# Patient Record
Sex: FEMALE | Race: WHITE | Employment: OTHER | URBAN - METROPOLITAN AREA
[De-identification: names, ages, dates, MRNs, and addresses within clinical notes are randomized per-mention and may not be internally consistent; named-entity substitution may affect disease eponyms.]

---

## 2018-01-11 PROBLEM — F41.8 DEPRESSION WITH ANXIETY: Status: ACTIVE | Noted: 2018-01-11

## 2018-01-12 ENCOUNTER — CARE COORDINATION (OUTPATIENT)
Dept: CARE COORDINATION | Age: 33
End: 2018-01-12

## 2018-01-12 NOTE — CARE COORDINATION
Attempted to reach patient for ed follow up, detailed message left with instructions to return call to care coordination at [phone]

## 2018-01-26 ENCOUNTER — CARE COORDINATION (OUTPATIENT)
Dept: CARE COORDINATION | Age: 33
End: 2018-01-26

## 2018-01-26 NOTE — CARE COORDINATION
Attempted to reach patient for ed follow up, detailed message left with instructions to return call to care coordination at [phone]

## 2018-02-02 PROBLEM — S39.012A STRAIN OF LUMBAR REGION: Status: ACTIVE | Noted: 2018-02-02

## 2018-02-02 PROBLEM — M54.30 SCIATICA: Status: ACTIVE | Noted: 2018-02-02

## 2018-02-02 PROBLEM — M62.838 SPASM OF MUSCLE: Status: ACTIVE | Noted: 2018-02-02

## 2018-03-22 ENCOUNTER — OFFICE VISIT (OUTPATIENT)
Dept: OBGYN | Age: 33
End: 2018-03-22
Payer: COMMERCIAL

## 2018-03-22 VITALS
SYSTOLIC BLOOD PRESSURE: 143 MMHG | HEART RATE: 132 BPM | BODY MASS INDEX: 51.13 KG/M2 | RESPIRATION RATE: 14 BRPM | WEIGHT: 237 LBS | HEIGHT: 57 IN | DIASTOLIC BLOOD PRESSURE: 97 MMHG | TEMPERATURE: 99.2 F

## 2018-03-22 DIAGNOSIS — R03.0 ELEVATED BLOOD PRESSURE READING: ICD-10-CM

## 2018-03-22 DIAGNOSIS — N93.8 DUB (DYSFUNCTIONAL UTERINE BLEEDING): Primary | ICD-10-CM

## 2018-03-22 DIAGNOSIS — E66.2 OBESITY WITH ALVEOLAR HYPOVENTILATION AND BODY MASS INDEX (BMI) OF 40 OR GREATER (HCC): ICD-10-CM

## 2018-03-22 PROCEDURE — G8417 CALC BMI ABV UP PARAM F/U: HCPCS | Performed by: NURSE PRACTITIONER

## 2018-03-22 PROCEDURE — G8427 DOCREV CUR MEDS BY ELIG CLIN: HCPCS | Performed by: NURSE PRACTITIONER

## 2018-03-22 PROCEDURE — 99202 OFFICE O/P NEW SF 15 MIN: CPT | Performed by: NURSE PRACTITIONER

## 2018-03-22 PROCEDURE — 99203 OFFICE O/P NEW LOW 30 MIN: CPT

## 2018-03-22 PROCEDURE — 4004F PT TOBACCO SCREEN RCVD TLK: CPT | Performed by: NURSE PRACTITIONER

## 2018-03-22 PROCEDURE — G8484 FLU IMMUNIZE NO ADMIN: HCPCS | Performed by: NURSE PRACTITIONER

## 2018-03-22 RX ORDER — DIVALPROEX SODIUM 500 MG/1
1000 TABLET, DELAYED RELEASE ORAL NIGHTLY
COMMUNITY
End: 2020-03-01

## 2018-03-22 ASSESSMENT — ENCOUNTER SYMPTOMS: GASTROINTESTINAL NEGATIVE: 1

## 2018-03-22 NOTE — PROGRESS NOTES
Subjective:      Patient ID: Tanesha Banegas is a 34 y.o. female. HPI  Patient in today for problem visit as a new patient. P5K2- 2 vaginal, 2   Patient's last menstrual period was 2018. Patient states for the past three months periods have been heavy with a lot of cramping. Did take Provera 10 mg last month for 6 days. Labs from 18:  TSH 2.560 0.270 - 4.200 uIU/mL Final 2018 4:45 PM MH- [address] [facility]  Hemoglobin 13.4 11.5 - 15.5 g/dL Final 2018 4:45 PM  - [address] [facility]   Hematocrit 40.4 34.0 - 48.0 % Final 2018 4:45 PM [address] [facility]      CT of Abdomen and Pelvis from 3/5/18:  ABDOMEN and PELVIS:     Intraperitoneal space: There is no free fluid or free air. Bones/joints: The spine, pelvis and proximal femurs are normal.     Soft tissues: There is no ventral hernia. Vasculature:  Unremarkable aorta and IVC. No abdominal aortic aneurysm. Lymph nodes:  Unremarkable. No enlarged lymph nodes. Impression   1. Mild splenomegaly, unchanged. 2.  There are no acute GI tract or urinary tract abnormalities. No free fluid    or free intraperitoneal air. There has been no adverse change since 2015. Pelvic Ultrasound from 18 showed:  Findings: The uterus demonstrates normal smooth contour and   ultrasonographic appearance, measuring approximately 7.6 cm x 5.2 cm x   5.3 cm. There is no space-occupying lesion in the uterus. The   endometrial stripe measures approximately 14.7 mm in diameter, which   is within normal limits for this age group. The right and left ovaries are not imaged. Normal color doppler flow   is demonstrated in the adnexa bilaterally. Limited views provided of the urinary bladder are unremarkable. No   free intra-abdominal or pelvic fluid is visualized. Impression   1. Grossly unremarkable transvaginal sonographic evaluation of the   uterus.    2. Right left ovaries not imaged or evaluated. Pap smear in January 2016 was negative for intraepithelial lesion or malignancy and HPV negative. Patient denies any history of abnormal pap smears. Review of Systems   Constitutional: Negative. Negative for unexpected weight change. Gastrointestinal: Negative. Endocrine: Negative. Genitourinary: Positive for menstrual problem. Objective:   Physical Exam   Constitutional: She is oriented to person, place, and time. She appears well-developed and well-nourished. Genitourinary: Uterus normal. There is no rash, tenderness, lesion or injury on the right labia. There is no rash, tenderness, lesion or injury on the left labia. Cervix exhibits no motion tenderness, no discharge and no friability. Right adnexum displays no tenderness. Left adnexum displays no tenderness. There is bleeding in the vagina. No erythema or tenderness in the vagina. No foreign body in the vagina. No signs of injury around the vagina. No vaginal discharge found. Genitourinary Comments: Small amount of blood in vaginal vault   Neurological: She is alert and oriented to person, place, and time. Psychiatric: She has a normal mood and affect. Nursing note and vitals reviewed. Assessment:      1. DUB (dysfunctional uterine bleeding)    2. 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 Elevated blood pressure reading            Plan:      Orders Placed This Encounter   Procedures    Hemoglobin A1C    Testosterone Free and Total, Non-Male     Return in about 1 week (around 3/29/2018) [facility] and results. Indications for endometrial biopsy discussed with patient at length as well as the procedure itself. Patient is aware that abnormal uterine bleeding can be a sign of endometrial cancer. Careful follow-up is advised. Patient is aware she will be having this procedure at her follow-up visit.  If she misses this procedure it is her responsibility to reschedule. Patient given brochure on endometrial biopsy. Patient advised that her blood pressure is elevated today. Risks of uncontrolled hypertension discussed with patient including stroke, heart attack and cardiovascular disease. I recommend that patient follow-up with her PCP or our internal medicine clinic. Patient advised to go to ER if shortness of breath, severe headache, or chest pain develops. Patient voices understanding. All questions and concerns addressed. Patient voices understanding of plan of care.

## 2018-03-29 ENCOUNTER — TELEPHONE (OUTPATIENT)
Dept: OBGYN | Age: 33
End: 2018-03-29

## 2018-03-29 NOTE — TELEPHONE ENCOUNTER
Patient Yazan Casas) called and left a message on the voicemail to cancel her appointment for this afternoon due to her daughter being ill. Darcy Cockayne was called back at 10:06 am on 3/29/18 and a message was left on her voicemail with the office hours and phone number to call us back to reschedule.     -Catr[place]ito, 3/29/18 (10:08a)

## 2018-04-27 ENCOUNTER — APPOINTMENT (OUTPATIENT)
Dept: GENERAL RADIOLOGY | Age: 33
End: 2018-04-27
Payer: COMMERCIAL

## 2018-04-27 ENCOUNTER — HOSPITAL ENCOUNTER (EMERGENCY)
Age: 33
Discharge: HOME | End: 2018-04-27
Payer: COMMERCIAL

## 2018-04-27 VITALS
HEART RATE: 100 BPM | BODY MASS INDEX: 44.3 KG/M2 | TEMPERATURE: 97.8 F | DIASTOLIC BLOOD PRESSURE: 70 MMHG | RESPIRATION RATE: 20 BRPM | WEIGHT: 250 LBS | OXYGEN SATURATION: 98 % | HEIGHT: 63 IN | SYSTOLIC BLOOD PRESSURE: 140 MMHG

## 2018-04-27 DIAGNOSIS — J40 BRONCHITIS: Primary | ICD-10-CM

## 2018-04-27 PROCEDURE — 6370000000 HC RX 637 (ALT 250 FOR IP): Performed by: NURSE PRACTITIONER

## 2018-04-27 PROCEDURE — 94664 DEMO&/EVAL PT USE INHALER: CPT

## 2018-04-27 PROCEDURE — 99283 EMERGENCY DEPT VISIT LOW MDM: CPT

## 2018-04-27 PROCEDURE — 71046 X-RAY EXAM CHEST 2 VIEWS: CPT

## 2018-04-27 RX ORDER — AMOXICILLIN AND CLAVULANATE POTASSIUM 875; 125 MG/1; MG/1
1 TABLET, FILM COATED ORAL 2 TIMES DAILY
Qty: 20 TABLET | Refills: 0 | Status: SHIPPED | OUTPATIENT
Start: 2018-04-27 | End: 2018-05-07

## 2018-04-27 RX ORDER — METHYLPREDNISOLONE 4 MG/1
TABLET ORAL
Qty: 1 KIT | Refills: 0 | Status: SHIPPED | OUTPATIENT
Start: 2018-04-27 | End: 2018-05-03

## 2018-04-27 RX ORDER — IPRATROPIUM BROMIDE AND ALBUTEROL SULFATE 2.5; .5 MG/3ML; MG/3ML
1 SOLUTION RESPIRATORY (INHALATION) ONCE
Status: COMPLETED | OUTPATIENT
Start: 2018-04-27 | End: 2018-04-27

## 2018-04-27 RX ORDER — ALBUTEROL SULFATE 90 UG/1
2 AEROSOL, METERED RESPIRATORY (INHALATION) EVERY 6 HOURS PRN
Qty: 1 INHALER | Refills: 3 | Status: ON HOLD | OUTPATIENT
Start: 2018-04-27 | End: 2019-01-08 | Stop reason: SDUPTHER

## 2018-04-27 RX ORDER — IBUPROFEN 800 MG/1
800 TABLET ORAL ONCE
Status: COMPLETED | OUTPATIENT
Start: 2018-04-27 | End: 2018-04-27

## 2018-04-27 RX ORDER — GUAIFENESIN 100 MG/5ML
200 SOLUTION ORAL ONCE
Status: COMPLETED | OUTPATIENT
Start: 2018-04-27 | End: 2018-04-27

## 2018-04-27 RX ADMIN — GUAIFENESIN 200 MG: 200 SOLUTION ORAL at 15:13

## 2018-04-27 RX ADMIN — IBUPROFEN 800 MG: 800 TABLET ORAL at 15:13

## 2018-04-27 RX ADMIN — IPRATROPIUM BROMIDE AND ALBUTEROL SULFATE 1 AMPULE: .5; 3 SOLUTION RESPIRATORY (INHALATION) at 15:30

## 2018-04-27 ASSESSMENT — PAIN SCALES - GENERAL: PAINLEVEL_OUTOF10: 7

## 2018-05-04 ENCOUNTER — HOSPITAL ENCOUNTER (EMERGENCY)
Age: 33
Discharge: HOME | End: 2018-05-04
Attending: EMERGENCY MEDICINE
Payer: COMMERCIAL

## 2018-05-04 ENCOUNTER — APPOINTMENT (OUTPATIENT)
Dept: GENERAL RADIOLOGY | Age: 33
End: 2018-05-04
Payer: COMMERCIAL

## 2018-05-04 VITALS
BODY MASS INDEX: 44.3 KG/M2 | WEIGHT: 250 LBS | OXYGEN SATURATION: 97 % | RESPIRATION RATE: 16 BRPM | TEMPERATURE: 98.9 F | HEIGHT: 63 IN | HEART RATE: 115 BPM | DIASTOLIC BLOOD PRESSURE: 64 MMHG | SYSTOLIC BLOOD PRESSURE: 118 MMHG

## 2018-05-04 DIAGNOSIS — S80.02XA CONTUSION OF LEFT KNEE, INITIAL ENCOUNTER: Primary | ICD-10-CM

## 2018-05-04 DIAGNOSIS — R07.89 ATYPICAL CHEST PAIN: ICD-10-CM

## 2018-05-04 LAB
ALBUMIN SERPL-MCNC: 3.7 G/DL (ref 3.5–5.2)
ALP BLD-CCNC: 87 U/L (ref 35–104)
ALT SERPL-CCNC: 14 U/L (ref 0–32)
ANION GAP SERPL CALCULATED.3IONS-SCNC: 16 MMOL/L (ref 7–16)
AST SERPL-CCNC: 13 U/L (ref 0–31)
BASOPHILS ABSOLUTE: 0.06 E9/L (ref 0–0.2)
BASOPHILS RELATIVE PERCENT: 0.5 % (ref 0–2)
BILIRUB SERPL-MCNC: <0.2 MG/DL (ref 0–1.2)
BUN BLDV-MCNC: 10 MG/DL (ref 6–20)
CALCIUM SERPL-MCNC: 8.8 MG/DL (ref 8.6–10.2)
CHLORIDE BLD-SCNC: 101 MMOL/L (ref 98–107)
CO2: 21 MMOL/L (ref 22–29)
CREAT SERPL-MCNC: 0.6 MG/DL (ref 0.5–1)
D DIMER: <200 NG/ML DDU
EOSINOPHILS ABSOLUTE: 0.37 E9/L (ref 0.05–0.5)
EOSINOPHILS RELATIVE PERCENT: 3 % (ref 0–6)
GFR AFRICAN AMERICAN: >60
GFR NON-AFRICAN AMERICAN: >60 ML/MIN/1.73
GLUCOSE BLD-MCNC: 201 MG/DL (ref 74–109)
HCT VFR BLD CALC: 39.5 % (ref 34–48)
HEMOGLOBIN: 12.5 G/DL (ref 11.5–15.5)
IMMATURE GRANULOCYTES #: 0.05 E9/L
IMMATURE GRANULOCYTES %: 0.4 % (ref 0–5)
LYMPHOCYTES ABSOLUTE: 3.08 E9/L (ref 1.5–4)
LYMPHOCYTES RELATIVE PERCENT: 25.1 % (ref 20–42)
MCH RBC QN AUTO: 28 PG (ref 26–35)
MCHC RBC AUTO-ENTMCNC: 31.6 % (ref 32–34.5)
MCV RBC AUTO: 88.4 FL (ref 80–99.9)
MONOCYTES ABSOLUTE: 0.6 E9/L (ref 0.1–0.95)
MONOCYTES RELATIVE PERCENT: 4.9 % (ref 2–12)
NEUTROPHILS ABSOLUTE: 8.09 E9/L (ref 1.8–7.3)
NEUTROPHILS RELATIVE PERCENT: 66.1 % (ref 43–80)
PDW BLD-RTO: 14.6 FL (ref 11.5–15)
PLATELET # BLD: 250 E9/L (ref 130–450)
PMV BLD AUTO: 11.4 FL (ref 7–12)
POTASSIUM SERPL-SCNC: 3.6 MMOL/L (ref 3.5–5)
RBC # BLD: 4.47 E12/L (ref 3.5–5.5)
SODIUM BLD-SCNC: 138 MMOL/L (ref 132–146)
TOTAL PROTEIN: 7 G/DL (ref 6.4–8.3)
TROPONIN: <0.01 NG/ML (ref 0–0.03)
WBC # BLD: 12.3 E9/L (ref 4.5–11.5)

## 2018-05-04 PROCEDURE — 96374 THER/PROPH/DIAG INJ IV PUSH: CPT

## 2018-05-04 PROCEDURE — 6370000000 HC RX 637 (ALT 250 FOR IP): Performed by: PHYSICIAN ASSISTANT

## 2018-05-04 PROCEDURE — 71046 X-RAY EXAM CHEST 2 VIEWS: CPT

## 2018-05-04 PROCEDURE — 85378 FIBRIN DEGRADE SEMIQUANT: CPT

## 2018-05-04 PROCEDURE — 93005 ELECTROCARDIOGRAM TRACING: CPT | Performed by: NURSE PRACTITIONER

## 2018-05-04 PROCEDURE — 85025 COMPLETE CBC W/AUTO DIFF WBC: CPT

## 2018-05-04 PROCEDURE — 6360000002 HC RX W HCPCS: Performed by: PHYSICIAN ASSISTANT

## 2018-05-04 PROCEDURE — 73562 X-RAY EXAM OF KNEE 3: CPT

## 2018-05-04 PROCEDURE — 84484 ASSAY OF TROPONIN QUANT: CPT

## 2018-05-04 PROCEDURE — 99285 EMERGENCY DEPT VISIT HI MDM: CPT

## 2018-05-04 PROCEDURE — 80053 COMPREHEN METABOLIC PANEL: CPT

## 2018-05-04 RX ORDER — NAPROXEN 500 MG/1
500 TABLET ORAL 2 TIMES DAILY
Qty: 14 TABLET | Refills: 0 | Status: SHIPPED | OUTPATIENT
Start: 2018-05-04 | End: 2018-12-27 | Stop reason: ALTCHOICE

## 2018-05-04 RX ORDER — ASPIRIN 81 MG/1
324 TABLET, CHEWABLE ORAL ONCE
Status: COMPLETED | OUTPATIENT
Start: 2018-05-04 | End: 2018-05-04

## 2018-05-04 RX ORDER — KETOROLAC TROMETHAMINE 30 MG/ML
15 INJECTION, SOLUTION INTRAMUSCULAR; INTRAVENOUS ONCE
Status: COMPLETED | OUTPATIENT
Start: 2018-05-04 | End: 2018-05-04

## 2018-05-04 RX ADMIN — ASPIRIN 81 MG 324 MG: 81 TABLET ORAL at 21:02

## 2018-05-04 RX ADMIN — KETOROLAC TROMETHAMINE 15 MG: 30 INJECTION, SOLUTION INTRAMUSCULAR at 21:12

## 2018-05-04 ASSESSMENT — PAIN DESCRIPTION - ORIENTATION: ORIENTATION: LEFT

## 2018-05-04 ASSESSMENT — PAIN DESCRIPTION - PROGRESSION: CLINICAL_PROGRESSION: NOT CHANGED

## 2018-05-04 ASSESSMENT — PAIN DESCRIPTION - LOCATION: LOCATION: KNEE

## 2018-05-04 ASSESSMENT — PAIN DESCRIPTION - DESCRIPTORS: DESCRIPTORS: CONSTANT;DISCOMFORT;SORE

## 2018-05-04 ASSESSMENT — PAIN DESCRIPTION - FREQUENCY: FREQUENCY: CONTINUOUS

## 2018-05-04 ASSESSMENT — PAIN DESCRIPTION - PAIN TYPE: TYPE: ACUTE PAIN

## 2018-05-04 ASSESSMENT — PAIN DESCRIPTION - ONSET: ONSET: ON-GOING

## 2018-05-04 ASSESSMENT — PAIN SCALES - GENERAL
PAINLEVEL_OUTOF10: 10
PAINLEVEL_OUTOF10: 10

## 2018-05-07 LAB
EKG ATRIAL RATE: 111 BPM
EKG P AXIS: 32 DEGREES
EKG P-R INTERVAL: 142 MS
EKG Q-T INTERVAL: 316 MS
EKG QRS DURATION: 76 MS
EKG QTC CALCULATION (BAZETT): 429 MS
EKG R AXIS: 9 DEGREES
EKG T AXIS: 19 DEGREES
EKG VENTRICULAR RATE: 111 BPM

## 2018-07-08 ENCOUNTER — HOSPITAL ENCOUNTER (EMERGENCY)
Age: 33
Discharge: HOME | End: 2018-07-08
Attending: EMERGENCY MEDICINE
Payer: COMMERCIAL

## 2018-07-08 VITALS
OXYGEN SATURATION: 95 % | BODY MASS INDEX: 51.53 KG/M2 | RESPIRATION RATE: 16 BRPM | DIASTOLIC BLOOD PRESSURE: 82 MMHG | HEART RATE: 98 BPM | HEIGHT: 62 IN | TEMPERATURE: 98.9 F | SYSTOLIC BLOOD PRESSURE: 127 MMHG | WEIGHT: 280 LBS

## 2018-07-08 DIAGNOSIS — R11.0 NAUSEA: ICD-10-CM

## 2018-07-08 DIAGNOSIS — R51.9 NONINTRACTABLE HEADACHE, UNSPECIFIED CHRONICITY PATTERN, UNSPECIFIED HEADACHE TYPE: Primary | ICD-10-CM

## 2018-07-08 LAB
ANION GAP SERPL CALCULATED.3IONS-SCNC: 16 MMOL/L (ref 7–16)
BASOPHILS ABSOLUTE: 0.07 E9/L (ref 0–0.2)
BASOPHILS RELATIVE PERCENT: 0.6 % (ref 0–2)
BUN BLDV-MCNC: 8 MG/DL (ref 6–20)
CALCIUM SERPL-MCNC: 9.1 MG/DL (ref 8.6–10.2)
CHLORIDE BLD-SCNC: 101 MMOL/L (ref 98–107)
CHP ED QC CHECK: NORMAL
CO2: 21 MMOL/L (ref 22–29)
CREAT SERPL-MCNC: 0.6 MG/DL (ref 0.5–1)
EOSINOPHILS ABSOLUTE: 0.24 E9/L (ref 0.05–0.5)
EOSINOPHILS RELATIVE PERCENT: 1.9 % (ref 0–6)
GFR AFRICAN AMERICAN: >60
GFR NON-AFRICAN AMERICAN: >60 ML/MIN/1.73
GLUCOSE BLD-MCNC: 179 MG/DL (ref 74–109)
HCT VFR BLD CALC: 42.3 % (ref 34–48)
HEMOGLOBIN: 13.5 G/DL (ref 11.5–15.5)
IMMATURE GRANULOCYTES #: 0.04 E9/L
IMMATURE GRANULOCYTES %: 0.3 % (ref 0–5)
LYMPHOCYTES ABSOLUTE: 2.95 E9/L (ref 1.5–4)
LYMPHOCYTES RELATIVE PERCENT: 23.8 % (ref 20–42)
MCH RBC QN AUTO: 27.1 PG (ref 26–35)
MCHC RBC AUTO-ENTMCNC: 31.9 % (ref 32–34.5)
MCV RBC AUTO: 84.8 FL (ref 80–99.9)
MONOCYTES ABSOLUTE: 0.6 E9/L (ref 0.1–0.95)
MONOCYTES RELATIVE PERCENT: 4.8 % (ref 2–12)
NEUTROPHILS ABSOLUTE: 8.51 E9/L (ref 1.8–7.3)
NEUTROPHILS RELATIVE PERCENT: 68.6 % (ref 43–80)
PDW BLD-RTO: 15.4 FL (ref 11.5–15)
PLATELET # BLD: 306 E9/L (ref 130–450)
PMV BLD AUTO: 11.3 FL (ref 7–12)
POTASSIUM SERPL-SCNC: 3.7 MMOL/L (ref 3.5–5)
PREGNANCY TEST URINE, POC: NEGATIVE
RBC # BLD: 4.99 E12/L (ref 3.5–5.5)
SODIUM BLD-SCNC: 138 MMOL/L (ref 132–146)
WBC # BLD: 12.4 E9/L (ref 4.5–11.5)

## 2018-07-08 PROCEDURE — 99284 EMERGENCY DEPT VISIT MOD MDM: CPT

## 2018-07-08 PROCEDURE — 80048 BASIC METABOLIC PNL TOTAL CA: CPT

## 2018-07-08 PROCEDURE — 96374 THER/PROPH/DIAG INJ IV PUSH: CPT

## 2018-07-08 PROCEDURE — 2580000003 HC RX 258: Performed by: EMERGENCY MEDICINE

## 2018-07-08 PROCEDURE — 6360000002 HC RX W HCPCS: Performed by: EMERGENCY MEDICINE

## 2018-07-08 PROCEDURE — 85025 COMPLETE CBC W/AUTO DIFF WBC: CPT

## 2018-07-08 PROCEDURE — 6370000000 HC RX 637 (ALT 250 FOR IP): Performed by: EMERGENCY MEDICINE

## 2018-07-08 PROCEDURE — 96375 TX/PRO/DX INJ NEW DRUG ADDON: CPT

## 2018-07-08 RX ORDER — DIPHENHYDRAMINE HYDROCHLORIDE 50 MG/ML
50 INJECTION INTRAMUSCULAR; INTRAVENOUS ONCE
Status: COMPLETED | OUTPATIENT
Start: 2018-07-08 | End: 2018-07-08

## 2018-07-08 RX ORDER — ACETAMINOPHEN 500 MG
1000 TABLET ORAL ONCE
Status: COMPLETED | OUTPATIENT
Start: 2018-07-08 | End: 2018-07-08

## 2018-07-08 RX ORDER — METHYLPREDNISOLONE 4 MG/1
TABLET ORAL
Qty: 1 KIT | Status: SHIPPED | OUTPATIENT
Start: 2018-07-08 | End: 2018-07-14

## 2018-07-08 RX ORDER — PROMETHAZINE HYDROCHLORIDE 25 MG/1
25 TABLET ORAL EVERY 6 HOURS PRN
Qty: 20 TABLET | Refills: 0 | Status: SHIPPED | OUTPATIENT
Start: 2018-07-08 | End: 2018-07-13

## 2018-07-08 RX ORDER — DEXAMETHASONE SODIUM PHOSPHATE 10 MG/ML
10 INJECTION INTRAMUSCULAR; INTRAVENOUS ONCE
Status: COMPLETED | OUTPATIENT
Start: 2018-07-08 | End: 2018-07-08

## 2018-07-08 RX ORDER — METOCLOPRAMIDE HYDROCHLORIDE 5 MG/ML
10 INJECTION INTRAMUSCULAR; INTRAVENOUS ONCE
Status: COMPLETED | OUTPATIENT
Start: 2018-07-08 | End: 2018-07-08

## 2018-07-08 RX ORDER — SODIUM CHLORIDE 9 MG/ML
INJECTION, SOLUTION INTRAVENOUS ONCE
Status: COMPLETED | OUTPATIENT
Start: 2018-07-08 | End: 2018-07-08

## 2018-07-08 RX ADMIN — DEXAMETHASONE SODIUM PHOSPHATE 10 MG: 10 INJECTION INTRAMUSCULAR; INTRAVENOUS at 22:15

## 2018-07-08 RX ADMIN — SODIUM CHLORIDE: 9 INJECTION, SOLUTION INTRAVENOUS at 22:15

## 2018-07-08 RX ADMIN — METOCLOPRAMIDE 10 MG: 5 INJECTION, SOLUTION INTRAMUSCULAR; INTRAVENOUS at 22:15

## 2018-07-08 RX ADMIN — DIPHENHYDRAMINE HYDROCHLORIDE 50 MG: 50 INJECTION, SOLUTION INTRAMUSCULAR; INTRAVENOUS at 22:15

## 2018-07-08 RX ADMIN — ACETAMINOPHEN 1000 MG: 500 TABLET ORAL at 22:15

## 2018-07-08 ASSESSMENT — PAIN SCALES - GENERAL
PAINLEVEL_OUTOF10: 7
PAINLEVEL_OUTOF10: 2

## 2018-07-08 ASSESSMENT — PAIN DESCRIPTION - PAIN TYPE: TYPE: ACUTE PAIN

## 2018-07-08 ASSESSMENT — PAIN DESCRIPTION - LOCATION: LOCATION: HEAD

## 2018-07-09 NOTE — ED PROVIDER NOTES
HPI:   Harpal Hill is a 34 y.o. female presenting to the ED for headache, beginning a few days ago. The complaint has been persistent, moderate in severity, and worsened by nothing. Pt states she has had a headache for a couple of days pt does have a hx of migraines and takes tylenol but has had no relief. Pt states she had a head injury in the past. Pt denies fever, chills, nausea, vomiting, diarrhea, LOC, SOB, cough, hematuria, dysuria, dizziness, fall, abdominal pain, chest pain. ROS:   Pertinent positives and negatives are stated within HPI, all other systems reviewed and are negative.    --------------------------------------------- PAST HISTORY ---------------------------------------------  Past Medical History:  has a past medical history of Anxiety; Asthma; Bipolar 1 disorder ([facility] [address] [facility] Depression; Fissure, anal; Neuropathy (HCC); and PTSD (post-traumatic stress disorder). Past Surgical History:  has a past surgical history that includes Cholecystectomy; Tubal ligation; and Anterior cruciate ligament repair. Social History:  reports that she has been smoking Cigarettes. She started smoking about 22 years ago. She has a 10.00 pack-year smoking history. She has never used smokeless tobacco. She reports that she does not drink alcohol or use drugs. Family History: family history includes Cancer in her mother and sister; Diabetes in her father, mother, and sister; High Blood Pressure in her brother. The patients home medications have been reviewed. Allergies: Cymbalta [duloxetine hcl];  Neurontin [gabapentin]; and Lamictal [lamotrigine]    -------------------------------------------------- RESULTS -------------------------------------------------  All laboratory and radiology results have been personally reviewed by myself   LABS:  Results for orders placed or performed during the hospital encounter of 07/08/18   CBC Auto

## 2018-07-09 NOTE — ED NOTES
FIRST PROVIDER CONTACT ASSESSMENT NOTE      Department of Emergency Medicine   7/8/18  9:45 PM    Chief Complaint: Headache (pt states she has had a headache for a couple of days. pt has history of migraines and takes tylenol. )      History of Present Illness:    Marion Avila is a 34 y.o. female who presents to the ED by private car for headache   Focused Screening Exam:  Constitutional:  Alert, appears stated age and is in no distress. Heart rrr  Lungs clear     *ALLERGIES*     Cymbalta [duloxetine hcl];  Neurontin [gabapentin]; and Lamictal [lamotrigine]     ED Triage Vitals   BP Temp Temp src Pulse Resp SpO2 Height Weight   -- -- -- -- -- -- -- --        Initial Plan of Care:  Initiate Treatment-Testing, Proceed toTreatment Area When Bed Available for ED Attending/MLP to Continue Care    -----------------END OF FIRST PROVIDER CONTACT ASSESSMENT NOTE--------------  Electronically signed by BRANDEN Zepeda   DD: 7/8/18     BRANDEN Zepeda  07/08/18 2146

## 2018-07-12 ENCOUNTER — CARE COORDINATION (OUTPATIENT)
Dept: CARE COORDINATION | Age: 33
End: 2018-07-12

## 2018-07-12 NOTE — LETTER
7/12/2018        Maira Newton  [facility] [address]          Dear Hussein Luu,    My name is Lin Edge and I am a registered nurse who partners with Yazmin Kruse MD to improve patients' health. Yazmin Kruse MD believes you would benefit from working with me. As a member of your health care team, I would work with other providers involved in your care, offer education for your specific health conditions, and connect you with additional resources as needed. I will collaborate with Yazmin Kruse MD to support you in following your treatment plan. The additional support I provide is no additional cost to you. My primary focus is to help you achieve specific goals and improve your health. We are committed to walk with you on this journey and look forward to working with you. Please call me to further discuss your healthcare needs. I am available by phone. You can reach me at [phone].     In good health,     Lin Edge RN

## 2018-07-25 ENCOUNTER — CARE COORDINATION (OUTPATIENT)
Dept: CARE COORDINATION | Age: 33
End: 2018-07-25

## 2018-07-25 NOTE — CARE COORDINATION
[facility] reached out to Ayush Cortez for follow up from letter of invite. Left message with contact info.

## 2018-08-07 ENCOUNTER — CARE COORDINATION (OUTPATIENT)
Dept: CARE COORDINATION | Age: 33
End: 2018-08-07

## 2018-08-09 ENCOUNTER — CARE COORDINATION (OUTPATIENT)
Dept: CARE COORDINATION | Age: 33
End: 2018-08-09

## 2018-08-09 ASSESSMENT — ENCOUNTER SYMPTOMS: DYSPNEA ASSOCIATED WITH: EXERTION

## 2018-08-15 ENCOUNTER — CARE COORDINATION (OUTPATIENT)
Dept: CARE COORDINATION | Age: 33
End: 2018-08-15

## 2018-08-15 ASSESSMENT — ENCOUNTER SYMPTOMS: DYSPNEA ASSOCIATED WITH: EXERTION

## 2018-08-29 ENCOUNTER — CARE COORDINATION (OUTPATIENT)
Dept: CARE COORDINATION | Age: 33
End: 2018-08-29

## 2018-08-29 NOTE — CARE COORDINATION
Ambulatory Care Coordination Note  8/29/2018  CM Risk Score: 7  Sai Mortality Risk Score:      [facility]: Donna Encarnacion RN    Summary Note:   [facility] called Caroline Pacheco for COPD follow up. Caroline Pacheco states she has been feeling \"OK\". She denies any increase in shortness of breath or exertion with activity. She states she has not been very active. She admits to continuing to smoke. [facility] encouraged her to make a follow up appointment before her medications run out and she needs refills. Caroline Pacheco was in agreement and [facility] transferred her to [facility]. Care Coordination Interventions    Program Enrollment:  Complex Care  Referral from Primary Care Provider:  No  Suggested Interventions and Community Resources  Smoking Cessation: In Process  Zone Management Tools:   In Process         Goals Addressed             Most Recent     Stop Cigarette/Tobacco use   Not on track (8/29/2018)             I will work towards quitting    Barriers: lack of motivation  Plan to overcome: decrease number of cigarettes a day  Confidence: 4/10  Anticipate goal completion date: 10/01/2018                        Future Appointments  Date Time Provider Niesha Yarbrough   9/10/2018 11:00 AM MD Lindsey Molina [facility]      and   COPD Assessment    Does the patient understand envrionmental exposure?:  Yes  Is the patient able to verbalize Rescue vs. Long Acting medications?:  Yes  Does the patient have a nebulizer?:  Yes  Does the patient use a space with inhaled medications?:  No     No patient-reported symptoms         Symptoms:

## 2018-09-10 ENCOUNTER — OFFICE VISIT (OUTPATIENT)
Dept: FAMILY MEDICINE CLINIC | Age: 33
End: 2018-09-10
Payer: COMMERCIAL

## 2018-09-10 VITALS
HEART RATE: 112 BPM | RESPIRATION RATE: 16 BRPM | WEIGHT: 284 LBS | BODY MASS INDEX: 50.32 KG/M2 | OXYGEN SATURATION: 98 % | DIASTOLIC BLOOD PRESSURE: 79 MMHG | TEMPERATURE: 98.8 F | HEIGHT: 63 IN | SYSTOLIC BLOOD PRESSURE: 129 MMHG

## 2018-09-10 DIAGNOSIS — R06.09 DYSPNEA ON EXERTION: Primary | ICD-10-CM

## 2018-09-10 PROCEDURE — 99212 OFFICE O/P EST SF 10 MIN: CPT | Performed by: FAMILY MEDICINE

## 2018-09-10 PROCEDURE — G8427 DOCREV CUR MEDS BY ELIG CLIN: HCPCS | Performed by: FAMILY MEDICINE

## 2018-09-10 PROCEDURE — G8417 CALC BMI ABV UP PARAM F/U: HCPCS | Performed by: FAMILY MEDICINE

## 2018-09-10 PROCEDURE — 99213 OFFICE O/P EST LOW 20 MIN: CPT | Performed by: FAMILY MEDICINE

## 2018-09-10 PROCEDURE — 4004F PT TOBACCO SCREEN RCVD TLK: CPT | Performed by: FAMILY MEDICINE

## 2018-09-10 RX ORDER — ALBUTEROL SULFATE 90 UG/1
2 AEROSOL, METERED RESPIRATORY (INHALATION) EVERY 6 HOURS PRN
Qty: 1 INHALER | Refills: 3 | Status: CANCELLED | OUTPATIENT
Start: 2018-09-10

## 2018-09-10 RX ORDER — MONTELUKAST SODIUM 10 MG/1
10 TABLET ORAL NIGHTLY
Qty: 30 TABLET | Refills: 3 | Status: SHIPPED | OUTPATIENT
Start: 2018-09-10 | End: 2019-05-22

## 2018-09-10 RX ORDER — ALBUTEROL SULFATE 90 UG/1
2 AEROSOL, METERED RESPIRATORY (INHALATION) EVERY 6 HOURS PRN
Qty: 1 INHALER | Refills: 3 | Status: SHIPPED | OUTPATIENT
Start: 2018-09-10 | End: 2020-01-22

## 2018-09-12 ENCOUNTER — CARE COORDINATION (OUTPATIENT)
Dept: CARE COORDINATION | Age: 33
End: 2018-09-12

## 2018-09-12 ASSESSMENT — ENCOUNTER SYMPTOMS
DIARRHEA: 0
NAUSEA: 0
SHORTNESS OF BREATH: 1
BLURRED VISION: 0
CONSTIPATION: 0
SORE THROAT: 0
SINUS PAIN: 0
DOUBLE VISION: 0
SPUTUM PRODUCTION: 0
VOMITING: 0
WHEEZING: 1
ABDOMINAL PAIN: 0
COUGH: 1

## 2018-09-12 NOTE — PROGRESS NOTES
[address] [facility]  Department of Family Medicine  Family Medicine Residency Program      Patient:  Agus Newton 34 y.o. female     Date of Service: 9/10/18      Chief complaint:   Chief Complaint   Patient presents with    Medication Refill    Blister     blisters on lips, would like something to help this         History of Present Illness   The patient is a 34 y.o. female  presented to the clinic for asthma FU. She has had PFT's completed in 2017 which showed no airway processes. States she used to follow with pulmonology but has not in some time. Was given an inhaled steroid to use daily but does not due to fear of getting oral thrush. She uses albuterol inhaler six times daily for when she gets SOB or coughs. States she gets SOB w/ any type of exertion, has elevated BMI and strong smoking history. Wakes up in the middle of the night feeling as though she is \"drowning\" and feeling very SOB. Strong family hx of CHF. States she has had sleep study which did not reveal CELESTINO. Started smoking 3 packs a day when she was 8, only with in the last year or so has she cut back to ~1 pack. Denies any fever chills, nasal congestion, headaches, productive cough, abdominal pain, N/V/D/C. Past Medical History:      Diagnosis Date    Anxiety     Asthma     Bipolar 1 disorder ([address]     COPD [facility]ch[facility] obstructive [facility] disease) ([facility]     Depression     Fissure, anal     Neuropathy     PTSD (post-traumatic stress disorder)        Past Surgical History:        Procedure Laterality Date    ANTERIOR CRUCIATE LIGAMENT REPAIR      CHOLECYSTECTOMY      TUBAL LIGATION         Allergies:    Cymbalta [duloxetine hcl];  Neurontin [gabapentin]; and Lamictal [lamotrigine]    Social History:   Social History     Social History    Marital status:      Spouse name: N/A    Number of children: N/A    Years of education: N/A     Occupational History    disability-, LPN      Social History Main Topics    Smoking status: Current Every Day Smoker     Packs/day: 0.50     Years: 20.00     Types: Cigarettes     Start date: 11/3/1995    Smokeless tobacco: Never Used    Alcohol use No      Comment: social    Drug use: No    Sexual activity: Not Currently     Other Topics Concern    Not on file     Social History Narrative    No narrative on file        Family History:   Family History   Problem Relation Age of Onset    Diabetes Mother     Cancer Mother         ovarian    Diabetes Father     Cancer Sister         NHL    Diabetes Sister     High Blood Pressure Brother        Review of Systems:   Review of Systems   Constitutional: Negative for chills and fever. HENT: Negative for congestion, ear discharge, ear pain, sinus pain and sore throat. Eyes: Negative for blurred vision and double vision. Respiratory: Positive for cough, shortness of breath and wheezing. Negative for sputum production. Cardiovascular: Negative for chest pain, palpitations and leg swelling. Gastrointestinal: Negative for abdominal pain, constipation, diarrhea, nausea and vomiting. Neurological: Negative for dizziness and headaches. Physical Exam   Vitals: /79   Pulse 112   Temp 98.8 °F (37.1 °C) (Oral)   Resp 16   Ht 5' 3\" (1.6 m)   Wt 284 lb (128.8 kg)   LMP 08/30/2018 (Approximate)   SpO2 98%   BMI 50.31 kg/m²   General Appearance: Well developed, awake, alert, oriented, no acute distress  HEENT: Normocephalic, atraumatic. EOM's intact, EAC without erythema or swelling, no pallor or icterus. Neck: Supple, symmetrical, trachea midline. No JVD. Chest wall/Lung: Clear to auscultation bilaterally,  respirations unlabored. No ronchi/wheezing/rales  Heart: Regular rate and rhythm, S1 and S2 normal, no murmur, rub or gallop. Extremities:  Extremities normal, atraumatic, no cyanosis. no edema.   Skin: Skin color, texture, turgor normal, no rashes or lesions  Musculokeletal: ROM grossly normal in all joints of extremities, no obvious joint swelling. Lymph nodes: no lymph node enlargement appreciated  Neurologic:   Alert&Oriented. Normal gait and coordination  No focal neurological deficits appreaciated         Psychiatric: has a normal mood and affect. Behavior is normal.       Assessment and Plan      Dyspnea on exertion  -advised to take inhaled steroid daily, to call in w/name of inhaler  - Echocardiogram complete; Future-->due to strong family hx and symptoms will obtain  - montelukast (SINGULAIR) 10 MG tablet; Take 1 tablet by mouth nightly  Dispense: 30 tablet; Refill: 3      Return to Office: Return in about 1 month (around 10/10/2018) for FU asthma.     Michele Bahena MD    Medication List:    Current Outpatient Prescriptions   Medication Sig Dispense Refill    albuterol sulfate HFA (PROAIR HFA) 108 (90 Base) MCG/ACT inhaler Inhale 2 puffs into the lungs every 6 hours as needed for Wheezing 1 Inhaler 3    montelukast (SINGULAIR) 10 MG tablet Take 1 tablet by mouth nightly 30 tablet 3    albuterol sulfate HFA (PROVENTIL HFA) 108 (90 Base) MCG/ACT inhaler Inhale 2 puffs into the lungs every 6 hours as needed for Wheezing 1 Inhaler 3    divalproex (DEPAKOTE) 500 MG DR tablet Take 1,000 mg by mouth daily      albuterol (PROVENTIL) (2.5 MG/3ML) 0.083% nebulizer solution Take 3 mLs by nebulization every 6 hours as needed for Wheezing 30 each 1    venlafaxine (EFFEXOR XR) 150 MG extended release capsule Take 150 mg by mouth daily       clonazePAM (KLONOPIN) 0.5 MG tablet Take 1 tablet by mouth 2 times daily as needed for Anxiety (Patient taking differently: Take 0.5 mg by mouth 3 times daily as needed for Anxiety ) 15 tablet 0    naproxen (NAPROSYN) 500 MG tablet Take 1 tablet by mouth 2 times daily for 7 days 14 tablet 0    guaiFENesin (ROBITUSSIN) 100 MG/5ML liquid Take 10 mLs by mouth 3 times daily as needed for Cough or Congestion 118 mL 0    ibuprofen (ADVIL;MOTRIN) 800 MG tablet Take 1 tablet by mouth every 6 hours as needed for Pain 20 tablet 0    mirtazapine (REMERON) 15 MG tablet Take 15 mg by mouth nightly      medroxyPROGESTERone (PROVERA) 10 MG tablet Take 1 tablet by mouth daily for 6 days 6 tablet 0    OXcarbazepine (TRILEPTAL) 300 MG tablet Take 300 mg by mouth nightly       No current facility-administered medications for this visit.

## 2018-09-27 ENCOUNTER — CARE COORDINATION (OUTPATIENT)
Dept: CARE COORDINATION | Age: 33
End: 2018-09-27

## 2018-10-10 ENCOUNTER — CARE COORDINATION (OUTPATIENT)
Dept: CARE COORDINATION | Age: 33
End: 2018-10-10

## 2018-10-12 ENCOUNTER — HOSPITAL ENCOUNTER (EMERGENCY)
Age: 33
Discharge: HOME | End: 2018-10-12
Payer: COMMERCIAL

## 2018-10-12 VITALS
OXYGEN SATURATION: 98 % | SYSTOLIC BLOOD PRESSURE: 131 MMHG | BODY MASS INDEX: 49.61 KG/M2 | RESPIRATION RATE: 18 BRPM | WEIGHT: 280 LBS | DIASTOLIC BLOOD PRESSURE: 74 MMHG | HEART RATE: 97 BPM | HEIGHT: 63 IN | TEMPERATURE: 97.7 F

## 2018-10-12 DIAGNOSIS — S90.851A FOREIGN BODY IN RIGHT FOOT, INITIAL ENCOUNTER: Primary | ICD-10-CM

## 2018-10-12 PROCEDURE — 90715 TDAP VACCINE 7 YRS/> IM: CPT | Performed by: NURSE PRACTITIONER

## 2018-10-12 PROCEDURE — 90471 IMMUNIZATION ADMIN: CPT | Performed by: NURSE PRACTITIONER

## 2018-10-12 PROCEDURE — 10120 INC&RMVL FB SUBQ TISS SMPL: CPT

## 2018-10-12 PROCEDURE — 6360000002 HC RX W HCPCS: Performed by: NURSE PRACTITIONER

## 2018-10-12 PROCEDURE — 99282 EMERGENCY DEPT VISIT SF MDM: CPT

## 2018-10-12 RX ORDER — CEPHALEXIN 500 MG/1
500 CAPSULE ORAL 2 TIMES DAILY
Qty: 10 CAPSULE | Refills: 0 | Status: SHIPPED | OUTPATIENT
Start: 2018-10-12 | End: 2018-10-17

## 2018-10-12 RX ADMIN — TETANUS TOXOID, REDUCED DIPHTHERIA TOXOID AND ACELLULAR PERTUSSIS VACCINE, ADSORBED 0.5 ML: 5; 2.5; 8; 8; 2.5 SUSPENSION INTRAMUSCULAR at 17:55

## 2018-10-12 ASSESSMENT — PAIN DESCRIPTION - PAIN TYPE: TYPE: ACUTE PAIN

## 2018-10-12 ASSESSMENT — PAIN DESCRIPTION - LOCATION: LOCATION: FOOT

## 2018-10-12 ASSESSMENT — PAIN DESCRIPTION - ORIENTATION: ORIENTATION: RIGHT

## 2018-10-12 ASSESSMENT — PAIN SCALES - GENERAL: PAINLEVEL_OUTOF10: 5

## 2018-10-23 ENCOUNTER — OFFICE VISIT (OUTPATIENT)
Dept: OBGYN | Age: 33
End: 2018-10-23
Payer: COMMERCIAL

## 2018-10-23 VITALS
WEIGHT: 272 LBS | SYSTOLIC BLOOD PRESSURE: 136 MMHG | DIASTOLIC BLOOD PRESSURE: 93 MMHG | TEMPERATURE: 97.8 F | HEART RATE: 92 BPM | BODY MASS INDEX: 48.2 KG/M2 | HEIGHT: 63 IN

## 2018-10-23 DIAGNOSIS — A63.0 GENITAL WARTS: ICD-10-CM

## 2018-10-23 DIAGNOSIS — N93.8 DUB (DYSFUNCTIONAL UTERINE BLEEDING): Primary | ICD-10-CM

## 2018-10-23 PROCEDURE — 99213 OFFICE O/P EST LOW 20 MIN: CPT | Performed by: NURSE PRACTITIONER

## 2018-10-23 PROCEDURE — G8417 CALC BMI ABV UP PARAM F/U: HCPCS | Performed by: NURSE PRACTITIONER

## 2018-10-23 PROCEDURE — G8427 DOCREV CUR MEDS BY ELIG CLIN: HCPCS | Performed by: NURSE PRACTITIONER

## 2018-10-23 PROCEDURE — 4004F PT TOBACCO SCREEN RCVD TLK: CPT | Performed by: NURSE PRACTITIONER

## 2018-10-23 PROCEDURE — G8484 FLU IMMUNIZE NO ADMIN: HCPCS | Performed by: NURSE PRACTITIONER

## 2018-10-23 NOTE — PROGRESS NOTES
Subjective:      Patient ID: Kezia Baca is a 34 y.o. female. HPI  Patient in today for a problem visit as an established patient. Patient's last menstrual period was 10/14/2018. States her period \"at times has issues. \" Has not improved since appointment in March. States can go through three bags of pads in one cycle. \"Cramps are so bad\" and last for 10 days. Period comes every month per patient. Patient was seen by myself in March of this year for DUB and did not follow-up as scheduled. At that appointment the following information was reviewed:  - 2 vaginal, 2   Patient did take a 6 day course of Provera 10 mg po prescribed by [facility] in 2018. Labs from 18:  TSH 2.560 0.270 - 4.200 uIU/mL Final 2018 4:45 PM MH- [address] [facility]  Hemoglobin 13.4 11.5 - 15.5 g/dL Final 2018 4:45 PM MH - [address] [facility]   Hematocrit 40.4 34.0 - 48.0 % Final 2018 4:45 PM [address] [facility]       CT of Abdomen and Pelvis from 3/5/18:  ABDOMEN and PELVIS:     Intraperitoneal space: There is no free fluid or free air. Bones/joints: The spine, pelvis and proximal femurs are normal.     Soft tissues: There is no ventral hernia. Vasculature:  Unremarkable aorta and IVC. No abdominal aortic aneurysm. Lymph nodes:  Unremarkable. No enlarged lymph nodes. Impression   1. Mild splenomegaly, unchanged. 2.  There are no acute GI tract or urinary tract abnormalities. No free fluid    or free intraperitoneal air. There has been no adverse change since 2015. Pelvic Ultrasound from 18 showed:  Findings: The uterus demonstrates normal smooth contour and   ultrasonographic appearance, measuring approximately 7.6 cm x 5.2 cm x   5.3 cm. There is no space-occupying lesion in the uterus. The   endometrial stripe measures approximately 14.7 mm in diameter, which   is within normal limits for this age group.  US NON OB TRANSVAGINAL    US Pelvis Complete     Return for genital warts and DUB. Will schedule patient with Dr. Shannan Rosales to discuss surgical treatment of condyloma and ultrasound results. All questions and concerns addressed. Patient voices understanding of plan of care.           VELMA Bishop - PATRICIA

## 2018-11-01 ENCOUNTER — CARE COORDINATION (OUTPATIENT)
Dept: CARE COORDINATION | Age: 33
End: 2018-11-01

## 2018-11-14 ENCOUNTER — HOSPITAL ENCOUNTER (OUTPATIENT)
Age: 33
Discharge: HOME | End: 2018-11-16
Payer: COMMERCIAL

## 2018-11-14 ENCOUNTER — OFFICE VISIT (OUTPATIENT)
Dept: OBGYN | Age: 33
End: 2018-11-14
Payer: COMMERCIAL

## 2018-11-14 VITALS
WEIGHT: 264 LBS | BODY MASS INDEX: 46.78 KG/M2 | TEMPERATURE: 98.1 F | SYSTOLIC BLOOD PRESSURE: 135 MMHG | RESPIRATION RATE: 18 BRPM | HEIGHT: 63 IN | HEART RATE: 100 BPM | DIASTOLIC BLOOD PRESSURE: 70 MMHG

## 2018-11-14 DIAGNOSIS — N92.0 MENORRHAGIA WITH REGULAR CYCLE: ICD-10-CM

## 2018-11-14 DIAGNOSIS — A63.0 GENITAL WARTS: ICD-10-CM

## 2018-11-14 DIAGNOSIS — N93.8 DUB (DYSFUNCTIONAL UTERINE BLEEDING): Primary | ICD-10-CM

## 2018-11-14 DIAGNOSIS — N93.8 DUB (DYSFUNCTIONAL UTERINE BLEEDING): ICD-10-CM

## 2018-11-14 LAB
BASOPHILS ABSOLUTE: 0.07 E9/L (ref 0–0.2)
BASOPHILS RELATIVE PERCENT: 0.6 % (ref 0–2)
EOSINOPHILS ABSOLUTE: 0.32 E9/L (ref 0.05–0.5)
EOSINOPHILS RELATIVE PERCENT: 2.8 % (ref 0–6)
HCT VFR BLD CALC: 43.5 % (ref 34–48)
HEMOGLOBIN: 13.6 G/DL (ref 11.5–15.5)
IMMATURE GRANULOCYTES #: 0.04 E9/L
IMMATURE GRANULOCYTES %: 0.3 % (ref 0–5)
LYMPHOCYTES ABSOLUTE: 2.45 E9/L (ref 1.5–4)
LYMPHOCYTES RELATIVE PERCENT: 21.2 % (ref 20–42)
MCH RBC QN AUTO: 27.6 PG (ref 26–35)
MCHC RBC AUTO-ENTMCNC: 31.3 % (ref 32–34.5)
MCV RBC AUTO: 88.4 FL (ref 80–99.9)
MONOCYTES ABSOLUTE: 0.54 E9/L (ref 0.1–0.95)
MONOCYTES RELATIVE PERCENT: 4.7 % (ref 2–12)
NEUTROPHILS ABSOLUTE: 8.12 E9/L (ref 1.8–7.3)
NEUTROPHILS RELATIVE PERCENT: 70.4 % (ref 43–80)
PDW BLD-RTO: 14.2 FL (ref 11.5–15)
PLATELET # BLD: 282 E9/L (ref 130–450)
PMV BLD AUTO: 11.4 FL (ref 7–12)
RBC # BLD: 4.92 E12/L (ref 3.5–5.5)
WBC # BLD: 11.5 E9/L (ref 4.5–11.5)

## 2018-11-14 PROCEDURE — 99213 OFFICE O/P EST LOW 20 MIN: CPT | Performed by: OBSTETRICS & GYNECOLOGY

## 2018-11-14 PROCEDURE — G8427 DOCREV CUR MEDS BY ELIG CLIN: HCPCS | Performed by: OBSTETRICS & GYNECOLOGY

## 2018-11-14 PROCEDURE — 99212 OFFICE O/P EST SF 10 MIN: CPT | Performed by: OBSTETRICS & GYNECOLOGY

## 2018-11-14 PROCEDURE — 36415 COLL VENOUS BLD VENIPUNCTURE: CPT

## 2018-11-14 PROCEDURE — G8417 CALC BMI ABV UP PARAM F/U: HCPCS | Performed by: OBSTETRICS & GYNECOLOGY

## 2018-11-14 PROCEDURE — 85025 COMPLETE CBC W/AUTO DIFF WBC: CPT

## 2018-11-14 PROCEDURE — 84403 ASSAY OF TOTAL TESTOSTERONE: CPT

## 2018-11-14 PROCEDURE — G8484 FLU IMMUNIZE NO ADMIN: HCPCS | Performed by: OBSTETRICS & GYNECOLOGY

## 2018-11-14 PROCEDURE — 84270 ASSAY OF SEX HORMONE GLOBUL: CPT

## 2018-11-14 PROCEDURE — 4004F PT TOBACCO SCREEN RCVD TLK: CPT | Performed by: OBSTETRICS & GYNECOLOGY

## 2018-11-14 NOTE — PROGRESS NOTES
Labs for testosterone and cbc drawn and sent to lab. Per pao patient given number for US scheduling and informed to call and get done before returns. Assisted with pelvic exam by dr Josee Dyson. Discharge instructions given by dr Josee Dyson.

## 2018-11-17 LAB
SEX HORMONE BINDING GLOBULIN: 69 NMOL/L (ref 30–135)
TESTOSTERONE FREE-NONMALE: 2.1 PG/ML (ref 1.3–9.2)
TESTOSTERONE TOTAL: 19 NG/DL (ref 20–70)

## 2018-11-19 ENCOUNTER — CARE COORDINATION (OUTPATIENT)
Dept: CARE COORDINATION | Age: 33
End: 2018-11-19

## 2018-11-23 ENCOUNTER — HOSPITAL ENCOUNTER (EMERGENCY)
Age: 33
Discharge: HOME | End: 2018-11-23
Attending: EMERGENCY MEDICINE
Payer: COMMERCIAL

## 2018-11-23 VITALS
BODY MASS INDEX: 46.78 KG/M2 | SYSTOLIC BLOOD PRESSURE: 132 MMHG | RESPIRATION RATE: 16 BRPM | OXYGEN SATURATION: 97 % | HEIGHT: 63 IN | HEART RATE: 121 BPM | DIASTOLIC BLOOD PRESSURE: 93 MMHG | TEMPERATURE: 98 F | WEIGHT: 264 LBS

## 2018-11-23 DIAGNOSIS — R51.9 NONINTRACTABLE EPISODIC HEADACHE, UNSPECIFIED HEADACHE TYPE: Primary | ICD-10-CM

## 2018-11-23 PROCEDURE — 6360000002 HC RX W HCPCS: Performed by: EMERGENCY MEDICINE

## 2018-11-23 PROCEDURE — 96374 THER/PROPH/DIAG INJ IV PUSH: CPT

## 2018-11-23 PROCEDURE — 2580000003 HC RX 258: Performed by: EMERGENCY MEDICINE

## 2018-11-23 PROCEDURE — 99283 EMERGENCY DEPT VISIT LOW MDM: CPT

## 2018-11-23 PROCEDURE — 96375 TX/PRO/DX INJ NEW DRUG ADDON: CPT

## 2018-11-23 RX ORDER — KETOROLAC TROMETHAMINE 30 MG/ML
15 INJECTION, SOLUTION INTRAMUSCULAR; INTRAVENOUS ONCE
Status: COMPLETED | OUTPATIENT
Start: 2018-11-23 | End: 2018-11-23

## 2018-11-23 RX ORDER — DIPHENHYDRAMINE HYDROCHLORIDE 50 MG/ML
50 INJECTION INTRAMUSCULAR; INTRAVENOUS ONCE
Status: COMPLETED | OUTPATIENT
Start: 2018-11-23 | End: 2018-11-23

## 2018-11-23 RX ORDER — 0.9 % SODIUM CHLORIDE 0.9 %
1000 INTRAVENOUS SOLUTION INTRAVENOUS ONCE
Status: COMPLETED | OUTPATIENT
Start: 2018-11-23 | End: 2018-11-23

## 2018-11-23 RX ORDER — KETOROLAC TROMETHAMINE 10 MG/1
10 TABLET, FILM COATED ORAL EVERY 8 HOURS PRN
Qty: 15 TABLET | Refills: 0 | Status: SHIPPED | OUTPATIENT
Start: 2018-11-23 | End: 2018-12-27 | Stop reason: ALTCHOICE

## 2018-11-23 RX ORDER — PROCHLORPERAZINE MALEATE 10 MG
10 TABLET ORAL EVERY 6 HOURS PRN
Qty: 20 TABLET | Refills: 0 | Status: SHIPPED | OUTPATIENT
Start: 2018-11-23 | End: 2018-12-27 | Stop reason: ALTCHOICE

## 2018-11-23 RX ADMIN — SODIUM CHLORIDE 1000 ML: 9 INJECTION, SOLUTION INTRAVENOUS at 22:37

## 2018-11-23 RX ADMIN — KETOROLAC TROMETHAMINE 15 MG: 30 INJECTION, SOLUTION INTRAMUSCULAR at 22:37

## 2018-11-23 RX ADMIN — PROCHLORPERAZINE EDISYLATE 10 MG: 5 INJECTION INTRAMUSCULAR; INTRAVENOUS at 22:37

## 2018-11-23 RX ADMIN — DIPHENHYDRAMINE HYDROCHLORIDE 50 MG: 50 INJECTION INTRAMUSCULAR; INTRAVENOUS at 22:37

## 2018-11-23 ASSESSMENT — PAIN SCALES - GENERAL
PAINLEVEL_OUTOF10: 10
PAINLEVEL_OUTOF10: 0
PAINLEVEL_OUTOF10: 8

## 2018-11-23 ASSESSMENT — PAIN DESCRIPTION - DESCRIPTORS: DESCRIPTORS: ACHING

## 2018-11-23 ASSESSMENT — PAIN SCALES - WONG BAKER: WONGBAKER_NUMERICALRESPONSE: 2

## 2018-11-23 ASSESSMENT — PAIN DESCRIPTION - PAIN TYPE
TYPE: ACUTE PAIN;CHRONIC PAIN
TYPE: ACUTE PAIN

## 2018-11-23 ASSESSMENT — PAIN DESCRIPTION - LOCATION
LOCATION: HEAD
LOCATION: HEAD

## 2018-11-24 NOTE — ED PROVIDER NOTES
the plan of care and counseling regarding the diagnosis and prognosis. Questions are answered at this time and they are agreeable with the plan.      --------------------------------- IMPRESSION AND DISPOSITION ---------------------------------    IMPRESSION  1.  Nonintractable episodic headache, unspecified headache type        DISPOSITION  Disposition: Discharge to home  Patient condition is stable                  Allan Webster MD  11/24/18 [id number]

## 2018-11-27 ENCOUNTER — HOSPITAL ENCOUNTER (OUTPATIENT)
Dept: ULTRASOUND IMAGING | Age: 33
Discharge: HOME | End: 2018-11-29
Payer: COMMERCIAL

## 2018-11-27 DIAGNOSIS — N93.8 DUB (DYSFUNCTIONAL UTERINE BLEEDING): ICD-10-CM

## 2018-11-27 PROCEDURE — 76856 US EXAM PELVIC COMPLETE: CPT

## 2018-11-27 PROCEDURE — 76830 TRANSVAGINAL US NON-OB: CPT

## 2018-12-07 ENCOUNTER — OFFICE VISIT (OUTPATIENT)
Dept: OBGYN | Age: 33
End: 2018-12-07
Payer: COMMERCIAL

## 2018-12-07 VITALS
WEIGHT: 277 LBS | RESPIRATION RATE: 14 BRPM | DIASTOLIC BLOOD PRESSURE: 79 MMHG | BODY MASS INDEX: 49.08 KG/M2 | HEIGHT: 63 IN | SYSTOLIC BLOOD PRESSURE: 124 MMHG | TEMPERATURE: 98.2 F | HEART RATE: 107 BPM

## 2018-12-07 DIAGNOSIS — A63.0 GENITAL WARTS: Primary | ICD-10-CM

## 2018-12-07 DIAGNOSIS — B00.2 ORAL HERPES SIMPLEX INFECTION: ICD-10-CM

## 2018-12-07 DIAGNOSIS — N92.0 MENORRHAGIA WITH REGULAR CYCLE: ICD-10-CM

## 2018-12-07 PROCEDURE — G8484 FLU IMMUNIZE NO ADMIN: HCPCS | Performed by: OBSTETRICS & GYNECOLOGY

## 2018-12-07 PROCEDURE — G8427 DOCREV CUR MEDS BY ELIG CLIN: HCPCS | Performed by: OBSTETRICS & GYNECOLOGY

## 2018-12-07 PROCEDURE — G8417 CALC BMI ABV UP PARAM F/U: HCPCS | Performed by: OBSTETRICS & GYNECOLOGY

## 2018-12-07 PROCEDURE — 4004F PT TOBACCO SCREEN RCVD TLK: CPT | Performed by: OBSTETRICS & GYNECOLOGY

## 2018-12-07 PROCEDURE — 99212 OFFICE O/P EST SF 10 MIN: CPT | Performed by: OBSTETRICS & GYNECOLOGY

## 2018-12-07 RX ORDER — VALACYCLOVIR HYDROCHLORIDE 1 G/1
1000 TABLET, FILM COATED ORAL DAILY
Qty: 5 TABLET | Refills: 5 | Status: SHIPPED | OUTPATIENT
Start: 2018-12-07 | End: 2018-12-12

## 2018-12-11 ENCOUNTER — CARE COORDINATION (OUTPATIENT)
Dept: CARE COORDINATION | Age: 33
End: 2018-12-11

## 2018-12-11 NOTE — LETTER
12/11/2018    Esau Newton  [address]     I have enjoyed working with you to improve your health. I would like to continue to provide you support. However, I have been unable to reach you at, [phone] (home)  and you have not been in the office since 9/10/18. Please let me know if I can help schedule an office visit for you or answer any questions. Talib Parsons MD is interested in your health and hopes to hear from you soon. If you would like continued access to your Nurse Care Coordinator, Yamilex Nam RN, you can reach me at [phone]. I will wait to hear from you and will no longer reach out to you. Talib Parsons MD and his/her team will continue to provide care and be available for questions.       In good health,     Yamilex Nam RN

## 2018-12-27 ENCOUNTER — OFFICE VISIT (OUTPATIENT)
Dept: OBGYN | Age: 33
End: 2018-12-27
Payer: COMMERCIAL

## 2018-12-27 VITALS
SYSTOLIC BLOOD PRESSURE: 119 MMHG | TEMPERATURE: 98.4 F | HEART RATE: 107 BPM | DIASTOLIC BLOOD PRESSURE: 58 MMHG | RESPIRATION RATE: 14 BRPM | HEIGHT: 63 IN | BODY MASS INDEX: 48.37 KG/M2 | WEIGHT: 273 LBS

## 2018-12-27 DIAGNOSIS — Z01.818 PRE-OP EXAM: ICD-10-CM

## 2018-12-27 PROCEDURE — G8417 CALC BMI ABV UP PARAM F/U: HCPCS | Performed by: OBSTETRICS & GYNECOLOGY

## 2018-12-27 PROCEDURE — 99212 OFFICE O/P EST SF 10 MIN: CPT | Performed by: OBSTETRICS & GYNECOLOGY

## 2018-12-27 PROCEDURE — G8484 FLU IMMUNIZE NO ADMIN: HCPCS | Performed by: OBSTETRICS & GYNECOLOGY

## 2018-12-27 PROCEDURE — 4004F PT TOBACCO SCREEN RCVD TLK: CPT | Performed by: OBSTETRICS & GYNECOLOGY

## 2018-12-27 PROCEDURE — G8427 DOCREV CUR MEDS BY ELIG CLIN: HCPCS | Performed by: OBSTETRICS & GYNECOLOGY

## 2019-01-03 ENCOUNTER — PREP FOR PROCEDURE (OUTPATIENT)
Dept: OBGYN | Age: 34
End: 2019-01-03

## 2019-01-03 DIAGNOSIS — N92.1 MENORRHAGIA WITH IRREGULAR CYCLE: Primary | ICD-10-CM

## 2019-01-03 RX ORDER — SODIUM CHLORIDE, SODIUM LACTATE, POTASSIUM CHLORIDE, CALCIUM CHLORIDE 600; 310; 30; 20 MG/100ML; MG/100ML; MG/100ML; MG/100ML
INJECTION, SOLUTION INTRAVENOUS CONTINUOUS
Status: CANCELLED | OUTPATIENT
Start: 2019-01-03

## 2019-01-03 RX ORDER — SODIUM CHLORIDE 0.9 % (FLUSH) 0.9 %
10 SYRINGE (ML) INJECTION PRN
Status: CANCELLED | OUTPATIENT
Start: 2019-01-03

## 2019-01-03 RX ORDER — SODIUM CHLORIDE 0.9 % (FLUSH) 0.9 %
10 SYRINGE (ML) INJECTION EVERY 12 HOURS SCHEDULED
Status: CANCELLED | OUTPATIENT
Start: 2019-01-03

## 2019-01-07 ENCOUNTER — ANESTHESIA EVENT (OUTPATIENT)
Dept: OPERATING ROOM | Age: 34
End: 2019-01-07
Payer: COMMERCIAL

## 2019-01-07 ENCOUNTER — CARE COORDINATION (OUTPATIENT)
Dept: CARE COORDINATION | Age: 34
End: 2019-01-07

## 2019-01-08 ENCOUNTER — ANESTHESIA (OUTPATIENT)
Dept: OPERATING ROOM | Age: 34
End: 2019-01-08
Payer: COMMERCIAL

## 2019-01-08 ENCOUNTER — HOSPITAL ENCOUNTER (OUTPATIENT)
Age: 34
Setting detail: OUTPATIENT SURGERY
Discharge: HOME | End: 2019-01-08
Attending: OBSTETRICS & GYNECOLOGY | Admitting: OBSTETRICS & GYNECOLOGY
Payer: COMMERCIAL

## 2019-01-08 VITALS — OXYGEN SATURATION: 98 % | SYSTOLIC BLOOD PRESSURE: 124 MMHG | DIASTOLIC BLOOD PRESSURE: 97 MMHG | TEMPERATURE: 97.3 F

## 2019-01-08 VITALS
SYSTOLIC BLOOD PRESSURE: 110 MMHG | TEMPERATURE: 97.2 F | RESPIRATION RATE: 18 BRPM | DIASTOLIC BLOOD PRESSURE: 62 MMHG | HEIGHT: 63 IN | HEART RATE: 80 BPM | BODY MASS INDEX: 48.9 KG/M2 | WEIGHT: 276 LBS | OXYGEN SATURATION: 95 %

## 2019-01-08 PROBLEM — N92.1 MENORRHAGIA WITH IRREGULAR CYCLE: Status: ACTIVE | Noted: 2019-01-08

## 2019-01-08 PROBLEM — A63.0 CONDYLOMATA ACUMINATA: Status: ACTIVE | Noted: 2019-01-08

## 2019-01-08 LAB
BASOPHILS ABSOLUTE: 0.07 E9/L (ref 0–0.2)
BASOPHILS RELATIVE PERCENT: 0.7 % (ref 0–2)
EOSINOPHILS ABSOLUTE: 0.39 E9/L (ref 0.05–0.5)
EOSINOPHILS RELATIVE PERCENT: 3.7 % (ref 0–6)
HCT VFR BLD CALC: 39.1 % (ref 34–48)
HEMOGLOBIN: 12.6 G/DL (ref 11.5–15.5)
IMMATURE GRANULOCYTES #: 0.03 E9/L
IMMATURE GRANULOCYTES %: 0.3 % (ref 0–5)
LYMPHOCYTES ABSOLUTE: 3.06 E9/L (ref 1.5–4)
LYMPHOCYTES RELATIVE PERCENT: 28.7 % (ref 20–42)
Lab: NORMAL
MCH RBC QN AUTO: 28.5 PG (ref 26–35)
MCHC RBC AUTO-ENTMCNC: 32.2 % (ref 32–34.5)
MCV RBC AUTO: 88.5 FL (ref 80–99.9)
MONOCYTES ABSOLUTE: 0.65 E9/L (ref 0.1–0.95)
MONOCYTES RELATIVE PERCENT: 6.1 % (ref 2–12)
NEUTROPHILS ABSOLUTE: 6.45 E9/L (ref 1.8–7.3)
NEUTROPHILS RELATIVE PERCENT: 60.5 % (ref 43–80)
PDW BLD-RTO: 14.1 FL (ref 11.5–15)
PLATELET # BLD: 239 E9/L (ref 130–450)
PMV BLD AUTO: 11.3 FL (ref 7–12)
PREGNANCY TEST URINE, POC: NEGATIVE
RBC # BLD: 4.42 E12/L (ref 3.5–5.5)
WBC # BLD: 10.7 E9/L (ref 4.5–11.5)

## 2019-01-08 PROCEDURE — 7100000010 HC PHASE II RECOVERY - FIRST 15 MIN: Performed by: OBSTETRICS & GYNECOLOGY

## 2019-01-08 PROCEDURE — 36415 COLL VENOUS BLD VENIPUNCTURE: CPT

## 2019-01-08 PROCEDURE — 6360000002 HC RX W HCPCS: Performed by: ANESTHESIOLOGY

## 2019-01-08 PROCEDURE — 88305 TISSUE EXAM BY PATHOLOGIST: CPT

## 2019-01-08 PROCEDURE — 2709999900 HC NON-CHARGEABLE SUPPLY: Performed by: OBSTETRICS & GYNECOLOGY

## 2019-01-08 PROCEDURE — 3700000001 HC ADD 15 MINUTES (ANESTHESIA): Performed by: OBSTETRICS & GYNECOLOGY

## 2019-01-08 PROCEDURE — 6360000002 HC RX W HCPCS: Performed by: REGISTERED NURSE

## 2019-01-08 PROCEDURE — 7100000001 HC PACU RECOVERY - ADDTL 15 MIN: Performed by: OBSTETRICS & GYNECOLOGY

## 2019-01-08 PROCEDURE — 2580000003 HC RX 258: Performed by: OBSTETRICS & GYNECOLOGY

## 2019-01-08 PROCEDURE — 3600000013 HC SURGERY LEVEL 3 ADDTL 15MIN: Performed by: OBSTETRICS & GYNECOLOGY

## 2019-01-08 PROCEDURE — 7100000011 HC PHASE II RECOVERY - ADDTL 15 MIN: Performed by: OBSTETRICS & GYNECOLOGY

## 2019-01-08 PROCEDURE — 85025 COMPLETE CBC W/AUTO DIFF WBC: CPT

## 2019-01-08 PROCEDURE — 3600000003 HC SURGERY LEVEL 3 BASE: Performed by: OBSTETRICS & GYNECOLOGY

## 2019-01-08 PROCEDURE — 56605 BIOPSY OF VULVA/PERINEUM: CPT | Performed by: OBSTETRICS & GYNECOLOGY

## 2019-01-08 PROCEDURE — 6370000000 HC RX 637 (ALT 250 FOR IP): Performed by: REGISTERED NURSE

## 2019-01-08 PROCEDURE — 56606 BIOPSY OF VULVA/PERINEUM: CPT | Performed by: OBSTETRICS & GYNECOLOGY

## 2019-01-08 PROCEDURE — 58558 HYSTEROSCOPY BIOPSY: CPT | Performed by: OBSTETRICS & GYNECOLOGY

## 2019-01-08 PROCEDURE — 6370000000 HC RX 637 (ALT 250 FOR IP): Performed by: OBSTETRICS & GYNECOLOGY

## 2019-01-08 PROCEDURE — 3700000000 HC ANESTHESIA ATTENDED CARE: Performed by: OBSTETRICS & GYNECOLOGY

## 2019-01-08 PROCEDURE — 7100000000 HC PACU RECOVERY - FIRST 15 MIN: Performed by: OBSTETRICS & GYNECOLOGY

## 2019-01-08 PROCEDURE — 2500000003 HC RX 250 WO HCPCS: Performed by: REGISTERED NURSE

## 2019-01-08 RX ORDER — DEXAMETHASONE SODIUM PHOSPHATE 10 MG/ML
INJECTION, SOLUTION INTRAMUSCULAR; INTRAVENOUS PRN
Status: DISCONTINUED | OUTPATIENT
Start: 2019-01-08 | End: 2019-01-08 | Stop reason: SDUPTHER

## 2019-01-08 RX ORDER — ALBUTEROL SULFATE 90 UG/1
AEROSOL, METERED RESPIRATORY (INHALATION) PRN
Status: DISCONTINUED | OUTPATIENT
Start: 2019-01-08 | End: 2019-01-08 | Stop reason: SDUPTHER

## 2019-01-08 RX ORDER — SODIUM CHLORIDE 0.9 % (FLUSH) 0.9 %
10 SYRINGE (ML) INJECTION PRN
Status: DISCONTINUED | OUTPATIENT
Start: 2019-01-08 | End: 2019-01-08 | Stop reason: HOSPADM

## 2019-01-08 RX ORDER — GLYCOPYRROLATE 1 MG/5 ML
SYRINGE (ML) INTRAVENOUS PRN
Status: DISCONTINUED | OUTPATIENT
Start: 2019-01-08 | End: 2019-01-08 | Stop reason: SDUPTHER

## 2019-01-08 RX ORDER — FENTANYL CITRATE 50 UG/ML
INJECTION, SOLUTION INTRAMUSCULAR; INTRAVENOUS PRN
Status: DISCONTINUED | OUTPATIENT
Start: 2019-01-08 | End: 2019-01-08 | Stop reason: SDUPTHER

## 2019-01-08 RX ORDER — DIAPER,BRIEF,INFANT-TODD,DISP
EACH MISCELLANEOUS PRN
Status: DISCONTINUED | OUTPATIENT
Start: 2019-01-08 | End: 2019-01-08 | Stop reason: HOSPADM

## 2019-01-08 RX ORDER — PROMETHAZINE HYDROCHLORIDE 25 MG/ML
25 INJECTION, SOLUTION INTRAMUSCULAR; INTRAVENOUS PRN
Status: DISCONTINUED | OUTPATIENT
Start: 2019-01-08 | End: 2019-01-08 | Stop reason: HOSPADM

## 2019-01-08 RX ORDER — SODIUM CHLORIDE 0.9 % (FLUSH) 0.9 %
10 SYRINGE (ML) INJECTION EVERY 12 HOURS SCHEDULED
Status: DISCONTINUED | OUTPATIENT
Start: 2019-01-08 | End: 2019-01-08 | Stop reason: HOSPADM

## 2019-01-08 RX ORDER — NEOSTIGMINE METHYLSULFATE 1 MG/ML
INJECTION, SOLUTION INTRAVENOUS PRN
Status: DISCONTINUED | OUTPATIENT
Start: 2019-01-08 | End: 2019-01-08 | Stop reason: SDUPTHER

## 2019-01-08 RX ORDER — MIDAZOLAM HYDROCHLORIDE 1 MG/ML
INJECTION INTRAMUSCULAR; INTRAVENOUS PRN
Status: DISCONTINUED | OUTPATIENT
Start: 2019-01-08 | End: 2019-01-08 | Stop reason: SDUPTHER

## 2019-01-08 RX ORDER — SODIUM CHLORIDE, SODIUM LACTATE, POTASSIUM CHLORIDE, CALCIUM CHLORIDE 600; 310; 30; 20 MG/100ML; MG/100ML; MG/100ML; MG/100ML
INJECTION, SOLUTION INTRAVENOUS CONTINUOUS
Status: DISCONTINUED | OUTPATIENT
Start: 2019-01-08 | End: 2019-01-08 | Stop reason: HOSPADM

## 2019-01-08 RX ORDER — ONDANSETRON 2 MG/ML
INJECTION INTRAMUSCULAR; INTRAVENOUS PRN
Status: DISCONTINUED | OUTPATIENT
Start: 2019-01-08 | End: 2019-01-08 | Stop reason: SDUPTHER

## 2019-01-08 RX ORDER — MEPERIDINE HYDROCHLORIDE 50 MG/ML
12.5 INJECTION INTRAMUSCULAR; INTRAVENOUS; SUBCUTANEOUS EVERY 5 MIN PRN
Status: DISCONTINUED | OUTPATIENT
Start: 2019-01-08 | End: 2019-01-08 | Stop reason: HOSPADM

## 2019-01-08 RX ORDER — LIDOCAINE HYDROCHLORIDE 20 MG/ML
INJECTION, SOLUTION INFILTRATION; PERINEURAL PRN
Status: DISCONTINUED | OUTPATIENT
Start: 2019-01-08 | End: 2019-01-08 | Stop reason: SDUPTHER

## 2019-01-08 RX ORDER — LABETALOL HYDROCHLORIDE 5 MG/ML
5 INJECTION, SOLUTION INTRAVENOUS EVERY 10 MIN PRN
Status: DISCONTINUED | OUTPATIENT
Start: 2019-01-08 | End: 2019-01-08 | Stop reason: HOSPADM

## 2019-01-08 RX ORDER — PROPOFOL 10 MG/ML
INJECTION, EMULSION INTRAVENOUS PRN
Status: DISCONTINUED | OUTPATIENT
Start: 2019-01-08 | End: 2019-01-08 | Stop reason: SDUPTHER

## 2019-01-08 RX ORDER — ROCURONIUM BROMIDE 10 MG/ML
INJECTION, SOLUTION INTRAVENOUS PRN
Status: DISCONTINUED | OUTPATIENT
Start: 2019-01-08 | End: 2019-01-08 | Stop reason: SDUPTHER

## 2019-01-08 RX ADMIN — FENTANYL CITRATE 50 MCG: 50 INJECTION, SOLUTION INTRAMUSCULAR; INTRAVENOUS at 07:40

## 2019-01-08 RX ADMIN — FENTANYL CITRATE 50 MCG: 50 INJECTION, SOLUTION INTRAMUSCULAR; INTRAVENOUS at 08:00

## 2019-01-08 RX ADMIN — ONDANSETRON HYDROCHLORIDE 4 MG: 2 INJECTION, SOLUTION INTRAMUSCULAR; INTRAVENOUS at 07:31

## 2019-01-08 RX ADMIN — LIDOCAINE HYDROCHLORIDE 100 MG: 20 INJECTION, SOLUTION INFILTRATION; PERINEURAL at 07:37

## 2019-01-08 RX ADMIN — Medication 2 MG: at 08:12

## 2019-01-08 RX ADMIN — FENTANYL CITRATE 50 MCG: 50 INJECTION, SOLUTION INTRAMUSCULAR; INTRAVENOUS at 07:37

## 2019-01-08 RX ADMIN — HYDROMORPHONE HYDROCHLORIDE 0.25 MG: 1 INJECTION, SOLUTION INTRAMUSCULAR; INTRAVENOUS; SUBCUTANEOUS at 09:05

## 2019-01-08 RX ADMIN — PROPOFOL 200 MG: 10 INJECTION, EMULSION INTRAVENOUS at 07:37

## 2019-01-08 RX ADMIN — DEXAMETHASONE SODIUM PHOSPHATE 10 MG: 10 INJECTION INTRAMUSCULAR; INTRAVENOUS at 07:31

## 2019-01-08 RX ADMIN — SODIUM CHLORIDE, POTASSIUM CHLORIDE, SODIUM LACTATE AND CALCIUM CHLORIDE: 600; 310; 30; 20 INJECTION, SOLUTION INTRAVENOUS at 05:56

## 2019-01-08 RX ADMIN — MIDAZOLAM HYDROCHLORIDE 2 MG: 1 INJECTION, SOLUTION INTRAMUSCULAR; INTRAVENOUS at 07:25

## 2019-01-08 RX ADMIN — PROPOFOL 200 MG: 10 INJECTION, EMULSION INTRAVENOUS at 07:41

## 2019-01-08 RX ADMIN — SODIUM CHLORIDE, POTASSIUM CHLORIDE, SODIUM LACTATE AND CALCIUM CHLORIDE: 600; 310; 30; 20 INJECTION, SOLUTION INTRAVENOUS at 07:31

## 2019-01-08 RX ADMIN — ROCURONIUM BROMIDE 10 MG: 10 INJECTION INTRAVENOUS at 07:37

## 2019-01-08 RX ADMIN — Medication 0.4 MG: at 08:12

## 2019-01-08 RX ADMIN — FENTANYL CITRATE 50 MCG: 50 INJECTION, SOLUTION INTRAMUSCULAR; INTRAVENOUS at 08:07

## 2019-01-08 RX ADMIN — FENTANYL CITRATE 50 MCG: 50 INJECTION, SOLUTION INTRAMUSCULAR; INTRAVENOUS at 07:50

## 2019-01-08 RX ADMIN — Medication 0.2 MG: at 07:31

## 2019-01-08 RX ADMIN — ALBUTEROL SULFATE 2 PUFF: 108 AEROSOL, METERED RESPIRATORY (INHALATION) at 07:19

## 2019-01-08 RX ADMIN — MIDAZOLAM HYDROCHLORIDE 2 MG: 1 INJECTION, SOLUTION INTRAMUSCULAR; INTRAVENOUS at 07:48

## 2019-01-08 RX ADMIN — HYDROMORPHONE HYDROCHLORIDE 0.25 MG: 1 INJECTION, SOLUTION INTRAMUSCULAR; INTRAVENOUS; SUBCUTANEOUS at 09:00

## 2019-01-08 ASSESSMENT — PULMONARY FUNCTION TESTS
PIF_VALUE: 29
PIF_VALUE: 30
PIF_VALUE: 2
PIF_VALUE: 2
PIF_VALUE: 10
PIF_VALUE: 29
PIF_VALUE: 30
PIF_VALUE: 1
PIF_VALUE: 28
PIF_VALUE: 1
PIF_VALUE: 29
PIF_VALUE: 1
PIF_VALUE: 30
PIF_VALUE: 29
PIF_VALUE: 11
PIF_VALUE: 1
PIF_VALUE: 26
PIF_VALUE: 25
PIF_VALUE: 29
PIF_VALUE: 1
PIF_VALUE: 11
PIF_VALUE: 29
PIF_VALUE: 27
PIF_VALUE: 3
PIF_VALUE: 26
PIF_VALUE: 4
PIF_VALUE: 30
PIF_VALUE: 11
PIF_VALUE: 11
PIF_VALUE: 1
PIF_VALUE: 29
PIF_VALUE: 11
PIF_VALUE: 30
PIF_VALUE: 11
PIF_VALUE: 25
PIF_VALUE: 27
PIF_VALUE: 11
PIF_VALUE: 10
PIF_VALUE: 11
PIF_VALUE: 25
PIF_VALUE: 11
PIF_VALUE: 27
PIF_VALUE: 23
PIF_VALUE: 22
PIF_VALUE: 3
PIF_VALUE: 30
PIF_VALUE: 24
PIF_VALUE: 11
PIF_VALUE: 11
PIF_VALUE: 4
PIF_VALUE: 28
PIF_VALUE: 1
PIF_VALUE: 11
PIF_VALUE: 6

## 2019-01-08 ASSESSMENT — PAIN DESCRIPTION - PAIN TYPE
TYPE: SURGICAL PAIN

## 2019-01-08 ASSESSMENT — PAIN - FUNCTIONAL ASSESSMENT: PAIN_FUNCTIONAL_ASSESSMENT: 0-10

## 2019-01-08 ASSESSMENT — PAIN SCALES - GENERAL
PAINLEVEL_OUTOF10: 0
PAINLEVEL_OUTOF10: 3
PAINLEVEL_OUTOF10: 4
PAINLEVEL_OUTOF10: 2
PAINLEVEL_OUTOF10: 0
PAINLEVEL_OUTOF10: 5

## 2019-01-08 ASSESSMENT — PAIN DESCRIPTION - LOCATION
LOCATION: PERINEUM
LOCATION: PERINEUM

## 2019-01-08 ASSESSMENT — PAIN DESCRIPTION - DESCRIPTORS
DESCRIPTORS: DULL
DESCRIPTORS: DULL;PRESSURE
DESCRIPTORS: DULL

## 2019-01-08 ASSESSMENT — PAIN DESCRIPTION - FREQUENCY
FREQUENCY: INTERMITTENT
FREQUENCY: INTERMITTENT

## 2019-01-08 ASSESSMENT — LIFESTYLE VARIABLES: SMOKING_STATUS: 1

## 2019-01-08 ASSESSMENT — PAIN DESCRIPTION - ONSET: ONSET: ON-GOING

## 2019-01-13 ENCOUNTER — HOSPITAL ENCOUNTER (EMERGENCY)
Age: 34
Discharge: HOME | End: 2019-01-13
Attending: EMERGENCY MEDICINE
Payer: COMMERCIAL

## 2019-01-13 ENCOUNTER — APPOINTMENT (OUTPATIENT)
Dept: GENERAL RADIOLOGY | Age: 34
End: 2019-01-13
Payer: COMMERCIAL

## 2019-01-13 VITALS
TEMPERATURE: 98.1 F | OXYGEN SATURATION: 99 % | WEIGHT: 276 LBS | DIASTOLIC BLOOD PRESSURE: 76 MMHG | HEIGHT: 63 IN | SYSTOLIC BLOOD PRESSURE: 128 MMHG | BODY MASS INDEX: 48.9 KG/M2 | HEART RATE: 74 BPM | RESPIRATION RATE: 16 BRPM

## 2019-01-13 DIAGNOSIS — R10.2 PELVIC PAIN IN FEMALE: Primary | ICD-10-CM

## 2019-01-13 DIAGNOSIS — N71.9 ENDOMETRITIS: ICD-10-CM

## 2019-01-13 LAB
ALBUMIN SERPL-MCNC: 3.8 G/DL (ref 3.5–5.2)
ALP BLD-CCNC: 82 U/L (ref 35–104)
ALT SERPL-CCNC: 20 U/L (ref 0–32)
ANION GAP SERPL CALCULATED.3IONS-SCNC: 11 MMOL/L (ref 7–16)
AST SERPL-CCNC: 12 U/L (ref 0–31)
BACTERIA: ABNORMAL /HPF
BASOPHILS ABSOLUTE: 0.07 E9/L (ref 0–0.2)
BASOPHILS RELATIVE PERCENT: 0.6 % (ref 0–2)
BILIRUB SERPL-MCNC: <0.2 MG/DL (ref 0–1.2)
BILIRUBIN URINE: NEGATIVE
BLOOD, URINE: ABNORMAL
BUN BLDV-MCNC: 8 MG/DL (ref 6–20)
CALCIUM SERPL-MCNC: 8.8 MG/DL (ref 8.6–10.2)
CHLORIDE BLD-SCNC: 103 MMOL/L (ref 98–107)
CLARITY: CLEAR
CLUE CELLS: NORMAL
CO2: 25 MMOL/L (ref 22–29)
COLOR: YELLOW
CREAT SERPL-MCNC: 0.6 MG/DL (ref 0.5–1)
D DIMER: 176 NG/ML DDU
EOSINOPHILS ABSOLUTE: 0.43 E9/L (ref 0.05–0.5)
EOSINOPHILS RELATIVE PERCENT: 3.4 % (ref 0–6)
GFR AFRICAN AMERICAN: >60
GFR NON-AFRICAN AMERICAN: >60 ML/MIN/1.73
GLUCOSE BLD-MCNC: 79 MG/DL (ref 74–99)
GLUCOSE URINE: NEGATIVE MG/DL
GONADOTROPIN, CHORIONIC (HCG) QUANT: <0.1 MIU/ML
HCT VFR BLD CALC: 40.1 % (ref 34–48)
HEMOGLOBIN: 12.8 G/DL (ref 11.5–15.5)
IMMATURE GRANULOCYTES #: 0.06 E9/L
IMMATURE GRANULOCYTES %: 0.5 % (ref 0–5)
KETONES, URINE: NEGATIVE MG/DL
LEUKOCYTE ESTERASE, URINE: NEGATIVE
LIPASE: 20 U/L (ref 13–60)
LYMPHOCYTES ABSOLUTE: 3.38 E9/L (ref 1.5–4)
LYMPHOCYTES RELATIVE PERCENT: 27 % (ref 20–42)
MCH RBC QN AUTO: 28.6 PG (ref 26–35)
MCHC RBC AUTO-ENTMCNC: 31.9 % (ref 32–34.5)
MCV RBC AUTO: 89.7 FL (ref 80–99.9)
MONOCYTES ABSOLUTE: 0.8 E9/L (ref 0.1–0.95)
MONOCYTES RELATIVE PERCENT: 6.4 % (ref 2–12)
NEUTROPHILS ABSOLUTE: 7.76 E9/L (ref 1.8–7.3)
NEUTROPHILS RELATIVE PERCENT: 62.1 % (ref 43–80)
NITRITE, URINE: NEGATIVE
PDW BLD-RTO: 14.1 FL (ref 11.5–15)
PH UA: 6.5 (ref 5–9)
PLATELET # BLD: 259 E9/L (ref 130–450)
PMV BLD AUTO: 10.8 FL (ref 7–12)
POTASSIUM REFLEX MAGNESIUM: 3.9 MMOL/L (ref 3.5–5)
PROTEIN UA: NEGATIVE MG/DL
RBC # BLD: 4.47 E12/L (ref 3.5–5.5)
RBC UA: ABNORMAL /HPF (ref 0–2)
SODIUM BLD-SCNC: 139 MMOL/L (ref 132–146)
SOURCE WET PREP: NORMAL
SPECIFIC GRAVITY UA: 1.01 (ref 1–1.03)
TOTAL PROTEIN: 6.9 G/DL (ref 6.4–8.3)
TRICHOMONAS PREP: NORMAL
UROBILINOGEN, URINE: 0.2 E.U./DL
WBC # BLD: 12.5 E9/L (ref 4.5–11.5)
WBC UA: ABNORMAL /HPF (ref 0–5)
YEAST WET PREP: NORMAL

## 2019-01-13 PROCEDURE — 99284 EMERGENCY DEPT VISIT MOD MDM: CPT

## 2019-01-13 PROCEDURE — 71046 X-RAY EXAM CHEST 2 VIEWS: CPT

## 2019-01-13 PROCEDURE — 85378 FIBRIN DEGRADE SEMIQUANT: CPT

## 2019-01-13 PROCEDURE — 87491 CHLMYD TRACH DNA AMP PROBE: CPT

## 2019-01-13 PROCEDURE — 83690 ASSAY OF LIPASE: CPT

## 2019-01-13 PROCEDURE — 80053 COMPREHEN METABOLIC PANEL: CPT

## 2019-01-13 PROCEDURE — 96365 THER/PROPH/DIAG IV INF INIT: CPT

## 2019-01-13 PROCEDURE — 6360000002 HC RX W HCPCS: Performed by: EMERGENCY MEDICINE

## 2019-01-13 PROCEDURE — 87591 N.GONORRHOEAE DNA AMP PROB: CPT

## 2019-01-13 PROCEDURE — 85025 COMPLETE CBC W/AUTO DIFF WBC: CPT

## 2019-01-13 PROCEDURE — 96375 TX/PRO/DX INJ NEW DRUG ADDON: CPT

## 2019-01-13 PROCEDURE — 84702 CHORIONIC GONADOTROPIN TEST: CPT

## 2019-01-13 PROCEDURE — 87210 SMEAR WET MOUNT SALINE/INK: CPT

## 2019-01-13 PROCEDURE — 2580000003 HC RX 258: Performed by: EMERGENCY MEDICINE

## 2019-01-13 PROCEDURE — 36415 COLL VENOUS BLD VENIPUNCTURE: CPT

## 2019-01-13 PROCEDURE — 81001 URINALYSIS AUTO W/SCOPE: CPT

## 2019-01-13 RX ORDER — KETOROLAC TROMETHAMINE 30 MG/ML
30 INJECTION, SOLUTION INTRAMUSCULAR; INTRAVENOUS ONCE
Status: COMPLETED | OUTPATIENT
Start: 2019-01-13 | End: 2019-01-13

## 2019-01-13 RX ORDER — HYDROCODONE BITARTRATE AND ACETAMINOPHEN 5; 325 MG/1; MG/1
1 TABLET ORAL EVERY 6 HOURS PRN
Qty: 12 TABLET | Refills: 0 | Status: SHIPPED | OUTPATIENT
Start: 2019-01-13 | End: 2019-01-16

## 2019-01-13 RX ORDER — 0.9 % SODIUM CHLORIDE 0.9 %
1000 INTRAVENOUS SOLUTION INTRAVENOUS ONCE
Status: COMPLETED | OUTPATIENT
Start: 2019-01-13 | End: 2019-01-13

## 2019-01-13 RX ORDER — ONDANSETRON 2 MG/ML
4 INJECTION INTRAMUSCULAR; INTRAVENOUS EVERY 6 HOURS PRN
Status: DISCONTINUED | OUTPATIENT
Start: 2019-01-13 | End: 2019-01-14 | Stop reason: HOSPADM

## 2019-01-13 RX ORDER — ONDANSETRON 4 MG/1
4 TABLET, ORALLY DISINTEGRATING ORAL EVERY 8 HOURS PRN
Qty: 10 TABLET | Refills: 0 | Status: SHIPPED | OUTPATIENT
Start: 2019-01-13 | End: 2019-05-22

## 2019-01-13 RX ORDER — ONDANSETRON 2 MG/ML
4 INJECTION INTRAMUSCULAR; INTRAVENOUS ONCE
Status: DISCONTINUED | OUTPATIENT
Start: 2019-01-13 | End: 2019-01-14 | Stop reason: HOSPADM

## 2019-01-13 RX ORDER — AMOXICILLIN AND CLAVULANATE POTASSIUM 875; 125 MG/1; MG/1
1 TABLET, FILM COATED ORAL 2 TIMES DAILY
Qty: 20 TABLET | Refills: 0 | Status: SHIPPED | OUTPATIENT
Start: 2019-01-13 | End: 2019-01-23

## 2019-01-13 RX ADMIN — ONDANSETRON HYDROCHLORIDE 4 MG: 2 SOLUTION INTRAMUSCULAR; INTRAVENOUS at 21:43

## 2019-01-13 RX ADMIN — AMPICILLIN SODIUM AND SULBACTAM SODIUM 3 G: 2; 1 INJECTION, POWDER, FOR SOLUTION INTRAMUSCULAR; INTRAVENOUS at 22:03

## 2019-01-13 RX ADMIN — KETOROLAC TROMETHAMINE 30 MG: 30 INJECTION, SOLUTION INTRAMUSCULAR; INTRAVENOUS at 19:18

## 2019-01-13 RX ADMIN — ONDANSETRON HYDROCHLORIDE 4 MG: 2 SOLUTION INTRAMUSCULAR; INTRAVENOUS at 19:18

## 2019-01-13 RX ADMIN — SODIUM CHLORIDE 1000 ML: 9 INJECTION, SOLUTION INTRAVENOUS at 19:18

## 2019-01-13 RX ADMIN — HYDROMORPHONE HYDROCHLORIDE 1 MG: 1 INJECTION, SOLUTION INTRAMUSCULAR; INTRAVENOUS; SUBCUTANEOUS at 21:44

## 2019-01-13 ASSESSMENT — PAIN SCALES - GENERAL
PAINLEVEL_OUTOF10: 1
PAINLEVEL_OUTOF10: 8

## 2019-01-13 ASSESSMENT — PAIN DESCRIPTION - LOCATION
LOCATION: ABDOMEN
LOCATION: ABDOMEN

## 2019-01-13 ASSESSMENT — PAIN DESCRIPTION - PAIN TYPE
TYPE: ACUTE PAIN
TYPE: ACUTE PAIN

## 2019-01-13 ASSESSMENT — PAIN DESCRIPTION - DESCRIPTORS
DESCRIPTORS: ACHING
DESCRIPTORS: DISCOMFORT;PRESSURE

## 2019-01-13 ASSESSMENT — PAIN DESCRIPTION - ONSET: ONSET: ON-GOING

## 2019-01-13 ASSESSMENT — PAIN DESCRIPTION - ORIENTATION: ORIENTATION: MID

## 2019-01-13 ASSESSMENT — PAIN DESCRIPTION - PROGRESSION: CLINICAL_PROGRESSION: RAPIDLY IMPROVING

## 2019-01-13 ASSESSMENT — PAIN DESCRIPTION - FREQUENCY: FREQUENCY: INTERMITTENT

## 2019-01-16 ENCOUNTER — OFFICE VISIT (OUTPATIENT)
Dept: OBGYN | Age: 34
End: 2019-01-16
Payer: COMMERCIAL

## 2019-01-16 VITALS
SYSTOLIC BLOOD PRESSURE: 140 MMHG | DIASTOLIC BLOOD PRESSURE: 86 MMHG | WEIGHT: 282 LBS | TEMPERATURE: 98.5 F | HEIGHT: 63 IN | HEART RATE: 94 BPM | RESPIRATION RATE: 18 BRPM | BODY MASS INDEX: 49.96 KG/M2

## 2019-01-16 DIAGNOSIS — Z98.890 POST-OPERATIVE STATE: Primary | ICD-10-CM

## 2019-01-16 PROCEDURE — 99211 OFF/OP EST MAY X REQ PHY/QHP: CPT | Performed by: OBSTETRICS & GYNECOLOGY

## 2019-01-16 PROCEDURE — 99024 POSTOP FOLLOW-UP VISIT: CPT | Performed by: OBSTETRICS & GYNECOLOGY

## 2019-01-17 LAB
N GONORRHOEAE AMPLIFIED DET: ABNORMAL
ORGANISM: ABNORMAL

## 2019-01-18 ENCOUNTER — OFFICE VISIT (OUTPATIENT)
Dept: FAMILY MEDICINE CLINIC | Age: 34
End: 2019-01-18
Payer: COMMERCIAL

## 2019-01-18 ENCOUNTER — HOSPITAL ENCOUNTER (OUTPATIENT)
Age: 34
Discharge: HOME | End: 2019-01-20
Payer: COMMERCIAL

## 2019-01-18 VITALS
HEIGHT: 61 IN | RESPIRATION RATE: 20 BRPM | DIASTOLIC BLOOD PRESSURE: 92 MMHG | OXYGEN SATURATION: 99 % | HEART RATE: 108 BPM | BODY MASS INDEX: 53.03 KG/M2 | WEIGHT: 280.9 LBS | SYSTOLIC BLOOD PRESSURE: 140 MMHG

## 2019-01-18 DIAGNOSIS — A74.9 CHLAMYDIA INFECTION: ICD-10-CM

## 2019-01-18 DIAGNOSIS — Z72.51 UNPROTECTED SEXUAL INTERCOURSE: ICD-10-CM

## 2019-01-18 DIAGNOSIS — Z11.59 ENCOUNTER FOR SCREENING FOR OTHER VIRAL DISEASES: ICD-10-CM

## 2019-01-18 DIAGNOSIS — R10.819 SUPRAPUBIC TENDERNESS: ICD-10-CM

## 2019-01-18 DIAGNOSIS — A74.9 CHLAMYDIA INFECTION: Primary | ICD-10-CM

## 2019-01-18 LAB
CONTROL: NORMAL
PREGNANCY TEST URINE, POC: NORMAL

## 2019-01-18 PROCEDURE — 81002 URINALYSIS NONAUTO W/O SCOPE: CPT | Performed by: FAMILY MEDICINE

## 2019-01-18 PROCEDURE — 99212 OFFICE O/P EST SF 10 MIN: CPT | Performed by: FAMILY MEDICINE

## 2019-01-18 PROCEDURE — 87340 HEPATITIS B SURFACE AG IA: CPT

## 2019-01-18 PROCEDURE — 86706 HEP B SURFACE ANTIBODY: CPT

## 2019-01-18 PROCEDURE — 87491 CHLMYD TRACH DNA AMP PROBE: CPT

## 2019-01-18 PROCEDURE — 4004F PT TOBACCO SCREEN RCVD TLK: CPT | Performed by: FAMILY MEDICINE

## 2019-01-18 PROCEDURE — 36415 COLL VENOUS BLD VENIPUNCTURE: CPT

## 2019-01-18 PROCEDURE — 86592 SYPHILIS TEST NON-TREP QUAL: CPT

## 2019-01-18 PROCEDURE — G8484 FLU IMMUNIZE NO ADMIN: HCPCS | Performed by: FAMILY MEDICINE

## 2019-01-18 PROCEDURE — 81025 URINE PREGNANCY TEST: CPT | Performed by: FAMILY MEDICINE

## 2019-01-18 PROCEDURE — G8428 CUR MEDS NOT DOCUMENT: HCPCS | Performed by: FAMILY MEDICINE

## 2019-01-18 PROCEDURE — G8417 CALC BMI ABV UP PARAM F/U: HCPCS | Performed by: FAMILY MEDICINE

## 2019-01-18 PROCEDURE — 87591 N.GONORRHOEAE DNA AMP PROB: CPT

## 2019-01-18 PROCEDURE — 99213 OFFICE O/P EST LOW 20 MIN: CPT | Performed by: FAMILY MEDICINE

## 2019-01-18 PROCEDURE — 99212 OFFICE O/P EST SF 10 MIN: CPT

## 2019-01-18 PROCEDURE — 86803 HEPATITIS C AB TEST: CPT

## 2019-01-18 PROCEDURE — 86703 HIV-1/HIV-2 1 RESULT ANTBDY: CPT

## 2019-01-18 RX ORDER — AZITHROMYCIN 250 MG/1
1000 TABLET, FILM COATED ORAL ONCE
Qty: 4 TABLET | Refills: 0 | Status: SHIPPED | OUTPATIENT
Start: 2019-01-18 | End: 2019-01-18

## 2019-01-21 LAB
HBV SURFACE AB TITR SER: NORMAL {TITER}
HEPATITIS B SURFACE ANTIGEN INTERPRETATION: NORMAL
HEPATITIS C ANTIBODY INTERPRETATION: NORMAL
HIV-1 AND HIV-2 ANTIBODIES: NORMAL
RPR: NORMAL

## 2019-01-22 LAB
N GONORRHOEAE AMPLIFIED DET: ABNORMAL
ORGANISM: ABNORMAL

## 2019-02-01 ENCOUNTER — OFFICE VISIT (OUTPATIENT)
Dept: OBGYN | Age: 34
End: 2019-02-01
Payer: COMMERCIAL

## 2019-02-01 ENCOUNTER — HOSPITAL ENCOUNTER (OUTPATIENT)
Age: 34
Discharge: HOME | End: 2019-02-03
Payer: COMMERCIAL

## 2019-02-01 VITALS
WEIGHT: 277 LBS | SYSTOLIC BLOOD PRESSURE: 130 MMHG | TEMPERATURE: 98.5 F | RESPIRATION RATE: 14 BRPM | BODY MASS INDEX: 49.08 KG/M2 | DIASTOLIC BLOOD PRESSURE: 84 MMHG | HEIGHT: 63 IN | HEART RATE: 92 BPM

## 2019-02-01 DIAGNOSIS — Z98.890 POST-OPERATIVE STATE: ICD-10-CM

## 2019-02-01 DIAGNOSIS — A74.9 CHLAMYDIA INFECTION: Primary | ICD-10-CM

## 2019-02-01 DIAGNOSIS — A74.9 CHLAMYDIA INFECTION: ICD-10-CM

## 2019-02-01 PROCEDURE — G8484 FLU IMMUNIZE NO ADMIN: HCPCS | Performed by: OBSTETRICS & GYNECOLOGY

## 2019-02-01 PROCEDURE — 99212 OFFICE O/P EST SF 10 MIN: CPT | Performed by: OBSTETRICS & GYNECOLOGY

## 2019-02-01 PROCEDURE — 99213 OFFICE O/P EST LOW 20 MIN: CPT | Performed by: OBSTETRICS & GYNECOLOGY

## 2019-02-01 PROCEDURE — 4004F PT TOBACCO SCREEN RCVD TLK: CPT | Performed by: OBSTETRICS & GYNECOLOGY

## 2019-02-01 PROCEDURE — 87591 N.GONORRHOEAE DNA AMP PROB: CPT

## 2019-02-01 PROCEDURE — 87491 CHLMYD TRACH DNA AMP PROBE: CPT

## 2019-02-01 PROCEDURE — G8427 DOCREV CUR MEDS BY ELIG CLIN: HCPCS | Performed by: OBSTETRICS & GYNECOLOGY

## 2019-02-01 PROCEDURE — G8417 CALC BMI ABV UP PARAM F/U: HCPCS | Performed by: OBSTETRICS & GYNECOLOGY

## 2019-02-04 LAB
CHLAMYDIA TRACHOMATIS AMPLIFIED DET: NORMAL
N GONORRHOEAE AMPLIFIED DET: NORMAL

## 2019-02-07 ENCOUNTER — TELEPHONE (OUTPATIENT)
Dept: OBGYN | Age: 34
End: 2019-02-07

## 2019-03-12 ENCOUNTER — HOSPITAL ENCOUNTER (OUTPATIENT)
Age: 34
Discharge: HOME | End: 2019-03-14
Payer: COMMERCIAL

## 2019-03-12 ENCOUNTER — OFFICE VISIT (OUTPATIENT)
Dept: FAMILY MEDICINE CLINIC | Age: 34
End: 2019-03-12
Payer: COMMERCIAL

## 2019-03-12 VITALS
WEIGHT: 273 LBS | OXYGEN SATURATION: 97 % | TEMPERATURE: 98.3 F | DIASTOLIC BLOOD PRESSURE: 85 MMHG | HEIGHT: 63 IN | SYSTOLIC BLOOD PRESSURE: 131 MMHG | RESPIRATION RATE: 18 BRPM | BODY MASS INDEX: 48.37 KG/M2 | HEART RATE: 96 BPM

## 2019-03-12 DIAGNOSIS — Z72.51 HIGH RISK HETEROSEXUAL BEHAVIOR: ICD-10-CM

## 2019-03-12 DIAGNOSIS — J06.9 VIRAL URI: Primary | ICD-10-CM

## 2019-03-12 PROCEDURE — G8427 DOCREV CUR MEDS BY ELIG CLIN: HCPCS | Performed by: FAMILY MEDICINE

## 2019-03-12 PROCEDURE — 99213 OFFICE O/P EST LOW 20 MIN: CPT | Performed by: FAMILY MEDICINE

## 2019-03-12 PROCEDURE — 4004F PT TOBACCO SCREEN RCVD TLK: CPT | Performed by: FAMILY MEDICINE

## 2019-03-12 PROCEDURE — 99212 OFFICE O/P EST SF 10 MIN: CPT | Performed by: FAMILY MEDICINE

## 2019-03-12 PROCEDURE — 36415 COLL VENOUS BLD VENIPUNCTURE: CPT | Performed by: FAMILY MEDICINE

## 2019-03-12 PROCEDURE — 36415 COLL VENOUS BLD VENIPUNCTURE: CPT

## 2019-03-12 PROCEDURE — 86592 SYPHILIS TEST NON-TREP QUAL: CPT

## 2019-03-12 PROCEDURE — 87591 N.GONORRHOEAE DNA AMP PROB: CPT

## 2019-03-12 PROCEDURE — 87491 CHLMYD TRACH DNA AMP PROBE: CPT

## 2019-03-12 PROCEDURE — G8484 FLU IMMUNIZE NO ADMIN: HCPCS | Performed by: FAMILY MEDICINE

## 2019-03-12 PROCEDURE — G8417 CALC BMI ABV UP PARAM F/U: HCPCS | Performed by: FAMILY MEDICINE

## 2019-03-12 RX ORDER — FLUTICASONE PROPIONATE 50 MCG
2 SPRAY, SUSPENSION (ML) NASAL DAILY
Qty: 1 BOTTLE | Refills: 1 | Status: SHIPPED | OUTPATIENT
Start: 2019-03-12 | End: 2019-05-22

## 2019-03-13 LAB — RPR: NORMAL

## 2019-03-14 ENCOUNTER — TELEPHONE (OUTPATIENT)
Dept: FAMILY MEDICINE CLINIC | Age: 34
End: 2019-03-14

## 2019-03-14 LAB
CHLAMYDIA TRACHOMATIS AMPLIFIED DET: NORMAL
N GONORRHOEAE AMPLIFIED DET: NORMAL

## 2019-03-14 ASSESSMENT — ENCOUNTER SYMPTOMS
WHEEZING: 0
CHEST TIGHTNESS: 0
COUGH: 1
RHINORRHEA: 0
SINUS PRESSURE: 1
SHORTNESS OF BREATH: 0
TROUBLE SWALLOWING: 0
SINUS PAIN: 0
SORE THROAT: 0
PHOTOPHOBIA: 0

## 2019-03-16 ENCOUNTER — HOSPITAL ENCOUNTER (EMERGENCY)
Age: 34
Discharge: HOME | End: 2019-03-16
Attending: EMERGENCY MEDICINE
Payer: COMMERCIAL

## 2019-03-16 ENCOUNTER — APPOINTMENT (OUTPATIENT)
Dept: GENERAL RADIOLOGY | Age: 34
End: 2019-03-16
Payer: COMMERCIAL

## 2019-03-16 VITALS
TEMPERATURE: 98.4 F | RESPIRATION RATE: 14 BRPM | SYSTOLIC BLOOD PRESSURE: 130 MMHG | HEART RATE: 96 BPM | BODY MASS INDEX: 48.37 KG/M2 | HEIGHT: 63 IN | OXYGEN SATURATION: 98 % | DIASTOLIC BLOOD PRESSURE: 70 MMHG | WEIGHT: 273 LBS

## 2019-03-16 DIAGNOSIS — J06.9 ACUTE UPPER RESPIRATORY INFECTION: ICD-10-CM

## 2019-03-16 DIAGNOSIS — R07.9 CHEST PAIN, UNSPECIFIED TYPE: ICD-10-CM

## 2019-03-16 DIAGNOSIS — J44.1 COPD EXACERBATION (HCC): Primary | ICD-10-CM

## 2019-03-16 LAB
ANION GAP SERPL CALCULATED.3IONS-SCNC: 12 MMOL/L (ref 7–16)
BUN BLDV-MCNC: 8 MG/DL (ref 6–20)
CALCIUM SERPL-MCNC: 8.7 MG/DL (ref 8.6–10.2)
CHLORIDE BLD-SCNC: 107 MMOL/L (ref 98–107)
CK MB: <1 NG/ML (ref 0–4.3)
CO2: 21 MMOL/L (ref 22–29)
CREAT SERPL-MCNC: 0.6 MG/DL (ref 0.5–1)
D DIMER: <200 NG/ML DDU
GFR AFRICAN AMERICAN: >60
GFR NON-AFRICAN AMERICAN: >60 ML/MIN/1.73
GLUCOSE BLD-MCNC: 138 MG/DL (ref 74–99)
HCG, URINE, POC: NEGATIVE
HCT VFR BLD CALC: 40.5 % (ref 34–48)
HEMOGLOBIN: 13.1 G/DL (ref 11.5–15.5)
Lab: NORMAL
MCH RBC QN AUTO: 29.2 PG (ref 26–35)
MCHC RBC AUTO-ENTMCNC: 32.3 % (ref 32–34.5)
MCV RBC AUTO: 90.2 FL (ref 80–99.9)
NEGATIVE QC PASS/FAIL: NORMAL
PDW BLD-RTO: 14.1 FL (ref 11.5–15)
PLATELET # BLD: 222 E9/L (ref 130–450)
PMV BLD AUTO: 11.7 FL (ref 7–12)
POSITIVE QC PASS/FAIL: NORMAL
POTASSIUM SERPL-SCNC: 3.3 MMOL/L (ref 3.5–5)
RBC # BLD: 4.49 E12/L (ref 3.5–5.5)
SODIUM BLD-SCNC: 140 MMOL/L (ref 132–146)
TOTAL CK: 52 U/L (ref 20–180)
TROPONIN: <0.01 NG/ML (ref 0–0.03)
WBC # BLD: 11.8 E9/L (ref 4.5–11.5)

## 2019-03-16 PROCEDURE — 84484 ASSAY OF TROPONIN QUANT: CPT

## 2019-03-16 PROCEDURE — 6360000002 HC RX W HCPCS: Performed by: EMERGENCY MEDICINE

## 2019-03-16 PROCEDURE — 82550 ASSAY OF CK (CPK): CPT

## 2019-03-16 PROCEDURE — 80048 BASIC METABOLIC PNL TOTAL CA: CPT

## 2019-03-16 PROCEDURE — 93005 ELECTROCARDIOGRAM TRACING: CPT

## 2019-03-16 PROCEDURE — 71046 X-RAY EXAM CHEST 2 VIEWS: CPT

## 2019-03-16 PROCEDURE — 85027 COMPLETE CBC AUTOMATED: CPT

## 2019-03-16 PROCEDURE — 6370000000 HC RX 637 (ALT 250 FOR IP): Performed by: EMERGENCY MEDICINE

## 2019-03-16 PROCEDURE — 99285 EMERGENCY DEPT VISIT HI MDM: CPT

## 2019-03-16 PROCEDURE — 85378 FIBRIN DEGRADE SEMIQUANT: CPT

## 2019-03-16 PROCEDURE — 82553 CREATINE MB FRACTION: CPT

## 2019-03-16 PROCEDURE — 96374 THER/PROPH/DIAG INJ IV PUSH: CPT

## 2019-03-16 RX ORDER — POTASSIUM CHLORIDE 20 MEQ/1
40 TABLET, EXTENDED RELEASE ORAL ONCE
Status: COMPLETED | OUTPATIENT
Start: 2019-03-16 | End: 2019-03-16

## 2019-03-16 RX ORDER — ALBUTEROL SULFATE 90 UG/1
2 AEROSOL, METERED RESPIRATORY (INHALATION) EVERY 4 HOURS PRN
Qty: 1 INHALER | Refills: 1 | Status: SHIPPED | OUTPATIENT
Start: 2019-03-16 | End: 2019-05-22

## 2019-03-16 RX ORDER — PREDNISONE 10 MG/1
40 TABLET ORAL DAILY
Qty: 20 TABLET | Refills: 0 | Status: SHIPPED | OUTPATIENT
Start: 2019-03-16 | End: 2019-03-21

## 2019-03-16 RX ORDER — KETOROLAC TROMETHAMINE 30 MG/ML
15 INJECTION, SOLUTION INTRAMUSCULAR; INTRAVENOUS ONCE
Status: COMPLETED | OUTPATIENT
Start: 2019-03-16 | End: 2019-03-16

## 2019-03-16 RX ORDER — IPRATROPIUM BROMIDE AND ALBUTEROL SULFATE 2.5; .5 MG/3ML; MG/3ML
1 SOLUTION RESPIRATORY (INHALATION) ONCE
Status: COMPLETED | OUTPATIENT
Start: 2019-03-16 | End: 2019-03-16

## 2019-03-16 RX ADMIN — KETOROLAC TROMETHAMINE 15 MG: 30 INJECTION, SOLUTION INTRAMUSCULAR; INTRAVENOUS at 06:29

## 2019-03-16 RX ADMIN — IPRATROPIUM BROMIDE AND ALBUTEROL SULFATE 1 AMPULE: .5; 3 SOLUTION RESPIRATORY (INHALATION) at 06:30

## 2019-03-16 RX ADMIN — POTASSIUM CHLORIDE 40 MEQ: 20 TABLET, EXTENDED RELEASE ORAL at 07:55

## 2019-03-16 ASSESSMENT — PAIN SCALES - GENERAL
PAINLEVEL_OUTOF10: 6
PAINLEVEL_OUTOF10: 6

## 2019-03-16 ASSESSMENT — PAIN DESCRIPTION - LOCATION: LOCATION: BACK;CHEST

## 2019-03-16 ASSESSMENT — ENCOUNTER SYMPTOMS
BACK PAIN: 0
COUGH: 1
VOMITING: 0
ABDOMINAL PAIN: 0
NAUSEA: 0
BLOOD IN STOOL: 0

## 2019-03-16 ASSESSMENT — PAIN DESCRIPTION - PAIN TYPE: TYPE: ACUTE PAIN

## 2019-03-20 LAB
EKG ATRIAL RATE: 99 BPM
EKG P AXIS: 48 DEGREES
EKG P-R INTERVAL: 148 MS
EKG Q-T INTERVAL: 346 MS
EKG QRS DURATION: 76 MS
EKG QTC CALCULATION (BAZETT): 444 MS
EKG R AXIS: 11 DEGREES
EKG T AXIS: 21 DEGREES
EKG VENTRICULAR RATE: 99 BPM

## 2019-04-08 ENCOUNTER — OFFICE VISIT (OUTPATIENT)
Dept: FAMILY MEDICINE CLINIC | Age: 34
End: 2019-04-08
Payer: COMMERCIAL

## 2019-04-08 VITALS
WEIGHT: 268 LBS | BODY MASS INDEX: 47.48 KG/M2 | DIASTOLIC BLOOD PRESSURE: 76 MMHG | TEMPERATURE: 98.3 F | OXYGEN SATURATION: 97 % | HEART RATE: 97 BPM | RESPIRATION RATE: 18 BRPM | SYSTOLIC BLOOD PRESSURE: 122 MMHG | HEIGHT: 63 IN

## 2019-04-08 DIAGNOSIS — Z91.89 HAS MULTIPLE SEXUAL PARTNERS: Primary | ICD-10-CM

## 2019-04-08 DIAGNOSIS — R73.03 PREDIABETES: ICD-10-CM

## 2019-04-08 DIAGNOSIS — R07.9 CHEST PAIN, UNSPECIFIED TYPE: ICD-10-CM

## 2019-04-08 PROCEDURE — 4004F PT TOBACCO SCREEN RCVD TLK: CPT | Performed by: FAMILY MEDICINE

## 2019-04-08 PROCEDURE — G8427 DOCREV CUR MEDS BY ELIG CLIN: HCPCS | Performed by: FAMILY MEDICINE

## 2019-04-08 PROCEDURE — 99212 OFFICE O/P EST SF 10 MIN: CPT | Performed by: FAMILY MEDICINE

## 2019-04-08 PROCEDURE — 99213 OFFICE O/P EST LOW 20 MIN: CPT | Performed by: FAMILY MEDICINE

## 2019-04-08 PROCEDURE — G8417 CALC BMI ABV UP PARAM F/U: HCPCS | Performed by: FAMILY MEDICINE

## 2019-04-08 NOTE — PROGRESS NOTES
Social Needs    Financial resource strain: Not on file    Food insecurity:     Worry: Not on file     Inability: Not on file    Transportation needs:     Medical: Not on file     Non-medical: Not on file   Tobacco Use    Smoking status: Current Every Day Smoker     Packs/day: 1.00     Years: 20.00     Pack years: 20.00     Types: Cigarettes     Start date: 11/3/1995    Smokeless tobacco: Never Used   Substance and Sexual Activity    Alcohol use: Yes     Alcohol/week: 1.2 oz     Types: 2 Cans of beer per week     Comment: social    Drug use: No    Sexual activity: Yes     Partners: Male   Lifestyle    Physical activity:     Days per week: Not on file     Minutes per session: Not on file    Stress: Not on file   Relationships    Social connections:     Talks on phone: Not on file     Gets together: Not on file     Attends Che[employer] service: Not on file     Active member of club or organization: Not on file     Attends meetings of clubs or organizations: Not on file     Relationship status: Not on file    Intimate partner violence:     Fear of current or ex partner: Not on file     Emotionally abused: Not on file     Physically abused: Not on file     Forced sexual activity: Not on file   Other Topics Concern    Not on file   Social History Narrative    Not on file        Family History:       Problem Relation Age of Onset    Diabetes Mother     Cancer Mother         ovarian    Diabetes Father     Cancer Sister         NHL    Diabetes Sister     High Blood Pressure Brother        Review of Systems:   Review of Systems   Constitutional: Negative for activity change, appetite change, chills, diaphoresis, fatigue, fever and unexpected weight change. HENT: Negative for congestion, ear discharge, ear pain, postnasal drip, rhinorrhea, sinus pressure, sinus pain and sore throat. Eyes: Negative for photophobia and visual disturbance.    Respiratory: Positive for shortness of breath (occasionally w/exercise) and wheezing (occasionally w/exercise). Negative for cough and chest tightness. Gastrointestinal: Negative for abdominal distention, abdominal pain, constipation, diarrhea, nausea and vomiting. Genitourinary: Negative for decreased urine volume, dysuria, frequency, pelvic pain, urgency, vaginal bleeding, vaginal discharge and vaginal pain. Neurological: Negative for dizziness, light-headedness and headaches. Physical Exam   Vitals: /76 (Site: Right Upper Arm, Position: Sitting, Cuff Size: Medium Adult)   Pulse 97   Temp 98.3 °F (36.8 °C) (Oral)   Resp 18   Ht 5' 3\" (1.6 m)   Wt 268 lb (121.6 kg)   LMP 02/20/2019   SpO2 97%   BMI 47.47 kg/m²   General Appearance: Well developed, awake, alert, oriented, no acute distress  HEENT: Normocephalic, atraumatic. EOM's intact, EACwithout erythema or swelling, no pallor oricterus. TM's intact bilaterally, no sinus tenderness or oropharyngeal lesions    Neck: Supple, symmetrical, trachea midline. No JVD. Chest wall/Lung: Clear to auscultation bilaterally,  respirationsunlabored. No ronchi/wheezing/rales  Heart: Regular rate and rhythm, S1 and S2 normal, no murmur, rub or gallop. Extremities:  Extremities normal, atraumatic, nocyanosis. no edema. Skin: Skin color, texture, turgor normal, no rashes or lesions  Musculokeletal: ROM grossly normal in all joints of extremities, no obvious joint swelling. Lymph nodes: no lymph node enlargement appreciated  Neurologic:   Alert&Oriented. Normal gait and coordination  No focal neurological deficits appreaciated         Psychiatric: has a normal mood and affect. Behavior is normal.       Assessment and Plan       1. Chest pain, unspecified type  -resolved currently. Likely 2/2 MSK vs asthma vs anxiety  -continue to use albuterol PRN and 20 min before exercise  -encouraged smoking cessation, not yet ready    2.  Has multiple sexual partners  -will recheck for HIV, as she is concerned about

## 2019-04-09 DIAGNOSIS — Z20.6 EXPOSURE TO HIV: Primary | ICD-10-CM

## 2019-04-09 ASSESSMENT — ENCOUNTER SYMPTOMS
COUGH: 0
SORE THROAT: 0
SINUS PAIN: 0
SINUS PRESSURE: 0
CONSTIPATION: 0
PHOTOPHOBIA: 0
DIARRHEA: 0
RHINORRHEA: 0
CHEST TIGHTNESS: 0
SHORTNESS OF BREATH: 1
VOMITING: 0
ABDOMINAL DISTENTION: 0
NAUSEA: 0
WHEEZING: 1
ABDOMINAL PAIN: 0

## 2019-05-22 ENCOUNTER — HOSPITAL ENCOUNTER (EMERGENCY)
Age: 34
Discharge: HOME | End: 2019-05-22
Attending: EMERGENCY MEDICINE
Payer: COMMERCIAL

## 2019-05-22 VITALS
SYSTOLIC BLOOD PRESSURE: 168 MMHG | DIASTOLIC BLOOD PRESSURE: 103 MMHG | OXYGEN SATURATION: 96 % | WEIGHT: 260 LBS | RESPIRATION RATE: 16 BRPM | BODY MASS INDEX: 46.07 KG/M2 | HEIGHT: 63 IN | TEMPERATURE: 98.9 F | HEART RATE: 109 BPM

## 2019-05-22 DIAGNOSIS — F41.0 PANIC ATTACK: ICD-10-CM

## 2019-05-22 DIAGNOSIS — F41.1 ANXIETY STATE: Primary | ICD-10-CM

## 2019-05-22 DIAGNOSIS — G44.209 TENSION HEADACHE: ICD-10-CM

## 2019-05-22 LAB
ANION GAP SERPL CALCULATED.3IONS-SCNC: 12 MMOL/L (ref 7–16)
BASOPHILS ABSOLUTE: 0.07 E9/L (ref 0–0.2)
BASOPHILS RELATIVE PERCENT: 0.7 % (ref 0–2)
BUN BLDV-MCNC: 10 MG/DL (ref 6–20)
CALCIUM SERPL-MCNC: 8.7 MG/DL (ref 8.6–10.2)
CHLORIDE BLD-SCNC: 101 MMOL/L (ref 98–107)
CO2: 22 MMOL/L (ref 22–29)
CREAT SERPL-MCNC: 0.6 MG/DL (ref 0.5–1)
EOSINOPHILS ABSOLUTE: 0.51 E9/L (ref 0.05–0.5)
EOSINOPHILS RELATIVE PERCENT: 4.8 % (ref 0–6)
GFR AFRICAN AMERICAN: >60
GFR NON-AFRICAN AMERICAN: >60 ML/MIN/1.73
GLUCOSE BLD-MCNC: 126 MG/DL (ref 74–99)
HCT VFR BLD CALC: 39.6 % (ref 34–48)
HEMOGLOBIN: 12.9 G/DL (ref 11.5–15.5)
IMMATURE GRANULOCYTES #: 0.04 E9/L
IMMATURE GRANULOCYTES %: 0.4 % (ref 0–5)
LYMPHOCYTES ABSOLUTE: 2.61 E9/L (ref 1.5–4)
LYMPHOCYTES RELATIVE PERCENT: 24.6 % (ref 20–42)
MCH RBC QN AUTO: 28.9 PG (ref 26–35)
MCHC RBC AUTO-ENTMCNC: 32.6 % (ref 32–34.5)
MCV RBC AUTO: 88.6 FL (ref 80–99.9)
MONOCYTES ABSOLUTE: 0.66 E9/L (ref 0.1–0.95)
MONOCYTES RELATIVE PERCENT: 6.2 % (ref 2–12)
NEUTROPHILS ABSOLUTE: 6.74 E9/L (ref 1.8–7.3)
NEUTROPHILS RELATIVE PERCENT: 63.3 % (ref 43–80)
PDW BLD-RTO: 14.6 FL (ref 11.5–15)
PLATELET # BLD: 226 E9/L (ref 130–450)
PMV BLD AUTO: 11.6 FL (ref 7–12)
POTASSIUM SERPL-SCNC: 3.6 MMOL/L (ref 3.5–5)
RBC # BLD: 4.47 E12/L (ref 3.5–5.5)
SODIUM BLD-SCNC: 135 MMOL/L (ref 132–146)
TROPONIN: <0.01 NG/ML (ref 0–0.03)
WBC # BLD: 10.6 E9/L (ref 4.5–11.5)

## 2019-05-22 PROCEDURE — 99284 EMERGENCY DEPT VISIT MOD MDM: CPT

## 2019-05-22 PROCEDURE — 80048 BASIC METABOLIC PNL TOTAL CA: CPT

## 2019-05-22 PROCEDURE — 2580000003 HC RX 258: Performed by: EMERGENCY MEDICINE

## 2019-05-22 PROCEDURE — 96375 TX/PRO/DX INJ NEW DRUG ADDON: CPT

## 2019-05-22 PROCEDURE — 36415 COLL VENOUS BLD VENIPUNCTURE: CPT

## 2019-05-22 PROCEDURE — 6360000002 HC RX W HCPCS: Performed by: EMERGENCY MEDICINE

## 2019-05-22 PROCEDURE — 84484 ASSAY OF TROPONIN QUANT: CPT

## 2019-05-22 PROCEDURE — 96374 THER/PROPH/DIAG INJ IV PUSH: CPT

## 2019-05-22 PROCEDURE — 93005 ELECTROCARDIOGRAM TRACING: CPT | Performed by: EMERGENCY MEDICINE

## 2019-05-22 PROCEDURE — 85025 COMPLETE CBC W/AUTO DIFF WBC: CPT

## 2019-05-22 RX ORDER — METOCLOPRAMIDE HYDROCHLORIDE 5 MG/ML
10 INJECTION INTRAMUSCULAR; INTRAVENOUS ONCE
Status: COMPLETED | OUTPATIENT
Start: 2019-05-22 | End: 2019-05-22

## 2019-05-22 RX ORDER — 0.9 % SODIUM CHLORIDE 0.9 %
1000 INTRAVENOUS SOLUTION INTRAVENOUS ONCE
Status: COMPLETED | OUTPATIENT
Start: 2019-05-22 | End: 2019-05-22

## 2019-05-22 RX ORDER — DIPHENHYDRAMINE HYDROCHLORIDE 50 MG/ML
50 INJECTION INTRAMUSCULAR; INTRAVENOUS ONCE
Status: COMPLETED | OUTPATIENT
Start: 2019-05-22 | End: 2019-05-22

## 2019-05-22 RX ORDER — LORAZEPAM 1 MG/1
1 TABLET ORAL EVERY 8 HOURS PRN
Qty: 8 TABLET | Refills: 0 | Status: SHIPPED | OUTPATIENT
Start: 2019-05-22 | End: 2019-05-25

## 2019-05-22 RX ORDER — BUTALBITAL, ACETAMINOPHEN AND CAFFEINE 300; 40; 50 MG/1; MG/1; MG/1
1 CAPSULE ORAL EVERY 6 HOURS PRN
Qty: 20 CAPSULE | Refills: 0 | Status: SHIPPED | OUTPATIENT
Start: 2019-05-22 | End: 2019-08-16 | Stop reason: ALTCHOICE

## 2019-05-22 RX ORDER — ONDANSETRON 8 MG/1
8 TABLET, ORALLY DISINTEGRATING ORAL EVERY 8 HOURS PRN
Qty: 10 TABLET | Refills: 1 | Status: SHIPPED | OUTPATIENT
Start: 2019-05-22 | End: 2019-09-08

## 2019-05-22 RX ORDER — LORAZEPAM 2 MG/ML
1 INJECTION INTRAMUSCULAR ONCE
Status: COMPLETED | OUTPATIENT
Start: 2019-05-22 | End: 2019-05-22

## 2019-05-22 RX ADMIN — METOCLOPRAMIDE 10 MG: 5 INJECTION, SOLUTION INTRAMUSCULAR; INTRAVENOUS at 18:51

## 2019-05-22 RX ADMIN — DIPHENHYDRAMINE HYDROCHLORIDE 50 MG: 50 INJECTION, SOLUTION INTRAMUSCULAR; INTRAVENOUS at 18:51

## 2019-05-22 RX ADMIN — LORAZEPAM 1 MG: 2 INJECTION, SOLUTION INTRAMUSCULAR; INTRAVENOUS at 18:51

## 2019-05-22 RX ADMIN — SODIUM CHLORIDE 1000 ML: 9 INJECTION, SOLUTION INTRAVENOUS at 18:51

## 2019-05-22 ASSESSMENT — PAIN SCALES - GENERAL: PAINLEVEL_OUTOF10: 10

## 2019-05-22 ASSESSMENT — PAIN DESCRIPTION - FREQUENCY: FREQUENCY: CONTINUOUS

## 2019-05-22 ASSESSMENT — PAIN DESCRIPTION - PAIN TYPE: TYPE: ACUTE PAIN

## 2019-05-22 ASSESSMENT — PAIN DESCRIPTION - LOCATION: LOCATION: HEAD

## 2019-05-23 LAB
EKG ATRIAL RATE: 103 BPM
EKG P AXIS: 30 DEGREES
EKG P-R INTERVAL: 152 MS
EKG Q-T INTERVAL: 340 MS
EKG QRS DURATION: 76 MS
EKG QTC CALCULATION (BAZETT): 445 MS
EKG R AXIS: 22 DEGREES
EKG T AXIS: 19 DEGREES
EKG VENTRICULAR RATE: 103 BPM

## 2019-05-23 PROCEDURE — 93010 ELECTROCARDIOGRAM REPORT: CPT | Performed by: INTERNAL MEDICINE

## 2019-05-23 NOTE — ED NOTES
Discharge instructions given, medications and follow up instructions reviewed. Patient verbalized understanding, no other noted or stated problems at this time. Patient will follow up with physicians as directed.       James Reyes RN  05/22/19 2042

## 2019-08-16 ENCOUNTER — HOSPITAL ENCOUNTER (EMERGENCY)
Age: 34
Discharge: HOME | End: 2019-08-16
Payer: COMMERCIAL

## 2019-08-16 VITALS
OXYGEN SATURATION: 98 % | HEIGHT: 63 IN | SYSTOLIC BLOOD PRESSURE: 124 MMHG | DIASTOLIC BLOOD PRESSURE: 84 MMHG | RESPIRATION RATE: 16 BRPM | TEMPERATURE: 99.4 F | WEIGHT: 260 LBS | BODY MASS INDEX: 46.07 KG/M2 | HEART RATE: 105 BPM

## 2019-08-16 DIAGNOSIS — R30.0 DYSURIA: Primary | ICD-10-CM

## 2019-08-16 LAB
BACTERIA: ABNORMAL /HPF
BILIRUBIN URINE: NEGATIVE
BLOOD, URINE: NORMAL
CLARITY: CLEAR
COLOR: YELLOW
GLUCOSE URINE: NEGATIVE MG/DL
HCG(URINE) PREGNANCY TEST: NEGATIVE
KETONES, URINE: NEGATIVE MG/DL
LEUKOCYTE ESTERASE, URINE: NEGATIVE
NITRITE, URINE: NEGATIVE
PH UA: 6 (ref 5–9)
PROTEIN UA: NEGATIVE MG/DL
RBC UA: ABNORMAL /HPF (ref 0–2)
SPECIFIC GRAVITY UA: >=1.03 (ref 1–1.03)
UROBILINOGEN, URINE: 1 E.U./DL
WBC UA: ABNORMAL /HPF (ref 0–5)

## 2019-08-16 PROCEDURE — 81025 URINE PREGNANCY TEST: CPT

## 2019-08-16 PROCEDURE — 99283 EMERGENCY DEPT VISIT LOW MDM: CPT

## 2019-08-16 PROCEDURE — 87088 URINE BACTERIA CULTURE: CPT

## 2019-08-16 PROCEDURE — 81001 URINALYSIS AUTO W/SCOPE: CPT

## 2019-08-16 RX ORDER — LORAZEPAM 1 MG/1
1 TABLET ORAL 2 TIMES DAILY
Status: ON HOLD | COMMUNITY
End: 2020-02-25 | Stop reason: ALTCHOICE

## 2019-08-16 RX ORDER — SULFAMETHOXAZOLE AND TRIMETHOPRIM 800; 160 MG/1; MG/1
1 TABLET ORAL 2 TIMES DAILY
Qty: 6 TABLET | Refills: 0 | Status: SHIPPED | OUTPATIENT
Start: 2019-08-16 | End: 2019-08-19

## 2019-08-16 ASSESSMENT — PAIN DESCRIPTION - DESCRIPTORS: DESCRIPTORS: PRESSURE

## 2019-08-16 ASSESSMENT — PAIN SCALES - GENERAL: PAINLEVEL_OUTOF10: 6

## 2019-08-16 ASSESSMENT — PAIN DESCRIPTION - ORIENTATION: ORIENTATION: LOWER

## 2019-08-16 ASSESSMENT — PAIN DESCRIPTION - LOCATION: LOCATION: PELVIS

## 2019-08-16 ASSESSMENT — PAIN DESCRIPTION - PAIN TYPE: TYPE: ACUTE PAIN

## 2019-08-19 LAB — URINE CULTURE, ROUTINE: NORMAL

## 2019-09-08 ENCOUNTER — APPOINTMENT (OUTPATIENT)
Dept: GENERAL RADIOLOGY | Age: 34
End: 2019-09-08
Payer: MEDICARE

## 2019-09-08 ENCOUNTER — HOSPITAL ENCOUNTER (EMERGENCY)
Age: 34
Discharge: HOME | End: 2019-09-08
Payer: MEDICARE

## 2019-09-08 VITALS
HEIGHT: 63 IN | DIASTOLIC BLOOD PRESSURE: 94 MMHG | SYSTOLIC BLOOD PRESSURE: 138 MMHG | WEIGHT: 280 LBS | RESPIRATION RATE: 16 BRPM | TEMPERATURE: 98.8 F | OXYGEN SATURATION: 99 % | HEART RATE: 90 BPM | BODY MASS INDEX: 49.61 KG/M2

## 2019-09-08 DIAGNOSIS — M77.8 RIGHT ELBOW TENDONITIS: Primary | ICD-10-CM

## 2019-09-08 PROCEDURE — 99283 EMERGENCY DEPT VISIT LOW MDM: CPT

## 2019-09-08 PROCEDURE — 73080 X-RAY EXAM OF ELBOW: CPT

## 2019-09-08 ASSESSMENT — PAIN DESCRIPTION - PAIN TYPE: TYPE: ACUTE PAIN

## 2019-09-08 ASSESSMENT — PAIN SCALES - GENERAL: PAINLEVEL_OUTOF10: 6

## 2019-09-08 ASSESSMENT — PAIN DESCRIPTION - DESCRIPTORS: DESCRIPTORS: SHARP

## 2019-09-08 ASSESSMENT — PAIN DESCRIPTION - ORIENTATION: ORIENTATION: RIGHT

## 2019-09-08 ASSESSMENT — PAIN DESCRIPTION - LOCATION: LOCATION: ELBOW

## 2019-09-19 ENCOUNTER — HOSPITAL ENCOUNTER (EMERGENCY)
Age: 34
Discharge: HOME | End: 2019-09-19
Attending: FAMILY MEDICINE
Payer: MEDICARE

## 2019-09-19 VITALS
HEIGHT: 63 IN | TEMPERATURE: 98.4 F | BODY MASS INDEX: 51.74 KG/M2 | OXYGEN SATURATION: 99 % | SYSTOLIC BLOOD PRESSURE: 140 MMHG | WEIGHT: 292 LBS | DIASTOLIC BLOOD PRESSURE: 82 MMHG | RESPIRATION RATE: 16 BRPM | HEART RATE: 89 BPM

## 2019-09-19 DIAGNOSIS — G43.909 MIGRAINE WITHOUT STATUS MIGRAINOSUS, NOT INTRACTABLE, UNSPECIFIED MIGRAINE TYPE: Primary | ICD-10-CM

## 2019-09-19 PROCEDURE — 96374 THER/PROPH/DIAG INJ IV PUSH: CPT

## 2019-09-19 PROCEDURE — 6360000002 HC RX W HCPCS: Performed by: PHYSICIAN ASSISTANT

## 2019-09-19 PROCEDURE — 99283 EMERGENCY DEPT VISIT LOW MDM: CPT

## 2019-09-19 PROCEDURE — 96375 TX/PRO/DX INJ NEW DRUG ADDON: CPT

## 2019-09-19 PROCEDURE — 2580000003 HC RX 258: Performed by: PHYSICIAN ASSISTANT

## 2019-09-19 RX ORDER — METOCLOPRAMIDE HYDROCHLORIDE 5 MG/ML
10 INJECTION INTRAMUSCULAR; INTRAVENOUS ONCE
Status: COMPLETED | OUTPATIENT
Start: 2019-09-19 | End: 2019-09-19

## 2019-09-19 RX ORDER — DIPHENHYDRAMINE HYDROCHLORIDE 50 MG/ML
25 INJECTION INTRAMUSCULAR; INTRAVENOUS ONCE
Status: DISCONTINUED | OUTPATIENT
Start: 2019-09-19 | End: 2019-09-19

## 2019-09-19 RX ORDER — KETOROLAC TROMETHAMINE 30 MG/ML
30 INJECTION, SOLUTION INTRAMUSCULAR; INTRAVENOUS ONCE
Status: DISCONTINUED | OUTPATIENT
Start: 2019-09-19 | End: 2019-09-19

## 2019-09-19 RX ORDER — DIPHENHYDRAMINE HYDROCHLORIDE 50 MG/ML
25 INJECTION INTRAMUSCULAR; INTRAVENOUS ONCE
Status: COMPLETED | OUTPATIENT
Start: 2019-09-19 | End: 2019-09-19

## 2019-09-19 RX ORDER — KETOROLAC TROMETHAMINE 30 MG/ML
30 INJECTION, SOLUTION INTRAMUSCULAR; INTRAVENOUS ONCE
Status: COMPLETED | OUTPATIENT
Start: 2019-09-19 | End: 2019-09-19

## 2019-09-19 RX ORDER — 0.9 % SODIUM CHLORIDE 0.9 %
1000 INTRAVENOUS SOLUTION INTRAVENOUS ONCE
Status: COMPLETED | OUTPATIENT
Start: 2019-09-19 | End: 2019-09-19

## 2019-09-19 RX ORDER — PROCHLORPERAZINE EDISYLATE 5 MG/ML
10 INJECTION INTRAMUSCULAR; INTRAVENOUS ONCE
Status: DISCONTINUED | OUTPATIENT
Start: 2019-09-19 | End: 2019-09-19

## 2019-09-19 RX ADMIN — KETOROLAC TROMETHAMINE 30 MG: 30 INJECTION, SOLUTION INTRAMUSCULAR at 16:59

## 2019-09-19 RX ADMIN — SODIUM CHLORIDE 1000 ML: 9 INJECTION, SOLUTION INTRAVENOUS at 16:58

## 2019-09-19 RX ADMIN — METOCLOPRAMIDE 10 MG: 5 INJECTION, SOLUTION INTRAMUSCULAR; INTRAVENOUS at 17:00

## 2019-09-19 RX ADMIN — DIPHENHYDRAMINE HYDROCHLORIDE 25 MG: 50 INJECTION, SOLUTION INTRAMUSCULAR; INTRAVENOUS at 17:01

## 2019-09-19 ASSESSMENT — PAIN DESCRIPTION - PAIN TYPE
TYPE: ACUTE PAIN
TYPE: ACUTE PAIN

## 2019-09-19 ASSESSMENT — PAIN DESCRIPTION - FREQUENCY: FREQUENCY: CONTINUOUS

## 2019-09-19 ASSESSMENT — PAIN SCALES - GENERAL
PAINLEVEL_OUTOF10: 3
PAINLEVEL_OUTOF10: 10
PAINLEVEL_OUTOF10: 10

## 2019-09-19 ASSESSMENT — PAIN DESCRIPTION - PROGRESSION: CLINICAL_PROGRESSION: GRADUALLY IMPROVING

## 2019-09-19 ASSESSMENT — PAIN DESCRIPTION - LOCATION
LOCATION: HEAD
LOCATION: HEAD

## 2019-09-19 ASSESSMENT — PAIN DESCRIPTION - DESCRIPTORS
DESCRIPTORS: HEADACHE
DESCRIPTORS: HEADACHE;CRUSHING

## 2019-10-27 ENCOUNTER — APPOINTMENT (OUTPATIENT)
Dept: CT IMAGING | Age: 34
End: 2019-10-27
Payer: MEDICAID

## 2019-10-27 ENCOUNTER — HOSPITAL ENCOUNTER (EMERGENCY)
Age: 34
Discharge: HOME | End: 2019-10-27
Attending: FAMILY MEDICINE
Payer: MEDICAID

## 2019-10-27 VITALS
HEART RATE: 98 BPM | SYSTOLIC BLOOD PRESSURE: 118 MMHG | HEIGHT: 63 IN | WEIGHT: 290 LBS | DIASTOLIC BLOOD PRESSURE: 56 MMHG | OXYGEN SATURATION: 99 % | TEMPERATURE: 98.2 F | RESPIRATION RATE: 16 BRPM | BODY MASS INDEX: 51.38 KG/M2

## 2019-10-27 DIAGNOSIS — R10.30 LOWER ABDOMINAL PAIN: Primary | ICD-10-CM

## 2019-10-27 DIAGNOSIS — M54.41 ACUTE BILATERAL LOW BACK PAIN WITH RIGHT-SIDED SCIATICA: ICD-10-CM

## 2019-10-27 LAB
ALBUMIN SERPL-MCNC: 3.8 G/DL (ref 3.5–5.2)
ALP BLD-CCNC: 86 U/L (ref 35–104)
ALT SERPL-CCNC: 14 U/L (ref 0–32)
ANION GAP SERPL CALCULATED.3IONS-SCNC: 13 MMOL/L (ref 7–16)
AST SERPL-CCNC: 15 U/L (ref 0–31)
BACTERIA: ABNORMAL /HPF
BASOPHILS ABSOLUTE: 0.06 E9/L (ref 0–0.2)
BASOPHILS RELATIVE PERCENT: 0.5 % (ref 0–2)
BILIRUB SERPL-MCNC: <0.2 MG/DL (ref 0–1.2)
BILIRUBIN URINE: NEGATIVE
BLOOD, URINE: ABNORMAL
BUN BLDV-MCNC: 10 MG/DL (ref 6–20)
CALCIUM SERPL-MCNC: 9 MG/DL (ref 8.6–10.2)
CHLORIDE BLD-SCNC: 106 MMOL/L (ref 98–107)
CLARITY: CLEAR
CO2: 22 MMOL/L (ref 22–29)
COLOR: YELLOW
CREAT SERPL-MCNC: 0.6 MG/DL (ref 0.5–1)
EOSINOPHILS ABSOLUTE: 0.32 E9/L (ref 0.05–0.5)
EOSINOPHILS RELATIVE PERCENT: 2.9 % (ref 0–6)
EPITHELIAL CELLS, UA: ABNORMAL /HPF
GFR AFRICAN AMERICAN: >60
GFR NON-AFRICAN AMERICAN: >60 ML/MIN/1.73
GLUCOSE BLD-MCNC: 131 MG/DL (ref 74–99)
GLUCOSE URINE: NEGATIVE MG/DL
HCG(URINE) PREGNANCY TEST: NEGATIVE
HCT VFR BLD CALC: 40.1 % (ref 34–48)
HEMOGLOBIN: 12.7 G/DL (ref 11.5–15.5)
IMMATURE GRANULOCYTES #: 0.04 E9/L
IMMATURE GRANULOCYTES %: 0.4 % (ref 0–5)
KETONES, URINE: NEGATIVE MG/DL
LACTIC ACID: 1.5 MMOL/L (ref 0.5–2.2)
LEUKOCYTE ESTERASE, URINE: ABNORMAL
LIPASE: 19 U/L (ref 13–60)
LYMPHOCYTES ABSOLUTE: 2.11 E9/L (ref 1.5–4)
LYMPHOCYTES RELATIVE PERCENT: 19.3 % (ref 20–42)
MCH RBC QN AUTO: 28.8 PG (ref 26–35)
MCHC RBC AUTO-ENTMCNC: 31.7 % (ref 32–34.5)
MCV RBC AUTO: 90.9 FL (ref 80–99.9)
MONOCYTES ABSOLUTE: 0.74 E9/L (ref 0.1–0.95)
MONOCYTES RELATIVE PERCENT: 6.8 % (ref 2–12)
NEUTROPHILS ABSOLUTE: 7.64 E9/L (ref 1.8–7.3)
NEUTROPHILS RELATIVE PERCENT: 70.1 % (ref 43–80)
NITRITE, URINE: NEGATIVE
PDW BLD-RTO: 14.4 FL (ref 11.5–15)
PH UA: 6.5 (ref 5–9)
PLATELET # BLD: 237 E9/L (ref 130–450)
PMV BLD AUTO: 11.1 FL (ref 7–12)
POTASSIUM REFLEX MAGNESIUM: 4 MMOL/L (ref 3.5–5)
PROTEIN UA: NEGATIVE MG/DL
RBC # BLD: 4.41 E12/L (ref 3.5–5.5)
RBC UA: ABNORMAL /HPF (ref 0–2)
SODIUM BLD-SCNC: 141 MMOL/L (ref 132–146)
SPECIFIC GRAVITY UA: 1.02 (ref 1–1.03)
TOTAL PROTEIN: 7.5 G/DL (ref 6.4–8.3)
UROBILINOGEN, URINE: 0.2 E.U./DL
WBC # BLD: 10.9 E9/L (ref 4.5–11.5)
WBC UA: ABNORMAL /HPF (ref 0–5)

## 2019-10-27 PROCEDURE — 36415 COLL VENOUS BLD VENIPUNCTURE: CPT

## 2019-10-27 PROCEDURE — 81001 URINALYSIS AUTO W/SCOPE: CPT

## 2019-10-27 PROCEDURE — 74176 CT ABD & PELVIS W/O CONTRAST: CPT

## 2019-10-27 PROCEDURE — 6360000002 HC RX W HCPCS: Performed by: FAMILY MEDICINE

## 2019-10-27 PROCEDURE — 99284 EMERGENCY DEPT VISIT MOD MDM: CPT

## 2019-10-27 PROCEDURE — 83690 ASSAY OF LIPASE: CPT

## 2019-10-27 PROCEDURE — 81025 URINE PREGNANCY TEST: CPT

## 2019-10-27 PROCEDURE — 96375 TX/PRO/DX INJ NEW DRUG ADDON: CPT

## 2019-10-27 PROCEDURE — 80053 COMPREHEN METABOLIC PANEL: CPT

## 2019-10-27 PROCEDURE — 85025 COMPLETE CBC W/AUTO DIFF WBC: CPT

## 2019-10-27 PROCEDURE — 96374 THER/PROPH/DIAG INJ IV PUSH: CPT

## 2019-10-27 PROCEDURE — 87088 URINE BACTERIA CULTURE: CPT

## 2019-10-27 PROCEDURE — 83605 ASSAY OF LACTIC ACID: CPT

## 2019-10-27 RX ORDER — KETOROLAC TROMETHAMINE 30 MG/ML
30 INJECTION, SOLUTION INTRAMUSCULAR; INTRAVENOUS ONCE
Status: COMPLETED | OUTPATIENT
Start: 2019-10-27 | End: 2019-10-27

## 2019-10-27 RX ORDER — IBUPROFEN 400 MG/1
400 TABLET ORAL EVERY 8 HOURS PRN
Qty: 12 TABLET | Refills: 0 | Status: SHIPPED | OUTPATIENT
Start: 2019-10-27 | End: 2020-01-22

## 2019-10-27 RX ORDER — ACETAMINOPHEN 500 MG
1000 TABLET ORAL EVERY 8 HOURS PRN
Qty: 50 TABLET | Refills: 0 | Status: SHIPPED | OUTPATIENT
Start: 2019-10-27 | End: 2020-03-01

## 2019-10-27 RX ORDER — CEFDINIR 300 MG/1
300 CAPSULE ORAL 2 TIMES DAILY
Qty: 14 CAPSULE | Refills: 0 | Status: SHIPPED | OUTPATIENT
Start: 2019-10-27 | End: 2019-11-03

## 2019-10-27 RX ORDER — PROCHLORPERAZINE EDISYLATE 5 MG/ML
10 INJECTION INTRAMUSCULAR; INTRAVENOUS ONCE
Status: COMPLETED | OUTPATIENT
Start: 2019-10-27 | End: 2019-10-27

## 2019-10-27 RX ADMIN — KETOROLAC TROMETHAMINE 30 MG: 30 INJECTION, SOLUTION INTRAMUSCULAR at 15:18

## 2019-10-27 RX ADMIN — PROCHLORPERAZINE EDISYLATE 10 MG: 5 INJECTION INTRAMUSCULAR; INTRAVENOUS at 15:18

## 2019-10-27 ASSESSMENT — PAIN DESCRIPTION - PROGRESSION
CLINICAL_PROGRESSION: GRADUALLY IMPROVING
CLINICAL_PROGRESSION: GRADUALLY WORSENING

## 2019-10-27 ASSESSMENT — PAIN DESCRIPTION - LOCATION
LOCATION: ABDOMEN;FLANK
LOCATION: FLANK

## 2019-10-27 ASSESSMENT — PAIN DESCRIPTION - ORIENTATION
ORIENTATION: RIGHT
ORIENTATION: RIGHT

## 2019-10-27 ASSESSMENT — PAIN DESCRIPTION - DESCRIPTORS
DESCRIPTORS: SHARP
DESCRIPTORS: STABBING;PRESSURE

## 2019-10-27 ASSESSMENT — PAIN SCALES - GENERAL
PAINLEVEL_OUTOF10: 6
PAINLEVEL_OUTOF10: 4

## 2019-10-27 ASSESSMENT — PAIN DESCRIPTION - FREQUENCY
FREQUENCY: CONTINUOUS
FREQUENCY: CONTINUOUS

## 2019-10-27 ASSESSMENT — PAIN DESCRIPTION - ONSET: ONSET: SUDDEN

## 2019-10-27 ASSESSMENT — PAIN DESCRIPTION - PAIN TYPE: TYPE: ACUTE PAIN

## 2019-10-29 LAB — URINE CULTURE, ROUTINE: NORMAL

## 2019-10-30 ENCOUNTER — HOSPITAL ENCOUNTER (EMERGENCY)
Age: 34
Discharge: HOME | End: 2019-10-30
Attending: EMERGENCY MEDICINE
Payer: COMMERCIAL

## 2019-10-30 ENCOUNTER — APPOINTMENT (OUTPATIENT)
Dept: ULTRASOUND IMAGING | Age: 34
End: 2019-10-30
Payer: COMMERCIAL

## 2019-10-30 VITALS
BODY MASS INDEX: 51.38 KG/M2 | SYSTOLIC BLOOD PRESSURE: 141 MMHG | RESPIRATION RATE: 16 BRPM | TEMPERATURE: 98 F | HEIGHT: 63 IN | OXYGEN SATURATION: 100 % | WEIGHT: 290 LBS | HEART RATE: 97 BPM | DIASTOLIC BLOOD PRESSURE: 81 MMHG

## 2019-10-30 DIAGNOSIS — R10.9 ABDOMINAL PAIN, UNSPECIFIED ABDOMINAL LOCATION: ICD-10-CM

## 2019-10-30 DIAGNOSIS — N93.8 DYSFUNCTIONAL UTERINE BLEEDING: Primary | ICD-10-CM

## 2019-10-30 LAB
ABO/RH: NORMAL
ALBUMIN SERPL-MCNC: 4.2 G/DL (ref 3.5–5.2)
ALP BLD-CCNC: 86 U/L (ref 35–104)
ALT SERPL-CCNC: 17 U/L (ref 0–32)
ANION GAP SERPL CALCULATED.3IONS-SCNC: 13 MMOL/L (ref 7–16)
ANTIBODY SCREEN: NORMAL
APTT: 33.5 SEC (ref 24.5–35.1)
AST SERPL-CCNC: 14 U/L (ref 0–31)
BASOPHILS ABSOLUTE: 0.06 E9/L (ref 0–0.2)
BASOPHILS RELATIVE PERCENT: 0.5 % (ref 0–2)
BILIRUB SERPL-MCNC: <0.2 MG/DL (ref 0–1.2)
BUN BLDV-MCNC: 15 MG/DL (ref 6–20)
CALCIUM SERPL-MCNC: 9.3 MG/DL (ref 8.6–10.2)
CHLORIDE BLD-SCNC: 102 MMOL/L (ref 98–107)
CO2: 22 MMOL/L (ref 22–29)
CREAT SERPL-MCNC: 0.6 MG/DL (ref 0.5–1)
EOSINOPHILS ABSOLUTE: 0.35 E9/L (ref 0.05–0.5)
EOSINOPHILS RELATIVE PERCENT: 3 % (ref 0–6)
GFR AFRICAN AMERICAN: >60
GFR NON-AFRICAN AMERICAN: >60 ML/MIN/1.73
GLUCOSE BLD-MCNC: 129 MG/DL (ref 74–99)
HCG, URINE, POC: NEGATIVE
HCT VFR BLD CALC: 40.6 % (ref 34–48)
HEMOGLOBIN: 12.9 G/DL (ref 11.5–15.5)
IMMATURE GRANULOCYTES #: 0.04 E9/L
IMMATURE GRANULOCYTES %: 0.3 % (ref 0–5)
INR BLD: 1
LYMPHOCYTES ABSOLUTE: 2.65 E9/L (ref 1.5–4)
LYMPHOCYTES RELATIVE PERCENT: 22.6 % (ref 20–42)
Lab: NORMAL
MCH RBC QN AUTO: 29.1 PG (ref 26–35)
MCHC RBC AUTO-ENTMCNC: 31.8 % (ref 32–34.5)
MCV RBC AUTO: 91.4 FL (ref 80–99.9)
MONOCYTES ABSOLUTE: 0.55 E9/L (ref 0.1–0.95)
MONOCYTES RELATIVE PERCENT: 4.7 % (ref 2–12)
NEGATIVE QC PASS/FAIL: NORMAL
NEUTROPHILS ABSOLUTE: 8.05 E9/L (ref 1.8–7.3)
NEUTROPHILS RELATIVE PERCENT: 68.9 % (ref 43–80)
PDW BLD-RTO: 14.2 FL (ref 11.5–15)
PLATELET # BLD: 247 E9/L (ref 130–450)
PMV BLD AUTO: 11.2 FL (ref 7–12)
POSITIVE QC PASS/FAIL: NORMAL
POTASSIUM REFLEX MAGNESIUM: 3.8 MMOL/L (ref 3.5–5)
PROTHROMBIN TIME: 11.3 SEC (ref 9.3–12.4)
RBC # BLD: 4.44 E12/L (ref 3.5–5.5)
SODIUM BLD-SCNC: 137 MMOL/L (ref 132–146)
TOTAL PROTEIN: 7.6 G/DL (ref 6.4–8.3)
WBC # BLD: 11.7 E9/L (ref 4.5–11.5)

## 2019-10-30 PROCEDURE — 85025 COMPLETE CBC W/AUTO DIFF WBC: CPT

## 2019-10-30 PROCEDURE — 86901 BLOOD TYPING SEROLOGIC RH(D): CPT

## 2019-10-30 PROCEDURE — 96372 THER/PROPH/DIAG INJ SC/IM: CPT

## 2019-10-30 PROCEDURE — 85730 THROMBOPLASTIN TIME PARTIAL: CPT

## 2019-10-30 PROCEDURE — 99284 EMERGENCY DEPT VISIT MOD MDM: CPT

## 2019-10-30 PROCEDURE — 80053 COMPREHEN METABOLIC PANEL: CPT

## 2019-10-30 PROCEDURE — 76856 US EXAM PELVIC COMPLETE: CPT

## 2019-10-30 PROCEDURE — 86850 RBC ANTIBODY SCREEN: CPT

## 2019-10-30 PROCEDURE — 86900 BLOOD TYPING SEROLOGIC ABO: CPT

## 2019-10-30 PROCEDURE — 85610 PROTHROMBIN TIME: CPT

## 2019-10-30 PROCEDURE — 6360000002 HC RX W HCPCS: Performed by: EMERGENCY MEDICINE

## 2019-10-30 RX ORDER — SODIUM CHLORIDE 0.9 % (FLUSH) 0.9 %
10 SYRINGE (ML) INJECTION PRN
Status: DISCONTINUED | OUTPATIENT
Start: 2019-10-30 | End: 2019-10-30 | Stop reason: HOSPADM

## 2019-10-30 RX ORDER — KETOROLAC TROMETHAMINE 30 MG/ML
30 INJECTION, SOLUTION INTRAMUSCULAR; INTRAVENOUS ONCE
Status: COMPLETED | OUTPATIENT
Start: 2019-10-30 | End: 2019-10-30

## 2019-10-30 RX ORDER — SODIUM CHLORIDE 9 MG/ML
1000 INJECTION, SOLUTION INTRAVENOUS ONCE
Status: DISCONTINUED | OUTPATIENT
Start: 2019-10-30 | End: 2019-10-30 | Stop reason: HOSPADM

## 2019-10-30 RX ADMIN — KETOROLAC TROMETHAMINE 30 MG: 30 INJECTION, SOLUTION INTRAMUSCULAR; INTRAVENOUS at 19:25

## 2019-10-30 ASSESSMENT — PAIN SCALES - GENERAL
PAINLEVEL_OUTOF10: 10
PAINLEVEL_OUTOF10: 8

## 2019-10-30 ASSESSMENT — PAIN DESCRIPTION - LOCATION: LOCATION: PELVIS

## 2019-10-30 ASSESSMENT — PAIN DESCRIPTION - PAIN TYPE: TYPE: ACUTE PAIN

## 2019-10-31 ENCOUNTER — TELEPHONE (OUTPATIENT)
Dept: ADMINISTRATIVE | Age: 34
End: 2019-10-31

## 2019-10-31 ASSESSMENT — ENCOUNTER SYMPTOMS
COUGH: 0
DIARRHEA: 0
CHEST TIGHTNESS: 0
COLOR CHANGE: 0
BACK PAIN: 0
SHORTNESS OF BREATH: 0
BLOOD IN STOOL: 0
NAUSEA: 0
ABDOMINAL PAIN: 1
VOMITING: 0

## 2019-11-01 ENCOUNTER — OFFICE VISIT (OUTPATIENT)
Dept: OBGYN | Age: 34
End: 2019-11-01
Payer: COMMERCIAL

## 2019-11-01 VITALS
DIASTOLIC BLOOD PRESSURE: 71 MMHG | SYSTOLIC BLOOD PRESSURE: 148 MMHG | BODY MASS INDEX: 50.13 KG/M2 | WEIGHT: 283 LBS | HEART RATE: 105 BPM

## 2019-11-01 DIAGNOSIS — N92.1 MENORRHAGIA WITH IRREGULAR CYCLE: Primary | ICD-10-CM

## 2019-11-01 PROCEDURE — 4004F PT TOBACCO SCREEN RCVD TLK: CPT | Performed by: OBSTETRICS & GYNECOLOGY

## 2019-11-01 PROCEDURE — G8484 FLU IMMUNIZE NO ADMIN: HCPCS | Performed by: OBSTETRICS & GYNECOLOGY

## 2019-11-01 PROCEDURE — 99213 OFFICE O/P EST LOW 20 MIN: CPT | Performed by: OBSTETRICS & GYNECOLOGY

## 2019-11-01 PROCEDURE — G8427 DOCREV CUR MEDS BY ELIG CLIN: HCPCS | Performed by: OBSTETRICS & GYNECOLOGY

## 2019-11-01 PROCEDURE — 99212 OFFICE O/P EST SF 10 MIN: CPT | Performed by: OBSTETRICS & GYNECOLOGY

## 2019-11-01 PROCEDURE — G8417 CALC BMI ABV UP PARAM F/U: HCPCS | Performed by: OBSTETRICS & GYNECOLOGY

## 2019-11-01 RX ORDER — FERROUS SULFATE 325(65) MG
325 TABLET ORAL DAILY
Qty: 30 TABLET | Refills: 3 | Status: ON HOLD
Start: 2019-11-01 | End: 2020-02-25 | Stop reason: ALTCHOICE

## 2019-11-01 RX ORDER — MEDROXYPROGESTERONE ACETATE 10 MG/1
10 TABLET ORAL DAILY
Qty: 14 TABLET | Refills: 0 | Status: SHIPPED | OUTPATIENT
Start: 2019-11-01 | End: 2020-01-22

## 2019-12-06 ENCOUNTER — OFFICE VISIT (OUTPATIENT)
Dept: OBGYN | Age: 34
End: 2019-12-06
Payer: COMMERCIAL

## 2019-12-06 ENCOUNTER — HOSPITAL ENCOUNTER (OUTPATIENT)
Age: 34
Discharge: HOME | End: 2019-12-08
Payer: COMMERCIAL

## 2019-12-06 VITALS
WEIGHT: 285 LBS | HEIGHT: 63 IN | BODY MASS INDEX: 50.5 KG/M2 | SYSTOLIC BLOOD PRESSURE: 135 MMHG | HEART RATE: 113 BPM | DIASTOLIC BLOOD PRESSURE: 68 MMHG

## 2019-12-06 DIAGNOSIS — N93.8 DUB (DYSFUNCTIONAL UTERINE BLEEDING): ICD-10-CM

## 2019-12-06 DIAGNOSIS — N92.0 MENORRHAGIA WITH REGULAR CYCLE: ICD-10-CM

## 2019-12-06 DIAGNOSIS — R93.89 ENDOMETRIAL THICKENING ON ULTRASOUND: ICD-10-CM

## 2019-12-06 DIAGNOSIS — N92.0 MENORRHAGIA WITH REGULAR CYCLE: Primary | ICD-10-CM

## 2019-12-06 PROCEDURE — 99212 OFFICE O/P EST SF 10 MIN: CPT | Performed by: OBSTETRICS & GYNECOLOGY

## 2019-12-06 PROCEDURE — 87491 CHLMYD TRACH DNA AMP PROBE: CPT

## 2019-12-06 PROCEDURE — G8417 CALC BMI ABV UP PARAM F/U: HCPCS | Performed by: OBSTETRICS & GYNECOLOGY

## 2019-12-06 PROCEDURE — G8427 DOCREV CUR MEDS BY ELIG CLIN: HCPCS | Performed by: OBSTETRICS & GYNECOLOGY

## 2019-12-06 PROCEDURE — 99213 OFFICE O/P EST LOW 20 MIN: CPT | Performed by: OBSTETRICS & GYNECOLOGY

## 2019-12-06 PROCEDURE — G8484 FLU IMMUNIZE NO ADMIN: HCPCS | Performed by: OBSTETRICS & GYNECOLOGY

## 2019-12-06 PROCEDURE — 4004F PT TOBACCO SCREEN RCVD TLK: CPT | Performed by: OBSTETRICS & GYNECOLOGY

## 2019-12-06 PROCEDURE — 87591 N.GONORRHOEAE DNA AMP PROB: CPT

## 2019-12-11 LAB
C TRACH DNA GENITAL QL NAA+PROBE: NEGATIVE
N. GONORRHOEAE DNA: NEGATIVE
SOURCE: NORMAL

## 2019-12-13 ENCOUNTER — TELEPHONE (OUTPATIENT)
Dept: ADMINISTRATIVE | Age: 34
End: 2019-12-13

## 2020-01-07 ENCOUNTER — PROCEDURE VISIT (OUTPATIENT)
Dept: OBGYN | Age: 35
End: 2020-01-07
Payer: COMMERCIAL

## 2020-01-07 VITALS
BODY MASS INDEX: 51.03 KG/M2 | HEIGHT: 63 IN | WEIGHT: 288 LBS | HEART RATE: 82 BPM | DIASTOLIC BLOOD PRESSURE: 78 MMHG | SYSTOLIC BLOOD PRESSURE: 138 MMHG | TEMPERATURE: 98.4 F | RESPIRATION RATE: 16 BRPM

## 2020-01-07 LAB
CONTROL: NORMAL
PREGNANCY TEST URINE, POC: NEGATIVE

## 2020-01-07 PROCEDURE — 99213 OFFICE O/P EST LOW 20 MIN: CPT | Performed by: OBSTETRICS & GYNECOLOGY

## 2020-01-07 PROCEDURE — 81025 URINE PREGNANCY TEST: CPT | Performed by: OBSTETRICS & GYNECOLOGY

## 2020-01-07 PROCEDURE — 99212 OFFICE O/P EST SF 10 MIN: CPT | Performed by: OBSTETRICS & GYNECOLOGY

## 2020-01-07 NOTE — PROGRESS NOTES
Multiple attempts to try for an Endo Bx  Procedure was abandoned  She is going to [facility]  Or proceed with robotic hyster at [facility]  She will think about this and see back next week for a decision.

## 2020-01-07 NOTE — PROGRESS NOTES
Here today for her endometrial biopsy. Thickened endometrium is the reason for the biopsy. Multiple attempts were made to lodate her cervix which is extremly anterior. Finally was able to grasp the anterior cervix wiith a tenaculum ut unable to pass a pipet endometrial biopsy instrument into the uterine endometrial cavity. Procedure was abandoned. Could proceed to do the [facility] and Novasure ablation but if path was bad she could still need a hysterectomy. She wants to just proceed with hyster but with her weight she is a poor candidate for an open procedure,  I could offer her a visit with Dr. Jill Moreira at [facility] to see if she would be a candidate for robotic hyster which might be much safer for her because of the weight risks. I will have them consider this and see them back next week to  Make a decision.

## 2020-01-10 ENCOUNTER — OFFICE VISIT (OUTPATIENT)
Dept: OBGYN | Age: 35
End: 2020-01-10
Payer: COMMERCIAL

## 2020-01-10 VITALS
DIASTOLIC BLOOD PRESSURE: 74 MMHG | BODY MASS INDEX: 51.56 KG/M2 | HEIGHT: 63 IN | WEIGHT: 291 LBS | HEART RATE: 100 BPM | TEMPERATURE: 98 F | RESPIRATION RATE: 16 BRPM | SYSTOLIC BLOOD PRESSURE: 136 MMHG

## 2020-01-10 PROCEDURE — 4004F PT TOBACCO SCREEN RCVD TLK: CPT | Performed by: OBSTETRICS & GYNECOLOGY

## 2020-01-10 PROCEDURE — 99212 OFFICE O/P EST SF 10 MIN: CPT | Performed by: OBSTETRICS & GYNECOLOGY

## 2020-01-10 PROCEDURE — G8427 DOCREV CUR MEDS BY ELIG CLIN: HCPCS | Performed by: OBSTETRICS & GYNECOLOGY

## 2020-01-10 PROCEDURE — G8484 FLU IMMUNIZE NO ADMIN: HCPCS | Performed by: OBSTETRICS & GYNECOLOGY

## 2020-01-10 PROCEDURE — G8417 CALC BMI ABV UP PARAM F/U: HCPCS | Performed by: OBSTETRICS & GYNECOLOGY

## 2020-01-10 NOTE — PROGRESS NOTES
Back today to discuss possible therapies for her bleeding. We had discussed [facility] novasure ablation or referral to [facility] to see if she would be a candidate for robotic hysterectomy. Information was given on those options. After discussion they would like to proceed with a [facility] and Novasure ablation as a first procedure to try to correct of control the menses. Will proceed to schedule 2/25/202 to follow. See week before pre-op. Patint fully counselled re risks guillaume perforation and infection and the need for antibiotics. Pap is current to 2021 with a negative HPV.

## 2020-01-10 NOTE — PROGRESS NOTES
Review of therapies and procedure discussed with patient by dr Lissa Elizondo. Per dr whitt's orders patient scheduled for [facility] and novasure ablation on 2/25/2020 at [address] [place] [address]. I spoke with [place]. patient informed of date time place of surgery. preop appointment given to patient. Discharge instructions given by dr Lissa Elizondo.

## 2020-01-22 ENCOUNTER — APPOINTMENT (OUTPATIENT)
Dept: GENERAL RADIOLOGY | Age: 35
End: 2020-01-22
Payer: COMMERCIAL

## 2020-01-22 ENCOUNTER — HOSPITAL ENCOUNTER (EMERGENCY)
Age: 35
Discharge: HOME | End: 2020-01-22
Payer: COMMERCIAL

## 2020-01-22 VITALS
HEART RATE: 113 BPM | OXYGEN SATURATION: 99 % | DIASTOLIC BLOOD PRESSURE: 84 MMHG | RESPIRATION RATE: 18 BRPM | BODY MASS INDEX: 50.85 KG/M2 | TEMPERATURE: 98 F | WEIGHT: 287 LBS | HEIGHT: 63 IN | SYSTOLIC BLOOD PRESSURE: 128 MMHG

## 2020-01-22 LAB
INFLUENZA A BY PCR: NOT DETECTED
INFLUENZA B BY PCR: NOT DETECTED
STREP GRP A PCR: NEGATIVE

## 2020-01-22 PROCEDURE — 87880 STREP A ASSAY W/OPTIC: CPT

## 2020-01-22 PROCEDURE — 87502 INFLUENZA DNA AMP PROBE: CPT

## 2020-01-22 PROCEDURE — 99283 EMERGENCY DEPT VISIT LOW MDM: CPT

## 2020-01-22 PROCEDURE — 6370000000 HC RX 637 (ALT 250 FOR IP): Performed by: NURSE PRACTITIONER

## 2020-01-22 PROCEDURE — 71046 X-RAY EXAM CHEST 2 VIEWS: CPT

## 2020-01-22 RX ORDER — BROMPHENIRAMINE MALEATE, PSEUDOEPHEDRINE HYDROCHLORIDE, AND DEXTROMETHORPHAN HYDROBROMIDE 2; 30; 10 MG/5ML; MG/5ML; MG/5ML
5 SYRUP ORAL 4 TIMES DAILY PRN
Qty: 118 ML | Refills: 0 | Status: SHIPPED | OUTPATIENT
Start: 2020-01-22 | End: 2020-02-07 | Stop reason: ALTCHOICE

## 2020-01-22 RX ORDER — AZITHROMYCIN 250 MG/1
TABLET, FILM COATED ORAL
Qty: 1 PACKET | Refills: 0 | Status: SHIPPED | OUTPATIENT
Start: 2020-01-22 | End: 2020-02-01

## 2020-01-22 RX ORDER — ALBUTEROL SULFATE 90 UG/1
2 AEROSOL, METERED RESPIRATORY (INHALATION) EVERY 6 HOURS PRN
Qty: 1 INHALER | Refills: 0 | Status: SHIPPED | OUTPATIENT
Start: 2020-01-22 | End: 2020-02-07

## 2020-01-22 RX ORDER — IBUPROFEN 800 MG/1
800 TABLET ORAL ONCE
Status: COMPLETED | OUTPATIENT
Start: 2020-01-22 | End: 2020-01-22

## 2020-01-22 RX ORDER — IBUPROFEN 800 MG/1
800 TABLET ORAL EVERY 6 HOURS PRN
Qty: 16 TABLET | Refills: 0 | Status: SHIPPED | OUTPATIENT
Start: 2020-01-22 | End: 2020-02-07

## 2020-01-22 RX ADMIN — IBUPROFEN 800 MG: 800 TABLET, FILM COATED ORAL at 17:09

## 2020-01-22 ASSESSMENT — PAIN SCALES - GENERAL: PAINLEVEL_OUTOF10: 7

## 2020-01-23 NOTE — ED PROVIDER NOTES
[facility]     Department of Emergency Medicine   ED  Provider Note  Admit Date/RoomTime: 1/22/2020  4:16 PM  ED Room: 05/05  Chief Complaint:   Cough (dizziness when she cough. + body aches. Started last night, vomiting started today. )    History of Present Illness   Source of history provided by:  patient. History/Exam Limitations: none. Merlinda Low is a 27 y.o. old female with a past medical history of:   Past Medical History:   Diagnosis Date    Anxiety     Asthma     Bipolar 1 disorder ([facility]     COPD (chronic obstructive pulmonary disease) ([facility] [address] [address]     Depression     Fissure, anal     Neuropathy     PTSD (post-traumatic stress disorder)     presents to the emergency department by private vehicle, for fever, chills, rhinorrhea, sore throat and cough, which began 2 day(s) prior to arrival.  Since onset the symptoms have been persistent and mild in severity. The symptoms are associated with muscle aches. There has been NO abdominal pain, appetite decrease or chest pain. Patient states that she is a smoker and has been coughing up yellow/green phlegm  ROS   Pertinent positives and negatives are stated within HPI, all other systems reviewed and are negative. Past Surgical History:  has a past surgical history that includes Cholecystectomy; Tubal ligation; Anterior cruciate ligament repair; and Dilation and curettage of uterus (N/A, 1/8/2019). Social History:  reports that she has been smoking cigarettes. She started smoking about 24 years ago. She has a 30.00 pack-year smoking history. She has never used smokeless tobacco. She reports previous alcohol use of about 2.0 standard drinks of alcohol per week. She reports that she does not use drugs. Family History: family history includes Cancer in her mother and sister; Diabetes in her father, mother, and sister; High Blood Pressure in her brother. Allergies: Cymbalta [duloxetine hcl];  Neurontin [gabapentin]; and Lamictal [lamotrigine]    Physical Exam           ED Triage Vitals   BP Temp Temp Source Pulse Resp SpO2 Height Weight   01/22/20 1614 01/22/20 1614 01/22/20 1614 01/22/20 1614 01/22/20 1614 01/22/20 1614 01/22/20 1628 01/22/20 1628   128/84 98 °F (36.7 °C) Oral 113 18 99 % 5' 3\" (1.6 m) 287 lb (130.2 kg)      Oxygen Saturation Interpretation: Normal.    · Constitutional:  Alert, development consistent with age. · Ears:  External Ears: Bilateral normal.               TM's & External Canals: normal TMs bilaterally. · Nose:   There is clear rhinorrhea, mucosal erythema and mucosal edema. · Sinuses: no Bilateral maxillary sinus tenderness. no Bilateral frontal sinus tenderness. · Mouth:  normal tongue and buccal mucosa. · Throat: mild erythema. Airway Patent. · Neck:  Supple. There is no  anterior cervical node tenderness. · Respiratory:   Breath sounds: Bilateral normal.  Lung sounds: normal.   · CV:  Regular rate and rhythm, normal heart sounds, without pathological murmurs, ectopy, gallops, or rubs. · GI:  Abdomen Soft, nontender, good bowel sounds. No firm or pulsatile mass. · Integument:  Normal turgor. Warm, dry, without visible rash. · Neurological:  Oriented. Motor functions intact. Lab / Imaging Results   (All laboratory and radiology results have been personally reviewed by myself)  Labs:  Results for orders placed or performed during the hospital encounter of 01/22/20   Strep Screen Group A Throat   Result Value Ref Range    Strep Grp A PCR Negative Negative   Rapid influenza A/B antigens   Result Value Ref Range    Influenza A by PCR Not Detected Not Detected    Influenza B by PCR Not Detected Not Detected       Imaging: All Radiology results interpreted by Radiologist unless otherwise noted.   XR CHEST STANDARD (2 VW)   Final Result   NO ACUTE CARDIOPULMONARY PROCESS              ED Course / Medical Decision Making     Medications   ibuprofen (ADVIL;MOTRIN) tablet 800 mg (800 mg Oral Given 1/22/20 8095)            Consults:   None    Procedures:   none    Medical Decision Making:    Based on low suspicion for pneumonia as per history/physical findings, imaging was done and confirms the above findings. Upper respiratory infection may  be viral in etiology . Antibiotics are indicated at this time based on clinical presentation and physical findings. She is not hypoxic. Patient is well appearing, non toxic and appropriate for outpatient management. Plan of Care: Normal progression of disease discussed. All questions answered. Explained the rationale for symptomatic treatment rather than use of an antibiotic. Instruction provided in the use of fluids, vaporizer, acetaminophen, and other OTC medication for symptom control. Extra fluids  Analgesics as needed, dose reviewed. Follow up as needed should symptoms fail to improve. Counseling: The emergency provider has spoken with the patient and discussed todays results, in addition to providing specific details for the plan of care and counseling regarding the diagnosis and prognosis. Questions are answered at this time and they are agreeable with the plan. Assessment     1. Bronchitis      Plan   Discharge to home  Patient condition is good    New Medications     Discharge Medication List as of 1/22/2020  5:24 PM      START taking these medications    Details   azithromycin (ZITHROMAX Z-ADAIR) 250 MG tablet TAKE 500MG PO DAY ONE. .. 250MG PO DAY TWO THROUGH FIVE   DISPENSE 6 TABS  NO REFILLS, Disp-1 packet, R-0Print      brompheniramine-pseudoephedrine-DM (BROMFED DM) 2-30-10 MG/5ML syrup Take 5 mLs by mouth 4 times daily as needed for Congestion or Cough, Disp-118 mL, R-0Print           Electronically signed by VELMA Gonzalez CNP   DD: 1/22/20  **This report was transcribed using voice recognition software.  Every effort was made to ensure accuracy; however, inadvertent computerized transcription errors may be present.   END OF ED PROVIDER NOTE          Jose Price, VELMA - CNP  01/22/20 212

## 2020-02-07 ENCOUNTER — HOSPITAL ENCOUNTER (EMERGENCY)
Age: 35
Discharge: HOME | End: 2020-02-07
Attending: EMERGENCY MEDICINE
Payer: COMMERCIAL

## 2020-02-07 ENCOUNTER — APPOINTMENT (OUTPATIENT)
Dept: CT IMAGING | Age: 35
End: 2020-02-07
Payer: COMMERCIAL

## 2020-02-07 VITALS
WEIGHT: 290 LBS | TEMPERATURE: 97.5 F | HEIGHT: 63 IN | BODY MASS INDEX: 51.38 KG/M2 | SYSTOLIC BLOOD PRESSURE: 140 MMHG | DIASTOLIC BLOOD PRESSURE: 82 MMHG | OXYGEN SATURATION: 99 % | RESPIRATION RATE: 16 BRPM | HEART RATE: 110 BPM

## 2020-02-07 PROCEDURE — 96375 TX/PRO/DX INJ NEW DRUG ADDON: CPT

## 2020-02-07 PROCEDURE — 70450 CT HEAD/BRAIN W/O DYE: CPT

## 2020-02-07 PROCEDURE — 6370000000 HC RX 637 (ALT 250 FOR IP): Performed by: EMERGENCY MEDICINE

## 2020-02-07 PROCEDURE — 96374 THER/PROPH/DIAG INJ IV PUSH: CPT

## 2020-02-07 PROCEDURE — 99284 EMERGENCY DEPT VISIT MOD MDM: CPT

## 2020-02-07 PROCEDURE — 2580000003 HC RX 258: Performed by: EMERGENCY MEDICINE

## 2020-02-07 PROCEDURE — 6360000002 HC RX W HCPCS: Performed by: EMERGENCY MEDICINE

## 2020-02-07 RX ORDER — IPRATROPIUM BROMIDE AND ALBUTEROL SULFATE 2.5; .5 MG/3ML; MG/3ML
1 SOLUTION RESPIRATORY (INHALATION) ONCE
Status: COMPLETED | OUTPATIENT
Start: 2020-02-07 | End: 2020-02-07

## 2020-02-07 RX ORDER — METHYLPREDNISOLONE 4 MG/1
TABLET ORAL
Qty: 1 KIT | Status: SHIPPED | OUTPATIENT
Start: 2020-02-07 | End: 2020-02-13

## 2020-02-07 RX ORDER — METOCLOPRAMIDE HYDROCHLORIDE 5 MG/ML
10 INJECTION INTRAMUSCULAR; INTRAVENOUS ONCE
Status: COMPLETED | OUTPATIENT
Start: 2020-02-07 | End: 2020-02-07

## 2020-02-07 RX ORDER — DIPHENHYDRAMINE HYDROCHLORIDE 50 MG/ML
25 INJECTION INTRAMUSCULAR; INTRAVENOUS ONCE
Status: COMPLETED | OUTPATIENT
Start: 2020-02-07 | End: 2020-02-07

## 2020-02-07 RX ORDER — IBUPROFEN 800 MG/1
800 TABLET ORAL EVERY 8 HOURS PRN
Qty: 21 TABLET | Refills: 0 | Status: SHIPPED | OUTPATIENT
Start: 2020-02-07 | End: 2020-02-18

## 2020-02-07 RX ORDER — DEXAMETHASONE SODIUM PHOSPHATE 10 MG/ML
10 INJECTION, SOLUTION INTRAMUSCULAR; INTRAVENOUS ONCE
Status: COMPLETED | OUTPATIENT
Start: 2020-02-07 | End: 2020-02-07

## 2020-02-07 RX ORDER — ONDANSETRON 4 MG/1
4 TABLET, ORALLY DISINTEGRATING ORAL EVERY 8 HOURS PRN
Qty: 24 TABLET | Refills: 0 | Status: ON HOLD | OUTPATIENT
Start: 2020-02-07 | End: 2020-02-25 | Stop reason: ALTCHOICE

## 2020-02-07 RX ORDER — ALBUTEROL SULFATE 90 UG/1
2 AEROSOL, METERED RESPIRATORY (INHALATION) EVERY 4 HOURS PRN
Qty: 1 INHALER | Refills: 1 | Status: SHIPPED | OUTPATIENT
Start: 2020-02-07 | End: 2021-10-12 | Stop reason: SDUPTHER

## 2020-02-07 RX ORDER — 0.9 % SODIUM CHLORIDE 0.9 %
1000 INTRAVENOUS SOLUTION INTRAVENOUS ONCE
Status: COMPLETED | OUTPATIENT
Start: 2020-02-07 | End: 2020-02-07

## 2020-02-07 RX ORDER — ACETAMINOPHEN 500 MG
1000 TABLET ORAL ONCE
Status: COMPLETED | OUTPATIENT
Start: 2020-02-07 | End: 2020-02-07

## 2020-02-07 RX ADMIN — METOCLOPRAMIDE 10 MG: 5 INJECTION, SOLUTION INTRAMUSCULAR; INTRAVENOUS at 18:59

## 2020-02-07 RX ADMIN — SODIUM CHLORIDE 1000 ML: 9 INJECTION, SOLUTION INTRAVENOUS at 19:00

## 2020-02-07 RX ADMIN — DEXAMETHASONE SODIUM PHOSPHATE 10 MG: 10 INJECTION, SOLUTION INTRAMUSCULAR; INTRAVENOUS at 18:59

## 2020-02-07 RX ADMIN — DIPHENHYDRAMINE HYDROCHLORIDE 25 MG: 50 INJECTION, SOLUTION INTRAMUSCULAR; INTRAVENOUS at 18:59

## 2020-02-07 RX ADMIN — ACETAMINOPHEN 1000 MG: 500 TABLET ORAL at 18:59

## 2020-02-07 RX ADMIN — IPRATROPIUM BROMIDE AND ALBUTEROL SULFATE 1 AMPULE: 2.5; .5 SOLUTION RESPIRATORY (INHALATION) at 18:59

## 2020-02-07 ASSESSMENT — PAIN DESCRIPTION - LOCATION: LOCATION: HEAD

## 2020-02-07 ASSESSMENT — PAIN DESCRIPTION - DESCRIPTORS: DESCRIPTORS: PRESSURE

## 2020-02-07 ASSESSMENT — PAIN DESCRIPTION - PAIN TYPE: TYPE: CHRONIC PAIN;ACUTE PAIN

## 2020-02-07 ASSESSMENT — PAIN SCALES - GENERAL
PAINLEVEL_OUTOF10: 8
PAINLEVEL_OUTOF10: 8

## 2020-02-08 NOTE — ED PROVIDER NOTES
HPI:  2/7/20, Time: 7:19 PM         Missy Esposito is a 27 y.o. female presenting to the ED for migraine, beginning 2 days ago. The complaint has been intermittent, moderate in severity, and worsened by nothing. Patient has had cough congestion has been complaining of a migraine headache. She is a smoker has history of asthma and COPD. She smokes 1 pack/day. She denies any chest pain shortness of breath abdominal pain nausea vomiting diarrhea. States her headache is her usual headache is not the worst headache of her life. She denies any focal weakness numbness or tingling. She denies any dysarthria or facial droop. She denies any neck or back pain. Denies fever chills    Review of Systems:   Pertinent positives and negatives are stated within HPI, all other systems reviewed and are negative.          --------------------------------------------- PAST HISTORY ---------------------------------------------  Past Medical History:  has a past medical history of Anxiety, Asthma, Bipolar 1 disorder ([facility] COPD [facility]chronic obstructive [facility] disease) ([facility] Depression, Fissure, anal, Neuropathy, and PTSD (post-traumatic stress disorder). Past Surgical History:  has a past surgical history that includes Cholecystectomy; Tubal ligation; Anterior cruciate ligament repair; and Dilation and curettage of uterus (N/A, 1/8/2019). Social History:  reports that she has been smoking cigarettes. She started smoking about 24 years ago. She has a 20.00 pack-year smoking history. She has never used smokeless tobacco. She reports previous alcohol use of about 2.0 standard drinks of alcohol per week. She reports that she does not use drugs. Family History: family history includes Cancer in her mother and sister; Diabetes in her father, mother, and sister; High Blood Pressure in her brother. The patients home medications have been reviewed. Allergies: Cymbalta [duloxetine hcl];  Neurontin [gabapentin]; and Lamictal [lamotrigine]    -------------------------------------------------- RESULTS -------------------------------------------------  All laboratory and radiology results have been personally reviewed by myself   LABS:  No results found for this visit on 02/07/20. RADIOLOGY:  Interpreted by Radiologist.  Matthew Nixon Contrast   Final Result   No evidence of intracranial hemorrhage or edema. There is minimal   mucosal thickening in the maxillary sinuses. ------------------------- NURSING NOTES AND VITALS REVIEWED ---------------------------   The nursing notes within the ED encounter and vital signs as below have been reviewed. BP (!) 140/82   Pulse 110   Temp 97.5 °F (36.4 °C) (Oral)   Resp 16   Ht 5' 3\" (1.6 m)   Wt 290 lb (131.5 kg)   LMP 01/22/2020   SpO2 99%   BMI 51.37 kg/m²   Oxygen Saturation Interpretation: Normal      ---------------------------------------------------PHYSICAL EXAM--------------------------------------      Constitutional/General: Alert and oriented x3, afebrile patient looks very well  Head: Normocephalic and atraumatic  Eyes: PERRL, EOMI  Mouth: Oropharynx clear, handling secretions, no trismus  Neck: Supple, full ROM, neck stiffness or nuchal rigidity  Pulmonary: Faint wheezes bilaterally otherwise clear. Not in respiratory distress  Cardiovascular:  Regular rate and rhythm, no murmurs, gallops, or rubs. 2+ distal pulses  Abdomen: Soft, non tender, non distended,   Extremities: Moves all extremities x 4.  Warm and well perfused  Skin: warm and dry without rash  Neurologic: GCS 15, no meningismus 5 out of 5 upper and lower extremity normal gait and ambulation  Psych: Normal Affect      ------------------------------ ED COURSE/MEDICAL DECISION MAKING----------------------  Medications   0.9 % sodium chloride bolus (0 mLs Intravenous Stopped 2/7/20 1928)   metoclopramide (REGLAN) injection 10 mg (10 mg Intravenous Given 2/7/20 1859)   diphenhydrAMINE made to ensure accuracy; however, inadvertent computerized transcription errors may be present       Theerasto Alvarez DO  02/07/20 1931

## 2020-02-18 ENCOUNTER — OFFICE VISIT (OUTPATIENT)
Dept: OBGYN | Age: 35
End: 2020-02-18
Payer: COMMERCIAL

## 2020-02-18 VITALS
HEIGHT: 63 IN | WEIGHT: 288 LBS | SYSTOLIC BLOOD PRESSURE: 130 MMHG | HEART RATE: 88 BPM | TEMPERATURE: 98.6 F | RESPIRATION RATE: 16 BRPM | BODY MASS INDEX: 51.03 KG/M2 | DIASTOLIC BLOOD PRESSURE: 80 MMHG

## 2020-02-18 PROBLEM — R93.89 ENDOMETRIAL THICKENING ON ULTRASOUND: Status: ACTIVE | Noted: 2020-02-18

## 2020-02-18 PROBLEM — N92.0 MENORRHAGIA WITH REGULAR CYCLE: Status: ACTIVE | Noted: 2020-02-18

## 2020-02-18 PROCEDURE — 4004F PT TOBACCO SCREEN RCVD TLK: CPT | Performed by: OBSTETRICS & GYNECOLOGY

## 2020-02-18 PROCEDURE — 99212 OFFICE O/P EST SF 10 MIN: CPT | Performed by: OBSTETRICS & GYNECOLOGY

## 2020-02-18 PROCEDURE — G8484 FLU IMMUNIZE NO ADMIN: HCPCS | Performed by: OBSTETRICS & GYNECOLOGY

## 2020-02-18 PROCEDURE — G8417 CALC BMI ABV UP PARAM F/U: HCPCS | Performed by: OBSTETRICS & GYNECOLOGY

## 2020-02-18 PROCEDURE — G8427 DOCREV CUR MEDS BY ELIG CLIN: HCPCS | Performed by: OBSTETRICS & GYNECOLOGY

## 2020-02-18 NOTE — PROGRESS NOTES
Here for pre-op appt [facility] and tracy ablation next Tuesday  Pre-op       Instructions given to patient  Return in one week for post op.

## 2020-02-18 NOTE — PROGRESS NOTES
Here for pre-op visit for [facility] novasure endometrial ablation next Tuesday. Procedure reviewed at length with the patient. Risks guillaume perforation and sepsis disdcussed. Post op antibiotic discussed as well. All questions answered. Has read all the booklets as well. General PE is wnl all regions systems and areas. Takes no morning meds. Post op instructions given to the patient. RTC 1 week post op.

## 2020-02-19 ENCOUNTER — TELEPHONE (OUTPATIENT)
Dept: OBGYN | Age: 35
End: 2020-02-19

## 2020-02-19 NOTE — TELEPHONE ENCOUNTER
Spoke with martha from [address] [place] [address] scheduling at [facility] and patient's surgery moved up from 0830 to 0730 on 2/25/2020 due to 0730 case cancelled. PAT also notified that this case now at 0730 and patient notified that her surgery will be moved up to 0730 and to be at hospital on 2/25 at 0530 now. Patient voiced understanding and agreement.

## 2020-02-20 ENCOUNTER — PREP FOR PROCEDURE (OUTPATIENT)
Dept: FAMILY MEDICINE CLINIC | Age: 35
End: 2020-02-20

## 2020-02-20 DIAGNOSIS — N92.0 MENORRHAGIA WITH REGULAR CYCLE: Primary | ICD-10-CM

## 2020-02-20 RX ORDER — SODIUM CHLORIDE, SODIUM LACTATE, POTASSIUM CHLORIDE, CALCIUM CHLORIDE 600; 310; 30; 20 MG/100ML; MG/100ML; MG/100ML; MG/100ML
INJECTION, SOLUTION INTRAVENOUS CONTINUOUS
Status: CANCELLED | OUTPATIENT
Start: 2020-02-20

## 2020-02-20 RX ORDER — SODIUM CHLORIDE 0.9 % (FLUSH) 0.9 %
10 SYRINGE (ML) INJECTION PRN
Status: CANCELLED | OUTPATIENT
Start: 2020-02-20

## 2020-02-20 RX ORDER — SODIUM CHLORIDE 0.9 % (FLUSH) 0.9 %
10 SYRINGE (ML) INJECTION EVERY 12 HOURS SCHEDULED
Status: CANCELLED | OUTPATIENT
Start: 2020-02-20

## 2020-02-21 NOTE — H&P
[address]                  [address] [facility],  [facility]                              HISTORY AND PHYSICAL    PATIENT NAME: Sara Garcias                  :        1985  MED REC NO:   [id number]                            ROOM:  ACCOUNT NO:   [de-identified]                           ADMIT DATE: 2020  PROVIDER:     Jacki Ruiz MD    This is for outpatient surgery on 2020. CHIEF COMPLAINT:  Menorrhagia. HISTORY OF PRESENT ILLNESS:  The patient is a 48-year-old G2, P4 female  with heavy recurrent menorrhagia interfering with lifestyle. The  patient enters at this time for hysteroscopy, dilatation and curettage  and NovaSure ablation of the endometrial cavity for her menorrhagia. The patient has been fully counseled on this procedure and its risks  especially perforation and sepsis following the procedure, understands  she will need to be on antibiotic postoperatively and will give her  preoperative antibiotic as well. The patient enters freely for this  procedure. REVIEW OF SYSTEMS:  Negative. PAST MEDICAL HISTORY:  She is allergic to CYMBALTA, LAMICTAL and  GABAPENTIN. PAST SURGICAL HISTORY:  She has had no surgeries in the past.    MEDICATIONS:  She is currently on no medications. PHYSICAL EXAMINATION:  GENERAL:  A well-developed, well-nourished female in no acute distress. HEENT:  Clear. NECK:  Supple without masses. LUNGS:  Clear to auscultation and percussion. HEART:  Regular rhythm without gallops or murmurs noted. ABDOMEN:  Soft. Normal bowel sounds present. PELVIC:  Examination normal vulva, vagina and cervix. Her uterus, she  has normal to low top normal size. Adnexa palpate clearly    IMPRESSION:  Menorrhagia, recurrent.     PLAN:  The patient is admitted as an outpatient for dilatation and  curettage along with hysteroscopy and NovaSure ablation of the  endometrium for her menorrhagia.                     Joseph Fernandez MD    D: 02/20/2020 13:39:42       T: 02/20/2020 13:47:47     [email or web address]  Job#: [id number]     Doc#: [id number]    CC:

## 2020-02-25 ENCOUNTER — ANESTHESIA EVENT (OUTPATIENT)
Dept: OPERATING ROOM | Age: 35
End: 2020-02-25
Payer: COMMERCIAL

## 2020-02-25 ENCOUNTER — ANESTHESIA (OUTPATIENT)
Dept: OPERATING ROOM | Age: 35
End: 2020-02-25
Payer: COMMERCIAL

## 2020-02-25 ENCOUNTER — HOSPITAL ENCOUNTER (OUTPATIENT)
Age: 35
Setting detail: OUTPATIENT SURGERY
Discharge: HOME | End: 2020-02-25
Attending: OBSTETRICS & GYNECOLOGY | Admitting: OBSTETRICS & GYNECOLOGY
Payer: COMMERCIAL

## 2020-02-25 VITALS
OXYGEN SATURATION: 94 % | SYSTOLIC BLOOD PRESSURE: 127 MMHG | DIASTOLIC BLOOD PRESSURE: 81 MMHG | TEMPERATURE: 98.1 F | RESPIRATION RATE: 14 BRPM | HEIGHT: 63 IN | HEART RATE: 88 BPM | BODY MASS INDEX: 51.03 KG/M2 | WEIGHT: 288 LBS

## 2020-02-25 VITALS
TEMPERATURE: 97.7 F | OXYGEN SATURATION: 96 % | DIASTOLIC BLOOD PRESSURE: 66 MMHG | SYSTOLIC BLOOD PRESSURE: 101 MMHG | RESPIRATION RATE: 8 BRPM

## 2020-02-25 PROBLEM — N88.2 CERVICAL STENOSIS (UTERINE CERVIX): Status: ACTIVE | Noted: 2020-02-25

## 2020-02-25 PROBLEM — N76.4 VULVAR ABSCESS: Status: ACTIVE | Noted: 2020-02-25

## 2020-02-25 LAB
HCG, URINE, POC: NEGATIVE
HCT VFR BLD CALC: 38 % (ref 34–48)
HEMOGLOBIN: 11.8 G/DL (ref 11.5–15.5)
Lab: NORMAL
MCH RBC QN AUTO: 27.8 PG (ref 26–35)
MCHC RBC AUTO-ENTMCNC: 31.1 % (ref 32–34.5)
MCV RBC AUTO: 89.4 FL (ref 80–99.9)
NEGATIVE QC PASS/FAIL: NORMAL
PDW BLD-RTO: 14.9 FL (ref 11.5–15)
PLATELET # BLD: 234 E9/L (ref 130–450)
PMV BLD AUTO: 10.7 FL (ref 7–12)
POSITIVE QC PASS/FAIL: NORMAL
RBC # BLD: 4.25 E12/L (ref 3.5–5.5)
WBC # BLD: 9.8 E9/L (ref 4.5–11.5)

## 2020-02-25 PROCEDURE — 7100000000 HC PACU RECOVERY - FIRST 15 MIN: Performed by: OBSTETRICS & GYNECOLOGY

## 2020-02-25 PROCEDURE — 87070 CULTURE OTHR SPECIMN AEROBIC: CPT

## 2020-02-25 PROCEDURE — 6360000002 HC RX W HCPCS: Performed by: OBSTETRICS & GYNECOLOGY

## 2020-02-25 PROCEDURE — 87077 CULTURE AEROBIC IDENTIFY: CPT

## 2020-02-25 PROCEDURE — 85027 COMPLETE CBC AUTOMATED: CPT

## 2020-02-25 PROCEDURE — 87205 SMEAR GRAM STAIN: CPT

## 2020-02-25 PROCEDURE — 2580000003 HC RX 258: Performed by: OBSTETRICS & GYNECOLOGY

## 2020-02-25 PROCEDURE — 3700000000 HC ANESTHESIA ATTENDED CARE: Performed by: OBSTETRICS & GYNECOLOGY

## 2020-02-25 PROCEDURE — 87206 SMEAR FLUORESCENT/ACID STAI: CPT

## 2020-02-25 PROCEDURE — 7100000010 HC PHASE II RECOVERY - FIRST 15 MIN: Performed by: OBSTETRICS & GYNECOLOGY

## 2020-02-25 PROCEDURE — 36415 COLL VENOUS BLD VENIPUNCTURE: CPT

## 2020-02-25 PROCEDURE — 87186 SC STD MICRODIL/AGAR DIL: CPT

## 2020-02-25 PROCEDURE — 6360000002 HC RX W HCPCS: Performed by: ANESTHESIOLOGY

## 2020-02-25 PROCEDURE — 6370000000 HC RX 637 (ALT 250 FOR IP): Performed by: OBSTETRICS & GYNECOLOGY

## 2020-02-25 PROCEDURE — 6360000002 HC RX W HCPCS

## 2020-02-25 PROCEDURE — 87102 FUNGUS ISOLATION CULTURE: CPT

## 2020-02-25 PROCEDURE — 56405 I&D VULVA/PERINEAL ABSCESS: CPT | Performed by: OBSTETRICS & GYNECOLOGY

## 2020-02-25 PROCEDURE — 2709999900 HC NON-CHARGEABLE SUPPLY: Performed by: OBSTETRICS & GYNECOLOGY

## 2020-02-25 PROCEDURE — 87075 CULTR BACTERIA EXCEPT BLOOD: CPT

## 2020-02-25 PROCEDURE — 87116 MYCOBACTERIA CULTURE: CPT

## 2020-02-25 PROCEDURE — 3600000004 HC SURGERY LEVEL 4 BASE: Performed by: OBSTETRICS & GYNECOLOGY

## 2020-02-25 PROCEDURE — 58555 HYSTEROSCOPY DX SEP PROC: CPT | Performed by: OBSTETRICS & GYNECOLOGY

## 2020-02-25 PROCEDURE — 88305 TISSUE EXAM BY PATHOLOGIST: CPT

## 2020-02-25 PROCEDURE — 7100000001 HC PACU RECOVERY - ADDTL 15 MIN: Performed by: OBSTETRICS & GYNECOLOGY

## 2020-02-25 PROCEDURE — 3700000001 HC ADD 15 MINUTES (ANESTHESIA): Performed by: OBSTETRICS & GYNECOLOGY

## 2020-02-25 PROCEDURE — 7100000011 HC PHASE II RECOVERY - ADDTL 15 MIN: Performed by: OBSTETRICS & GYNECOLOGY

## 2020-02-25 PROCEDURE — 87015 SPECIMEN INFECT AGNT CONCNTJ: CPT

## 2020-02-25 PROCEDURE — 3600000014 HC SURGERY LEVEL 4 ADDTL 15MIN: Performed by: OBSTETRICS & GYNECOLOGY

## 2020-02-25 RX ORDER — DEXAMETHASONE SODIUM PHOSPHATE 10 MG/ML
INJECTION, SOLUTION INTRAMUSCULAR; INTRAVENOUS PRN
Status: DISCONTINUED | OUTPATIENT
Start: 2020-02-25 | End: 2020-02-25 | Stop reason: SDUPTHER

## 2020-02-25 RX ORDER — OXYCODONE HYDROCHLORIDE AND ACETAMINOPHEN 5; 325 MG/1; MG/1
1 TABLET ORAL
Status: DISCONTINUED | OUTPATIENT
Start: 2020-02-25 | End: 2020-02-25 | Stop reason: HOSPADM

## 2020-02-25 RX ORDER — MIDAZOLAM HYDROCHLORIDE 1 MG/ML
INJECTION INTRAMUSCULAR; INTRAVENOUS PRN
Status: DISCONTINUED | OUTPATIENT
Start: 2020-02-25 | End: 2020-02-25 | Stop reason: SDUPTHER

## 2020-02-25 RX ORDER — DIPHENHYDRAMINE HYDROCHLORIDE 50 MG/ML
12.5 INJECTION INTRAMUSCULAR; INTRAVENOUS
Status: DISCONTINUED | OUTPATIENT
Start: 2020-02-25 | End: 2020-02-25 | Stop reason: HOSPADM

## 2020-02-25 RX ORDER — FENTANYL CITRATE 50 UG/ML
INJECTION, SOLUTION INTRAMUSCULAR; INTRAVENOUS PRN
Status: DISCONTINUED | OUTPATIENT
Start: 2020-02-25 | End: 2020-02-25 | Stop reason: SDUPTHER

## 2020-02-25 RX ORDER — SODIUM CHLORIDE 0.9 % (FLUSH) 0.9 %
10 SYRINGE (ML) INJECTION EVERY 12 HOURS SCHEDULED
Status: DISCONTINUED | OUTPATIENT
Start: 2020-02-25 | End: 2020-02-25 | Stop reason: HOSPADM

## 2020-02-25 RX ORDER — DIAPER,BRIEF,INFANT-TODD,DISP
EACH MISCELLANEOUS PRN
Status: DISCONTINUED | OUTPATIENT
Start: 2020-02-25 | End: 2020-02-25 | Stop reason: ALTCHOICE

## 2020-02-25 RX ORDER — SODIUM CHLORIDE, SODIUM LACTATE, POTASSIUM CHLORIDE, CALCIUM CHLORIDE 600; 310; 30; 20 MG/100ML; MG/100ML; MG/100ML; MG/100ML
INJECTION, SOLUTION INTRAVENOUS CONTINUOUS
Status: DISCONTINUED | OUTPATIENT
Start: 2020-02-25 | End: 2020-02-25 | Stop reason: HOSPADM

## 2020-02-25 RX ORDER — FENTANYL CITRATE 50 UG/ML
50 INJECTION, SOLUTION INTRAMUSCULAR; INTRAVENOUS EVERY 5 MIN PRN
Status: DISCONTINUED | OUTPATIENT
Start: 2020-02-25 | End: 2020-02-25 | Stop reason: HOSPADM

## 2020-02-25 RX ORDER — MEPERIDINE HYDROCHLORIDE 50 MG/ML
12.5 INJECTION INTRAMUSCULAR; INTRAVENOUS; SUBCUTANEOUS EVERY 5 MIN PRN
Status: DISCONTINUED | OUTPATIENT
Start: 2020-02-25 | End: 2020-02-25 | Stop reason: HOSPADM

## 2020-02-25 RX ORDER — CEPHALEXIN 500 MG/1
500 CAPSULE ORAL 2 TIMES DAILY
Qty: 14 CAPSULE | Refills: 0 | Status: SHIPPED | OUTPATIENT
Start: 2020-02-25 | End: 2020-03-01

## 2020-02-25 RX ORDER — FENTANYL CITRATE 50 UG/ML
25 INJECTION, SOLUTION INTRAMUSCULAR; INTRAVENOUS EVERY 5 MIN PRN
Status: DISCONTINUED | OUTPATIENT
Start: 2020-02-25 | End: 2020-02-25 | Stop reason: HOSPADM

## 2020-02-25 RX ORDER — PROPOFOL 10 MG/ML
INJECTION, EMULSION INTRAVENOUS PRN
Status: DISCONTINUED | OUTPATIENT
Start: 2020-02-25 | End: 2020-02-25 | Stop reason: SDUPTHER

## 2020-02-25 RX ORDER — SODIUM CHLORIDE 0.9 % (FLUSH) 0.9 %
10 SYRINGE (ML) INJECTION PRN
Status: DISCONTINUED | OUTPATIENT
Start: 2020-02-25 | End: 2020-02-25 | Stop reason: HOSPADM

## 2020-02-25 RX ORDER — PROMETHAZINE HYDROCHLORIDE 25 MG/ML
6.25 INJECTION, SOLUTION INTRAMUSCULAR; INTRAVENOUS
Status: DISCONTINUED | OUTPATIENT
Start: 2020-02-25 | End: 2020-02-25 | Stop reason: HOSPADM

## 2020-02-25 RX ORDER — ONDANSETRON 2 MG/ML
INJECTION INTRAMUSCULAR; INTRAVENOUS PRN
Status: DISCONTINUED | OUTPATIENT
Start: 2020-02-25 | End: 2020-02-25 | Stop reason: SDUPTHER

## 2020-02-25 RX ORDER — LIDOCAINE HYDROCHLORIDE 20 MG/ML
INJECTION, SOLUTION INTRAVENOUS PRN
Status: DISCONTINUED | OUTPATIENT
Start: 2020-02-25 | End: 2020-02-25 | Stop reason: SDUPTHER

## 2020-02-25 RX ADMIN — SODIUM CHLORIDE, POTASSIUM CHLORIDE, SODIUM LACTATE AND CALCIUM CHLORIDE: 600; 310; 30; 20 INJECTION, SOLUTION INTRAVENOUS at 06:32

## 2020-02-25 RX ADMIN — FENTANYL CITRATE 50 MCG: 50 INJECTION, SOLUTION INTRAMUSCULAR; INTRAVENOUS at 09:10

## 2020-02-25 RX ADMIN — MIDAZOLAM 1 MG: 1 INJECTION INTRAMUSCULAR; INTRAVENOUS at 07:29

## 2020-02-25 RX ADMIN — FENTANYL CITRATE 50 MCG: 50 INJECTION, SOLUTION INTRAMUSCULAR; INTRAVENOUS at 09:15

## 2020-02-25 RX ADMIN — MIDAZOLAM 1 MG: 1 INJECTION INTRAMUSCULAR; INTRAVENOUS at 07:32

## 2020-02-25 RX ADMIN — FENTANYL CITRATE 50 MCG: 50 INJECTION, SOLUTION INTRAMUSCULAR; INTRAVENOUS at 07:32

## 2020-02-25 RX ADMIN — FENTANYL CITRATE 50 MCG: 50 INJECTION, SOLUTION INTRAMUSCULAR; INTRAVENOUS at 07:37

## 2020-02-25 RX ADMIN — CEFAZOLIN 3 G: 10 INJECTION, POWDER, FOR SOLUTION INTRAVENOUS at 07:39

## 2020-02-25 RX ADMIN — DEXAMETHASONE SODIUM PHOSPHATE 10 MG: 10 INJECTION INTRAMUSCULAR; INTRAVENOUS at 07:32

## 2020-02-25 RX ADMIN — SODIUM CHLORIDE, POTASSIUM CHLORIDE, SODIUM LACTATE AND CALCIUM CHLORIDE: 600; 310; 30; 20 INJECTION, SOLUTION INTRAVENOUS at 07:26

## 2020-02-25 RX ADMIN — LIDOCAINE HYDROCHLORIDE 100 MG: 20 INJECTION, SOLUTION INTRAVENOUS at 07:32

## 2020-02-25 RX ADMIN — PROPOFOL 200 MG: 10 INJECTION, EMULSION INTRAVENOUS at 07:32

## 2020-02-25 RX ADMIN — FENTANYL CITRATE 50 MCG: 50 INJECTION, SOLUTION INTRAMUSCULAR; INTRAVENOUS at 07:58

## 2020-02-25 RX ADMIN — FENTANYL CITRATE 50 MCG: 50 INJECTION, SOLUTION INTRAMUSCULAR; INTRAVENOUS at 07:45

## 2020-02-25 RX ADMIN — ONDANSETRON HYDROCHLORIDE 4 MG: 2 INJECTION, SOLUTION INTRAMUSCULAR; INTRAVENOUS at 08:26

## 2020-02-25 ASSESSMENT — PULMONARY FUNCTION TESTS
PIF_VALUE: 10
PIF_VALUE: 10
PIF_VALUE: 3
PIF_VALUE: 0
PIF_VALUE: 10
PIF_VALUE: 3
PIF_VALUE: 10
PIF_VALUE: 10
PIF_VALUE: 7
PIF_VALUE: 3
PIF_VALUE: 10
PIF_VALUE: 3
PIF_VALUE: 8
PIF_VALUE: 10
PIF_VALUE: 9
PIF_VALUE: 10
PIF_VALUE: 7
PIF_VALUE: 3
PIF_VALUE: 3
PIF_VALUE: 0
PIF_VALUE: 3
PIF_VALUE: 8
PIF_VALUE: 0
PIF_VALUE: 10
PIF_VALUE: 10
PIF_VALUE: 0
PIF_VALUE: 2
PIF_VALUE: 10
PIF_VALUE: 9
PIF_VALUE: 7
PIF_VALUE: 9
PIF_VALUE: 3
PIF_VALUE: 10
PIF_VALUE: 10
PIF_VALUE: 0
PIF_VALUE: 3
PIF_VALUE: 2
PIF_VALUE: 9
PIF_VALUE: 7
PIF_VALUE: 9
PIF_VALUE: 9
PIF_VALUE: 8
PIF_VALUE: 10
PIF_VALUE: 8
PIF_VALUE: 3
PIF_VALUE: 7
PIF_VALUE: 10
PIF_VALUE: 0
PIF_VALUE: 10
PIF_VALUE: 3
PIF_VALUE: 3
PIF_VALUE: 8
PIF_VALUE: 10
PIF_VALUE: 10
PIF_VALUE: 9
PIF_VALUE: 11
PIF_VALUE: 10
PIF_VALUE: 0
PIF_VALUE: 8
PIF_VALUE: 10
PIF_VALUE: 2
PIF_VALUE: 3
PIF_VALUE: 3
PIF_VALUE: 8
PIF_VALUE: 3
PIF_VALUE: 25
PIF_VALUE: 10
PIF_VALUE: 3
PIF_VALUE: 3
PIF_VALUE: 10
PIF_VALUE: 7
PIF_VALUE: 7
PIF_VALUE: 2

## 2020-02-25 ASSESSMENT — PAIN DESCRIPTION - DESCRIPTORS
DESCRIPTORS: ACHING;BURNING;CONSTANT
DESCRIPTORS: ACHING;BURNING;CONSTANT
DESCRIPTORS: ACHING;BURNING

## 2020-02-25 ASSESSMENT — PAIN SCALES - GENERAL
PAINLEVEL_OUTOF10: 7
PAINLEVEL_OUTOF10: 7
PAINLEVEL_OUTOF10: 3
PAINLEVEL_OUTOF10: 0
PAINLEVEL_OUTOF10: 7

## 2020-02-25 ASSESSMENT — PAIN DESCRIPTION - PAIN TYPE
TYPE: SURGICAL PAIN

## 2020-02-25 ASSESSMENT — PAIN DESCRIPTION - LOCATION
LOCATION: VAGINA

## 2020-02-25 ASSESSMENT — PAIN - FUNCTIONAL ASSESSMENT: PAIN_FUNCTIONAL_ASSESSMENT: 0-10

## 2020-02-25 ASSESSMENT — PAIN DESCRIPTION - FREQUENCY
FREQUENCY: CONTINUOUS

## 2020-02-25 ASSESSMENT — PAIN DESCRIPTION - ONSET: ONSET: ON-GOING

## 2020-02-25 ASSESSMENT — LIFESTYLE VARIABLES: SMOKING_STATUS: 1

## 2020-02-25 ASSESSMENT — PAIN DESCRIPTION - PROGRESSION: CLINICAL_PROGRESSION: GRADUALLY IMPROVING

## 2020-02-25 NOTE — PROGRESS NOTES
Tolerating fluids well.  Timothy De La Cruz
concerns about your blood sugar [phone]. [x] Use your inhalers the morning of surgery. Bring your emergency inhaler with you day of surgery. [] Follow physician instructions regarding any blood thinners you may be taking. WHAT TO EXPECT:  [x] The day of surgery you will be greeted and checked in by the [facility].  In addition, you will be registered in the [facility] by a Patient Access Representative. Please bring your photo ID and insurance card. A nurse will greet you in accordance to the time you are needed in the pre-op area to prepare you for surgery. Please do not be discouraged if you are not greeted in the order you arrive as there are many variables that are involved in patient preparation. Your patience is greatly appreciated as you wait for your nurse. Please bring in items such as: books, magazines, newspapers, electronics, or any other items  to occupy your time in the waiting area. []  Delays may occur with surgery and staff will make a sincere effort to keep you informed of delays. If any delays occur with your procedure, we apologize ahead of time for your inconvenience as we recognize the value of your time.

## 2020-02-25 NOTE — INTERVAL H&P NOTE
H&P Update    Patient's History and Physical from February 19,  was reviewed. Patient examined. Also has a boil on the lower left labial area that she would like lanced if possible. Will add to permit today. There has been addition of boil to darian and will add to permit. Proceed with [facility], tracy ablation and darian of boil. .    Electronically signed by Kisha Conklin MD on 2/25/20 at 7:07 AM

## 2020-02-25 NOTE — ANESTHESIA PRE PROCEDURE
Elevated blood pressure reading R03.0    Tooth pain K08.89    Bipolar 1 disorder (HCC) F31.9    Anxiety F41.9    Suicidal ideation R45.851    Major depressive disorder, recurrent (HCC) F33.9    Depression with suicidal ideation F32.9, R39.200    Depression with anxiety F41.8    Strain of lumbar region S39.012A    Sciatica M54.30    Spasm of muscle M62.838    Menorrhagia with irregular cycle N92.1    Condylomata acuminata A63.0    Menorrhagia with regular cycle N92.0    Endometrial thickening on ultrasound R93.89       Past Medical History:        Diagnosis Date    Anxiety     Asthma     Bipolar 1 disorder (HCC)     COPD (chronic obstructive pulmonary disease) (HCC)     Depression     Fissure, anal     Neuropathy     PTSD (post-traumatic stress disorder)        Past Surgical History:        Procedure Laterality Date    ANTERIOR CRUCIATE LIGAMENT REPAIR      CHOLECYSTECTOMY      DILATION AND CURETTAGE OF UTERUS N/A 1/8/2019    DILATATION AND CURETTAGE HYSTEROSCOPY WITH REMOVAL OF CONDYLOMATA performed by Amy Parikh MD at [address]    repair anal fissure    TUBAL LIGATION         Social History:    Social History     Tobacco Use    Smoking status: Current Every Day Smoker     Packs/day: 1.00     Years: 20.00     Pack years: 20.00     Types: Cigarettes     Start date: 11/3/1995    Smokeless tobacco: Never Used   Substance Use Topics    Alcohol use: Not Currently     Comment: socially                                Ready to quit: No  Counseling given: Not Answered      Vital Signs (Current):   Vitals:    02/25/20 0557   BP: (!) 142/86   Pulse: 103   Resp: 20   Temp: 98.1 °F (36.7 °C)   TempSrc: Temporal   SpO2: 96%   Weight: 288 lb (130.6 kg)   Height: 5' 3\" (1.6 m)                                              BP Readings from Last 3 Encounters:   02/25/20 (!) 142/86   02/18/20 130/80   02/07/20 (!) 140/82       NPO Status: Time of last liquid consumption: 2200                        Time of last solid consumption: 2000                        Date of last liquid consumption: 02/24/20                        Date of last solid food consumption: 02/24/20    BMI:   Wt Readings from Last 3 Encounters:   02/25/20 288 lb (130.6 kg)   02/18/20 288 lb (130.6 kg)   02/07/20 290 lb (131.5 kg)     Body mass index is 51.02 kg/m². CBC:   Lab Results   Component Value Date    WBC 9.8 02/25/2020    RBC 4.25 02/25/2020    HGB 11.8 02/25/2020    HCT 38.0 02/25/2020    MCV 89.4 02/25/2020    RDW 14.9 02/25/2020     02/25/2020       CMP:   Lab Results   Component Value Date     10/30/2019    K 3.8 10/30/2019     10/30/2019    CO2 22 10/30/2019    BUN 15 10/30/2019    CREATININE 0.6 10/30/2019    GFRAA >60 10/30/2019    LABGLOM >60 10/30/2019    GLUCOSE 129 10/30/2019    PROT 7.6 10/30/2019    CALCIUM 9.3 10/30/2019    BILITOT <0.2 10/30/2019    ALKPHOS 86 10/30/2019    AST 14 10/30/2019    ALT 17 10/30/2019       POC Tests: No results for input(s): POCGLU, POCNA, POCK, POCCL, POCBUN, POCHEMO, POCHCT in the last 72 hours. Coags:   Lab Results   Component Value Date    PROTIME 11.3 10/30/2019    INR 1.0 10/30/2019    APTT 33.5 10/30/2019       HCG (If Applicable):   Lab Results   Component Value Date    PREGTESTUR negative 01/07/2020        ABGs: No results found for: PHART, PO2ART, BHE0ETW, KMU3ZEF, BEART, F2LBNJVH     Type & Screen (If Applicable):  No results found for: [facility]    Anesthesia Evaluation  Patient summary reviewed no history of anesthetic complications:   Airway: Mallampati: II  TM distance: <3 FB     Mouth opening: > = 3 FB Dental:          Pulmonary:   (+) COPD:  decreased breath sounds (at bases, otherwise clear),  asthma (daily inhalers): current smoker    (-) wheezes                          ROS comment: Probable CELESTINO based on body habitus and h/o snoring;   Never had sleep study   Cardiovascular:        (-) past MI, CAD and CABG/stent      Rhythm: regular  Rate: normal                    Neuro/Psych:   (+) psychiatric history (PTSD; bipolar d/o):depression/anxiety             GI/Hepatic/Renal:   (+) GERD (prn Zantac (states she only takes it a \"few times\" per year)):,           Endo/Other: Negative Endo/Other ROS                    Abdominal:   (+) obese,         Vascular: negative vascular ROS. Anesthesia Plan      general     ASA 3       Induction: intravenous. Anesthetic plan and risks discussed with patient. Plan discussed with CRNA.                   Mason Ott MD   2/25/2020  (this addendum was done preop but unable to be filed electronically at that time)

## 2020-02-26 LAB — GRAM STAIN ORDERABLE: NORMAL

## 2020-02-26 NOTE — OP NOTE
[address]                  [address] [id number],  [facility]                                OPERATIVE REPORT    PATIENT NAME: Dk Kelley                  :        1985  MED REC NO:   [id number]                            ROOM:  ACCOUNT NO:   [de-identified]                           ADMIT DATE: 2020  PROVIDER:     Henok Park MD    DATE OF PROCEDURE:  2020    PREOPERATIVE DIAGNOSES:  Menorrhagia and vulvar abscess. POSTOPERATIVE DIAGNOSES:  Menorrhagia and vulvar abscess plus cervical  stenosis, internal os. PROCEDURES:  Hysteroscopy and biopsy, drainage of abscess of the vulva. SURGEON:  Henok Park MD    ASSISTANT:  JAS Neri    DESCRIPTION OF PROCEDURE:  After identification and time-out, the  patient was placed under general anesthesia in the dorsal lithotomy  position. The patient was prepped and draped in usual sterile fashion. A side-open speculum was placed and the cervix was located with some  difficulty secondary to her weight. The anterior cervix was grasped  with a sharp-tooth tenaculum. On trying to sound the uterine cavity,  immediately we met a severe cervical stenosis at the internal os which  was approximately 1.5 cm from the external os. Careful manual  dilatation was performed with hemostat, and the uterus was sounded to 10  cm in the mid plane. Dilatation was carried out with some difficulty  with #18 Hanks dilator. The 5-mm contact hysteroscope was then inserted  and direct contact hysteroscopy of the endometrial cavity was performed  with moderate difficulty. The cavity itself appeared very smooth and  atrophic. The areas were photodocumented. We also straight cathed the  patient at this time to make sure there was no perforation of the  bladder. There was only clear urine and no catheter seen with the  hysteroscope. The scope was withdrawn.   We attempted to go back through  the

## 2020-02-28 LAB
ANAEROBIC CULTURE: ABNORMAL
ORGANISM: ABNORMAL

## 2020-03-01 ENCOUNTER — HOSPITAL ENCOUNTER (EMERGENCY)
Age: 35
Discharge: HOME | End: 2020-03-02
Attending: EMERGENCY MEDICINE
Payer: COMMERCIAL

## 2020-03-01 VITALS
DIASTOLIC BLOOD PRESSURE: 84 MMHG | HEIGHT: 63 IN | RESPIRATION RATE: 18 BRPM | HEART RATE: 98 BPM | TEMPERATURE: 98.8 F | BODY MASS INDEX: 51.03 KG/M2 | OXYGEN SATURATION: 100 % | SYSTOLIC BLOOD PRESSURE: 116 MMHG | WEIGHT: 288 LBS

## 2020-03-01 LAB
CULTURE SURGICAL: ABNORMAL
CULTURE SURGICAL: ABNORMAL
ORGANISM: ABNORMAL
ORGANISM: ABNORMAL

## 2020-03-01 PROCEDURE — 99282 EMERGENCY DEPT VISIT SF MDM: CPT

## 2020-03-01 PROCEDURE — 6370000000 HC RX 637 (ALT 250 FOR IP): Performed by: EMERGENCY MEDICINE

## 2020-03-01 RX ORDER — DIPHENHYDRAMINE HCL 25 MG
50 TABLET ORAL ONCE
Status: COMPLETED | OUTPATIENT
Start: 2020-03-02 | End: 2020-03-01

## 2020-03-01 RX ORDER — PREDNISONE 20 MG/1
60 TABLET ORAL ONCE
Status: COMPLETED | OUTPATIENT
Start: 2020-03-02 | End: 2020-03-01

## 2020-03-01 RX ADMIN — DIPHENHYDRAMINE HCL 50 MG: 25 TABLET ORAL at 23:56

## 2020-03-01 RX ADMIN — PREDNISONE 60 MG: 20 TABLET ORAL at 23:56

## 2020-03-02 RX ORDER — PREDNISONE 20 MG/1
20 TABLET ORAL 2 TIMES DAILY
Qty: 10 TABLET | Refills: 0 | Status: SHIPPED | OUTPATIENT
Start: 2020-03-02 | End: 2020-03-07

## 2020-03-02 NOTE — ED PROVIDER NOTES
in her father, mother, and sister; High Blood Pressure in her brother. The patients home medications have been reviewed. Allergies: Cymbalta [duloxetine hcl]; Neurontin [gabapentin]; and Lamictal [lamotrigine]    -------------------------------------------------- RESULTS -------------------------------------------------  All laboratory and radiology results have been personally reviewed by myself   LABS:  No results found for this visit on 03/01/20. RADIOLOGY:  Interpreted by Radiologist.  No orders to display       ------------------------- NURSING NOTES AND VITALS REVIEWED ---------------------------   The nursing notes within the ED encounter and vital signs as below have been reviewed. /84   Pulse 98   Temp 98.8 °F (37.1 °C) (Temporal)   Resp 18   Ht 5' 3\" (1.6 m)   Wt 288 lb (130.6 kg)   LMP 02/15/2020   SpO2 100%   BMI 51.02 kg/m²   Oxygen Saturation Interpretation: Normal    ---------------------------------------------------PHYSICAL EXAM--------------------------------------    GEN: No acute distress, well-appearing, well nourished   HENT: Normocephalic, atraumatic, oral mucosa moist, no edema of oral mucosa, no edema of oropharynx, no swelling of lips  PULM: Lungs clear to ascultation bilaterally, no wheezes, no crackles  CARDIO: Regular rate, regular rhythm, normal S1/S2  ABD: Soft, non-tender, no rigidity  MSK: No deformities, no edema, palpable pulses all extremities   SKIN: no lacerations, no abrasions + blanching patchy erythematous rash noted bilateral upper extremities and anterior chest and bilateral cheeks.   NEURO: Awake, alert, appropriate         ------------------------------ ED COURSE/MEDICAL DECISION MAKING----------------------  Medications   diphenhydrAMINE (BENADRYL) tablet 50 mg (50 mg Oral Given 3/1/20 2356)   predniSONE (DELTASONE) tablet 60 mg (60 mg Oral Given 3/1/20 2356)         ED Course and Medical Decision Making:   Patient presents with complaint of

## 2020-03-03 ENCOUNTER — OFFICE VISIT (OUTPATIENT)
Dept: OBGYN | Age: 35
End: 2020-03-03
Payer: COMMERCIAL

## 2020-03-03 VITALS
BODY MASS INDEX: 51.03 KG/M2 | WEIGHT: 288 LBS | TEMPERATURE: 98.2 F | HEIGHT: 63 IN | DIASTOLIC BLOOD PRESSURE: 82 MMHG | SYSTOLIC BLOOD PRESSURE: 138 MMHG | HEART RATE: 88 BPM | RESPIRATION RATE: 16 BRPM

## 2020-03-03 PROCEDURE — 99024 POSTOP FOLLOW-UP VISIT: CPT | Performed by: OBSTETRICS & GYNECOLOGY

## 2020-03-03 PROCEDURE — 99212 OFFICE O/P EST SF 10 MIN: CPT | Performed by: OBSTETRICS & GYNECOLOGY

## 2020-03-03 NOTE — PROGRESS NOTES
Post op check today  Unable to do the ablation because of scarring. See her back in on month see how her next menses does.

## 2020-03-13 ENCOUNTER — OFFICE VISIT (OUTPATIENT)
Dept: FAMILY MEDICINE CLINIC | Age: 35
End: 2020-03-13
Payer: COMMERCIAL

## 2020-03-13 ENCOUNTER — HOSPITAL ENCOUNTER (OUTPATIENT)
Age: 35
Discharge: HOME | End: 2020-03-15
Payer: COMMERCIAL

## 2020-03-13 VITALS
TEMPERATURE: 98.1 F | WEIGHT: 293 LBS | DIASTOLIC BLOOD PRESSURE: 80 MMHG | HEART RATE: 88 BPM | SYSTOLIC BLOOD PRESSURE: 142 MMHG | OXYGEN SATURATION: 98 % | BODY MASS INDEX: 51.91 KG/M2 | RESPIRATION RATE: 20 BRPM | HEIGHT: 63 IN

## 2020-03-13 LAB
ALBUMIN SERPL-MCNC: 3.9 G/DL (ref 3.5–5.2)
ALP BLD-CCNC: 76 U/L (ref 35–104)
ALT SERPL-CCNC: 21 U/L (ref 0–32)
ANION GAP SERPL CALCULATED.3IONS-SCNC: 14 MMOL/L (ref 7–16)
AST SERPL-CCNC: 21 U/L (ref 0–31)
BASOPHILS ABSOLUTE: 0.04 E9/L (ref 0–0.2)
BASOPHILS RELATIVE PERCENT: 0.4 % (ref 0–2)
BILIRUB SERPL-MCNC: <0.2 MG/DL (ref 0–1.2)
BUN BLDV-MCNC: 10 MG/DL (ref 6–20)
CALCIUM SERPL-MCNC: 8.9 MG/DL (ref 8.6–10.2)
CHLORIDE BLD-SCNC: 102 MMOL/L (ref 98–107)
CHOLESTEROL, TOTAL: 185 MG/DL (ref 0–199)
CO2: 21 MMOL/L (ref 22–29)
CREAT SERPL-MCNC: 0.5 MG/DL (ref 0.5–1)
EOSINOPHILS ABSOLUTE: 0.38 E9/L (ref 0.05–0.5)
EOSINOPHILS RELATIVE PERCENT: 3.9 % (ref 0–6)
FOLATE: 10.2 NG/ML (ref 4.8–24.2)
GFR AFRICAN AMERICAN: >60
GFR NON-AFRICAN AMERICAN: >60 ML/MIN/1.73
GLUCOSE BLD-MCNC: 153 MG/DL (ref 74–99)
HCT VFR BLD CALC: 40.4 % (ref 34–48)
HDLC SERPL-MCNC: 40 MG/DL
HEMOGLOBIN: 12.2 G/DL (ref 11.5–15.5)
IMMATURE GRANULOCYTES #: 0.03 E9/L
IMMATURE GRANULOCYTES %: 0.3 % (ref 0–5)
LDL CHOLESTEROL CALCULATED: 112 MG/DL (ref 0–99)
LYMPHOCYTES ABSOLUTE: 2.39 E9/L (ref 1.5–4)
LYMPHOCYTES RELATIVE PERCENT: 24.7 % (ref 20–42)
MAGNESIUM: 2.1 MG/DL (ref 1.6–2.6)
MCH RBC QN AUTO: 28 PG (ref 26–35)
MCHC RBC AUTO-ENTMCNC: 30.2 % (ref 32–34.5)
MCV RBC AUTO: 92.7 FL (ref 80–99.9)
MONOCYTES ABSOLUTE: 0.57 E9/L (ref 0.1–0.95)
MONOCYTES RELATIVE PERCENT: 5.9 % (ref 2–12)
NEUTROPHILS ABSOLUTE: 6.26 E9/L (ref 1.8–7.3)
NEUTROPHILS RELATIVE PERCENT: 64.8 % (ref 43–80)
PDW BLD-RTO: 15.4 FL (ref 11.5–15)
PLATELET # BLD: 236 E9/L (ref 130–450)
PMV BLD AUTO: 11.8 FL (ref 7–12)
POTASSIUM SERPL-SCNC: 4.2 MMOL/L (ref 3.5–5)
RBC # BLD: 4.36 E12/L (ref 3.5–5.5)
RHEUMATOID FACTOR: 12 IU/ML (ref 0–13)
SODIUM BLD-SCNC: 137 MMOL/L (ref 132–146)
T4 FREE: 1.1 NG/DL (ref 0.93–1.7)
TOTAL PROTEIN: 7 G/DL (ref 6.4–8.3)
TRIGL SERPL-MCNC: 165 MG/DL (ref 0–149)
TSH SERPL DL<=0.05 MIU/L-ACNC: 1.18 UIU/ML (ref 0.27–4.2)
VITAMIN B-12: 229 PG/ML (ref 211–946)
VLDLC SERPL CALC-MCNC: 33 MG/DL
WBC # BLD: 9.7 E9/L (ref 4.5–11.5)

## 2020-03-13 PROCEDURE — 4004F PT TOBACCO SCREEN RCVD TLK: CPT | Performed by: PHYSICIAN ASSISTANT

## 2020-03-13 PROCEDURE — 86431 RHEUMATOID FACTOR QUANT: CPT

## 2020-03-13 PROCEDURE — 80061 LIPID PANEL: CPT

## 2020-03-13 PROCEDURE — 86038 ANTINUCLEAR ANTIBODIES: CPT

## 2020-03-13 PROCEDURE — 90686 IIV4 VACC NO PRSV 0.5 ML IM: CPT | Performed by: PHYSICIAN ASSISTANT

## 2020-03-13 PROCEDURE — G8417 CALC BMI ABV UP PARAM F/U: HCPCS | Performed by: PHYSICIAN ASSISTANT

## 2020-03-13 PROCEDURE — G0008 ADMIN INFLUENZA VIRUS VAC: HCPCS | Performed by: PHYSICIAN ASSISTANT

## 2020-03-13 PROCEDURE — 83036 HEMOGLOBIN GLYCOSYLATED A1C: CPT

## 2020-03-13 PROCEDURE — G8427 DOCREV CUR MEDS BY ELIG CLIN: HCPCS | Performed by: PHYSICIAN ASSISTANT

## 2020-03-13 PROCEDURE — 84443 ASSAY THYROID STIM HORMONE: CPT

## 2020-03-13 PROCEDURE — 83735 ASSAY OF MAGNESIUM: CPT

## 2020-03-13 PROCEDURE — 99204 OFFICE O/P NEW MOD 45 MIN: CPT | Performed by: PHYSICIAN ASSISTANT

## 2020-03-13 PROCEDURE — 85651 RBC SED RATE NONAUTOMATED: CPT

## 2020-03-13 PROCEDURE — 84439 ASSAY OF FREE THYROXINE: CPT

## 2020-03-13 PROCEDURE — 85025 COMPLETE CBC W/AUTO DIFF WBC: CPT

## 2020-03-13 PROCEDURE — 82607 VITAMIN B-12: CPT

## 2020-03-13 PROCEDURE — 80053 COMPREHEN METABOLIC PANEL: CPT

## 2020-03-13 PROCEDURE — G8482 FLU IMMUNIZE ORDER/ADMIN: HCPCS | Performed by: PHYSICIAN ASSISTANT

## 2020-03-13 PROCEDURE — 82746 ASSAY OF FOLIC ACID SERUM: CPT

## 2020-03-13 RX ORDER — VENLAFAXINE HYDROCHLORIDE 150 MG/1
150 CAPSULE, EXTENDED RELEASE ORAL DAILY
Qty: 30 CAPSULE | Refills: 3 | Status: SHIPPED
Start: 2020-03-13 | End: 2020-09-21 | Stop reason: SDUPTHER

## 2020-03-13 ASSESSMENT — ENCOUNTER SYMPTOMS
SORE THROAT: 0
SINUS PRESSURE: 0
EYE PAIN: 0
DIARRHEA: 0
NAUSEA: 0
ABDOMINAL PAIN: 0
WHEEZING: 1
VOICE CHANGE: 0
EYE REDNESS: 0
VOMITING: 0
COUGH: 1
BACK PAIN: 1
SHORTNESS OF BREATH: 1

## 2020-03-13 NOTE — PATIENT INSTRUCTIONS
combination of the two can help most people with depression. Often a combination works best. Counseling can also help you cope with having a chronic disease. When should you call for help? Call 911 anytime you think you may need emergency care. For example, call if:    · You feel like hurting yourself or someone else.     · Someone you know has depression and is about to attempt or is attempting suicide.   [facility] your doctor now or seek immediate medical care if:    · You hear voices.     · Someone you know has depression and:  ? Starts to give away his or her possessions. ? Uses illegal drugs or drinks alcohol heavily. ? Talks or writes about death, including writing suicide notes or talking about guns, knives, or pills. ? Starts to spend a lot of time alone. ? Acts very aggressively or suddenly appears calm.    Watch closely for changes in your health, and be sure to contact your doctor if:    · You do not get better as expected. Where can you learn more? Go to https://Personal MedSystems.[employer] (LGT[employer]). org and sign in to your [employer] account. Enter X611 in the [employer] box to learn more about \"Depression and Chronic Disease: Care Instructions. \"     If you do not have an account, please click on the \"Sign Up Now\" link. Current as of: May 28, 2019  Content Version: 12.3  © 1715-8215 Healthwise, Incorporated. Care instructions adapted under license by [facility] ([facility]). If you have questions about a medical condition or this instruction, always ask your healthcare professional. [place] [address] any warranty or liability for your use of this information. Patient Education        Fatigue: Care Instructions  Your Care Instructions    Fatigue is a feeling of tiredness, exhaustion, or lack of energy. You may feel fatigue because of too much or not enough activity. It can also come from stress, lack of sleep, boredom, and poor diet.  Many medical problems, such as viral infections, can cause fatigue. Emotional problems, especially depression, are often the cause of fatigue. Fatigue is most often a symptom of another problem. Treatment for fatigue depends on the cause. For example, if you have fatigue because you have a certain health problem, treating this problem also treats your fatigue. If depression or anxiety is the cause, treatment may help. Follow-up care is a key part of your treatment and safety. Be sure to make and go to all appointments, and call your doctor if you are having problems. It's also a good idea to know your test results and keep a list of the medicines you take. How can you care for yourself at home? · Get regular exercise. But don't overdo it. Go back and forth between rest and exercise. · Get plenty of rest.  · Eat a healthy diet. Do not skip meals, especially breakfast.  · Reduce your use of caffeine, tobacco, and alcohol. Caffeine is most often found in coffee, tea, cola drinks, and chocolate. · Limit medicines that can cause fatigue. This includes tranquilizers and cold and allergy medicines. When should you call for help? Watch closely for changes in your health, and be sure to contact your doctor if:    · You have new symptoms such as fever or a rash.     · Your fatigue gets worse.     · You have been feeling down, depressed, or hopeless. Or you may have lost interest in things that you usually enjoy.     · You are not getting better as expected. Where can you learn more? Go to https://[employer] ConceptspeSapling Learning.Bridge. org and sign in to your GetFeedback account. Enter E369 in the [employer] box to learn more about \"Fatigue: Care Instructions. \"     If you do not have an account, please click on the \"Sign Up Now\" link. Current as of: June 26, 2019  Content Version: 12.3  © 6723-7171 Healthwise, Incorporated. Care instructions adapted under license by [facility] ([facility]).  If you have questions about a medical condition or this instruction, always ask your healthcare professional. [address] any warranty or liability for your use of this information. Patient Education        Learning About Mood Disorders  What are mood disorders? Mood disorders are medical problems that affect how you feel. They can impact your moods, thoughts, and actions. Mood disorders include:  · Depression. This causes you to feel sad or hopeless for much of the time. · Bipolar disorder. This causes extreme mood changes from manic episodes of very high energy to extreme lows of depression. · Seasonal affective disorder (SAD). This is a type of depression that affects you during the same season each year. Most often people experience SAD during the fall and winter months when days are shorter and there is less light. What are the symptoms? Depression  You may:  · Feel sad or hopeless nearly every day. · Lose interest in or not get pleasure from most daily activities. You feel this way nearly every day. · Have low energy, changes in your appetite, or changes in how well you sleep. · Have trouble concentrating. · Think about death and suicide. Keep the numbers for these national suicide hotlines: 3-462-968-TALK (5-807.268.1370) and 9-163-UBIDKYE (6-888.260.9385). If you or someone you know talks about suicide or feeling hopeless, get help right away. Bipolar disorder  Symptoms depend on your mood swings. You may:  · Feel very happy, energetic, or on edge. · Feel like you need very little sleep. · Feel overly self-confident. · Do impulsive things, such as spending a lot of money. · Feel sad or hopeless. · Have racing thoughts or trouble thinking and making decisions. · Lose interest in things you have enjoyed in the past.  · Think about death and suicide. Keep the numbers for these national suicide hotlines: 8-154-912-TALK (3-471-388-057-315-3334) and 4-618-LILRCHQ (8-100.893.2348).  If you or someone you know talks about suicide or feeling hopeless, get help Information box to learn more about \"Learning About Mood Disorders. \"     If you do not have an account, please click on the \"Sign Up Now\" link. Current as of: May 28, 2019  Content Version: 12.3  © 6471-8184 Healthwise, Incorporated. Care instructions adapted under license by [facility] ([facility]). If you have questions about a medical condition or this instruction, always ask your healthcare professional. [address] any warranty or liability for your use of this information. Patient Education        Anxiety Disorder: Care Instructions  Your Care Instructions    Anxiety is a normal reaction to stress. Difficult situations can cause you to have symptoms such as sweaty palms and a nervous feeling. In an anxiety disorder, the symptoms are far more severe. Constant worry, muscle tension, trouble sleeping, nausea and diarrhea, and other symptoms can make normal daily activities difficult or impossible. These symptoms may occur for no reason, and they can affect your work, school, or social life. Medicines, counseling, and self-care can all help. Follow-up care is a key part of your treatment and safety. Be sure to make and go to all appointments, and call your doctor if you are having problems. It's also a good idea to know your test results and keep a list of the medicines you take. How can you care for yourself at home? · Take medicines exactly as directed. Call your doctor if you think you are having a problem with your medicine. · Go to your counseling sessions and follow-up appointments. · Recognize and accept your anxiety. Then, when you are in a situation that makes you anxious, say to yourself, \"This is not an emergency. I feel uncomfortable, but I am not in danger. I can keep going even if I feel anxious. \"  · Be kind to your body:  ? Relieve tension with exercise or a massage. ? Get enough rest.  ? Avoid alcohol, caffeine, nicotine, and illegal drugs.  They can increase your anxiety

## 2020-03-13 NOTE — PROGRESS NOTES
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 The patient has a history of COPD, PTSD and Bipolar and needs to establish with a new provider. She orts that she currently is not seeing anyone for her mental health issues and she is going to try to reestablish with someone new. She does have a history of recent vulvar abscess and was seeing gynecology. She is also had some issues with heavy menstrual bleeding and they have been contemplating a possible hysterectomy. They have tried several procedures without success. She also had allergic reaction to Keflex recently. She reports that she does currently smoke 3 packs/day, she has no current desire to quit as her \"stress level is too high\". She denies any suicidal or homicidal ideations. She reports that she has all over body pain, at times some unusual rashes as well as intermittent, but persistent headaches. She states the headaches are tension type in nature and encompass her entire head. She states she can get rashes or changes to her skin which come and go. She reports there is no specific location for the rash. She states that her body will hurt even if you \"touch her skin\". She states that she does sleep okay at night, is able to function with her ADLs, but states she always has, pain all over her body\". She admits to anxiety as well as some depression. She states that her  has been going through some health issues as well and she does not want him to know how she feels because he will just worry about her and not take care of himself. She reports extreme fatigue as well as lack of motivation at times to do things. She also has been feeling some \"palpitations\", but she feels this is when she is having her \"anxious episodes\". She is interested in trying to quit smoking.  Due to her history of capsule by mouth daily Yes Gela Reynaga PA-C   albuterol sulfate HFA (PROVENTIL HFA) 108 (90 Base) MCG/ACT inhaler Inhale 2 puffs into the lungs every 4 hours as needed for Wheezing Yes Zaid Chavis, DO        Allergies   Allergen Reactions    Cymbalta [Duloxetine Hcl] Other (See Comments)     angry    Keflex [Cephalexin]      Hives, rash      Neurontin [Gabapentin] Other (See Comments)     Makes me feel very weird    Lamictal [Lamotrigine] Nausea And Vomiting       Past Medical History:   Diagnosis Date    Anxiety     Asthma     Bipolar [facility] disorder ([facility] [address]     [facility]     Depression     Fissure, anal     Neuropathy     PTSD (post-traumatic stress disorder)          Family History   Problem Relation Age of Onset    Diabetes Mother     Cancer Mother         ovarian    Diabetes Father     Cancer Sister         NHL    Diabetes Sister     High Blood Pressure Brother        Vitals:    03/13/20 0905 03/13/20 0942   BP: (!) 142/80    Site: Left Upper Arm    Position: Sitting    Cuff Size: Large Adult    Pulse: 101 88   Resp: 20    Temp: 98.1 °F (36.7 °C)    TempSrc: Oral    SpO2: 98%    Weight: 296 lb (134.3 kg)    Height: 5' 3\" (1.6 m)      Estimated body mass index is 52.43 kg/m² as calculated from the following:    Height as of this encounter: 5' 3\" (1.6 m). Weight as of this encounter: 296 lb (134.3 kg). Physical Exam  Vitals signs and nursing note reviewed. Constitutional:       General: She is not in acute distress. Appearance: Normal appearance. She is well-developed. She is not toxic-appearing. HENT:      Head: Normocephalic and atraumatic. Right Ear: Tympanic membrane normal.      Left Ear: Tympanic membrane normal.      Nose: Nose normal. No congestion. Mouth/Throat:      Mouth: Mucous membranes are moist.      Pharynx: No posterior oropharyngeal erythema. Eyes:      Extraocular Movements: Extraocular movements intact. pneumonia vaccine appears UTD. Patient agrees to start Effexor once daily. Recommend to establish with counselor/psych in area for reevaluation and recommendation for meds as well as counseling. Patient to continue follow up with GYN as well. Patient will recheck with myself in about 6 to 8 weeks, but she will call sooner if any problems. We reviewed health maintenance, but patient declined AWV/Medicare visit at this time. Flu shot given today. We will call with lab results when they return. An  electronic signature was used to authenticate this note. This document was prepared at least partially through the use of voice recognition software. Although effort is taken to assure the accuracy of this document, it is possible that grammatical, syntax,  or spelling errors may occur.       --Doyle Mckeon PA-C on 3/13/2020 at 1:03 PM

## 2020-03-14 LAB
HBA1C MFR BLD: 6 % (ref 4–5.6)
SEDIMENTATION RATE, ERYTHROCYTE: 30 MM/HR (ref 0–20)

## 2020-03-16 ENCOUNTER — TELEPHONE (OUTPATIENT)
Dept: FAMILY MEDICINE CLINIC | Age: 35
End: 2020-03-16

## 2020-03-16 LAB — ANTI-NUCLEAR ANTIBODY (ANA): NEGATIVE

## 2020-03-16 RX ORDER — METHOCARBAMOL 500 MG/1
500 TABLET, FILM COATED ORAL 2 TIMES DAILY PRN
Qty: 60 TABLET | Refills: 2 | Status: SHIPPED | OUTPATIENT
Start: 2020-03-16 | End: 2020-03-26

## 2020-03-16 NOTE — TELEPHONE ENCOUNTER
Call placed to patient about her current lab findings. Patient advised of negative AMANDA and slight elevation of her ESR, but normal RF as well. Patient advised of her elevated Blood sugar and elevated HGBA1C at 6.0. Patient advised would recommend Metformin 500mg BID with meals. Recommend to continue her Effexor. I advised her I feel a lot of her symptoms are related to fibromyalgia and her depression/anxiety. She is unable to tolerate Gabapentin, Lamictal or Cymbalta, I would hesitant to try Lyrica. We will try a low dose muscle relaxer, she will keep me posted. I do not feel she needs to check frequent BS at home at this time. Encouraged her to try and set up my chart if able.     Brittnee Raymundo PA-C

## 2020-03-27 ENCOUNTER — HOSPITAL ENCOUNTER (EMERGENCY)
Age: 35
Discharge: HOME | End: 2020-03-27
Attending: EMERGENCY MEDICINE
Payer: COMMERCIAL

## 2020-03-27 VITALS
DIASTOLIC BLOOD PRESSURE: 78 MMHG | OXYGEN SATURATION: 98 % | SYSTOLIC BLOOD PRESSURE: 134 MMHG | WEIGHT: 290 LBS | TEMPERATURE: 98.7 F | HEART RATE: 95 BPM | BODY MASS INDEX: 51.37 KG/M2 | RESPIRATION RATE: 16 BRPM

## 2020-03-27 LAB — STREP GRP A PCR: NEGATIVE

## 2020-03-27 PROCEDURE — 87880 STREP A ASSAY W/OPTIC: CPT

## 2020-03-27 PROCEDURE — 99283 EMERGENCY DEPT VISIT LOW MDM: CPT

## 2020-03-27 RX ORDER — METHOCARBAMOL 500 MG/1
500 TABLET, FILM COATED ORAL 2 TIMES DAILY
COMMUNITY
End: 2020-05-01 | Stop reason: DRUGHIGH

## 2020-03-27 ASSESSMENT — PAIN SCALES - GENERAL: PAINLEVEL_OUTOF10: 7

## 2020-03-28 ENCOUNTER — CARE COORDINATION (OUTPATIENT)
Dept: CARE COORDINATION | Age: 35
End: 2020-03-28

## 2020-03-30 LAB
FUNGUS (MYCOLOGY) CULTURE: NORMAL
FUNGUS STAIN: NORMAL

## 2020-03-31 RX ORDER — ONDANSETRON 4 MG/1
4 TABLET, FILM COATED ORAL 3 TIMES DAILY PRN
Qty: 30 TABLET | Refills: 0 | Status: SHIPPED
Start: 2020-03-31 | End: 2020-05-28 | Stop reason: ALTCHOICE

## 2020-04-02 RX ORDER — GLUCOSAMINE HCL/CHONDROITIN SU 500-400 MG
CAPSULE ORAL
Qty: 90 STRIP | Refills: 5 | Status: SHIPPED
Start: 2020-04-02 | End: 2021-05-11

## 2020-04-02 RX ORDER — LANCETS 30 GAUGE
1 EACH MISCELLANEOUS 3 TIMES DAILY
Qty: 100 EACH | Refills: 5 | Status: SHIPPED
Start: 2020-04-02 | End: 2021-05-11

## 2020-04-04 ENCOUNTER — CARE COORDINATION (OUTPATIENT)
Dept: CARE COORDINATION | Age: 35
End: 2020-04-04

## 2020-04-04 NOTE — CARE COORDINATION
COVID-19 Screening Follow-up Note    Patient contacted regarding COVID-19 risk and screening. Care Transition Nurse/ Ambulatory Care Manager contacted the patient by telephone to perform follow-up assessment. Verified name and  with patient as identifiers. Patient has following risk factors of: Asthma, COPD, DM, smokes cigarettes. Symptoms reviewed with patient who verbalized the following symptoms: Pt reports that she is feeling much better. Her cough is dry nonproductive. Pt reports that her cough is at baseline related to her COPD. The cough is never gone. Pt reports the nausea is improve. She still has a little nausea but she did not need Zofran medication today. Sore throat is gone. No new or worsening symptoms noted. -Pt is keeping well hydrated and her appetite is normal.  She said she maybe eating more than usual due to being home & the stress of the pandemic.  -Pt has an appt 20 with a counselor to stop smoking. Due to no new or worsening symptoms encounter was not routed to provider for escalation. Education provided regarding infection prevention, and signs and symptoms of COVID-19 and when to seek medical attention with patient who verbalized understanding. Discussed exposure protocols and quarantine from [address] you at higher risk for severe illness  and given an opportunity for questions and concerns. The patient agrees to contact the COVID-19 hotline [phone] or PCP office for questions related to their healthcare. CTN/ACM provided contact information for future reference. From CDC: Are you at higher risk for severe illness?  Wash your hands often.  Avoid close contact (6 feet, which is about two arm lengths) with people who are sick.  Put distance between yourself and other people if COVID-19 is spreading in your community.  Clean and disinfect frequently touched surfaces.    Avoid all cruise travel and non-essential air

## 2020-04-06 ENCOUNTER — HOSPITAL ENCOUNTER (OUTPATIENT)
Dept: PSYCHIATRY | Age: 35
Discharge: HOME | End: 2020-04-06
Payer: COMMERCIAL

## 2020-04-06 NOTE — FLOWSHEET NOTE
Met with client via phone for intake session. Client start date will be 4/20/2020. Paperwork sent to client for signatures with a return envelope. Client ok with phone sessions and picking up medications.

## 2020-04-13 ENCOUNTER — CARE COORDINATION (OUTPATIENT)
Dept: CARE COORDINATION | Age: 35
End: 2020-04-13

## 2020-04-14 LAB
AFB CULTURE (MYCOBACTERIA): NORMAL
AFB SMEAR: NORMAL

## 2020-04-15 ENCOUNTER — TELEMEDICINE (OUTPATIENT)
Dept: FAMILY MEDICINE CLINIC | Age: 35
End: 2020-04-15
Payer: COMMERCIAL

## 2020-04-15 ENCOUNTER — TELEPHONE (OUTPATIENT)
Dept: FAMILY MEDICINE CLINIC | Age: 35
End: 2020-04-15

## 2020-04-15 PROCEDURE — G8427 DOCREV CUR MEDS BY ELIG CLIN: HCPCS | Performed by: PHYSICIAN ASSISTANT

## 2020-04-15 PROCEDURE — 99214 OFFICE O/P EST MOD 30 MIN: CPT | Performed by: PHYSICIAN ASSISTANT

## 2020-04-15 RX ORDER — CLINDAMYCIN HYDROCHLORIDE 300 MG/1
300 CAPSULE ORAL 3 TIMES DAILY
Qty: 30 CAPSULE | Refills: 0 | Status: SHIPPED | OUTPATIENT
Start: 2020-04-15 | End: 2020-04-25

## 2020-04-15 ASSESSMENT — ENCOUNTER SYMPTOMS
SHORTNESS OF BREATH: 0
SORE THROAT: 0
COUGH: 0
EYE DISCHARGE: 0
PHOTOPHOBIA: 0
DIARRHEA: 0
NAUSEA: 0
FACIAL SWELLING: 0
SINUS PRESSURE: 0
BACK PAIN: 0
EYE REDNESS: 0
EYE PAIN: 0
SINUS PAIN: 0
RHINORRHEA: 0
TROUBLE SWALLOWING: 0
ABDOMINAL PAIN: 0
VOMITING: 0
VOICE CHANGE: 0

## 2020-04-15 NOTE — PATIENT INSTRUCTIONS
can double your chances of quitting smoking. They can ease cravings and withdrawal symptoms. · Getting counseling along with using medicine can raise your chances of quitting even more. · If you smoke fewer than 5 cigarettes a day, you may not need medicines to help you quit smoking. · These medicines have less nicotine than cigarettes. And by itself, nicotine is not nearly as harmful as smoking. The tars, carbon monoxide, and other toxic chemicals in tobacco cause the harmful effects. · The side effects of nicotine replacement products depend on the type of product. For example, a patch can make your skin red and itchy. Medicines in pill form can make you sick to your stomach. They can also cause dry mouth and trouble sleeping. For most people, the side effects are not bad enough to make them stop using the products. Why might you choose to use medicines to quit smoking? · You have tried on your own to stop smoking, but you were not able to stop. · You smoke more than 5 cigarettes a day. · You want to increase your chances of quitting smoking. · You want to reduce your cravings and withdrawal symptoms. · You feel the benefits of medicine outweigh the side effects. Why might you choose not to use medicine? · You want to try quitting on your own by stopping all at once (\"cold turkey\"). · You want to cut back slowly on the number of cigarettes you smoke. · You smoke fewer than 5 cigarettes a day. · You do not like using medicine. · You feel the side effects of medicines outweigh the benefits. · You are worried about the cost of medicines. Your decision  Thinking about the facts and your feelings can help you make a decision that is right for you. Be sure you understand the benefits and risks of your options, and think about what else you need to do before you make the decision. Where can you learn more? Go to https://leann.Lovejuice. org and sign in to your Apture account.  Enter Z682 in the Search Health Information box to learn more about \"Deciding About Using Medicines To Quit Smoking. \"     If you do not have an account, please click on the \"Sign Up Now\" link. Current as of: July 4, 2019Content Version: 12.4  © 5966-0165 Healthwise, Incorporated. Care instructions adapted under license by [facility] ([facility]). If you have questions about a medical condition or this instruction, always ask your healthcare professional. [address] any warranty or liability for your use of this information. Patient Education        bupropion  Pronunciation:  bycristina PRO pee on  Brand:  Aplenzin, Forfivo XL, Wellbutrin SR, Wellbutrin XL, Zyban Advantage Pack  What is the most important information I should know about bupropion? You should not take bupropion if you have seizures or an eating disorder, or if you have suddenly stopped using alcohol, seizure medication, or sedatives. If you take Wellbutrin for depression, do not also take Zyban to quit smoking. Do not use bupropion within 14 days before or 14 days after you have used an MAO inhibitor, such as isocarboxazid, linezolid, methylene blue injection, phenelzine, rasagiline, selegiline, or tranylcypromine. Some young people have thoughts about suicide when first taking an antidepressant. Stay alert to changes in your mood or symptoms. Report any new or worsening symptoms to your doctor. What is bupropion? Bupropion is an antidepressant used to treat major depressive disorder and seasonal affective disorder. The Zyban brand of bupropion is used to help people stop smoking by reducing cravings and other withdrawal effects. Bupropion may also be used for purposes not listed in this medication guide. What should I discuss with my healthcare provider before taking bupropion?   You should not take bupropion if you are allergic to it, or if you have:  · a seizure disorder;  · an eating disorder such as anorexia or bulimia; or  · if you that the drug or drug combination is safe, effective or appropriate for any given patient. [facility] does not assume any responsibility for any aspect of healthcare administered with the aid of information [facility] provides. The information contained herein is not intended to cover all possible uses, directions, precautions, warnings, drug interactions, allergic reactions, or adverse effects. If you have questions about the drugs you are taking, check with your doctor, nurse or pharmacist.  Copyright 1048-3888 [address]: 22.01. Revision date: 12/5/2019. Care instructions adapted under license by [facility] ([facility]). If you have questions about a medical condition or this instruction, always ask your healthcare professional. [place] [address] any warranty or liability for your use of this information. Patient Education        Learning About Meal Planning for Diabetes  Why plan your meals? Meal planning can be a key part of managing diabetes. Planning meals and snacks with the right balance of carbohydrate, protein, and fat can help you keep your blood sugar at the target level you set with your doctor. You don't have to eat special foods. You can eat what your family eats, including sweets once in a while. But you do have to pay attention to how often you eat and how much you eat of certain foods. You may want to work with a dietitian or a certified diabetes educator. He or she can give you tips and meal ideas and can answer your questions about meal planning. This health professional can also help you reach a healthy weight if that is one of your goals. What plan is right for you? Your dietitian or diabetes educator may suggest that you start with the plate format or carbohydrate counting. The plate format  The plate format is a simple way to help you manage how you eat. You plan meals by learning how much space each food should take on a plate.  Using the plate format helps you too.  · List foods that you use to make breakfasts, lunches, and snacks. List plenty of fruits and vegetables. · Post this list on the refrigerator. Add to it as you think of more things you need. · Take the list to the store to do your weekly shopping. Follow-up care is a key part of your treatment and safety. Be sure to make and go to all appointments, and call your doctor if you are having problems. It's also a good idea to know your test results and keep a list of the medicines you take. Where can you learn more? Go to https://Clicknationpepiceweb.[email or web address] org and sign in to your mPortico account. Enter N256 in the Zin.gl box to learn more about \"Learning About Meal Planning for Diabetes. \"     If you do not have an account, please click on the \"Sign Up Now\" link. Current as of: December 19, 2019Content Version: 12.4  © 9317-0581 Healthwise, Incorporated. Care instructions adapted under license by [facility] ([facility]). If you have questions about a medical condition or this instruction, always ask your healthcare professional. [place] [address] any warranty or liability for your use of this information.

## 2020-04-15 NOTE — PROGRESS NOTES
TeleMedicine Patient Consent    This visit was performed as a virtual video visit using a synchronous, two-way, audio-video telehealth technology platform. Patient identification was verified at the start of the visit, including the patient's telephone number and physical location. I discussed with the patient the nature of our telehealth visits, that:     1. Due to the nature of an audio- video modality, the only components of a physical exam that could be done are the elements supported by direct observation. 2. I would evaluate the patient and recommend diagnostics and treatments based on my assessment. 3. If it was felt that the patient should be evaluated in clinic or an emergency room setting, then they would be directed there. 4. Our sessions are not being recorded and that personal health information is protected. 5. Our team would provide follow up care in person if/when the patient needs it. Patient does agree to proceed with telemedicine consultation. Patient's location: home address in [facility]  Physician  location home address in [facility] other people involved in call were none. Time spent: Greater than 15    This visit was completed virtually using My Chart/Haiku/Paola Hollingsworth Viet (:  1985) is a 27 y.o. female, requested visit for  the following medical concerns:    HPI  Patient requested video visit due to swelling behind her right ear onset over the last few days. She had communicated with me via my chart her concerns and requested video visit. She has been wearing loop mask when she goes to store or outside her home. She does use gentle soap as she has sensitive skin. She reports she had noticed some pain and \"swelling\" to the skin behind her right ear that met the \"bone in the back of her right ear\" initially. She was unaware of any drainage or known rash. Just that it was feeling sore to touch.  No severe fever, inner ear pain, nasal congestion or significant

## 2020-04-20 ENCOUNTER — TELEPHONE (OUTPATIENT)
Dept: FAMILY MEDICINE CLINIC | Age: 35
End: 2020-04-20

## 2020-04-20 ENCOUNTER — HOSPITAL ENCOUNTER (OUTPATIENT)
Dept: PSYCHIATRY | Age: 35
Discharge: HOME | End: 2020-04-20
Payer: COMMERCIAL

## 2020-04-20 PROCEDURE — 99411 PREVENTIVE COUNSELING GROUP: CPT

## 2020-04-20 RX ORDER — BUPROPION HYDROCHLORIDE 150 MG/1
TABLET, EXTENDED RELEASE ORAL
Qty: 81 TABLET | Refills: 0 | Status: ON HOLD
Start: 2020-04-20 | End: 2020-05-14 | Stop reason: HOSPADM

## 2020-04-28 ENCOUNTER — OFFICE VISIT (OUTPATIENT)
Dept: PRIMARY CARE CLINIC | Age: 35
End: 2020-04-28
Payer: COMMERCIAL

## 2020-04-28 ENCOUNTER — HOSPITAL ENCOUNTER (OUTPATIENT)
Age: 35
Discharge: HOME | End: 2020-04-30
Payer: COMMERCIAL

## 2020-04-28 VITALS
OXYGEN SATURATION: 99 % | TEMPERATURE: 98.6 F | SYSTOLIC BLOOD PRESSURE: 120 MMHG | BODY MASS INDEX: 51.91 KG/M2 | HEART RATE: 106 BPM | RESPIRATION RATE: 18 BRPM | DIASTOLIC BLOOD PRESSURE: 75 MMHG | WEIGHT: 293 LBS | HEIGHT: 63 IN

## 2020-04-28 PROCEDURE — 99213 OFFICE O/P EST LOW 20 MIN: CPT | Performed by: FAMILY MEDICINE

## 2020-04-28 PROCEDURE — 2022F DILAT RTA XM EVC RTNOPTHY: CPT | Performed by: FAMILY MEDICINE

## 2020-04-28 PROCEDURE — G8417 CALC BMI ABV UP PARAM F/U: HCPCS | Performed by: FAMILY MEDICINE

## 2020-04-28 PROCEDURE — U0003 INFECTIOUS AGENT DETECTION BY NUCLEIC ACID (DNA OR RNA); SEVERE ACUTE RESPIRATORY SYNDROME CORONAVIRUS 2 (SARS-COV-2) (CORONAVIRUS DISEASE [COVID-19]), AMPLIFIED PROBE TECHNIQUE, MAKING USE OF HIGH THROUGHPUT TECHNOLOGIES AS DESCRIBED BY CMS-2020-01-R: HCPCS

## 2020-04-28 PROCEDURE — 3044F HG A1C LEVEL LT 7.0%: CPT | Performed by: FAMILY MEDICINE

## 2020-04-28 PROCEDURE — 4004F PT TOBACCO SCREEN RCVD TLK: CPT | Performed by: FAMILY MEDICINE

## 2020-04-28 PROCEDURE — G8427 DOCREV CUR MEDS BY ELIG CLIN: HCPCS | Performed by: FAMILY MEDICINE

## 2020-04-28 RX ORDER — BENZONATATE 200 MG/1
200 CAPSULE ORAL 3 TIMES DAILY PRN
Qty: 21 CAPSULE | Refills: 0 | Status: SHIPPED | OUTPATIENT
Start: 2020-04-28 | End: 2020-05-05

## 2020-04-28 NOTE — PROGRESS NOTES
COVID-19 Ambulatory; Future    Controlled type 2 diabetes mellitus without complication, without long-term current use of insulin ([address]    Other orders  -     benzonatate (TESSALON) 200 MG capsule; Take 1 capsule by mouth 3 times daily as needed for Cough             Andrey Navarrete MD  4/28/20     This visit was provided as a focused evaluation during the COVID -19 pandemic/national emergency. A comprehensive review of all previous patient history and testing was not conducted. Pertinent findings were elicited during the visit. Pt was educated that symptoms may change rapidly and may need re-assessed. Pt was instructed to return to the flu clinic or go to ER if symptoms worsen.

## 2020-04-28 NOTE — PATIENT INSTRUCTIONS
bathroom, if available. Animals: You should restrict contact with pets and other animals while you are sick with COVID-19, just like you would around other people. Although there have not been reports of pets or other animals becoming sick with COVID-19, it is still recommended that people sick with COVID-19 limit contact with animals until more information is known about the virus. When possible, have another member of your household care for your animals while you are sick. If you are sick with COVID-19, avoid contact with your pet, including petting, snuggling, being kissed or licked, and sharing food. If you must care for your pet or be around animals while you are sick, wash your hands before and after you interact with pets and wear a facemask. Call ahead before visiting your doctor  If you have a medical appointment, call the healthcare provider and tell them that you have or may have COVID-19. This will help the healthcare providers office take steps to keep other people from getting infected or exposed. Wear a facemask  You should wear a facemask when you are around other people (e.g., sharing a room or vehicle) or pets and before you enter a healthcare providers office. If you are not able to wear a facemask (for example, because it causes trouble breathing), then people who live with you should not stay in the same room with you, or they should wear a facemask if they enter your room. Cover your coughs and sneezes  Cover your mouth and nose with a tissue when you cough or sneeze. Throw used tissues in a lined trash can. Immediately wash your hands with soap and water for at least 20 seconds or, if soap and water are not available, clean your hands with an alcohol-based hand  that contains at least 60% alcohol.   Clean your hands often  Wash your hands often with soap and water for at least 20 seconds, especially after blowing your nose, coughing, or sneezing; going to the bathroom; and have a medical emergency and need to call 911, notify the dispatch personnel that you have, or are being evaluated for COVID-19. If possible, put on a facemask before emergency medical services arrive. Discontinuing home isolation  Patients with confirmed COVID-19 should remain under home isolation precautions until the risk of secondary transmission to others is thought to be low. The decision to discontinue home isolation precautions should be made on a case-by-case basis, in consultation with healthcare providers and state and local health departments. Follow up with PCP in 10 days.

## 2020-04-29 ENCOUNTER — TELEPHONE (OUTPATIENT)
Dept: ADMINISTRATIVE | Age: 35
End: 2020-04-29

## 2020-04-29 RX ORDER — ALBUTEROL SULFATE 2.5 MG/3ML
2.5 SOLUTION RESPIRATORY (INHALATION) EVERY 6 HOURS PRN
Qty: 120 EACH | Refills: 3 | Status: SHIPPED | OUTPATIENT
Start: 2020-04-29 | End: 2020-04-29

## 2020-04-29 RX ORDER — ALBUTEROL SULFATE 2.5 MG/3ML
2.5 SOLUTION RESPIRATORY (INHALATION) EVERY 6 HOURS PRN
Qty: 120 EACH | Refills: 3 | Status: SHIPPED
Start: 2020-04-29 | End: 2022-09-16 | Stop reason: ALTCHOICE

## 2020-04-29 NOTE — TELEPHONE ENCOUNTER
Lm to call back and schedule a 10 day follow up from [facility] 04/28/20. Can schedule a VV      Patient called back, she is in need of a refill for her nebulizer solution albuterol for her machine. She was seen at the Flu clinic yesterday- 4/28 and had COVID swab, but it is still pending. She reports her asthma is currently flared and she was \"wheezing\" yesterday. She is staying away from her children and family and is scheduled for a video visit this Friday with me per her request.    I will send in her albuterol solution today and she was advised to contact me with any further problems sooner.     Lidya Ford PA-C

## 2020-04-30 LAB
SARS-COV-2: NOT DETECTED
SOURCE: NORMAL

## 2020-05-01 ENCOUNTER — TELEMEDICINE (OUTPATIENT)
Dept: FAMILY MEDICINE CLINIC | Age: 35
End: 2020-05-01
Payer: COMMERCIAL

## 2020-05-01 PROCEDURE — 99214 OFFICE O/P EST MOD 30 MIN: CPT | Performed by: PHYSICIAN ASSISTANT

## 2020-05-01 PROCEDURE — G8428 CUR MEDS NOT DOCUMENT: HCPCS | Performed by: PHYSICIAN ASSISTANT

## 2020-05-01 RX ORDER — LANOLIN ALCOHOL/MO/W.PET/CERES
1000 CREAM (GRAM) TOPICAL DAILY
Qty: 30 TABLET | Refills: 3 | Status: SHIPPED
Start: 2020-05-01 | End: 2021-05-05

## 2020-05-01 RX ORDER — PRASTERONE (DHEA) 50 MG
CAPSULE ORAL
Qty: 30 CAPSULE | Refills: 3 | Status: SHIPPED
Start: 2020-05-01 | End: 2020-05-12 | Stop reason: ALTCHOICE

## 2020-05-01 RX ORDER — MELOXICAM 15 MG/1
15 TABLET ORAL DAILY
Qty: 30 TABLET | Refills: 2 | Status: SHIPPED
Start: 2020-05-01 | End: 2020-09-18

## 2020-05-01 RX ORDER — METHOCARBAMOL 500 MG/1
500 TABLET, FILM COATED ORAL 4 TIMES DAILY
Qty: 60 TABLET | Refills: 0 | Status: SHIPPED
Start: 2020-05-01 | End: 2020-10-22 | Stop reason: ALTCHOICE

## 2020-05-01 RX ORDER — PREGABALIN 75 MG/1
75 CAPSULE ORAL 2 TIMES DAILY
Qty: 60 CAPSULE | Refills: 0 | Status: SHIPPED
Start: 2020-05-01 | End: 2020-09-18

## 2020-05-01 ASSESSMENT — ENCOUNTER SYMPTOMS
SHORTNESS OF BREATH: 0
NAUSEA: 0
EYE PAIN: 0
DIARRHEA: 0
SINUS PRESSURE: 0
BACK PAIN: 1
COUGH: 0
ABDOMINAL PAIN: 0
SORE THROAT: 0
EYE DISCHARGE: 0
VOMITING: 0
EYE REDNESS: 0
CHEST TIGHTNESS: 0

## 2020-05-01 NOTE — PROGRESS NOTES
some time\" and was told she had neuropathy in the past as well, but her diabetes was never addressed. She was recently seen at [facility] and her COVID 19 test was negative and she is aware. She reports that her cough is improving. She is having no fever and otherwise feeling better. She does have a history of bipolar as well and is getting schedule with local psychiatrist Seema Bess as well. She denies any suicidal or homicidal ideations, but hasn't seen psychiatry for some time and wanted to establish. She is doing well with the tobacco program and the Zyban. She is trying to watch her diet with the current COVID pandemic, being stuck in the house and not as active as well. Review of Systems   Constitutional: Positive for fatigue (all the time). Negative for activity change, chills (denies) and fever (denies). HENT: Negative for congestion, ear pain, sinus pressure and sore throat. Eyes: Negative for pain, discharge, redness and visual disturbance. Respiratory: Negative for cough, chest tightness and shortness of breath. Cardiovascular: Negative for chest pain, palpitations and leg swelling. Gastrointestinal: Negative for abdominal pain, diarrhea (denies at current), nausea and vomiting. Endocrine: Negative for polydipsia, polyphagia and polyuria. Genitourinary: Negative for dysuria, flank pain and frequency. Musculoskeletal: Positive for arthralgias, back pain and myalgias. Negative for neck pain and neck stiffness. Body hurts \"all over and her skin hurts to touch as well\". Skin: Negative for rash and wound. Neurological: Positive for headaches (intermittent). Negative for dizziness, weakness and light-headedness. Hematological: Negative for adenopathy. Psychiatric/Behavioral: Positive for sleep disturbance (at times). Negative for hallucinations, self-injury and suicidal ideas. The patient is nervous/anxious (at times). The patient is not hyperactive. All other systems reviewed and are negative. Prior to Visit Medications    Medication Sig Taking? Authorizing Provider   pregabalin (LYRICA) 75 MG capsule Take 1 capsule by mouth 2 times daily for 30 days. Yes Tracy Snyder PA-C   meloxicam (MOBIC) 15 MG tablet Take 1 tablet by mouth daily Yes Tracy Snyder PA-C   DHEA 50 MG CAPS Take 1 tablet by mouth daily Yes Tracy Snyder PA-C   vitamin B-12 (CYANOCOBALAMIN) 1000 MCG tablet Take 1 tablet by mouth daily Yes Tracy Snyder PA-C   methocarbamol (ROBAXIN) 500 MG tablet Take 1 tablet by mouth 4 times daily Yes Tracy Snyder PA-C   albuterol (PROVENTIL) (2.5 MG/3ML) 0.083% nebulizer solution Take 3 mLs by nebulization every 6 hours as needed for Wheezing  Tracy Snyder PA-C   benzonatate (TESSALON) 200 MG capsule Take 1 capsule by mouth 3 times daily as needed for Cough  Roland Smith MD   buPROPion (ZYBAN) 150 MG extended release tablet Take 1 tablet PO in AM for 3 days then increase to BID  Tracy Snyder PA-C   blood glucose monitor kit and supplies Test 3 times a day as needed for symptoms of irregular blood glucose. Tracy Snyder PA-C   Lancets MISC 1 each by Does not apply route 3 times daily  Tracy Snyder PA-C   blood glucose monitor strips Test 3 times a day as needed for symptoms of irregular blood glucose. Tracy Snyder PA-C   ondansetron (ZOFRAN) 4 MG tablet Take 1 tablet by mouth 3 times daily as needed for Nausea or Vomiting  Tracy Snyder PA-C   metFORMIN (GLUCOPHAGE) 500 MG tablet Take 1 tablet by mouth 2 times daily (with meals)  Tracy Snyder PA-C   venlafaxine (EFFEXOR XR) 150 MG extended release capsule Take 1 capsule by mouth daily  Tracy Snyder PA-C   albuterol sulfate HFA (PROVENTIL HFA) 108 (90 Base) MCG/ACT inhaler Inhale 2 puffs into the lungs every 4 hours as needed for Wheezing  Elliot Garcia DO          There were no vitals filed for this visit. - no Preparedness and Response Supplemental Appropriations Act, this Virtual Visit was conducted with patient's (and/or legal guardian's) consent, to reduce the patient's risk of exposure to COVID-19 and provide necessary medical care. The patient (and/or legal guardian) has also been advised to contact this office for worsening conditions or problems, and seek emergency medical treatment and/or call 911 if deemed necessary. Patient identification was verified at the start of the visit: Yes    Total time spent for this encounter: Not billed by time    Services were provided through a video synchronous discussion virtually to substitute for in-person clinic visit. Patient and provider were located at their individual homes. --Zunilda Huertas PA-C on 5/1/2020 at 2:30 PM    An electronic signature was used to authenticate this note.       --Zunilda Huertas PA-C on 5/1/2020 at 2:30 PM

## 2020-05-01 NOTE — PATIENT INSTRUCTIONS
Patient Education        Fibromyalgia: Care Instructions  Your Care Instructions    Fibromyalgia is a painful condition that is not completely understood by medical experts. The cause of fibromyalgia is not known. It can make you feel tired and ache all over. It causes tender spots at specific points of the body that hurt only when you press on them. You may have trouble sleeping, as well as other symptoms. These problems can upset your work and home life. Symptoms tend to come and go, although they may never go away completely. Fibromyalgia does not harm your muscles, joints, or organs. Follow-up care is a key part of your treatment and safety. Be sure to make and go to all appointments, and call your doctor if you are having problems. It's also a good idea to know your test results and keep a list of the medicines you take. How can you care for yourself at home? · Exercise often. Walk, swim, or bike to help with pain and sleep problems and to make you feel better. · Try to get a good night's sleep. Go to bed and get up at the same time each day, whether you feel rested or not. Make sure you have a good mattress and pillow. · Reduce stress. Avoid things that cause you stress, if you can. If not, work at making them less stressful. Learn to use biofeedback, guided imagery, meditation, or other methods to relax. · Make healthy changes. Eat a balanced diet, quit smoking, and limit alcohol and caffeine. · Use a heating pad set on low or take warm baths or showers for pain. Using cold packs for up to 20 minutes at a time can also relieve pain. Put a thin cloth between the cold pack and your skin. A gentle massage might help too. · Be safe with medicines. Take your medicines exactly as prescribed. Call your doctor if you think you are having a problem with your medicine. Your doctor may talk to you about taking antidepressant medicines.  These medicines may improve sleep, relieve pain, and in some cases treat depression. · Learn about fibromyalgia. This makes coping easier. Then, take an active role in your treatment. · Think about joining a support group with others who have fibromyalgia to learn more and get support. When should you call for help? Watch closely for changes in your health, and be sure to contact your doctor if:    · You feel sad, helpless, or hopeless; lose interest in things you used to enjoy; or have other symptoms of depression.     · Your fibromyalgia symptoms get worse. Where can you learn more? Go to https://Diversity Marketplacemicky.Termii webtech limited. org and sign in to your Simio account. Enter V003 in the Seratis box to learn more about \"Fibromyalgia: Care Instructions. \"     If you do not have an account, please click on the \"Sign Up Now\" link. Current as of: November 19, 2019Content Version: 12.4  © 4544-7659 [employer]int. Care instructions adapted under license by [employer]. If you have questions about a medical condition or this instruction, always ask your healthcare professional. [place] [address] any warranty or liability for your use of this information. Patient Education        Vitamin B12 Deficiency: Care Instructions  Overview    A vitamin B12 deficiency means that your body doesn't have enough of this vitamin. You need vitamin B12 to keep red blood cells and nerve cells healthy. Not enough B12 can cause anemia. It can also damage nerves and cause trouble with memory and thinking. Many things can cause low levels of vitamin B12. They include:  · Not getting enough of this vitamin through food. · An autoimmune problem, like pernicious anemia. · Weight-loss surgery, like gastric bypass. · Long-term use of heartburn medicines. Low levels of B12 may not cause symptoms. But symptoms may include fatigue, depression, and thinking or memory problems. You may have tingling in your hands or feet and changes in the way you walk.   Treatment depends on the reason for low vitamin B12. Eating more foods rich in B12 may be enough. Or you might take the vitamin as a pill, as shots, or as nasal spray. How can you care for yourself? · Take vitamin B12 as your doctor recommends. · Go to your appointments if you are getting B12 shots. · Eat more foods rich in vitamin B12. Examples are:  ? Animal products. These include meat, seafood, milk products, poultry, and eggs. ? Foods that have B12 added. These are called fortified foods. They include soy products, nutritional yeast, and dry cereals. · Work with a nutritionist or dietitian if you need help getting more vitamin B12 from food. · Talk to your doctor about stopping medicines if they are adding to your B12 deficiency. When should you call for help? Call 911 anytime you think you may need emergency care. For example, call if:    · You passed out (lost consciousness).    Call your doctor now or seek immediate medical care if:    · You are dizzy or lightheaded, or you feel like you may faint.    Watch closely for changes in your health. Be sure to call your doctor if:    · You are confused or can't think clearly.     · You don't get better as expected. Where can you learn more? Go to https://S[employer]C [employer] Systems.Empower Microsystems. org and sign in to your Zaplee account. Enter [phone] in the [facility] box to learn more about \"Vitamin B12 Deficiency: Care Instructions. \"     If you do not have an account, please click on the \"Sign Up Now\" link. Current as of: November 7, 2019Content Version: 12.4  © 0355-4401 Healthwise, Incorporated. Care instructions adapted under license by [facility] ([facility]). If you have questions about a medical condition or this instruction, always ask your healthcare professional. [place] [address] any warranty or liability for your use of this information.          Patient Education        Musculoskeletal Pain: Care Instructions  Your Care Instructions    Different problems with the bones, muscles, nerves, ligaments, and tendons in the body can cause pain. One or more areas of your body may ache or burn. Or they may feel tired, stiff, or sore. The medical term for this type of pain is musculoskeletal pain. It can have many different causes. Sometimes the pain is caused by an injury such as a strain or sprain. Or you might have pain from using one part of your body in the same way over and over again. This is called overuse. In some cases, the cause of the pain is another health problem such as arthritis or fibromyalgia. The doctor will examine you and ask you questions about your health to help find the cause of your pain. Blood tests or imaging tests like an X-ray may also be helpful. But sometimes doctors can't find a cause of the pain. Treatment depends on your symptoms and the cause of the pain, if known. The doctor has checked you carefully, but problems can develop later. If you notice any problems or new symptoms, get medical treatment right away. Follow-up care is a key part of your treatment and safety. Be sure to make and go to all appointments, and call your doctor if you are having problems. It's also a good idea to know your test results and keep a list of the medicines you take. How can you care for yourself at home? · Rest until you feel better. · Do not do anything that makes the pain worse. Return to exercise gradually if you feel better and your doctor says it's okay. · Be safe with medicines. Read and follow all instructions on the label. ? If the doctor gave you a prescription medicine for pain, take it as prescribed. ? If you are not taking a prescription pain medicine, ask your doctor if you can take an over-the-counter medicine. · Put ice or a cold pack on the area for 10 to 20 minutes at a time to ease pain. Put a thin cloth between the ice and your skin. When should you call for help?   Call your doctor now or seek immediate medical care if:    · You have new pain, or your pain gets worse.     · You have new symptoms such as a fever, a rash, or chills.    Watch closely for changes in your health, and be sure to contact your doctor if:    · You do not get better as expected. Where can you learn more? Go to https://chpepiceweb.WhoJam. org and sign in to your [employer] Technologies account. Enter G444 in the [employer] box to learn more about \"Musculoskeletal Pain: Care Instructions. \"     If you do not have an account, please click on the \"Sign Up Now\" link. Current as of: November 19, 2019Content Version: 12.4  © 6727-1228 Healthwise, Incorporated. Care instructions adapted under license by [facility] ([facility]). If you have questions about a medical condition or this instruction, always ask your healthcare professional. [place] [address] any warranty or liability for your use of this information. Patient Education        Weakness: Care Instructions  Your Care Instructions    Weakness is a lack of physical or muscle strength. You may feel that you need to make extra effort to move your arms, legs, or other muscles. Generalized weakness means that you feel weak in most areas of your body. Another type of weakness may affect just one muscle or group of muscles. You may feel weak and tired after you have done too much activity, such as taking an extra-long hike. This is not a serious problem. It often goes away on its own. Feeling weak can also be caused by medical conditions like thyroid problems, depression, or a virus. Sometimes the cause can be serious. Your doctor may want to do more tests to try to find the cause of the weakness. The doctor has checked you carefully, but problems can develop later. If you notice any problems or new symptoms, get medical treatment right away. Follow-up care is a key part of your treatment and safety.  Be sure to make and go to all appointments, and call your doctor if you are having or react quickly. Stress also can last a long time. Long-term stress is caused by stressful situations or events. Examples of long-term stress include long-term health problems, ongoing problems at work, and conflicts in your family. Long-term stress can harm your health. When you have anxiety or stress in your life, one of the ways your body responds is with muscle tension. Progressive muscle relaxation is a method that helps relieve that tension. How does progressive muscle relaxation reduce stress? The body responds to stress with muscle tension, which can cause pain or discomfort. In turn, tense muscles relay to the body that it's stressed. That keeps the cycle of stress and muscle tension going. Progressive muscle relaxation helps break this cycle by reducing muscle tension and general mental anxiety. This method often helps people get to sleep. How do you do progressive muscle relaxation? To do progressive muscle relaxation, first you tense a group of muscles as you breathe in. Then you relax them as you breathe out. Notice how your muscles feel when you relax them. You work on your muscle groups in a certain order. Through practice, you can learn to feel the difference between a tensed muscle and a relaxed muscle. Then you can learn how to turn on this relaxed state at the first sign of a muscle tensing up due to stress. Practicing this method for a few weeks will help you get better at this skill. In time you'll be able to use this method to relieve stress. When you first start, it may help to use an audio recording until you learn all the muscle groups in order. Check online for these recordings. Choose a place where you won't be interrupted. It should have space for you to lie down on your back and stretch out comfortably, such as on a carpeted floor. Follow-up care is a key part of your treatment and safety.  Be sure to make and go to all appointments, and call your doctor if you are having problems. It's also a good idea to know your test results and keep a list of the medicines you take. Where can you learn more? Go to https://chpeedeneweb.STI [email or web address] org and sign in to your ParkAround account. Enter X384 in the [employer]â„¢ box to learn more about \"Learning About Progressive Muscle Relaxation for Stress. \"     If you do not have an account, please click on the \"Sign Up Now\" link. Current as of: December 15, 2019Content Version: 12.4  © 2938-5902 Healthwise, Incorporated. Care instructions adapted under license by [facility] ([facility]). If you have questions about a medical condition or this instruction, always ask your healthcare professional. [address] any warranty or liability for your use of this information.

## 2020-05-02 ENCOUNTER — CLINICAL DOCUMENTATION (OUTPATIENT)
Dept: FAMILY MEDICINE CLINIC | Age: 35
End: 2020-05-02

## 2020-05-11 ENCOUNTER — TELEPHONE (OUTPATIENT)
Dept: FAMILY MEDICINE CLINIC | Age: 35
End: 2020-05-11

## 2020-05-11 RX ORDER — ASPIRIN 81 MG/1
81 TABLET ORAL DAILY
Qty: 30 TABLET | Refills: 1 | Status: SHIPPED
Start: 2020-05-11 | End: 2020-09-14

## 2020-05-12 ENCOUNTER — HOSPITAL ENCOUNTER (OUTPATIENT)
Age: 35
Setting detail: OBSERVATION
Discharge: HOME | End: 2020-05-14
Attending: EMERGENCY MEDICINE | Admitting: INTERNAL MEDICINE
Payer: COMMERCIAL

## 2020-05-12 ENCOUNTER — APPOINTMENT (OUTPATIENT)
Dept: GENERAL RADIOLOGY | Age: 35
End: 2020-05-12
Payer: COMMERCIAL

## 2020-05-12 ENCOUNTER — TELEPHONE (OUTPATIENT)
Dept: CARDIOLOGY CLINIC | Age: 35
End: 2020-05-12

## 2020-05-12 PROBLEM — R07.9 CHEST PAIN: Status: ACTIVE | Noted: 2020-05-12

## 2020-05-12 LAB
ALBUMIN SERPL-MCNC: 3.9 G/DL (ref 3.5–5.2)
ALP BLD-CCNC: 93 U/L (ref 35–104)
ALT SERPL-CCNC: 16 U/L (ref 0–32)
ANION GAP SERPL CALCULATED.3IONS-SCNC: 12 MMOL/L (ref 7–16)
APTT: 32.2 SEC (ref 24.5–35.1)
AST SERPL-CCNC: 15 U/L (ref 0–31)
BASOPHILS ABSOLUTE: 0.07 E9/L (ref 0–0.2)
BASOPHILS RELATIVE PERCENT: 0.6 % (ref 0–2)
BILIRUB SERPL-MCNC: <0.2 MG/DL (ref 0–1.2)
BUN BLDV-MCNC: 9 MG/DL (ref 6–20)
CALCIUM SERPL-MCNC: 9 MG/DL (ref 8.6–10.2)
CHLORIDE BLD-SCNC: 103 MMOL/L (ref 98–107)
CO2: 26 MMOL/L (ref 22–29)
CREAT SERPL-MCNC: 0.6 MG/DL (ref 0.5–1)
EOSINOPHILS ABSOLUTE: 0.39 E9/L (ref 0.05–0.5)
EOSINOPHILS RELATIVE PERCENT: 3.5 % (ref 0–6)
GFR AFRICAN AMERICAN: >60
GFR NON-AFRICAN AMERICAN: >60 ML/MIN/1.73
GLUCOSE BLD-MCNC: 113 MG/DL (ref 74–99)
HCT VFR BLD CALC: 38.5 % (ref 34–48)
HEMOGLOBIN: 12.5 G/DL (ref 11.5–15.5)
IMMATURE GRANULOCYTES #: 0.04 E9/L
IMMATURE GRANULOCYTES %: 0.4 % (ref 0–5)
INR BLD: 0.9
LYMPHOCYTES ABSOLUTE: 3.3 E9/L (ref 1.5–4)
LYMPHOCYTES RELATIVE PERCENT: 29.5 % (ref 20–42)
MCH RBC QN AUTO: 29.5 PG (ref 26–35)
MCHC RBC AUTO-ENTMCNC: 32.5 % (ref 32–34.5)
MCV RBC AUTO: 90.8 FL (ref 80–99.9)
MONOCYTES ABSOLUTE: 0.85 E9/L (ref 0.1–0.95)
MONOCYTES RELATIVE PERCENT: 7.6 % (ref 2–12)
NEUTROPHILS ABSOLUTE: 6.55 E9/L (ref 1.8–7.3)
NEUTROPHILS RELATIVE PERCENT: 58.4 % (ref 43–80)
PDW BLD-RTO: 13.8 FL (ref 11.5–15)
PLATELET # BLD: 234 E9/L (ref 130–450)
PMV BLD AUTO: 11 FL (ref 7–12)
POTASSIUM SERPL-SCNC: 3.9 MMOL/L (ref 3.5–5)
PROTHROMBIN TIME: 10.4 SEC (ref 9.3–12.4)
RBC # BLD: 4.24 E12/L (ref 3.5–5.5)
SODIUM BLD-SCNC: 141 MMOL/L (ref 132–146)
TOTAL PROTEIN: 7 G/DL (ref 6.4–8.3)
TROPONIN: <0.01 NG/ML (ref 0–0.03)
WBC # BLD: 11.2 E9/L (ref 4.5–11.5)

## 2020-05-12 PROCEDURE — 71045 X-RAY EXAM CHEST 1 VIEW: CPT

## 2020-05-12 PROCEDURE — 36415 COLL VENOUS BLD VENIPUNCTURE: CPT

## 2020-05-12 PROCEDURE — 99285 EMERGENCY DEPT VISIT HI MDM: CPT

## 2020-05-12 PROCEDURE — 96374 THER/PROPH/DIAG INJ IV PUSH: CPT

## 2020-05-12 PROCEDURE — 6370000000 HC RX 637 (ALT 250 FOR IP): Performed by: INTERNAL MEDICINE

## 2020-05-12 PROCEDURE — 2500000003 HC RX 250 WO HCPCS: Performed by: EMERGENCY MEDICINE

## 2020-05-12 PROCEDURE — 93005 ELECTROCARDIOGRAM TRACING: CPT | Performed by: EMERGENCY MEDICINE

## 2020-05-12 PROCEDURE — 80053 COMPREHEN METABOLIC PANEL: CPT

## 2020-05-12 PROCEDURE — 84484 ASSAY OF TROPONIN QUANT: CPT

## 2020-05-12 PROCEDURE — 6370000000 HC RX 637 (ALT 250 FOR IP): Performed by: EMERGENCY MEDICINE

## 2020-05-12 PROCEDURE — 85610 PROTHROMBIN TIME: CPT

## 2020-05-12 PROCEDURE — 85025 COMPLETE CBC W/AUTO DIFF WBC: CPT

## 2020-05-12 PROCEDURE — G0378 HOSPITAL OBSERVATION PER HR: HCPCS

## 2020-05-12 PROCEDURE — 85730 THROMBOPLASTIN TIME PARTIAL: CPT

## 2020-05-12 PROCEDURE — 2580000003 HC RX 258: Performed by: INTERNAL MEDICINE

## 2020-05-12 PROCEDURE — 82962 GLUCOSE BLOOD TEST: CPT

## 2020-05-12 RX ORDER — LABETALOL HYDROCHLORIDE 5 MG/ML
5 INJECTION, SOLUTION INTRAVENOUS ONCE
Status: COMPLETED | OUTPATIENT
Start: 2020-05-12 | End: 2020-05-12

## 2020-05-12 RX ORDER — CHOLECALCIFEROL (VITAMIN D3) 125 MCG
5 CAPSULE ORAL NIGHTLY PRN
Status: DISCONTINUED | OUTPATIENT
Start: 2020-05-13 | End: 2020-05-13

## 2020-05-12 RX ORDER — NITROGLYCERIN 0.4 MG/1
0.4 TABLET SUBLINGUAL EVERY 5 MIN PRN
Status: DISCONTINUED | OUTPATIENT
Start: 2020-05-12 | End: 2020-05-14 | Stop reason: HOSPADM

## 2020-05-12 RX ORDER — ASPIRIN 81 MG/1
324 TABLET, CHEWABLE ORAL ONCE
Status: COMPLETED | OUTPATIENT
Start: 2020-05-12 | End: 2020-05-12

## 2020-05-12 RX ORDER — DEXTROSE MONOHYDRATE 25 G/50ML
12.5 INJECTION, SOLUTION INTRAVENOUS PRN
Status: DISCONTINUED | OUTPATIENT
Start: 2020-05-12 | End: 2020-05-14 | Stop reason: HOSPADM

## 2020-05-12 RX ORDER — DEXTROSE MONOHYDRATE 50 MG/ML
100 INJECTION, SOLUTION INTRAVENOUS PRN
Status: DISCONTINUED | OUTPATIENT
Start: 2020-05-12 | End: 2020-05-14 | Stop reason: HOSPADM

## 2020-05-12 RX ORDER — LANOLIN ALCOHOL/MO/W.PET/CERES
1000 CREAM (GRAM) TOPICAL DAILY
Status: DISCONTINUED | OUTPATIENT
Start: 2020-05-13 | End: 2020-05-14 | Stop reason: HOSPADM

## 2020-05-12 RX ORDER — NICOTINE POLACRILEX 4 MG
15 LOZENGE BUCCAL PRN
Status: DISCONTINUED | OUTPATIENT
Start: 2020-05-12 | End: 2020-05-14 | Stop reason: HOSPADM

## 2020-05-12 RX ORDER — ASPIRIN 81 MG/1
81 TABLET, CHEWABLE ORAL DAILY
Status: DISCONTINUED | OUTPATIENT
Start: 2020-05-13 | End: 2020-05-14 | Stop reason: HOSPADM

## 2020-05-12 RX ORDER — ACETAMINOPHEN 325 MG/1
650 TABLET ORAL EVERY 6 HOURS PRN
Status: DISCONTINUED | OUTPATIENT
Start: 2020-05-12 | End: 2020-05-14 | Stop reason: HOSPADM

## 2020-05-12 RX ORDER — VENLAFAXINE HYDROCHLORIDE 150 MG/1
150 CAPSULE, EXTENDED RELEASE ORAL DAILY
Status: DISCONTINUED | OUTPATIENT
Start: 2020-05-13 | End: 2020-05-14 | Stop reason: HOSPADM

## 2020-05-12 RX ORDER — ACETAMINOPHEN 650 MG/1
650 SUPPOSITORY RECTAL EVERY 6 HOURS PRN
Status: DISCONTINUED | OUTPATIENT
Start: 2020-05-12 | End: 2020-05-14 | Stop reason: HOSPADM

## 2020-05-12 RX ORDER — ATORVASTATIN CALCIUM 40 MG/1
40 TABLET, FILM COATED ORAL NIGHTLY
Status: DISCONTINUED | OUTPATIENT
Start: 2020-05-12 | End: 2020-05-14 | Stop reason: HOSPADM

## 2020-05-12 RX ORDER — SODIUM CHLORIDE 0.9 % (FLUSH) 0.9 %
10 SYRINGE (ML) INJECTION EVERY 12 HOURS SCHEDULED
Status: DISCONTINUED | OUTPATIENT
Start: 2020-05-12 | End: 2020-05-14 | Stop reason: HOSPADM

## 2020-05-12 RX ORDER — SODIUM CHLORIDE 0.9 % (FLUSH) 0.9 %
10 SYRINGE (ML) INJECTION PRN
Status: DISCONTINUED | OUTPATIENT
Start: 2020-05-12 | End: 2020-05-14 | Stop reason: HOSPADM

## 2020-05-12 RX ORDER — ALBUTEROL SULFATE 2.5 MG/3ML
2.5 SOLUTION RESPIRATORY (INHALATION) EVERY 6 HOURS PRN
Status: DISCONTINUED | OUTPATIENT
Start: 2020-05-12 | End: 2020-05-14 | Stop reason: HOSPADM

## 2020-05-12 RX ORDER — NITROGLYCERIN 0.4 MG/1
0.4 TABLET SUBLINGUAL ONCE
Status: COMPLETED | OUTPATIENT
Start: 2020-05-12 | End: 2020-05-12

## 2020-05-12 RX ORDER — BUPROPION HYDROCHLORIDE 150 MG/1
150 TABLET, EXTENDED RELEASE ORAL DAILY
Status: DISCONTINUED | OUTPATIENT
Start: 2020-05-13 | End: 2020-05-13 | Stop reason: SDUPTHER

## 2020-05-12 RX ADMIN — ATORVASTATIN CALCIUM 40 MG: 40 TABLET, FILM COATED ORAL at 23:45

## 2020-05-12 RX ADMIN — SODIUM CHLORIDE, PRESERVATIVE FREE 10 ML: 5 INJECTION INTRAVENOUS at 23:46

## 2020-05-12 RX ADMIN — ASPIRIN 81 MG 324 MG: 81 TABLET ORAL at 20:09

## 2020-05-12 RX ADMIN — PREGABALIN 75 MG: 50 CAPSULE ORAL at 23:45

## 2020-05-12 RX ADMIN — NITROGLYCERIN 0.4 MG: 0.4 TABLET, ORALLY DISINTEGRATING SUBLINGUAL at 20:09

## 2020-05-12 RX ADMIN — LABETALOL HYDROCHLORIDE 5 MG: 5 INJECTION INTRAVENOUS at 21:07

## 2020-05-12 ASSESSMENT — PAIN DESCRIPTION - LOCATION
LOCATION: CHEST
LOCATION: SHOULDER

## 2020-05-12 ASSESSMENT — PAIN SCALES - GENERAL
PAINLEVEL_OUTOF10: 6
PAINLEVEL_OUTOF10: 3
PAINLEVEL_OUTOF10: 0
PAINLEVEL_OUTOF10: 0

## 2020-05-12 ASSESSMENT — PAIN DESCRIPTION - ONSET
ONSET: ON-GOING
ONSET: ON-GOING

## 2020-05-12 ASSESSMENT — PAIN DESCRIPTION - PROGRESSION
CLINICAL_PROGRESSION: GRADUALLY WORSENING
CLINICAL_PROGRESSION: NOT CHANGED

## 2020-05-12 ASSESSMENT — PAIN DESCRIPTION - DESCRIPTORS
DESCRIPTORS: ACHING
DESCRIPTORS: TIGHTNESS;SHARP

## 2020-05-12 ASSESSMENT — PAIN DESCRIPTION - PAIN TYPE
TYPE: ACUTE PAIN
TYPE: ACUTE PAIN

## 2020-05-12 ASSESSMENT — PAIN DESCRIPTION - FREQUENCY
FREQUENCY: INTERMITTENT
FREQUENCY: CONTINUOUS

## 2020-05-12 ASSESSMENT — PAIN DESCRIPTION - ORIENTATION
ORIENTATION: LEFT
ORIENTATION: LEFT

## 2020-05-12 NOTE — ED PROVIDER NOTES
hcl]; Keflex [cephalexin];  Neurontin [gabapentin]; and Lamictal [lamotrigine]    -------------------------------------------------- RESULTS -------------------------------------------------  All laboratory and radiology results have been personally reviewed by myself   LABS:  Results for orders placed or performed during the hospital encounter of 05/12/20   Comprehensive Metabolic Panel   Result Value Ref Range    Sodium 141 132 - 146 mmol/L    Potassium 3.9 3.5 - 5.0 mmol/L    Chloride 103 98 - 107 mmol/L    CO2 26 22 - 29 mmol/L    Anion Gap 12 7 - 16 mmol/L    Glucose 113 (H) 74 - 99 mg/dL    BUN 9 6 - 20 mg/dL    CREATININE 0.6 0.5 - 1.0 mg/dL    GFR Non-African American >60 >=60 mL/min/1.73    GFR African American >60     Calcium 9.0 8.6 - 10.2 mg/dL    Total Protein 7.0 6.4 - 8.3 g/dL    Alb 3.9 3.5 - 5.2 g/dL    Total Bilirubin <0.2 0.0 - 1.2 mg/dL    Alkaline Phosphatase 93 35 - 104 U/L    ALT 16 0 - 32 U/L    AST 15 0 - 31 U/L   CBC Auto Differential   Result Value Ref Range    WBC 11.2 4.5 - 11.5 E9/L    RBC 4.24 3.50 - 5.50 E12/L    Hemoglobin 12.5 11.5 - 15.5 g/dL    Hematocrit 38.5 34.0 - 48.0 %    MCV 90.8 80.0 - 99.9 fL    MCH 29.5 26.0 - 35.0 pg    MCHC 32.5 32.0 - 34.5 %    RDW 13.8 11.5 - 15.0 fL    Platelets 699 502 - 850 E9/L    MPV 11.0 7.0 - 12.0 fL    Neutrophils % 58.4 43.0 - 80.0 %    Immature Granulocytes % 0.4 0.0 - 5.0 %    Lymphocytes % 29.5 20.0 - 42.0 %    Monocytes % 7.6 2.0 - 12.0 %    Eosinophils % 3.5 0.0 - 6.0 %    Basophils % 0.6 0.0 - 2.0 %    Neutrophils Absolute 6.55 1.80 - 7.30 E9/L    Immature Granulocytes # 0.04 E9/L    Lymphocytes Absolute 3.30 1.50 - 4.00 E9/L    Monocytes Absolute 0.85 0.10 - 0.95 E9/L    Eosinophils Absolute 0.39 0.05 - 0.50 E9/L    Basophils Absolute 0.07 0.00 - 0.20 E9/L   APTT   Result Value Ref Range    aPTT 32.2 24.5 - 35.1 sec   Protime-INR   Result Value Ref Range    Protime 10.4 9.3 - 12.4 sec    INR 0.9    Troponin   Result Value Ref Range

## 2020-05-13 ENCOUNTER — APPOINTMENT (OUTPATIENT)
Dept: NON INVASIVE DIAGNOSTICS | Age: 35
End: 2020-05-13
Payer: COMMERCIAL

## 2020-05-13 LAB
AMPHETAMINE SCREEN, URINE: NOT DETECTED
BARBITURATE SCREEN URINE: NOT DETECTED
BENZODIAZEPINE SCREEN, URINE: NOT DETECTED
CANNABINOID SCREEN URINE: NOT DETECTED
COCAINE METABOLITE SCREEN URINE: NOT DETECTED
D DIMER: <200 NG/ML DDU
EKG ATRIAL RATE: 103 BPM
EKG ATRIAL RATE: 89 BPM
EKG P AXIS: 44 DEGREES
EKG P AXIS: 46 DEGREES
EKG P-R INTERVAL: 144 MS
EKG P-R INTERVAL: 158 MS
EKG Q-T INTERVAL: 338 MS
EKG Q-T INTERVAL: 376 MS
EKG QRS DURATION: 74 MS
EKG QRS DURATION: 76 MS
EKG QTC CALCULATION (BAZETT): 442 MS
EKG QTC CALCULATION (BAZETT): 457 MS
EKG R AXIS: 17 DEGREES
EKG R AXIS: 22 DEGREES
EKG T AXIS: 24 DEGREES
EKG T AXIS: 26 DEGREES
EKG VENTRICULAR RATE: 103 BPM
EKG VENTRICULAR RATE: 89 BPM
FENTANYL SCREEN, URINE: NOT DETECTED
HCT VFR BLD CALC: 36.8 % (ref 34–48)
HEMOGLOBIN: 11.5 G/DL (ref 11.5–15.5)
Lab: NORMAL
MCH RBC QN AUTO: 28.7 PG (ref 26–35)
MCHC RBC AUTO-ENTMCNC: 31.3 % (ref 32–34.5)
MCV RBC AUTO: 91.8 FL (ref 80–99.9)
METER GLUCOSE: 108 MG/DL (ref 74–99)
METER GLUCOSE: 113 MG/DL (ref 74–99)
METER GLUCOSE: 114 MG/DL (ref 74–99)
METER GLUCOSE: 130 MG/DL (ref 74–99)
METER GLUCOSE: 193 MG/DL (ref 74–99)
METHADONE SCREEN, URINE: NOT DETECTED
OPIATE SCREEN URINE: NOT DETECTED
OXYCODONE URINE: NOT DETECTED
PDW BLD-RTO: 13.7 FL (ref 11.5–15)
PHENCYCLIDINE SCREEN URINE: NOT DETECTED
PLATELET # BLD: 233 E9/L (ref 130–450)
PMV BLD AUTO: 11.6 FL (ref 7–12)
RBC # BLD: 4.01 E12/L (ref 3.5–5.5)
TROPONIN: <0.01 NG/ML (ref 0–0.03)
TROPONIN: <0.01 NG/ML (ref 0–0.03)
WBC # BLD: 9.2 E9/L (ref 4.5–11.5)

## 2020-05-13 PROCEDURE — 82962 GLUCOSE BLOOD TEST: CPT

## 2020-05-13 PROCEDURE — 93018 CV STRESS TEST I&R ONLY: CPT | Performed by: INTERNAL MEDICINE

## 2020-05-13 PROCEDURE — 85027 COMPLETE CBC AUTOMATED: CPT

## 2020-05-13 PROCEDURE — 84484 ASSAY OF TROPONIN QUANT: CPT

## 2020-05-13 PROCEDURE — 93016 CV STRESS TEST SUPVJ ONLY: CPT | Performed by: INTERNAL MEDICINE

## 2020-05-13 PROCEDURE — G0378 HOSPITAL OBSERVATION PER HR: HCPCS

## 2020-05-13 PROCEDURE — 93017 CV STRESS TEST TRACING ONLY: CPT

## 2020-05-13 PROCEDURE — 6370000000 HC RX 637 (ALT 250 FOR IP): Performed by: INTERNAL MEDICINE

## 2020-05-13 PROCEDURE — 85378 FIBRIN DEGRADE SEMIQUANT: CPT

## 2020-05-13 PROCEDURE — APPSS60 APP SPLIT SHARED TIME 46-60 MINUTES: Performed by: NURSE PRACTITIONER

## 2020-05-13 PROCEDURE — 93010 ELECTROCARDIOGRAM REPORT: CPT | Performed by: INTERNAL MEDICINE

## 2020-05-13 PROCEDURE — 80307 DRUG TEST PRSMV CHEM ANLYZR: CPT

## 2020-05-13 PROCEDURE — 36415 COLL VENOUS BLD VENIPUNCTURE: CPT

## 2020-05-13 PROCEDURE — 93005 ELECTROCARDIOGRAM TRACING: CPT | Performed by: INTERNAL MEDICINE

## 2020-05-13 PROCEDURE — 99204 OFFICE O/P NEW MOD 45 MIN: CPT | Performed by: INTERNAL MEDICINE

## 2020-05-13 PROCEDURE — 2580000003 HC RX 258: Performed by: INTERNAL MEDICINE

## 2020-05-13 RX ORDER — CHOLECALCIFEROL (VITAMIN D3) 125 MCG
5 CAPSULE ORAL NIGHTLY PRN
Status: DISCONTINUED | OUTPATIENT
Start: 2020-05-13 | End: 2020-05-14 | Stop reason: HOSPADM

## 2020-05-13 RX ORDER — BUPROPION HYDROCHLORIDE 150 MG/1
150 TABLET, EXTENDED RELEASE ORAL DAILY
Status: DISCONTINUED | OUTPATIENT
Start: 2020-05-13 | End: 2020-05-14 | Stop reason: HOSPADM

## 2020-05-13 RX ADMIN — VENLAFAXINE HYDROCHLORIDE 150 MG: 150 CAPSULE, EXTENDED RELEASE ORAL at 13:17

## 2020-05-13 RX ADMIN — ATORVASTATIN CALCIUM 40 MG: 40 TABLET, FILM COATED ORAL at 21:14

## 2020-05-13 RX ADMIN — Medication 1000 MCG: at 13:17

## 2020-05-13 RX ADMIN — Medication 5 MG: at 00:20

## 2020-05-13 RX ADMIN — SODIUM CHLORIDE, PRESERVATIVE FREE 10 ML: 5 INJECTION INTRAVENOUS at 21:16

## 2020-05-13 RX ADMIN — BUPROPION HYDROCHLORIDE 150 MG: 150 TABLET, EXTENDED RELEASE ORAL at 13:20

## 2020-05-13 RX ADMIN — PREGABALIN 75 MG: 50 CAPSULE ORAL at 13:17

## 2020-05-13 RX ADMIN — SODIUM CHLORIDE, PRESERVATIVE FREE 10 ML: 5 INJECTION INTRAVENOUS at 13:18

## 2020-05-13 RX ADMIN — ASPIRIN 81 MG 81 MG: 81 TABLET ORAL at 13:17

## 2020-05-13 RX ADMIN — PREGABALIN 75 MG: 50 CAPSULE ORAL at 21:16

## 2020-05-13 RX ADMIN — Medication 5 MG: at 21:14

## 2020-05-13 ASSESSMENT — PAIN DESCRIPTION - PROGRESSION
CLINICAL_PROGRESSION: NOT CHANGED
CLINICAL_PROGRESSION: NOT CHANGED

## 2020-05-13 ASSESSMENT — PAIN - FUNCTIONAL ASSESSMENT: PAIN_FUNCTIONAL_ASSESSMENT: ACTIVITIES ARE NOT PREVENTED

## 2020-05-13 ASSESSMENT — PAIN DESCRIPTION - ONSET: ONSET: ON-GOING

## 2020-05-13 ASSESSMENT — PAIN DESCRIPTION - LOCATION: LOCATION: CHEST

## 2020-05-13 ASSESSMENT — PAIN SCALES - GENERAL
PAINLEVEL_OUTOF10: 3
PAINLEVEL_OUTOF10: 0

## 2020-05-13 ASSESSMENT — PAIN DESCRIPTION - ORIENTATION: ORIENTATION: MID

## 2020-05-13 ASSESSMENT — PAIN DESCRIPTION - PAIN TYPE: TYPE: ACUTE PAIN

## 2020-05-13 ASSESSMENT — PAIN DESCRIPTION - FREQUENCY: FREQUENCY: INTERMITTENT

## 2020-05-13 ASSESSMENT — PAIN DESCRIPTION - DESCRIPTORS: DESCRIPTORS: PRESSURE

## 2020-05-13 NOTE — CONSULTS
smoking cessation program 3 weeks ago and has cut down from smoking 4 PPD to 4 cigarettes/day. She started on Lyrica 2 weeks ago along with Wellbutrin and denies any side effects. On arrival to the ED: Blood pressure 138/84, heart rate 105, afebrile, 99% on room air. Labs: Sodium 141, potassium 3.9, CO2 26, BUN 9, creatinine 0.6, urine tox negative, troponin <0.01 x 3, WBC 11.2, H/H stable, platelet count 397. Chest x-ray: No acute cardiopulmonary pathology. ER medications: Aspirin 324 mg, labetalol 5 mg IV. EKG: Sinus tachycardia, rate 103 bpm, QT/QTc 338/442 ms. Patient was admitted to a telemetry monitored unit. She was ordered a exercise nonnuclear stress test per primary. Stress test results showing: Exercise time: 137, METs:4.5, MPHR: 74%, Duke treadmill score: 1, no EKG changes, no chest pain/she had fatigue and dyspnea. She developed chest pain during recovery and felt like a brick was sitting on her chest.  EKG was repeated showing normal sinus rhythm without any ST to T wave changes, normal EKG. Cardiology was asked to see the patient for chest pain. She is currently sitting up in bed and appears to be in no acute distress. She denies any reoccurring chest pain or shortness of breath overnight. VSS. Telemetry monitor showing sinus rhythm with no tachy/moira arrhythmias. Please note: past medical records were reviewed per electronic medical record (EMR) - see detailed reports under Past Medical/ Surgical History. Past Medical History:    1. COPD  2. Heavy tobacco abuse: 4 PPD since age 4. She recently enrolled in a smoking cessation program 3 weeks ago and as of 5/13/2020 she is smoking 4 cigarettes/day. 3. Anxiety / Depression / PTSD /bipolar disorder  4. Super morbid obese: BMI 53  5. Cholecystectomy   6. Current smoker   7. NIDDM   8. Probable obstructive sleep apnea  9. History of right ACL tear  10. Hyperlipidemia  11.   Traumatic head injury --\"a tree fell on me\" PRN  pregabalin (LYRICA) capsule 75 mg, 75 mg, Oral, BID  venlafaxine (EFFEXOR XR) extended release capsule 150 mg, 150 mg, Oral, Daily  vitamin B-12 (CYANOCOBALAMIN) tablet 1,000 mcg, 1,000 mcg, Oral, Daily  insulin lispro (HUMALOG) injection vial 0-6 Units, 0-6 Units, Subcutaneous, TID WC  insulin lispro (HUMALOG) injection vial 0-3 Units, 0-3 Units, Subcutaneous, Nightly  glucose (GLUTOSE) 40 % oral gel 15 g, 15 g, Oral, PRN  dextrose 50 % IV solution, 12.5 g, Intravenous, PRN  glucagon (rDNA) injection 1 mg, 1 mg, Intramuscular, PRN  dextrose 5 % solution, 100 mL/hr, Intravenous, PRN    Allergies:  Cymbalta [duloxetine hcl]; Keflex [cephalexin]; Neurontin [gabapentin]; and Lamictal [lamotrigine]    Social History:    Current smoker: 4 PPD since age 4. She recently enrolled in a smoking cessation program 3 weeks ago and as of 5/13/2020 she is smoking 4 cigarettes/day. Denies alcohol or illicit drug use  Patient is on disability  Denies using walker or cane  She lives with her  and 2 children    Family History:   Father: Diabetes mellitus  Mother: History of ovarian cancer, multiple strokes  Brother: History of hypertension, tobacco abuse and 2 heart attacks (patient is unsure if he has had stents or open heart surgery). Children: Alive and in good health    REVIEW OF SYSTEMS:     · Constitutional: + Fatigue. denies fevers, chills or night sweats  · Eyes: Denies visual changes or drainage  · ENT: Denies headaches or hearing loss. No mouth sores or sore throat. No epistaxis   · Cardiovascular: See HPI. No lower extremity swelling. · Respiratory: + GARRETT, Denies cough, orthopnea + PND. No hemoptysis   · Gastrointestinal: Denies hematemesis or anorexia. No hematochezia or melena    · Genitourinary: Denies urgency, dysuria or hematuria. · Musculoskeletal: Denies gait disturbance, weakness or joint complaints  · Integumentary: Denies rash, hives or pruritis   · Neurological: See HPI.  Denies headaches or pain.  Unfortunately, they are not available at our facility. Assessment:  1. Atypical chest pain --occurring at rest and occasionally with exertion. Troponin negative x 3. No acute EKG changes. Normal exercise non-nuclear stress test (5/13/2020). 2. SOB: Possibly related to obesity and severe COPD  2. Possible TIA symptoms / currently has no deficits. On ASA and Lipitor  3. Vertigo with recent \"ear fullness\"  4. NIDDM  5. Super morbid obese: BMI 53  6. Hyperlipidemia: On statin therapy  7. Anxiety/depression/PTSD/bipolar disorder  8. Probable obstructive sleep apnea    Plan:   1. ECHO to assess for LV function   2. Lexiscan nuclear stress tomorrow am.  3. Check Ddimer  4. Aggressive risk factor modification (smoking cessation, weight loss, exercise)  5. Further recommendations pending the above. Discussed the above plan with Dr. Becky Jung. Electronically signed by VELMA Montana CNP on 5/13/20 at 2:39 PM EDT    Nagi Self 3 MD Saleem    I have personally participated in a face-to-face and personally obtained history and performed physical exam on the date of service. I reviewed chart, vitals, labs and radiologic studies. I also participated in medical decision making with VELMA Montana CNP on the date of service All of the assessments and recommendations are from me and I agree with all of the pertinent clinical information, assessment and treatment plan. I have reviewed and edited the note above based on my findings during my history, exam, and decision making. Please see my additional contributions to the history, physical exam, assessment, and recommendations below.       Reason for Consult: Chest pain  Requesting Physician: Ede Cramer  Chief Complaint: Chest pain, dizziness  History Obtained From: Patient    HISTORY OF PRESENT ILLNESS:   Patient is 27years old female with history of type 2 diabetes mellitus, significant small nicotine Vick Ibanez MD    nicotine (NICODERM CQ) 7 MG/24HR 1 patch, 1 patch, Transdermal, Once, Jacoby Cummings MD, 1 patch at 05/12/20 2154    sodium chloride flush 0.9 % injection 10 mL, 10 mL, Intravenous, 2 times per day, Gisell Russell, DO, 10 mL at 05/13/20 1318    sodium chloride flush 0.9 % injection 10 mL, 10 mL, Intravenous, PRN, Gisell Jarvisiter, DO    acetaminophen (TYLENOL) tablet 650 mg, 650 mg, Oral, Q6H PRN **OR** acetaminophen (TYLENOL) suppository 650 mg, 650 mg, Rectal, Q6H PRN, Gisell Jarvisiter, DO    atorvastatin (LIPITOR) tablet 40 mg, 40 mg, Oral, Nightly, Gisell Russell, DO, 40 mg at 05/12/20 2345    aspirin chewable tablet 81 mg, 81 mg, Oral, Daily, Gisell Russell, DO, 81 mg at 05/13/20 1317    enoxaparin (LOVENOX) injection 40 mg, 40 mg, Subcutaneous, Daily, Gisell Russell, DO    nitroGLYCERIN (NITROSTAT) SL tablet 0.4 mg, 0.4 mg, Sublingual, Q5 Min PRN, Gisell Russell, DO    albuterol (PROVENTIL) nebulizer solution 2.5 mg, 2.5 mg, Nebulization, Q6H PRN, Gisell Jarvisiter, DO    pregabalin (LYRICA) capsule 75 mg, 75 mg, Oral, BID, Gisell Russell, DO, 75 mg at 05/13/20 1317    venlafaxine (EFFEXOR XR) extended release capsule 150 mg, 150 mg, Oral, Daily, Gisell Russell, DO, 150 mg at 05/13/20 1317    vitamin B-12 (CYANOCOBALAMIN) tablet 1,000 mcg, 1,000 mcg, Oral, Daily, Gisell Russell, , 1,000 mcg at 05/13/20 1317    insulin lispro (HUMALOG) injection vial 0-6 Units, 0-6 Units, Subcutaneous, TID WC, Gisell Russell, DO    insulin lispro (HUMALOG) injection vial 0-3 Units, 0-3 Units, Subcutaneous, Nightly, Gisell E Drew, DO    glucose (GLUTOSE) 40 % oral gel 15 g, 15 g, Oral, PRN, Gisell E Drew, DO    dextrose 50 % IV solution, 12.5 g, Intravenous, PRN, Gisell E Drew, DO    glucagon (rDNA) injection 1 mg, 1 mg, Intramuscular, PRN, Gisell E Drew, DO    dextrose 5 % solution, 100 mL/hr, Intravenous, PRN, Gisell E Drew, DO    Allergies as of 05/12/2020

## 2020-05-13 NOTE — PROGRESS NOTES
Hospitalist Progress Note      PCP: Vee Casas PA-C    Date of Admission: 5/12/2020    Chief Complaint: *chest pain    Hospital Course: **with radiation down left arm. She was to see card tomorrow. Went for a stress and could not complete it. TT have been normal so will have a lexican stress in the am    Subjective: *no complaints       Medications:  Reviewed    Infusion Medications    dextrose       Scheduled Medications    buPROPion  150 mg Oral Daily    nicotine  1 patch Transdermal Once    sodium chloride flush  10 mL Intravenous 2 times per day    atorvastatin  40 mg Oral Nightly    aspirin  81 mg Oral Daily    enoxaparin  40 mg Subcutaneous Daily    pregabalin  75 mg Oral BID    venlafaxine  150 mg Oral Daily    vitamin B-12  1,000 mcg Oral Daily    insulin lispro  0-6 Units Subcutaneous TID WC    insulin lispro  0-3 Units Subcutaneous Nightly     PRN Meds: melatonin, perflutren lipid microspheres, regadenoson, sodium chloride flush, acetaminophen **OR** acetaminophen, nitroGLYCERIN, albuterol, glucose, dextrose, glucagon (rDNA), dextrose      Intake/Output Summary (Last 24 hours) at 5/13/2020 1530  Last data filed at 5/12/2020 2318  Gross per 24 hour   Intake 360 ml   Output --   Net 360 ml       Exam:    /67   Pulse 82   Temp 97.6 °F (36.4 °C) (Temporal)   Resp 18   Ht 5' 3\" (1.6 m)   Wt 298 lb 12.8 oz (135.5 kg)   SpO2 94%   BMI 52.93 kg/m²           Gen: *well developed  HEENT: NC/AT, moist mucous membranes, no oropharyngeal erythema or exudate  Neck: supple, trachea midline, no anterior cervical or SC LAD  Heart:  Normal s1/s2, RRR, no murmurs, gallops, or rubs. Lungs:  cta bilaterally, *  Abd: bowel sounds present, soft, nontender, nondistended, no masses  Extrem:  No clubbing, cyanosis,  * no edema  Skin: no rashes or lesions  Psych: A & O x3  Neuro: grossly intact, moves all four extremities.     Capillary Refill: Brisk,< 3 seconds   Peripheral Pulses: +2

## 2020-05-13 NOTE — H&P
81 MG EC tablet Take 1 tablet by mouth daily 5/11/20  Yes Lenore Wray PA-C   pregabalin (LYRICA) 75 MG capsule Take 1 capsule by mouth 2 times daily for 30 days. 5/1/20 5/31/20 Yes Lenore Wray PA-C   meloxicam (MOBIC) 15 MG tablet Take 1 tablet by mouth daily 5/1/20  Yes Lenore Wray PA-C   vitamin B-12 (CYANOCOBALAMIN) 1000 MCG tablet Take 1 tablet by mouth daily 5/1/20  Yes Lenore Wray PA-C   methocarbamol (ROBAXIN) 500 MG tablet Take 1 tablet by mouth 4 times daily 5/1/20  Yes Lenore Wray PA-C   albuterol (PROVENTIL) (2.5 MG/3ML) 0.083% nebulizer solution Take 3 mLs by nebulization every 6 hours as needed for Wheezing 4/29/20  Yes Lenore Wray PA-C   buPROPion (ZYBAN) 150 MG extended release tablet Take 1 tablet PO in AM for 3 days then increase to BID 4/20/20  Yes Lenore Wray PA-C   blood glucose monitor kit and supplies Test 3 times a day as needed for symptoms of irregular blood glucose. 4/2/20  Yes Lenore Wray PA-C   Lancets MISC 1 each by Does not apply route 3 times daily 4/2/20  Yes Lenore Wray PA-C   blood glucose monitor strips Test 3 times a day as needed for symptoms of irregular blood glucose. 4/2/20  Yes Lenore Wary PA-C   ondansetron (ZOFRAN) 4 MG tablet Take 1 tablet by mouth 3 times daily as needed for Nausea or Vomiting 3/31/20  Yes Lenore Wray PA-C   metFORMIN (GLUCOPHAGE) 500 MG tablet Take 1 tablet by mouth 2 times daily (with meals) 3/16/20  Yes Lenore Wray PA-C   venlafaxine (EFFEXOR XR) 150 MG extended release capsule Take 1 capsule by mouth daily 3/13/20  Yes Lenore Wray PA-C   albuterol sulfate HFA (PROVENTIL HFA) 108 (90 Base) MCG/ACT inhaler Inhale 2 puffs into the lungs every 4 hours as needed for Wheezing 2/7/20 2/6/21 Yes Yinka Lua DO       Allergies:  Cymbalta [duloxetine hcl]; Keflex [cephalexin];  Neurontin [gabapentin]; and Lamictal [lamotrigine]    Social History:      TOBACCO:

## 2020-05-13 NOTE — PROGRESS NOTES
Pt educated on Lipitor and smoking cessation at the bedside. Handout was given. Pt gave verbal understanding to teachings.     Electronically signed by Kevin Love RN on 5/13/2020 at 12:19 AM

## 2020-05-13 NOTE — ED NOTES
IV established, ekg completed, blood to lab. On cardiac monitor, call light in easy reach, chest xray completed.      Elisha Mayorga RN  05/12/20 2019

## 2020-05-14 ENCOUNTER — APPOINTMENT (OUTPATIENT)
Dept: NUCLEAR MEDICINE | Age: 35
End: 2020-05-14
Payer: COMMERCIAL

## 2020-05-14 ENCOUNTER — APPOINTMENT (OUTPATIENT)
Dept: NON INVASIVE DIAGNOSTICS | Age: 35
End: 2020-05-14
Payer: COMMERCIAL

## 2020-05-14 VITALS
TEMPERATURE: 97.9 F | SYSTOLIC BLOOD PRESSURE: 138 MMHG | HEART RATE: 78 BPM | WEIGHT: 293 LBS | HEIGHT: 63 IN | DIASTOLIC BLOOD PRESSURE: 84 MMHG | RESPIRATION RATE: 18 BRPM | OXYGEN SATURATION: 96 % | BODY MASS INDEX: 51.91 KG/M2

## 2020-05-14 LAB
LV EF: 52 %
LVEF MODALITY: NORMAL
METER GLUCOSE: 110 MG/DL (ref 74–99)
METER GLUCOSE: 121 MG/DL (ref 74–99)

## 2020-05-14 PROCEDURE — 3430000000 HC RX DIAGNOSTIC RADIOPHARMACEUTICAL: Performed by: RADIOLOGY

## 2020-05-14 PROCEDURE — 2580000003 HC RX 258: Performed by: INTERNAL MEDICINE

## 2020-05-14 PROCEDURE — 6370000000 HC RX 637 (ALT 250 FOR IP): Performed by: INTERNAL MEDICINE

## 2020-05-14 PROCEDURE — 93016 CV STRESS TEST SUPVJ ONLY: CPT | Performed by: INTERNAL MEDICINE

## 2020-05-14 PROCEDURE — 93018 CV STRESS TEST I&R ONLY: CPT | Performed by: INTERNAL MEDICINE

## 2020-05-14 PROCEDURE — 82962 GLUCOSE BLOOD TEST: CPT

## 2020-05-14 PROCEDURE — G0378 HOSPITAL OBSERVATION PER HR: HCPCS

## 2020-05-14 PROCEDURE — 93017 CV STRESS TEST TRACING ONLY: CPT

## 2020-05-14 PROCEDURE — 6360000002 HC RX W HCPCS: Performed by: INTERNAL MEDICINE

## 2020-05-14 PROCEDURE — A9500 TC99M SESTAMIBI: HCPCS | Performed by: RADIOLOGY

## 2020-05-14 PROCEDURE — 78452 HT MUSCLE IMAGE SPECT MULT: CPT

## 2020-05-14 RX ORDER — ATORVASTATIN CALCIUM 40 MG/1
40 TABLET, FILM COATED ORAL NIGHTLY
Qty: 30 TABLET | Refills: 3 | Status: SHIPPED | OUTPATIENT
Start: 2020-05-14 | End: 2021-02-18

## 2020-05-14 RX ORDER — BUPROPION HYDROCHLORIDE 150 MG/1
150 TABLET, EXTENDED RELEASE ORAL DAILY
Qty: 60 TABLET | Refills: 3 | Status: SHIPPED | OUTPATIENT
Start: 2020-05-15 | End: 2020-05-20

## 2020-05-14 RX ADMIN — SODIUM CHLORIDE, PRESERVATIVE FREE 10 ML: 5 INJECTION INTRAVENOUS at 11:49

## 2020-05-14 RX ADMIN — VENLAFAXINE HYDROCHLORIDE 150 MG: 150 CAPSULE, EXTENDED RELEASE ORAL at 11:45

## 2020-05-14 RX ADMIN — Medication 35 MILLICURIE: at 10:20

## 2020-05-14 RX ADMIN — ASPIRIN 81 MG 81 MG: 81 TABLET ORAL at 11:46

## 2020-05-14 RX ADMIN — Medication 1000 MCG: at 11:45

## 2020-05-14 RX ADMIN — PREGABALIN 75 MG: 50 CAPSULE ORAL at 11:45

## 2020-05-14 RX ADMIN — BUPROPION HYDROCHLORIDE 150 MG: 150 TABLET, EXTENDED RELEASE ORAL at 11:45

## 2020-05-14 RX ADMIN — Medication 12 MILLICURIE: at 08:28

## 2020-05-14 RX ADMIN — ACETAMINOPHEN 650 MG: 325 TABLET, FILM COATED ORAL at 11:49

## 2020-05-14 RX ADMIN — REGADENOSON 0.4 MG: 0.08 INJECTION, SOLUTION INTRAVENOUS at 10:23

## 2020-05-14 ASSESSMENT — PAIN DESCRIPTION - PROGRESSION: CLINICAL_PROGRESSION: NOT CHANGED

## 2020-05-14 ASSESSMENT — PAIN SCALES - GENERAL
PAINLEVEL_OUTOF10: 3
PAINLEVEL_OUTOF10: 2

## 2020-05-14 NOTE — PROGRESS NOTES
Patient is seen in follow-up for chest pain    Subjective:     Ms. Brigid Kearns feels okay today  Sitting up in chair no apparent distress    ROS:  CONSTITUTIONAL:  negative for  fevers, chills  HEENT:  negative for earaches, nasal congestion and epistaxis  RESPIRATORY:  negative for  dry cough, cough with sputum,wheezing and hemoptysis  GASTROINTESTINAL:  negative for nausea, vomiting  MUSCULOSKELETAL:  negative for  myalgias, arthralgias  NEUROLOGICAL:  negative for visual disturbance, dysphagia    Medication side effects: none    Scheduled Meds:   buPROPion  150 mg Oral Daily    sodium chloride flush  10 mL Intravenous 2 times per day    atorvastatin  40 mg Oral Nightly    aspirin  81 mg Oral Daily    enoxaparin  40 mg Subcutaneous Daily    pregabalin  75 mg Oral BID    venlafaxine  150 mg Oral Daily    vitamin B-12  1,000 mcg Oral Daily    insulin lispro  0-6 Units Subcutaneous TID WC    insulin lispro  0-3 Units Subcutaneous Nightly     Continuous Infusions:   dextrose       PRN Meds:melatonin, perflutren lipid microspheres, sodium chloride flush, acetaminophen **OR** acetaminophen, nitroGLYCERIN, albuterol, glucose, dextrose, glucagon (rDNA), dextrose      Objective:      Physical Exam:   /84   Pulse 78   Temp 97.9 °F (36.6 °C) (Temporal)   Resp 18   Ht 5' 3\" (1.6 m)   Wt 295 lb 6.4 oz (134 kg)   SpO2 96%   BMI 52.33 kg/m²   CONSTITUTIONAL:  awake, alert, cooperative, no apparent distress, and appears stated age  HEAD:  normocepalic, without obvious abnormality, atraumatic  NECK:  Supple, symmetrical, trachea midline, no adenopathy, thyroid symmetric, not enlarged and no tenderness, skin normal  LUNGS:  No increased work of breathing, No accessory muscle use or intercostal retractions, good air exchange, clear to auscultation bilaterally, no crackles or wheezing  CARDIOVASCULAR:  Normal apical impulse, regular rate and rhythm, normal S1 and S2, no S3 or S4, and no murmur noted, no edema, no JVD, no carotid bruit. ABDOMEN:  Soft, nontender, no masses, no hepatomegaly, no splenomegaly, BS+  MUSCULOSKELETAL:  No clubbing no cyanosis. there is no redness, warmth, or swelling of the joints  full range of motion noted  NEUROLOGIC:  Alert, awake,oriented x3  SKIN:  no bruising or bleeding, normal skin color, texture, turgor and no redness, warmth, or swelling      Cardiographics  I personally reviewed the telemetry monitor strips with the following interpretation:  Echocardiogram: not done    Imaging  NM Cardiac Stress Test Nuclear Imaging   Final Result   1. No reversible perfusion defect   2. Ejection fraction is 52 %. 3. No significant wall motion abnormality         XR CHEST PORTABLE   Final Result   No evidence of acute cardiopulmonary pathology. Lab Review   Lab Results   Component Value Date     05/12/2020    K 3.9 05/12/2020    K 3.8 10/30/2019     05/12/2020    CO2 26 05/12/2020    BUN 9 05/12/2020    CREATININE 0.6 05/12/2020    GLUCOSE 113 05/12/2020    CALCIUM 9.0 05/12/2020     Lab Results   Component Value Date    WBC 9.2 05/13/2020    HGB 11.5 05/13/2020    HCT 36.8 05/13/2020    MCV 91.8 05/13/2020     05/13/2020     I have personally reviewed the laboratory, cardiac diagnostic and radiographic testing as outlined above:    Assessment:     1. Chest pain: Negative Lexiscan stress test.  2. Shortness of breath: Etiology?,  Suspect multifactorial secondary to obesity and COPD. 3. Type 2 diabetes mellitus  4. Hyperlipidemia: On statin  5. Obesity  6. Tobacco abuse:   7. Dizziness with vertigo: Iatrogenic secondary to Lyrica and/or Effexor? Recommendations:     1. Will increase ambulation as tolerated  2. Can be discharged from cardiology standpoint    Discussed with patient  Discussed with the nursing staff  Electronically signed by Alia Espinoza MD on 5/14/2020 at 5:23 PM  NOTE: This report was transcribed using voice recognition software.  Every effort was made to ensure accuracy; however, inadvertent computerized transcription errors may be present

## 2020-05-14 NOTE — PLAN OF CARE
Problem: Falls - Risk of:  Goal: Will remain free from falls  Description: Will remain free from falls  Outcome: Met This Shift     Problem: Falls - Risk of:  Goal: Absence of physical injury  Description: Absence of physical injury  Outcome: Met This Shift     Problem: Pain:  Goal: Pain level will decrease  Description: Pain level will decrease  Outcome: Met This Shift     Problem: Pain:  Goal: Control of acute pain  Description: Control of acute pain  Outcome: Met This Shift     Problem: Pain:  Goal: Control of chronic pain  Description: Control of chronic pain  Outcome: Met This Shift

## 2020-05-15 NOTE — DISCHARGE SUMMARY
Hospital Medicine Discharge Summary    Patient: Chetna Wilson     Gender: female  : 1985   Age: 27 y.o. MRN: [id number]    Code Status:  Full code    Primary Care Provider: Everett Holguin PA-C    Admit Date: 2020   Discharge Date: 2020      Admitting Physician: Jameel Mendez DO  Discharge Physician: Gloria العراقي DO     Discharge Diagnoses: Active Hospital Problems    Diagnosis Date Noted    At risk for shortness of breath [Z91.89]     Dizziness [R42]     Type 2 diabetes mellitus without complication, without long-term current use of insulin (HCC) [E11.9]     Tobacco abuse [Z72.0]     Chest pain [R07.9] 2020       Hospital Course:   * presented with chest pain**with radiation down left arm. She was to see card tomorrow. Yung Elias for a stress and could not complete it.  TT have been normal . Had a lexican stress, nuclear imaging pending. **    Disposition:  Home    Exam:     /84   Pulse 78   Temp 97.9 °F (36.6 °C) (Temporal)   Resp 18   Ht 5' 3\" (1.6 m)   Wt 295 lb 6.4 oz (134 kg)   SpO2 96%   BMI 52.33 kg/m²     General appearance:  No apparent distress, appears stated age and cooperative. HEENT:  Normal cephalic, atraumatic without obvious deformity. Pupils equal, round, and reactive to light. Extra ocular muscles intact. Conjunctivae/corneas clear. Neck: Supple, with full range of motion. No jugular venous distention. Trachea midline. Respiratory:  Normal respiratory effort. Clear to auscultation, bilaterally without Rales/Wheezes/Rhonchi. Cardiovascular:  Regular rate and rhythm with normal S1/S2 without murmurs, rubs or gallops. Abdomen: Soft, non-tender, non-distended with normal bowel sounds. Musculoskeletal:  No clubbing, cyanosis or edema bilaterally. Full range of motion without deformity. Skin: Skin color, texture, turgor normal.  No rashes or lesions. Neurologic:  Neurovascularly intact without any focal sensory/motor deficits.

## 2020-05-20 ENCOUNTER — OFFICE VISIT (OUTPATIENT)
Dept: FAMILY MEDICINE CLINIC | Age: 35
End: 2020-05-20
Payer: COMMERCIAL

## 2020-05-20 VITALS
TEMPERATURE: 97.6 F | WEIGHT: 293 LBS | BODY MASS INDEX: 51.91 KG/M2 | HEART RATE: 92 BPM | SYSTOLIC BLOOD PRESSURE: 133 MMHG | OXYGEN SATURATION: 97 % | HEIGHT: 63 IN | DIASTOLIC BLOOD PRESSURE: 89 MMHG | RESPIRATION RATE: 16 BRPM

## 2020-05-20 PROCEDURE — 1111F DSCHRG MED/CURRENT MED MERGE: CPT | Performed by: NURSE PRACTITIONER

## 2020-05-20 PROCEDURE — 99495 TRANSJ CARE MGMT MOD F2F 14D: CPT | Performed by: NURSE PRACTITIONER

## 2020-05-20 RX ORDER — BUPROPION HYDROCHLORIDE 150 MG/1
TABLET, EXTENDED RELEASE ORAL
Qty: 15 TABLET | Refills: 0 | COMMUNITY
Start: 2020-05-20 | End: 2020-05-22 | Stop reason: ALTCHOICE

## 2020-05-20 NOTE — PROGRESS NOTES
Post-Discharge Transitional Care Management Services or Hospital Follow Up      Tosha Ochoa   YOB: 1985    Date of Office Visit:  5/20/2020  Date of Hospital Admission: 5/12/20  Date of Hospital Discharge: 5/14/20  Risk of hospital readmission (high >=14%.  Medium >=10%) :Readmission Risk Score: 10      Care management risk score Rising risk (score 2-5) and Complex Care (Scores >=6): 3     Non face to face  following discharge, date last encounter closed (first attempt may have been earlier): *No documented post hospital discharge outreach found in the last 14 days    Call initiated 2 business days of discharge: *No response recorded in the last 14 days    Patient Active Problem List   Diagnosis    Mild intermittent asthma without complication    Elevated blood pressure reading    Tooth pain    Bipolar 1 disorder [facility] [address]    Anxiety    Suicidal ideation    Major depressive disorder, recurrent ([facility] [address]    Depression with suicidal ideation    Depression with anxiety    Strain of lumbar region    Sciatica    Spasm of muscle    Menorrhagia with irregular cycle    Condylomata acuminata    Menorrhagia with regular cycle    Endometrial thickening on ultrasound    Vulvar abscess    Cervical stenosis (uterine cervix)    Chest pain    At risk for shortness of breath    Dizziness    Type 2 diabetes mellitus without complication, without long-term current use of insulin (HCC)    Tobacco abuse       Allergies   Allergen Reactions    Cymbalta [Duloxetine Hcl] Other (See Comments)     angry    Keflex [Cephalexin]      Hives, rash      Neurontin [Gabapentin] Other (See Comments)     Makes me feel very weird    Lamictal [Lamotrigine] Nausea And Vomiting       Medications listed as ordered at the time of discharge from hospital   Treva SERVIN   Home Medication Instructions DENIA:    Printed on:05/20/20 6299   Medication Information                      albuterol (PROVENTIL) (2.5 (LYRICA) 75 MG capsule Take 1 capsule by mouth 2 times daily for 30 days. 60 capsule 0    meloxicam (MOBIC) 15 MG tablet Take 1 tablet by mouth daily 30 tablet 2    vitamin B-12 (CYANOCOBALAMIN) 1000 MCG tablet Take 1 tablet by mouth daily 30 tablet 3    methocarbamol (ROBAXIN) 500 MG tablet Take 1 tablet by mouth 4 times daily 60 tablet 0    albuterol (PROVENTIL) (2.5 MG/3ML) 0.083% nebulizer solution Take 3 mLs by nebulization every 6 hours as needed for Wheezing 120 each 3    blood glucose monitor kit and supplies Test 3 times a day as needed for symptoms of irregular blood glucose. 1 kit 0    Lancets MISC 1 each by Does not apply route 3 times daily 100 each 5    blood glucose monitor strips Test 3 times a day as needed for symptoms of irregular blood glucose. 90 strip 5    ondansetron (ZOFRAN) 4 MG tablet Take 1 tablet by mouth 3 times daily as needed for Nausea or Vomiting 30 tablet 0    metFORMIN (GLUCOPHAGE) 500 MG tablet Take 1 tablet by mouth 2 times daily (with meals) 60 tablet 2    venlafaxine (EFFEXOR XR) 150 MG extended release capsule Take 1 capsule by mouth daily 30 capsule 3    albuterol sulfate HFA (PROVENTIL HFA) 108 (90 Base) MCG/ACT inhaler Inhale 2 puffs into the lungs every 4 hours as needed for Wheezing 1 Inhaler 1        Medications patient taking as of now reconciled against medications ordered at time of hospital discharge: Yes    Chief Complaint   Patient presents with    Follow-Up from Hospital     2 weeks ago        History of Present illness - Follow up of Hospital diagnosis(es): Chest Pain/COPD/SOB/Anxiety    All diagnostic testing reviewed w/pt, pt did not have ECHO as ordered in the hospital, will order today. Pt stated chest pain has improved as well as SOB. She does have a significant h/o COPD/Anxiety, she is a smoker and refuses to quit    Inpatient course: Discharge summary reviewed- see chart.     Interval history/Current status: Home/Stable/Improved    Review of

## 2020-05-22 ENCOUNTER — HOSPITAL ENCOUNTER (EMERGENCY)
Age: 35
Discharge: HOME | End: 2020-05-22
Payer: COMMERCIAL

## 2020-05-22 ENCOUNTER — APPOINTMENT (OUTPATIENT)
Dept: GENERAL RADIOLOGY | Age: 35
End: 2020-05-22
Payer: COMMERCIAL

## 2020-05-22 VITALS
BODY MASS INDEX: 51.91 KG/M2 | OXYGEN SATURATION: 98 % | HEIGHT: 63 IN | WEIGHT: 293 LBS | HEART RATE: 104 BPM | SYSTOLIC BLOOD PRESSURE: 119 MMHG | DIASTOLIC BLOOD PRESSURE: 73 MMHG | RESPIRATION RATE: 18 BRPM | TEMPERATURE: 98.1 F

## 2020-05-22 PROCEDURE — 96372 THER/PROPH/DIAG INJ SC/IM: CPT

## 2020-05-22 PROCEDURE — 73030 X-RAY EXAM OF SHOULDER: CPT

## 2020-05-22 PROCEDURE — 6360000002 HC RX W HCPCS: Performed by: EMERGENCY MEDICINE

## 2020-05-22 PROCEDURE — 6370000000 HC RX 637 (ALT 250 FOR IP): Performed by: EMERGENCY MEDICINE

## 2020-05-22 PROCEDURE — 99283 EMERGENCY DEPT VISIT LOW MDM: CPT

## 2020-05-22 RX ORDER — ORPHENADRINE CITRATE 30 MG/ML
60 INJECTION INTRAMUSCULAR; INTRAVENOUS ONCE
Status: COMPLETED | OUTPATIENT
Start: 2020-05-22 | End: 2020-05-22

## 2020-05-22 RX ORDER — NAPROXEN 500 MG/1
500 TABLET ORAL 2 TIMES DAILY PRN
Qty: 60 TABLET | Refills: 0 | Status: SHIPPED | OUTPATIENT
Start: 2020-05-22 | End: 2020-09-18

## 2020-05-22 RX ORDER — ACETAMINOPHEN 500 MG
1000 TABLET ORAL ONCE
Status: COMPLETED | OUTPATIENT
Start: 2020-05-22 | End: 2020-05-22

## 2020-05-22 RX ADMIN — ORPHENADRINE CITRATE 60 MG: 30 INJECTION INTRAMUSCULAR; INTRAVENOUS at 17:16

## 2020-05-22 RX ADMIN — ACETAMINOPHEN 1000 MG: 500 TABLET ORAL at 17:15

## 2020-05-22 ASSESSMENT — PAIN SCALES - GENERAL
PAINLEVEL_OUTOF10: 10
PAINLEVEL_OUTOF10: 10

## 2020-05-22 ASSESSMENT — PAIN DESCRIPTION - ORIENTATION: ORIENTATION: LEFT

## 2020-05-22 ASSESSMENT — PAIN DESCRIPTION - DESCRIPTORS: DESCRIPTORS: CONSTANT

## 2020-05-22 ASSESSMENT — PAIN DESCRIPTION - LOCATION: LOCATION: SHOULDER

## 2020-05-22 ASSESSMENT — PAIN DESCRIPTION - PAIN TYPE: TYPE: ACUTE PAIN

## 2020-05-22 NOTE — ED PROVIDER NOTES
medications have been reviewed. Allergies: Cymbalta [duloxetine hcl]; Keflex [cephalexin]; Neurontin [gabapentin]; and Lamictal [lamotrigine]    -------------------------------------------------- RESULTS -------------------------------------------------  Labs:  No results found for this visit on 05/22/20. Radiology:  XR SHOULDER LEFT (MIN 2 VIEWS)   Final Result   Very mild osteoarthritis at the left [facility] [place] joint.            ------------------------- NURSING NOTES AND VITALS REVIEWED ---------------------------  Date / Time Roomed:  5/22/2020  4:02 PM  ED Bed Assignment:  05/05    The nursing notes within the ED encounter and vital signs as below have been reviewed. /73   Pulse 104   Temp 98.1 °F (36.7 °C) (Infrared)   Resp 18   Ht 5' 3\" (1.6 m)   Wt 298 lb (135.2 kg)   LMP 05/05/2020   SpO2 98%   BMI 52.79 kg/m²   Oxygen Saturation Interpretation: Normal      ------------------------------------------ PROGRESS NOTES ------------------------------------------  ED COURSE MEDICATIONS:                Medications   orphenadrine (NORFLEX) injection 60 mg (60 mg Intramuscular Given 5/22/20 1716)   acetaminophen (TYLENOL) tablet 1,000 mg (1,000 mg Oral Given 5/22/20 1715)       I have spoken with the patient and discussed todays results, in addition to providing specific details for the plan of care and counseling regarding the diagnosis and prognosis. Their questions are answered at this time and they are agreeable with the plan. I discussed at length with them reasons for immediate return here for re evaluation. They will followup with primary care by calling their office tomorrow. --------------------------------- ADDITIONAL PROVIDER NOTES ---------------------------------  At this time the patient is without objective evidence of an acute process requiring hospitalization or inpatient management.   They have remained hemodynamically stable throughout their entire ED visit and are stable for

## 2020-05-23 ENCOUNTER — CARE COORDINATION (OUTPATIENT)
Dept: CARE COORDINATION | Age: 35
End: 2020-05-23

## 2020-05-23 ASSESSMENT — ENCOUNTER SYMPTOMS
VOMITING: 0
ABDOMINAL PAIN: 0
BACK PAIN: 0
COLOR CHANGE: 0
NAUSEA: 0
SHORTNESS OF BREATH: 0
COUGH: 0

## 2020-05-24 NOTE — CARE COORDINATION
information for future reference. Patient/family/caregiver given information for [employer] and agrees to enroll yes  Patient's preferred e-mail: [email or web address]   Patient's preferred phone number: [phone]  Based on Loop alert triggers, patient will be contacted by nurse care manager for worsening symptoms.

## 2020-05-25 ASSESSMENT — ENCOUNTER SYMPTOMS
SINUS PAIN: 0
APNEA: 0
EYE PAIN: 0
SHORTNESS OF BREATH: 1
COUGH: 0
CHEST TIGHTNESS: 0
EYE REDNESS: 0
EYE DISCHARGE: 0
PHOTOPHOBIA: 0
RHINORRHEA: 0
CHOKING: 0
COLOR CHANGE: 0
TROUBLE SWALLOWING: 0
WHEEZING: 0
DIARRHEA: 0
ABDOMINAL PAIN: 0
VOMITING: 0
ABDOMINAL DISTENTION: 0
NAUSEA: 0
VOICE CHANGE: 0
SINUS PRESSURE: 0
SORE THROAT: 0
STRIDOR: 0
BLOOD IN STOOL: 0
EYE ITCHING: 0
CONSTIPATION: 0

## 2020-05-26 ENCOUNTER — TELEPHONE (OUTPATIENT)
Dept: OTHER | Facility: CLINIC | Age: 35
End: 2020-05-26

## 2020-05-26 NOTE — TELEPHONE ENCOUNTER
RN access contacted Chaparro Moreira PA-C office to schedule ed follow up appt.      Pt scheduled for 06/15/20 @ 1000

## 2020-05-27 ENCOUNTER — HOSPITAL ENCOUNTER (EMERGENCY)
Age: 35
Discharge: LEFT BEFORE DISCHARGE | End: 2020-05-28
Attending: FAMILY MEDICINE
Payer: COMMERCIAL

## 2020-05-27 ENCOUNTER — TELEPHONE (OUTPATIENT)
Dept: CARDIOLOGY CLINIC | Age: 35
End: 2020-05-27

## 2020-05-27 PROCEDURE — 4500000002 HC ER NO CHARGE

## 2020-05-28 VITALS
HEIGHT: 63 IN | RESPIRATION RATE: 16 BRPM | SYSTOLIC BLOOD PRESSURE: 138 MMHG | DIASTOLIC BLOOD PRESSURE: 80 MMHG | BODY MASS INDEX: 51.91 KG/M2 | OXYGEN SATURATION: 97 % | TEMPERATURE: 97.7 F | HEART RATE: 100 BPM | WEIGHT: 293 LBS

## 2020-05-28 RX ORDER — DIVALPROEX SODIUM 500 MG/1
500 TABLET, DELAYED RELEASE ORAL 2 TIMES DAILY
COMMUNITY
End: 2020-09-21 | Stop reason: SDUPTHER

## 2020-05-28 RX ORDER — VALACYCLOVIR HYDROCHLORIDE 1 G/1
2000 TABLET, FILM COATED ORAL 2 TIMES DAILY
Qty: 4 TABLET | Refills: 3 | Status: SHIPPED | OUTPATIENT
Start: 2020-05-28 | End: 2020-05-29

## 2020-05-28 ASSESSMENT — PAIN DESCRIPTION - ORIENTATION: ORIENTATION: RIGHT;LEFT;LOWER

## 2020-05-28 ASSESSMENT — PAIN DESCRIPTION - LOCATION: LOCATION: FOOT

## 2020-05-28 ASSESSMENT — PAIN DESCRIPTION - ONSET: ONSET: ON-GOING

## 2020-05-28 ASSESSMENT — PAIN SCALES - GENERAL: PAINLEVEL_OUTOF10: 10

## 2020-05-28 ASSESSMENT — PAIN DESCRIPTION - PAIN TYPE: TYPE: ACUTE PAIN

## 2020-05-28 ASSESSMENT — PAIN DESCRIPTION - FREQUENCY: FREQUENCY: CONTINUOUS

## 2020-05-28 ASSESSMENT — PAIN DESCRIPTION - PROGRESSION: CLINICAL_PROGRESSION: GRADUALLY WORSENING

## 2020-05-28 NOTE — ED NOTES
Patient placed call light on and informed me she is leaving, states she hasn't been seen yet and isn't waiting any longer. I tried to tell her the physician would be in soon and she said, no she is not, get me the paper to sign I will come back another time.      Franko London RN  05/28/20 0424

## 2020-06-02 ENCOUNTER — VIRTUAL VISIT (OUTPATIENT)
Dept: CARDIOLOGY CLINIC | Age: 35
End: 2020-06-02
Payer: COMMERCIAL

## 2020-06-02 VITALS — BODY MASS INDEX: 51.91 KG/M2 | WEIGHT: 293 LBS | HEIGHT: 63 IN

## 2020-06-02 PROCEDURE — 99213 OFFICE O/P EST LOW 20 MIN: CPT | Performed by: INTERNAL MEDICINE

## 2020-06-08 ENCOUNTER — TELEMEDICINE (OUTPATIENT)
Dept: FAMILY MEDICINE CLINIC | Age: 35
End: 2020-06-08
Payer: COMMERCIAL

## 2020-06-08 VITALS — RESPIRATION RATE: 18 BRPM | HEIGHT: 63 IN | BODY MASS INDEX: 52.79 KG/M2

## 2020-06-08 PROCEDURE — 99214 OFFICE O/P EST MOD 30 MIN: CPT | Performed by: NURSE PRACTITIONER

## 2020-06-08 PROCEDURE — G8427 DOCREV CUR MEDS BY ELIG CLIN: HCPCS | Performed by: NURSE PRACTITIONER

## 2020-06-08 RX ORDER — HYDROCHLOROTHIAZIDE 25 MG/1
TABLET ORAL
Qty: 30 TABLET | Refills: 0 | Status: SHIPPED
Start: 2020-06-08 | End: 2020-10-22 | Stop reason: SDUPTHER

## 2020-06-08 ASSESSMENT — ENCOUNTER SYMPTOMS
EYE REDNESS: 0
CHOKING: 0
NAUSEA: 0
CHEST TIGHTNESS: 0
EYE PAIN: 0
COLOR CHANGE: 0
WHEEZING: 0
STRIDOR: 0
ABDOMINAL PAIN: 0
DIARRHEA: 0
PHOTOPHOBIA: 0
EYE ITCHING: 0
EYE DISCHARGE: 0
VOMITING: 0
COUGH: 0
SHORTNESS OF BREATH: 0

## 2020-06-08 NOTE — PROGRESS NOTES
results. , or sooner if necessary. Heather Miller is a 27 y.o. female being evaluated by a Virtual Visit (video visit) encounter to address concerns as mentioned above. A caregiver was present when appropriate. Due to this being a TeleHealth encounter (During ALFTQ-11 public health emergency), evaluation of the following organ systems was limited: Vitals/Constitutional/EENT/Resp/CV/GI//MS/Neuro/Skin/Heme-Lymph-Imm. Pursuant to the emergency declaration under the [address] [place] [address], [address] [place] [address] authority and the [employer] Act, this Virtual Visit was conducted with patient's (and/or legal guardian's) consent, to reduce the patient's risk of exposure to COVID-19 and provide necessary medical care. The patient (and/or legal guardian) has also been advised to contact this office for worsening conditions or problems, and seek emergency medical treatment and/or call 911 if deemed necessary. Patient identification was verified at the start of the visit: Yes    Total time spent for this encounter: Not billed by time    Services were provided through a video synchronous discussion virtually to substitute for in-person clinic visit. Patient and provider were located at their individual homes. --VELMA Nelson CNP on 6/8/2020 at 10:58 AM    An electronic signature was used to authenticate this note. Educational materials and/or home exercises printed for patient's review and were included in patient instructions on his/her After Visit Summary and given to patient at the end of visit. Counseled regarding above diagnosis, including possible risks and complications,  especially if left uncontrolled.     Counseled regarding the possible side effects, risks, benefits and alternatives to treatment; patient and/or guardian verbalizes understanding, agrees, feels comfortable with and wishes to proceed with above

## 2020-07-20 ENCOUNTER — HOSPITAL ENCOUNTER (EMERGENCY)
Age: 35
Discharge: HOME | End: 2020-07-20
Payer: COMMERCIAL

## 2020-07-20 ENCOUNTER — APPOINTMENT (OUTPATIENT)
Dept: CT IMAGING | Age: 35
End: 2020-07-20
Payer: COMMERCIAL

## 2020-07-20 VITALS
SYSTOLIC BLOOD PRESSURE: 132 MMHG | BODY MASS INDEX: 51.91 KG/M2 | DIASTOLIC BLOOD PRESSURE: 80 MMHG | HEIGHT: 63 IN | RESPIRATION RATE: 16 BRPM | HEART RATE: 83 BPM | TEMPERATURE: 97.7 F | OXYGEN SATURATION: 98 % | WEIGHT: 293 LBS

## 2020-07-20 PROCEDURE — 99283 EMERGENCY DEPT VISIT LOW MDM: CPT

## 2020-07-20 PROCEDURE — 96372 THER/PROPH/DIAG INJ SC/IM: CPT

## 2020-07-20 PROCEDURE — 72125 CT NECK SPINE W/O DYE: CPT

## 2020-07-20 PROCEDURE — 6360000002 HC RX W HCPCS: Performed by: NURSE PRACTITIONER

## 2020-07-20 RX ORDER — KETOROLAC TROMETHAMINE 30 MG/ML
30 INJECTION, SOLUTION INTRAMUSCULAR; INTRAVENOUS ONCE
Status: COMPLETED | OUTPATIENT
Start: 2020-07-20 | End: 2020-07-20

## 2020-07-20 RX ORDER — IBUPROFEN 800 MG/1
800 TABLET ORAL EVERY 6 HOURS PRN
COMMUNITY
End: 2020-09-18

## 2020-07-20 RX ORDER — ACETAMINOPHEN 500 MG
500 TABLET ORAL EVERY 6 HOURS PRN
Qty: 20 TABLET | Refills: 0 | Status: SHIPPED | OUTPATIENT
Start: 2020-07-20 | End: 2020-09-14 | Stop reason: SDUPTHER

## 2020-07-20 RX ORDER — ORPHENADRINE CITRATE 30 MG/ML
60 INJECTION INTRAMUSCULAR; INTRAVENOUS ONCE
Status: COMPLETED | OUTPATIENT
Start: 2020-07-20 | End: 2020-07-20

## 2020-07-20 RX ORDER — LORAZEPAM 0.5 MG/1
0.5 TABLET ORAL DAILY
COMMUNITY
End: 2020-09-24

## 2020-07-20 RX ADMIN — ORPHENADRINE CITRATE 60 MG: 30 INJECTION INTRAMUSCULAR; INTRAVENOUS at 20:06

## 2020-07-20 RX ADMIN — KETOROLAC TROMETHAMINE 30 MG: 30 INJECTION, SOLUTION INTRAMUSCULAR at 20:03

## 2020-07-20 ASSESSMENT — PAIN SCALES - GENERAL
PAINLEVEL_OUTOF10: 10
PAINLEVEL_OUTOF10: 10

## 2020-07-20 ASSESSMENT — PAIN DESCRIPTION - PAIN TYPE: TYPE: ACUTE PAIN

## 2020-07-20 ASSESSMENT — PAIN DESCRIPTION - DESCRIPTORS: DESCRIPTORS: PRESSURE

## 2020-07-20 ASSESSMENT — PAIN DESCRIPTION - FREQUENCY: FREQUENCY: CONTINUOUS

## 2020-07-20 ASSESSMENT — PAIN DESCRIPTION - PROGRESSION: CLINICAL_PROGRESSION: GRADUALLY WORSENING

## 2020-07-20 ASSESSMENT — PAIN DESCRIPTION - ORIENTATION: ORIENTATION: POSTERIOR

## 2020-07-20 ASSESSMENT — PAIN DESCRIPTION - LOCATION: LOCATION: NECK

## 2020-07-20 NOTE — ED PROVIDER NOTES
needed. She feels improvement of pain. Patient has a history of having chronic left shoulder pain and states she is being worked up for fibromyalgia and she ready taking the Lyrica. She has no chest pain or shortness of breath or any other symptoms at this time. Counseling: The emergency provider has spoken with the patient and discussed todays results, in addition to providing specific details for the plan of care and counseling regarding the diagnosis and prognosis. Questions are answered at this time and they are agreeable with the plan. Assessment     1. Torticollis    2. Trapezius muscle strain, left, initial encounter      Plan   Discharge to home  Patient condition is good    New Medications     New Prescriptions    ACETAMINOPHEN (APAP EXTRA STRENGTH) 500 MG TABLET    Take 1 tablet by mouth every 6 hours as needed for Pain     Electronically signed by VELMA Keane CNP   DD: 7/20/20  **This report was transcribed using voice recognition software. Every effort was made to ensure accuracy; however, inadvertent computerized transcription errors may be present.   END OF ED PROVIDER NOTE      VELMA Keane CNP  07/20/20 2547

## 2020-07-21 NOTE — ED NOTES
Pain improved to a 3 after meds. Discharged with a followup to PCP.  Return here if worse or concerns     Deanna Allen RN  07/20/20 2844

## 2020-07-22 ENCOUNTER — TELEPHONE (OUTPATIENT)
Dept: CARDIOLOGY | Age: 35
End: 2020-07-22

## 2020-08-16 ENCOUNTER — HOSPITAL ENCOUNTER (EMERGENCY)
Age: 35
Discharge: HOME | End: 2020-08-16
Attending: FAMILY MEDICINE
Payer: COMMERCIAL

## 2020-08-16 ENCOUNTER — APPOINTMENT (OUTPATIENT)
Dept: GENERAL RADIOLOGY | Age: 35
End: 2020-08-16
Payer: COMMERCIAL

## 2020-08-16 VITALS
BODY MASS INDEX: 51.38 KG/M2 | TEMPERATURE: 98.9 F | HEIGHT: 63 IN | WEIGHT: 290 LBS | HEART RATE: 84 BPM | RESPIRATION RATE: 18 BRPM | SYSTOLIC BLOOD PRESSURE: 137 MMHG | DIASTOLIC BLOOD PRESSURE: 81 MMHG | OXYGEN SATURATION: 98 %

## 2020-08-16 LAB
ALBUMIN SERPL-MCNC: 3.8 G/DL (ref 3.5–5.2)
ALP BLD-CCNC: 88 U/L (ref 35–104)
ALT SERPL-CCNC: 30 U/L (ref 0–32)
ANION GAP SERPL CALCULATED.3IONS-SCNC: 12 MMOL/L (ref 7–16)
AST SERPL-CCNC: 24 U/L (ref 0–31)
ATYPICAL LYMPHOCYTE RELATIVE PERCENT: 1 % (ref 0–4)
BACTERIA: ABNORMAL /HPF
BASOPHILS ABSOLUTE: 0 E9/L (ref 0–0.2)
BASOPHILS RELATIVE PERCENT: 0 % (ref 0–2)
BILIRUB SERPL-MCNC: 0.4 MG/DL (ref 0–1.2)
BILIRUBIN URINE: ABNORMAL
BLOOD, URINE: ABNORMAL
BUN BLDV-MCNC: 6 MG/DL (ref 6–20)
CALCIUM SERPL-MCNC: 8.6 MG/DL (ref 8.6–10.2)
CHLORIDE BLD-SCNC: 103 MMOL/L (ref 98–107)
CLARITY: CLEAR
CO2: 21 MMOL/L (ref 22–29)
COLOR: YELLOW
CREAT SERPL-MCNC: 0.6 MG/DL (ref 0.5–1)
EOSINOPHILS ABSOLUTE: 0.23 E9/L (ref 0.05–0.5)
EOSINOPHILS RELATIVE PERCENT: 5 % (ref 0–6)
EPITHELIAL CELLS, UA: ABNORMAL /HPF
GFR AFRICAN AMERICAN: >60
GFR NON-AFRICAN AMERICAN: >60 ML/MIN/1.73
GLUCOSE BLD-MCNC: 96 MG/DL (ref 74–99)
GLUCOSE URINE: NEGATIVE MG/DL
HCG(URINE) PREGNANCY TEST: NEGATIVE
HCT VFR BLD CALC: 38.6 % (ref 34–48)
HEMOGLOBIN: 12.7 G/DL (ref 11.5–15.5)
KETONES, URINE: ABNORMAL MG/DL
LACTIC ACID: 1.4 MMOL/L (ref 0.5–2.2)
LEUKOCYTE ESTERASE, URINE: NEGATIVE
LYMPHOCYTES ABSOLUTE: 1.56 E9/L (ref 1.5–4)
LYMPHOCYTES RELATIVE PERCENT: 33 % (ref 20–42)
MCH RBC QN AUTO: 29.3 PG (ref 26–35)
MCHC RBC AUTO-ENTMCNC: 32.9 % (ref 32–34.5)
MCV RBC AUTO: 88.9 FL (ref 80–99.9)
MONOCYTES ABSOLUTE: 0.28 E9/L (ref 0.1–0.95)
MONOCYTES RELATIVE PERCENT: 6 % (ref 2–12)
NEUTROPHILS ABSOLUTE: 2.53 E9/L (ref 1.8–7.3)
NEUTROPHILS RELATIVE PERCENT: 55 % (ref 43–80)
NITRITE, URINE: NEGATIVE
PDW BLD-RTO: 14.6 FL (ref 11.5–15)
PH UA: 6 (ref 5–9)
PLATELET # BLD: 161 E9/L (ref 130–450)
PMV BLD AUTO: 10.3 FL (ref 7–12)
POTASSIUM SERPL-SCNC: 3.7 MMOL/L (ref 3.5–5)
PROTEIN UA: ABNORMAL MG/DL
RBC # BLD: 4.34 E12/L (ref 3.5–5.5)
RBC UA: ABNORMAL /HPF (ref 0–2)
SODIUM BLD-SCNC: 136 MMOL/L (ref 132–146)
SPECIFIC GRAVITY UA: 1.02 (ref 1–1.03)
TOTAL PROTEIN: 7 G/DL (ref 6.4–8.3)
UROBILINOGEN, URINE: 1 E.U./DL
WBC # BLD: 4.6 E9/L (ref 4.5–11.5)
WBC UA: ABNORMAL /HPF (ref 0–5)

## 2020-08-16 PROCEDURE — 87088 URINE BACTERIA CULTURE: CPT

## 2020-08-16 PROCEDURE — 99284 EMERGENCY DEPT VISIT MOD MDM: CPT

## 2020-08-16 PROCEDURE — 71045 X-RAY EXAM CHEST 1 VIEW: CPT

## 2020-08-16 PROCEDURE — 81001 URINALYSIS AUTO W/SCOPE: CPT

## 2020-08-16 PROCEDURE — 80053 COMPREHEN METABOLIC PANEL: CPT

## 2020-08-16 PROCEDURE — 36415 COLL VENOUS BLD VENIPUNCTURE: CPT

## 2020-08-16 PROCEDURE — 81025 URINE PREGNANCY TEST: CPT

## 2020-08-16 PROCEDURE — 85025 COMPLETE CBC W/AUTO DIFF WBC: CPT

## 2020-08-16 PROCEDURE — 87040 BLOOD CULTURE FOR BACTERIA: CPT

## 2020-08-16 PROCEDURE — 2580000003 HC RX 258: Performed by: FAMILY MEDICINE

## 2020-08-16 PROCEDURE — 99283 EMERGENCY DEPT VISIT LOW MDM: CPT

## 2020-08-16 PROCEDURE — 83605 ASSAY OF LACTIC ACID: CPT

## 2020-08-16 RX ORDER — 0.9 % SODIUM CHLORIDE 0.9 %
2000 INTRAVENOUS SOLUTION INTRAVENOUS ONCE
Status: COMPLETED | OUTPATIENT
Start: 2020-08-16 | End: 2020-08-16

## 2020-08-16 RX ADMIN — SODIUM CHLORIDE 2000 ML: 9 INJECTION, SOLUTION INTRAVENOUS at 11:59

## 2020-08-16 NOTE — ED NOTES
Discharged-  To follow with pmd.  Appointment with Dr Itzel Vargas 9/1  approx 1600 cc infused-  Pt is ok with that-  Ready to leave     Gwendolyn Francisco RN  08/16/20 7640

## 2020-08-16 NOTE — ED PROVIDER NOTES
HPI:  8/16/20, Time: 11:34 AM EDT         Dbeo Gimenez is a 27 y.o. female presenting to the ED for fever and sweats sized 101 degrees taken at home she has been taking Tylenol for the fever intermittent headaches frontal she does have history of headaches there is no neck stiffness sinus pressure numbness tingling or weakness no blurred vision no nausea vomiting, beginning 1 day ago. The complaint has been intermittent, mild in severity, and worsened by nothing. There is been  dysuria no hematuria has had left sided flank pain. Patient is diabetic but does not have history of kidney stones. ROS:   Pertinent positives and negatives are stated within HPI, all other systems reviewed and are negative.  --------------------------------------------- PAST HISTORY ---------------------------------------------  Past Medical History:  has a past medical history of Anxiety, Asthma, Bipolar 1 disorder ([facility] COPD (chronic obstructive pulmonary disease) ([facility] [address] [facility] Depression, Fissure, anal, Neuropathy, PTSD (post-traumatic stress disorder), and Type 2 diabetes mellitus without complication, without long-term current use of insulin ([facility] [address] Past Surgical History:  has a past surgical history that includes Cholecystectomy; Tubal ligation; Anterior cruciate ligament repair; Dilation and curettage of uterus (N/A, 1/8/2019); other surgical history (1997); and Dilation and curettage of uterus (N/A, 2/25/2020). Social History:  reports that she has been smoking cigarettes. She started smoking about 24 years ago. She has a 20.00 pack-year smoking history. She has never used smokeless tobacco. She reports previous alcohol use. She reports that she does not use drugs. Family History: family history includes Cancer in her mother and sister; Diabetes in her father, mother, and sister; High Blood Pressure in her brother. The patients home medications have been reviewed. Allergies: Cymbalta [duloxetine hcl];  Keflex [cephalexin]; Neurontin [gabapentin]; and Lamictal [lamotrigine]    ---------------------------------------------------PHYSICAL EXAM--------------------------------------      Constitutional/General: Alert and oriented x3, well appearing, non toxic in NAD  Head: Normocephalic and atraumatic  Eyes: PERRL, EOMI  Mouth: Oropharynx clear, handling secretions, no trismus  Neck: Supple, full ROM, non tender to palpation in the midline, no stridor, no crepitus, no meningeal signs no lymphadenopathy  Pulmonary: Lungs clear to auscultation bilaterally, no wheezes, rales, or rhonchi. Not in respiratory distress  Cardiovascular:  Regular rate. Regular rhythm. No murmurs, gallops, or rubs. 2+ distal pulses  Chest: no chest wall tenderness  Abdomen: Soft. Non tender. Non distended. +BS. No rebound, guarding, or rigidity. No pulsatile masses appreciated. Back shows no CVA tenderness  Musculoskeletal: Moves all extremities x 4. Warm and well perfused, no clubbing, cyanosis, or edema. Capillary refill <3 seconds  Skin: warm and dry. No rashes. Neurologic: GCS 15, CN 2-12 grossly intact, no focal deficits, symmetric strength 5/5 in the upper and lower extremities bilaterally  Psych: Normal Affect    -------------------------------------------------- RESULTS -------------------------------------------------  I have personally reviewed all laboratory and imaging results for this patient. Results are listed below.      LABS:  Results for orders placed or performed during the hospital encounter of 08/16/20   Pregnancy, Urine   Result Value Ref Range    HCG(Urine) Pregnancy Test NEGATIVE NEGATIVE   Urinalysis with Microscopic   Result Value Ref Range    Color, UA Yellow Straw/Yellow    Clarity, UA Clear Clear    Glucose, Ur Negative Negative mg/dL    Bilirubin Urine SMALL (A) Negative    Ketones, Urine TRACE (A) Negative mg/dL    Specific Gravity, UA 1.025 1.005 - 1.030    Blood, Urine TRACE-INTACT Negative    pH, UA 6.0 5.0 - 9.0 Protein, UA TRACE Negative mg/dL    Urobilinogen, Urine 1.0 <2.0 E.U./dL    Nitrite, Urine Negative Negative    Leukocyte Esterase, Urine Negative Negative    WBC, UA NONE 0 - 5 /HPF    RBC, UA 1-3 0 - 2 /HPF    Epithelial Cells, UA RARE /HPF    Bacteria, UA NONE SEEN None Seen /HPF   CBC Auto Differential   Result Value Ref Range    WBC 4.6 4.5 - 11.5 E9/L    RBC 4.34 3.50 - 5.50 E12/L    Hemoglobin 12.7 11.5 - 15.5 g/dL    Hematocrit 38.6 34.0 - 48.0 %    MCV 88.9 80.0 - 99.9 fL    MCH 29.3 26.0 - 35.0 pg    MCHC 32.9 32.0 - 34.5 %    RDW 14.6 11.5 - 15.0 fL    Platelets 678 301 - 213 E9/L    MPV 10.3 7.0 - 12.0 fL   Comprehensive Metabolic Panel   Result Value Ref Range    Sodium 136 132 - 146 mmol/L    Potassium 3.7 3.5 - 5.0 mmol/L    Chloride 103 98 - 107 mmol/L    CO2 21 (L) 22 - 29 mmol/L    Anion Gap 12 7 - 16 mmol/L    Glucose 96 74 - 99 mg/dL    BUN 6 6 - 20 mg/dL    CREATININE 0.6 0.5 - 1.0 mg/dL    GFR Non-African American >60 >=60 mL/min/1.73    GFR African American >60     Calcium 8.6 8.6 - 10.2 mg/dL    Total Protein 7.0 6.4 - 8.3 g/dL    Alb 3.8 3.5 - 5.2 g/dL    Total Bilirubin 0.4 0.0 - 1.2 mg/dL    Alkaline Phosphatase 88 35 - 104 U/L    ALT 30 0 - 32 U/L    AST 24 0 - 31 U/L   Lactic Acid, Plasma   Result Value Ref Range    Lactic Acid 1.4 0.5 - 2.2 mmol/L       RADIOLOGY:  Interpreted by Radiologist.  XR CHEST PORTABLE   Final Result   No definite evidence of acute cardiopulmonary or chest wall pathology. ------------------------- NURSING NOTES AND VITALS REVIEWED ---------------------------   The nursing notes within the ED encounter and vital signs as below have been reviewed by myself. /70   Pulse 88   Temp 98.9 °F (37.2 °C)   Resp 18   Ht 5' 3\" (1.6 m)   Wt 290 lb (131.5 kg)   LMP 07/26/2020   SpO2 98%   BMI 51.37 kg/m²   Oxygen Saturation Interpretation: Normal    The patients available past medical records and past encounters were reviewed. ------------------------------ ED COURSE/MEDICAL DECISION MAKING----------------------  Medications   0.9 % sodium chloride bolus (2,000 mLs Intravenous New Bag 8/16/20 1159)             Medical Decision Making:      Patient is nontoxic afebrile patient exam is unremarkable chest x-ray as well as labs are otherwise within normal limits reexamination no new findings patient's headache has resolved. Will discharge home advised may want a rapid COVID test unfortunately we do not have the capability here. This patient's ED course included: re-evaluation prior to disposition, multiple bedside re-evaluations, IV medications, complex medical decision making and emergency management and a personal history and physicial eaxmination    This patient has remained hemodynamically stable during their ED course. Counseling: The emergency provider has spoken with the patient and discussed todays results, in addition to providing specific details for the plan of care and counseling regarding the diagnosis and prognosis. Questions are answered at this time and they are agreeable with the plan.       --------------------------------- IMPRESSION AND DISPOSITION ---------------------------------    IMPRESSION  1. Viral syndrome    2. Nonintractable headache, unspecified chronicity pattern, unspecified headache type        DISPOSITION  Disposition: Discharge to home  Patient condition is good        NOTE: This report was transcribed using voice recognition software.  Every effort was made to ensure accuracy; however, inadvertent computerized transcription errors may be present          Nader Pan MD  08/16/20 2249

## 2020-08-17 ENCOUNTER — CARE COORDINATION (OUTPATIENT)
Dept: CARE COORDINATION | Age: 35
End: 2020-08-17

## 2020-08-17 NOTE — CARE COORDINATION
Patient contacted regarding Belle Olivera. Discussed COVID-19 related testing which was not done at this time. Test results were not done. Patient informed of results, if available? NA    Care Transition Nurse/ Ambulatory Care Manager contacted the patient by telephone to perform post discharge assessment. Verified name and  with patient as identifiers. Provided introduction to self, and explanation of the CTN/ACM role, and reason for call due to risk factors for infection and/or exposure to COVID-19. Symptoms reviewed with patient who verbalized the following symptoms: fever, headache. Due to new onset of symptoms encounter was not routed to provider for escalation. Discussed follow-up appointments. If no appointment was previously scheduled, appointment scheduling offered: Yes  [facility] follow up appointment(s):   Future Appointments   Date Time Provider Niesha Yarbrough   2020 10:00 AM [facility]   2020  1:00 PM Rene Ku MD [facility]     Non-[facility] follow up appointment(s): NA     Advance Care Planning:   Does patient have an Advance Directive:  not on file. Patient has following risk factors of: COPD, asthma and diabetes. CTN/ACM reviewed discharge instructions, medical action plan and red flags such as increased shortness of breath, increasing fever and signs of decompensation with patient who verbalized understanding. Discussed exposure protocols and quarantine with CDC Guidelines What to do if you are sick with coronavirus disease .  Patient was given an opportunity for questions and concerns. The patient agrees to contact the [facility] exposure line [phone], local health department [place] Department of Health: ([phone]) and PCP office for questions related to their healthcare. CTN/ACM provided contact information for future needs.     Reviewed and educated patient on any new and changed medications related to discharge diagnosis Patient/family/caregiver given information for [employer] and agrees to enroll no  Patient's preferred e-mail: NA   Patient's preferred phone number: NA  Based on Loop alert triggers, patient will be contacted by nurse care manager for worsening symptoms. Plan for follow-up call in 5-7 days based on severity of symptoms and risk factors.

## 2020-08-18 LAB — URINE CULTURE, ROUTINE: NORMAL

## 2020-08-21 ENCOUNTER — TELEPHONE (OUTPATIENT)
Dept: FAMILY MEDICINE CLINIC | Age: 35
End: 2020-08-21

## 2020-08-21 LAB
BLOOD CULTURE, ROUTINE: NORMAL
CULTURE, BLOOD 2: NORMAL

## 2020-08-24 ENCOUNTER — CARE COORDINATION (OUTPATIENT)
Dept: CARE COORDINATION | Age: 35
End: 2020-08-24

## 2020-08-24 ENCOUNTER — TELEPHONE (OUTPATIENT)
Dept: FAMILY MEDICINE CLINIC | Age: 35
End: 2020-08-24

## 2020-08-24 NOTE — CARE COORDINATION
Patient contacted regarding COVID-19 risk and screening. Discussed COVID-19 related testing which was not done at this time. Test results were not done. Patient informed of results, if available? NA. Care Transition Nurse/ Ambulatory Care Manager contacted the patient by telephone to perform follow-up assessment. Verified name and  with patient as identifiers. Patient has following risk factors of: COPD, asthma and diabetes. Symptoms reviewed with patient who verbalized the following symptoms: fever, cough, chills or shaking and chest pain. Due to worsening symptoms encounter was routed to provider for escalation. Patient stated that her cough continues, her fever continues at 102-103. Tylenol does help bring fever down. States she has developed a rash on her chest and arms that gets worse when her fever rises. Patient did contact PCP's office on 20 and they advised to continue with tylenol and quarantine for 2 weeks. Call was made to PCP to notify that symptoms have continued to not get any better and further advisement for patient. Awaiting callback. Education provided regarding infection prevention, and signs and symptoms of COVID-19 and when to seek medical attention with patient who verbalized understanding. Discussed exposure protocols and quarantine from [address] you at higher risk for severe illness  and given an opportunity for questions and concerns. The patient agrees to contact the COVID-19 hotline [phone] or PCP office for questions related to their healthcare. CTN/ACM provided contact information for future reference. From CDC: Are you at higher risk for severe illness?  Wash your hands often.  Avoid close contact (6 feet, which is about two arm lengths) with people who are sick.  Put distance between yourself and other people if COVID-19 is spreading in your community.  Clean and disinfect frequently touched surfaces.    Avoid all cruise travel and non-essential air travel.  Call your healthcare professional if you have concerns about COVID-19 and your underlying condition or if you are sick. For more information on steps you can take to protect yourself, see CDC's How to [id number] [place] [address] for follow-up call in 3-5 days based on severity of symptoms and risk factors.

## 2020-08-27 ENCOUNTER — CARE COORDINATION (OUTPATIENT)
Dept: CARE COORDINATION | Age: 35
End: 2020-08-27

## 2020-08-27 NOTE — CARE COORDINATION
Date/Time:  8/27/2020 9:45 AM  Attempted to reach patient by telephone. Left HIPPA compliant message requesting a return call. Will attempt to reach patient again.

## 2020-09-01 ENCOUNTER — CARE COORDINATION (OUTPATIENT)
Dept: CARE COORDINATION | Age: 35
End: 2020-09-01

## 2020-09-01 NOTE — CARE COORDINATION
Date/Time:  9/1/2020 10:54 AM  Attempted to reach patient by telephone. Left HIPPA compliant message requesting a return call. Final attempt to contact patient regarding Covid 19 Monitoring. Advised to contact PCP with any further issues or concerns.

## 2020-09-14 ENCOUNTER — APPOINTMENT (OUTPATIENT)
Dept: ULTRASOUND IMAGING | Age: 35
End: 2020-09-14
Payer: COMMERCIAL

## 2020-09-14 ENCOUNTER — HOSPITAL ENCOUNTER (EMERGENCY)
Age: 35
Discharge: HOME | End: 2020-09-14
Payer: COMMERCIAL

## 2020-09-14 ENCOUNTER — APPOINTMENT (OUTPATIENT)
Dept: CT IMAGING | Age: 35
End: 2020-09-14
Payer: COMMERCIAL

## 2020-09-14 ENCOUNTER — TELEPHONE (OUTPATIENT)
Dept: ADMINISTRATIVE | Age: 35
End: 2020-09-14

## 2020-09-14 VITALS
OXYGEN SATURATION: 99 % | RESPIRATION RATE: 18 BRPM | TEMPERATURE: 98 F | HEIGHT: 63 IN | DIASTOLIC BLOOD PRESSURE: 74 MMHG | BODY MASS INDEX: 49.61 KG/M2 | HEART RATE: 82 BPM | WEIGHT: 280 LBS | SYSTOLIC BLOOD PRESSURE: 122 MMHG

## 2020-09-14 LAB
ALBUMIN SERPL-MCNC: 3.5 G/DL (ref 3.5–5.2)
ALP BLD-CCNC: 88 U/L (ref 35–104)
ALT SERPL-CCNC: 9 U/L (ref 0–32)
ANION GAP SERPL CALCULATED.3IONS-SCNC: 10 MMOL/L (ref 7–16)
AST SERPL-CCNC: 20 U/L (ref 0–31)
ATYPICAL LYMPHOCYTE RELATIVE PERCENT: 2 % (ref 0–4)
BACTERIA: NORMAL /HPF
BASOPHILS ABSOLUTE: 0 E9/L (ref 0–0.2)
BASOPHILS RELATIVE PERCENT: 0 % (ref 0–2)
BILIRUB SERPL-MCNC: 0.3 MG/DL (ref 0–1.2)
BILIRUBIN DIRECT: 0.2 MG/DL (ref 0–0.3)
BILIRUBIN URINE: NEGATIVE
BILIRUBIN, INDIRECT: 0.1 MG/DL (ref 0–1)
BLOOD, URINE: ABNORMAL
BUN BLDV-MCNC: 11 MG/DL (ref 6–20)
CALCIUM SERPL-MCNC: 8.7 MG/DL (ref 8.6–10.2)
CHLORIDE BLD-SCNC: 104 MMOL/L (ref 98–107)
CLARITY: ABNORMAL
CO2: 23 MMOL/L (ref 22–29)
COLOR: ABNORMAL
CREAT SERPL-MCNC: 0.6 MG/DL (ref 0.5–1)
EOSINOPHILS ABSOLUTE: 0.13 E9/L (ref 0.05–0.5)
EOSINOPHILS RELATIVE PERCENT: 2 % (ref 0–6)
GFR AFRICAN AMERICAN: >60
GFR NON-AFRICAN AMERICAN: >60 ML/MIN/1.73
GLUCOSE BLD-MCNC: 151 MG/DL (ref 74–99)
GLUCOSE URINE: NEGATIVE MG/DL
HCG(URINE) PREGNANCY TEST: NEGATIVE
HCT VFR BLD CALC: 35.4 % (ref 34–48)
HEMOGLOBIN: 11.6 G/DL (ref 11.5–15.5)
KETONES, URINE: NEGATIVE MG/DL
LACTIC ACID: 2 MMOL/L (ref 0.5–2.2)
LEUKOCYTE ESTERASE, URINE: NEGATIVE
LIPASE: 31 U/L (ref 13–60)
LYMPHOCYTES ABSOLUTE: 3.08 E9/L (ref 1.5–4)
LYMPHOCYTES RELATIVE PERCENT: 44 % (ref 20–42)
MCH RBC QN AUTO: 29.7 PG (ref 26–35)
MCHC RBC AUTO-ENTMCNC: 32.8 % (ref 32–34.5)
MCV RBC AUTO: 90.5 FL (ref 80–99.9)
MONOCYTES ABSOLUTE: 0.47 E9/L (ref 0.1–0.95)
MONOCYTES RELATIVE PERCENT: 7 % (ref 2–12)
NEUTROPHILS ABSOLUTE: 3.02 E9/L (ref 1.8–7.3)
NEUTROPHILS RELATIVE PERCENT: 45 % (ref 43–80)
NITRITE, URINE: NEGATIVE
PDW BLD-RTO: 15.3 FL (ref 11.5–15)
PH UA: 6 (ref 5–9)
PLATELET # BLD: 152 E9/L (ref 130–450)
PMV BLD AUTO: 10.6 FL (ref 7–12)
POTASSIUM SERPL-SCNC: 4.2 MMOL/L (ref 3.5–5)
PROTEIN UA: 100 MG/DL
RBC # BLD: 3.91 E12/L (ref 3.5–5.5)
RBC UA: NORMAL /HPF (ref 0–2)
SODIUM BLD-SCNC: 137 MMOL/L (ref 132–146)
SPECIFIC GRAVITY UA: 1.02 (ref 1–1.03)
TOTAL PROTEIN: 7.1 G/DL (ref 6.4–8.3)
UROBILINOGEN, URINE: 0.2 E.U./DL
WBC # BLD: 6.7 E9/L (ref 4.5–11.5)
WBC UA: NORMAL /HPF (ref 0–5)

## 2020-09-14 PROCEDURE — 6370000000 HC RX 637 (ALT 250 FOR IP): Performed by: NURSE PRACTITIONER

## 2020-09-14 PROCEDURE — 81025 URINE PREGNANCY TEST: CPT

## 2020-09-14 PROCEDURE — 6360000004 HC RX CONTRAST MEDICATION: Performed by: RADIOLOGY

## 2020-09-14 PROCEDURE — 74177 CT ABD & PELVIS W/CONTRAST: CPT

## 2020-09-14 PROCEDURE — 36415 COLL VENOUS BLD VENIPUNCTURE: CPT

## 2020-09-14 PROCEDURE — 76856 US EXAM PELVIC COMPLETE: CPT

## 2020-09-14 PROCEDURE — 99285 EMERGENCY DEPT VISIT HI MDM: CPT

## 2020-09-14 PROCEDURE — 96374 THER/PROPH/DIAG INJ IV PUSH: CPT

## 2020-09-14 PROCEDURE — 6360000002 HC RX W HCPCS: Performed by: NURSE PRACTITIONER

## 2020-09-14 PROCEDURE — 83690 ASSAY OF LIPASE: CPT

## 2020-09-14 PROCEDURE — 80048 BASIC METABOLIC PNL TOTAL CA: CPT

## 2020-09-14 PROCEDURE — 93975 VASCULAR STUDY: CPT

## 2020-09-14 PROCEDURE — 99284 EMERGENCY DEPT VISIT MOD MDM: CPT

## 2020-09-14 PROCEDURE — 85025 COMPLETE CBC W/AUTO DIFF WBC: CPT

## 2020-09-14 PROCEDURE — 2580000003 HC RX 258: Performed by: NURSE PRACTITIONER

## 2020-09-14 PROCEDURE — 80076 HEPATIC FUNCTION PANEL: CPT

## 2020-09-14 PROCEDURE — 81001 URINALYSIS AUTO W/SCOPE: CPT

## 2020-09-14 PROCEDURE — 83605 ASSAY OF LACTIC ACID: CPT

## 2020-09-14 RX ORDER — KETOROLAC TROMETHAMINE 30 MG/ML
15 INJECTION, SOLUTION INTRAMUSCULAR; INTRAVENOUS ONCE
Status: COMPLETED | OUTPATIENT
Start: 2020-09-14 | End: 2020-09-14

## 2020-09-14 RX ORDER — ACETAMINOPHEN 500 MG
500 TABLET ORAL EVERY 6 HOURS PRN
Qty: 20 TABLET | Refills: 0 | Status: SHIPPED | OUTPATIENT
Start: 2020-09-14 | End: 2021-02-18

## 2020-09-14 RX ORDER — ONDANSETRON 4 MG/1
4 TABLET, ORALLY DISINTEGRATING ORAL EVERY 8 HOURS PRN
Qty: 10 TABLET | Refills: 0 | Status: SHIPPED | OUTPATIENT
Start: 2020-09-14 | End: 2021-02-15

## 2020-09-14 RX ORDER — POLYETHYLENE GLYCOL 3350 17 G/17G
POWDER, FOR SOLUTION ORAL
Qty: 1 BOTTLE | Refills: 0 | Status: SHIPPED | OUTPATIENT
Start: 2020-09-14 | End: 2020-09-24

## 2020-09-14 RX ORDER — 0.9 % SODIUM CHLORIDE 0.9 %
1000 INTRAVENOUS SOLUTION INTRAVENOUS ONCE
Status: COMPLETED | OUTPATIENT
Start: 2020-09-14 | End: 2020-09-14

## 2020-09-14 RX ADMIN — MAGNESIUM CITRATE 300 ML: 1.75 LIQUID ORAL at 17:04

## 2020-09-14 RX ADMIN — KETOROLAC TROMETHAMINE 15 MG: 30 INJECTION, SOLUTION INTRAMUSCULAR at 14:04

## 2020-09-14 RX ADMIN — SODIUM CHLORIDE 1000 ML: 9 INJECTION, SOLUTION INTRAVENOUS at 14:04

## 2020-09-14 RX ADMIN — IOPAMIDOL 110 ML: 755 INJECTION, SOLUTION INTRAVENOUS at 14:50

## 2020-09-14 ASSESSMENT — PAIN DESCRIPTION - ORIENTATION
ORIENTATION: RIGHT;LOWER
ORIENTATION: RIGHT;LOWER

## 2020-09-14 ASSESSMENT — PAIN DESCRIPTION - PAIN TYPE
TYPE: ACUTE PAIN

## 2020-09-14 ASSESSMENT — PAIN DESCRIPTION - LOCATION
LOCATION: PELVIS;VAGINA
LOCATION: ABDOMEN
LOCATION: ABDOMEN

## 2020-09-14 ASSESSMENT — PAIN SCALES - GENERAL
PAINLEVEL_OUTOF10: 4
PAINLEVEL_OUTOF10: 4
PAINLEVEL_OUTOF10: 3

## 2020-09-14 ASSESSMENT — PAIN DESCRIPTION - DESCRIPTORS
DESCRIPTORS: HEAVINESS
DESCRIPTORS: DISCOMFORT;CRAMPING
DESCRIPTORS: CRAMPING

## 2020-09-14 ASSESSMENT — PAIN DESCRIPTION - FREQUENCY: FREQUENCY: CONTINUOUS

## 2020-09-14 NOTE — TELEPHONE ENCOUNTER
Heavy period bleeding, having painful intercourse, feels like tube is getting hit; prev discussed hysterectomy

## 2020-09-14 NOTE — ED NOTES
Patient given discharge paperwork at this time. Patient also given scripts. Patient has no further questions at this time. Nurse provided education to patient. Patient is alert and oriented and VS are stable at this time.       Estephania Fernandez RN  09/14/20 0113

## 2020-09-14 NOTE — ED PROVIDER NOTES
[facility]         Department of Emergency Medicine   ED  Provider Note  Admit Date/RoomTime: 9/14/2020 12:36 PM  ED Room: 02/02  Chief Complaint      Vaginal Bleeding (heavy bleeding for a few days, even during intercourse having pain and increase in bleeding)    History of Present Illness   Source of history provided by:  patient. History/Exam Limitations: none. Darnell Ashby is a 27 y.o. old female who has a past medical Hx of:   Past Medical History:   Diagnosis Date    Anxiety     Asthma     [facility] ([facility]     COPD (chronic obstructive pulmonary disease) ([facility]     Depression     Fissure, anal     Neuropathy     PTSD (post-traumatic stress disorder)     Type 2 diabetes mellitus without complication, without long-term current use of insulin ([facility] [address]     presents to the emergency department by private vehicle, for lower abdominal pain greater on right and heavy vaginal bleeding that started 2 days ago and states she has gone thought 24 pads in the past 2 days. Since onset the symptoms have been constant and moderate in severity. Symptoms are associated with painful intercourse and denies any :additional complaints. She denies any chest pain, shortness of breath, nausea, vomiting, fever, chills, weakness, fatigue, dysuria, urinary frequency, constipation or diarrhea. GYN History: irregular periods. STD History: no history of PID, STD's. No LMP recorded. 9/12/20  Current pregnancy: No.     Birth Control: Tubal ligation  Gravid Status: [id number]    ROS    Pertinent positives and negatives are stated within HPI, all other systems reviewed and are negative.     Past Surgical History:   Procedure Laterality Date    ANTERIOR CRUCIATE LIGAMENT REPAIR      CHOLECYSTECTOMY      DILATION AND CURETTAGE OF UTERUS N/A 1/8/2019    DILATATION AND CURETTAGE HYSTEROSCOPY WITH REMOVAL OF CONDYLOMATA performed by Filomena Black MD at [facility] N/A 2/25/2020 DILATATION AND CURETTAGE HYSTEROSCOPY, ATTEMPTED CERVICAL BIOPSY, MARIYA OF  LABIAL ABSCESS performed by Enriqueta Menon MD at [address] [place] [address]    repair anal fissure    TUBAL LIGATION     Social History:  reports that she has been smoking cigarettes. She started smoking about 24 years ago. She has a 20.00 pack-year smoking history. She has never used smokeless tobacco. She reports previous alcohol use. She reports that she does not use drugs. Family History: family history includes Cancer in her mother and sister; Diabetes in her father, mother, and sister; High Blood Pressure in her brother. Allergies: Cymbalta [duloxetine hcl]; Keflex [cephalexin]; Neurontin [gabapentin]; and Lamictal [lamotrigine]    Physical Exam           ED Triage Vitals   BP Temp Temp Source Pulse Resp SpO2 Height Weight   09/14/20 1229 09/14/20 1229 09/14/20 1229 09/14/20 1229 09/14/20 1229 09/14/20 1229 09/14/20 1239 09/14/20 1239   (!) 128/92 98 °F (36.7 °C) Infrared 98 16 98 % 5' 3\" (1.6 m) 280 lb (127 kg)      Oxygen Saturation Interpretation: Normal.    General Appearance/Constitutional:  Alert, development consistent with age, NAD. HEENT:  NC/NT. PERRLA. Airway patent. Neck:  Supple. No lymphadenopathy. Respiratory:  Clear to auscultation and breath sounds equal.  CV:  Regular rate and rhythm. GI:  General Appearance: normal.       Bowel sounds: normal bowel sounds. Distension:  None. Tenderness: moderate tenderness is present in the RLQ, no rebound tenderness, no guarding, abdominal rigidity is absent. Liver: non-palpable and non-tender. Spleen:  non-palpable and non-tender. Pulsatile Mass: absent. Hernia:  no inguinal or femoral hernias noted. Back: CVA Tenderness: No.  : (chaperone present during examination). [address] present.        External Genitalia: General appearance; normal, Hair distribution; normal, Lesions absent. Urethral Meatus: Size normal, Location normal, Lesions absent, Prolapse absent. Vagina: no abnormal discharge or lesions and Wet Prep/KOH no pathogens and .       Cervix: cervical motion tenderness absent and unable to visualize cervix due to vaginal walls collapsing. Uterus:  normal size, contour, position, consistency, mobility, non-tender. Adenexa: tenderness on left. .       Anus/Perineum:  normal.  Integument:  Normal turgor. Warm, dry, without visible rash, unless noted elsewhere. Lymphatics: No lymphangitis or adenopathy noted. Neurological:  Orientation age-appropriate. Motor functions intact.     Lab / Imaging Results   (All laboratory and radiology results have been personally reviewed by myself)  Labs:  Results for orders placed or performed during the hospital encounter of 09/14/20   CBC auto differential   Result Value Ref Range    WBC 6.7 4.5 - 11.5 E9/L    RBC 3.91 3.50 - 5.50 E12/L    Hemoglobin 11.6 11.5 - 15.5 g/dL    Hematocrit 35.4 34.0 - 48.0 %    MCV 90.5 80.0 - 99.9 fL    MCH 29.7 26.0 - 35.0 pg    MCHC 32.8 32.0 - 34.5 %    RDW 15.3 (H) 11.5 - 15.0 fL    Platelets 618 012 - 701 E9/L    MPV 10.6 7.0 - 12.0 fL   Basic Metabolic Panel   Result Value Ref Range    Sodium 137 132 - 146 mmol/L    Potassium 4.2 3.5 - 5.0 mmol/L    Chloride 104 98 - 107 mmol/L    CO2 23 22 - 29 mmol/L    Anion Gap 10 7 - 16 mmol/L    Glucose 151 (H) 74 - 99 mg/dL    BUN 11 6 - 20 mg/dL    CREATININE 0.6 0.5 - 1.0 mg/dL    GFR Non-African American >60 >=60 mL/min/1.73    GFR African American >60     Calcium 8.7 8.6 - 10.2 mg/dL   Hepatic function panel   Result Value Ref Range    Total Protein 7.1 6.4 - 8.3 g/dL    Alb 3.5 3.5 - 5.2 g/dL    Alkaline Phosphatase 88 35 - 104 U/L    ALT 9 0 - 32 U/L    AST 20 0 - 31 U/L    Total Bilirubin 0.3 0.0 - 1.2 mg/dL    Bilirubin, Direct 0.2 0.0 - 0.3 mg/dL    Bilirubin, Indirect 0.1 0.0 - 1.0 mg/dL   Lipase   Result Value Ref Range Lipase 31 13 - 60 U/L   Lactic acid, plasma   Result Value Ref Range    Lactic Acid 2.0 0.5 - 2.2 mmol/L   Urinalysis   Result Value Ref Range    Color, UA RED (A) Straw/Yellow    Clarity, UA SL CLOUDY Clear    Glucose, Ur Negative Negative mg/dL    Bilirubin Urine Negative Negative    Ketones, Urine Negative Negative mg/dL    Specific Gravity, UA 1.025 1.005 - 1.030    Blood, Urine LARGE (A) Negative    pH, UA 6.0 5.0 - 9.0    Protein,  (A) Negative mg/dL    Urobilinogen, Urine 0.2 <2.0 E.U./dL    Nitrite, Urine Negative Negative    Leukocyte Esterase, Urine Negative Negative   Pregnancy, Urine   Result Value Ref Range    HCG(Urine) Pregnancy Test NEGATIVE NEGATIVE   Microscopic Urinalysis   Result Value Ref Range    WBC, UA NONE 0 - 5 /HPF    RBC, UA PACKED 0 - 2 /HPF    Bacteria, UA NONE SEEN None Seen /HPF     Imaging: All Radiology results interpreted by Radiologist unless otherwise noted. US PELVIS COMPLETE   Final Result      NORMAL TRANSABDOMINAL AND TRANSVAGINAL PELVIC SONOGRAM.          US DUP ABD PEL RETRO SCROT COMPLETE   Final Result      NORMAL TRANSABDOMINAL AND TRANSVAGINAL PELVIC SONOGRAM.          CT ABDOMEN PELVIS W IV CONTRAST Additional Contrast? None   Final Result      1. Increased splenomegaly 18.4 cm.         2. Negative for venous thrombosis. No ascites or obvious varices. 3. 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 4. Normal appendix. 5. Constipation proximal colon, empty distal colon. No obstruction.                  ED Course / Medical Decision Making     Medications   magnesium citrate solution 300 mL (has no administration in time range)   0.9 % sodium chloride bolus (0 mLs Intravenous Stopped 9/14/20 1436)   ketorolac (TORADOL) injection 15 mg (15 mg Intravenous Given 9/14/20 1404)   iopamidol (ISOVUE-370) 76 % injection 110 mL (110 mLs Intravenous Given 9/14/20 1450)        Re-examination:  9/14/20       Time: 9482 Patients symptoms are improving and is taking oral fluids and has decrease in bleeding and not changed pad in the past hour. Consults:   None    Procedures:   none    MDM:   Vaginal examination reveals small amount of dark menstrual blood in the vaginal vault unable to fully visualize the cervix due to vaginal walls collapsing onto speculum. Patient is tired of heavy menstrual bleeding and feels something has to be done and thinks she need a hysterectomy. Patient does have right adnexal tenderness and will obtain ultrasound and labs. Case and CT findings of old kidney infarct discussed with Dr. Cindy Moeller prior to discharge. She was given mag citrate for constipation and advised on MiraLAX and follow-up with urology for history of kidney infarction for reevaluation. Patient has enlargement of her spleen and was given a surgeon for follow-up. She can follow-up with Dr. Hailey Liz for her abnormal uterine bleeding and her hemoglobin was 11.6 and has no associated dizziness or hypotension. US reveal no acute findings. Patient advised on follow-up with gynecologist.       Counseling: The emergency provider has spoken with the patient and discussed todays results, in addition to providing specific details for the plan of care and counseling regarding the diagnosis and prognosis. Questions are answered at this time and they are agreeable with the plan. Assessment      1. Abdominal pain, right lower quadrant    2. Pelvic pain    3. Menorrhagia with irregular cycle    4. Enlargement of spleen    5. Infarction of kidney ([address]    6. Nausea    7. Constipation, unspecified constipation type      Plan   Discharge to home  Patient condition is good    New Medications     New Prescriptions    No medications on file     Electronically signed by VELMA Benavidez CNP   DD: 9/14/20  **This report was transcribed using voice recognition software. Every effort was made to ensure accuracy; however, inadvertent computerized transcription errors may be present.   END OF ED PROVIDER NOTE      VELMA Snowden - KATE  09/16/20 0022

## 2020-09-14 NOTE — TELEPHONE ENCOUNTER
This nurse spoke with patient and patient states she is having very heavy bleeding. Changing her pad every hour and it is saturated with blood. States when she stands up blood pours out. This nurse advised patient to go to ED  Patient states she is going to go to [facility] ED. Patient does have an appointment for 8/6/2020 and this nurse kept patient on the schedule for that date.

## 2020-09-15 ENCOUNTER — TELEPHONE (OUTPATIENT)
Dept: FAMILY MEDICINE CLINIC | Age: 35
End: 2020-09-15

## 2020-09-15 ENCOUNTER — TELEPHONE (OUTPATIENT)
Dept: SURGERY | Age: 35
End: 2020-09-15

## 2020-09-18 ENCOUNTER — VIRTUAL VISIT (OUTPATIENT)
Dept: FAMILY MEDICINE CLINIC | Age: 35
End: 2020-09-18
Payer: COMMERCIAL

## 2020-09-18 ENCOUNTER — TELEPHONE (OUTPATIENT)
Dept: FAMILY MEDICINE CLINIC | Age: 35
End: 2020-09-18

## 2020-09-18 PROCEDURE — 81003 URINALYSIS AUTO W/O SCOPE: CPT | Performed by: PHYSICIAN ASSISTANT

## 2020-09-18 PROCEDURE — 99214 OFFICE O/P EST MOD 30 MIN: CPT | Performed by: PHYSICIAN ASSISTANT

## 2020-09-18 RX ORDER — FLUOROMETHOLONE 0.1 %
1 SUSPENSION, DROPS(FINAL DOSAGE FORM)(ML) OPHTHALMIC (EYE) EVERY 4 HOURS
COMMUNITY
End: 2020-10-22 | Stop reason: ALTCHOICE

## 2020-09-18 ASSESSMENT — ENCOUNTER SYMPTOMS
SHORTNESS OF BREATH: 0
EYE DISCHARGE: 0
SINUS PRESSURE: 0
EYE PAIN: 0
COUGH: 0
ABDOMINAL PAIN: 0
VOMITING: 0
NAUSEA: 0
SINUS PAIN: 0
EYE REDNESS: 0
CHEST TIGHTNESS: 0
SORE THROAT: 0
DIARRHEA: 0
CONSTIPATION: 1
BACK PAIN: 1

## 2020-09-18 NOTE — PROGRESS NOTES
TeleMedicine Patient Consent    This visit was performed as a virtual video visit using a synchronous, two-way, audio-video telehealth technology platform. Patient identification was verified at the start of the visit, including the patient's telephone number and physical location. I discussed with the patient the nature of our telehealth visits, that:     1. Due to the nature of an audio- video modality, the only components of a physical exam that could be done are the elements supported by direct observation. 2. I would evaluate the patient and recommend diagnostics and treatments based on my assessment. 3. If it was felt that the patient should be evaluated in clinic or an emergency room setting, then they would be directed there. 4. Our sessions are not being recorded and that personal health information is protected. 5. Our team would provide follow up care in person if/when the patient needs it. Patient does agree to proceed with telemedicine consultation. Patient's location: home address in [place] [address]  location home address in [facility]. Time spent: Greater than Not billed by time    This visit was completed virtually using Doxy. me             2020     Mary Soni (:  1985) is a 27 y.o. female, here for evaluation of the following medical concerns:    HPI  Patient requested virtual visit to discuss recent issues as well as recent ER visit. The patient reports that she has been having a fever on and off over the last month in August. She reports that she was very fatigued as well as she developed a \"rash\", especially when the fever increased. She did go to ER in mid August with left sided flank pain and complaints of fever. She was told she had a virus and was sent home, but the fevers continued. She was contacted several times and told to go to flu clinic for COVID testing, but she did not want to go and get exposed to possible people with COVID.  She has no current fever, but went to ER again a few days ago because of severe heavy menstrual bleeding, soaking \"24 pads\" in 2 days. She was noted to be constipated but she also was noted to have left inferior kidney infarct as well as increased splenomegaly. She has been having pain to her LUQ as well as left flank pain. She is having currently a lot of fullness to her left upper abdomen. She has now gotten her bowels to move and has been urinating ok. She is scheduled to see Dr. Deshawn Mead from surgery on 9-24, Urology([facility]) on 9-29 and GYN [facility] on 10-6. She did recently see eye [facility] and told she had some \"blood in her eye\" as she has been having some visual problems. I did provide her office fax so we can get notes/documentation concerning this. She does have diabetes and is scheduled to see endocrine, but not until November. She is due for HGBA1C. Review of Systems   Constitutional: Positive for appetite change (some feeling of early fullness with LUQ pain). Negative for chills, fever and unexpected weight change. HENT: Negative for congestion (denies), ear pain, sinus pressure, sinus pain and sore throat (patient denies). Eyes: Positive for visual disturbance (recently saw her eye specialist, will forward notes and has additional follow up). Negative for pain, discharge and redness. Respiratory: Negative for cough (denies), chest tightness and shortness of breath. Cardiovascular: Positive for leg swelling (on and off). Negative for chest pain. Gastrointestinal: Positive for constipation (recently, now resolved). Negative for abdominal pain (LUQ), diarrhea, nausea and vomiting. Genitourinary: Positive for flank pain (left, persistent with LUQ pain), menstrual problem (recently in ER for heavy bleeding, has GYN follow up) and vaginal bleeding (recently in ER). Negative for decreased urine volume, dysuria and frequency.         Urine still seems \"cloudy\" per patient  She reports when she didn't feel well her urine looked \"dark or brown\". Musculoskeletal: Positive for back pain (left flank area). Negative for arthralgias and neck stiffness. Skin: Positive for rash (when she was having fevers. ). Negative for wound (denies). Allergic/Immunologic: Negative for immunocompromised state (unaware). Neurological: Positive for numbness (neuropathy, no recent foot exam) and headaches (intermittent, mild). Negative for dizziness, syncope, weakness and light-headedness. Hematological: Negative for adenopathy. Psychiatric/Behavioral: The patient is nervous/anxious (taking medication). All other systems reviewed and are negative. Prior to Visit Medications    Medication Sig Taking? Authorizing Provider   fluorometholone (FML) 0.1 % ophthalmic suspension 1 drop every 4 hours Yes Historical Provider, MD   acetaminophen (APAP EXTRA STRENGTH) 500 MG tablet Take 1 tablet by mouth every 6 hours as needed for Pain Yes VELMA Morrison CNP   ondansetron (ZOFRAN ODT) 4 MG disintegrating tablet Take 1 tablet by mouth every 8 hours as needed for Nausea or Vomiting Yes VELMA Morrison CNP   polyethylene glycol (MIRALAX) 17 GM/SCOOP powder Take 17 g by mouth daily for 14 days. Dispense QS, and no refills Yes VELMA Morrison CNP   LORazepam (ATIVAN) 0.5 MG tablet Take 0.5 mg by mouth daily.  Yes Historical Provider, MD   hydroCHLOROthiazide (HYDRODIURIL) 25 MG tablet 1 tab po daily prn fluid retention Yes VELMA Delacruz CNP   divalproex (DEPAKOTE) 500 MG DR tablet Take 500 mg by mouth 2 times daily Yes Historical Provider, MD   atorvastatin (LIPITOR) 40 MG tablet Take 1 tablet by mouth nightly Yes Mela Reid,    vitamin B-12 (CYANOCOBALAMIN) 1000 MCG tablet Take 1 tablet by mouth daily Yes Robinson Yo PA-C   methocarbamol (ROBAXIN) 500 MG tablet Take 1 tablet by mouth 4 times daily Yes Robinson Yo PA-C   albuterol (PROVENTIL) (2.5 MG/3ML) 0.083% nebulizer solution Take 3 mLs by nebulization every 6 hours as needed for Wheezing Yes Dayana Quinonez PA-C   blood glucose monitor kit and supplies Test 3 times a day as needed for symptoms of irregular blood glucose. Yes Dayana Quinonez PA-C   Lancets MISC 1 each by Does not apply route 3 times daily Yes Dayana Quinonez PA-C   blood glucose monitor strips Test 3 times a day as needed for symptoms of irregular blood glucose. Yes Dayana Quinonez PA-C   metFORMIN (GLUCOPHAGE) 500 MG tablet Take 1 tablet by mouth 2 times daily (with meals) Yes Dayana Quinonez PA-C   venlafaxine (EFFEXOR XR) 150 MG extended release capsule Take 1 capsule by mouth daily Yes Dayana Quinonez PA-C   albuterol sulfate HFA (PROVENTIL HFA) 108 (90 Base) MCG/ACT inhaler Inhale 2 puffs into the lungs every 4 hours as needed for Wheezing Yes Sherry Lua DO          There were no vitals filed for this visit. Estimated body mass index is 49.6 kg/m² as calculated from the following:    Height as of 9/14/20: 5' 3\" (1.6 m). Weight as of 9/14/20: 280 lb (127 kg). Physical Exam  Patient alert, non toxic in appearance and very conversant. ASSESSMENT/PLAN:  Mckenzie Weston was seen today for follow-up, fever, diabetes and other. Diagnoses and all orders for this visit:    Infarction of kidney ([facility]  -     PERIPHERAL BLOOD SMEAR, PATH REVIEW; Future  -     ANTISTREPTOLYSIN O TITER; Future  -     Laurey Deems Virus (EBV) Antibody Panel I; Future  -     COMPREHENSIVE METABOLIC PANEL; Future    Type 2 diabetes mellitus without complication, without long-term current use of insulin (HCC)  -     MICROALBUMIN / CREATININE URINE RATIO; Future  -     HEMOGLOBIN A1C; Future  -     COMPREHENSIVE METABOLIC PANEL;  Future    Diabetic polyneuropathy associated with type 2 diabetes mellitus ([facility]  -     External Referral To Podiatry    Persistent fever  -     PERIPHERAL BLOOD SMEAR, PATH REVIEW; Future  -     ANTISTREPTOLYSIN O TITER;

## 2020-09-18 NOTE — TELEPHONE ENCOUNTER
----- Message from Lajuana Boeck, PA-C sent at 9/18/2020  1:39 PM EDT -----  Recheck 1 month AWV and can discuss other things then.

## 2020-09-18 NOTE — PATIENT INSTRUCTIONS
breathing. · You passed out (lost consciousness). Call your doctor now or seek immediate medical care if:  · You seem to be getting much sicker. · You have a new or higher fever. · You have blood in your stools. · You have new belly pain, or your pain gets worse. · You have a new rash. Watch closely for changes in your health, and be sure to contact your doctor if:  · You start to get better and then get worse. · You do not get better as expected. Where can you learn more? Go to https://Oxehealthpe51credit.comeb.DUHEM. org and sign in to your Ouner account. Enter U886 in the Reelhouse box to learn more about \"Viral Infections: Care Instructions. \"     If you do not have an account, please click on the \"Sign Up Now\" link. Current as of: February 11, 2020               Content Version: 12.5  © 3356-7783 Healthwise, Incorporated. Care instructions adapted under license by [facility] ([facility]). If you have questions about a medical condition or this instruction, always ask your healthcare professional. [address] any warranty or liability for your use of this information.

## 2020-09-19 ENCOUNTER — HOSPITAL ENCOUNTER (OUTPATIENT)
Age: 35
Discharge: HOME | End: 2020-09-19
Payer: COMMERCIAL

## 2020-09-19 LAB
ALBUMIN SERPL-MCNC: 3.7 G/DL (ref 3.5–5.2)
ALP BLD-CCNC: 94 U/L (ref 35–104)
ALT SERPL-CCNC: 18 U/L (ref 0–32)
ANION GAP SERPL CALCULATED.3IONS-SCNC: 9 MMOL/L (ref 7–16)
ANTISTREPTOLYSIN-O: 186 IU/ML (ref 0–200)
AST SERPL-CCNC: 19 U/L (ref 0–31)
BACTERIA: ABNORMAL /HPF
BASOPHILS ABSOLUTE: 0 E9/L (ref 0–0.2)
BASOPHILS RELATIVE PERCENT: 0 % (ref 0–2)
BILIRUB SERPL-MCNC: 0.3 MG/DL (ref 0–1.2)
BILIRUBIN URINE: NEGATIVE
BLOOD, URINE: NEGATIVE
BUN BLDV-MCNC: 11 MG/DL (ref 6–20)
C-REACTIVE PROTEIN: 1 MG/DL (ref 0–0.4)
CALCIUM SERPL-MCNC: 8.8 MG/DL (ref 8.6–10.2)
CHLORIDE BLD-SCNC: 105 MMOL/L (ref 98–107)
CLARITY: ABNORMAL
CO2: 25 MMOL/L (ref 22–29)
COLOR: YELLOW
CREAT SERPL-MCNC: 0.9 MG/DL (ref 0.5–1)
CREATININE URINE: 261 MG/DL (ref 29–226)
EOSINOPHILS ABSOLUTE: 0.21 E9/L (ref 0.05–0.5)
EOSINOPHILS RELATIVE PERCENT: 3 % (ref 0–6)
EPITHELIAL CELLS, UA: ABNORMAL /HPF
GFR AFRICAN AMERICAN: >60
GFR NON-AFRICAN AMERICAN: >60 ML/MIN/1.73
GLUCOSE BLD-MCNC: 99 MG/DL (ref 74–99)
GLUCOSE URINE: NEGATIVE MG/DL
HBA1C MFR BLD: 4.9 % (ref 4–5.6)
HCT VFR BLD CALC: 38.2 % (ref 34–48)
HEMOGLOBIN: 12.5 G/DL (ref 11.5–15.5)
KETONES, URINE: ABNORMAL MG/DL
LEUKOCYTE ESTERASE, URINE: NEGATIVE
LYMPHOCYTES ABSOLUTE: 3.27 E9/L (ref 1.5–4)
LYMPHOCYTES RELATIVE PERCENT: 46 % (ref 20–42)
MCH RBC QN AUTO: 29.8 PG (ref 26–35)
MCHC RBC AUTO-ENTMCNC: 32.7 % (ref 32–34.5)
MCV RBC AUTO: 91.2 FL (ref 80–99.9)
MICROALBUMIN UR-MCNC: 15.5 MG/L
MICROALBUMIN/CREAT UR-RTO: 5.9 (ref 0–30)
MONO TEST: NEGATIVE
MONOCYTES ABSOLUTE: 0.5 E9/L (ref 0.1–0.95)
MONOCYTES RELATIVE PERCENT: 7 % (ref 2–12)
NEUTROPHILS ABSOLUTE: 3.12 E9/L (ref 1.8–7.3)
NEUTROPHILS RELATIVE PERCENT: 44 % (ref 43–80)
NITRITE, URINE: NEGATIVE
PDW BLD-RTO: 14.9 FL (ref 11.5–15)
PH UA: 6.5 (ref 5–9)
PLATELET # BLD: 85 E9/L (ref 130–450)
PLATELET CONFIRMATION: NORMAL
PMV BLD AUTO: 12 FL (ref 7–12)
POTASSIUM SERPL-SCNC: 4 MMOL/L (ref 3.5–5)
PROTEIN UA: NEGATIVE MG/DL
RBC # BLD: 4.19 E12/L (ref 3.5–5.5)
RBC UA: ABNORMAL /HPF (ref 0–2)
SODIUM BLD-SCNC: 139 MMOL/L (ref 132–146)
SPECIFIC GRAVITY UA: 1.02 (ref 1–1.03)
TOTAL PROTEIN: 7.1 G/DL (ref 6.4–8.3)
UROBILINOGEN, URINE: 1 E.U./DL
WBC # BLD: 7.1 E9/L (ref 4.5–11.5)
WBC UA: ABNORMAL /HPF (ref 0–5)

## 2020-09-19 PROCEDURE — 82570 ASSAY OF URINE CREATININE: CPT

## 2020-09-19 PROCEDURE — 87088 URINE BACTERIA CULTURE: CPT

## 2020-09-19 PROCEDURE — 86665 EPSTEIN-BARR CAPSID VCA: CPT

## 2020-09-19 PROCEDURE — 86592 SYPHILIS TEST NON-TREP QUAL: CPT

## 2020-09-19 PROCEDURE — 80053 COMPREHEN METABOLIC PANEL: CPT

## 2020-09-19 PROCEDURE — 80074 ACUTE HEPATITIS PANEL: CPT

## 2020-09-19 PROCEDURE — 86663 EPSTEIN-BARR ANTIBODY: CPT

## 2020-09-19 PROCEDURE — 86060 ANTISTREPTOLYSIN O TITER: CPT

## 2020-09-19 PROCEDURE — 86664 EPSTEIN-BARR NUCLEAR ANTIGEN: CPT

## 2020-09-19 PROCEDURE — 86140 C-REACTIVE PROTEIN: CPT

## 2020-09-19 PROCEDURE — 85025 COMPLETE CBC W/AUTO DIFF WBC: CPT

## 2020-09-19 PROCEDURE — 36415 COLL VENOUS BLD VENIPUNCTURE: CPT

## 2020-09-19 PROCEDURE — 83036 HEMOGLOBIN GLYCOSYLATED A1C: CPT

## 2020-09-19 PROCEDURE — 81001 URINALYSIS AUTO W/SCOPE: CPT

## 2020-09-19 PROCEDURE — 82044 UR ALBUMIN SEMIQUANTITATIVE: CPT

## 2020-09-19 PROCEDURE — 86308 HETEROPHILE ANTIBODY SCREEN: CPT

## 2020-09-21 ENCOUNTER — TELEPHONE (OUTPATIENT)
Dept: FAMILY MEDICINE CLINIC | Age: 35
End: 2020-09-21

## 2020-09-21 LAB
HAV IGM SER IA-ACNC: NORMAL
HEPATITIS B CORE IGM ANTIBODY: NORMAL
HEPATITIS B SURFACE ANTIGEN INTERPRETATION: NORMAL
HEPATITIS C ANTIBODY INTERPRETATION: NORMAL
PATHOLOGIST REVIEW: NORMAL
RPR: NORMAL
URINE CULTURE, ROUTINE: NORMAL

## 2020-09-21 RX ORDER — DIVALPROEX SODIUM 500 MG/1
500 TABLET, DELAYED RELEASE ORAL 2 TIMES DAILY
Qty: 60 TABLET | Refills: 2 | Status: SHIPPED
Start: 2020-09-21 | End: 2020-10-22 | Stop reason: ALTCHOICE

## 2020-09-21 RX ORDER — VENLAFAXINE HYDROCHLORIDE 150 MG/1
150 CAPSULE, EXTENDED RELEASE ORAL DAILY
Qty: 30 CAPSULE | Refills: 2 | Status: SHIPPED
Start: 2020-09-21 | End: 2020-10-22 | Stop reason: SDUPTHER

## 2020-09-21 RX ORDER — LORAZEPAM 0.5 MG/1
0.5 TABLET ORAL 2 TIMES DAILY PRN
Qty: 30 TABLET | Refills: 0 | Status: SHIPPED
Start: 2020-09-21 | End: 2020-12-10 | Stop reason: SDUPTHER

## 2020-09-21 NOTE — TELEPHONE ENCOUNTER
Spoke to patient about her labs at length, see separate chart documentation. When speaking with patient, I was reviewing her medications and it looked like she was in need of refill of her Metformin. The patient was told of her improved HGBA1C, she Is to remain on Metformin for now, and I will send in refill. The patient was seeing Dr. Evelia Fry and her appointment was cancelled due to her illness and yet to be rescheduled. I will send in refills for her Depakote, Effexor as well as her Ativan and OARRS report was reviewed. Controlled Substance Monitoring:    Acute and Chronic Pain Monitoring:   RX Monitoring 9/21/2020   Attestation -   Periodic Controlled Substance Monitoring No signs of potential drug abuse or diversion identified. She did recently get medical marijuana card and has been smoking marijuana. I advised her that I do not have any experience with this, but that she can remain my patient, but wanted to let her know that I may ask for random UDS in the future and she is aware. She is no longer taking the Lyrica. She reports that the marijuana does help her body pain she was experiencing. She is to keep her appointments with specialists as scheduled, but will await her CBC repeat to be done on 9-22-20 and if platelets still low, which I assume they are, I will place consult for Heme/Onc. And patient aware.     Cem Duncan PA-C

## 2020-09-21 NOTE — PROGRESS NOTES
Spoke with patient at length after he husbands virtual visit about her labs. Patient Platelet count very low, would like to repeat CBC as was normal when she was just in ER. If still below 100, will sent to hematology for evaluation. With history of recent heavy vaginal bleeding, bleeding gums when brushing her teeth, skin itching as well as recent history of fevers with suspected viral illness as well as what sounds like purpura- like rash or petechiae. Patient did not take pictures of rash that she had when she was ill and having fevers. I suspect ITP. Patient will get CBC repeated tomorrow and I will call with results.     Speedy Elizabeth PA-C

## 2020-09-22 ENCOUNTER — TELEPHONE (OUTPATIENT)
Dept: FAMILY MEDICINE CLINIC | Age: 35
End: 2020-09-22

## 2020-09-22 ENCOUNTER — HOSPITAL ENCOUNTER (OUTPATIENT)
Age: 35
Discharge: HOME | End: 2020-09-22
Payer: COMMERCIAL

## 2020-09-22 LAB
ANISOCYTOSIS: NORMAL
ATYPICAL LYMPHOCYTE RELATIVE PERCENT: 2 % (ref 0–4)
BASOPHILS ABSOLUTE: 0 E9/L (ref 0–0.2)
BASOPHILS RELATIVE PERCENT: 0 % (ref 0–2)
EOSINOPHILS ABSOLUTE: 0.15 E9/L (ref 0.05–0.5)
EOSINOPHILS RELATIVE PERCENT: 2 % (ref 0–6)
EPSTEIN BARR VIRUS NUCLEAR AB IGG: 403 U/ML (ref 0–21.9)
EPSTEIN-BARR EARLY ANTIGEN ANTIBODY: 10.2 U/ML (ref 0–10.9)
EPSTEIN-BARR VCA IGG: >750 U/ML (ref 0–21.9)
EPSTEIN-BARR VCA IGM: <10 U/ML (ref 0–43.9)
HCT VFR BLD CALC: 37.7 % (ref 34–48)
HEMOGLOBIN: 12.3 G/DL (ref 11.5–15.5)
LYMPHOCYTES ABSOLUTE: 3 E9/L (ref 1.5–4)
LYMPHOCYTES RELATIVE PERCENT: 38 % (ref 20–42)
MCH RBC QN AUTO: 30 PG (ref 26–35)
MCHC RBC AUTO-ENTMCNC: 32.6 % (ref 32–34.5)
MCV RBC AUTO: 92 FL (ref 80–99.9)
MONOCYTES ABSOLUTE: 0.68 E9/L (ref 0.1–0.95)
MONOCYTES RELATIVE PERCENT: 9 % (ref 2–12)
NEUTROPHILS ABSOLUTE: 3.68 E9/L (ref 1.8–7.3)
NEUTROPHILS RELATIVE PERCENT: 49 % (ref 43–80)
OVALOCYTES: NORMAL
PDW BLD-RTO: 15 FL (ref 11.5–15)
PLATELET # BLD: 169 E9/L (ref 130–450)
PMV BLD AUTO: 10 FL (ref 7–12)
POIKILOCYTES: NORMAL
RBC # BLD: 4.1 E12/L (ref 3.5–5.5)
WBC # BLD: 7.5 E9/L (ref 4.5–11.5)

## 2020-09-22 PROCEDURE — 85025 COMPLETE CBC W/AUTO DIFF WBC: CPT

## 2020-09-22 PROCEDURE — 36415 COLL VENOUS BLD VENIPUNCTURE: CPT

## 2020-09-22 NOTE — TELEPHONE ENCOUNTER
Spoke with patient about her repeat CBC which was normal.  Her EBV titers came back and IGG very high. Patient had episode of vomiting last night and hasn't felt well. She reports her temperature today was right below 100 degrees. She just feels awful all over and is tired of not feeling well. She is scheduled to see surgeon on Thursday and then urology next week. She was having some lower back soreness today. She has had some SOB and feels like her breathing is a little labored like \"breathing through a straw\" at times. She has not taken her Lasix as she does not like to urinate a lot, advised her to do so, even if a half of tablet. She was ordered an echo multiple times, but does not appear that it was completed. She did see cardiology earlier this year and had stress test which was stable with EF of 52%. She was advised if any worsening of her breathing, to call 911 or to ER immediately. I will reach out to office as patient needs Echo done ASAP. I will also discuss with my supervising physician Dr. Jayme Reagan to see if she has any further suggestions.     Nate Schafer PA-C

## 2020-09-22 NOTE — TELEPHONE ENCOUNTER
Called and spoke to patient about how she is feeling. She stated that she is doing Ok and did take 1 whole hydrochlorothiazide instead of 1/2. She did not state anything about her SOB.  Electronically signed by Alondra Abernathy MA on 9/22/20 at 4:06 PM EDT

## 2020-09-24 ENCOUNTER — TELEPHONE (OUTPATIENT)
Dept: FAMILY MEDICINE CLINIC | Age: 35
End: 2020-09-24

## 2020-09-24 ENCOUNTER — OFFICE VISIT (OUTPATIENT)
Dept: SURGERY | Age: 35
End: 2020-09-24
Payer: COMMERCIAL

## 2020-09-24 ENCOUNTER — TELEPHONE (OUTPATIENT)
Dept: CARDIOLOGY | Age: 35
End: 2020-09-24

## 2020-09-24 VITALS
WEIGHT: 293 LBS | TEMPERATURE: 97.3 F | HEIGHT: 63 IN | BODY MASS INDEX: 51.91 KG/M2 | HEART RATE: 112 BPM | SYSTOLIC BLOOD PRESSURE: 133 MMHG | OXYGEN SATURATION: 98 % | DIASTOLIC BLOOD PRESSURE: 87 MMHG

## 2020-09-24 PROBLEM — R16.1 SPLENOMEGALY: Status: ACTIVE | Noted: 2020-09-24

## 2020-09-24 PROCEDURE — 99203 OFFICE O/P NEW LOW 30 MIN: CPT | Performed by: SURGERY

## 2020-09-24 PROCEDURE — G8427 DOCREV CUR MEDS BY ELIG CLIN: HCPCS | Performed by: SURGERY

## 2020-09-24 PROCEDURE — 4004F PT TOBACCO SCREEN RCVD TLK: CPT | Performed by: SURGERY

## 2020-09-24 PROCEDURE — G8417 CALC BMI ABV UP PARAM F/U: HCPCS | Performed by: SURGERY

## 2020-09-24 NOTE — PROGRESS NOTES
Noted office visit with Dr. Garcia Flanagan and agree with heme/onc referral.    Patient has appointments with urology as well as GYN and echo of heart coming up. I did send patient a my chart message that I reviewed her visit with general surgery and agree with Heme/onc consult.     Charmayne Morales PA-C

## 2020-09-24 NOTE — TELEPHONE ENCOUNTER
Called and left message on voicemail for Dr Uri Mueller office to return my call about scheduling patient for Echo. Alley Sellers from Dr. Uri Mueller office called back today and stated that they did try to call her and schedule the Echo but she did not return there call. Alley Sellers stated that she will call her again. I did call patient is she stated that she is schedule for 10/1/2020 @2:15 for Echo.  Electronically signed by Paco Connor MA on 9/24/20 at 9:32 AM EDT

## 2020-09-24 NOTE — PROGRESS NOTES
[facility] Surgery   History and Physical    Patient's Name/Date of Birth: Douglas Forbes / 1985 (21 y.o.)      PCP: Pryor Phalen, PA-C      CC:  splenomegaly     HPI:  27 y.o. female with about a 1 month hx of fevers malaise generalized weakness, and decreased appetite with recent onset of LUQ pain. Recently was seen in ED for very heavy period, found to have splenomegaly on CT and she was referred subsequently to me.       Past Medical History:   Diagnosis Date    Anxiety     Asthma     Bipolar 1 disorder ([address]     COPD (chronic obstructive pulmonary disease) (HCC)     Depression     Fissure, anal     Glaucoma     Suspected by eye specialist    Macular edema     Cm    Neuropathy     PTSD (post-traumatic stress disorder)     Type 2 diabetes mellitus without complication, without long-term current use of insulin ([facility])        Past Surgical History:   Procedure Laterality Date    ANTERIOR CRUCIATE LIGAMENT REPAIR      CHOLECYSTECTOMY      DILATION AND CURETTAGE OF UTERUS N/A 1/8/2019    DILATATION AND CURETTAGE HYSTEROSCOPY WITH REMOVAL OF CONDYLOMATA performed by Leigh Evans MD at [address] [facility] N/A 2/25/2020    DILATATION AND CURETTAGE HYSTEROSCOPY, ATTEMPTED CERVICAL BIOPSY, MARIYA OF  LABIAL ABSCESS performed by Leigh Evans MD at [address]    repair anal fissure    TUBAL LIGATION         Family History   Problem Relation Age of Onset    Diabetes Mother     Cancer Mother         ovarian    Diabetes Father     Cancer Sister         NHL    Diabetes Sister     High Blood Pressure Brother        Social History     Socioeconomic History    Marital status:      Spouse name: Not on file    Number of children: Not on file    Years of education: Not on file    Highest education level: Not on file   Occupational History    Occupation: disability-, LPN   Social Needs    Financial resource strain: Not on file    Food insecurity     Worry: Not on file     Inability: Not on file    Transportation needs     Medical: Not on file     Non-medical: Not on file   Tobacco Use    Smoking status: Current Every Day Smoker     Packs/day: 1.00     Years: 20.00     Pack years: 20.00     Types: Cigarettes     Start date: 11/3/1995    Smokeless tobacco: Never Used   Substance and Sexual Activity    Alcohol use: Not Currently     Comment: socially    Drug use: No    Sexual activity: Yes     Partners: Male   Lifestyle    Physical activity     Days per week: Not on file     Minutes per session: Not on file    Stress: Not on file   Relationships    Social connections     Talks on phone: Not on file     Gets together: Not on file     Attends Mandaeism service: Not on file     Active member of club or organization: Not on file     Attends meetings of clubs or organizations: Not on file     Relationship status: Not on file    Intimate partner violence     Fear of current or ex partner: Not on file     Emotionally abused: Not on file     Physically abused: Not on file     Forced sexual activity: Not on file   Other Topics Concern    Not on file   Social History Narrative    Not on file       Current Outpatient Medications   Medication Sig Dispense Refill    venlafaxine (EFFEXOR XR) 150 MG extended release capsule Take 1 capsule by mouth daily 30 capsule 2    metFORMIN (GLUCOPHAGE) 500 MG tablet Take 1 tablet by mouth 2 times daily (with meals) 60 tablet 2    divalproex (DEPAKOTE) 500 MG DR tablet Take 1 tablet by mouth 2 times daily 60 tablet 2    LORazepam (ATIVAN) 0.5 MG tablet Take 1 tablet by mouth 2 times daily as needed for Anxiety for up to 30 doses.  30 tablet 0    fluorometholone (FML) 0.1 % ophthalmic suspension 1 drop every 4 hours      acetaminophen (APAP EXTRA STRENGTH) 500 MG tablet Take 1 tablet by mouth every 6 hours as needed for Pain 20 tablet 0    ondansetron (ZOFRAN ODT) 4 MG disintegrating tablet Take 1 tablet by mouth every 8 hours as needed for Nausea or Vomiting 10 tablet 0    hydroCHLOROthiazide (HYDRODIURIL) 25 MG tablet 1 tab po daily prn fluid retention 30 tablet 0    atorvastatin (LIPITOR) 40 MG tablet Take 1 tablet by mouth nightly 30 tablet 3    vitamin B-12 (CYANOCOBALAMIN) 1000 MCG tablet Take 1 tablet by mouth daily 30 tablet 3    albuterol (PROVENTIL) (2.5 MG/3ML) 0.083% nebulizer solution Take 3 mLs by nebulization every 6 hours as needed for Wheezing 120 each 3    blood glucose monitor kit and supplies Test 3 times a day as needed for symptoms of irregular blood glucose. 1 kit 0    Lancets MISC 1 each by Does not apply route 3 times daily 100 each 5    blood glucose monitor strips Test 3 times a day as needed for symptoms of irregular blood glucose. 90 strip 5    albuterol sulfate HFA (PROVENTIL HFA) 108 (90 Base) MCG/ACT inhaler Inhale 2 puffs into the lungs every 4 hours as needed for Wheezing 1 Inhaler 1    methocarbamol (ROBAXIN) 500 MG tablet Take 1 tablet by mouth 4 times daily (Patient not taking: Reported on 9/24/2020) 60 tablet 0     No current facility-administered medications for this visit.         Allergies   Allergen Reactions    Cymbalta [Duloxetine Hcl] Other (See Comments)     angry    Keflex [Cephalexin]      Hives, rash      Neurontin [Gabapentin] Other (See Comments)     Makes me feel very weird    Lamictal [Lamotrigine] Nausea And Vomiting       REVIEW OF SYSTEMS:    Constitutional: negative   Eyes: negative  Ears, nose, mouth, throat, and face: negative  Respiratory: no sob   Cardiovascular: no cp   Gastrointestinal: early satiety   Genitourinary:frequent urination   Integument/breast: negative  Hematologic/lymphatic: negative  Musculoskeletal:negative  Neurological: negative  Allergic/Immunologic: negative    Physical Exam:    @/87 (Site: Right Upper Arm, Position: Sitting, Cuff Size: Medium Adult)   Pulse 112   Temp 97.3 °F (36.3 °C)   Ht 5' 3\" (1.6 m)   Wt 295 lb (133.8 kg)   SpO2 98%   BMI 52.26 kg/m² @    GENERAL EXAM: On exam- pt appears stated age. No acute distress. NEURO:  Alert and oriented x 3. No obvious neuro deficits   HEENT: head- atraumatic-normocephalic. No discharge from ears, nose or throat. NECK: Supple. No jugular venous distention. No masses or obvious adenopathy   CVS:tachy   LUNGS: Bilateral chest movements without the use of accessory muscles. Respirations easy, nonlabored,   ABDOMEN: Abdomen obese no obvious masses mild moderate LUQ tenderness, L CVA tenderness   RECTAL: exam deferred. EXTREMITIES: No edema, NVI and symmetrical        IMPRESSION/PLAN: This is a 27 y.o. female who has splenomegaly her hx and sx sound to me like a viral syndrome,     - CT from 2015 reviewed she does have a larger than normal spleen obviously larger on the last CT.    - At this point I think she has a more acute splenomegaly which would explain her LUQ sx, she has normal platelets now, did have a low count earlier this month but with clumping. ...   - I think it may be helpful to have hemeon see her to make sure there is nothing else going, my expectation would be that we will be able to mange her non operatively. - she has a higher risk of splenic rupture which we discussed.         Electronically Signed by Abraham Lundborg MD FACS   1:35 PM

## 2020-09-24 NOTE — TELEPHONE ENCOUNTER
SCHEDULED ECHO FOR 10-01-20. REVIEWED COVID CHECKLIST WITH PATIENT.     Electronically signed by Maria Teresa Sandoval on 9/24/2020 at 9:30 AM

## 2020-09-28 ENCOUNTER — TELEPHONE (OUTPATIENT)
Dept: FAMILY MEDICINE CLINIC | Age: 35
End: 2020-09-28

## 2020-09-28 RX ORDER — AZELASTINE 1 MG/ML
1 SPRAY, METERED NASAL 2 TIMES DAILY
Qty: 1 BOTTLE | Refills: 0 | OUTPATIENT
Start: 2020-09-28 | End: 2021-02-03

## 2020-09-28 RX ORDER — CETIRIZINE HYDROCHLORIDE 10 MG/1
10 TABLET ORAL DAILY
Qty: 30 TABLET | Refills: 0 | OUTPATIENT
Start: 2020-09-28 | End: 2020-10-22 | Stop reason: SDUPTHER

## 2020-09-28 RX ORDER — MONTELUKAST SODIUM 10 MG/1
10 TABLET ORAL NIGHTLY
Qty: 30 TABLET | Refills: 0 | OUTPATIENT
Start: 2020-09-28 | End: 2020-10-22 | Stop reason: SDUPTHER

## 2020-09-28 NOTE — TELEPHONE ENCOUNTER
LATE DOCUMENTATION:    Patient had contacted me yesterday 9-2 via my chart about nasal congestion, constant \"dripping from her nose\" as well as history of asthma and some recent issues with SOB. She did have CXR which was stable, no fluid or infection. She has had no fever or chills. She reports drainage is clear and not discolored. Patient advised that my computer access is currently restricted and therefore I verbally called in patient Rx's to [address] in [facility] for Zyrtec in AM, Singulair at HS and Astelin Nasal spray.

## 2020-09-29 ENCOUNTER — TELEPHONE (OUTPATIENT)
Dept: SURGERY | Age: 35
End: 2020-09-29

## 2020-09-29 NOTE — TELEPHONE ENCOUNTER
Spoke with Dr. Dennis Goodman office referral specialist Deepti Nugent. She stated as soon as she received a fax for clinical reports and demographic, she is going to call the patient and set up the appt with Dr. Elias Multani. Faxed the notes to [phone].   Electronically signed by Fareed Kern on 9/29/2020 at 10:18 AM

## 2020-10-01 ENCOUNTER — HOSPITAL ENCOUNTER (OUTPATIENT)
Dept: CARDIOLOGY | Age: 35
Discharge: HOME | End: 2020-10-01
Payer: COMMERCIAL

## 2020-10-01 LAB
LV EF: 65 %
LVEF MODALITY: NORMAL

## 2020-10-01 PROCEDURE — 93306 TTE W/DOPPLER COMPLETE: CPT

## 2020-10-06 ENCOUNTER — OFFICE VISIT (OUTPATIENT)
Dept: OBGYN | Age: 35
End: 2020-10-06
Payer: COMMERCIAL

## 2020-10-06 VITALS
RESPIRATION RATE: 16 BRPM | TEMPERATURE: 98 F | HEART RATE: 90 BPM | SYSTOLIC BLOOD PRESSURE: 128 MMHG | DIASTOLIC BLOOD PRESSURE: 74 MMHG | WEIGHT: 293 LBS | HEIGHT: 63 IN | BODY MASS INDEX: 51.91 KG/M2

## 2020-10-06 PROCEDURE — G8427 DOCREV CUR MEDS BY ELIG CLIN: HCPCS | Performed by: OBSTETRICS & GYNECOLOGY

## 2020-10-06 PROCEDURE — 99212 OFFICE O/P EST SF 10 MIN: CPT | Performed by: OBSTETRICS & GYNECOLOGY

## 2020-10-06 PROCEDURE — 4004F PT TOBACCO SCREEN RCVD TLK: CPT | Performed by: OBSTETRICS & GYNECOLOGY

## 2020-10-06 PROCEDURE — G8484 FLU IMMUNIZE NO ADMIN: HCPCS | Performed by: OBSTETRICS & GYNECOLOGY

## 2020-10-06 PROCEDURE — G8417 CALC BMI ABV UP PARAM F/U: HCPCS | Performed by: OBSTETRICS & GYNECOLOGY

## 2020-10-06 NOTE — PROGRESS NOTES
Here for heavy bleeding and discomfort with menses. Starts and stops. Sharp stabbing pain with flow and sometimes at other time. Menses lasting 9-10 days off and on. When has the flow it is quite heavy, changes often especially when has to go to the Bathroom. Clotting as well. We were unable to perform an endometrial ablation in February as I was unable to get past an internal cervical stenosis. Because of that her only cure for the menses and flow may be an hysterectomy. Discussed with her and will refer her to Dr. Chay Feliz for an opinion. Patient concurs so will referr her to Dr. Chay Feliz for further care.

## 2020-10-06 NOTE — PROGRESS NOTES
Pt here today for irregular bleeding and painful intercourse  Unable to perform endometrial ablation because of cervical stenosis  She is going to see Maurizio Terrell for another opinion

## 2020-10-22 ENCOUNTER — OFFICE VISIT (OUTPATIENT)
Dept: FAMILY MEDICINE CLINIC | Age: 35
End: 2020-10-22
Payer: COMMERCIAL

## 2020-10-22 VITALS
HEART RATE: 88 BPM | WEIGHT: 293 LBS | DIASTOLIC BLOOD PRESSURE: 82 MMHG | RESPIRATION RATE: 18 BRPM | TEMPERATURE: 96.9 F | HEIGHT: 63 IN | SYSTOLIC BLOOD PRESSURE: 120 MMHG | BODY MASS INDEX: 51.91 KG/M2 | OXYGEN SATURATION: 98 %

## 2020-10-22 PROCEDURE — 99214 OFFICE O/P EST MOD 30 MIN: CPT | Performed by: PHYSICIAN ASSISTANT

## 2020-10-22 PROCEDURE — G8482 FLU IMMUNIZE ORDER/ADMIN: HCPCS | Performed by: PHYSICIAN ASSISTANT

## 2020-10-22 PROCEDURE — 94640 AIRWAY INHALATION TREATMENT: CPT | Performed by: PHYSICIAN ASSISTANT

## 2020-10-22 PROCEDURE — G8417 CALC BMI ABV UP PARAM F/U: HCPCS | Performed by: PHYSICIAN ASSISTANT

## 2020-10-22 PROCEDURE — 4004F PT TOBACCO SCREEN RCVD TLK: CPT | Performed by: PHYSICIAN ASSISTANT

## 2020-10-22 PROCEDURE — G8427 DOCREV CUR MEDS BY ELIG CLIN: HCPCS | Performed by: PHYSICIAN ASSISTANT

## 2020-10-22 RX ORDER — MONTELUKAST SODIUM 10 MG/1
10 TABLET ORAL NIGHTLY
Qty: 30 TABLET | Refills: 3 | Status: SHIPPED
Start: 2020-10-22 | End: 2021-03-31

## 2020-10-22 RX ORDER — LISINOPRIL 10 MG/1
10 TABLET ORAL DAILY
Qty: 30 TABLET | Refills: 2 | Status: CANCELLED | OUTPATIENT
Start: 2020-10-22

## 2020-10-22 RX ORDER — HYDROCHLOROTHIAZIDE 25 MG/1
TABLET ORAL
Qty: 30 TABLET | Refills: 3 | Status: SHIPPED
Start: 2020-10-22 | End: 2021-03-31

## 2020-10-22 RX ORDER — CLONIDINE HYDROCHLORIDE 0.1 MG/1
0.1 TABLET ORAL 2 TIMES DAILY
Qty: 60 TABLET | Refills: 3 | Status: SHIPPED
Start: 2020-10-22 | End: 2021-03-31

## 2020-10-22 RX ORDER — CETIRIZINE HYDROCHLORIDE 10 MG/1
10 TABLET ORAL DAILY
Qty: 30 TABLET | Refills: 3 | Status: SHIPPED
Start: 2020-10-22 | End: 2021-05-05

## 2020-10-22 RX ORDER — CLONIDINE HYDROCHLORIDE 0.1 MG/1
0.1 TABLET ORAL ONCE
Status: COMPLETED | OUTPATIENT
Start: 2020-10-22 | End: 2020-10-22

## 2020-10-22 RX ORDER — VENLAFAXINE HYDROCHLORIDE 150 MG/1
150 CAPSULE, EXTENDED RELEASE ORAL DAILY
Qty: 30 CAPSULE | Refills: 3 | Status: SHIPPED
Start: 2020-10-22 | End: 2021-02-18

## 2020-10-22 RX ORDER — IPRATROPIUM BROMIDE AND ALBUTEROL SULFATE 2.5; .5 MG/3ML; MG/3ML
1 SOLUTION RESPIRATORY (INHALATION) ONCE
Status: COMPLETED | OUTPATIENT
Start: 2020-10-22 | End: 2020-10-22

## 2020-10-22 RX ADMIN — CLONIDINE HYDROCHLORIDE 0.1 MG: 0.1 TABLET ORAL at 17:01

## 2020-10-22 RX ADMIN — IPRATROPIUM BROMIDE AND ALBUTEROL SULFATE 1 AMPULE: 2.5; .5 SOLUTION RESPIRATORY (INHALATION) at 17:02

## 2020-10-22 ASSESSMENT — ENCOUNTER SYMPTOMS
WHEEZING: 1
SHORTNESS OF BREATH: 1
EYE DISCHARGE: 0
VOMITING: 0
BACK PAIN: 0
COUGH: 1
DIARRHEA: 0
EYE REDNESS: 0
SINUS PRESSURE: 0
SORE THROAT: 0
NAUSEA: 0
ABDOMINAL PAIN: 0
TROUBLE SWALLOWING: 0
EYE PAIN: 0
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
## 2020-10-22 NOTE — PATIENT INSTRUCTIONS
Patient Education        DASH Diet: Care Instructions  Your Care Instructions     The DASH diet is an eating plan that can help lower your blood pressure. DASH stands for Dietary Approaches to Stop Hypertension. Hypertension is high blood pressure. The DASH diet focuses on eating foods that are high in calcium, potassium, and magnesium. These nutrients can lower blood pressure. The foods that are highest in these nutrients are fruits, vegetables, low-fat dairy products, nuts, seeds, and legumes. But taking calcium, potassium, and magnesium supplements instead of eating foods that are high in those nutrients does not have the same effect. The DASH diet also includes whole grains, fish, and poultry. The DASH diet is one of several lifestyle changes your doctor may recommend to lower your high blood pressure. Your doctor may also want you to decrease the amount of sodium in your diet. Lowering sodium while following the DASH diet can lower blood pressure even further than just the DASH diet alone. Follow-up care is a key part of your treatment and safety. Be sure to make and go to all appointments, and call your doctor if you are having problems. It's also a good idea to know your test results and keep a list of the medicines you take. How can you care for yourself at home? Following the DASH diet  · Eat 4 to 5 servings of fruit each day. A serving is 1 medium-sized piece of fruit, ½ cup chopped or canned fruit, 1/4 cup dried fruit, or 4 ounces (½ cup) of fruit juice. Choose fruit more often than fruit juice. · Eat 4 to 5 servings of vegetables each day. A serving is 1 cup of lettuce or raw leafy vegetables, ½ cup of chopped or cooked vegetables, or 4 ounces (½ cup) of vegetable juice. Choose vegetables more often than vegetable juice. · Get 2 to 3 servings of low-fat and fat-free dairy each day. A serving is 8 ounces of milk, 1 cup of yogurt, or 1 ½ ounces of cheese. · Eat 6 to 8 servings of grains each day. A serving is 1 slice of bread, 1 ounce of dry cereal, or ½ cup of cooked rice, pasta, or cooked cereal. Try to choose whole-grain products as much as possible. · Limit lean meat, poultry, and fish to 2 servings each day. A serving is 3 ounces, about the size of a deck of cards. · Eat 4 to 5 servings of nuts, seeds, and legumes (cooked dried beans, lentils, and split peas) each week. A serving is 1/3 cup of nuts, 2 tablespoons of seeds, or ½ cup of cooked beans or peas. · Limit fats and oils to 2 to 3 servings each day. A serving is 1 teaspoon of vegetable oil or 2 tablespoons of salad dressing. · Limit sweets and added sugars to 5 servings or less a week. A serving is 1 tablespoon jelly or jam, ½ cup sorbet, or 1 cup of lemonade. · Eat less than 2,300 milligrams (mg) of sodium a day. If you limit your sodium to 1,500 mg a day, you can lower your blood pressure even more. Tips for success  · Start small. Do not try to make dramatic changes to your diet all at once. You might feel that you are missing out on your favorite foods and then be more likely to not follow the plan. Make small changes, and stick with them. Once those changes become habit, add a few more changes. · Try some of the following:  ? Make it a goal to eat a fruit or vegetable at every meal and at snacks. This will make it easy to get the recommended amount of fruits and vegetables each day. ? Try yogurt topped with fruit and nuts for a snack or healthy dessert. ? Add lettuce, tomato, cucumber, and onion to sandwiches. ? Combine a ready-made pizza crust with low-fat mozzarella cheese and lots of vegetable toppings. Try using tomatoes, squash, spinach, broccoli, carrots, cauliflower, and onions. ? Have a variety of cut-up vegetables with a low-fat dip as an appetizer instead of chips and dip. ? Sprinkle sunflower seeds or chopped almonds over salads. Or try adding chopped walnuts or almonds to cooked vegetables.   ? Try some vegetarian meals using beans and peas. Add garbanzo or kidney beans to salads. Make burritos and tacos with mashed sánchez beans or black beans. Where can you learn more? Go to https://leann.lensgen. org and sign in to your [employer] account. Enter T929 in the [facility] box to learn more about \"DASH Diet: Care Instructions. \"     If you do not have an account, please click on the \"Sign Up Now\" link. Current as of: December 16, 2019               Content Version: 12.6  © 7500-7049 [employer]. Care instructions adapted under license by [facility] ([facility]). If you have questions about a medical condition or this instruction, always ask your healthcare professional. [address] any warranty or liability for your use of this information. Patient Education        Asthma in Adults: Care Instructions  Your Care Instructions     During an asthma attack, your airways swell and narrow as a reaction to certain things (triggers). This makes it hard to breathe. You may be able to prevent asthma attacks if you avoid the things that set off your asthma symptoms. Keeping your asthma under control and treating symptoms before they get bad can help you avoid severe attacks. If you can control your asthma, you may be able to do all of your normal daily activities. You may also avoid asthma attacks and trips to the hospital.  Follow-up care is a key part of your treatment and safety. Be sure to make and go to all appointments, and call your doctor if you are having problems. It's also a good idea to know your test results and keep a list of the medicines you take. How can you care for yourself at home? · Follow your asthma action plan so you can manage your symptoms at home. An asthma action plan will help you prevent and control airway reactions and will tell you what to do during an asthma attack.  If you do not have an asthma action plan, work with your doctor to build one.  · Take your asthma medicine exactly as prescribed. Medicine plays an important role in controlling asthma. Talk to your doctor right away if you have any questions about what to take and how to take it. ? Use your quick-relief medicine when you have symptoms of an attack. Quick-relief medicine often is an albuterol inhaler. Some people need to use quick-relief medicine before they exercise. ? Take your controller medicine every day, not just when you have symptoms. Controller medicine is usually an inhaled corticosteroid. The goal is to prevent problems before they occur. Do not use your controller medicine to try to treat an attack that has already started. It does not work fast enough to help. ? If your doctor prescribed corticosteroid pills to use during an attack, take them as directed. They may take hours to work, but they may shorten the attack and help you breathe better. ? Keep your quick-relief medicine with you at all times. · Talk to your doctor before using other medicines. Some medicines, such as aspirin, can cause asthma attacks in some people. · Check yourself for asthma symptoms to know which step to follow in your action plan. Watch for things like being short of breath, having chest tightness, coughing, and wheezing. Also notice if symptoms wake you up at night or if you get tired quickly when you exercise. · If you have a peak flow meter, use it to check how well you are breathing. This can help you predict when an asthma attack is going to occur. Then you can take medicine to prevent the asthma attack or make it less severe. · See your doctor regularly. These visits will help you learn more about asthma and what you can do to control it. Your doctor will monitor your treatment to make sure the medicine is helping you. · Keep track of your asthma attacks and your treatment.  After you have had an attack, write down what triggered it, what helped end it, and any concerns you have [address] If you have questions about a medical condition or this instruction, always ask your healthcare professional. [place] [address] any warranty or liability for your use of this information. Patient Education        clonidine (oral)  Pronunciation:  CASSI jaimes  Brand:  MaricelaprnyidaMora  What is the most important information I should know about clonidine? Follow all directions on your medicine label and package. Tell each of your healthcare providers about all your medical conditions, allergies, and all medicines you use. What is clonidine? Clonidine lowers blood pressure by decreasing the levels of certain chemicals in your blood. This allows your blood vessels to relax and your heart to beat more slowly and easily. Clonidine is used to treat hypertension (high blood pressure). The Kapvay brand of clonidine is used to treat attention deficit hyperactivity disorder (ADHD). Clonidine is sometimes given with other medications. Clonidine may also be used for purposes not listed in this medication guide. What should I discuss with my healthcare provider before taking clonidine? You should not take this medicine if you are allergic to clonidine. To make sure clonidine is safe for you, tell your doctor if you have:  · heart disease or severe coronary artery disease;  · heart rhythm disorder, slow heartbeats;  · low blood pressure, or a history of fainting spells;  · a history of heart attack or stroke;  · pheochromocytoma (tumor of the adrenal gland);  · kidney disease; or  · if you have ever had an allergic reaction to a clonidine transdermal skin patch (Catapres TTS). Older adults may be more sensitive to the effects of this medicine. It is not known whether this medicine will harm an unborn baby. Tell your doctor if you are pregnant or plan to become pregnant while taking clonidine. Clonidine can pass into breast milk and may harm a nursing baby.  Tell your doctor if you are breast-feeding a baby. Catapres is not approved for use by anyone younger than 25years old. Do not give Negar Kern to a child younger than 10years old. How should I take clonidine? Follow all directions on your prescription label. Your doctor may occasionally change your dose to make sure you get the best results. Do not take this medicine in larger or smaller amounts or for longer than recommended. Clonidine is usually taken in the morning and at bedtime. If you take different doses of this medicine at each dosing time, it may be best to take the larger dose at bedtime. Clonidine may be taken with or without food. Do not use two forms of clonidine at the same time. This medicine is also available as a transdermal patch worn on the skin. Do not crush, chew, or break an extended-release tablet. Swallow it whole. Tell your doctor if you have trouble swallowing the tablet. If you need surgery, tell the surgeon ahead of time that you are using clonidine. You may need to stop using the medicine for a short time. Do not stop using clonidine suddenly, or you could have unpleasant withdrawal symptoms. Ask your doctor how to safely stop using clonidine. Call your doctor if you are sick with vomiting. Prolonged illness can make it harder for your body to absorb clonidine, which may lead to withdrawal symptoms. This is especially important for a child taking clonidine. If you are being treated for high blood pressure, keep using this medication even if you feel well. High blood pressure often has no symptoms. You may need to use blood pressure medication for the rest of your life. Store at room temperature away from moisture, heat, and light. What happens if I miss a dose? Take the missed dose as soon as you remember. Skip the missed dose if it is almost time for your next scheduled dose. Do not take extra medicine to make up the missed dose. What happens if I overdose?   Seek emergency medical attention or call anxiety, depression, or seizures. Tell your doctor about all your current medicines and any you start or stop using, especially:  · other heart or blood pressure medications;  · an antidepressant; or  · any other medicine that contains clonidine. This list is not complete. Other drugs may interact with clonidine, including prescription and over-the-counter medicines, vitamins, and herbal products. Not all possible interactions are listed in this medication guide. Where can I get more information? Your pharmacist can provide more information about clonidine. Remember, keep this and all other medicines out of the reach of children, never share your medicines with others, and use this medication only for the indication prescribed. Every effort has been made to ensure that the information provided by [address] [place] [address]  is accurate, up-to-date, and complete, but no guarantee is made to that effect. Drug information contained herein may be time sensitive. [facility] information has been compiled for use by healthcare practitioners and consumers in the [place] and therefore [employer] does not warrant that uses outside of the [place] are appropriate, unless specifically indicated otherwise. [facility] drug information does not endorse drugs, diagnose patients or recommend therapy. [facility] drug information is an informational resource designed to assist licensed healthcare practitioners in caring for their patients and/or to serve consumers viewing this service as a supplement to, and not a substitute for, the expertise, skill, knowledge and judgment of healthcare practitioners. The absence of a warning for a given drug or drug combination in no way should be construed to indicate that the drug or drug combination is safe, effective or appropriate for any given patient. [facility] does not assume any responsibility for any aspect of healthcare administered with the aid of information [facility] provides.  The information contained herein is not intended to cover all possible uses, directions, precautions, warnings, drug interactions, allergic reactions, or adverse effects. If you have questions about the drugs you are taking, check with your doctor, nurse or pharmacist.  Copyright 6358-0968 [address]. Version: 9.01. Revision date: 3/23/2016. Care instructions adapted under license by [facility] ([facility]). If you have questions about a medical condition or this instruction, always ask your healthcare professional. [place] [address] any warranty or liability for your use of this information.

## 2020-10-28 ENCOUNTER — OFFICE VISIT (OUTPATIENT)
Dept: OBGYN | Age: 35
End: 2020-10-28
Payer: COMMERCIAL

## 2020-10-28 VITALS
HEART RATE: 110 BPM | HEIGHT: 63 IN | DIASTOLIC BLOOD PRESSURE: 65 MMHG | WEIGHT: 293 LBS | TEMPERATURE: 98 F | SYSTOLIC BLOOD PRESSURE: 121 MMHG | BODY MASS INDEX: 51.91 KG/M2

## 2020-10-28 PROBLEM — N92.0 MENORRHAGIA WITH REGULAR CYCLE: Status: RESOLVED | Noted: 2020-02-18 | Resolved: 2020-10-28

## 2020-10-28 PROCEDURE — 99212 OFFICE O/P EST SF 10 MIN: CPT | Performed by: OBSTETRICS & GYNECOLOGY

## 2020-10-28 PROCEDURE — 99213 OFFICE O/P EST LOW 20 MIN: CPT | Performed by: OBSTETRICS & GYNECOLOGY

## 2020-10-28 PROCEDURE — G8417 CALC BMI ABV UP PARAM F/U: HCPCS | Performed by: OBSTETRICS & GYNECOLOGY

## 2020-10-28 PROCEDURE — G8482 FLU IMMUNIZE ORDER/ADMIN: HCPCS | Performed by: OBSTETRICS & GYNECOLOGY

## 2020-10-28 PROCEDURE — G8427 DOCREV CUR MEDS BY ELIG CLIN: HCPCS | Performed by: OBSTETRICS & GYNECOLOGY

## 2020-10-28 PROCEDURE — 4004F PT TOBACCO SCREEN RCVD TLK: CPT | Performed by: OBSTETRICS & GYNECOLOGY

## 2020-10-28 RX ORDER — MEGESTROL ACETATE 20 MG/1
20 TABLET ORAL 2 TIMES DAILY
Qty: 60 TABLET | Refills: 3 | Status: SHIPPED
Start: 2020-10-28 | End: 2021-02-18

## 2020-10-28 NOTE — PROGRESS NOTES
Patient alert and pleasant with no new complaints  Here today referred by Dr. Emerson Dawson for possible Hysterectomy  Discharge instructions have been discussed with the patient. Patient advised to call our office with any questions or concerns. Voiced understanding.

## 2020-10-28 NOTE — PROGRESS NOTES
Don Dove     Patient presents for evaluation of heavy, irregular bleeding. States this has been going on for the past year. She has started to have pain with intercourse as well. Patient was evaluated by Dr. Tammy Askew for this problem. Underwent [facility] 2/2020 but difficulty dilating internal cervical os. Rare clusters of proliferative endometrium were noted on pathology. Patient would like to discuss hysterectomy. Procedure reviewed, including risks. Discussed potential complications due to Type 2 DM, glaucoma, COPD. Patient would need medical clearance. Discussed starting megace to control cycles while surgery is tentatively scheduled. Patient agrees.      Past Medical History:   Diagnosis Date    Anxiety     Asthma     Bipolar 1 disorder ([address]     COPD (chronic obstructive pulmonary disease) (HCC)     Depression     Fissure, anal     Glaucoma     Suspected by eye specialist    Macular edema     Cm    Neuropathy     PTSD (post-traumatic stress disorder)     Splenomegaly 9/24/2020    Type 2 diabetes mellitus without complication, without long-term current use of insulin (HCC)         Past Surgical History:   Procedure Laterality Date    ANTERIOR CRUCIATE LIGAMENT REPAIR      CHOLECYSTECTOMY      DILATION AND CURETTAGE OF UTERUS N/A 1/8/2019    DILATATION AND CURETTAGE HYSTEROSCOPY WITH REMOVAL OF CONDYLOMATA performed by Jackie Choudhury MD at [address] N/A 2/25/2020    DILATATION AND CURETTAGE HYSTEROSCOPY, ATTEMPTED CERVICAL BIOPSY, MARIYA OF  LABIAL ABSCESS performed by Jackie Choudhury MD at [address]    repair anal fissure    TUBAL LIGATION          Family History   Problem Relation Age of Onset    Diabetes Mother     Cancer Mother         ovarian    Diabetes Father     Cancer Sister         NHL    Diabetes Sister     High Blood Pressure Brother         Social History     Tobacco History     Smoking Status  Current Every Day Smoker Smoking Start Date  11/3/1995 Smoking Frequency  1 pack/day for 20 years (20 pk yrs) Smoking Tobacco Type  Cigarettes    Smokeless Tobacco Use  Never Used          Alcohol History     Alcohol Use Status  Not Currently Comment  socially          Drug Use     Drug Use Status  No          Sexual Activity     Sexually Active  Yes Partners  Male Comment  TL                  Current Outpatient Medications:     megestrol (MEGACE) 20 MG tablet, Take 1 tablet by mouth 2 times daily, Disp: 60 tablet, Rfl: 3    cetirizine (ZYRTEC) 10 MG tablet, Take 1 tablet by mouth daily, Disp: 30 tablet, Rfl: 3    montelukast (SINGULAIR) 10 MG tablet, Take 1 tablet by mouth nightly, Disp: 30 tablet, Rfl: 3    venlafaxine (EFFEXOR XR) 150 MG extended release capsule, Take 1 capsule by mouth daily, Disp: 30 capsule, Rfl: 3    hydroCHLOROthiazide (HYDRODIURIL) 25 MG tablet, 1 tab by mouth daily, Disp: 30 tablet, Rfl: 3    cloNIDine (CATAPRES) 0.1 MG tablet, Take 1 tablet by mouth 2 times daily, Disp: 60 tablet, Rfl: 3    metFORMIN (GLUCOPHAGE) 500 MG tablet, Take 1 tablet by mouth 2 times daily (with meals), Disp: 60 tablet, Rfl: 2    acetaminophen (APAP EXTRA STRENGTH) 500 MG tablet, Take 1 tablet by mouth every 6 hours as needed for Pain, Disp: 20 tablet, Rfl: 0    ondansetron (ZOFRAN ODT) 4 MG disintegrating tablet, Take 1 tablet by mouth every 8 hours as needed for Nausea or Vomiting, Disp: 10 tablet, Rfl: 0    atorvastatin (LIPITOR) 40 MG tablet, Take 1 tablet by mouth nightly, Disp: 30 tablet, Rfl: 3    vitamin B-12 (CYANOCOBALAMIN) 1000 MCG tablet, Take 1 tablet by mouth daily, Disp: 30 tablet, Rfl: 3    albuterol (PROVENTIL) (2.5 MG/3ML) 0.083% nebulizer solution, Take 3 mLs by nebulization every 6 hours as needed for Wheezing, Disp: 120 each, Rfl: 3    blood glucose monitor strips, Test 3 times a day as needed for symptoms of irregular blood glucose., Disp: 90 strip, Rfl: 5    albuterol sulfate HFA (PROVENTIL HFA) 108 (90 Base) MCG/ACT inhaler, Inhale 2 puffs into the lungs every 4 hours as needed for Wheezing, Disp: 1 Inhaler, Rfl: 1    azelastine (ASTELIN) 0.1 % nasal spray, 1 spray by Nasal route 2 times daily Use in each nostril as directed, Disp: 1 Bottle, Rfl: 0    blood glucose monitor kit and supplies, Test 3 times a day as needed for symptoms of irregular blood glucose., Disp: 1 kit, Rfl: 0    Lancets MISC, 1 each by Does not apply route 3 times daily, Disp: 100 each, Rfl: 5     Allergies   Allergen Reactions    Cymbalta [Duloxetine Hcl] Other (See Comments)     angry    Keflex [Cephalexin]      Hives, rash      Neurontin [Gabapentin] Other (See Comments)     Makes me feel very weird    Lamictal [Lamotrigine] Nausea And Vomiting        Vitals:    10/28/20 0959   BP: 121/65   Pulse: 110   Temp: 98 °F (36.7 °C)        Physical Exam:  General: pleasant, alert     Breasts: deferred     Pelvic exam: deferred      Thunichole Lr was seen today for other. Diagnoses and all orders for this visit:    DUB (dysfunctional uterine bleeding)    Menorrhagia with irregular cycle    Other orders  -     megestrol (MEGACE) 20 MG tablet; Take 1 tablet by mouth 2 times daily      Risks of medication reviewed. Advised to call with questions or concerns. Tentative TLH in January. Return in about 1 month (around 11/28/2020) for Annual and to discuss surgery .      Chi Bhat MD

## 2020-11-05 ENCOUNTER — HOSPITAL ENCOUNTER (EMERGENCY)
Age: 35
Discharge: HOME | End: 2020-11-05
Payer: COMMERCIAL

## 2020-11-05 VITALS
WEIGHT: 291 LBS | OXYGEN SATURATION: 99 % | DIASTOLIC BLOOD PRESSURE: 106 MMHG | RESPIRATION RATE: 16 BRPM | SYSTOLIC BLOOD PRESSURE: 153 MMHG | BODY MASS INDEX: 51.56 KG/M2 | TEMPERATURE: 97.5 F | HEART RATE: 104 BPM | HEIGHT: 63 IN

## 2020-11-05 PROCEDURE — 2580000003 HC RX 258: Performed by: NURSE PRACTITIONER

## 2020-11-05 PROCEDURE — 6360000002 HC RX W HCPCS: Performed by: NURSE PRACTITIONER

## 2020-11-05 PROCEDURE — 96374 THER/PROPH/DIAG INJ IV PUSH: CPT

## 2020-11-05 PROCEDURE — 6370000000 HC RX 637 (ALT 250 FOR IP): Performed by: NURSE PRACTITIONER

## 2020-11-05 PROCEDURE — 96375 TX/PRO/DX INJ NEW DRUG ADDON: CPT

## 2020-11-05 PROCEDURE — 99283 EMERGENCY DEPT VISIT LOW MDM: CPT

## 2020-11-05 RX ORDER — DIPHENHYDRAMINE HYDROCHLORIDE 50 MG/ML
25 INJECTION INTRAMUSCULAR; INTRAVENOUS ONCE
Status: COMPLETED | OUTPATIENT
Start: 2020-11-05 | End: 2020-11-05

## 2020-11-05 RX ORDER — CYCLOBENZAPRINE HCL 10 MG
10 TABLET ORAL ONCE
Status: COMPLETED | OUTPATIENT
Start: 2020-11-05 | End: 2020-11-05

## 2020-11-05 RX ORDER — 0.9 % SODIUM CHLORIDE 0.9 %
1000 INTRAVENOUS SOLUTION INTRAVENOUS ONCE
Status: COMPLETED | OUTPATIENT
Start: 2020-11-05 | End: 2020-11-05

## 2020-11-05 RX ORDER — BUTALBITAL, ASPIRIN, AND CAFFEINE 325; 50; 40 MG/1; MG/1; MG/1
1 CAPSULE ORAL EVERY 6 HOURS PRN
Qty: 20 CAPSULE | Refills: 0 | Status: SHIPPED | OUTPATIENT
Start: 2020-11-05 | End: 2020-11-18

## 2020-11-05 RX ORDER — METOCLOPRAMIDE HYDROCHLORIDE 5 MG/ML
10 INJECTION INTRAMUSCULAR; INTRAVENOUS ONCE
Status: COMPLETED | OUTPATIENT
Start: 2020-11-05 | End: 2020-11-05

## 2020-11-05 RX ORDER — CLONIDINE HYDROCHLORIDE 0.1 MG/1
0.1 TABLET ORAL ONCE
Status: COMPLETED | OUTPATIENT
Start: 2020-11-05 | End: 2020-11-05

## 2020-11-05 RX ORDER — CYCLOBENZAPRINE HCL 10 MG
10 TABLET ORAL 3 TIMES DAILY PRN
Qty: 12 TABLET | Refills: 0 | Status: SHIPPED | OUTPATIENT
Start: 2020-11-05 | End: 2020-11-09

## 2020-11-05 RX ADMIN — DIPHENHYDRAMINE HYDROCHLORIDE 25 MG: 50 INJECTION, SOLUTION INTRAMUSCULAR; INTRAVENOUS at 15:22

## 2020-11-05 RX ADMIN — CYCLOBENZAPRINE 10 MG: 10 TABLET, FILM COATED ORAL at 16:11

## 2020-11-05 RX ADMIN — METOCLOPRAMIDE HYDROCHLORIDE 10 MG: 5 INJECTION INTRAMUSCULAR; INTRAVENOUS at 15:21

## 2020-11-05 RX ADMIN — SODIUM CHLORIDE 1000 ML: 9 INJECTION, SOLUTION INTRAVENOUS at 15:21

## 2020-11-05 RX ADMIN — CLONIDINE HYDROCHLORIDE 0.1 MG: 0.1 TABLET ORAL at 15:21

## 2020-11-05 ASSESSMENT — PAIN DESCRIPTION - DIRECTION: RADIATING_TOWARDS: NECK

## 2020-11-05 ASSESSMENT — PAIN DESCRIPTION - LOCATION: LOCATION: HEAD

## 2020-11-05 ASSESSMENT — PAIN DESCRIPTION - DESCRIPTORS: DESCRIPTORS: SPASM;SHARP;SHOOTING

## 2020-11-05 ASSESSMENT — PAIN SCALES - GENERAL
PAINLEVEL_OUTOF10: 5
PAINLEVEL_OUTOF10: 10

## 2020-11-05 ASSESSMENT — PAIN DESCRIPTION - ORIENTATION: ORIENTATION: LEFT

## 2020-11-05 ASSESSMENT — PAIN DESCRIPTION - PAIN TYPE: TYPE: ACUTE PAIN

## 2020-11-06 RX ORDER — RIZATRIPTAN BENZOATE 10 MG/1
10 TABLET ORAL
Qty: 27 TABLET | Refills: 1 | Status: SHIPPED
Start: 2020-11-06 | End: 2020-11-18

## 2020-11-06 RX ORDER — TOPIRAMATE 25 MG/1
TABLET ORAL
Qty: 60 TABLET | Refills: 1 | Status: SHIPPED
Start: 2020-11-06 | End: 2021-02-01

## 2020-11-06 NOTE — ED PROVIDER NOTES
[facility]     Department of Emergency Medicine   ED  Provider Note  Admit Date/RoomTime: 11/5/2020  2:48 PM  ED Room: 12/12   Chief Complaint:   Headache (left side onset 1230 today emesis x1, spasms left neck. usual migraine)    History of Present Illness   Source of history provided by:  patient. History/Exam Limitations: none. Osbaldo Muro is a 27 y.o. old female who has a past medical hx of:   Past Medical History:   Diagnosis Date    Anxiety     Asthma     Bipolar 1 disorder ([address]     COPD (chronic obstructive pulmonary disease) ([address]     Depression     Fissure, anal     Glaucoma     Suspected by eye specialist    Macular edema     Cm    Neuropathy     PTSD (post-traumatic stress disorder)     Splenomegaly 9/24/2020    Type 2 diabetes mellitus without complication, without long-term current use of insulin ([facility]     presents to the emergency department by private vehicle, for complaint of intermittent episodes frontal aching pain which began several hour(s) prior to arrival.  Since onset the symptoms have been fluctuating and mild in severity. The patient has history of migraine headaches diagnosed in the past.   Today's episode is typical for her. She has had PCP evaluation in the past. The pain is associated with muscle spasms of the neck and negative for numbness, weakness, speech or visual changes, syncope, seizures, amaurosis, diplopia, other visual changes, paralysis/weakness, numbness or tingling, involuntary movements, tremor or speech impairment. The symptoms are aggravated by movement and relieved by nothing. There has been no history of recent trauma. Treatment prior to arrival consisted of: unable to obtain relief with OTC meds with moderate relief of symptoms. She has a history of no anticoagulation use. ROS    Pertinent positives and negatives are stated within HPI, all other systems reviewed and are negative.     Past Surgical History:   Procedure Laterality Date    ANTERIOR CRUCIATE LIGAMENT REPAIR      CHOLECYSTECTOMY      DILATION AND CURETTAGE OF UTERUS N/A 1/8/2019    DILATATION AND CURETTAGE HYSTEROSCOPY WITH REMOVAL OF CONDYLOMATA performed by Ailyn Tyler MD at [address] N/A 2/25/2020    DILATATION AND CURETTAGE HYSTEROSCOPY, ATTEMPTED CERVICAL BIOPSY, MARIYA OF  LABIAL ABSCESS performed by Ailyn Tyler MD at [address]    repair anal fissure    TUBAL LIGATION     Social History:  reports that she has been smoking cigarettes. She started smoking about 25 years ago. She has a 20.00 pack-year smoking history. She has never used smokeless tobacco. She reports previous alcohol use. She reports that she does not use drugs. Family History: family history includes Cancer in her mother and sister; Diabetes in her father, mother, and sister; High Blood Pressure in her brother. Allergies: Cymbalta [duloxetine hcl]; Keflex [cephalexin]; Neurontin [gabapentin]; and Lamictal [lamotrigine]    Physical Exam           ED Triage Vitals   BP Temp Temp Source Pulse Resp SpO2 Height Weight   11/05/20 1438 11/05/20 1438 11/05/20 1438 11/05/20 1438 11/05/20 1438 11/05/20 1438 11/05/20 1452 11/05/20 1452   (!) 153/106 97.5 °F (36.4 °C) Temporal 104 16 99 % 5' 3\" (1.6 m) 291 lb (132 kg)    Oxygen Saturation Interpretation: Normal.    Constitutional:  Alert, development consistent with age. HEENT:  NC/NT. PERRLA. Airway patent. Neck:  Normal ROM. Supple. No meningeal signs. Spasming to the left side of the neck  Respiratory:  Clear to auscultation and breath sounds equal.  CV:  Regular rate and rhythm, normal heart sounds, without pathological murmurs, ectopy, gallops, or rubs. GI:  Abdomen Soft, nontender, good bowel sounds. No firm or pulsatile mass. Back:  No costovertebral tenderness. Extremities: No tenderness or edema noted. Integument:  Normal turgor.   Warm, dry, without visible rash, unless noted elsewhere. Lymphatics: No lymphangitis or adenopathy noted. Neurological:  Oriented x 3, GCS 15. CNII-XII grossly intact. Motor functions intact. Lab / Imaging Results   (All laboratory and radiology results have been personally reviewed by myself)  Labs:  No results found for this visit on 11/05/20. Imaging: All Radiology results interpreted by Radiologist unless otherwise noted. No orders to display     ED Course / Medical Decision Making     Medications   0.9 % sodium chloride bolus (0 mLs Intravenous Stopped 11/5/20 1706)   cloNIDine (CATAPRES) tablet 0.1 mg (0.1 mg Oral Given 11/5/20 1521)   metoclopramide (REGLAN) injection 10 mg (10 mg Intravenous Given 11/5/20 1521)   diphenhydrAMINE (BENADRYL) injection 25 mg (25 mg Intravenous Given 11/5/20 1522)   cyclobenzaprine (FLEXERIL) tablet 10 mg (10 mg Oral Given 11/5/20 1611)        Re-examination:  11/5/20       Time: 1700   Patient states that she is feeling much better. Patient was given a dose of her clonidine as she states that she did not take her blood pressure medication today. Consult(s):   None    Procedure(s):   none    MDM:   At this time the patient is without objective evidence of an acute process requiring hospitalization or inpatient management. They have remained hemodynamically stable throughout their entire ED visit and are stable for discharge with outpatient follow-up. The plan has been discussed in detail and they are aware of the specific conditions for emergent return, as well as the importance of follow-up. Counseling: The emergency provider has spoken with the patient and discussed todays results, in addition to providing specific details for the plan of care and counseling regarding the diagnosis and prognosis. Questions are answered at this time and they are agreeable with the plan. Assessment      1. Other migraine without status migrainosus, not intractable    2.  Spasm of

## 2020-11-12 ENCOUNTER — VIRTUAL VISIT (OUTPATIENT)
Dept: FAMILY MEDICINE CLINIC | Age: 35
End: 2020-11-12
Payer: COMMERCIAL

## 2020-11-12 PROCEDURE — 99213 OFFICE O/P EST LOW 20 MIN: CPT | Performed by: PHYSICIAN ASSISTANT

## 2020-11-12 RX ORDER — CLINDAMYCIN HYDROCHLORIDE 300 MG/1
300 CAPSULE ORAL 3 TIMES DAILY
Qty: 30 CAPSULE | Refills: 0 | Status: SHIPPED | OUTPATIENT
Start: 2020-11-12 | End: 2020-11-22

## 2020-11-12 RX ORDER — AMLODIPINE BESYLATE 5 MG/1
5 TABLET ORAL DAILY
Qty: 30 TABLET | Refills: 1 | Status: SHIPPED
Start: 2020-11-12 | End: 2021-05-11

## 2020-11-12 RX ORDER — OXYBUTYNIN CHLORIDE 10 MG/1
TABLET, EXTENDED RELEASE ORAL
COMMUNITY
Start: 2020-09-29 | End: 2021-02-18

## 2020-11-12 RX ORDER — CHLORHEXIDINE GLUCONATE 4 G/100ML
SOLUTION TOPICAL
Qty: 473 ML | Refills: 2 | Status: SHIPPED | OUTPATIENT
Start: 2020-11-12 | End: 2020-11-26

## 2020-11-12 ASSESSMENT — ENCOUNTER SYMPTOMS
SORE THROAT: 0
NAUSEA: 0
EYE REDNESS: 0
ABDOMINAL PAIN: 0
BACK PAIN: 0
DIARRHEA: 0
COUGH: 0
SHORTNESS OF BREATH: 0
SINUS PRESSURE: 0
EYE PAIN: 0
VOMITING: 0
EYE DISCHARGE: 0

## 2020-11-12 NOTE — PROGRESS NOTES
TeleMedicine Patient Consent    This visit was performed as a virtual video visit using a synchronous, two-way, audio-video telehealth technology platform. Patient identification was verified at the start of the visit, including the patient's telephone number and physical location. I discussed with the patient the nature of our telehealth visits, that:     1. Due to the nature of an audio- video modality, the only components of a physical exam that could be done are the elements supported by direct observation. 2. I would evaluate the patient and recommend diagnostics and treatments based on my assessment. 3. If it was felt that the patient should be evaluated in clinic or an emergency room setting, then they would be directed there. 4. Our sessions are not being recorded and that personal health information is protected. 5. Our team would provide follow up care in person if/when the patient needs it. Patient does agree to proceed with telemedicine consultation. Patient's location: home address in [facility]  Physician  location home address in [facility]      Time spent: Greater than Not billed by time    This visit was completed virtually using Doxy. me             2020     Arias Hickman (:  1985) is a 27 y.o. female, here for evaluation of the following medical concerns:    HPI  Patient contacted me via my chart stating she had pimple or boil to her right upper inner thigh draining some yellow pus onset about 1 week ago. She reports that it initially drained some yellow and green pus and now has been firm again and reddened. She did have a low grade fever to 100 degrees. She reports no fever or chills. She has had no nausea or vomiting. She was noted to have elevated blood pressure today and did take her Clonidine and then her BP improved. I discussed with her and we will add small dose of Norvasc to her BP medication with her HCTZ in the morning.       Review of Systems   Constitutional: Positive for fever (to 100 degrees last night). Negative for chills (denies). HENT: Negative for ear pain, sinus pressure and sore throat. Eyes: Negative for pain, discharge and redness. Respiratory: Negative for cough and shortness of breath. Cardiovascular: Negative for chest pain (denies). Gastrointestinal: Negative for abdominal pain, diarrhea, nausea and vomiting. Genitourinary: Positive for menstrual problem (pending hysterectomy, did not start hormones, worried due to [place] of breast cancer). Negative for dysuria and frequency. Musculoskeletal: Negative for arthralgias and back pain. Skin: Positive for wound (pimple, boil to right inner thigh, painful and draining, patient reports it does not extend into vaginal area. Appears approximately 2cm in diameter). Negative for rash. Neurological: Positive for tremors (on and off, improves with Clonidine) and headaches (on and off). Negative for weakness. Hematological: Negative for adenopathy. Psychiatric/Behavioral: The patient is nervous/anxious (taking her medication ). All other systems reviewed and are negative. Prior to Visit Medications    Medication Sig Taking? Authorizing Provider   oxybutynin (DITROPAN-XL) 10 MG extended release tablet TAKE ONE TABLET BY MOUTH EVERY DAY Yes Historical Provider, MD   clindamycin (CLEOCIN) 300 MG capsule Take 1 capsule by mouth 3 times daily for 10 days Yes Michele Kenny PA-C   chlorhexidine (HIBICLENS) 4 % external liquid Wash affected area and rinse twice daily. Yes Michele Kenny PA-C   amLODIPine (NORVASC) 5 MG tablet Take 1 tablet by mouth daily Yes Michele Kenny PA-C   topiramate (TOPAMAX) 25 MG tablet Take 1 tablet at bedtime, then increase to 2 tablets at bedtime after 1 week as tolerated.  Yes Michele Kenny PA-C   rizatriptan (MAXALT) 10 MG tablet Take 1 tablet by mouth once as needed for Migraine May repeat in 2 hours if needed Yes Michele Kenny PA-C   megestrol (MEGACE) 20 MG tablet Take 1 tablet by mouth 2 times daily Yes Slava Lizama MD   cetirizine (ZYRTEC) 10 MG tablet Take 1 tablet by mouth daily Yes Viry Camp PA-C   montelukast (SINGULAIR) 10 MG tablet Take 1 tablet by mouth nightly Yes Viry Camp PA-C   venlafaxine (EFFEXOR XR) 150 MG extended release capsule Take 1 capsule by mouth daily Yes Viry Camp PA-C   hydroCHLOROthiazide (HYDRODIURIL) 25 MG tablet 1 tab by mouth daily Yes Viry Camp PA-C   cloNIDine (CATAPRES) 0.1 MG tablet Take 1 tablet by mouth 2 times daily Yes Viry Camp PA-C   azelastine (ASTELIN) 0.1 % nasal spray 1 spray by Nasal route 2 times daily Use in each nostril as directed Yes Viry Camp PA-C   metFORMIN (GLUCOPHAGE) 500 MG tablet Take 1 tablet by mouth 2 times daily (with meals) Yes Viry Camp PA-C   acetaminophen (APAP EXTRA STRENGTH) 500 MG tablet Take 1 tablet by mouth every 6 hours as needed for Pain Yes VELMA Johnson CNP   ondansetron (ZOFRAN ODT) 4 MG disintegrating tablet Take 1 tablet by mouth every 8 hours as needed for Nausea or Vomiting Yes VELMA Johnson CNP   atorvastatin (LIPITOR) 40 MG tablet Take 1 tablet by mouth nightly Yes Becka Kim,    vitamin B-12 (CYANOCOBALAMIN) 1000 MCG tablet Take 1 tablet by mouth daily Yes Viry Camp PA-C   albuterol (PROVENTIL) (2.5 MG/3ML) 0.083% nebulizer solution Take 3 mLs by nebulization every 6 hours as needed for Wheezing Yes Viry Camp PA-C   blood glucose monitor kit and supplies Test 3 times a day as needed for symptoms of irregular blood glucose. Yes Viry Camp PA-C   Lancets MISC 1 each by Does not apply route 3 times daily Yes Viry Camp PA-C   blood glucose monitor strips Test 3 times a day as needed for symptoms of irregular blood glucose.  Yes Viry Camp PA-C   albuterol sulfate HFA (PROVENTIL HFA) 108 (90 Base) MCG/ACT inhaler Inhale 2 puffs into the lungs every 4 hours as needed for Wheezing Yes Eloise Reeves, DO   butalbital-aspirin-caffeine [facility]) -40 MG capsule Take 1 capsule by mouth every 6 hours as needed for Migraine for up to 5 days. VELMA Lim - CNP          There were no vitals filed for this visit. Estimated body mass index is 51.55 kg/m² as calculated from the following:    Height as of 11/5/20: 5' 3\" (1.6 m). Weight as of 11/5/20: 291 lb (132 kg). Physical Exam   I was able to visualize via virtual visit induration and abscess to right upper inner thigh. ASSESSMENT/PLAN:  Viola Felipe was seen today for other. Diagnoses and all orders for this visit:    Abscess of right thigh  -     clindamycin (CLEOCIN) 300 MG capsule; Take 1 capsule by mouth 3 times daily for 10 days  -     chlorhexidine (HIBICLENS) 4 % external liquid; Wash affected area and rinse twice daily. Essential hypertension  -     amLODIPine (NORVASC) 5 MG tablet; Take 1 tablet by mouth daily      Patient will continue to monitor her symptoms. Recommend warm compresses to area and good handwashing. Keep covered with sterile guaze. Patient reports history of similar beneath breasts, underarms, etc- will examine further next visit- most likely hidradenitis and consider consult with Dr. Jaqui Bell from surgery. Patient has had MRSA in the past.    She was advised to look for worsening redness or swelling or high fever. She was advised will add Norvasc, take with HCTZ in AM. She will continue the Clonidine. She will keep track of her BP. I agree with not taking the Megace, patient with strong FH of breast cancer as well as patient BP very difficult to control of late. She will discuss with her GYN. She will keep me posted with any changes. She will keep her appointment with me in January unless we need to move it up sooner. An electronic signature was used to authenticate this note.     --Hola Lira PA-C on 11/12/2020 at 11:39 AM

## 2020-11-12 NOTE — Clinical Note
She will keep her appointment with me in January unless we need to move up sooner.   Thanks  Husam Sousa

## 2020-11-12 NOTE — PATIENT INSTRUCTIONS
Patient Education        Skin Abscess: Care Instructions  Your Care Instructions     A skin abscess is a bacterial infection that forms a pocket of pus. A boil is a kind of skin abscess. The doctor may have cut an opening in the abscess so that the pus can drain out. You may have gauze in the cut so that the abscess will stay open and keep draining. You may need antibiotics. You will need to follow up with your doctor to make sure the infection has gone away. The doctor has checked you carefully, but problems can develop later. If you notice any problems or new symptoms, get medical treatment right away. Follow-up care is a key part of your treatment and safety. Be sure to make and go to all appointments, and call your doctor if you are having problems. It's also a good idea to know your test results and keep a list of the medicines you take. How can you care for yourself at home? · Apply warm and dry compresses, a heating pad set on low, or a hot water bottle 3 or 4 times a day for pain. Keep a cloth between the heat source and your skin. · If your doctor prescribed antibiotics, take them as directed. Do not stop taking them just because you feel better. You need to take the full course of antibiotics. · Take pain medicines exactly as directed. ? If the doctor gave you a prescription medicine for pain, take it as prescribed. ? If you are not taking a prescription pain medicine, ask your doctor if you can take an over-the-counter medicine. · Keep your bandage clean and dry. Change the bandage whenever it gets wet or dirty, or at least one time a day. · If the abscess was packed with gauze:  ? Keep follow-up appointments to have the gauze changed or removed. If the doctor instructed you to remove the gauze, follow the instructions you were given for how to remove it. ? After the gauze is removed, soak the area in warm water for 15 to 20 minutes 2 times a day, until the wound closes.   When should you call for help? Call your doctor now or seek immediate medical care if:    · You have signs of worsening infection, such as:  ? Increased pain, swelling, warmth, or redness. ? Red streaks leading from the infected skin. ? Pus draining from the wound. ? A fever. Watch closely for changes in your health, and be sure to contact your doctor if:    · You do not get better as expected. Where can you learn more? Go to https://GangkrpeConcilio Networkseb.[email or web address] org and sign in to your [facility] account. Enter H788 in the [place] box to learn more about \"Skin Abscess: Care Instructions. \"     If you do not have an account, please click on the \"Sign Up Now\" link. Current as of: July 2, 2020               Content Version: 12.6  © 2006-2020 [employer]. Care instructions adapted under license by [facility] ([facility]). If you have questions about a medical condition or this instruction, always ask your healthcare professional. [place] [address] any warranty or liability for your use of this information. Patient Education        Hidradenitis Suppurativa: Care Instructions  Your Care Instructions     Hidradenitis suppurativa (say \"son-gnyj-yd-NY-tus sup-yur-uh-TY-vuh\") is a skin condition that causes lumps on the skin that look like pimples or boils. The lumps are usually painful and can break open and drain blood and bad-smelling pus. The condition can come and go for many years. Treatment for this condition may include antibiotics and other medicines. You may need surgery to remove the lumps. Home care includes wearing loose-fitting clothes and washing the area gently. You can help prevent lumps from coming back by staying at a healthy weight and not smoking. Doctors don't know exactly how this condition starts. But they do know that something irritates and inflames the hair follicles, causing them to swell and form lumps.  This skin condition can't be spread from person to person (isn't contagious). Follow-up care is a key part of your treatment and safety. Be sure to make and go to all appointments, and call your doctor if you are having problems. It's also a good idea to know your test results and keep a list of the medicines you take. How can you care for yourself at home? Skin care    · Wash the area every day with mild soap. Use your hands rather than a washcloth or sponge when you wash that part of your body.     · Leave the affected areas uncovered when you can. If you have lumps that are draining, you can cover them with a bandage or other dressing. Put petroleum jelly (such as Vaseline) on the dressing to help keep it from sticking.     · Wear-loose fitting clothes that don't rub against the area. Avoid activities that cause skin to rub together.     · If you have pain, try a warm compress. Soak a towel or washcloth in warm water, wring it out, and place it on the affected skin for about 10 minutes. Medicines    · Be safe with medicines. Take your medicines exactly as prescribed. Call your doctor if you think you are having a problem with your medicine. You will get more details on the specific medicines your doctor prescribes.     · If your doctor prescribed antibiotics, take them as directed. Do not stop taking them just because you feel better. You need to take the full course of antibiotics. Lifestyle choices    · If you smoke, think about quitting. Smoking can make the condition worse. If you need help quitting, talk to your doctor about stop-smoking programs and medicines. These can increase your chances of quitting for good.     · Stay at a healthy weight, or lose weight, by eating healthy foods and being physically active. Being overweight could make this condition worse. When should you call for help? Call your doctor now or seek immediate medical care if:    · You have symptoms of infection, such as:  ? Increased pain, swelling, warmth, or redness. ?  Red streaks leading from the area. ? Pus draining from the area. ? A fever. Watch closely for changes in your health, and be sure to contact your doctor if:    · You do not get better as expected. Where can you learn more? Go to https://leann.XiaoSheng.[email or web address] org and sign in to your [employer]y account. Enter U222 in the [facility] box to learn more about \"Hidradenitis Suppurativa: Care Instructions. \"     If you do not have an account, please click on the \"Sign Up Now\" link. Current as of: July 2, 2020               Content Version: 12.6  © 4042-2713 [employer]. Care instructions adapted under license by [facility] ([facility]). If you have questions about a medical condition or this instruction, always ask your healthcare professional. [address] any warranty or liability for your use of this information. Patient Education        DASH Diet: Care Instructions  Your Care Instructions     The DASH diet is an eating plan that can help lower your blood pressure. DASH stands for Dietary Approaches to Stop Hypertension. Hypertension is high blood pressure. The DASH diet focuses on eating foods that are high in calcium, potassium, and magnesium. These nutrients can lower blood pressure. The foods that are highest in these nutrients are fruits, vegetables, low-fat dairy products, nuts, seeds, and legumes. But taking calcium, potassium, and magnesium supplements instead of eating foods that are high in those nutrients does not have the same effect. The DASH diet also includes whole grains, fish, and poultry. The DASH diet is one of several lifestyle changes your doctor may recommend to lower your high blood pressure. Your doctor may also want you to decrease the amount of sodium in your diet. Lowering sodium while following the DASH diet can lower blood pressure even further than just the DASH diet alone. Follow-up care is a key part of your treatment and safety.  Be

## 2020-11-18 ENCOUNTER — VIRTUAL VISIT (OUTPATIENT)
Dept: ENDOCRINOLOGY | Age: 35
End: 2020-11-18
Payer: COMMERCIAL

## 2020-11-18 PROBLEM — E11.9 DIABETES MELLITUS (HCC): Status: ACTIVE | Noted: 2020-11-18

## 2020-11-18 PROCEDURE — G8417 CALC BMI ABV UP PARAM F/U: HCPCS | Performed by: INTERNAL MEDICINE

## 2020-11-18 PROCEDURE — 99204 OFFICE O/P NEW MOD 45 MIN: CPT | Performed by: INTERNAL MEDICINE

## 2020-11-18 PROCEDURE — 3044F HG A1C LEVEL LT 7.0%: CPT | Performed by: INTERNAL MEDICINE

## 2020-11-18 PROCEDURE — G8427 DOCREV CUR MEDS BY ELIG CLIN: HCPCS | Performed by: INTERNAL MEDICINE

## 2020-11-18 PROCEDURE — 2022F DILAT RTA XM EVC RTNOPTHY: CPT | Performed by: INTERNAL MEDICINE

## 2020-11-18 PROCEDURE — 4004F PT TOBACCO SCREEN RCVD TLK: CPT | Performed by: INTERNAL MEDICINE

## 2020-11-18 PROCEDURE — G8482 FLU IMMUNIZE ORDER/ADMIN: HCPCS | Performed by: INTERNAL MEDICINE

## 2020-11-18 RX ORDER — SUMATRIPTAN 50 MG/1
50 TABLET, FILM COATED ORAL
Qty: 9 TABLET | Refills: 5 | Status: SHIPPED
Start: 2020-11-18 | End: 2021-05-11

## 2020-11-18 NOTE — PROGRESS NOTES
Surgical History:   Procedure Laterality Date    ANTERIOR CRUCIATE LIGAMENT REPAIR      CHOLECYSTECTOMY      DILATION AND CURETTAGE OF UTERUS N/A 1/8/2019    DILATATION AND CURETTAGE HYSTEROSCOPY WITH REMOVAL OF CONDYLOMATA performed by Kendra Adames MD at [address] N/A 2/25/2020    DILATATION AND CURETTAGE HYSTEROSCOPY, ATTEMPTED CERVICAL BIOPSY, MARIYA OF  LABIAL ABSCESS performed by Kendra Adames MD at [address]    repair anal fissure    TUBAL LIGATION         SOCIAL HISTORY   Tobacco:   reports that she has been smoking cigarettes. She started smoking about 25 years ago. She has a 20.00 pack-year smoking history. She has never used smokeless tobacco.  Alcohol:   reports previous alcohol use. Drugs:   reports no history of drug use. FAMILY HISTORY   Family History   Problem Relation Age of Onset    Diabetes Mother     Cancer Mother         ovarian    Diabetes Father     Cancer Sister         NHL    Diabetes Sister     High Blood Pressure Brother        ALLERGIES AND DRUG REACTIONS   Allergies   Allergen Reactions    Cymbalta [Duloxetine Hcl] Other (See Comments)     angry    Keflex [Cephalexin]      Hives, rash      Neurontin [Gabapentin] Other (See Comments)     Makes me feel very weird    Lamictal [Lamotrigine] Nausea And Vomiting       CURRENT MEDICATIONS   Current Outpatient Medications   Medication Sig Dispense Refill    SUMAtriptan (IMITREX) 50 MG tablet Take 1 tablet by mouth once as needed for Migraine 9 tablet 5    metFORMIN (GLUCOPHAGE) 1000 MG tablet Take 1 tablet by mouth 2 times daily (with meals) 180 tablet 2    oxybutynin (DITROPAN-XL) 10 MG extended release tablet TAKE ONE TABLET BY MOUTH EVERY DAY      clindamycin (CLEOCIN) 300 MG capsule Take 1 capsule by mouth 3 times daily for 10 days 30 capsule 0    chlorhexidine (HIBICLENS) 4 % external liquid Wash affected area and rinse twice daily.  473 mL 2  amLODIPine (NORVASC) 5 MG tablet Take 1 tablet by mouth daily 30 tablet 1    topiramate (TOPAMAX) 25 MG tablet Take 1 tablet at bedtime, then increase to 2 tablets at bedtime after 1 week as tolerated. 60 tablet 1    megestrol (MEGACE) 20 MG tablet Take 1 tablet by mouth 2 times daily 60 tablet 3    cetirizine (ZYRTEC) 10 MG tablet Take 1 tablet by mouth daily 30 tablet 3    montelukast (SINGULAIR) 10 MG tablet Take 1 tablet by mouth nightly 30 tablet 3    venlafaxine (EFFEXOR XR) 150 MG extended release capsule Take 1 capsule by mouth daily 30 capsule 3    hydroCHLOROthiazide (HYDRODIURIL) 25 MG tablet 1 tab by mouth daily 30 tablet 3    cloNIDine (CATAPRES) 0.1 MG tablet Take 1 tablet by mouth 2 times daily 60 tablet 3    azelastine (ASTELIN) 0.1 % nasal spray 1 spray by Nasal route 2 times daily Use in each nostril as directed 1 Bottle 0    acetaminophen (APAP EXTRA STRENGTH) 500 MG tablet Take 1 tablet by mouth every 6 hours as needed for Pain 20 tablet 0    ondansetron (ZOFRAN ODT) 4 MG disintegrating tablet Take 1 tablet by mouth every 8 hours as needed for Nausea or Vomiting 10 tablet 0    atorvastatin (LIPITOR) 40 MG tablet Take 1 tablet by mouth nightly 30 tablet 3    vitamin B-12 (CYANOCOBALAMIN) 1000 MCG tablet Take 1 tablet by mouth daily 30 tablet 3    albuterol (PROVENTIL) (2.5 MG/3ML) 0.083% nebulizer solution Take 3 mLs by nebulization every 6 hours as needed for Wheezing 120 each 3    blood glucose monitor kit and supplies Test 3 times a day as needed for symptoms of irregular blood glucose. 1 kit 0    Lancets MISC 1 each by Does not apply route 3 times daily 100 each 5    blood glucose monitor strips Test 3 times a day as needed for symptoms of irregular blood glucose.  90 strip 5    albuterol sulfate HFA (PROVENTIL HFA) 108 (90 Base) MCG/ACT inhaler Inhale 2 puffs into the lungs every 4 hours as needed for Wheezing 1 Inhaler 1     No current facility-administered medications for this visit. Review of Systems  Constitutional: No fever, no chills, no diaphoresis, no generalized weakness. HEENT: No blurred vision, No sore throat, no ear pain, no hair loss  Neck: denied any neck swelling, difficulty swallowing,   Cardio-pulmonary: No CP, SOB or palpitation, No orthopnea or PND. No cough or wheezing. GI: No N/V/D, no constipation, No abdominal pain, no melena or hematochezia   : Denied any dysuria, hematuria, flank pain, discharge, or incontinence. Skin: denied any rash, ulcer, Hirsute, or hyperpigmentation. MSK: denied any joint deformity, joint pain/swelling, muscle pain, or back pain. Neuro: no numbness, no tingling, no weakness, _    OBJECTIVE    LMP 11/03/2020 (Exact Date)   BP Readings from Last 4 Encounters:   11/05/20 (!) 153/106   10/28/20 121/65   10/22/20 120/82   10/22/20 (!) 156/96     Wt Readings from Last 6 Encounters:   11/05/20 291 lb (132 kg)   10/28/20 293 lb (132.9 kg)   10/22/20 294 lb (133.4 kg)   10/22/20 294 lb (133.4 kg)   10/06/20 295 lb (133.8 kg)   09/24/20 295 lb (133.8 kg)     Physical examination:  Due to this being a TeleHealth encounter, evaluation of the following organ systems is limited: Vitals/Constitutional/EENT/Resp/CV/GI//MS/Neuro/Skin/Heme-Lymph-Imm. Modified physical exam through Telemedicine camera    General: Communicating well via camera   Neck: no obvious neck mass. No obvious neck deformity     CVS: no distress   Chest: no distress. Chest is moving with respiration    Extremities:  no visible tremor  Skin: No visible rashes as seen from camera   Musculoskeletal: no visible deformity  Neuro: Alert and oriented to person, place, and time. Psychiatric: Normal mood and affect.  Behavior is normal      Review of Laboratory Data:  I personally reviewed the following lab:  Lab Results   Component Value Date/Time    WBC 7.5 09/22/2020 12:01 PM    RBC 4.10 09/22/2020 12:01 PM    HGB 12.3 09/22/2020 12:01 PM    HCT 37.7 09/22/2020 12:01 PM    MCV 92.0 09/22/2020 12:01 PM    MCH 30.0 09/22/2020 12:01 PM    MCHC 32.6 09/22/2020 12:01 PM    RDW 15.0 09/22/2020 12:01 PM     09/22/2020 12:01 PM    MPV 10.0 09/22/2020 12:01 PM      Lab Results   Component Value Date/Time     09/19/2020 01:47 PM    K 4.0 09/19/2020 01:47 PM    K 3.8 10/30/2019 05:55 PM    CO2 25 09/19/2020 01:47 PM    BUN 11 09/19/2020 01:47 PM    CREATININE 0.9 09/19/2020 01:47 PM    CALCIUM 8.8 09/19/2020 01:47 PM    LABGLOM >60 09/19/2020 01:47 PM    GFRAA >60 09/19/2020 01:47 PM      Lab Results   Component Value Date/Time    TSH 1.180 03/13/2020 09:44 AM    T4FREE 1.10 03/13/2020 09:44 AM    U2ZAKRC 5.7 07/12/2016 05:30 PM     Lab Results   Component Value Date    LABA1C 4.9 09/19/2020    GLUCOSE 99 09/19/2020    MALBCR 5.9 09/19/2020    LABMICR 15.5 09/19/2020    LABCREA 261 09/19/2020     Lab Results   Component Value Date    LABA1C 4.9 09/19/2020    LABA1C 6.0 03/13/2020    LABA1C 5.7 11/03/2015     Lab Results   Component Value Date    TRIG 165 03/13/2020    HDL 40 03/13/2020    LDLCALC 112 03/13/2020    CHOL 185 03/13/2020     No results found for: VITD25    Medical Records/Labs/Images review:   I personally reviewed and summarized previous records   All labs and imaging studies were independently reviewed     Nancy Odomgurvinder Muro, a 27 y.o.-old female seen in for the following issues     Diabetes Mellitus Type 2     · Patient's diabetes is uncontrol   · Will change DM regimen to Metformin 1000 mg BID   · The patient was advised to check blood sugars 2-3  times a day before meals and at bedtime and send BS readings to our office in a week. · Discussed with patient A1c and blood sugar goals   · Patient will need routine diabetes maintenance and prevention  · Diabetes labs before next visit     Dietary noncompliance   Discussed with patient the importance of eating consistent carbohydrate meals, avoiding high glycemic index food. Also, discussed with patient the risk and negative consequences of dietary noncompliance on blood glucose control, blood pressure and weight   Ordered referral to CDE class     Obesity   Discussed lifestyle changes including diet and exercise with patient in depth. Also discussed with patient cardiovascular risk associated with obesity    Return in about 3 months (around 2/18/2021) for DM type 2 . The above issues were reviewed with the patient who understood and agreed with the plan. Thank you for allowing us to participate in the care of this patient. Please do not hesitate to contact us with any additional questions. Evgeny Yoder MD  Endocrinologist, [facility] Endocrinology   [address] [facility] [address]   Phone: [phone]  Fax: [phone]  --------------------------------------------  An electronic signature was used to authenticate this note.  Marty Osborn MD on 11/18/2020 at 1:15 PM

## 2020-11-18 NOTE — PROGRESS NOTES
Shanna Jean Baptiste was read the following message We want to confirm that, for purposes of billing, this is a virtual visit with your provider for which we will submit a claim for reimbursement with your insurance company. You will be responsible for any copays, coinsurance amounts or other amounts not covered by your insurance company. If you do not accept this, unfortunately we will not be able to schedule a virtual visit with the provider. Do you accept?  Earline Mckeon responded Shaun Chaudhry

## 2020-11-23 ENCOUNTER — OFFICE VISIT (OUTPATIENT)
Dept: PRIMARY CARE CLINIC | Age: 35
End: 2020-11-23
Payer: COMMERCIAL

## 2020-11-23 VITALS
DIASTOLIC BLOOD PRESSURE: 86 MMHG | HEART RATE: 106 BPM | SYSTOLIC BLOOD PRESSURE: 130 MMHG | HEIGHT: 63 IN | BODY MASS INDEX: 51.56 KG/M2 | TEMPERATURE: 98.1 F | WEIGHT: 291 LBS | OXYGEN SATURATION: 98 %

## 2020-11-23 DIAGNOSIS — J40 SINOBRONCHITIS: ICD-10-CM

## 2020-11-23 DIAGNOSIS — J32.9 SINOBRONCHITIS: ICD-10-CM

## 2020-11-23 LAB
Lab: NORMAL
QC PASS/FAIL: NORMAL
SARS-COV-2, POC: NORMAL

## 2020-11-23 PROCEDURE — 4004F PT TOBACCO SCREEN RCVD TLK: CPT | Performed by: PHYSICIAN ASSISTANT

## 2020-11-23 PROCEDURE — G8482 FLU IMMUNIZE ORDER/ADMIN: HCPCS | Performed by: PHYSICIAN ASSISTANT

## 2020-11-23 PROCEDURE — 99214 OFFICE O/P EST MOD 30 MIN: CPT | Performed by: PHYSICIAN ASSISTANT

## 2020-11-23 PROCEDURE — G8427 DOCREV CUR MEDS BY ELIG CLIN: HCPCS | Performed by: PHYSICIAN ASSISTANT

## 2020-11-23 PROCEDURE — 87426 SARSCOV CORONAVIRUS AG IA: CPT | Performed by: PHYSICIAN ASSISTANT

## 2020-11-23 PROCEDURE — G8417 CALC BMI ABV UP PARAM F/U: HCPCS | Performed by: PHYSICIAN ASSISTANT

## 2020-11-23 RX ORDER — PREDNISONE 20 MG/1
20 TABLET ORAL 2 TIMES DAILY
Qty: 10 TABLET | Refills: 0 | Status: SHIPPED | OUTPATIENT
Start: 2020-11-23 | End: 2020-11-28

## 2020-11-23 RX ORDER — BENZONATATE 200 MG/1
200 CAPSULE ORAL 3 TIMES DAILY PRN
Qty: 15 CAPSULE | Refills: 0 | Status: SHIPPED
Start: 2020-11-23 | End: 2021-02-03

## 2020-11-23 RX ORDER — DOXYCYCLINE HYCLATE 100 MG
100 TABLET ORAL 2 TIMES DAILY
Qty: 20 TABLET | Refills: 0 | Status: SHIPPED | OUTPATIENT
Start: 2020-11-23 | End: 2020-12-03

## 2020-11-25 LAB
SARS-COV-2: NOT DETECTED
SOURCE: NORMAL

## 2020-11-25 NOTE — PROGRESS NOTES
Chief Complaint   Cough (Yellow mucus. Started 4 days ago); Wheezing; Shortness of Breath; and Headache      History of Present Illness   Source of history provided by: patient. Don Dove is a 27 y.o. old female who has a past medical history of:   Past Medical History:   Diagnosis Date    Anxiety     Asthma     [facility]ipolar [facility] ([facility] [address]     COPD (chronic obstructive pulmonary disease) ([facility] [address] [facility]     Depression     Fissure, anal     Glaucoma     Suspected by eye specialist    Macular edema     Cm    Neuropathy     PTSD (post-traumatic stress disorder)     Splenomegaly 9/24/2020    Type 2 diabetes mellitus without complication, without long-term current use of insulin ([facility] [address]    Presents to the flu clinic for evaluation of productive cough with yellow mucus, wheezing, mild intermittent shortness of breath with coughing fits, lightheadedness, and headache x4 days. Patient denies any known exposure to COVID-19. Patient does have a history of asthma which is typically controlled with albuterol. Reports having to use her rescue inhaler and nebulizer more frequently during this acute illness. Denies any fever, chills, loss of taste or smell, CP, dyspnea, LE edema, abdominal pain, vomiting, rash, or lethargy. Denies any hx of tobacco use. Past medical history is also significant for type 2 diabetes. ROS   Pertinent positives and negatives are stated within HPI, all other systems reviewed and are negative. Surgical History:  has a past surgical history that includes Cholecystectomy; Tubal ligation; Anterior cruciate ligament repair; Dilation and curettage of uterus (N/A, 1/8/2019); other surgical history (1997); and Dilation and curettage of uterus (N/A, 2/25/2020). Social History:  reports that she has been smoking cigarettes. She started smoking about 25 years ago. She has a 20.00 pack-year smoking history. She has never used smokeless tobacco. She reports previous alcohol use.  She reports that she does not use drugs. Family History: family history includes Cancer in her mother and sister; Diabetes in her father, mother, and sister; High Blood Pressure in her brother. Allergies: Cymbalta [duloxetine hcl]; Keflex [cephalexin]; Neurontin [gabapentin]; and Lamictal [lamotrigine]    Physical Exam      VS:  /86   Pulse 106   Temp 98.1 °F (36.7 °C)   Ht 5' 3\" (1.6 m)   Wt 291 lb (132 kg)   LMP 11/03/2020 (Exact Date)   SpO2 98%   BMI 51.55 kg/m²    Oxygen Saturation Interpretation: Normal.    Constitutional:  Alert, development consistent with age. NAD. Head:  NC/NT. Airway patent. Mouth: Posterior pharynx with mild erythema and clear postnasal drip. No tonsillar hypertrophy or exudate. Neck:  Normal ROM. Supple. No anterior cervical adenopathy noted. Lungs: CTAB without wheezes, rales, or rhonchi. CV:  Regular rate and rhythm, normal heart sounds, without pathological murmurs, ectopy, gallops, or rubs. Skin:  Normal turgor. Warm, dry, without visible rash. Lymphatic: No lymphangitis or adenopathy noted. Neurological:  Oriented. Motor functions intact. Lab / Imaging Results   (All laboratory and radiology results have been personally reviewed by myself)  Labs:  Results for orders placed or performed in visit on 11/23/20   POCT COVID-19, Antigen   Result Value Ref Range    SARS-COV-2, POC Not-Detected Not Detected    Lot Number [id number]     QC Pass/Fail pass        Imaging: All Radiology results interpreted by Radiologist unless otherwise noted. No results found. Medical Decision Making   Pt non-toxic, in no apparent distress and stable at time of discharge. Assessment/Plan   Lolis [place] was seen today for cough, wheezing, shortness of breath and headache. Diagnoses and all orders for this visit:    Sinobronchitis  -     doxycycline hyclate (VIBRA-TABS) 100 MG tablet;  Take 1 tablet by mouth 2 times daily for 10 days  -     predniSONE (DELTASONE) 20 MG tablet; Take 1 tablet by mouth 2 times daily for 5 days  -     benzonatate (TESSALON) 200 MG capsule; Take 1 capsule by mouth 3 times daily as needed for Cough  -     COVID-19 Ambulatory; Future  -     POCT COVID-19, Antigen    Mild persistent asthma with exacerbation      Rapid COVID-19 testing is negative in office. Confirmatory PCR swab obtained and pending, will call with results once available. Advised cautionary self-quarantine at home in the interim. Pt should remain out of work for at least 7-10 days from the start of symptoms. Pt should also be fever free for 24 hours and symptoms should be improved overall prior to returning. Symptoms are most consistent with sinobronchitis and an acute asthma exacerbation. Prescription written for doxycycline, prednisone, and Tessalon Perles, side effects discussed. Continue albuterol at home as directed. Advised to carefully monitor blood glucose at home while taking steroid. Go to ER immediately with any readings greater than 350 or signs/symptoms of hyperglycemia. Increase fluids and rest. Symptomatic relief discussed including Tylenol prn pain/fever. Schedule virtual f/u with PCP in 7-10 days if symptoms persist. ED sooner if symptoms worsen or change. ED immediately with high or refractory fever, progressive SOB, dyspnea, CP, calf pain/swelling, shaking chills, vomiting, abdominal pain, lethargy, flank pain, or decreased urinary output. Pt verbalizes understanding and is in agreement with plan of care. All questions answered. Ayanna Crane PA-C    This visit was provided as a focused evaluation during the COVID -19 pandemic/national emergency. A comprehensive review of all previous patient history and testing was not conducted. Pertinent findings were elicited during the visit.

## 2020-12-07 ENCOUNTER — HOSPITAL ENCOUNTER (OUTPATIENT)
Dept: DIABETES SERVICES | Age: 35
Setting detail: THERAPIES SERIES
Discharge: HOME | End: 2020-12-07
Payer: COMMERCIAL

## 2020-12-07 VITALS — WEIGHT: 291 LBS | HEIGHT: 63 IN | BODY MASS INDEX: 51.56 KG/M2

## 2020-12-07 PROCEDURE — G0108 DIAB MANAGE TRN  PER INDIV: HCPCS

## 2020-12-07 SDOH — ECONOMIC STABILITY: FOOD INSECURITY: ADDITIONAL INFORMATION: NO

## 2020-12-07 ASSESSMENT — PATIENT HEALTH QUESTIONNAIRE - PHQ9
SUM OF ALL RESPONSES TO PHQ9 QUESTIONS 1 & 2: 1
1. LITTLE INTEREST OR PLEASURE IN DOING THINGS: 1
SUM OF ALL RESPONSES TO PHQ QUESTIONS 1-9: 1
SUM OF ALL RESPONSES TO PHQ QUESTIONS 1-9: 1
2. FEELING DOWN, DEPRESSED OR HOPELESS: 0
SUM OF ALL RESPONSES TO PHQ QUESTIONS 1-9: 1

## 2020-12-07 NOTE — PROGRESS NOTES
Diabetes Self-Management Education Record    Participant Name: Genesis Nicolas  Referring Provider: Jennifer Back PA-C  Assessment/Evaluation Ratings:  1=Needs Instruction   4=Demonstrates Understanding/Competency  2=Needs Review   NC=Not Covered    3=Comprehends Key Points  N/A=Not Applicable  Topics/Learning Objectives Pre-session Assess Date:  12/7/2020 CS Instr. Date Follow-up Date Post- session Eval Comments   Diabetes disease process & Treatment process:   -Define diabetes & prediabetes and ABCs of     diabetes   -Identify own type of diabetes  -Identify lifestyle changes/treatment options 1 12/7/2020 CS  3 Type 2- Pt states first diagnosed 2004, family history   Developing strategies for Healthy coping/psychosocial issues:    -Describe feelings about living with diabetes  -Identify coping strategies;   -Identify support needed & support network available 1 12/7/2020 CS  3   PHQ-2 Depression Screen Score: 1       Prevention, detection & treatment of Chronic complications:    -Identify the prevention, detection and treatment for complications including immunizations, preventive eye, foot, dental and renal exams as indicated per the participant's duration of diabetes and health status.  -Define the natural course of diabetes and the relationship of blood glucose levels to long term complications of diabetes. 1 12/7/2020 CS  3    Prevention, detection & treatment of acute complications:    -List symptoms of hyper & hypoglycemia, and prevention & treatment strategies.   -Describe sick day guidelines  DKA /indications for ketone testing &  when to call physician  1 12/7/2020 CS  3 Pt reports episodes of hypoglycemia. Instructed patient on symptoms, possible causes and treatment on hypoglycemia/typerglycemia. Mailed supporting materials.     Identify severe weather/situation crisis  & diabetes supplies management        Using medications safely:   -State effects of diabetes medicines on blood glucose levels;  -List diabetes medication taken, action & side effects 1 12/7/2020 CS  3 Pt states Metformin BID with meals. Insulin/Injectables  -Name appropriate injection sites; proper storage; supplies needed;          Demonstrate proper technique        Monitoring blood glucose, interpreting and using results:   -Identify recommended & personal blood glucose targets & HgbA1C target levels  -State the Importance of logging blood glucose levels for pattern recognition;   -State benefits of reading/using pt generated health data  -Verbalize safe lancet disposal 1 12/7/2020 CS  3 Pt states that she monitors 3 times/day. Pt states that glucose values have been higher because she has been on steroids for past week due to pneumonia. A1C 4.9% 9/19/2020   -Demonstrate proper testing technique        Incorporating physical activity into lifestyle:   -State effect of exercise on blood glucose levels;   -State benefits of regular exercise;   -Define safety considerations;  -Describe contraindications/maintenance of activity. 1 12/7/2020 CS  3 Pt states that she does housework for physical activity. Incorporating nutritional management into lifestyle:   -Describe effect of type, amount & timing of food on blood glucose  -Describe methods for preparing and planning healthy meals  Correctly read food labels 1 12/7/2020 CS  3 Food recall reveals 1 meal/day and regular soda everyday. Instructed on importance of 3 meals/day with decrease consumption of regular soda. Pt verbalized understanding. Mailed supporting materials. Plan personal carbohydrate-controlled meal based on individualized meal plan  Demonstrate CHO counting/portion control  1 12/7/2020 CS  3 Instructed patient on 1400 calorie carbohydrate controlled meal plan: 5 servings fat, 5 oz lean protein, 10 carbohydrate choices: 3 choices breakfast, lunch and dinner, 1 choices HS snack. Mailed patient supporting materials.     Developing strategies for problem solving to promote health/change behavior. -Identify 7 self-care behaviors; Personal health risk factors; Benefits, challenges & strategies for behavioral change and set an individualized goal selection.  1 12/7/2020   3 Goal: Consistent carbohydrate at meals and snack     Identified Barriers to learning/adherence to self management plan:    None  []  other    Instruction Method:  Lecture/Discussion, Handouts and Return demonstration     Supporting Education Materials/Equipment Provided: Educational Binder, Meal Plan and Nutritional Packet   []Macedonian materials       [] services     []Other:      Encounter Type Date Attended Start Time End Time Comments No Show Dates   Assessment 12/7/2020  [id number]   telehealth    Session 1         Session 2        Session 3 12/7/2020  [id number]  telehealth    In person Follow-up         Gestational Diabetes         Individual MNT        Meter Instrx        Insulin Instrx           Additional Comments:    Date:           DSMES Follow-up plan based on patients DSMES goal    Dr Notified by [] EMR []Fax        []HgbA1C   []Weight   []Hypertension  []Follow-up with Physician  []Medication compliance   []Plate method/meal plan compliance   []Self-Foot Exam Frequency   []Monitoring Frequency   []Exercise Routine   []Goal Attainment       []Patient lost to follow-up  Dr Notified by []EMR []Fax     Personal Support Plan:      [x] Keep all scheduled doctor appointments   [] Make and keep appointments with specialists (foot, eye, dentist) as recommended   [] Consult my pharmacist about all new medications or to ask any medication questions   [] Get tested for sleep apnea   [] Seek help for:   [] Make an appointment with:   [] Attend smoking cessation classes or call -800-QUIT-NOW  [] Attend Diabetes Support Group   [] Use diabetes magazines, books, or credible web-sites like the ADA for more information  [] Increase exercise at home or join an exercise program:   [] Other:

## 2020-12-10 ENCOUNTER — VIRTUAL VISIT (OUTPATIENT)
Dept: FAMILY MEDICINE CLINIC | Age: 35
End: 2020-12-10
Payer: COMMERCIAL

## 2020-12-10 PROCEDURE — 99203 OFFICE O/P NEW LOW 30 MIN: CPT | Performed by: PHYSICIAN ASSISTANT

## 2020-12-10 RX ORDER — LORAZEPAM 0.5 MG/1
0.5 TABLET ORAL 2 TIMES DAILY PRN
Qty: 30 TABLET | Refills: 0 | Status: SHIPPED
Start: 2020-12-10 | End: 2021-01-28 | Stop reason: SDUPTHER

## 2020-12-10 ASSESSMENT — ENCOUNTER SYMPTOMS
SINUS PAIN: 0
VOMITING: 0
ABDOMINAL PAIN: 0
EYE PAIN: 0
SORE THROAT: 0
NAUSEA: 0
SHORTNESS OF BREATH: 0
EYE REDNESS: 0
SINUS PRESSURE: 1
COUGH: 0
EYE DISCHARGE: 0
DIARRHEA: 0
BACK PAIN: 0

## 2020-12-10 NOTE — PROGRESS NOTES
TeleMedicine Patient Consent    This visit was performed as a virtual video visit using a synchronous, two-way, audio-video telehealth technology platform. Patient identification was verified at the start of the visit, including the patient's telephone number and physical location. I discussed with the patient the nature of our telehealth visits, that:     1. Due to the nature of an audio- video modality, the only components of a physical exam that could be done are the elements supported by direct observation. 2. I would evaluate the patient and recommend diagnostics and treatments based on my assessment. 3. If it was felt that the patient should be evaluated in clinic or an emergency room setting, then they would be directed there. 4. Our sessions are not being recorded and that personal health information is protected. 5. Our team would provide follow up care in person if/when the patient needs it. Patient does agree to proceed with telemedicine consultation. Patient's location: home address in [facility]  Physician  location office addresss at [facility]         Time spent: Greater than Not billed by time    This visit was completed virtually using Doxy. me           12/10/2020     Bouchra Sarah (:  1985) is a 38 y.o. female, here for evaluation of the following medical concerns:    HPI  Patient requested video visit to discuss her migraine headache. The patient reports she has had the headache since yesterday. She has been having regular headaches and reports this is her usual migraine. She is taking the Imitrex and is taking the Topamax, but hasn't increased it at night as of yet and was planning to do so this evening. She has had some nausea, but no vomiting. No visual changes. She is drinking fluids and able to eat. She recently came off some steroids due to a respiratory issue.    She has not yet been able to see her psychiatrist and been very anxious, asking for refill of her Lorazepam.- advised to schedule ASAP. She reports current HA Is not the most severe or worst HA of her life. No neck pain or stiffness. Has had some mild nasal congestion, no significant discolored mucous. She is taking her Singulair. Doesn't like her nasal spray. Review of Systems   Constitutional: Positive for fatigue (all the time). Negative for chills (denies) and fever (denies). HENT: Positive for postnasal drip and sinus pressure (frontal, mild). Negative for congestion, ear pain, sinus pain, sore throat and tinnitus. Eyes: Negative for pain, discharge and redness. Respiratory: Negative for cough and shortness of breath. Cardiovascular: Negative for chest pain. Gastrointestinal: Negative for abdominal pain, diarrhea, nausea and vomiting. Genitourinary: Negative for dysuria and frequency. Musculoskeletal: Positive for arthralgias. Negative for back pain. Skin: Negative for rash and wound. Allergic/Immunologic: Positive for environmental allergies. Neurological: Positive for headaches (worse over the last 2 days). Negative for dizziness, syncope and weakness. Hematological: Negative for adenopathy. Psychiatric/Behavioral: Negative for self-injury and suicidal ideas. The patient is nervous/anxious (currently out of her Lorazapam). All other systems reviewed and are negative. Prior to Visit Medications    Medication Sig Taking? Authorizing Provider   LORazepam (ATIVAN) 0.5 MG tablet Take 1 tablet by mouth 2 times daily as needed for Anxiety for up to 30 doses.  Yes Jonathan Christensen PA-C   SUMAtriptan (IMITREX) 50 MG tablet Take 1 tablet by mouth once as needed for Migraine Yes Jonathan Christensen PA-C   metFORMIN (GLUCOPHAGE) 1000 MG tablet Take 1 tablet by mouth 2 times daily (with meals) Yes Arlene Pang MD   oxybutynin (DITROPAN-XL) 10 MG extended release tablet TAKE ONE TABLET BY MOUTH EVERY DAY Yes Historical Provider, MD   amLODIPine (NORVASC) 5 MG tablet Take 1 tablet by mouth daily Yes Hola Lira PA-C   topiramate (TOPAMAX) 25 MG tablet Take 1 tablet at bedtime, then increase to 2 tablets at bedtime after 1 week as tolerated. Yes Hola Lira PA-C   megestrol (MEGACE) 20 MG tablet Take 1 tablet by mouth 2 times daily Yes Yohannes Uribe MD   montelukast (SINGULAIR) 10 MG tablet Take 1 tablet by mouth nightly Yes Hola Lira PA-C   venlafaxine (EFFEXOR XR) 150 MG extended release capsule Take 1 capsule by mouth daily Yes Hola Lira PA-C   hydroCHLOROthiazide (HYDRODIURIL) 25 MG tablet 1 tab by mouth daily Yes Hola Lira PA-C   cloNIDine (CATAPRES) 0.1 MG tablet Take 1 tablet by mouth 2 times daily Yes Hola Lira PA-C   azelastine (ASTELIN) 0.1 % nasal spray 1 spray by Nasal route 2 times daily Use in each nostril as directed Yes Hola Lira PA-C   acetaminophen (APAP EXTRA STRENGTH) 500 MG tablet Take 1 tablet by mouth every 6 hours as needed for Pain Yes VELMA Welsh CNP   ondansetron (ZOFRAN ODT) 4 MG disintegrating tablet Take 1 tablet by mouth every 8 hours as needed for Nausea or Vomiting Yes VELMA Welsh CNP   atorvastatin (LIPITOR) 40 MG tablet Take 1 tablet by mouth nightly Yes Audie Mario,    vitamin B-12 (CYANOCOBALAMIN) 1000 MCG tablet Take 1 tablet by mouth daily Yes Hola Lira PA-C   albuterol (PROVENTIL) (2.5 MG/3ML) 0.083% nebulizer solution Take 3 mLs by nebulization every 6 hours as needed for Wheezing Yes Hola Lira PA-C   blood glucose monitor kit and supplies Test 3 times a day as needed for symptoms of irregular blood glucose. Yes Hola Lira PA-C   Lancets MISC 1 each by Does not apply route 3 times daily Yes Hola Lira PA-C   blood glucose monitor strips Test 3 times a day as needed for symptoms of irregular blood glucose.  Yes Hola Lira PA-C   albuterol sulfate HFA (PROVENTIL HFA) 108 (90 Base) MCG/ACT inhaler Inhale 2 puffs changes, to seek emergent care immediately. She will keep close touch with me. She is in need of no other refills at this time. An electronic signature was used to authenticate this note.     --Sumaya May PA-C on 12/10/2020 at 10:59 AM

## 2020-12-10 NOTE — Clinical Note
She can keep her appointment in January for now and she will obviously let us know if she needs to be seen sooner.  Thanks  Agatha Rivas

## 2020-12-10 NOTE — PATIENT INSTRUCTIONS
Patient Education        Anxiety Disorder: Care Instructions  Your Care Instructions     Anxiety is a normal reaction to stress. Difficult situations can cause you to have symptoms such as sweaty palms and a nervous feeling. In an anxiety disorder, the symptoms are far more severe. Constant worry, muscle tension, trouble sleeping, nausea and diarrhea, and other symptoms can make normal daily activities difficult or impossible. These symptoms may occur for no reason, and they can affect your work, school, or social life. Medicines, counseling, and self-care can all help. Follow-up care is a key part of your treatment and safety. Be sure to make and go to all appointments, and call your doctor if you are having problems. It's also a good idea to know your test results and keep a list of the medicines you take. How can you care for yourself at home? · Take medicines exactly as directed. Call your doctor if you think you are having a problem with your medicine. · Go to your counseling sessions and follow-up appointments. · Recognize and accept your anxiety. Then, when you are in a situation that makes you anxious, say to yourself, \"This is not an emergency. I feel uncomfortable, but I am not in danger. I can keep going even if I feel anxious. \"  · Be kind to your body:  ? Relieve tension with exercise or a massage. ? Get enough rest.  ? Avoid alcohol, caffeine, nicotine, and illegal drugs. They can increase your anxiety level and cause sleep problems. ? Learn and do relaxation techniques. See below for more about these techniques. · Engage your mind. Get out and do something you enjoy. Go to a funny movie, or take a walk or hike. Plan your day. Having too much or too little to do can make you anxious. · Keep a record of your symptoms. Discuss your fears with a good friend or family member, or join a support group for people with similar problems. Talking to others sometimes relieves stress.   · Get involved in social groups, or volunteer to help others. Being alone sometimes makes things seem worse than they are. · Get at least 30 minutes of exercise on most days of the week to relieve stress. Walking is a good choice. You also may want to do other activities, such as running, swimming, cycling, or playing tennis or team sports. Relaxation techniques  Do relaxation exercises 10 to 20 minutes a day. You can play soothing, relaxing music while you do them, if you wish. · Tell others in your house that you are going to do your relaxation exercises. Ask them not to disturb you. · Find a comfortable place, away from all distractions and noise. · Lie down on your back, or sit with your back straight. · Focus on your breathing. Make it slow and steady. · Breathe in through your nose. Breathe out through either your nose or mouth. · Breathe deeply, filling up the area between your navel and your rib cage. Breathe so that your belly goes up and down. · Do not hold your breath. · Breathe like this for 5 to 10 minutes. Notice the feeling of calmness throughout your whole body. As you continue to breathe slowly and deeply, relax by doing the following for another 5 to 10 minutes:  · Tighten and relax each muscle group in your body. You can begin at your toes and work your way up to your head. · Imagine your muscle groups relaxing and becoming heavy. · Empty your mind of all thoughts. · Let yourself relax more and more deeply. · Become aware of the state of calmness that surrounds you. · When your relaxation time is over, you can bring yourself back to alertness by moving your fingers and toes and then your hands and feet and then stretching and moving your entire body. Sometimes people fall asleep during relaxation, but they usually wake up shortly afterward. · Always give yourself time to return to full alertness before you drive a car or do anything that might cause an accident if you are not fully alert.  Never play a relaxation tape while you drive a car. When should you call for help? Call 911 anytime you think you may need emergency care. For example, call if:    · You feel you cannot stop from hurting yourself or someone else. Keep the numbers for these national suicide hotlines: 2-533-195-TALK (1-270.100.9446) and 4-823-ALKZNAQ (0-517.332.8741). If you or someone you know talks about suicide or feeling hopeless, get help right away. Watch closely for changes in your health, and be sure to contact your doctor if:    · You have anxiety or fear that affects your life.     · You have symptoms of anxiety that are new or different from those you had before. Where can you learn more? Go to https://Cyclone Power Technologies.Dude Solutions. org and sign in to your Enerpulse account. Enter P754 in the Document Agility box to learn more about \"Anxiety Disorder: Care Instructions. \"     If you do not have an account, please click on the \"Sign Up Now\" link. Current as of: January 31, 2020               Content Version: 12.6  © 5443-9978 [employer]. Care instructions adapted under license by [facility] ([facility]). If you have questions about a medical condition or this instruction, always ask your healthcare professional. [place] [address] any warranty or liability for your use of this information. Patient Education        Migraine Headache: Care Instructions  Your Care Instructions     Migraines are painful, throbbing headaches that often start on one side of the head. They may cause nausea and vomiting and make you sensitive to light, sound, or smell. Without treatment, migraines can last from 4 hours to a few days. Medicines can help prevent migraines or stop them after they have started. Your doctor can help you find which ones work best for you. Follow-up care is a key part of your treatment and safety.  Be sure to make and go to all appointments, and call your doctor if you are having problems. It's also a good idea to know your test results and keep a list of the medicines you take. How can you care for yourself at home? · Do not drive if you have taken a prescription pain medicine. · Rest in a quiet, dark room until your headache is gone. Close your eyes, and try to relax or go to sleep. Don't watch TV or read. · Put a cold, moist cloth or cold pack on the painful area for 10 to 20 minutes at a time. Put a thin cloth between the cold pack and your skin. · Use a warm, moist towel or a heating pad set on low to relax tight shoulder and neck muscles. · Have someone gently massage your neck and shoulders. · Take your medicines exactly as prescribed. Call your doctor if you think you are having a problem with your medicine. You will get more details on the specific medicines your doctor prescribes. · Don't take medicine for headache pain too often. Talk to your doctor if you are taking medicine more than 2 days a week to stop a headache. Taking too much pain medicine can lead to more headaches. These are called medicine-overuse headaches. To prevent migraines  · Keep a headache diary so you can figure out what triggers your headaches. Avoiding triggers may help you prevent headaches. Record when each headache began, how long it lasted, and what the pain was like. Write down any other symptoms you had with the headache, such as nausea, flashing lights or dark spots, or sensitivity to bright light or loud noise. Note if the headache occurred near your period. List anything that might have triggered the headache. Triggers may include certain foods (chocolate, cheese, wine) or odors, smoke, bright light, stress, or lack of sleep. · If your doctor has prescribed medicine for your migraines, take it as directed. You may have medicine that you take only when you get a migraine and medicine that you take all the time to help prevent migraines.   ? If your doctor has prescribed medicine for when you get a headache, take it at the first sign of a migraine, unless your doctor has given you other instructions. ? If your doctor has prescribed medicine to prevent migraines, take it exactly as prescribed. Call your doctor if you think you are having a problem with your medicine. · Find healthy ways to deal with stress. Migraines are most common during or right after stressful times. Try finding ways to reduce stress like practicing mindfulness or deep breathing exercises. · Get plenty of sleep and exercise. But be careful to not push yourself too hard during exercise. It may trigger a headache. · Eat meals on a regular schedule. Avoid foods and drinks that often trigger migraines. These include chocolate, alcohol (especially red wine and port), aspartame, monosodium glutamate (MSG), and some additives found in foods (such as hot dogs, cage, cold cuts, aged cheeses, and pickled foods). · Limit caffeine. Don't drink too much coffee, tea, or soda. But don't quit caffeine suddenly. That can also give you migraines. · Do not smoke or allow others to smoke around you. If you need help quitting, talk to your doctor about stop-smoking programs and medicines. These can increase your chances of quitting for good. · If you are taking birth control pills or hormone therapy, talk to your doctor about whether they are triggering your migraines. When should you call for help? Call 911 anytime you think you may need emergency care. For example, call if:    · You have signs of a stroke. These may include:  ? Sudden numbness, paralysis, or weakness in your face, arm, or leg, especially on only one side of your body. ? Sudden vision changes. ? Sudden trouble speaking. ? Sudden confusion or trouble understanding simple statements. ? Sudden problems with walking or balance. ? A sudden, severe headache that is different from past headaches.    Call your doctor now or seek immediate medical care if:    · You have new or worse nausea and vomiting.     · You have a new or higher fever.     · Your headache gets much worse. Watch closely for changes in your health, and be sure to contact your doctor if:    · You are not getting better after 2 days (48 hours). Where can you learn more? Go to https://chpepiceweb.G.I. Java. org and sign in to your Cellomics Technology account. Enter N817 in the Imagry box to learn more about \"Migraine Headache: Care Instructions. \"     If you do not have an account, please click on the \"Sign Up Now\" link. Current as of: November 20, 2019               Content Version: 12.6  © 2948-5861 [employer], [employer]. Care instructions adapted under license by [facility] ([facility]). If you have questions about a medical condition or this instruction, always ask your healthcare professional. [address] any warranty or liability for your use of this information. Patient Education        Recurring Migraine Headache: Care Instructions  Your Care Instructions  Migraines are painful, throbbing headaches. They often start on one side of the head. They may cause nausea and vomiting and make you sensitive to light, sound, or smell. Some people may have only a few migraines throughout life. Others have them as often as several times a month. The goal of treatment is to reduce the number of migraines you have and relieve your symptoms. Even with treatment, you may continue to have migraines. You play an important role in dealing with your headaches. Work on avoiding things that seem to trigger your migraines. When you feel a headache coming on, act quickly to stop it before it gets worse. Follow-up care is a key part of your treatment and safety. Be sure to make and go to all appointments, and call your doctor if you are having problems. It's also a good idea to know your test results and keep a list of the medicines you take. How can you care for yourself at home?   · Do not drive if you have taken a prescription pain medicine. · Rest in a quiet, dark room until your headache is gone. Close your eyes and try to relax or go to sleep. Do not watch TV or read. · Put a cold, moist cloth or cold pack on the painful area for 10 to 20 minutes at a time. Put a thin cloth between the cold pack and your skin. · Have someone gently massage your neck and shoulders. · Take your medicines exactly as prescribed. Call your doctor if you think you are having a problem with your medicine. You will get more details on the specific medicines your doctor prescribes. · Don't take medicine for headache pain too often. Talk to your doctor if you are taking medicine more than 2 days a week to stop a headache. Taking too much pain medicine can lead to more headaches. These are called medicine-overuse headaches. To prevent migraines  · Keep a headache diary so you can figure out what triggers your headaches. Avoiding triggers may help you prevent headaches. Record when each headache began, how long it lasted, and what the pain was like. Write down any other symptoms you had with the headache. These may include nausea, flashing lights or dark spots, or sensitivity to bright light or loud noise. Note if the headache occurred near your period. List anything that might have triggered the headache. Triggers may include certain foods (chocolate, cheese, wine) or odors, smoke, bright light, stress, or lack of sleep. · If your doctor has prescribed medicine for your migraines, take it as directed. You may have medicine that you take only when you get a migraine and medicine that you take all the time to help prevent migraines. ? If your doctor has prescribed medicine for when you get a headache, take it at the first sign of a migraine, unless your doctor has given you other instructions. ? If your doctor has prescribed medicine to prevent migraines, take it exactly as prescribed.  Call your doctor if you think you are having a problem with your medicine. · Find healthy ways to deal with stress. Migraines are most common during or right after stressful times. Try finding ways to reduce stress like practicing mindfulness or deep breathing exercises. · Get regular sleep and exercise. But be careful to not push yourself too hard during exercise. It may trigger a headache. · Eat regular meals, and avoid foods and drinks that often trigger migraines. These include chocolate and alcohol, especially red wine and port. Chemicals used in food, such as aspartame and monosodium glutamate (MSG), also can trigger migraines. So can some food additives, such as those found in hot dogs, cage, cold cuts, aged cheeses, and pickled foods. · Limit caffeine by not drinking too much coffee, tea, or soda. Do not quit caffeine suddenly, because that can also give you migraines. · Do not smoke or allow others to smoke around you. If you need help quitting, talk to your doctor about stop-smoking programs and medicines. These can increase your chances of quitting for good. · If you are taking birth control pills or hormone therapy, talk to your doctor about whether they are triggering your migraines. When should you call for help? Call 911 anytime you think you may need emergency care. For example, call if:    · You have symptoms of a stroke. These may include:  ? Sudden numbness, tingling, weakness, or loss of movement in your face, arm, or leg, especially on only one side of your body. ? Sudden vision changes. ? Sudden trouble speaking. ? Sudden confusion or trouble understanding simple statements. ? Sudden problems with walking or balance. ? A sudden, severe headache that is different from past headaches. Call your doctor now or seek immediate medical care if:    · You develop a fever and a stiff neck.     · You have new nausea and vomiting, or you cannot keep down food or liquids.    Watch closely for changes in your health, and be sure to contact your doctor if:    · You have a headache that does not get better within 1 or 2 days.     · Your headaches get worse or happen more often. Where can you learn more? Go to https://Pet Wirelesspemarta.Giant Realm. org and sign in to your DailyPath account. Enter  in the [facility] box to learn more about \"Recurring Migraine Headache: Care Instructions. \"     If you do not have an account, please click on the \"Sign Up Now\" link. Current as of: November 20, 2019               Content Version: 12.6  © 3719-8397 [employer]. Care instructions adapted under license by [facility]. If you have questions about a medical condition or this instruction, always ask your healthcare professional. [address] any warranty or liability for your use of this information. Patient Education        Learning About Progressive Muscle Relaxation for Stress  What are progressive muscle relaxation and stress? Stress is what you feel when you have to handle more than you are used to. A lot of things can cause stress. You may feel stress when you go on a job interview, take a test, or run a race. This kind of short-term stress is normal and even useful. It can help you if you need to work hard or react quickly. Stress also can last a long time. Long-term stress is caused by stressful situations or events. Examples of long-term stress include long-term health problems, ongoing problems at work, and conflicts in your family. Long-term stress can harm your health. When you have anxiety or stress in your life, one of the ways your body responds is with muscle tension. Progressive muscle relaxation is a method that helps relieve that tension. How does progressive muscle relaxation reduce stress? The body responds to stress with muscle tension, which can cause pain or discomfort. In turn, tense muscles relay to the body that it's stressed.  That keeps the cycle of stress and muscle tension going. Progressive muscle relaxation helps break this cycle by reducing muscle tension and general mental anxiety. This method often helps people get to sleep. How do you do progressive muscle relaxation? To do progressive muscle relaxation, first you tense a group of muscles as you breathe in. Then you relax them as you breathe out. Notice how your muscles feel when you relax them. You work on your muscle groups in a certain order. Through practice, you can learn to feel the difference between a tensed muscle and a relaxed muscle. Then you can learn how to turn on this relaxed state at the first sign of a muscle tensing up due to stress. Practicing this method for a few weeks will help you get better at this skill. In time you'll be able to use this method to relieve stress. When you first start, it may help to use an audio recording until you learn all the muscle groups in order. Check online for these recordings. Choose a place where you won't be interrupted. It should have space for you to lie down on your back and stretch out comfortably, such as on a carpeted floor. Follow-up care is a key part of your treatment and safety. Be sure to make and go to all appointments, and call your doctor if you are having problems. It's also a good idea to know your test results and keep a list of the medicines you take. Where can you learn more? Go to https://leann.pMediaNetwork. org and sign in to your ChinaNetCenter account. Enter M483 in the Xintu Shuju box to learn more about \"Learning About Progressive Muscle Relaxation for Stress. \"     If you do not have an account, please click on the \"Sign Up Now\" link. Current as of: December 16, 2019               Content Version: 12.6  © 1151-5843 K[employer], [employer]. Care instructions adapted under license by [facility] ([facility]).  If you have questions about a medical condition or this instruction, always ask your healthcare professional. [address] any warranty or liability for your use of this information.

## 2020-12-22 ENCOUNTER — VIRTUAL VISIT (OUTPATIENT)
Dept: FAMILY MEDICINE CLINIC | Age: 35
End: 2020-12-22
Payer: COMMERCIAL

## 2020-12-22 PROCEDURE — 99213 OFFICE O/P EST LOW 20 MIN: CPT | Performed by: PHYSICIAN ASSISTANT

## 2020-12-22 RX ORDER — HYDROCODONE BITARTRATE AND ACETAMINOPHEN 5; 325 MG/1; MG/1
1 TABLET ORAL EVERY 6 HOURS PRN
Qty: 20 TABLET | Refills: 0 | Status: SHIPPED | OUTPATIENT
Start: 2020-12-22 | End: 2020-12-27

## 2020-12-22 RX ORDER — CLINDAMYCIN HYDROCHLORIDE 300 MG/1
300 CAPSULE ORAL 3 TIMES DAILY
Qty: 30 CAPSULE | Refills: 0 | Status: SHIPPED | OUTPATIENT
Start: 2020-12-22 | End: 2021-01-01

## 2020-12-22 ASSESSMENT — ENCOUNTER SYMPTOMS
COUGH: 0
ABDOMINAL PAIN: 0
BACK PAIN: 0
NAUSEA: 0
VOMITING: 0
SHORTNESS OF BREATH: 0
CHEST TIGHTNESS: 0
SORE THROAT: 0

## 2020-12-22 NOTE — PATIENT INSTRUCTIONS
Patient Education        Breast Pain: Care Instructions  Your Care Instructions     Breast tenderness and pain may come and go with your monthly periods (cyclic), or it may not follow any pattern (noncyclic). Breast pain is rarely caused by a serious health problem. You may need tests to find the cause. Follow-up care is a key part of your treatment and safety. Be sure to make and go to all appointments, and call your doctor if you are having problems. It's also a good idea to know your test results and keep a list of the medicines you take. How can you care for yourself at home? · If your doctor gave you medicine, take it exactly as prescribed. Call your doctor if you think you are having a problem with your medicine. · Take an over-the-counter pain medicine, such as acetaminophen (Tylenol), ibuprofen (Advil, Motrin), or naproxen (Aleve), to relieve pain and swelling. Read and follow all instructions on the label. · Do not take two or more pain medicines at the same time unless the doctor told you to. Many pain medicines have acetaminophen, which is Tylenol. Too much acetaminophen (Tylenol) can be harmful. · Wear a supportive bra, such as a sports bra or a jog bra. · Cut down on the amount of fat in your diet. If you need help planning healthy meals, see a dietitian. · Get at least 30 minutes of exercise on most days of the week. Walking is a good choice. You also may want to do other activities, such as running, swimming, cycling, or playing tennis or team sports. · Keep a healthy sleep pattern. Go to bed at the same time every night, and get up at the same time every day. When should you call for help? Call your doctor now or seek immediate medical care if:    · You have new changes in a breast, such as:  ? A lump or thickening in your breast or armpit. ? A change in the breast's size or shape. ? Skin changes, such as dimples or puckers. ? Nipple discharge. Follow-up care is a key part of your treatment and safety. Be sure to make and go to all appointments, and call your doctor if you are having problems. It's also a good idea to know your test results and keep a list of the medicines you take. How can you care for yourself at home? · If your doctor prescribed antibiotics, take them as directed. Do not stop taking them just because you feel better. You need to take the full course of antibiotics. · If you are breastfeeding, continue breastfeeding or pumping breast milk. It is important to empty your breasts regularly, every 2 to 3 hours while you are awake. These tips may help:  ? Before breastfeeding, place a warm, wet washcloth over your breast for about 15 minutes. Try this at least 3 times a day. This increases milk flow in the breast. Massaging the affected breast may also increase milk flow. ? Breastfeed on both sides. Try to start with your healthy breast first. Then, after your milk is flowing, breastfeed from the affected breast until it feels soft. You should empty this breast completely. Then switch back to the healthy breast and breastfeed until your baby has finished. ? Pump or hand-express a small amount of breast milk before breastfeeding if your breasts are too full with milk. This will make your breasts less full and may make it easier for your baby to latch on to your breast.  ? Pump or express milk from the affected breast if it hurts too much to breastfeed. · Take an over-the-counter pain medicine, such as acetaminophen (Tylenol) or ibuprofen (Advil, Motrin) to relieve pain and fever. Read and follow all instructions on the label. · Do not take two or more pain medicines at the same time unless the doctor told you to. Many pain medicines have acetaminophen, which is Tylenol. Too much acetaminophen (Tylenol) can be harmful. · Rest as much as possible. · Drink extra fluids. · If pus is draining from your infected breast, wash the nipple gently and let it air-dry before you put your bra back on. A disposable breast pad placed in the bra cup may absorb the pus. · Sometimes, a blocked nipple pore, called a milk blister (or bleb) happens. This causes milk to back up in the breast. It can cause mastitis, so it's important to treat this if it happens. A milk blister is often a white dot on your nipple that can be painful. If a milk blister is causing you pain, it may help to place a warm, wet washcloth over the blister before breastfeeding or pumping. If this doesn't clear the blockage, your doctor can open the blister using a sterile needle. When should you call for help? Call your doctor now or seek immediate medical care if:    · You have worse symptoms of a breast infection, such as:  ? Increased pain, swelling, redness, or warmth around a breast.  ? Red streaks leading from a breast.  ? Pus draining from a breast.  ? A fever. Watch closely for changes in your health, and be sure to contact your doctor if:    · You do not get better as expected. Where can you learn more? Go to https://[employer]. org and sign in to your [employer] account. Enter Y207 in the [facility] box to learn more about \"Mastitis: Care Instructions. \"     If you do not have an account, please click on the \"Sign Up Now\" link. Current as of: February 11, 2020               Content Version: 12.6  © 2006-2020 [employer]. Care instructions adapted under license by [facility] ([facility]). If you have questions about a medical condition or this instruction, always ask your healthcare professional. [place] [address] any warranty or liability for your use of this information.

## 2020-12-22 NOTE — PROGRESS NOTES
TeleMedicine Patient Consent    This visit was performed as a virtual video visit using a synchronous, two-way, audio-video telehealth technology platform. Patient identification was verified at the start of the visit, including the patient's telephone number and physical location. I discussed with the patient the nature of our telehealth visits, that:     1. Due to the nature of an audio- video modality, the only components of a physical exam that could be done are the elements supported by direct observation. 2. I would evaluate the patient and recommend diagnostics and treatments based on my assessment. 3. If it was felt that the patient should be evaluated in clinic or an emergency room setting, then they would be directed there. 4. Our sessions are not being recorded and that personal health information is protected. 5. Our team would provide follow up care in person if/when the patient needs it. Patient does agree to proceed with telemedicine consultation. Patient's location: home address in [facility]  Physician  location [facility] office. Time spent: Greater than Not billed by time    This visit was completed virtually using Doxy. me             2020     Zuhair Richardson (:  1985) is a 38 y.o. female, requested visit for medical concerns:    HPI  Patient requested visit for concerns due to right nipple pain. The patient reports that the pain began this afternoon with no injury. She was sitting on the floor folding some clothes. She denies any fall or trauma, no visible rash. She reports no fever or chills. She does not have her nipple pierced. She denies not wear a bra a lot of times and she does have very large pendulous breasts. She reports no problems with left breast.  She has tried not ice or heat and reports she cannot take the pain as it is severe. Review of Systems   Constitutional: Negative for chills (denies) and fever (denies). HENT: Negative for sore throat. Respiratory: Negative for cough, chest tightness and shortness of breath. Cardiovascular: Negative for chest pain. Gastrointestinal: Negative for abdominal pain, nausea and vomiting. Genitourinary: Negative for frequency. Musculoskeletal: Negative for arthralgias and back pain. Skin: Negative for rash and wound. Neurological: Negative for weakness and headaches. Hematological: Negative for adenopathy. Psychiatric/Behavioral: Negative. All other systems reviewed and are negative. Prior to Visit Medications    Medication Sig Taking? Authorizing Provider   clindamycin (CLEOCIN) 300 MG capsule Take 1 capsule by mouth 3 times daily for 10 days Yes Venancio Walter PA-C   HYDROcodone-acetaminophen (NORCO) 5-325 MG per tablet Take 1 tablet by mouth every 6 hours as needed for Pain for up to 5 days. Intended supply: 5 days. Take lowest dose possible to manage pain Yes Venancio Walter PA-C   LORazepam (ATIVAN) 0.5 MG tablet Take 1 tablet by mouth 2 times daily as needed for Anxiety for up to 30 doses. Yes Venancio Walter PA-C   benzonatate (TESSALON) 200 MG capsule Take 1 capsule by mouth 3 times daily as needed for Cough Yes Juliann Crane PA-C   metFORMIN (GLUCOPHAGE) 1000 MG tablet Take 1 tablet by mouth 2 times daily (with meals) Yes Laurie Montes De Oca MD   oxybutynin (DITROPAN-XL) 10 MG extended release tablet TAKE ONE TABLET BY MOUTH EVERY DAY Yes Historical Provider, MD   amLODIPine (NORVASC) 5 MG tablet Take 1 tablet by mouth daily Yes Venancio Walter PA-C   topiramate (TOPAMAX) 25 MG tablet Take 1 tablet at bedtime, then increase to 2 tablets at bedtime after 1 week as tolerated.  Yes Venancio Walter PA-C   megestrol (MEGACE) 20 MG tablet Take 1 tablet by mouth 2 times daily Yes Ilene Pallas, MD   cetirizine (ZYRTEC) 10 MG tablet Take 1 tablet by mouth daily Yes Venancio Walter PA-C montelukast (SINGULAIR) 10 MG tablet Take 1 tablet by mouth nightly Yes Raulito Campos PA-C   venlafaxine (EFFEXOR XR) 150 MG extended release capsule Take 1 capsule by mouth daily Yes Raulito Campos PA-C   hydroCHLOROthiazide (HYDRODIURIL) 25 MG tablet 1 tab by mouth daily Yes Raulito Campos PA-C   cloNIDine (CATAPRES) 0.1 MG tablet Take 1 tablet by mouth 2 times daily Yes Raulito Campos PA-C   azelastine (ASTELIN) 0.1 % nasal spray 1 spray by Nasal route 2 times daily Use in each nostril as directed Yes Raulito Campos PA-C   acetaminophen (APAP EXTRA STRENGTH) 500 MG tablet Take 1 tablet by mouth every 6 hours as needed for Pain Yes VELMA Piper CNP   ondansetron (ZOFRAN ODT) 4 MG disintegrating tablet Take 1 tablet by mouth every 8 hours as needed for Nausea or Vomiting Yes VELMA Piper CNP   atorvastatin (LIPITOR) 40 MG tablet Take 1 tablet by mouth nightly Yes Gerald Chun,    vitamin B-12 (CYANOCOBALAMIN) 1000 MCG tablet Take 1 tablet by mouth daily Yes Raulito Campos PA-C   albuterol (PROVENTIL) (2.5 MG/3ML) 0.083% nebulizer solution Take 3 mLs by nebulization every 6 hours as needed for Wheezing Yes Raulito Campos PA-C   blood glucose monitor kit and supplies Test 3 times a day as needed for symptoms of irregular blood glucose. Yes Raulito Campos PA-C   Lancets MISC 1 each by Does not apply route 3 times daily Yes Raulito Campos PA-C   blood glucose monitor strips Test 3 times a day as needed for symptoms of irregular blood glucose. Yes Raulito Campos PA-C   albuterol sulfate HFA (PROVENTIL HFA) 108 (90 Base) MCG/ACT inhaler Inhale 2 puffs into the lungs every 4 hours as needed for Wheezing Yes Abdirahman Lua,    SUMAtriptan (IMITREX) 50 MG tablet Take 1 tablet by mouth once as needed for Migraine  Raulito Campos PA-C          There were no vitals filed for this visit. Estimated body mass index is 51.55 kg/m² as calculated from the following:    Height as of 12/7/20: 5' 3\" (1.6 m). Weight as of 12/7/20: 291 lb (132 kg). Physical Exam  Chest:          Comments: I had patient gently squeeze her right nipple and no visible drainage or bleeding noted. I did not visualize any skin changes or rash via virtual platform, patient reports that the skin overlying her right anterior nipple/breast area appeared warm to touch. ASSESSMENT/PLAN:  Scottie Evans was seen today for other. Diagnoses and all orders for this visit:    Breast pain, right  -     clindamycin (CLEOCIN) 300 MG capsule; Take 1 capsule by mouth 3 times daily for 10 days  -     HYDROcodone-acetaminophen (NORCO) 5-325 MG per tablet; Take 1 tablet by mouth every 6 hours as needed for Pain for up to 5 days. Intended supply: 5 days. Take lowest dose possible to manage pain    Nipple pain  -     clindamycin (CLEOCIN) 300 MG capsule; Take 1 capsule by mouth 3 times daily for 10 days  -     HYDROcodone-acetaminophen (NORCO) 5-325 MG per tablet; Take 1 tablet by mouth every 6 hours as needed for Pain for up to 5 days. Intended supply: 5 days. Take lowest dose possible to manage pain    Mastitis  -     clindamycin (CLEOCIN) 300 MG capsule; Take 1 capsule by mouth 3 times daily for 10 days  -     HYDROcodone-acetaminophen (NORCO) 5-325 MG per tablet; Take 1 tablet by mouth every 6 hours as needed for Pain for up to 5 days. Intended supply: 5 days. Take lowest dose possible to manage pain      Patient advised to used local ice alternating by warm moist compress. She was advised to elevate right breast with pillow and to wear a supportive shirt, but not tight bra or device. Recommend to take Clindamycin and recommend to take Norco for severe pain, she does not drive a vehicle.

## 2021-01-05 DIAGNOSIS — K21.9 GASTROESOPHAGEAL REFLUX DISEASE WITHOUT ESOPHAGITIS: Primary | ICD-10-CM

## 2021-01-05 RX ORDER — OMEPRAZOLE 20 MG/1
20 CAPSULE, DELAYED RELEASE ORAL
Qty: 30 CAPSULE | Refills: 1 | Status: SHIPPED
Start: 2021-01-05 | End: 2021-03-02

## 2021-01-12 ENCOUNTER — OFFICE VISIT (OUTPATIENT)
Dept: OBGYN | Age: 36
End: 2021-01-12
Payer: COMMERCIAL

## 2021-01-12 VITALS
HEIGHT: 63 IN | BODY MASS INDEX: 51.91 KG/M2 | DIASTOLIC BLOOD PRESSURE: 75 MMHG | HEART RATE: 113 BPM | TEMPERATURE: 97 F | WEIGHT: 293 LBS | SYSTOLIC BLOOD PRESSURE: 148 MMHG

## 2021-01-12 DIAGNOSIS — N93.8 DUB (DYSFUNCTIONAL UTERINE BLEEDING): ICD-10-CM

## 2021-01-12 DIAGNOSIS — Z01.419 WOMEN'S ANNUAL ROUTINE GYNECOLOGICAL EXAMINATION: Primary | ICD-10-CM

## 2021-01-12 DIAGNOSIS — Z12.4 SCREENING FOR CERVICAL CANCER: ICD-10-CM

## 2021-01-12 PROCEDURE — 99213 OFFICE O/P EST LOW 20 MIN: CPT | Performed by: OBSTETRICS & GYNECOLOGY

## 2021-01-12 PROCEDURE — 99395 PREV VISIT EST AGE 18-39: CPT | Performed by: OBSTETRICS & GYNECOLOGY

## 2021-01-12 PROCEDURE — G8482 FLU IMMUNIZE ORDER/ADMIN: HCPCS | Performed by: OBSTETRICS & GYNECOLOGY

## 2021-01-12 NOTE — PROGRESS NOTES
Patient alert and pleasant with no complaints  Here today for annual GYN visit  Pelvic exam, pap smear obtained, labeled Lalo Cain  and hand delivered to lab. Discharge instructions have been discussed with the patient. Patient advised to call our office with any questions or concerns. Voiced understanding.

## 2021-01-12 NOTE — PROGRESS NOTES
Bryant Chery     Patient presents for annual exam. Patient was seen in October with heavy/ irregular cycles. She wanted a hysterectomy,  which is on hold due to Luiza. She was given megace but never took it. Patient is concerned of risk of breast cancer, as she has an aunt who had breast cancer. Patient is still bleeding heavily. States she will pass very large clots. Discussed options with Mirena or OCP but patient declines. Patient does not want hormones. Patient's chart reviewed. Patient had an attempted EMB [facility] by [facility]  last February with Dr. Mariela Aguiar. Both procedures unable to be performed due to cervical stenosis. Patient also with complex medical history, including type 2 DM, COPD (on steroids), hypertension, glaucoma, morbid obesity (BMI 52). It was was discussed by her prior provider to refer to gyn/onc due to complexity of case for robotic hysterectomy. Discussed this again with patient as this may be her better option. Patient agrees.      Past Medical History:   Diagnosis Date    Anxiety     Asthma     Bipolar 1 disorder ([facility] [address]     COPD (chronic obstructive pulmonary disease) (HCC)     Depression     Fissure, anal     Glaucoma     Suspected by eye specialist    Macular edema     Cm    Neuropathy     PTSD (post-traumatic stress disorder)     Splenomegaly 9/24/2020    Type 2 diabetes mellitus without complication, without long-term current use of insulin ([facility])         Past Surgical History:   Procedure Laterality Date    ANTERIOR CRUCIATE LIGAMENT REPAIR      CHOLECYSTECTOMY      DILATION AND CURETTAGE OF UTERUS N/A 1/8/2019    DILATATION AND CURETTAGE HYSTEROSCOPY WITH REMOVAL OF CONDYLOMATA performed by Gina Long MD [address] N/A 2/25/2020    DILATATION AND CURETTAGE HYSTEROSCOPY, ATTEMPTED CERVICAL BIOPSY, MARIYA OF  LABIAL ABSCESS performed by Gina Long MD at [address] [place]    repair anal fissure    TUBAL LIGATION          Family History   Problem Relation Age of Onset    Diabetes Mother     Cancer Mother         ovarian    Diabetes Father     Cancer Sister         NHL    Diabetes Sister     High Blood Pressure Brother           Current Outpatient Medications:     omeprazole (PRILOSEC) 20 MG delayed release capsule, Take 1 capsule by mouth every morning (before breakfast), Disp: 30 capsule, Rfl: 1    metFORMIN (GLUCOPHAGE) 1000 MG tablet, Take 1 tablet by mouth 2 times daily (with meals), Disp: 180 tablet, Rfl: 2    oxybutynin (DITROPAN-XL) 10 MG extended release tablet, TAKE ONE TABLET BY MOUTH EVERY DAY, Disp: , Rfl:     amLODIPine (NORVASC) 5 MG tablet, Take 1 tablet by mouth daily, Disp: 30 tablet, Rfl: 1    topiramate (TOPAMAX) 25 MG tablet, Take 1 tablet at bedtime, then increase to 2 tablets at bedtime after 1 week as tolerated. , Disp: 60 tablet, Rfl: 1    megestrol (MEGACE) 20 MG tablet, Take 1 tablet by mouth 2 times daily, Disp: 60 tablet, Rfl: 3    cetirizine (ZYRTEC) 10 MG tablet, Take 1 tablet by mouth daily, Disp: 30 tablet, Rfl: 3    montelukast (SINGULAIR) 10 MG tablet, Take 1 tablet by mouth nightly, Disp: 30 tablet, Rfl: 3    venlafaxine (EFFEXOR XR) 150 MG extended release capsule, Take 1 capsule by mouth daily, Disp: 30 capsule, Rfl: 3    hydroCHLOROthiazide (HYDRODIURIL) 25 MG tablet, 1 tab by mouth daily, Disp: 30 tablet, Rfl: 3    cloNIDine (CATAPRES) 0.1 MG tablet, Take 1 tablet by mouth 2 times daily, Disp: 60 tablet, Rfl: 3    ondansetron (ZOFRAN ODT) 4 MG disintegrating tablet, Take 1 tablet by mouth every 8 hours as needed for Nausea or Vomiting, Disp: 10 tablet, Rfl: 0    atorvastatin (LIPITOR) 40 MG tablet, Take 1 tablet by mouth nightly, Disp: 30 tablet, Rfl: 3    vitamin B-12 (CYANOCOBALAMIN) 1000 MCG tablet, Take 1 tablet by mouth daily, Disp: 30 tablet, Rfl: 3    albuterol (PROVENTIL) (2.5 MG/3ML) 0.083% nebulizer solution, Take 3 mLs by nebulization every 6 hours as needed for Wheezing, Disp: 120 each, Rfl: 3    albuterol sulfate HFA (PROVENTIL HFA) 108 (90 Base) MCG/ACT inhaler, Inhale 2 puffs into the lungs every 4 hours as needed for Wheezing, Disp: 1 Inhaler, Rfl: 1    benzonatate (TESSALON) 200 MG capsule, Take 1 capsule by mouth 3 times daily as needed for Cough (Patient not taking: Reported on 1/12/2021), Disp: 15 capsule, Rfl: 0    SUMAtriptan (IMITREX) 50 MG tablet, Take 1 tablet by mouth once as needed for Migraine, Disp: 9 tablet, Rfl: 5    azelastine (ASTELIN) 0.1 % nasal spray, 1 spray by Nasal route 2 times daily Use in each nostril as directed, Disp: 1 Bottle, Rfl: 0    acetaminophen (APAP EXTRA STRENGTH) 500 MG tablet, Take 1 tablet by mouth every 6 hours as needed for Pain, Disp: 20 tablet, Rfl: 0    blood glucose monitor kit and supplies, Test 3 times a day as needed for symptoms of irregular blood glucose., Disp: 1 kit, Rfl: 0    Lancets MISC, 1 each by Does not apply route 3 times daily, Disp: 100 each, Rfl: 5    blood glucose monitor strips, Test 3 times a day as needed for symptoms of irregular blood glucose., Disp: 90 strip, Rfl: 5     Allergies   Allergen Reactions    Cymbalta [Duloxetine Hcl] Other (See Comments)     angry    Keflex [Cephalexin]      Hives, rash      Neurontin [Gabapentin] Other (See Comments)     Makes me feel very weird    Lamictal [Lamotrigine] Nausea And Vomiting        Social History     Tobacco History     Smoking Status  Current Every Day Smoker Smoking Start Date  11/3/1995 Smoking Frequency  1 pack/day for 20 years (20 pk yrs) Smoking Tobacco Type  Cigarettes    Smokeless Tobacco Use  Never Used          Alcohol History     Alcohol Use Status  Not Currently Comment  socially          Drug Use     Drug Use Status  No          Sexual Activity     Sexually Active  Yes Partners  Male Comment  TL                 Vitals:    01/12/21 1429   BP: (!) 148/75   Pulse: 113   Temp: 97 °F (36.1 °C) Physical Exam:  General: pleasant, alert     Breasts: breasts appear normal, no suspicious masses, no skin or nipple changes or axillary nodes. Pelvic exam: normal external genitalia, vulva, vagina, cervix, uterus and adnexa. Cervix very anterior in vagina, difficulty fully visualizing posterior edge. No uterine or adnexal masses or tenderness. Sylvia Chavarria was seen today for gynecologic exam.    Diagnoses and all orders for this visit:    Women's annual routine gynecological examination    Screening for cervical cancer  -     PAP SMEAR    DUB (dysfunctional uterine bleeding)  -     Amb External Referral To Gynecology    Advised patient to call with questions or concerns. Referral placed to Dr. Tyson Wong. Return if symptoms worsen or fail to improve.      Mare Gomez MD

## 2021-01-28 DIAGNOSIS — F41.9 ANXIETY: ICD-10-CM

## 2021-01-28 RX ORDER — LORAZEPAM 0.5 MG/1
0.5 TABLET ORAL 2 TIMES DAILY PRN
Qty: 30 TABLET | Refills: 0 | Status: SHIPPED | OUTPATIENT
Start: 2021-01-28 | End: 2021-02-27

## 2021-01-28 NOTE — PROGRESS NOTES
Patient requesting refill of her Lorazepam. [place] report reviewed. Patient has not as of yet went to see psychiatry as instructed to do so multiple times. She will be advised once again that she needs to see someone ASAP. I will also advise her of center in her immediate are, [place] [address] which is very close to her home where she can be evaluated. Controlled Substance Monitoring:    Acute and Chronic Pain Monitoring:   RX Monitoring 1/28/2021   Attestation -   Periodic Controlled Substance Monitoring No signs of potential drug abuse or diversion identified.

## 2021-01-31 DIAGNOSIS — G43.909 MIGRAINE WITHOUT STATUS MIGRAINOSUS, NOT INTRACTABLE, UNSPECIFIED MIGRAINE TYPE: ICD-10-CM

## 2021-02-01 ENCOUNTER — TELEPHONE (OUTPATIENT)
Dept: FAMILY MEDICINE CLINIC | Age: 36
End: 2021-02-01

## 2021-02-01 DIAGNOSIS — G43.909 MIGRAINE WITHOUT STATUS MIGRAINOSUS, NOT INTRACTABLE, UNSPECIFIED MIGRAINE TYPE: Primary | ICD-10-CM

## 2021-02-01 RX ORDER — TOPIRAMATE 25 MG/1
TABLET ORAL
Qty: 60 TABLET | Refills: 0 | Status: SHIPPED
Start: 2021-02-01 | End: 2021-05-11

## 2021-02-01 RX ORDER — TOPIRAMATE 25 MG/1
TABLET ORAL
Qty: 60 TABLET | Refills: 0 | Status: SHIPPED
Start: 2021-02-01 | End: 2021-02-01

## 2021-02-01 NOTE — TELEPHONE ENCOUNTER
Murphy Haider Pharmacist, called regarding RX for Topiramate and stated directions have take 1 at bedtime, then increase to 2 at bedtime in 1 wk, however, patient has been taking 2 tablets at bedtime. Please clarify, resubmit.

## 2021-02-03 ENCOUNTER — OFFICE VISIT (OUTPATIENT)
Dept: FAMILY MEDICINE CLINIC | Age: 36
End: 2021-02-03
Payer: COMMERCIAL

## 2021-02-03 VITALS
HEART RATE: 100 BPM | RESPIRATION RATE: 18 BRPM | BODY MASS INDEX: 51.91 KG/M2 | TEMPERATURE: 96.2 F | SYSTOLIC BLOOD PRESSURE: 136 MMHG | HEIGHT: 63 IN | DIASTOLIC BLOOD PRESSURE: 82 MMHG | OXYGEN SATURATION: 99 % | WEIGHT: 293 LBS

## 2021-02-03 DIAGNOSIS — Z00.00 ROUTINE GENERAL MEDICAL EXAMINATION AT A HEALTH CARE FACILITY: Primary | ICD-10-CM

## 2021-02-03 PROCEDURE — G0438 PPPS, INITIAL VISIT: HCPCS | Performed by: PHYSICIAN ASSISTANT

## 2021-02-03 PROCEDURE — G8482 FLU IMMUNIZE ORDER/ADMIN: HCPCS | Performed by: PHYSICIAN ASSISTANT

## 2021-02-03 ASSESSMENT — PATIENT HEALTH QUESTIONNAIRE - PHQ9
SUM OF ALL RESPONSES TO PHQ QUESTIONS 1-9: 2
SUM OF ALL RESPONSES TO PHQ QUESTIONS 1-9: 2
1. LITTLE INTEREST OR PLEASURE IN DOING THINGS: 1

## 2021-02-03 ASSESSMENT — LIFESTYLE VARIABLES: HOW OFTEN DO YOU HAVE A DRINK CONTAINING ALCOHOL: 0

## 2021-02-03 NOTE — PATIENT INSTRUCTIONS
Personalized Preventive Plan for Glendy Andrade - 2/3/2021  Medicare offers a range of preventive health benefits. Some of the tests and screenings are paid in full while other may be subject to a deductible, co-insurance, and/or copay. Some of these benefits include a comprehensive review of your medical history including lifestyle, illnesses that may run in your family, and various assessments and screenings as appropriate. After reviewing your medical record and screening and assessments performed today your provider may have ordered immunizations, labs, imaging, and/or referrals for you. A list of these orders (if applicable) as well as your Preventive Care list are included within your After Visit Summary for your review. Other Preventive Recommendations:    · A preventive eye exam performed by an eye specialist is recommended every 1-2 years to screen for glaucoma; cataracts, macular degeneration, and other eye disorders. · A preventive dental visit is recommended every 6 months. · Try to get at least 150 minutes of exercise per week or 10,000 steps per day on a pedometer . · Order or download the FREE \"Exercise & Physical Activity: Your Everyday Guide\" from [employer]. Call 2-234.471.2390 or search [employer] online. · You need 2465-6335 mg of calcium and 7547-9451 IU of vitamin D per day. It is possible to meet your calcium requirement with diet alone, but a vitamin D supplement is usually necessary to meet this goal.  · When exposed to the sun, use a sunscreen that protects against both UVA and UVB radiation with an SPF of 30 or greater. Reapply every 2 to 3 hours or after sweating, drying off with a towel, or swimming. · Always wear a seat belt when traveling in a car. Always wear a helmet when riding a bicycle or motorcycle.   Personalized Preventive Plan for Glendy Andrade - 2/3/2021 Medicare offers a range of preventive health benefits. Some of the tests and screenings are paid in full while other may be subject to a deductible, co-insurance, and/or copay. Some of these benefits include a comprehensive review of your medical history including lifestyle, illnesses that may run in your family, and various assessments and screenings as appropriate. After reviewing your medical record and screening and assessments performed today your provider may have ordered immunizations, labs, imaging, and/or referrals for you. A list of these orders (if applicable) as well as your Preventive Care list are included within your After Visit Summary for your review. Other Preventive Recommendations:    A preventive eye exam performed by an eye specialist is recommended every 1-2 years to screen for glaucoma; cataracts, macular degeneration, and other eye disorders. A preventive dental visit is recommended every 6 months. Try to get at least 150 minutes of exercise per week or 10,000 steps per day on a pedometer . Order or download the FREE \"Exercise & Physical Activity: Your Everyday Guide\" from [employer]. Call 7-313.794.8363 or search [employer] online. You need 7879-2706 mg of calcium and 8818-6375 IU of vitamin D per day. It is possible to meet your calcium requirement with diet alone, but a vitamin D supplement is usually necessary to meet this goal.  When exposed to the sun, use a sunscreen that protects against both UVA and UVB radiation with an SPF of 30 or greater. Reapply every 2 to 3 hours or after sweating, drying off with a towel, or swimming. Always wear a seat belt when traveling in a car. Always wear a helmet when riding a bicycle or motorcycle.

## 2021-02-03 NOTE — PROGRESS NOTES
Medicare Annual Wellness Visit  Name: Shirin Palafox Date: 2/3/2021   MRN: [id number] Sex: Female   Age: 38 y.o. Ethnicity: Non-/Non    : 1985 Race: Isabelle Odonnell is here for Medicare AWV    Screenings for behavioral, psychosocial and functional/safety risks, and cognitive dysfunction are all negative except as indicated below. These results, as well as other patient data from the [address] form, are documented in Flowsheets linked to this Encounter. Allergies   Allergen Reactions    Cymbalta [Duloxetine Hcl] Other (See Comments)     angry    Keflex [Cephalexin]      Hives, rash      Neurontin [Gabapentin] Other (See Comments)     Makes me feel very weird    Lamictal [Lamotrigine] Nausea And Vomiting         Prior to Visit Medications    Medication Sig Taking? Authorizing Provider   topiramate (TOPAMAX) 25 MG tablet Take 2 tabs at bedtime Yes Rey Trejo PA-C   LORazepam (ATIVAN) 0.5 MG tablet Take 1 tablet by mouth 2 times daily as needed for Anxiety for up to 30 doses.  Yes Rey Trejo PA-C   omeprazole (PRILOSEC) 20 MG delayed release capsule Take 1 capsule by mouth every morning (before breakfast) Yes Rey Trejo PA-C   SUMAtriptan (IMITREX) 50 MG tablet Take 1 tablet by mouth once as needed for Migraine Yes Rey Trejo PA-C   metFORMIN (GLUCOPHAGE) 1000 MG tablet Take 1 tablet by mouth 2 times daily (with meals) Yes Sigifredo Corea MD   oxybutynin (DITROPAN-XL) 10 MG extended release tablet TAKE ONE TABLET BY MOUTH EVERY DAY Yes Historical Provider, MD   amLODIPine (NORVASC) 5 MG tablet Take 1 tablet by mouth daily Yes Rey Trejo PA-C   megestrol (MEGACE) 20 MG tablet Take 1 tablet by mouth 2 times daily Yes Laura Saldivar MD   cetirizine (ZYRTEC) 10 MG tablet Take 1 tablet by mouth daily Yes Rey Trejo PA-C   montelukast (SINGULAIR) 10 MG tablet Take 1 tablet by mouth nightly Yes Emily Zarate Araceli Ramírez PA-C   venlafaxine (EFFEXOR XR) 150 MG extended release capsule Take 1 capsule by mouth daily Yes Krishna Hurst PA-C   hydroCHLOROthiazide (HYDRODIURIL) 25 MG tablet 1 tab by mouth daily Yes Krishna Hurst PA-C   cloNIDine (CATAPRES) 0.1 MG tablet Take 1 tablet by mouth 2 times daily Yes Krishna Hurst PA-C   acetaminophen (APAP EXTRA STRENGTH) 500 MG tablet Take 1 tablet by mouth every 6 hours as needed for Pain Yes VELMA Isidro CNP   ondansetron (ZOFRAN ODT) 4 MG disintegrating tablet Take 1 tablet by mouth every 8 hours as needed for Nausea or Vomiting Yes VELMA Isidro CNP   atorvastatin (LIPITOR) 40 MG tablet Take 1 tablet by mouth nightly Yes Mario Messer DO   vitamin B-12 (CYANOCOBALAMIN) 1000 MCG tablet Take 1 tablet by mouth daily Yes Krishna Hurst PA-C   albuterol (PROVENTIL) (2.5 MG/3ML) 0.083% nebulizer solution Take 3 mLs by nebulization every 6 hours as needed for Wheezing Yes Krishna Hurst PA-C   blood glucose monitor kit and supplies Test 3 times a day as needed for symptoms of irregular blood glucose. Yes Krishna Hurst PA-C   Lancets MISC 1 each by Does not apply route 3 times daily Yes Krishna Hurst PA-C   blood glucose monitor strips Test 3 times a day as needed for symptoms of irregular blood glucose.  Yes Krishna Hurst PA-C   albuterol sulfate HFA (PROVENTIL HFA) 108 (90 Base) MCG/ACT inhaler Inhale 2 puffs into the lungs every 4 hours as needed for Wheezing Yes Racquel Sexton DO         Past Medical History:   Diagnosis Date    Anxiety     Asthma     Bipolar 1 disorder ([address]     COPD (chronic obstructive pulmonary disease) [facility]     Depression     Fissure, anal     Glaucoma     Suspected by eye specialist    Macular edema     Cm    Neuropathy     PTSD (post-traumatic stress disorder)     Splenomegaly 9/24/2020    Type 2 diabetes mellitus without complication, without long-term current use of insulin [facility])        Past Surgical History:   Procedure Laterality Date    ANTERIOR CRUCIATE LIGAMENT REPAIR       SECTION      x2    CHOLECYSTECTOMY      DILATION AND CURETTAGE OF UTERUS N/A 2019    DILATATION AND CURETTAGE HYSTEROSCOPY WITH REMOVAL OF CONDYLOMATA performed by Joni Skinner MD at [address] N/A 2020    DILATATION AND CURETTAGE HYSTEROSCOPY, ATTEMPTED CERVICAL BIOPSY, MARIYA OF  LABIAL ABSCESS performed by Joni Skinner MD at [address]    repair anal fissure    TUBAL LIGATION           Family History   Problem Relation Age of Onset    Diabetes Mother     Cancer Mother         ovarian    Diabetes Father     Cancer Sister         NHL    Diabetes Sister     High Blood Pressure Brother     Breast Cancer Maternal Aunt     Uterine Cancer Neg Hx     Colon Cancer Neg Hx        CareTeam (Including outside providers/suppliers regularly involved in providing care):   Patient Care Team:  Brigitte Will PA-C as PCP - General (Physician Assistant)  Brigitte Will PA-C as PCP - [address] [place] [address] Dr Cochranled Provider  Mikki Gonzalez MD as Consulting Physician (Cardiology)    Wt Readings from Last 3 Encounters:   21 (!) 301 lb (136.5 kg)   21 296 lb 6 oz (134.4 kg)   21 298 lb (135.2 kg)     Vitals:    21 0821 21 0915   BP: 136/82    Pulse: 111 100   Resp: 18    Temp: 96.2 °F (35.7 °C)    TempSrc: Temporal    SpO2: 99%    Weight: (!) 301 lb (136.5 kg)    Height: 5' 3\" (1.6 m)      Body mass index is 53.32 kg/m². Based upon direct observation of the patient, evaluation of cognition reveals recent and remote memory intact.     General Appearance: alert and oriented to person, place and time, well developed and well- nourished, in no acute distress, very anxious appearing, somewhat tearful  Skin: warm and dry, no rash or erythema  Head: normocephalic and atraumatic  Eyes: PERRLA, EOM's intact, conjunctivae normal  ENT: TM's intact, no nasal drainage. Neck: supple, some paracervical tenderness with palpable spasm. Some local tendereness to posterior scalp, Some shotty anterior cervical tenderness,  no thyromegaly or thyroid nodules  Pulmonary/Chest: clear to auscultation bilaterally- no wheezes, rales or rhonchi, normal air movement, no respiratory distress  Cardiovascular: normal rate, regular rhythm  Abdomen: soft, obese, non-tender, non-distended, normal bowel sounds, no masses or organomegaly  Extremities: no cyanosis, clubbing or edema  Musculoskeletal: normal range of motion, Some tenderness to palpation of arms and legs  Neurologic: reflexes normal and symmetric, no cranial nerve deficit, gait, coordination and speech normal      Patient's complete Health Risk Assessment and screening values have been reviewed and are found in Flowsheets. The following problems were reviewed today and where indicated follow up appointments were made and/or referrals ordered.     Visual inspection:  Deformity/amputation: absent  Skin lesions/pre-ulcerative calluses: absent  Edema: right- trace, left- trace    Sensory exam:  Monofilament sensation: abnormal - Left > right foot noted greater than 3 areas with decreased monofilament sensation  (minimum of 5 random plantar locations tested, avoiding callused areas - > 1 area with absence of sensation is + for neuropathy)    Plus at least one of the following:  Pulses: normal,   Pinprick: Intact  Proprioception: Intact  Vibration (128 Hz): N/A    Positive Risk Factor Screenings with Interventions:     Fall Risk:  2 or more falls in past year?: (!) yes  Fall with injury in past year?: (!) yes  Fall Risk Interventions:    · Home safety tips provided       Substance History:  Social History     Tobacco History     Smoking Status  Current Every Day Smoker Smoking Start Date  11/3/1995 Smoking Frequency  1 pack/day for 20 years (20 pk yrs) Smoking Tobacco Type  Cigarettes Smokeless Tobacco Use  Never Used          Alcohol History     Alcohol Use Status  Not Currently Comment  socially          Drug Use     Drug Use Status  No          Sexual Activity     Sexually Active  Yes Partners  Male Comment  TL               Alcohol Screening:       A score of 8 or more is associated with harmful or hazardous drinking. A score of 13 or more in women, and 15 or more in men, is likely to indicate alcohol dependence. Substance Abuse Interventions:  · None    General Health and ACP:     Advance Directives     Power of  Living Will ACP-Advance Directive ACP-Power of     Not on File Not on File Not on File Not on File      General Health Risk Interventions:  · Pain issues: Pain management discussed with patient, will need to do further work up prior  · No Living Will: Patient declines ACP discussion/assistance    Health Habits/Nutrition:     Body mass index: (!) 53.31  Health Habits/Nutrition Interventions:  · Inadequate physical activity:  patient is not ready to increase his/her physical activity level at this time, she is not physically capable  · Dental exam overdue:  patient declines dental evaluation, she reports that she has her dentist that she normally seese.        Personalized Preventive Plan   Current Health Maintenance Status  Immunization History   Administered Date(s) Administered    Influenza, Quadv, IM, PF (6 mo and older Fluzone, Flulaval, Fluarix, and 3 yrs and older Afluria) 03/13/2020, 10/22/2020    Pneumococcal Polysaccharide ([id number]) 03/10/2016    Tdap (Boostrix, Adacel) 09/08/2016, 10/12/2018        Health Maintenance   Topic Date Due    Annual Wellness Visit (AWV)  11/01/2019    Hepatitis B vaccine (1 of 3 - Risk 3-dose series) 02/03/2022 (Originally 12/4/2003)    Lipid screen  03/13/2021    Diabetic retinal exam  09/17/2021    A1C test (Diabetic or Prediabetic)  09/19/2021    Diabetic microalbuminuria test  09/19/2021    Potassium monitoring 09/19/2021    Creatinine monitoring  09/19/2021    Diabetic foot exam  02/03/2022    Cervical cancer screen  01/13/2026    DTaP/Tdap/Td vaccine (3 - Td) 10/12/2028    Flu vaccine  Completed    Pneumococcal 0-64 years Vaccine  Completed    Hepatitis C screen  Completed    HIV screen  Completed    Hepatitis A vaccine  Aged Out    Hib vaccine  Aged Out    Meningococcal (ACWY) vaccine  Aged Out    Varicella vaccine  Discontinued     Recommendations for [email or web address] Due: see orders and patient instructions/AVS.  . Recommended screening schedule for the next 5-10 years is provided to the patient in written form: see Patient Instructions/AVS.    Beverleyzenia Duncan was seen today for medicare aw.     Diagnoses and all orders for this visit:    Routine general medical examination at a health care facility  -     Diabetic Foot Exam

## 2021-02-04 ENCOUNTER — VIRTUAL VISIT (OUTPATIENT)
Dept: FAMILY MEDICINE CLINIC | Age: 36
End: 2021-02-04
Payer: COMMERCIAL

## 2021-02-04 DIAGNOSIS — M54.40 LOW BACK PAIN WITH SCIATICA, SCIATICA LATERALITY UNSPECIFIED, UNSPECIFIED BACK PAIN LATERALITY, UNSPECIFIED CHRONICITY: ICD-10-CM

## 2021-02-04 DIAGNOSIS — K76.0 FATTY LIVER DISEASE, NONALCOHOLIC: ICD-10-CM

## 2021-02-04 DIAGNOSIS — R26.89 LOSS OF BALANCE: ICD-10-CM

## 2021-02-04 DIAGNOSIS — M54.9 CHRONIC MID BACK PAIN: ICD-10-CM

## 2021-02-04 DIAGNOSIS — R29.6 FREQUENT FALLS: Primary | ICD-10-CM

## 2021-02-04 DIAGNOSIS — M79.661 PAIN IN BOTH LOWER LEGS: ICD-10-CM

## 2021-02-04 DIAGNOSIS — R16.1 SPLENOMEGALY: ICD-10-CM

## 2021-02-04 DIAGNOSIS — M54.2 NECK PAIN: ICD-10-CM

## 2021-02-04 DIAGNOSIS — R51.9 WORSENING HEADACHES: ICD-10-CM

## 2021-02-04 DIAGNOSIS — M79.662 PAIN IN BOTH LOWER LEGS: ICD-10-CM

## 2021-02-04 DIAGNOSIS — E11.42 DIABETIC POLYNEUROPATHY ASSOCIATED WITH TYPE 2 DIABETES MELLITUS (HCC): ICD-10-CM

## 2021-02-04 DIAGNOSIS — G89.29 CHRONIC MID BACK PAIN: ICD-10-CM

## 2021-02-04 PROCEDURE — 99213 OFFICE O/P EST LOW 20 MIN: CPT | Performed by: PHYSICIAN ASSISTANT

## 2021-02-04 RX ORDER — TRAMADOL HYDROCHLORIDE 50 MG/1
50 TABLET ORAL EVERY 6 HOURS PRN
Qty: 28 TABLET | Refills: 0 | Status: SHIPPED | OUTPATIENT
Start: 2021-02-04 | End: 2021-02-11

## 2021-02-04 ASSESSMENT — ENCOUNTER SYMPTOMS
NAUSEA: 1
VOMITING: 0
EYE PAIN: 0
SINUS PRESSURE: 0
CONSTIPATION: 1
WHEEZING: 0
BACK PAIN: 1
ABDOMINAL PAIN: 1
CHEST TIGHTNESS: 0
COUGH: 0
EYE REDNESS: 0
SHORTNESS OF BREATH: 0
SORE THROAT: 0
DIARRHEA: 0

## 2021-02-04 NOTE — PATIENT INSTRUCTIONS
Patient Education        MRI of the Head: About This Test  What is it? MRI (magnetic resonance imaging) is a test that uses a magnetic field and pulses of radio wave energy to make pictures of the organs and structures inside the body. An MRI of the head can give your doctor information about your brain, eyes, ears, and nerves. When you have an MRI, you lie on a table and the table moves into the MRI machine. Why is this test done? An MRI of the head can help find problems such as tumors and infection. It also can check symptoms of a head injury or of diseases such as multiple sclerosis (MS) and stroke. How do you prepare for the test?  In general, there's nothing you have to do before this test, unless your doctor tells you to. Tell your doctor if you get nervous in tight spaces. You may get a medicine to help you relax. If you think you'll get this medicine, be sure you have someone to take you home. What happens during the test?  · You may have contrast material (dye) put into your arm through a tube called an IV. · You will lie on a table that's part of the MRI scanner. · The table will slide into the space that contains the magnet. · Inside the scanner, you will hear a fan and feel air moving. You may hear tapping, thumping, or snapping noises. You may be given earplugs or headphones to reduce the noise. · You will be asked to hold still during the scan. You may be asked to hold your breath for short periods. · You may be alone in the scanning room. But a technologist will watch through a window and talk with you during the test.  How long does the test take? The test usually takes 30 to 60 minutes but can take as long as 2 hours. How does having an MRI of the head feel? You won't have pain from the magnetic field or radio waves used for the MRI test. But you may be tired or sore from lying in one position for a long time. If a contrast material is used, you may feel some coolness when it is put into your IV. In rare cases, you may feel:  · Tingling in the mouth if you have metal dental fillings. · Warmth in the area being checked. This is normal. Tell the technologist if you have nausea, vomiting, a headache, dizziness, pain, burning, or breathing problems. What are the risks of an MRI of the head? There are no known harmful effects from the strong magnetic field used for an MRI. But the magnet is very powerful. It may affect any metal implants or other medical devices you have. Risks from contrast material  Contrast material that contains gadolinium may be used in this test. But for most people, the benefit of its use in this test outweighs the risk. Be sure to tell your doctor if you have kidney problems or are pregnant. There is a slight chance of an allergic reaction if contrast material is used during the test. But most reactions are mild and can be treated using medicine. If you breastfeed and are concerned about whether the contrast material used in this test is safe, talk to your doctor. Most experts believe that very little dye passes into breast milk and even less is passed on to the baby. But if you are concerned, you can stop breastfeeding for up to 24 hours after the test. During this time, you can give your baby breast milk that you stored before the test. Don't use the breast milk you pump in the 24 hours after the test. Throw it out. What happens after the test?  · You will probably be able to go home right away. It depends on the reason for the test.  · You can go back to your usual activities right away. Follow-up care is a key part of your treatment and safety. Be sure to make and go to all appointments, and call your doctor if you are having problems. It's also a good idea to keep a list of the medicines you take. Ask your doctor when you can expect to have your test results. Where can you learn more? Go to https://chpepiceweb.Docracy. org and sign in to your ShopReply account. Enter T019 in the [place] box to learn more about \"MRI of the Head: About This Test.\"     If you do not have an account, please click on the \"Sign Up Now\" link. Current as of: December 9, 2019               Content Version: 12.6  © 2194-4279 [employer]er[employer]. Care instructions adapted under license by [facility] ([facility]). If you have questions about a medical condition or this instruction, always ask your healthcare professional. [address] any warranty or liability for your use of this information. Patient Education        Nonalcoholic Steatohepatitis (GIRALDO): Care Instructions  Your Care Instructions     Nonalcoholic steatohepatitis (GIRALDO) is liver inflammation. It is caused by a buildup of fat in the liver. The fat buildup is not caused by drinking alcohol. Because of the inflammation, the liver does not work as well as it should. GIRALDO is part of a group of liver diseases called nonalcoholic fatty liver disease. In these diseases, fat builds up in the liver and sometimes causes liver damage. This damage can get worse over time. Follow-up care is a key part of your treatment and safety. Be sure to make and go to all appointments, and call your doctor if you are having problems. It's also a good idea to know your test results and keep a list of the medicines you take. How can you care for yourself at home? · Stay at a healthy weight. Or if you need to, slowly get to a healthy weight. · Control your cholesterol. Talk to your doctor about ways to lower your cholesterol, if needed. You might try getting active, taking medicines, and making healthy changes to your diet. · Eat healthy foods. This includes fruits, vegetables, lean meats and dairy, and whole grains. · If you have diabetes, keep your blood sugar at your target level. · Get at least 30 minutes of exercise on most days of the week. Walking is a good choice. You also may want to do other activities, such as running, swimming, cycling, or playing tennis or team sports. · Limit alcohol, or do not drink. Alcohol can damage the liver and cause health problems. When should you call for help? Call 911 anytime you think you may need emergency care. For example, call if:    · You have trouble breathing.     · You vomit blood or what looks like coffee grounds. Call your doctor now or seek immediate medical care if:    · You feel very sleepy or confused.     · You have new or worse belly pain.     · You have a fever.     · There is a new or increasing yellow tint to your skin or the whites of your eyes.     · You have any abnormal bleeding, such as:  ? Nosebleeds. ? Vaginal bleeding that is different (heavier, more frequent, at a different time of the month) than what you are used to.  ? Bloody or black stools, or rectal bleeding. ? Bloody or pink urine. Watch closely for changes in your health, and be sure to contact your doctor if:    · Your belly is getting bigger.     · You are gaining weight.     · You have any problems. Where can you learn more? Go to https://BountyHunterpeAimetiseb.WoowUp. org and sign in to your [employer] account. Enter B107 in the Massively Fun box to learn more about \"Nonalcoholic Steatohepatitis (GIRALDO): Care Instructions. \"     If you do not have an account, please click on the \"Sign Up Now\" link. Current as of: April 15, 2020               Content Version: 12.6  © 0369-9698 [employer]. Care instructions adapted under license by [facility] ([facility]). If you have questions about a medical condition or this instruction, always ask your healthcare professional. [place] [address] any warranty or liability for your use of this information.          Patient Education Back Pain, Emergency or Urgent Symptoms: Care Instructions  Your Care Instructions     Many people have back pain at one time or another. In most cases, pain gets better with self-care that includes over-the-counter pain medicine, ice, heat, and exercises. Unless you have symptoms of a severe injury or heart attack, you may be able to give yourself a few days before you call a doctor. But some back problems are very serious. Do not ignore symptoms that need to be checked right away. Follow-up care is a key part of your treatment and safety. Be sure to make and go to all appointments, and call your doctor if you are having problems. It's also a good idea to know your test results and keep a list of the medicines you take. How can you care for yourself at home? · Sit or lie in positions that are most comfortable and that reduce your pain. Try one of these positions when you lie down:  ? Lie on your back with your knees bent and supported by large pillows. ? Lie on the floor with your legs on the seat of a sofa or chair. ? Lie on your side with your knees and hips bent and a pillow between your legs. ? Lie on your stomach if it does not make pain worse. · Do not sit up in bed, and avoid soft couches and twisted positions. Bed rest can help relieve pain at first, but it delays healing. Avoid bed rest after the first day. · Change positions every 30 minutes. If you must sit for long periods of time, take breaks from sitting. Get up and walk around, or lie flat. · Try using a heating pad on a low or medium setting, for 15 to 20 minutes every 2 or 3 hours. Try a warm shower in place of one session with the heating pad. You can also buy single-use heat wraps that last up to 8 hours. You can also try ice or cold packs on your back for 10 to 20 minutes at a time, several times a day. (Put a thin cloth between the ice pack and your skin.) This reduces pain and makes it easier to be active and exercise. · Take pain medicines exactly as directed. ? If the doctor gave you a prescription medicine for pain, take it as prescribed. ? If you are not taking a prescription pain medicine, ask your doctor if you can take an over-the-counter medicine. When should you call for help? Call 911 anytime you think you may need emergency care. For example, call if:    · You are unable to move a leg at all.     · You have back pain with severe belly pain.     · You have symptoms of a heart attack. These may include:  ? Chest pain or pressure, or a strange feeling in the chest.  ? Sweating. ? Shortness of breath. ? Nausea or vomiting. ? Pain, pressure, or a strange feeling in the back, neck, jaw, or upper belly or in one or both shoulders or arms. ? Lightheadedness or sudden weakness. ? A fast or irregular heartbeat. After you call 911, the  may tell you to chew 1 adult-strength or 2 to 4 low-dose aspirin. Wait for an ambulance. Do not try to drive yourself. Call your doctor now or seek immediate medical care if:    · You have new or worse symptoms in your arms, legs, chest, belly, or buttocks. Symptoms may include:  ? Numbness or tingling. ? Weakness. ? Pain.     · You lose bladder or bowel control.     · You have back pain and:  ? You have injured your back while lifting or doing some other activity. Call if the pain is severe, has not gone away after 1 or 2 days, and you cannot do your normal daily activities. ? You have had a back injury before that needed treatment. ? Your pain has lasted longer than 4 weeks. ? You have had weight loss you cannot explain. ? You have a fever. ? You are age 48 or older. ? You have cancer now or have had it before. Watch closely for changes in your health, and be sure to contact your doctor if you are not getting better as expected. Where can you learn more? · You have had low back pain for at least 6 weeks, and home treatment (pain relievers, exercise, and heat or ice) has not helped. · You are not worried about the cost of an MRI. · You are willing to have surgery if the MRI shows a problem that can be fixed with surgery. Why might you choose not to have an MRI? · A physical exam that includes questions about your medical history is all that is needed to diagnose most cases of low back pain. · An MRI can be done later if treatment is not working. · You are not willing to have surgery even if an MRI showed a problem that surgery could fix. Your decision  Thinking about the facts and your feelings can help you make a decision that is right for you. Be sure you understand the benefits and risks of your options, and think about what else you need to do before you make the decision. Where can you learn more? Go to https://The ANT WorkspeedenewJobScout.Windsor Circle. org and sign in to your Matcha account. Enter S844 in the [place] [employer] box to learn more about \"Deciding About an MRI for Low Back Pain. \"     If you do not have an account, please click on the \"Sign Up Now\" link. Current as of: March 2, 2020               Content Version: 12.6  © 2006-2020 [employer]. Care instructions adapted under license by [facility] ([facility]). If you have questions about a medical condition or this instruction, always ask your healthcare professional. [place] [address] any warranty or liability for your use of this information.
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
## 2021-02-11 DIAGNOSIS — R29.6 FREQUENT FALLS: Primary | ICD-10-CM

## 2021-02-11 DIAGNOSIS — R51.9 WORSENING HEADACHES: ICD-10-CM

## 2021-02-11 DIAGNOSIS — R26.89 LOSS OF BALANCE: ICD-10-CM

## 2021-02-12 ENCOUNTER — HOSPITAL ENCOUNTER (OUTPATIENT)
Age: 36
Discharge: HOME | End: 2021-02-12
Payer: COMMERCIAL

## 2021-02-12 DIAGNOSIS — R29.6 FREQUENT FALLS: ICD-10-CM

## 2021-02-12 DIAGNOSIS — R26.89 LOSS OF BALANCE: ICD-10-CM

## 2021-02-12 DIAGNOSIS — R51.9 WORSENING HEADACHES: ICD-10-CM

## 2021-02-12 LAB
BUN BLDV-MCNC: 11 MG/DL (ref 6–20)
CREAT SERPL-MCNC: 0.7 MG/DL (ref 0.5–1)
GFR AFRICAN AMERICAN: >60
GFR NON-AFRICAN AMERICAN: >60 ML/MIN/1.73

## 2021-02-12 PROCEDURE — 82565 ASSAY OF CREATININE: CPT

## 2021-02-12 PROCEDURE — 36415 COLL VENOUS BLD VENIPUNCTURE: CPT

## 2021-02-12 PROCEDURE — 84520 ASSAY OF UREA NITROGEN: CPT

## 2021-02-15 ENCOUNTER — HOSPITAL ENCOUNTER (OUTPATIENT)
Dept: MRI IMAGING | Age: 36
Discharge: HOME | End: 2021-02-17
Payer: COMMERCIAL

## 2021-02-15 DIAGNOSIS — R29.6 FREQUENT FALLS: ICD-10-CM

## 2021-02-15 DIAGNOSIS — R26.89 LOSS OF BALANCE: ICD-10-CM

## 2021-02-15 DIAGNOSIS — R51.9 WORSENING HEADACHES: ICD-10-CM

## 2021-02-15 DIAGNOSIS — R11.2 NON-INTRACTABLE VOMITING WITH NAUSEA, UNSPECIFIED VOMITING TYPE: Primary | ICD-10-CM

## 2021-02-15 PROCEDURE — A9579 GAD-BASE MR CONTRAST NOS,1ML: HCPCS | Performed by: RADIOLOGY

## 2021-02-15 PROCEDURE — 6360000004 HC RX CONTRAST MEDICATION: Performed by: RADIOLOGY

## 2021-02-15 PROCEDURE — 70553 MRI BRAIN STEM W/O & W/DYE: CPT

## 2021-02-15 RX ORDER — ONDANSETRON 4 MG/1
4 TABLET, FILM COATED ORAL DAILY PRN
Qty: 30 TABLET | Refills: 1 | Status: SHIPPED
Start: 2021-02-15 | End: 2021-05-05

## 2021-02-15 RX ADMIN — GADOTERIDOL 20 ML: 279.3 INJECTION, SOLUTION INTRAVENOUS at 15:16

## 2021-02-17 ENCOUNTER — PATIENT MESSAGE (OUTPATIENT)
Dept: ENDOCRINOLOGY | Age: 36
End: 2021-02-17

## 2021-02-18 ENCOUNTER — VIRTUAL VISIT (OUTPATIENT)
Dept: FAMILY MEDICINE CLINIC | Age: 36
End: 2021-02-18
Payer: COMMERCIAL

## 2021-02-18 ENCOUNTER — TELEPHONE (OUTPATIENT)
Dept: ENDOCRINOLOGY | Age: 36
End: 2021-02-18

## 2021-02-18 DIAGNOSIS — M62.81 MUSCLE WEAKNESS (GENERALIZED): ICD-10-CM

## 2021-02-18 DIAGNOSIS — R51.9 WORSENING HEADACHES: ICD-10-CM

## 2021-02-18 DIAGNOSIS — R29.6 FREQUENT FALLS: Primary | ICD-10-CM

## 2021-02-18 DIAGNOSIS — Z87.828 HISTORY OF TRAUMATIC HEAD INJURY: ICD-10-CM

## 2021-02-18 PROCEDURE — 99213 OFFICE O/P EST LOW 20 MIN: CPT | Performed by: PHYSICIAN ASSISTANT

## 2021-02-18 ASSESSMENT — ENCOUNTER SYMPTOMS
VOMITING: 0
NAUSEA: 1
ABDOMINAL PAIN: 0
SHORTNESS OF BREATH: 0
CHEST TIGHTNESS: 0
COUGH: 0
BACK PAIN: 1
SORE THROAT: 0
DIARRHEA: 0

## 2021-02-18 NOTE — TELEPHONE ENCOUNTER
The pt sent in the following message via Survature;    Hello I have a question so I have started the proper meal replacement that me and the nutritionist talked over and cut out all pop and I drink all water and cup of coffee in the morning today my sugar levels where 103 after my meal and I didn't take any medicine I felt fine what do you want me to do when my numbers are low also I am having a robotic hysterectomy Monday February 22 they have done some blood work I know my A1C I think was in there aswell if you would like to look at it

## 2021-02-18 NOTE — PATIENT INSTRUCTIONS
Patient Education        Weakness: Care Instructions  Your Care Instructions     Weakness is a lack of physical or muscle strength. You may feel that you need to make extra effort to move your arms, legs, or other muscles. Generalized weakness means that you feel weak in most areas of your body. Another type of weakness may affect just one muscle or group of muscles. You may feel weak and tired after you have done too much activity, such as taking an extra-long hike. This is not a serious problem. It often goes away on its own. Feeling weak can also be caused by medical conditions like thyroid problems, depression, or a virus. Sometimes the cause can be serious. Your doctor may want to do more tests to try to find the cause of the weakness. The doctor has checked you carefully, but problems can develop later. If you notice any problems or new symptoms, get medical treatment right away. Follow-up care is a key part of your treatment and safety. Be sure to make and go to all appointments, and call your doctor if you are having problems. It's also a good idea to know your test results and keep a list of the medicines you take. How can you care for yourself at home? · Rest when you feel tired. · Be safe with medicines. If your doctor prescribed medicine, take it exactly as prescribed. Call your doctor if you think you are having a problem with your medicine. You will get more details on the specific medicines your doctor prescribes. · Do not skip meals. Eating a balanced diet may increase your energy level. · Get some physical activity every day, but do not get too tired. When should you call for help? Call your doctor now or seek immediate medical care if:    · You have new or worse weakness.     · You are dizzy or lightheaded, or you feel like you may faint. Watch closely for changes in your health, and be sure to contact your doctor if:    · You do not get better as expected. Where can you learn more? Go to https://chpepiceweb.[employer]. org and sign in to your [employer] account. Enter [id number] in the [facility] box to learn more about \"Weakness: Care Instructions. \"     If you do not have an account, please click on the \"Sign Up Now\" link. Current as of: June 26, 2019               Content Version: 12.6  © 2006-2020 [employer]. Care instructions adapted under license by [facility] ([facility]). If you have questions about a medical condition or this instruction, always ask your healthcare professional. [place] [address] any warranty or liability for your use of this information. Patient Education        How to Get Up Safely After a Fall: Care Instructions  Your Care Instructions     If you have injuries, health problems, or other reasons that may make it easy for you to fall at home, it is a good idea to learn how to get up safely after a fall. Learning how to get up correctly can help you avoid making an injury worse. Also, knowing what to do if you cannot get up can help you stay safe until help arrives. Follow-up care is a key part of your treatment and safety. Be sure to make and go to all appointments, and call your doctor if you are having problems. It's also a good idea to know your test results and keep a list of the medicines you take. How can you care for yourself after a fall? If you think you can get up  First lie still for a few minutes and think about how you feel. If your body feels okay and you think you can get up safely, follow the rest of the steps below:  1. Look for a chair or other piece of furniture that is close to you. 2. Roll onto your side and rest. Roll by turning your head in the direction you want to roll, move your shoulder and arm, then hip and leg in the same direction.   3. Lie still for a moment to let your blood pressure adjust. 4. Slowly push your upper body up, lift your head, and take a moment to rest.  5. Slowly get up on your hands and knees, and crawl to the chair or other stable piece of furniture. 6. Put your hands on the chair. 7. Move one foot forward, and place it flat on the floor. Your other leg should be bent with the knee on the floor. 8. Rise slowly, turn your body, and sit in the chair. Stay seated for a bit and think about how you feel. Call for help. Even if you feel okay, let someone know what happened to you. You might not know that you have a serious injury. If you cannot get up  1. If you think you are injured after a fall or you cannot get up, try not to panic. 2. Call out for help. 3. If you have a phone within reach or you have an emergency call device, use it to call for help. 4. If you do not have a phone within reach, try to slide yourself toward it. If you cannot get to the phone, try to slide toward a door or window or a place where you think you can be heard. 5. Carbon or use an object to make noise so someone might hear you. 6. If you can reach something that you can use for a pillow, place it under your head. Try to stay warm by covering yourself with a blanket or clothing while you wait for help. When should you call for help? Call 911 anytime you think you may need emergency care. For example, call if:    · You passed out (lost consciousness).     · You cannot get up after a fall.     · You have severe pain. Call your doctor now or seek immediate medical care if:    · You have new or worse pain.     · You are dizzy or lightheaded.     · You hit your head. Watch closely for changes in your health, and be sure to contact your doctor if:    · You do not get better as expected. Where can you learn more? Go to https://chpepiceweb.[employer]. org and sign in to your [employer] account. Enter S064 in the [place] box to learn more about \"How to Get Up Safely After a Fall: Care Instructions. \"     If you do not have an account, please click on the \"Sign Up Now\" link. Current as of: April 15, 2020               Content Version: 12.6  © 2925-7492 [employer]. Care instructions adapted under license by [facility] ([facility]). If you have questions about a medical condition or this instruction, always ask your healthcare professional. [address] any warranty or liability for your use of this information.

## 2021-02-18 NOTE — PROGRESS NOTES
TeleMedicine Patient Consent    This visit was performed as a virtual video visit using a synchronous, two-way, audio-video telehealth technology platform. Patient identification was verified at the start of the visit, including the patient's telephone number and physical location. I discussed with the patient the nature of our telehealth visits, that:     1. Due to the nature of an audio- video modality, the only components of a physical exam that could be done are the elements supported by direct observation. 2. I would evaluate the patient and recommend diagnostics and treatments based on my assessment. 3. If it was felt that the patient should be evaluated in clinic or an emergency room setting, then they would be directed there. 4. Our sessions are not being recorded and that personal health information is protected. 5. Our team would provide follow up care in person if/when the patient needs it. Patient does agree to proceed with telemedicine consultation. Patient's location: home address in [facility]  Physician  location Office address at [facility]     Time spent: Greater than Not billed by time    This visit was completed virtually using Doxy. me             2021     Tony Mead (:  1985) is a 38 y.o. female,  for evaluation of the following medical concerns:    HPI  Patient requested virtual visit concerning fall earlier today and tremors in her muscles. The patient reports she almost needs to walk with a cane to support herself as she keeps feeling like she is going to fall and feels weak. She is getting ready to have a robotic hysterectomy on 2021 and she is a little nervous, but reassurance given. She did have MRI of her brain done which was stable, but patient reports that she feels that something is wrong as she doesn't feel good. She is having headaches as well as body pain, neck, mid and back pain. She is trying to lose weight and change her diet. She did recent stop taking the Lipitor as we had discussed and weaned off her Effexor. She is currently in counseling as well for her anxiety and depression. Review of Systems   Constitutional: Positive for activity change (due to concerns of falling) and fatigue (all the time). Negative for chills, fever and unexpected weight change. HENT: Negative for congestion and sore throat. Eyes: Negative for visual disturbance. Respiratory: Negative for cough, chest tightness and shortness of breath (not at current). Cardiovascular: Negative for chest pain (denies). Gastrointestinal: Positive for nausea (on and off). Negative for abdominal pain, diarrhea and vomiting. Genitourinary: Positive for menstrual problem (currently pending hysterectomy next week). Negative for dysuria, flank pain and frequency. Musculoskeletal: Positive for arthralgias, back pain, gait problem (falling frequently), myalgias and neck pain. Negative for neck stiffness. Skin: Negative for rash and wound. Neurological: Positive for light-headedness (on and off), numbness (to vaginal area) and headaches. Negative for syncope, speech difficulty and weakness. Hematological: Negative for adenopathy. Psychiatric/Behavioral: The patient is nervous/anxious. All other systems reviewed and are negative. Prior to Visit Medications    Medication Sig Taking? Authorizing Provider   ondansetron (ZOFRAN) 4 MG tablet Take 1 tablet by mouth daily as needed for Nausea or Vomiting Yes Chantal Duncan PA-C   topiramate (TOPAMAX) 25 MG tablet Take 2 tabs at bedtime Yes Chantal Duncan PA-C   LORazepam (ATIVAN) 0.5 MG tablet Take 1 tablet by mouth 2 times daily as needed for Anxiety for up to 30 doses.  Yes Chantal Duncan PA-C   omeprazole (PRILOSEC) 20 MG delayed release capsule Take 1 capsule by mouth every morning (before breakfast) Yes Chantal Duncan PA-C SUMAtriptan (IMITREX) 50 MG tablet Take 1 tablet by mouth once as needed for Migraine Yes Sanjay Escobedo PA-C   metFORMIN (GLUCOPHAGE) 1000 MG tablet Take 1 tablet by mouth 2 times daily (with meals) Yes Treva Armstrong MD   amLODIPine (NORVASC) 5 MG tablet Take 1 tablet by mouth daily Yes Sanjay Escobedo PA-C   cetirizine (ZYRTEC) 10 MG tablet Take 1 tablet by mouth daily Yes Sanjay Escobedo PA-C   montelukast (SINGULAIR) 10 MG tablet Take 1 tablet by mouth nightly Yes Sanjay Escobedo PA-C   hydroCHLOROthiazide (HYDRODIURIL) 25 MG tablet 1 tab by mouth daily Yes Sanjay Escobedo PA-C   cloNIDine (CATAPRES) 0.1 MG tablet Take 1 tablet by mouth 2 times daily Yes Sanjay Escobedo PA-C   albuterol (PROVENTIL) (2.5 MG/3ML) 0.083% nebulizer solution Take 3 mLs by nebulization every 6 hours as needed for Wheezing Yes Sanjay Escobedo PA-C   blood glucose monitor kit and supplies Test 3 times a day as needed for symptoms of irregular blood glucose. Yes Sanjay Escobedo PA-C   Lancets MISC 1 each by Does not apply route 3 times daily Yes Sanjay Escobedo PA-C   blood glucose monitor strips Test 3 times a day as needed for symptoms of irregular blood glucose. Yes Sanjay Escobedo PA-C   albuterol sulfate HFA (PROVENTIL HFA) 108 (90 Base) MCG/ACT inhaler Inhale 2 puffs into the lungs every 4 hours as needed for Wheezing Yes Devan Lua DO   vitamin B-12 (CYANOCOBALAMIN) 1000 MCG tablet Take 1 tablet by mouth daily  Sanjay Escobedo PA-C          There were no vitals filed for this visit. Estimated body mass index is 53.21 kg/m² as calculated from the following:    Height as of 2/15/21: 5' 3\" (1.6 m). Weight as of 2/15/21: 300 lb 6.4 oz (136.3 kg). Physical Exam    ASSESSMENT/PLAN:  Jaclyn Rivera was seen today for fall.     Diagnoses and all orders for this visit:    Frequent falls  -     [address], CNP, Neurology, [place]  -     EMG; Future    Worsening headaches -     Bradley Graves CNP, Neurology, [place]    History of traumatic head injury  -     Melanie - Russ Tolbert CNP, Neurology, [place]    Muscle weakness (generalized)  -     EMG; Future      Patient advised to continue with her weight loss efforts. She was given reassurance with her hysterectomy next week. Referral will be made for neurology concerning her frequent falls despite her normal MRI of her brain. She also suffers from Migraine headaches, currently under fairly good control with Topamax and Imitrex. I will order EMG study of her upper and lower extremities to rule out demyelinating disorder. Patient advised that I am going to be moving to Walk in care and she will need to establish with new primary care provider. She would like to see provider closer to her area if she has to change. I will help provide her with [facility] provider near her area to see in the near future. She was advised to reach out to our office if any problems with transition and we can assist her. An electronic signature was used to authenticate this note.     --Elo Myers PA-C on 2/18/2021 at 6:46 PM

## 2021-02-18 NOTE — TELEPHONE ENCOUNTER
Please continue taking Metformin even with such BS. Metformin will not cause low blood sugar even if you don't eat at all.  It only drop BS from high to normal range but should not cause hypoglycemia     Don't take Metformin the morning of surgery

## 2021-02-27 ENCOUNTER — HOSPITAL ENCOUNTER (EMERGENCY)
Age: 36
Discharge: HOME | End: 2021-02-27
Payer: COMMERCIAL

## 2021-02-27 VITALS
OXYGEN SATURATION: 99 % | RESPIRATION RATE: 16 BRPM | DIASTOLIC BLOOD PRESSURE: 82 MMHG | SYSTOLIC BLOOD PRESSURE: 140 MMHG | HEIGHT: 63 IN | HEART RATE: 100 BPM | WEIGHT: 293 LBS | BODY MASS INDEX: 51.91 KG/M2 | TEMPERATURE: 98 F

## 2021-02-27 DIAGNOSIS — R73.9 HYPERGLYCEMIA: Primary | ICD-10-CM

## 2021-02-27 LAB — METER GLUCOSE: 143 MG/DL (ref 74–99)

## 2021-02-27 PROCEDURE — 82962 GLUCOSE BLOOD TEST: CPT

## 2021-02-27 PROCEDURE — 99283 EMERGENCY DEPT VISIT LOW MDM: CPT

## 2021-02-27 RX ORDER — OXYCODONE HYDROCHLORIDE 5 MG/1
5 TABLET ORAL EVERY 4 HOURS PRN
COMMUNITY
End: 2021-05-11

## 2021-02-27 NOTE — ED PROVIDER NOTES
[facility]    HPI:  21, Time: 12:59 PM RAMONITA Navas is a 38 y.o. female presenting to the ED for hyperglycemia, beginning 2-3 hours ago. The complaint has been intermittent, mild in severity, and worsened by nothing. Patient had a hysterectomy with bilateral salpingectomy on 2021 by Dr. Reza Fernandes at [facility]. She has been doing well post-operatively. Patient relates that she had 2 pieces of candy, 2 eggs and a biscuit for lunch today. After which she took her blood sugar and it was 200. She waited another 15 to 20 minutes and took it again it was 360 therefore prompting ED evaluation. She did take her Metformin after her blood sugar readings. She denies any symptoms at this time. States that her wounds from her surgery are healing well. She is been afebrile without recent travel or sick contacts. Patient denies all symptoms at this time. Review of Systems:   A complete review of systems was performed and pertinent positives and negatives are stated within HPI, all other systems reviewed and are negative.          --------------------------------------------- PAST HISTORY ---------------------------------------------  Past Medical History:  has a past medical history of Anxiety, Arthritis, Asthma, Bipolar 1 disorder ([address] COPD (chronic obstructive pulmonary disease) ([address] Depression, Fatty liver, Fissure, anal, GERD (gastroesophageal reflux disease), Glaucoma, Hyperlipidemia, Hypertension, Macular edema, Migraines, Neuropathy, PTSD (post-traumatic stress disorder), Splenomegaly, and Type 2 diabetes mellitus without complication, without long-term current use of insulin ([address] Past Surgical History:  has a past surgical history that includes Cholecystectomy; Tubal ligation; Anterior cruciate ligament repair (Right); Dilation and curettage of uterus (N/A, 2019); other surgical history (); Dilation and curettage of uterus (N/A, 2020);   section; Dilation and curettage of uterus; and Hysterectomy (02/22/2021). Social History:  reports that she has been smoking cigarettes. She started smoking about 25 years ago. She has a 20.00 pack-year smoking history. She has never used smokeless tobacco. She reports previous alcohol use. She reports that she does not use drugs. Family History: family history includes Breast Cancer in her maternal aunt; Cancer in her mother and sister; Diabetes in her father, mother, and sister; High Blood Pressure in her brother. The patients home medications have been reviewed. Allergies: Cymbalta [duloxetine hcl], Keflex [cephalexin], Neurontin [gabapentin], and Lamictal [lamotrigine]    -------------------------------------------------- RESULTS -------------------------------------------------  All laboratory and radiology results have been personally reviewed by myself   LABS:  Results for orders placed or performed during the hospital encounter of 02/27/21   POCT Glucose   Result Value Ref Range    Meter Glucose 143 (H) 74 - 99 mg/dL       RADIOLOGY:  Interpreted by Radiologist.  No orders to display       ------------------------- NURSING NOTES AND VITALS REVIEWED ---------------------------   The nursing notes within the ED encounter and vital signs as below have been reviewed. BP (!) 172/98   Pulse 114   Temp 98 °F (36.7 °C) (Infrared)   Resp 16   Ht 5' 3\" (1.6 m)   Wt 300 lb (136.1 kg)   LMP 02/22/2021 (Approximate)   SpO2 99%   BMI 53.14 kg/m²   Oxygen Saturation Interpretation: Normal      ---------------------------------------------------PHYSICAL EXAM--------------------------------------      Constitutional/General: Alert and oriented x3, well appearing, non toxic in NAD  Head: Normocephalic and atraumatic  Eyes: PERRL, EOMI  Mouth: Oropharynx clear, handling secretions, no trismus  Neck: Supple, full ROM,   Pulmonary: Lungs clear to auscultation bilaterally, no wheezes, rales, or rhonchi.

## 2021-03-02 DIAGNOSIS — K21.9 GASTROESOPHAGEAL REFLUX DISEASE WITHOUT ESOPHAGITIS: ICD-10-CM

## 2021-03-02 RX ORDER — OMEPRAZOLE 20 MG/1
CAPSULE, DELAYED RELEASE ORAL
Qty: 30 CAPSULE | Refills: 0 | Status: SHIPPED
Start: 2021-03-02 | End: 2021-05-11

## 2021-03-15 ENCOUNTER — HOSPITAL ENCOUNTER (OUTPATIENT)
Dept: NEUROLOGY | Age: 36
Discharge: HOME | End: 2021-03-15
Payer: COMMERCIAL

## 2021-03-15 DIAGNOSIS — M62.81 MUSCLE WEAKNESS (GENERALIZED): ICD-10-CM

## 2021-03-15 DIAGNOSIS — R29.6 FREQUENT FALLS: ICD-10-CM

## 2021-03-15 PROCEDURE — 95886 MUSC TEST DONE W/N TEST COMP: CPT

## 2021-03-15 PROCEDURE — 95912 NRV CNDJ TEST 11-12 STUDIES: CPT

## 2021-03-15 NOTE — PROCEDURES
EMG UPPER AND LOWER RIGHT EXTREMITIES;        [address]  [address].  Electrodiagnostic Laboratory  [place]        Full Name: Linda Blakely Gender: Female  MRN: [id number] YOB: 1985  Location[de-identified] [facility] (1)      Visit Date: 3/15/2021 09:50  Age: 38 Years 3 Months Old  Examining Physician: Dr. Ruthy Espinoza   Referring Physician: Trevor Chan  Technician: Almita Banks   Height: 5 feet 3 inch  Weight: 300 lbs  Notes: Frequent falls (R29.6) Muscle weakness (M62.81)      Motor NCS      Nerve / Sites Lat. Amplitude Amp. 1-2 Distance Lat Diff Velocity Temp.    ms mV % cm ms m/s °C   R Median - APB      Wrist 3.75 16.9 100 8   32      Elbow 7.40 15.9 94.2 18 3.65 49 32   R Ulnar - ADM      Wrist 3.44 14.2 100 8   32      B. Elbow 5.78 15.1 107 14 2.34 60 32      A. Elbow 7.55 14.0 98.9 10 1.77 56 32   R Peroneal - EDB      Ankle 4.58 7.7 100 8   31.2      Pop fossa 10.36 6.7 87.4 34 5.78 59 31.2   R Tibial - AH      Ankle 3.65 17.7 100 8   31.3      Pop fossa 11.30 14.9 83.8 38 7.66 50 31.3       Sensory NCS      Nerve / Sites Onset Lat Peak Lat PP Amp Distance Velocity Temp.    ms ms µV cm m/s °C   R Median - Digit II (Antidromic)      Mid Palm 1.25 1.88 96.2 7 56 32.2      Wrist 3.02 3.91 105.5 14 46 32.2   R Ulnar - Digit V (Antidromic)      Wrist 3.13 4.01 66.7 14 45 32.2   R Radial - Anatomical snuff box (Forearm)      Forearm 1.56 1.93 42.4 10 64 32.2   R Superficial peroneal - Ankle      Lat leg 2.08 2.71 12.7 10 48 31.2   R Sural - Ankle (Calf)      Calf 2.92 3.59 11.5 14 48 31.3       F  Wave      Nerve F Lat M Lat F-M Lat    ms ms ms   R Peroneal - EDB 42.2 3.5 38.6   R Tibial - AH 42.3 2.1 40.3   R Median - APB 26.2 4.1 22.1   R Ulnar - ADM 26.5 2.9 23.5       H Reflex      Nerve Lat Hmax    ms   R Tibial - [place] 27.1   L Tibial - [place] 26. 3       EMG         EMG Summary Table     Spontaneous MUAP Recruitment   Muscle IA Fib PSW Fasc H.F. Amp Dur. PPP Pattern   R.  Extensor hallucis temperature normal.  Radial peak latency, normal.    In the lower extremities, right superficial peroneal and right sural nerves, normal peak latencies. Amplitudes were also within excepted limits of normal..    F waves regarding the right median and ulnar nerves, right peroneal and tibial nerves, within normal limits. H reflex, normal bilaterally. Insertional activity in the upper extremity revealing no evidence of positive waves, fibrillation potentials, abnormal recruitment pattern, myotonic discharges, changes in amplitude and duration. Please refer to the summarization table found above for further details. .        Impression:      #1  No diagnostic evidence of a generalized peripheral neuropathy    #2 no diagnostic evidence of a cervical or lumbar motor radiculopathy. Pure sensory radiculopathies cannot be detected by EMG technique. #3 no diagnostic evidence of a new entrapment syndrome in the upper or lower extremities. #4 no evidence of a primary myopathy. These findings are based on the results of muscles and nerves evaluated. Recommendation: Correlation with history, physical examination, as well as imaging.   Thank you      Electronically signed by Brittney Jansen MD on 3/11/5990 at 12:09 PM

## 2021-03-15 NOTE — LETTER
RE:  Mei Butler    Dear Ms Timmy PedrazarULYSSES,     Enclosed you will find a copy of the requested EMG on your patient, Mei Butler completed 3/15/2021. EMG Findings:     Jojo Gallegos MD   Physician   Internal Medicine   Procedures   Signed   Date of Service:  3/15/2021  9:30 AM            Procedure Orders   EMG [[id number]] ordered by  at 02/18/21 1622   Pre-procedure Diagnoses   Frequent falls [R29.6]   Muscle weakness (generalized) [M62.81]   Procedures   EMG [NEU5]          Signed             Show:Clear all  [x]Manual[x]Template[x]Copied    Added by:  Alexis Argueta MD    []ver for details  EMG UPPER AND LOWER RIGHT EXTREMITIES;         [facility].  Electrodiagnostic Laboratory  [place]         Full Name:    Gisel Barba                   Gender:             Female  MRN:             [id number]                          YOB: 1985  Location<[facility]>     [facility] (1)      Visit Date:                          3/15/2021 09:50  Age:                                   38 Years 3 Months Old  Examining Physician:      Dr. Graham Gimenez   Referring Physician:        Jannet Malloy  Technician:                       Linsey Naranjo   Height:                               5 feet 3 inch  Weight:                              300 lbs  Notes:                                Frequent falls (R29.6) Muscle weakness (M62.81)      Motor NCS      Nerve / Sites Lat. Amplitude Amp. 1-2 Distance Lat Diff Velocity Temp.     ms mV % cm ms m/s °C   R Median - APB      Wrist 3.75 16.9 100 8     32      Elbow 7.40 15.9 94.2 18 3.65 49 32   R Ulnar - ADM      Wrist 3.44 14.2 100 8     32      B. Elbow 5.78 15.1 107 14 2.34 60 32      A. Elbow 7.55 14.0 98.9 10 1.77 56 32   R Peroneal - EDB      Ankle 4.58 7.7 100 8     31.2      Pop fossa 10.36 6.7 87.4 34 5.78 59 31.2   R Tibial - AH      Ankle 3.65 17.7 100 8     31.3      Pop fossa 11.30 14.9 83.8 38 7.66 50 31.3       Sensory NCS Nerve / Sites Onset Lat Peak Lat PP Amp Distance Velocity Temp.     ms ms µV cm m/s °C   R Median - Digit II (Antidromic)      Mid Palm 1.25 1.88 96.2 7 56 32.2      Wrist 3.02 3.91 105.5 14 46 32.2   R Ulnar - Digit V (Antidromic)      Wrist 3.13 4.01 66.7 14 45 32.2   R Radial - Anatomical snuff box (Forearm)      Forearm 1.56 1.93 42.4 10 64 32.2   R Superficial peroneal - Ankle      Lat leg 2.08 2.71 12.7 10 48 31.2   R Sural - Ankle (Calf)      Calf 2.92 3.59 11.5 14 48 31.3       F  Wave      Nerve F Lat M Lat F-M Lat     ms ms ms   R Peroneal - EDB 42.2 3.5 38.6   R Tibial - AH 42.3 2.1 40.3   R Median - APB 26.2 4.1 22.1   R Ulnar - ADM 26.5 2.9 23.5       H Reflex      Nerve Lat Hmax     ms   R Tibial - Soleus 27.1   L Tibial - Soleus 26. 3       EMG                     EMG Summary Table       Spontaneous MUAP Recruitment   Muscle IA Fib PSW Fasc H.F. Amp Dur. PPP Pattern   R. Extensor hallucis longus N None None None None N N N N   R. Flexor digitorum longus N None None None None N N N N   R. Tibialis anterior N None None None None N N N N   R. Gastrocnemius (Medial head) N None None None None N N N N   R. Vastus lateralis N None None None None N N N N   R. Lumbar paraspinals (mid) N None None None None N N N N   R. Deltoid N None None None None N N N N   R. Triceps brachii N None None None None N N N N   R. Biceps brachii N None None None None N N N N   R. Extensor digitorum communis N None None None None N N N N   R. Flexor pollicis longus N None None None None N N N N   R. Abductor pollicis brevis N None None None None N N N N   R. First dorsal interosseous N None None None None N N N N   R. Flexor digitorum profundus (Ulnar) N None None None None N N N N          This is the first electrodiagnostic study for Belgica Thompson. She was advised as to benefits and risks of this examination and voices understanding and consent.   There are no contraindications to this exam, no limitations or restrictions other than patient's lower extremity peripheral edema and generalized obesity. This examination was ordered for evaluation of her complaints of weakness and falls. The exam was within excepted limits of normal.     Summary:  Nerve conduction studies in the upper extremities revealed normal mixed motor latency of the right median and ulnar nerves. Amplitudes of both nerves were within normal limits, velocities normal including across the olecranon segment of the right ulnar nerve.     Motor nerve conduction studies of the right peroneal and right tibial nerves demonstrated normal mixed motor latencies, amplitudes, velocities.     Temperature measured in the upper and lower extremities were within normal limits. .     Sensory studies in the upper extremities demonstrate normal antidromic stimulation of the right median and ulnar nerves guard to peak latencies. Amplitudes were well within excepted limits of normal, temperature normal.  Radial peak latency, normal.     In the lower extremities, right superficial peroneal and right sural nerves, normal peak latencies. Amplitudes were also within excepted limits of normal..     F waves regarding the right median and ulnar nerves, right peroneal and tibial nerves, within normal limits. H reflex, normal bilaterally.     Insertional activity in the upper extremity revealing no evidence of positive waves, fibrillation potentials, abnormal recruitment pattern, myotonic discharges, changes in amplitude and duration. Please refer to the summarization table found above for further details. .        Impression:       #1  No diagnostic evidence of a generalized peripheral neuropathy     #2 no diagnostic evidence of a cervical or lumbar motor radiculopathy.   Pure sensory radiculopathies cannot be detected by EMG technique.     #3 no diagnostic evidence of a new entrapment syndrome in the upper or lower extremities.     #4 no evidence of a primary myopathy.     These findings are based on the results of muscles and nerves evaluated.     Recommendation: Correlation with history, physical examination, as well as imaging. Thank you        Electronically signed by Alex Arias MD on 7/26/1173 at 12:09 PM                   History:  Ernesto Moreno complains of \"my legs and hands go numb and very painful legs\". She also complains of chronic progressive lower back pain, shooting down mainly her right lower extremity. She denies any specific weakness that is consistent in muscle groups involving either upper or lower extremities. She relates bilateral symptoms, however cannot designate left or right side as being \"worse\". She denies any falls though she complains of weakness as well as balance issues. She expresses \"fear\" that something is \"very wrong\". .     ROS:   Please see the above history. She has a history of anxiety, bipolar disease, is on disability for these items. She has GERD, history of glaucoma, hyperlipidemia, high blood pressure, green history, difficulty breathing described as asthma complaints of weightbearing joints, chronic back pain, posttraumatic a several year history of diabetes. She has had surgery for repair of the cruciate ligament of her knee. Recently a hysterectomy. Meds:    Prior to Admission medications    Medication Sig Start Date End Date Taking? Authorizing Provider   sulfamethoxazole-trimethoprim (BACTRIM DS;SEPTRA DS) 800-160 MG per tablet Take 1 tablet by mouth 2 times daily for 5 days 3/10/21 3/15/21  Monie Matos MD   ibuprofen (ADVIL;MOTRIN) 600 MG tablet Take 1 tablet by mouth every 6 hours as needed for Pain 3/9/21   Monie Matos MD   omeprazole (PRILOSEC) 20 MG delayed release capsule TAKE ONE CAPSULE BY MOUTH EVERY MORNING BEFORE BREAKFAST 3/2/21   Elo Myers PA-C   oxyCODONE (ROXICODONE) 5 MG immediate release tablet Take 5 mg by mouth every 4 hours as needed for Pain.     Historical Provider, MD   ondansetron (ZOFRAN) 4 MG tablet Take 1 tablet by mouth daily as needed for Nausea or Vomiting 2/15/21   Huma Stager, PA-C   topiramate (TOPAMAX) 25 MG tablet Take 2 tabs at bedtime 2/1/21   Huma Stager, PA-C   SUMAtriptan (IMITREX) 50 MG tablet Take 1 tablet by mouth once as needed for Migraine 11/18/20 2/27/21  Huma Stager, PA-C   metFORMIN (GLUCOPHAGE) 1000 MG tablet Take 1 tablet by mouth 2 times daily (with meals) 11/18/20   Sophie Cervantes MD   amLODIPine (NORVASC) 5 MG tablet Take 1 tablet by mouth daily 11/12/20   Huma Stager, PAAlessandroC   cetirizine (ZYRTEC) 10 MG tablet Take 1 tablet by mouth daily 10/22/20   Huma Stager, PA-C   montelukast (SINGULAIR) 10 MG tablet Take 1 tablet by mouth nightly 10/22/20   Huma Stager, PA-C   hydroCHLOROthiazide (HYDRODIURIL) 25 MG tablet 1 tab by mouth daily 10/22/20   Huma Stager, PA-C   cloNIDine (CATAPRES) 0.1 MG tablet Take 1 tablet by mouth 2 times daily 10/22/20   Huma Stager, PAAlessandroC   vitamin B-12 (CYANOCOBALAMIN) 1000 MCG tablet Take 1 tablet by mouth daily 5/1/20   Huam Stager, PA-C   albuterol (PROVENTIL) (2.5 MG/3ML) 0.083% nebulizer solution Take 3 mLs by nebulization every 6 hours as needed for Wheezing 4/29/20   Huma Stager, PA-C   blood glucose monitor kit and supplies Test 3 times a day as needed for symptoms of irregular blood glucose. 4/2/20   Huma Stageeamon, PAAlessandroC   Lancets MISC 1 each by Does not apply route 3 times daily 4/2/20   Huma Stager, PA-C   blood glucose monitor strips Test 3 times a day as needed for symptoms of irregular blood glucose.  4/2/20   Huma Stager, PA-C   albuterol sulfate HFA (PROVENTIL HFA) 108 (90 Base) MCG/ACT inhaler Inhale 2 puffs into the lungs every 4 hours as needed for Wheezing 2/7/20 2/27/21  Francisco Hanley DO       PMH:     Past Medical History:   Diagnosis Date    Anxiety     Arthritis     Asthma     Bipolar 1 disorder ([facility] [address]     COPD (chronic Alcohol use: Not Currently     Frequency: Patient refused    Drug use: No    Sexual activity: Yes     Partners: Male     Comment: TL   Lifestyle    Physical activity     Days per week: Not on file     Minutes per session: Not on file    Stress: Not on file   Relationships    Social connections     Talks on phone: Not on file     Gets together: Not on file     Attends Rastafarian service: Not on file     Active member of club or organization: Not on file     Attends meetings of clubs or organizations: Not on file     Relationship status: Not on file    Intimate partner violence     Fear of current or ex partner: Not on file     Emotionally abused: Not on file     Physically abused: Not on file     Forced sexual activity: Not on file   Other Topics Concern    Not on file   Social History Narrative    Not on file       Family History:    Family History   Problem Relation Age of Onset    Diabetes Mother     Cancer Mother         ovarian    Diabetes Father     Cancer Sister         NHL    Diabetes Sister     High Blood Pressure Brother     Breast Cancer Maternal Aunt     Uterine Cancer Neg Hx     Colon Cancer Neg Hx        Exam: Feels a 26-year-old female 5 foot 3 inches weighing 300 pounds. Cognitively intact and following commands. Skin: Skin in the upper and lower extremities is normal normal in texture, temperature, color. No lesions are noted. Motor examination of the upper and lower extremities fails to reveal any significant weakness to manual muscle testing. No atrophy is present, tone is normal..    Sensory examination in the 4 extremities, within normal limits. .     Reflexes in the 4 extremities is entirely within normal limits and symmetrical.  No pathologic reflexes found in the upper or lower extremities. .     Cervical and lumbar range of motion is intact for patient's age as well as her significant obesity.   There is diminishment in hip range of motion though examination is difficult to assess    Discussion:    Results of the EMG demonstrated no evidence of primary neuropathy or myopathy. Patient was advised as to and referred back to her primary caregiver to discuss \"healthy living adaptations\". Thank you    If there are any questions, please contact me for discussion. Thank you once again for allowing me to participate along with you in his behalf.             Electronically signed by Declan Borden MD on 7/65/1445 at 12:14 PM

## 2021-03-30 DIAGNOSIS — R60.0 BILATERAL LOWER EXTREMITY EDEMA: ICD-10-CM

## 2021-03-30 DIAGNOSIS — R03.0 ELEVATED BLOOD PRESSURE READING: ICD-10-CM

## 2021-03-30 DIAGNOSIS — R09.81 NASAL CONGESTION: ICD-10-CM

## 2021-03-30 DIAGNOSIS — J45.901 MODERATE ASTHMA WITH ACUTE EXACERBATION, UNSPECIFIED WHETHER PERSISTENT: ICD-10-CM

## 2021-03-30 DIAGNOSIS — R09.82 POST-NASAL DRIP: ICD-10-CM

## 2021-03-30 DIAGNOSIS — J30.9 ALLERGIC RHINITIS, UNSPECIFIED SEASONALITY, UNSPECIFIED TRIGGER: ICD-10-CM

## 2021-03-31 RX ORDER — HYDROCHLOROTHIAZIDE 25 MG/1
TABLET ORAL
Qty: 30 TABLET | Refills: 0 | Status: SHIPPED
Start: 2021-03-31 | End: 2021-05-11

## 2021-03-31 RX ORDER — MONTELUKAST SODIUM 10 MG/1
10 TABLET ORAL NIGHTLY
Qty: 30 TABLET | Refills: 0 | Status: SHIPPED
Start: 2021-03-31 | End: 2021-05-11

## 2021-03-31 RX ORDER — VENLAFAXINE HYDROCHLORIDE 150 MG/1
CAPSULE, EXTENDED RELEASE ORAL
Qty: 30 CAPSULE | Refills: 0 | Status: SHIPPED
Start: 2021-03-31 | End: 2021-05-11

## 2021-03-31 RX ORDER — CLONIDINE HYDROCHLORIDE 0.1 MG/1
TABLET ORAL
Qty: 60 TABLET | Refills: 0 | Status: SHIPPED
Start: 2021-03-31 | End: 2021-05-11

## 2021-03-31 NOTE — TELEPHONE ENCOUNTER
[place],   I did approve these for 1 month  Who is she going to establish with??  I cannot refill these again. She needs to see a provider.     Thanks  Dayo Timmons

## 2021-04-07 ENCOUNTER — HOSPITAL ENCOUNTER (OUTPATIENT)
Dept: DIABETES SERVICES | Age: 36
Setting detail: THERAPIES SERIES
Discharge: HOME | End: 2021-04-07
Payer: COMMERCIAL

## 2021-04-07 PROCEDURE — 97802 MEDICAL NUTRITION INDIV IN: CPT | Performed by: DIETITIAN, REGISTERED

## 2021-04-07 SDOH — ECONOMIC STABILITY: FOOD INSECURITY: ADDITIONAL INFORMATION: NO

## 2021-04-07 ASSESSMENT — PROBLEM AREAS IN DIABETES QUESTIONNAIRE (PAID)
WORRYING ABOUT THE FUTURE AND THE POSSIBILITY OF SERIOUS COMPLICATIONS: 3
COPING WITH COMPLICATIONS OF DIABETES: 4
FEELING THAT DIABETES IS TAKING UP TOO MUCH OF YOUR MENTAL AND PHYSICAL ENERGY EVERY DAY: 3

## 2021-04-07 NOTE — LETTER
[facility]) - Diabetes Education    2021     Re:     Jayro Pang  :  1985    Dear Dr. Mateo Funez:    Thank you for referring your patient, Jayro Pang, to Diabetes Education Medical Nutrition Therapy. Your patient was here on 2021, and the following were addressed:        [x] Nutrition as Related to Diabetes    [x] Carbohydrate Counting     [] Free Food List    [] Combination Foods    [] Fast Foods    [x] Weighing and measuring    [x] Meal Planning    [x] Using Food Labels - Label Reading  [] Diabetes Education Class Schedule    [x] Diet Instruction       [x]  Diet instructed provided on: 1500 calorie meal plan, 3 meals and 1 snack         Mental health resources provided d/t high diabetes distress score of 12  Upon completion of this session, the following evaluation of your patients progress was made:    ASSESSMENT:  [x]  verbalizes understanding of diet principles  [x]  understands the relationship between diet and health  [x]  identifies proper food choices  [x]  identifies needed diet changes  [x]  expresses intentions to comply with diet guidelines  [x]  able to provide correct answers when asked    GOAL:  [x]  to follow individual meal plan  [x]  to weigh and measure food portions  [x]  to call with further questions  []  to attend diabetes education class sessions 1 and/or 2    PATIENT EDUCATION MATERIALS PROVIDED:  []  plate carb counting meal plan  []  low fat guidelines  [x]  carb counting sheet  []  low sodium guidelines  []  Other:      COMMENTS:      Patient is encouraged to call with further questions or call and re-schedule other sessions within a year from original date. Thank you for referring this patient to our program.  Please do not hesitate to call if you have any questions at ( (SEY or MUSHTAQ) or [phone] ([address] [place] [address]).         Sincerely,         Registered Dietitian Nutritionists:          [x]  ESTRELLITA Ambriz [facility]          []   Hernesto Ibarra Ranjeet GARCIA   []  ESTRELLITA Koroma  []   ESTRELLITA Lees           Diabetes

## 2021-04-07 NOTE — PROGRESS NOTES
Diabetes Self-Management Education Record    Participant Name: Juan Bird  Referring Provider: Jesse Pinedo PA-C  Assessment/Evaluation Ratings:  1=Needs Instruction   4=Demonstrates Understanding/Competency  2=Needs Review   NC=Not Covered    3=Comprehends Key Points  N/A=Not Applicable  Topics/Learning Objectives Pre-session Assess Date:  12/7/2020 CS Instr. Date Follow-up Date Post- session Eval Comments   Diabetes disease process & Treatment process:   -Define diabetes & prediabetes and ABCs of     diabetes   -Identify own type of diabetes  -Identify lifestyle changes/treatment options 1 12/7/2020 CS  3 Type 2- Pt states first diagnosed 2004, family history   Developing strategies for Healthy coping/psychosocial issues:    -Describe feelings about living with diabetes  -Identify coping strategies;   -Identify support needed & support network available 1 12/7/2020 CS        3         PHQ-2 Depression Screen Score: 1  4/7/2021Eliazarnnifer R Nadeen Lesch completed a Diabetes Self- Management Education Assessment on 4/7/21. Part of our assessment is having the patient complete the PAID (Problem Areas in Diabetes Scale)-5 survey. This tool  measures diabetes-related emotional distress a patient may be feeling. Juan Bird scored _12_   A total score of >8 indicates possible diabetes related emotional distress, which warrants further assessment and a referral to mental health professional for psychological support and treatment. Mental health resources sent to patient via mail       Prevention, detection & treatment of Chronic complications:    -Identify the prevention, detection and treatment for complications including immunizations, preventive eye, foot, dental and renal exams as indicated per the participant's duration of diabetes and health status.  -Define the natural course of diabetes and the relationship of blood glucose levels to long term complications of diabetes.   1 12/7/2020 CS 3 4/7/2021 Pt is experiencing neuropathy in feet, painful to walk in morning, feeling of bees stinging    Prevention, detection & treatment of acute complications:    -List symptoms of hyper & hypoglycemia, and prevention & treatment strategies.   -Describe sick day guidelines  DKA /indications for ketone testing &  when to call physician  1 12/7/2020 CS  3 Pt reports episodes of hypoglycemia. Instructed patient on symptoms, possible causes and treatment on hypoglycemia/typerglycemia. Mailed supporting materials. Identify severe weather/situation crisis  & diabetes supplies management        Using medications safely:   -State effects of diabetes medicines on blood glucose levels;  -List diabetes medication taken, action & side effects 1 12/7/2020 CS  3 Pt states Metformin BID with meals. 4/7/2021 Continues on metformin BID with meals   Insulin/Injectables  -Name appropriate injection sites; proper storage; supplies needed;          Demonstrate proper technique        Monitoring blood glucose, interpreting and using results:   -Identify recommended & personal blood glucose targets & HgbA1C target levels  -State the Importance of logging blood glucose levels for pattern recognition;   -State benefits of reading/using pt generated health data  -Verbalize safe lancet disposal 1 12/7/2020 CS  3 Pt states that she monitors 3 times/day. Pt states that glucose values have been higher because she has been on steroids for past week due to pneumonia. A1C 4.9% 9/19/2020   -Demonstrate proper testing technique        Incorporating physical activity into lifestyle:   -State effect of exercise on blood glucose levels;   -State benefits of regular exercise;   -Define safety considerations;  -Describe contraindications/maintenance of activity. 1 12/7/2020 CS          3 Pt states that she does housework for physical activity.      4/7/2021 States she has been walking for 30 minutes most days   Incorporating nutritional management into lifestyle:   -Describe effect of type, amount & timing of food on blood glucose  -Describe methods for preparing and planning healthy meals  Correctly read food labels 1 12/7/2020 CS            4/7/2021-KAT  3            3 Food recall reveals 1 meal/day and regular soda everyday. Instructed on importance of 3 meals/day with decrease consumption of regular soda. Pt verbalized understanding. Mailed supporting materials. 4/7/2021 Discussed food sources of carbohydrate, portion control, timing of meals. Pt need review, states she has been eating more than stated on meal plan. Admits she drinks sugared sprite some days. Encouraged patient to eliminate sprite and  to monitor portions to help to promote weight loss to improve fatty liver   Plan personal carbohydrate-controlled meal based on individualized meal plan  Demonstrate CHO counting/portion control  1 12/7/2020                     4/7/2021-KAT  3                    3 Instructed patient on 1400 calorie carbohydrate controlled meal plan: 5 servings fat, 5 oz lean protein, 10 carbohydrate choices: 3 choices breakfast, lunch and dinner, 1 choices HS snack. Mailed patient supporting materials. 3/23/2021 CS Follow-up call:  Pt states that she has stopped drinking regular soda and is trying to follow meal plan. Pt schedule appointment with dietitian 4/7/2021.  4/7/2021 Instructed on above meal plan, sent copy via mail, Sent information on lacto-ovo vegetarian guidelines, per patient request.   Developing strategies for problem solving to promote health/change behavior. -Identify 7 self-care behaviors; Personal health risk factors; Benefits, challenges & strategies for behavioral change and set an individualized goal selection.  1 12/7/2020 CS  3 Goal: Consistent carbohydrate at meals and snack     Identified Barriers to learning/adherence to self management plan:    None  []  other    Instruction Method:  Lecture/Discussion, Handouts and Return demonstration     Supporting Education Materials/Equipment Provided: Educational Binder, Meal Plan and Nutritional Packet   []Citizen of [place] materials       [] services     []Other:      Encounter Type Date Attended Start Time End Time Comments No Show Dates   Assessment 12/7/2020  0620 [id number]   telehealth    Session 1         Session 2        Session 3 12/7/2020  [id number]  telehealth    In person Follow-up         Gestational Diabetes         Individual MNT 4/7/2021-KAT 9:30 10:15 telehealth    Meter Instrx        Insulin Instrx           Additional Comments:    Date: 3/23/2021   DSMES Follow-up plan based on patients DSMES goal.    Notified by [x] EMR []Fax        []HgbA1C   []Weight   []Hypertension  []Follow-up with Physician  []Medication compliance   [x]Plate method/meal plan compliance/ decrease/eliminate sugary beverages from my diet   []Self-Foot Exam Frequency   []Monitoring Frequency   []Exercise Routine   []Goal Attainment       []Patient lost to follow-up   Notified by []EMR []Fax     Personal Support Plan:      [x] Keep all scheduled doctor appointments   [] Make and keep appointments with specialists (foot, eye, dentist) as recommended   [] Consult my pharmacist about all new medications or to ask any medication questions   [] Get tested for sleep apnea   [] Seek help for:   [] Make an appointment with:   [] Attend smoking cessation classes or call 6-800-QUIT-NOW  [] Attend Diabetes Support Group   [] Use diabetes magazines, books, or credible web-sites like the ADA for more information  [] Increase exercise at home or join an exercise program:   [] Other:

## 2021-05-05 ENCOUNTER — HOSPITAL ENCOUNTER (EMERGENCY)
Age: 36
Discharge: HOME | End: 2021-05-05
Attending: EMERGENCY MEDICINE
Payer: COMMERCIAL

## 2021-05-05 VITALS
SYSTOLIC BLOOD PRESSURE: 125 MMHG | RESPIRATION RATE: 16 BRPM | OXYGEN SATURATION: 98 % | DIASTOLIC BLOOD PRESSURE: 86 MMHG | BODY MASS INDEX: 51.91 KG/M2 | HEART RATE: 101 BPM | TEMPERATURE: 98 F | WEIGHT: 293 LBS | HEIGHT: 63 IN

## 2021-05-05 DIAGNOSIS — J34.0 NOSE CELLULITIS: Primary | ICD-10-CM

## 2021-05-05 DIAGNOSIS — R51.9 RIGHT-SIDED FACE PAIN: ICD-10-CM

## 2021-05-05 DIAGNOSIS — Z78.9 BODY PIERCING: ICD-10-CM

## 2021-05-05 PROCEDURE — 6370000000 HC RX 637 (ALT 250 FOR IP)

## 2021-05-05 PROCEDURE — 6370000000 HC RX 637 (ALT 250 FOR IP): Performed by: NURSE PRACTITIONER

## 2021-05-05 PROCEDURE — 99284 EMERGENCY DEPT VISIT MOD MDM: CPT

## 2021-05-05 RX ORDER — DOXYCYCLINE HYCLATE 100 MG
100 TABLET ORAL 2 TIMES DAILY
Qty: 20 TABLET | Refills: 0 | Status: SHIPPED | OUTPATIENT
Start: 2021-05-05 | End: 2021-05-15

## 2021-05-05 RX ORDER — ACETAMINOPHEN 500 MG
500 TABLET ORAL EVERY 6 HOURS PRN
COMMUNITY
End: 2021-05-11

## 2021-05-05 RX ORDER — TETRACAINE HYDROCHLORIDE 5 MG/ML
2 SOLUTION OPHTHALMIC ONCE
Status: COMPLETED | OUTPATIENT
Start: 2021-05-05 | End: 2021-05-05

## 2021-05-05 RX ORDER — TETRACAINE HYDROCHLORIDE 5 MG/ML
SOLUTION OPHTHALMIC
Status: COMPLETED
Start: 2021-05-05 | End: 2021-05-05

## 2021-05-05 RX ORDER — IBUPROFEN 800 MG/1
800 TABLET ORAL ONCE
Status: COMPLETED | OUTPATIENT
Start: 2021-05-05 | End: 2021-05-05

## 2021-05-05 RX ORDER — IBUPROFEN 600 MG/1
600 TABLET ORAL EVERY 6 HOURS PRN
Qty: 20 TABLET | Refills: 0 | Status: SHIPPED | OUTPATIENT
Start: 2021-05-05 | End: 2021-05-11

## 2021-05-05 RX ORDER — VALACYCLOVIR HYDROCHLORIDE 1 G/1
1000 TABLET, FILM COATED ORAL 3 TIMES DAILY
Qty: 21 TABLET | Refills: 0 | Status: SHIPPED | OUTPATIENT
Start: 2021-05-05 | End: 2021-05-12

## 2021-05-05 RX ADMIN — IBUPROFEN 800 MG: 800 TABLET, FILM COATED ORAL at 17:29

## 2021-05-05 RX ADMIN — TETRACAINE HYDROCHLORIDE 2 DROP: 5 SOLUTION OPHTHALMIC at 17:28

## 2021-05-05 RX ADMIN — FLUORESCEIN SODIUM 1 MG: 1 STRIP OPHTHALMIC at 17:28

## 2021-05-05 ASSESSMENT — PAIN DESCRIPTION - ORIENTATION: ORIENTATION: RIGHT

## 2021-05-05 ASSESSMENT — PAIN DESCRIPTION - ONSET: ONSET: GRADUAL

## 2021-05-05 ASSESSMENT — PAIN DESCRIPTION - FREQUENCY: FREQUENCY: CONTINUOUS

## 2021-05-05 ASSESSMENT — VISUAL ACUITY: OU: 20/200

## 2021-05-05 ASSESSMENT — PAIN DESCRIPTION - DESCRIPTORS: DESCRIPTORS: BURNING;SHARP;STABBING

## 2021-05-06 NOTE — ED PROVIDER NOTES
ED Attending  CC: No           [address] [facility]  Department of Emergency Medicine   ED  Encounter Note  Admit Date/RoomTime: 2021  3:54 PM  ED Room:   NAME: Jovanni Church  : 1985  MRN: [id number]     Chief Complaint:  Other (pain right nasal area that radiates to right cheek area onset a couple days ago and worse today, nki)    [facility]        Jovanni Church is a 38 y.o. female who presents to the ED by private vehicle for complaints of pain in her right nares and states that it feels like someone punched her in her right cheek and has pain over the right maxillary sinus region and right eye. She denies any photosensitivity or tearing. .  Patient denies any direct injury. She denies any visual changes and is not wearing her glasses. Patient recently had a bladder sling repair last week and denies any complaints of chest pain shortness of breath or any other symptoms. Patient denies any previous episodes similar. The complaint has been persistent and gradually worsening and are moderate in severity. Patient reports that she had a piercing in her right nares that has been removed approximately 4 days. She denies any fever or chills. Patient is diabetic and her blood sugars have been within range. Patient reports she has a history of having MRSA in the past.    ROS   Pertinent positives and negatives are stated within HPI, all other systems reviewed and are negative. Past Medical History:  has a past medical history of Anxiety, Arthritis, Asthma, Bipolar 1 disorder ([address] COPD (chronic obstructive pulmonary disease) ([address] Depression, Fatty liver, Fissure, anal, GERD (gastroesophageal reflux disease), Glaucoma, Hyperlipidemia, Hypertension, Macular edema, Migraines, Neuropathy, PTSD (post-traumatic stress disorder), Splenomegaly, and Type 2 diabetes mellitus without complication, without long-term current use of insulin ([address]     Surgical History:  has a past surgical history that includes Cholecystectomy; Tubal ligation; Anterior cruciate ligament repair (Right); Dilation and curettage of uterus (N/A, 2019); other surgical history (); Dilation and curettage of uterus (N/A, 2020);  section; Dilation and curettage of uterus; Hysterectomy (2021); and Incontinence surgery. Social History:  reports that she has been smoking cigarettes. She started smoking about 25 years ago. She has a 20.00 pack-year smoking history. She has never used smokeless tobacco. She reports previous alcohol use. She reports that she does not use drugs. Family History: family history includes Breast Cancer in her maternal aunt; Cancer in her mother and sister; Diabetes in her father, mother, and sister; High Blood Pressure in her brother. Allergies: Cymbalta [duloxetine hcl], Keflex [cephalexin], Neurontin [gabapentin], and Lamictal [lamotrigine]    PHYSICAL EXAM   Oxygen Saturation Interpretation: Normal.        ED Triage Vitals   BP Temp Temp Source Pulse Resp SpO2 Height Weight   21 1546 21 1546 21 1546 21 1546 21 1546 21 1546 21 1601 21 1601   125/86 98 °F (36.7 °C) Temporal 101 16 98 % 5' 3\" (1.6 m) 300 lb (136.1 kg)         Physical Exam  Constitutional/General: Alert and oriented x3, well appearing, non toxic  HEENT:  NC/NT. PERRLA,  Airway patent. No facial swelling tenderness over the right maxillary region and no pain with light palpation of skin. There is inflammation to the right inferior turbinate and has a scab on the inside of her nares with tenderness. No nasal drainage. Neck: Supple, full ROM, non tender to palpation in the midline, no stridor, no crepitus, no meningeal signs  Respiratory: Lungs clear to auscultation bilaterally, no wheezes, rales, or rhonchi. Not in respiratory distress  CV:  Regular rate. Regular rhythm. No murmurs, gallops, or rubs.  2+ distal pulses  Chest: No chest wall tenderness  GI:  Abdomen Soft, Non tender, Non distended. +BS. No rebound, guarding, or rigidity. No pulsatile masses. Musculoskeletal: Moves all extremities x 4. Warm and well perfused, no clubbing, cyanosis, or edema. Capillary refill <3 seconds  Integument: skin warm and dry. No rashes. Lymphatic: no lymphadenopathy noted  Neurologic: GCS 15, no focal deficits, symmetric strength 5/5 in the upper and lower extremities bilaterally  Psychiatric: Normal Affect    Lab / Imaging Results   (All laboratory and radiology results have been personally reviewed by myself)  Labs:  No results found for this visit on 05/05/21. Imaging: All Radiology results interpreted by Radiologist unless otherwise noted. No orders to display       ED Course / Medical Decision Making     Medications   tetracaine (TETRAVISC) 0.5 % ophthalmic solution 2 drop (2 drops Right Eye Given 5/5/21 1728)   fluorescein ophthalmic strip 1 mg (1 mg Right Eye Given 5/5/21 1728)   ibuprofen (ADVIL;MOTRIN) tablet 800 mg (800 mg Oral Given 5/5/21 1729)     ED Course as of May 06 0041   Wed May 05, 2021   1746 Dr. Quiroga Appl into examine patient and feels she has an infection from her piercing and will treat with Augmentin and Bactroban ointment. [SE]      ED Course User Index  [SE] VELMA Olmos CNP     Consult(s):   None    Procedure(s):     WOOD's LAMP EXAM:  Performed By: VELMA Olmos CNP. Right Eye: Cornea: no abnormalities were observed, no abrasion or foreign bodies were noted, no corneal ulcer was observed, no limbal injection was noted and no proptosis and no dendritic lesions. Flourescein stain: Negative. Anterior chamber: no abnormalities were observed.          MDM:   This is a 42-year-old female who presents to the ED with having right facial pain for the past couple days and tenderness in her right nares and was examined closely did not see swelling in the infraorbital region and did have tenderness over the maxillary tight sinus. Patient is allergic to Keflex and was given doxycycline and Bactroban ointment for the inside of her nose. Patient instructed on signs and symptoms warranting immediate return to the ED for reevaluation at any time. She was advised to follow-up with her PCP and given an eye doctor as well. Her visual acuity was poor because she did not have her glasses and vision was 20/200 both eyes and individually. Patient had no proptosis or any signs of dendritic lesions in the eye. Patient is afebrile, nontoxic in appearance and hemodynamically stable for discharge. Plan of Care/Counseling:  I reviewed today's visit with the patient in addition to providing specific details for the plan of care and counseling regarding the diagnosis and prognosis. Questions are answered at this time and are agreeable with the plan. ASSESSMENT     1. Nose cellulitis    2. Body piercing    3. Right-sided face pain      PLAN   Discharge home. Patient condition is good    New Medications     Discharge Medication List as of 5/5/2021  5:53 PM      START taking these medications    Details   valACYclovir (VALTREX) 1 g tablet Take 1 tablet by mouth three times daily for 7 days, Disp-21 tablet, R-0Print      doxycycline hyclate (VIBRA-TABS) 100 MG tablet Take 1 tablet by mouth 2 times daily for 10 days, Disp-20 tablet, R-0Print      mupirocin (BACTROBAN) 2 % ointment Apply topically to affected area 3 times daily for 10 days. Dispense QS. No refills, Disp-1 Tube, R-0, Print      ibuprofen (ADVIL;MOTRIN) 600 MG tablet Take 1 tablet by mouth every 6 hours as needed for Pain, Disp-20 tablet, R-0Print           Electronically signed by VELMA Ashby CNP   DD: 5/6/21  **This report was transcribed using voice recognition software. Every effort was made to ensure accuracy; however, inadvertent computerized transcription errors may be present.   END OF ED PROVIDER NOTE      VELMA Ashby CNP  05/06/21 4957

## 2021-05-08 ENCOUNTER — HOSPITAL ENCOUNTER (EMERGENCY)
Age: 36
Discharge: HOME | End: 2021-05-08
Attending: EMERGENCY MEDICINE
Payer: COMMERCIAL

## 2021-05-08 VITALS
TEMPERATURE: 97.3 F | WEIGHT: 293 LBS | SYSTOLIC BLOOD PRESSURE: 140 MMHG | RESPIRATION RATE: 16 BRPM | HEART RATE: 96 BPM | DIASTOLIC BLOOD PRESSURE: 88 MMHG | OXYGEN SATURATION: 99 % | BODY MASS INDEX: 51.91 KG/M2 | HEIGHT: 63 IN

## 2021-05-08 DIAGNOSIS — N89.8 VAGINAL IRRITATION: Primary | ICD-10-CM

## 2021-05-08 PROCEDURE — 99283 EMERGENCY DEPT VISIT LOW MDM: CPT

## 2021-05-08 ASSESSMENT — PAIN SCALES - GENERAL: PAINLEVEL_OUTOF10: 3

## 2021-05-09 NOTE — ED PROVIDER NOTES
Department of Emergency Medicine   ED  Provider Note  Admit Date/RoomTime: 2021  5:50 PM  ED Room:           History of Present Illness:  21, Time: 2:54 PM EDT  Chief Complaint   Patient presents with    Abdominal Pain     had bladder surgery last month, abd pain and vaginal bleeding                 Ladlizz Artis is a 38 y.o. female presenting to the ED for vaginal irritation, beginning approximately 2 hours ago. The complaint has been persistent, mild in severity, and worsened by nothing. Patient states that she recently had bladder sling surgery, no complications. Today she states that after she urinated she felt an odd string-like sensation from her vagina. She was concerned that there was a complication from her procedure, came to ED for evaluation. She denies any difficulty urinating. She denies any vaginal fullness, no discharge. She denies any lower abdominal pain. Review of Systems:   Pertinent positives and negatives are stated within HPI, all other systems reviewed and are negative.        --------------------------------------------- PAST HISTORY ---------------------------------------------  Past Medical History:  has a past medical history of Anxiety, Arthritis, Asthma, Bipolar 1 disorder ([facility] COPD (chronic obstructive pulmonary disease) ([facility] Depression, Fatty liver, Fissure, anal, GERD (gastroesophageal reflux disease), Glaucoma, Hyperlipidemia, Hypertension, Macular edema, Migraines, Neuropathy, PTSD (post-traumatic stress disorder), Splenomegaly, and Type 2 diabetes mellitus without complication, without long-term current use of insulin ([facility] Past Surgical History:  has a past surgical history that includes Cholecystectomy; Tubal ligation; Anterior cruciate ligament repair (Right); Dilation and curettage of uterus (N/A, 2019); other surgical history (); Dilation and curettage of uterus (N/A, 2020);   section; Dilation and curettage of uterus; Hysterectomy (02/22/2021); and Incontinence surgery. Social History:  reports that she has been smoking cigarettes. She started smoking about 25 years ago. She has a 20.00 pack-year smoking history. She has never used smokeless tobacco. She reports previous alcohol use. She reports that she does not use drugs. Family History: family history includes Breast Cancer in her maternal aunt; Cancer in her mother and sister; Diabetes in her father, mother, and sister; High Blood Pressure in her brother. . Unless otherwise noted, family history is non contributory    The patients home medications have been reviewed. Allergies: Cymbalta [duloxetine hcl], Keflex [cephalexin], Neurontin [gabapentin], and Lamictal [lamotrigine]        ---------------------------------------------------PHYSICAL EXAM--------------------------------------    Constitutional/General: Alert and oriented x3  Head: Normocephalic and atraumatic  Respiratory: Lungs clear to auscultation bilaterally, no wheezes, rales, or rhonchi. Not in respiratory distress  Cardiovascular:  Regular rate. Regular rhythm. No murmurs, no aortic murmurs, no gallops, or rubs. 2+ distal pulses. Equal extremity pulses. Chest: No chest wall tenderness  GI:  Abdomen Soft, Non tender, Non distended. No rebound, guarding, or rigidity. No pulsatile masses. : Suture with approximate 4 cm tail protruding from the vagina with origin at the superior wall below the urethra  Musculoskeletal: Moves all extremities x 4. Warm and well perfused, no clubbing, cyanosis, or edema. Capillary refill <3 seconds  Integument: skin warm and dry. No rashes.    Neurologic: GCS 15, no focal deficits, symmetric strength 5/5 in the upper and lower extremities bilaterally  Psychiatric: Normal Affect          -------------------------------------------------- RESULTS -------------------------------------------------  I have personally reviewed all laboratory and imaging results for this patient. Results are listed below. LABS: (Lab results interpreted by me)  No results found for this visit on 05/08/21.,       RADIOLOGY:  Interpreted by Radiologist unless otherwise specified  No orders to display           ------------------------- NURSING NOTES AND VITALS REVIEWED ---------------------------   The nursing notes within the ED encounter and vital signs as below have been reviewed by myself  BP (!) 140/88   Pulse 96   Temp 97.3 °F (36.3 °C)   Resp 16   Ht 5' 3\" (1.6 m)   Wt 300 lb (136.1 kg)   LMP 02/22/2021 (Approximate)   SpO2 99%   BMI 53.14 kg/m²     Oxygen Saturation Interpretation: Normal    The patients available past medical records and past encounters were reviewed. ------------------------------ ED COURSE/MEDICAL DECISION MAKING----------------------  Medications - No data to display          Medical Decision Making:     I, Dr. Sarah Lane, am the primary provider of record    27-year-old female presenting with vaginal irritation. She recently had bladder sling procedure. She is having no difficulty urinating, no fevers, no vaginal or pelvic fullness or discomfort. Upon exam she appears to have a suture still in place at the superior wall of the vagina at the urethra with a rather long tail from the knot. Do not feel comfortable removing the suture as she is recent postop. She has significant more peace of mind knowing that it is simply one of the sutures and not another complication. The urethra and vaginal walls all appear intact with no prolapse, no signs of infection, no active bleeding. She is comfortable with following up to her OB/GYN this week, encouraged to return for any changes in condition or new symptoms. Re-Evaluations: This patient's ED course included: a personal history and physicial examination    This patient has remained hemodynamically stable during their ED course. Counseling:    The emergency provider has spoken with the

## 2021-05-11 ENCOUNTER — HOSPITAL ENCOUNTER (EMERGENCY)
Age: 36
Discharge: HOME | End: 2021-05-11
Payer: COMMERCIAL

## 2021-05-11 VITALS
RESPIRATION RATE: 16 BRPM | DIASTOLIC BLOOD PRESSURE: 90 MMHG | SYSTOLIC BLOOD PRESSURE: 150 MMHG | HEIGHT: 63 IN | HEART RATE: 113 BPM | TEMPERATURE: 99.3 F | WEIGHT: 293 LBS | OXYGEN SATURATION: 99 % | BODY MASS INDEX: 51.91 KG/M2

## 2021-05-11 DIAGNOSIS — R21 RASH AND OTHER NONSPECIFIC SKIN ERUPTION: ICD-10-CM

## 2021-05-11 DIAGNOSIS — T78.40XA ALLERGIC REACTION, INITIAL ENCOUNTER: Primary | ICD-10-CM

## 2021-05-11 PROCEDURE — 6360000002 HC RX W HCPCS: Performed by: PHYSICIAN ASSISTANT

## 2021-05-11 PROCEDURE — 99283 EMERGENCY DEPT VISIT LOW MDM: CPT

## 2021-05-11 PROCEDURE — 6370000000 HC RX 637 (ALT 250 FOR IP): Performed by: PHYSICIAN ASSISTANT

## 2021-05-11 PROCEDURE — 96372 THER/PROPH/DIAG INJ SC/IM: CPT

## 2021-05-11 RX ORDER — FAMOTIDINE 20 MG/1
20 TABLET, FILM COATED ORAL ONCE
Status: COMPLETED | OUTPATIENT
Start: 2021-05-11 | End: 2021-05-11

## 2021-05-11 RX ORDER — DEXAMETHASONE SODIUM PHOSPHATE 10 MG/ML
10 INJECTION, SOLUTION INTRAMUSCULAR; INTRAVENOUS ONCE
Status: COMPLETED | OUTPATIENT
Start: 2021-05-11 | End: 2021-05-11

## 2021-05-11 RX ORDER — METHYLPREDNISOLONE 4 MG/1
TABLET ORAL
Qty: 1 KIT | Refills: 0 | Status: SHIPPED | OUTPATIENT
Start: 2021-05-11 | End: 2021-05-17

## 2021-05-11 RX ADMIN — DEXAMETHASONE SODIUM PHOSPHATE 10 MG: 10 INJECTION, SOLUTION INTRAMUSCULAR; INTRAVENOUS at 20:07

## 2021-05-11 RX ADMIN — FAMOTIDINE 20 MG: 20 TABLET ORAL at 20:07

## 2021-05-11 ASSESSMENT — PAIN DESCRIPTION - ORIENTATION: ORIENTATION: LEFT;RIGHT

## 2021-05-11 ASSESSMENT — PAIN DESCRIPTION - PAIN TYPE: TYPE: ACUTE PAIN

## 2021-05-11 ASSESSMENT — PAIN SCALES - GENERAL: PAINLEVEL_OUTOF10: 5

## 2021-05-11 ASSESSMENT — PAIN DESCRIPTION - LOCATION: LOCATION: ARM

## 2021-05-11 ASSESSMENT — PAIN DESCRIPTION - PROGRESSION: CLINICAL_PROGRESSION: GRADUALLY WORSENING

## 2021-05-11 NOTE — ED PROVIDER NOTES
[facility]     Department of Emergency Medicine   ED  Provider Note  Admit Date/RoomTime: 5/11/2021  7:16 PM  ED Room: 02/02    CHIEF COMPLAINT:   Chief Complaint   Patient presents with    Allergic Reaction     bilateral arm reddness and burning starting at 1700, took two benadryl without relief, feels like it's gettng worse     ---------------------------------HISTORY OF PRESENT ILLNESS-----------------------------------     Mj Sousa is a 38 y.o. female presenting to the ED for Assubel allergic reaction. Patient states she noticed redness to her arms around 5 PM this afternoon after eating long [employer] chicken. She denies any itching but states the rash feels hot and burning. She did take 50 mg of Benadryl around 6 PM with not much relief. Patient denies any chest pain, shortness of breath, pain with breathing, abdominal pain, nausea, vomiting, eye irritation, sore throat, or difficulty with handling her secretions. She is alert and oriented x3 and in no apparent distress at this exam.  Patient is nontoxic-appearing. She denies any recent travel. She denies any new soaps, lotions, detergents, or medications. Review of Systems:   Pertinent positives and negatives are stated within HPI, all other systems reviewed and are negative.     --------------------------------------------- PAST HISTORY ---------------------------------------------    Past Medical History:  has a past medical history of Anxiety, Arthritis, Asthma, Bipolar 1 disorder ([facility] [address] COPD (chronic obstructive pulmonary disease) ([facility] Depression, Fatty liver, Fissure, anal, GERD (gastroesophageal reflux disease), Glaucoma, Hyperlipidemia, Hypertension, Macular edema, Migraines, Neuropathy, PTSD (post-traumatic stress disorder), Splenomegaly, and Type 2 diabetes mellitus without complication, without long-term current use of insulin ([facility] [address]     Past Surgical History:  has a past surgical history that includes Cholecystectomy; Tubal ligation; Anterior cruciate ligament repair (Right); Dilation and curettage of uterus (N/A, 2019); other surgical history (); Dilation and curettage of uterus (N/A, 2020);  section; Dilation and curettage of uterus; Hysterectomy (2021); and Incontinence surgery. Social History:  reports that she has been smoking cigarettes. She started smoking about 25 years ago. She has a 20.00 pack-year smoking history. She has never used smokeless tobacco. She reports previous alcohol use. She reports that she does not use drugs. Family History: family history includes Breast Cancer in her maternal aunt; Cancer in her mother and sister; Diabetes in her father, mother, and sister; High Blood Pressure in her brother. The patients home medications have been reviewed. Allergies: Cymbalta [duloxetine hcl], Keflex [cephalexin], Neurontin [gabapentin], and Lamictal [lamotrigine]  Allergies have been reviewed with patient.     -------------------------------------------------- RESULTS -------------------------------------------------  All laboratory and radiology results have been personally reviewed by myself   LABS:  No results found for this visit on 21. RADIOLOGY:  Interpreted by Radiologist.  No orders to display     ------------------------- NURSING NOTES AND VITALS REVIEWED ---------------------------  The nursing notes within the ED encounter and vital signs as below have been reviewed.      BP (!) 150/90   Pulse 113   Temp 99.3 °F (37.4 °C) (Infrared)   Resp 16   Ht 5' 3\" (1.6 m)   Wt 300 lb (136.1 kg)   LMP 2021 (Approximate)   SpO2 99%   BMI 53.14 kg/m²   Oxygen Saturation Interpretation: Normal    ---------------------------------------------------PHYSICAL EXAM--------------------------------------    Constitutional/General: Alert and oriented x3, well appearing, NAD  HEENT: NC/AT, EOMI, Airway patent, no pharyngeal erythema, no oral swelling, noninjected conjunctiva  Neck: Supple. Non-tender with no lymphadenopathy   CVS: Regular rate and rhythm  Resp: Clear and equal bilaterally with good airflow, no distress, unlabored breathing, 99% room air  Abdomen:  Abdomen soft, nontender, No guarding or rigidity   Back:  No midline tenderness. No CVA tenderness. Musculo: Moves all extremities x 4. Warm and well perfused, No edema, no joint swelling  Integument:  Normal turgor. Warm, dry, bright pink rash noted to bilateral forearms, no open wounds, no vesicles lesions, mild redness to upper chest, no rash noted to back, abdomen, or neck, no rash to lower extremities or face  Neurological:  Motor functions intact. GCS 15, CN II-XII grossly intact     ------------------------------ ED COURSE/MEDICAL DECISION MAKING----------------------  ED Medications:  Medications   dexamethasone (PF) (DECADRON) injection 10 mg (10 mg Intramuscular Given 5/11/21 2007)   famotidine (PEPCID) tablet 20 mg (20 mg Oral Given 5/11/21 2007)     Procedures:  None    Consultations:   None     Re-examination:   Patients symptoms are improving. Counseling: The emergency provider has spoken with the patient/caregiver and discussed todays results, in addition to providing specific details for the plan of care and counseling regarding the diagnosis and prognosis. Questions are answered at this time and they are agreeable with the plan. All results reviewed with pt and all questions answered. I discussed the differential, results and discharge plan with the patient and/or family/friend/caregiver if present. I emphasized the importance of follow-up with the physician I referred them to in the timeframe recommended. I explained reasons for the patient to return to the Emergency Department. Additional verbal discharge instructions were also given and discussed with the patient to supplement those generated by the EMR.  We also discussed medications that were prescribed (if any) including common side effects and interactions. All questions were addressed. They understand return precautions and discharge instructions. The patient and/or family/friend/caregiver expressed understanding. Vitals were stable and they were in no distress at discharge.      --------------------------------- IMPRESSION AND DISPOSITION ---------------------------------    IMPRESSION  1. Allergic reaction, initial encounter    2. Rash and other nonspecific skin eruption      DISPOSITION  Discharge to home  Patient condition is good    NEW MEDICATIONS   Discharge Medication List as of 5/11/2021  8:12 PM      START taking these medications    Details   methylPREDNISolone (MEDROL, ADAIR,) 4 MG tablet Take by mouth., Disp-1 kit, R-0Print             NOTE: This report was transcribed using voice recognition software.  Every effort was made to ensure accuracy; however, inadvertent computerized transcription errors may be present    END OF PROVIDER NOTE         Humberto Avila PA-C  05/11/21 2122

## 2021-05-16 LAB — URINE CULTURE, ROUTINE: NORMAL

## 2021-08-01 ENCOUNTER — HOSPITAL ENCOUNTER (EMERGENCY)
Age: 36
Discharge: HOME | End: 2021-08-01
Payer: COMMERCIAL

## 2021-08-01 VITALS
HEIGHT: 63 IN | DIASTOLIC BLOOD PRESSURE: 88 MMHG | OXYGEN SATURATION: 97 % | RESPIRATION RATE: 16 BRPM | SYSTOLIC BLOOD PRESSURE: 138 MMHG | TEMPERATURE: 97.1 F | HEART RATE: 96 BPM | BODY MASS INDEX: 51.91 KG/M2 | WEIGHT: 293 LBS

## 2021-08-01 DIAGNOSIS — M79.606 LEG PAIN, DIFFUSE, UNSPECIFIED LATERALITY: Primary | ICD-10-CM

## 2021-08-01 LAB
ANION GAP SERPL CALCULATED.3IONS-SCNC: 10 MMOL/L (ref 7–16)
BASOPHILS ABSOLUTE: 0.03 E9/L (ref 0–0.2)
BASOPHILS RELATIVE PERCENT: 0.4 % (ref 0–2)
BUN BLDV-MCNC: 10 MG/DL (ref 6–20)
CALCIUM SERPL-MCNC: 9 MG/DL (ref 8.6–10.2)
CHLORIDE BLD-SCNC: 108 MMOL/L (ref 98–107)
CO2: 23 MMOL/L (ref 22–29)
CREAT SERPL-MCNC: 0.5 MG/DL (ref 0.5–1)
D DIMER: 219 NG/ML DDU
EOSINOPHILS ABSOLUTE: 0.38 E9/L (ref 0.05–0.5)
EOSINOPHILS RELATIVE PERCENT: 4.9 % (ref 0–6)
GFR AFRICAN AMERICAN: >60
GFR NON-AFRICAN AMERICAN: >60 ML/MIN/1.73
GLUCOSE BLD-MCNC: 151 MG/DL (ref 74–99)
HCT VFR BLD CALC: 41.2 % (ref 34–48)
HEMOGLOBIN: 13.8 G/DL (ref 11.5–15.5)
IMMATURE GRANULOCYTES #: 0.02 E9/L
IMMATURE GRANULOCYTES %: 0.3 % (ref 0–5)
LYMPHOCYTES ABSOLUTE: 2.62 E9/L (ref 1.5–4)
LYMPHOCYTES RELATIVE PERCENT: 33.9 % (ref 20–42)
MCH RBC QN AUTO: 29.9 PG (ref 26–35)
MCHC RBC AUTO-ENTMCNC: 33.5 % (ref 32–34.5)
MCV RBC AUTO: 89.2 FL (ref 80–99.9)
MONOCYTES ABSOLUTE: 0.55 E9/L (ref 0.1–0.95)
MONOCYTES RELATIVE PERCENT: 7.1 % (ref 2–12)
NEUTROPHILS ABSOLUTE: 4.13 E9/L (ref 1.8–7.3)
NEUTROPHILS RELATIVE PERCENT: 53.4 % (ref 43–80)
PDW BLD-RTO: 13.6 FL (ref 11.5–15)
PLATELET # BLD: 207 E9/L (ref 130–450)
PMV BLD AUTO: 11.2 FL (ref 7–12)
POTASSIUM SERPL-SCNC: 3.7 MMOL/L (ref 3.5–5)
RBC # BLD: 4.62 E12/L (ref 3.5–5.5)
SODIUM BLD-SCNC: 141 MMOL/L (ref 132–146)
WBC # BLD: 7.7 E9/L (ref 4.5–11.5)

## 2021-08-01 PROCEDURE — 99283 EMERGENCY DEPT VISIT LOW MDM: CPT

## 2021-08-01 PROCEDURE — 6370000000 HC RX 637 (ALT 250 FOR IP): Performed by: NURSE PRACTITIONER

## 2021-08-01 PROCEDURE — 80048 BASIC METABOLIC PNL TOTAL CA: CPT

## 2021-08-01 PROCEDURE — 85378 FIBRIN DEGRADE SEMIQUANT: CPT

## 2021-08-01 PROCEDURE — 36415 COLL VENOUS BLD VENIPUNCTURE: CPT

## 2021-08-01 PROCEDURE — 85025 COMPLETE CBC W/AUTO DIFF WBC: CPT

## 2021-08-01 RX ORDER — OXYCODONE HYDROCHLORIDE AND ACETAMINOPHEN 5; 325 MG/1; MG/1
1 TABLET ORAL ONCE
Status: COMPLETED | OUTPATIENT
Start: 2021-08-01 | End: 2021-08-01

## 2021-08-01 RX ADMIN — OXYCODONE AND ACETAMINOPHEN 1 TABLET: 5; 325 TABLET ORAL at 14:41

## 2021-08-01 ASSESSMENT — PAIN DESCRIPTION - FREQUENCY: FREQUENCY: CONTINUOUS

## 2021-08-01 ASSESSMENT — PAIN DESCRIPTION - LOCATION: LOCATION: LEG

## 2021-08-01 ASSESSMENT — PAIN DESCRIPTION - DESCRIPTORS: DESCRIPTORS: CRAMPING;CONSTANT

## 2021-08-01 ASSESSMENT — PAIN DESCRIPTION - ORIENTATION: ORIENTATION: RIGHT;LEFT;LOWER

## 2021-08-01 ASSESSMENT — PAIN DESCRIPTION - PAIN TYPE: TYPE: ACUTE PAIN

## 2021-08-01 ASSESSMENT — PAIN SCALES - GENERAL
PAINLEVEL_OUTOF10: 10
PAINLEVEL_OUTOF10: 10

## 2021-08-01 ASSESSMENT — PAIN DESCRIPTION - ONSET: ONSET: SUDDEN

## 2021-08-01 ASSESSMENT — PAIN DESCRIPTION - PROGRESSION: CLINICAL_PROGRESSION: GRADUALLY WORSENING

## 2021-08-01 NOTE — ED PROVIDER NOTES
[address]  Department of Emergency Medicine   ED  Encounter Note  Admit Date/RoomTime: 2021  1:26 PM  ED Room:   NAME: Harish Cervantes  : 1985  MRN: [id number]     Chief Complaint:  Leg Pain (bilateral calf pain. Started last night. )    HISTORY OF PRESENT ILLNESS        Harish Cervantes is a 38 y.o. female who presents to the ED by private vehicle for bilateral calf pain that has been ongoing for several months with worsening last night. She reports a history of neuropathy and diabetes that has been longstanding. There is no prior history of blood clots, traumatic incident or vascular compromise. She has been taking ibuprofen, Tylenol and lidocaine patches with no improvement. She denies having a family doctor to help her with her discomfort. The pain is aggravated by ambulation throughout the day and seems to be worse at night with associated \"charley horses. \"  The swelling does seem to be better in the morning upon awakening. She denies any recent change in her diet, salty or fast foods. She admits to not monitoring her glucoses. The complaint has been persistent and are moderate in severity. ROS   Pertinent positives and negatives are stated within HPI, all other systems reviewed and are negative. Past Medical History:  has a past medical history of Anxiety, Arthritis, Asthma, Bipolar 1 disorder ([address] COPD (chronic obstructive pulmonary disease) ([address] Depression, Fatty liver, Fissure, anal, GERD (gastroesophageal reflux disease), Glaucoma, Hyperlipidemia, Hypertension, Macular edema, Migraines, Neuropathy, PTSD (post-traumatic stress disorder), Splenomegaly, and Type 2 diabetes mellitus without complication, without long-term current use of insulin ([address] Surgical History:  has a past surgical history that includes Cholecystectomy; Tubal ligation;  Anterior cruciate ligament repair (Right); Dilation and curettage of uterus (N/A, 2019); other surgical history (); Dilation and curettage of uterus (N/A, 2020);  section; Dilation and curettage of uterus; Hysterectomy (2021); and Incontinence surgery. Social History:  reports that she has been smoking cigarettes. She started smoking about 25 years ago. She has a 20.00 pack-year smoking history. She has never used smokeless tobacco. She reports previous alcohol use. She reports that she does not use drugs. Family History: family history includes Breast Cancer in her maternal aunt; Cancer in her mother and sister; Diabetes in her father, mother, and sister; High Blood Pressure in her brother. Allergies: Cymbalta [duloxetine hcl], Keflex [cephalexin], Neurontin [gabapentin], and Lamictal [lamotrigine]    PHYSICAL EXAM   Oxygen Saturation Interpretation: Normal on room air analysis. ED Triage Vitals   BP Temp Temp Source Pulse Resp SpO2 Height Weight   21 1344 21 1344 21 1344 21 1344 21 1344 21 1344 21 1341 21 1341   138/88 97.1 °F (36.2 °C) Infrared 96 16 97 % 5' 3\" (1.6 m) 300 lb (136.1 kg)       Physical Exam  Constitutional/General: Alert and oriented x3, well appearing, non toxic  HEENT:  NC/NT. PERRLA,  Airway patent. Neck: Supple, full ROM, non tender to palpation in the midline, no stridor, no crepitus, no meningeal signs  Respiratory: Lungs clear to auscultation bilaterally, no wheezes, rales, or rhonchi. Not in respiratory distress  CV:  Regular rate. Regular rhythm. No murmurs, gallops, or rubs. 2+ distal pulses  GI:  Abdomen Soft, Non tender, Non distended. +BS. No rebound, guarding, or rigidity. No pulsatile masses. Musculoskeletal: Moves all extremities x 4. Warm and well perfused, no clubbing, cyanosis, or edema. There is pain to the bilateral calves without engorged varicosities, palpable cords or localized foot/ankle edema. There is no erythema, open wounds or ecchymosis. Negative Homans' sign bilaterally. No asymmetry of the lower extremities. Strong bilateral pedal and posterior tibial pulses. Acchiles tendons intact bilaterally. Compartments soft bilaterally. No bony tenderness through the bilateral lower extremities. Capillary refill <3 seconds   Integument: skin warm and dry. No rashes. Lymphatic: no lymphadenopathy noted  Neurologic: GCS 15, no focal deficits, symmetric strength 5/5 in the upper and lower extremities bilaterally  Psychiatric: Anxious affect without pressured speech or irrational thought.     Lab / Imaging Results   (All laboratory and radiology results have been personally reviewed by myself)  Labs:  Results for orders placed or performed during the hospital encounter of 23/80/35   Basic Metabolic Panel   Result Value Ref Range    Sodium 141 132 - 146 mmol/L    Potassium 3.7 3.5 - 5.0 mmol/L    Chloride 108 (H) 98 - 107 mmol/L    CO2 23 22 - 29 mmol/L    Anion Gap 10 7 - 16 mmol/L    Glucose 151 (H) 74 - 99 mg/dL    BUN 10 6 - 20 mg/dL    CREATININE 0.5 0.5 - 1.0 mg/dL    GFR Non-African American >60 >=60 mL/min/1.73    GFR African American >60     Calcium 9.0 8.6 - 10.2 mg/dL   CBC Auto Differential   Result Value Ref Range    WBC 7.7 4.5 - 11.5 E9/L    RBC 4.62 3.50 - 5.50 E12/L    Hemoglobin 13.8 11.5 - 15.5 g/dL    Hematocrit 41.2 34.0 - 48.0 %    MCV 89.2 80.0 - 99.9 fL    MCH 29.9 26.0 - 35.0 pg    MCHC 33.5 32.0 - 34.5 %    RDW 13.6 11.5 - 15.0 fL    Platelets 553 263 - 436 E9/L    MPV 11.2 7.0 - 12.0 fL    Neutrophils % 53.4 43.0 - 80.0 %    Immature Granulocytes % 0.3 0.0 - 5.0 %    Lymphocytes % 33.9 20.0 - 42.0 %    Monocytes % 7.1 2.0 - 12.0 %    Eosinophils % 4.9 0.0 - 6.0 %    Basophils % 0.4 0.0 - 2.0 %    Neutrophils Absolute 4.13 1.80 - 7.30 E9/L    Immature Granulocytes # 0.02 E9/L    Lymphocytes Absolute 2.62 1.50 - 4.00 E9/L    Monocytes Absolute 0.55 0.10 - 0.95 E9/L    Eosinophils Absolute 0.38 0.05 - 0.50 E9/L    Basophils Absolute 0. 03 0.00 - 0.20 E9/L   D-Dimer, Quantitative   Result Value Ref Range    D-Dimer, Quant 219 ng/mL DDU     Imaging: All Radiology results interpreted by Radiologist unless otherwise noted. No orders to display       ED Course / Medical Decision Making     Medications   oxyCODONE-acetaminophen (PERCOCET) 5-325 MG per tablet 1 tablet (1 tablet Oral Given 8/1/21 1441)        Re-examination:  8/1/21       Time: 8867  Patients condition is improving after treatment and remains stable. Consult(s):   None    Procedure(s):   none    MDM:   Neurovascularly intact with compartments soft. D-dimer negative and no suspicion for DVT. No electrolyte abnormality. She does have a history of neuropathy and has not been following with a primary care provider. She was given [facility] Access and encouraged to call tomorrow to arrange a new primary care provider. In the meantime over-the-counter ibuprofen as needed for discomfort. She is aware of signs and symptoms indicative of reevaluation in the emergency department setting. Patient is amenable to this outpatient management plan. She departed in stable condition with her family. Plan of Care/Counseling:  VELMA Greenberg Ma, CNP reviewed today's visit with the patient in addition to providing specific details for the plan of care and counseling regarding the diagnosis and prognosis. Questions are answered at this time and are agreeable with the plan. ASSESSMENT     1. Leg pain, diffuse, unspecified laterality      PLAN   Discharged home. Patient condition is stable    New Medications     Discharge Medication List as of 8/1/2021  3:50 PM        Electronically signed by VELMA Greenberg Ma, CNP   DD: 8/1/21  **This report was transcribed using voice recognition software. Every effort was made to ensure accuracy; however, inadvertent computerized transcription errors may be present.   END OF ED PROVIDER NOTE       VELMA Greenberg Ma, CNP  08/01/21 [facility]

## 2021-10-12 ENCOUNTER — OFFICE VISIT (OUTPATIENT)
Dept: FAMILY MEDICINE CLINIC | Age: 36
End: 2021-10-12
Payer: COMMERCIAL

## 2021-10-12 VITALS
DIASTOLIC BLOOD PRESSURE: 87 MMHG | HEIGHT: 63 IN | WEIGHT: 293 LBS | BODY MASS INDEX: 51.91 KG/M2 | SYSTOLIC BLOOD PRESSURE: 121 MMHG | HEART RATE: 94 BPM | RESPIRATION RATE: 20 BRPM | TEMPERATURE: 98.2 F

## 2021-10-12 DIAGNOSIS — R15.9 INCONTINENCE OF FECES, UNSPECIFIED FECAL INCONTINENCE TYPE: ICD-10-CM

## 2021-10-12 DIAGNOSIS — R13.10 DYSPHAGIA, UNSPECIFIED TYPE: ICD-10-CM

## 2021-10-12 DIAGNOSIS — R73.01 IMPAIRED FASTING BLOOD SUGAR: Primary | ICD-10-CM

## 2021-10-12 DIAGNOSIS — G89.29 CHRONIC BILATERAL LOW BACK PAIN WITH RIGHT-SIDED SCIATICA: ICD-10-CM

## 2021-10-12 DIAGNOSIS — E11.9 TYPE 2 DIABETES MELLITUS WITHOUT COMPLICATION, WITHOUT LONG-TERM CURRENT USE OF INSULIN (HCC): ICD-10-CM

## 2021-10-12 DIAGNOSIS — Z23 NEED FOR INFLUENZA VACCINATION: ICD-10-CM

## 2021-10-12 DIAGNOSIS — J45.901 MODERATE ASTHMA WITH ACUTE EXACERBATION, UNSPECIFIED WHETHER PERSISTENT: ICD-10-CM

## 2021-10-12 DIAGNOSIS — R20.0 NUMBNESS AND TINGLING IN BOTH HANDS: ICD-10-CM

## 2021-10-12 DIAGNOSIS — M25.59 PAIN IN OTHER JOINT: ICD-10-CM

## 2021-10-12 DIAGNOSIS — R20.2 NUMBNESS AND TINGLING IN BOTH HANDS: ICD-10-CM

## 2021-10-12 DIAGNOSIS — M54.41 CHRONIC BILATERAL LOW BACK PAIN WITH RIGHT-SIDED SCIATICA: ICD-10-CM

## 2021-10-12 LAB — HBA1C MFR BLD: 5.9 %

## 2021-10-12 PROCEDURE — G8427 DOCREV CUR MEDS BY ELIG CLIN: HCPCS | Performed by: FAMILY MEDICINE

## 2021-10-12 PROCEDURE — G8417 CALC BMI ABV UP PARAM F/U: HCPCS | Performed by: FAMILY MEDICINE

## 2021-10-12 PROCEDURE — 3044F HG A1C LEVEL LT 7.0%: CPT | Performed by: FAMILY MEDICINE

## 2021-10-12 PROCEDURE — G8482 FLU IMMUNIZE ORDER/ADMIN: HCPCS | Performed by: FAMILY MEDICINE

## 2021-10-12 PROCEDURE — 2022F DILAT RTA XM EVC RTNOPTHY: CPT | Performed by: FAMILY MEDICINE

## 2021-10-12 PROCEDURE — G0008 ADMIN INFLUENZA VIRUS VAC: HCPCS | Performed by: FAMILY MEDICINE

## 2021-10-12 PROCEDURE — 99215 OFFICE O/P EST HI 40 MIN: CPT | Performed by: FAMILY MEDICINE

## 2021-10-12 PROCEDURE — 83036 HEMOGLOBIN GLYCOSYLATED A1C: CPT | Performed by: FAMILY MEDICINE

## 2021-10-12 PROCEDURE — 96372 THER/PROPH/DIAG INJ SC/IM: CPT | Performed by: FAMILY MEDICINE

## 2021-10-12 PROCEDURE — 90674 CCIIV4 VAC NO PRSV 0.5 ML IM: CPT | Performed by: FAMILY MEDICINE

## 2021-10-12 PROCEDURE — 4004F PT TOBACCO SCREEN RCVD TLK: CPT | Performed by: FAMILY MEDICINE

## 2021-10-12 RX ORDER — TIOTROPIUM BROMIDE 18 UG/1
18 CAPSULE ORAL; RESPIRATORY (INHALATION) DAILY
Qty: 30 CAPSULE | Refills: 5 | Status: SHIPPED
Start: 2021-10-12 | End: 2022-03-09 | Stop reason: SDUPTHER

## 2021-10-12 RX ORDER — KETOROLAC TROMETHAMINE 30 MG/ML
30 INJECTION, SOLUTION INTRAMUSCULAR; INTRAVENOUS ONCE
Status: COMPLETED | OUTPATIENT
Start: 2021-10-12 | End: 2021-10-12

## 2021-10-12 RX ORDER — LISINOPRIL 5 MG/1
5 TABLET ORAL DAILY
Qty: 30 TABLET | Refills: 5 | Status: SHIPPED
Start: 2021-10-12 | End: 2021-11-10

## 2021-10-12 RX ORDER — VENLAFAXINE HYDROCHLORIDE 150 MG/1
150 CAPSULE, EXTENDED RELEASE ORAL DAILY
COMMUNITY
End: 2022-05-04

## 2021-10-12 RX ORDER — TOPIRAMATE 50 MG/1
TABLET, FILM COATED ORAL
COMMUNITY
Start: 2021-09-15 | End: 2022-02-09

## 2021-10-12 RX ORDER — ALBUTEROL SULFATE 90 UG/1
2 AEROSOL, METERED RESPIRATORY (INHALATION) EVERY 4 HOURS PRN
Qty: 1 EACH | Refills: 5 | Status: SHIPPED
Start: 2021-10-12 | End: 2022-05-04 | Stop reason: SDUPTHER

## 2021-10-12 RX ORDER — BUSPIRONE HYDROCHLORIDE 10 MG/1
TABLET ORAL
COMMUNITY
Start: 2021-09-15 | End: 2021-11-10

## 2021-10-12 RX ORDER — CLONIDINE HYDROCHLORIDE 0.1 MG/1
0.1 TABLET ORAL 2 TIMES DAILY
COMMUNITY
End: 2021-11-10

## 2021-10-12 RX ORDER — CLONIDINE HYDROCHLORIDE 0.1 MG/1
0.1 TABLET ORAL 2 TIMES DAILY
Qty: 60 TABLET | Refills: 5 | Status: CANCELLED | OUTPATIENT
Start: 2021-10-12

## 2021-10-12 RX ADMIN — KETOROLAC TROMETHAMINE 30 MG: 30 INJECTION, SOLUTION INTRAMUSCULAR; INTRAVENOUS at 15:50

## 2021-10-12 SDOH — ECONOMIC STABILITY: FOOD INSECURITY: WITHIN THE PAST 12 MONTHS, THE FOOD YOU BOUGHT JUST DIDN'T LAST AND YOU DIDN'T HAVE MONEY TO GET MORE.: NEVER TRUE

## 2021-10-12 SDOH — ECONOMIC STABILITY: FOOD INSECURITY: WITHIN THE PAST 12 MONTHS, YOU WORRIED THAT YOUR FOOD WOULD RUN OUT BEFORE YOU GOT MONEY TO BUY MORE.: NEVER TRUE

## 2021-10-12 ASSESSMENT — ENCOUNTER SYMPTOMS
DIARRHEA: 0
COUGH: 0
SORE THROAT: 0
BACK PAIN: 1
SHORTNESS OF BREATH: 1
EYE PAIN: 0
CONSTIPATION: 0
ABDOMINAL PAIN: 0
SINUS PRESSURE: 0

## 2021-10-12 ASSESSMENT — SOCIAL DETERMINANTS OF HEALTH (SDOH): HOW HARD IS IT FOR YOU TO PAY FOR THE VERY BASICS LIKE FOOD, HOUSING, MEDICAL CARE, AND HEATING?: NOT VERY HARD

## 2021-10-12 NOTE — PROGRESS NOTES
[Gabapentin] Other (See Comments)     Makes me feel very weird    Lamictal [Lamotrigine] Nausea And Vomiting       Health Maintenance Due   Topic Date Due    Lipid screen  03/13/2021    Diabetic microalbuminuria test  09/19/2021    Diabetic retinal exam  09/17/2021         REVIEW OF SYSTEMS  Review of Systems   Constitutional: Positive for fatigue. Negative for fever. HENT: Negative for ear pain, sinus pressure, sneezing and sore throat. Eyes: Negative for pain. Respiratory: Positive for shortness of breath. Negative for cough. Cardiovascular: Negative for chest pain and leg swelling. Gastrointestinal: Negative for abdominal pain, constipation and diarrhea. Fecal incontinence   Genitourinary: Negative for dysuria and urgency. Urinary incontinence   Musculoskeletal: Positive for arthralgias and back pain. Negative for myalgias. Skin: Negative for rash. Allergic/Immunologic: Negative for food allergies. Neurological: Positive for numbness. Negative for light-headedness and headaches. Hematological: Does not bruise/bleed easily. Psychiatric/Behavioral: Negative for behavioral problems and sleep disturbance. PHYSICAL EXAM  /87   Pulse 94   Temp 98.2 °F (36.8 °C)   Resp 20   Ht 5' 3\" (1.6 m)   Wt 296 lb (134.3 kg)   LMP 02/22/2021 (Approximate)   BMI 52.43 kg/m²   Physical Exam  Vitals and nursing note reviewed. Constitutional:       Appearance: She is well-developed. She is obese. HENT:      Head: Normocephalic and atraumatic. Right Ear: External ear normal.      Left Ear: External ear normal.      Nose: Nose normal.   Eyes:      Conjunctiva/sclera: Conjunctivae normal.   Neck:      Thyroid: No thyromegaly. Cardiovascular:      Rate and Rhythm: Normal rate and regular rhythm. Heart sounds: Normal heart sounds. No murmur heard. Pulmonary:      Effort: Pulmonary effort is normal.      Breath sounds: Normal breath sounds. No wheezing. Abdominal:      Palpations: Abdomen is soft. Tenderness: There is no abdominal tenderness. Musculoskeletal:         General: Tenderness (Pain to light palpation over the lumbar paraspinal musculature) present. Normal range of motion. Cervical back: Normal range of motion and neck supple. Lymphadenopathy:      Cervical: No cervical adenopathy. Skin:     General: Skin is warm and dry. Findings: No rash. Neurological:      General: No focal deficit present. Mental Status: She is alert and oriented to person, place, and time. Mental status is at baseline. Deep Tendon Reflexes: Reflexes are normal and symmetric. Psychiatric:         Mood and Affect: Mood normal.         Behavior: Behavior normal.              Laboratory: All laboratory and radiology results have been personally reviewed by myself    Lab Results   Component Value Date     08/01/2021    K 3.7 08/01/2021    K 3.8 10/30/2019     08/01/2021    CO2 23 08/01/2021    BUN 10 08/01/2021    CREATININE 0.5 08/01/2021    PROT 7.3 02/15/2021    LABALBU 4.2 02/15/2021    LABALBU 3.7 09/19/2020    CALCIUM 9.0 08/01/2021    GFRAA >60 08/01/2021    LABGLOM >60 08/01/2021    GLUCOSE 151 08/01/2021    AST 31 02/15/2021    ALT 27 02/15/2021    ALKPHOS 97 02/15/2021    BILITOT 0.5 02/15/2021    TSH 1.180 03/13/2020    CHOL 185 03/13/2020    TRIG 165 03/13/2020    HDL 40 03/13/2020    LDLCALC 112 03/13/2020    LABA1C 5.9 10/12/2021        Lab Results   Component Value Date    CHOL 185 03/13/2020     Lab Results   Component Value Date    TRIG 165 (H) 03/13/2020     Lab Results   Component Value Date    HDL 40 03/13/2020     Lab Results   Component Value Date    LDLCALC 112 (H) 03/13/2020       Lab Results   Component Value Date    LABA1C 5.9 10/12/2021     Lab Results   Component Value Date    LABMICR 15.5 09/19/2020    LDLCALC 112 (H) 03/13/2020    CREATININE 0.5 08/01/2021       ASSESSMENT/PLAN:    1.  Impaired fasting blood sugar  2. Type 2 diabetes mellitus without complication, without long-term current use of insulin (HCC)  -     Lipid Panel; Future  -     POCT microalbumin  -     POCT glycosylated hemoglobin (Hb A1C)  3. Chronic bilateral low back pain with right-sided sciatica  -     XR LUMBAR SPINE (2-3 VIEWS); Future  -     [facility]  -     ketorolac (TORADOL) injection 30 mg; 30 mg, IntraMUSCular, ONCE, On Tue 10/12/21 at 1600, For 1 doseDo not administer for more than 5 days  4. Dysphagia, unspecified type  -      HEAD NECK SOFT TISSUE THYROID; Future  5. Numbness and tingling in both hands  -     XR CERVICAL SPINE (2-3 VIEWS); Future  -     TSH without Reflex; Future  -     COMPREHENSIVE METABOLIC PANEL; Future  6. Incontinence of feces, unspecified fecal incontinence type  -     [facility] Adrian Yost MD, General Surgery, [place]  7. Moderate asthma with acute exacerbation, unspecified whether persistent  8. Need for influenza vaccination  -     INFLUENZA, MDCK QUADV, 2 YRS AND OLDER, IM, PF, PREFILL SYR OR SDV, 0.5ML (FLUCELVAX QUADV, PF)  9. Pain in other joint  -     Vitamin B12 & Folate; Future  -     RPR Reflex to Titer and TPPA; Future  -     AMANDA; Future  -     RHEUMATOID FACTOR; Future     Will obtain x-rays of the cervical spine and lumbar spine to further evaluate. Referral placed to PT as well. May consider referral to spine specialist depending on x-ray results. Toradol shot given today. Diclofenac sent into use as needed as well. As for dysphagia ultrasound of the head and neck to be completed. Numbness and tingling we reviewed EMG today we will obtain cervical spine x-ray. Referral placed to surgery for incontinence of fecal matter. Did highly recommend for her to follow-up with her gynecologist as well. Labs to be completed as above to rule out a rheumatologic issue.   Patient return in 1 month for reevaluation    More than 40 minutes was spent with the patient during the encounter, during which more than half the time was spent counseling on the above concerns    Problem list reviewed andsimplified/updated  HM reviewed today and counseled as appropriate    Call or go to ED immediately if symptoms worsen or persist.  Future Appointments   Date Time Provider Niesha Linnea   11/12/2021  9:45 AM Ese Zhang DO [facility]     Or sooner if necessary. Educational materials and/or homeexercises printed for patient's review and were included in patient instructions on his/her After Visit Summary and given to patient at the end of visit. Counseled regarding above diagnosis, including possible risks and complications,  especially if left uncontrolled. Counseled regarding the possible side effects, risks, benefits and alternatives to treatment; patient and/or guardian verbalizes understanding, agrees,feels comfortable with and wishes to proceed with above treatment plan. Advised patient to call Tyree Mix new medication issues, and read all Rx info from pharmacy to assure aware of all possible risks and side effects of medication before taking. Reviewed age and gender appropriate health screening exams and vaccinations. Advised patient regarding importance of keeping up with recommended health maintenance and toschedule as soon as possible if overdue, as this is important in assessing for undiagnosed pathology, especially cancer, as well as protecting against potentially harmful/life threatening disease. and/or guardian verbalizes understanding and agrees with above counseling, assessment and plan. All questions answered. Ese Zhang DO  10/12/21    NOTE: This report was transcribed using voice recognition software.  Every effort was made to ensure accuracy; however, inadvertent computerized transcription errors may be present

## 2021-10-13 ENCOUNTER — TELEPHONE (OUTPATIENT)
Dept: FAMILY MEDICINE CLINIC | Age: 36
End: 2021-10-13

## 2021-10-13 NOTE — TELEPHONE ENCOUNTER
Pt called wants the results on recent xrays of Cervical and Lumbar, she wants Dr to know her pain has increased. Medication she has is not helping her.  Thanks

## 2021-10-14 RX ORDER — BACLOFEN 10 MG/1
10 TABLET ORAL 3 TIMES DAILY PRN
Qty: 30 TABLET | Refills: 0 | Status: SHIPPED
Start: 2021-10-14 | End: 2021-10-25 | Stop reason: SDUPTHER

## 2021-10-21 ENCOUNTER — EVALUATION (OUTPATIENT)
Dept: PHYSICAL THERAPY | Age: 36
End: 2021-10-21

## 2021-10-21 DIAGNOSIS — M54.41 CHRONIC BILATERAL LOW BACK PAIN WITH RIGHT-SIDED SCIATICA: Primary | ICD-10-CM

## 2021-10-21 DIAGNOSIS — G89.29 CHRONIC BILATERAL LOW BACK PAIN WITH RIGHT-SIDED SCIATICA: Primary | ICD-10-CM

## 2021-10-21 NOTE — PROGRESS NOTES
[facility]   Phone: [phone]   Fax: [phone]           Date:  10/21/2021   Patient: Nir Zhao  : 1985  MRN: [id number]  Referring Provider: DO Taurus Rangel  [facility] [address]     Medical Diagnosis:      Diagnosis Orders   1. Chronic bilateral low back pain with right-sided sciatica         SUBJECTIVE:     Onset date: 10/12/21    Onset: Insidious onset    Mechanism of Injury: Pt with h/o chronic pain throughout the body for several years. Pt diagnosed with arthritis in the back    Previous PT: none    Medical Management for Current Problem: N/A    Chief complaint: pain, difficulty walking, falls, poor sleep quality     Behavior: condition is getting worse    Pain: constant  Current: 610     Best: 10     Worst:10/10    Symptom Type/Quality: stabbing, like spine is twisting, shooting, pinching  Location[de-identified] Back: lumbar region radiates down the posterior right leg         Provoking Activities/Positions: sitting, standing                 Relieving Activitie/Positions: lying on side, lying on stomach    Disturbed Sleep: yes  Bladder Dysfunction: yes, bladder sling  Bowel Dysfunction: yes     Imaging results: XR CERVICAL SPINE (2-3 VIEWS)    Result Date: 10/13/2021  EXAMINATION: THREE XRAY VIEWS OF THE LUMBAR SPINE; 2 XRAY VIEWS OF THE CERVICAL SPINE 10/12/2021 2:51 pm COMPARISON: L-spine 15 March 2016 and CT C-spine 2020 HISTORY: ORDERING SYSTEM PROVIDED HISTORY: Chronic bilateral low back pain with right-sided sciatica TECHNOLOGIST PROVIDED HISTORY: Reason for exam:->back pain; ORDERING SYSTEM PROVIDED HISTORY: Numbness and tingling in both hands TECHNOLOGIST PROVIDED HISTORY: Reason for exam:->numbness and tingling FINDINGS: C-spine: No fracture or dislocation. Disc spaces are preserved. Normal soft tissues. L-spine: Minimal degenerative endplate spurring from I3-Z9. No fracture or dislocation. Normal soft tissues. Unremarkable C-spine. Minimal degenerative endplate spurring from V9-L2. XR LUMBAR SPINE (2-3 VIEWS)    Result Date: 10/13/2021  EXAMINATION: THREE XRAY VIEWS OF THE LUMBAR SPINE; 2 XRAY VIEWS OF THE CERVICAL SPINE 10/12/2021 2:51 pm COMPARISON: L-spine 15 March 2016 and CT C-spine 2020 HISTORY: ORDERING SYSTEM PROVIDED HISTORY: Chronic bilateral low back pain with right-sided sciatica TECHNOLOGIST PROVIDED HISTORY: Reason for exam:->back pain; ORDERING SYSTEM PROVIDED HISTORY: Numbness and tingling in both hands TECHNOLOGIST PROVIDED HISTORY: Reason for exam:->numbness and tingling FINDINGS: C-spine: No fracture or dislocation. Disc spaces are preserved. Normal soft tissues. L-spine: Minimal degenerative endplate spurring from P3-E3. No fracture or dislocation. Normal soft tissues. Unremarkable C-spine. Minimal degenerative endplate spurring from A7-I9.        Past Medical History:  Past Medical History:   Diagnosis Date    Anxiety     Arthritis     Asthma     Bipolar 1 disorder ([address]     COPD (chronic obstructive pulmonary disease) ([address]     Depression     Fatty liver     Fissure, anal     GERD (gastroesophageal reflux disease)     Glaucoma     Suspected by eye specialist    Hyperlipidemia     Hypertension     Macular edema     Cm    Migraines     Neuropathy     PTSD (post-traumatic stress disorder)     Splenomegaly 2020    Type 2 diabetes mellitus without complication, without long-term current use of insulin ([address]      Past Surgical History:   Procedure Laterality Date    ANTERIOR CRUCIATE LIGAMENT REPAIR Right      SECTION      ,     CHOLECYSTECTOMY      DILATION AND CURETTAGE OF UTERUS N/A 2019    DILATATION AND CURETTAGE HYSTEROSCOPY WITH REMOVAL OF CONDYLOMATA performed by Lanie Ramos MD at [address] N/A 2020    DILATATION AND CURETTAGE HYSTEROSCOPY, ATTEMPTED CERVICAL BIOPSY, MARIYA OF LABIAL ABSCESS performed by Aram Fry MD at [facility]  02/22/2021    Robotic lysis of adhesions, total hysterectomy, bilateral salpingectomy, cystoscopy    INCONTINENCE SURGERY      OTHER SURGICAL HISTORY  1997    repair anal fissure    TUBAL LIGATION         Medications:   Current Outpatient Medications   Medication Sig Dispense Refill    baclofen (LIORESAL) 10 MG tablet Take 1 tablet by mouth 3 times daily as needed (muscle/back spasm/pain) 30 tablet 0    busPIRone (BUSPAR) 10 MG tablet TAKE ONE TABLET BY MOUTH THREE TIMES DAILY      topiramate (TOPAMAX) 50 MG tablet TAKE ONE TABLET BY MOUTH EVERY NIGHT      cloNIDine (CATAPRES) 0.1 MG tablet Take 0.1 mg by mouth 2 times daily      venlafaxine (EFFEXOR XR) 150 MG extended release capsule Take 150 mg by mouth daily      albuterol sulfate HFA (PROVENTIL HFA) 108 (90 Base) MCG/ACT inhaler Inhale 2 puffs into the lungs every 4 hours as needed for Wheezing 1 each 5    lisinopril (PRINIVIL;ZESTRIL) 5 MG tablet Take 1 tablet by mouth daily 30 tablet 5    tiotropium (SPIRIVA HANDIHALER) 18 MCG inhalation capsule Inhale 1 capsule into the lungs daily 30 capsule 5    diclofenac (VOLTAREN) 50 MG EC tablet Take 1 tablet by mouth 3 times daily (with meals) 60 tablet 3    albuterol (PROVENTIL) (2.5 MG/3ML) 0.083% nebulizer solution Take 3 mLs by nebulization every 6 hours as needed for Wheezing 120 each 3     No current facility-administered medications for this visit. Occupation: disability.      Exercise regimen: none    Hobbies: none    Patient Goals: pain relief    Contraindications/Precautions: none    OBJECTIVE:     Observations: well nourished female    Inspection: normal orthopedic exam         Gait: antalgic gait, wide-based, ambulates with cane, slow pace    Functional Strength: Slow, painful sit to stand transfers; min A for supine to sit    Range of Motion:    Trunk:    Flexion:  [] 3-4/10 at the worst   Increase ROM to to 50% throughout lumbar spine   Increase Strength to 4/5 throughout    Oswestry Low Back Disability Questionnaire 45% disability    Independent with Home Exercise Programs    Rehab Potential: [] Good  [x] Fair  [] Poor    PLAN       Treatment Plan:   [x] Therapeutic Exercise  [x] Therapeutic Activity  [x] Neuromuscular Re-education   [] Gait Training  [] Balance Training  [] Aerobic conditioning  [x] Manual Therapy  [] Massage/Fascial release   [] Work/Sport specific activities    [] Pain Neuroscience [x] Cold/hotpack  [] Vasocompression  [] Electrical Stimulation  [x] Lumbar/Cervical Traction  [] Ultrasound   [] Iontophoresis: 4 mg/mL Dexamethasone Sodium Phosphate 40-80 mAmin  [] Dry Needling      [x] Instruction in HEP      []  Medication allergies reviewed for use of Dexamethasone Sodium Phosphate 4mg/ml  with iontophoresis treatments. Patient is not allergic. The following CPT codes are likely to be used in the care of this patient: 57880 PT Evaluation: Low Complexity, 20149 PT Re-Evaluation, 22867 Therapeutic Exercise, 10520 Neuromuscular Re-Education, 26578 Therapeutic Activities, 68493 Manual Therapy and 46957 Electric Stimulation      Suggested Professional Referral: [x] No  [] Yes:     Patient Education:  [x] Plans/Goals, Risks/Benefits discussed  [x] Home exercise program  Method of Education: [x] Verbal  [x] Demo  [x] Written  Comprehension of Education:  [x] Verbalizes understanding. [x] Demonstrates understanding. [] Needs Review. [] Demonstrates/verbalizes understanding of HEP/Ed previously given. Frequency:  2 days per week for 4-6 weeks    Patient understands diagnosis/prognosis and consents to treatment, plan and goals: [x] Yes    [] No     Thank you for the opportunity to work with your patient. If you have questions or comments, please contact me at numbers listed above.     Electronically signed by: Alex Frank [place], [id number]    Medicare Patients Only     Please sign Physician's Certification and return to: [facility]  PHYSICAL THERAPY  [address] [facility] [address] [place] [id number]  Dept: [phone]  Dept Fax: [phone] Certification / Comments     Frequency/Duration 2 days per week for 4-6 weeks. Certification period from 10/21/2021  to 1/20/22. I have reviewed the Plan of Care established for skilled therapy services and certify that the services are required and that they will be provided while the patient is under my care.     Physician's Comments/Revisions:               Physician's Printed Name:                                           [de-identified] Signature:                                                               Date:

## 2021-10-21 NOTE — PROGRESS NOTES
[facility]          Phone: [phone] Fax: [phone]    Physical Therapy Daily Treatment Note  Date:  10/21/2021    Patient Name:  Ravin Miranda    :  1985  MRN: [id number]    Restrictions/Precautions:    Diagnosis:     Diagnosis Orders   1.  Chronic bilateral low back pain with right-sided sciatica       Treatment Diagnosis:    Insurance/Certification information:  [address] [place] [address]  Referring Physician:  Sam Lara DO  Plan of care signed (Y/N):    Visit# / total visits:  -  Pain level: 6/10   Time In:  1000  Time Out:  1030    Subjective:  See evaluation    Exercises:  Exercise/Equipment Resistance/Repetitions Other comments     stepper       Trunk stretch with ball       Hamstring stretch       Lower trunk rotation       SKTC                                                                                                            Other Therapeutic Activities:  PT evaluation completed    Home Exercise Program:  N/A    Manual Treatments:  N/A    Modalities:  IFC/HP PRN     Time-in Time-out Total Time   55793  Evaluation Low Complexity 1000 1030 30   86030  Evaluation Med Complexity      92706  Evaluation High Complexity      81200  Ther Ex      67619  Neuro Re-ed        75769  Ther Activities        99931  Manual Therapy       20904  E-stim       57663  Ultrasound            Session 1582 6512 75       Treatment/Activity Tolerance:  [] Patient tolerated treatment well [] Patient limited by fatigue  [x] Patient limited by pain  [] Patient limited by other medical complications  [] Other:     Prognosis: [] Good [x] Fair  [] Poor    Patient Requires Follow-up: [x] Yes  [] No    Plan:   [] Continue per plan of care [] Alter current plan (see comments)  [x] Plan of care initiated [] Hold pending MD visit [] Discharge  Plan for Next Session:        Electronically signed by:  Georgi Jc [place], [id number]

## 2021-10-26 RX ORDER — BACLOFEN 10 MG/1
10 TABLET ORAL 3 TIMES DAILY PRN
Qty: 30 TABLET | Refills: 0 | Status: SHIPPED
Start: 2021-10-26 | End: 2021-11-08 | Stop reason: ALTCHOICE

## 2021-10-27 ENCOUNTER — TELEPHONE (OUTPATIENT)
Dept: FAMILY MEDICINE CLINIC | Age: 36
End: 2021-10-27

## 2021-10-27 DIAGNOSIS — S39.012D STRAIN OF LUMBAR REGION, SUBSEQUENT ENCOUNTER: Primary | ICD-10-CM

## 2021-10-27 NOTE — TELEPHONE ENCOUNTER
Pt calling and asking if she can get an order for a walker d/t her balance being off and having weakness in her legs. Please call pt back if this can be ordered.

## 2021-10-29 ENCOUNTER — TREATMENT (OUTPATIENT)
Dept: PHYSICAL THERAPY | Age: 36
End: 2021-10-29
Payer: COMMERCIAL

## 2021-10-29 DIAGNOSIS — G89.29 CHRONIC BILATERAL LOW BACK PAIN WITH RIGHT-SIDED SCIATICA: Primary | ICD-10-CM

## 2021-10-29 DIAGNOSIS — M54.41 CHRONIC BILATERAL LOW BACK PAIN WITH RIGHT-SIDED SCIATICA: Primary | ICD-10-CM

## 2021-10-29 PROCEDURE — 97110 THERAPEUTIC EXERCISES: CPT | Performed by: PHYSICAL THERAPIST

## 2021-10-29 NOTE — PROGRESS NOTES
Pt called therapist stating that she started to have spasms in her back and neck after she left therapy. Pt c/o 1 episode of nausea during treatment which resolved when she changed her position. No other complaints reported during treatment. Stated that she has called doctor for advice and was wondering if therapist had any advice. Advised pt to rest for the next few days, to use home TENS unit, and to alterate ice and heat for pain relief. Pt verbalized understanding. Will reassess and decrease exercises to pt tolerance next visit.     Sonam Young, [place] [address]

## 2021-11-03 ENCOUNTER — APPOINTMENT (OUTPATIENT)
Dept: GENERAL RADIOLOGY | Age: 36
End: 2021-11-03
Payer: COMMERCIAL

## 2021-11-03 ENCOUNTER — HOSPITAL ENCOUNTER (EMERGENCY)
Age: 36
Discharge: HOME | End: 2021-11-03
Attending: EMERGENCY MEDICINE
Payer: COMMERCIAL

## 2021-11-03 VITALS
TEMPERATURE: 98.2 F | OXYGEN SATURATION: 97 % | BODY MASS INDEX: 51.91 KG/M2 | WEIGHT: 293 LBS | RESPIRATION RATE: 18 BRPM | DIASTOLIC BLOOD PRESSURE: 88 MMHG | HEIGHT: 63 IN | HEART RATE: 94 BPM | SYSTOLIC BLOOD PRESSURE: 168 MMHG

## 2021-11-03 DIAGNOSIS — J45.21 MILD INTERMITTENT ASTHMA WITH EXACERBATION: Primary | ICD-10-CM

## 2021-11-03 PROCEDURE — 6370000000 HC RX 637 (ALT 250 FOR IP): Performed by: EMERGENCY MEDICINE

## 2021-11-03 PROCEDURE — 99283 EMERGENCY DEPT VISIT LOW MDM: CPT

## 2021-11-03 PROCEDURE — 71046 X-RAY EXAM CHEST 2 VIEWS: CPT

## 2021-11-03 PROCEDURE — 93005 ELECTROCARDIOGRAM TRACING: CPT | Performed by: EMERGENCY MEDICINE

## 2021-11-03 RX ORDER — IPRATROPIUM BROMIDE AND ALBUTEROL SULFATE 2.5; .5 MG/3ML; MG/3ML
2 SOLUTION RESPIRATORY (INHALATION) ONCE
Status: COMPLETED | OUTPATIENT
Start: 2021-11-03 | End: 2021-11-03

## 2021-11-03 RX ORDER — PREDNISONE 20 MG/1
60 TABLET ORAL ONCE
Status: COMPLETED | OUTPATIENT
Start: 2021-11-03 | End: 2021-11-03

## 2021-11-03 RX ORDER — PREDNISONE 50 MG/1
50 TABLET ORAL DAILY
Qty: 5 TABLET | Refills: 0 | Status: SHIPPED | OUTPATIENT
Start: 2021-11-03 | End: 2021-11-08

## 2021-11-03 RX ADMIN — IPRATROPIUM BROMIDE AND ALBUTEROL SULFATE 2 AMPULE: .5; 2.5 SOLUTION RESPIRATORY (INHALATION) at 19:48

## 2021-11-03 RX ADMIN — PREDNISONE 60 MG: 20 TABLET ORAL at 19:47

## 2021-11-03 NOTE — ED NOTES
Bed: 12  Expected date: 11/3/21  Expected time:   Means of arrival:   Comments:  LANES Lucinda Heart  11/03/21 6977

## 2021-11-03 NOTE — ED PROVIDER NOTES
HPI:  11/3/21,   Time: 7:17 PM EDT       Cristian Camp is a 38 y.o. female presenting to the ED for sob, beginning months ago. The complaint has been persistent, mild in severity, and worsened by nothing. Feels like nebs aren't working anymore, worse past few weeks, bib ems, cough with w/o sputum for months, pos wheezing. No fever/chills/sweats/leg swelling/recent travel/n/v/d/abd pain/cp    Review of Systems:   Pertinent positives and negatives are stated within HPI, all other systems reviewed and are negative.          --------------------------------------------- PAST HISTORY ---------------------------------------------  Past Medical History:  has a past medical history of Anxiety, Arthritis, Asthma, Bipolar 1 disorder ([facility] [address] COPD (chronic obstructive pulmonary disease) ([facility] [address] Depression, Fatty liver, Fissure, anal, GERD (gastroesophageal reflux disease), Glaucoma, Hyperlipidemia, Hypertension, Macular edema, Migraines, Neuropathy, PTSD (post-traumatic stress disorder), Splenomegaly, and Type 2 diabetes mellitus without complication, without long-term current use of insulin ([facility] [address] Past Surgical History:  has a past surgical history that includes Cholecystectomy; Tubal ligation; Anterior cruciate ligament repair (Right); Dilation and curettage of uterus (N/A, 2019); other surgical history (); Dilation and curettage of uterus (N/A, 2020);  section; Dilation and curettage of uterus; Hysterectomy (2021); and Incontinence surgery. Social History:  reports that she has been smoking cigarettes. She started smoking about 26 years ago. She has a 20.00 pack-year smoking history. She has never used smokeless tobacco. She reports previous alcohol use. She reports that she does not use drugs. Family History: family history includes Breast Cancer in her maternal aunt; Cancer in her mother and sister; Diabetes in her father, mother, and sister; High Blood Pressure in her brother. The patients home medications have been reviewed. Allergies: Cymbalta [duloxetine hcl], Keflex [cephalexin], Neurontin [gabapentin], and Lamictal [lamotrigine]        ---------------------------------------------------PHYSICAL EXAM--------------------------------------    Constitutional/General: Alert and oriented x3, well appearing, non toxic in NAD  Head: Normocephalic and atraumatic  Eyes: PERRL, EOMI, conjunctive normal, sclera non icteric  Mouth: Oropharynx clear, handling secretions, no trismus, no asymmetry of the posterior oropharynx or uvular edema  Neck: Supple, full ROM,   Respiratory: lemuel wheezing, no rales, or rhonchi. Not in respiratory distress  Cardiovascular:  Regular rate. Regular rhythm. No murmurs, gallops, or rubs. 2+ distal pulses  Chest: No chest wall tenderness  GI:  Abdomen Soft, Non tender, Non distended. Musculoskeletal: Moves all extremities x 4. Warm and well perfused, no clubbing, cyanosis, or edema. Capillary refill <3 seconds  Integument: skin warm and dry. No rashes. Lymphatic: no lymphadenopathy noted  Neurologic: GCS 15, no focal deficits,   Psychiatric: Normal Affect    -------------------------------------------------- RESULTS -------------------------------------------------  I have personally reviewed all laboratory and imaging results for this patient. Results are listed below. LABS:  No results found for this visit on 11/03/21. RADIOLOGY:  Interpreted by Radiologist.  XR CHEST (2 VW)   Final Result   No acute process. Subsegmental atelectasis, right lower lung. EKG:  This EKG is signed and interpreted by the EP. Time:   Rate:   Rhythm:   Interpretation:   Comparison:       ------------------------- NURSING NOTES AND VITALS REVIEWED ---------------------------   The nursing notes within the ED encounter and vital signs as below have been reviewed by myself.   BP (!) 181/98   Pulse 110   Temp 98.2 °F (36.8 °C)   Resp 16   Ht 5' 3\" (1.6 m)   Wt 300 lb (136.1 kg)   LMP 02/22/2021 (Approximate)   SpO2 97%   BMI 53.14 kg/m²   Oxygen Saturation Interpretation: Normal    The patients available past medical records and past encounters were reviewed. ------------------------------ ED COURSE/MEDICAL DECISION MAKING----------------------  Medications   predniSONE (DELTASONE) tablet 60 mg (60 mg Oral Given 11/3/21 1947)   ipratropium-albuterol (Lori Julio) nebulizer solution 2 ampule (2 ampules Inhalation Given 11/3/21 1948)         ED COURSE:       Medical Decision Making:    Non toxic, acute on chronic, tx with steroids and outpt fu      This patient's ED course included: a personal history and physicial examination    This patient has remained hemodynamically stable during their ED course. Re-Evaluations:             Re-evaluation. Patients symptoms are improving      Critical Care:         Counseling: The emergency provider has spoken with the patient and discussed todays results, in addition to providing specific details for the plan of care and counseling regarding the diagnosis and prognosis. Questions are answered at this time and they are agreeable with the plan.       --------------------------------- IMPRESSION AND DISPOSITION ---------------------------------    IMPRESSION  1. Mild intermittent asthma with exacerbation        DISPOSITION  Disposition: Discharge to home  Patient condition is stable    NOTE: This report was transcribed using voice recognition software.  Every effort was made to ensure accuracy; however, inadvertent computerized transcription errors may be present        Michael Bass MD  11/03/21 2054

## 2021-11-04 LAB
EKG ATRIAL RATE: 103 BPM
EKG P AXIS: 52 DEGREES
EKG P-R INTERVAL: 148 MS
EKG Q-T INTERVAL: 346 MS
EKG QRS DURATION: 78 MS
EKG QTC CALCULATION (BAZETT): 453 MS
EKG R AXIS: 19 DEGREES
EKG T AXIS: 29 DEGREES
EKG VENTRICULAR RATE: 103 BPM

## 2021-11-05 ENCOUNTER — TELEPHONE (OUTPATIENT)
Dept: FAMILY MEDICINE CLINIC | Age: 36
End: 2021-11-05

## 2021-11-05 DIAGNOSIS — M54.41 CHRONIC BILATERAL LOW BACK PAIN WITH RIGHT-SIDED SCIATICA: ICD-10-CM

## 2021-11-05 DIAGNOSIS — S39.012D STRAIN OF LUMBAR REGION, SUBSEQUENT ENCOUNTER: Primary | ICD-10-CM

## 2021-11-05 DIAGNOSIS — G89.29 CHRONIC BILATERAL LOW BACK PAIN WITH RIGHT-SIDED SCIATICA: ICD-10-CM

## 2021-11-05 NOTE — TELEPHONE ENCOUNTER
Pt calling about the back pain she has been having, seems to be worse and she states once she started PT the meds don't seem to help her as much. She also states since she started PT she is getting spasms in her neck. Pain is mostly in her lower back and radiates to her buttocks. Please advise.

## 2021-11-05 NOTE — TELEPHONE ENCOUNTER
Patient given information, states she is having shooting pain down her back and into her legs. I advised ER if she felt the pain was too bad.

## 2021-11-05 NOTE — TELEPHONE ENCOUNTER
She has only been to PT twice - recommend to continue as this will likely help her if she sticks with it. Will refer to pain management.  She was in the ED and prednisone was given for her asthma but this should help her back as well

## 2021-11-08 ENCOUNTER — HOSPITAL ENCOUNTER (EMERGENCY)
Age: 36
Discharge: HOME | End: 2021-11-08
Attending: EMERGENCY MEDICINE
Payer: COMMERCIAL

## 2021-11-08 ENCOUNTER — TELEPHONE (OUTPATIENT)
Dept: FAMILY MEDICINE CLINIC | Age: 36
End: 2021-11-08

## 2021-11-08 VITALS
DIASTOLIC BLOOD PRESSURE: 76 MMHG | TEMPERATURE: 98 F | OXYGEN SATURATION: 97 % | SYSTOLIC BLOOD PRESSURE: 130 MMHG | BODY MASS INDEX: 51.91 KG/M2 | RESPIRATION RATE: 15 BRPM | WEIGHT: 293 LBS | HEART RATE: 92 BPM | HEIGHT: 63 IN

## 2021-11-08 DIAGNOSIS — M54.40 CHRONIC LOW BACK PAIN WITH SCIATICA, SCIATICA LATERALITY UNSPECIFIED, UNSPECIFIED BACK PAIN LATERALITY: Primary | ICD-10-CM

## 2021-11-08 DIAGNOSIS — G89.29 CHRONIC LOW BACK PAIN WITH SCIATICA, SCIATICA LATERALITY UNSPECIFIED, UNSPECIFIED BACK PAIN LATERALITY: Primary | ICD-10-CM

## 2021-11-08 PROCEDURE — 99283 EMERGENCY DEPT VISIT LOW MDM: CPT

## 2021-11-08 PROCEDURE — 6360000002 HC RX W HCPCS: Performed by: EMERGENCY MEDICINE

## 2021-11-08 PROCEDURE — 96372 THER/PROPH/DIAG INJ SC/IM: CPT

## 2021-11-08 RX ORDER — ORPHENADRINE CITRATE 30 MG/ML
60 INJECTION INTRAMUSCULAR; INTRAVENOUS ONCE
Status: COMPLETED | OUTPATIENT
Start: 2021-11-08 | End: 2021-11-08

## 2021-11-08 RX ORDER — CYCLOBENZAPRINE HCL 10 MG
10 TABLET ORAL 3 TIMES DAILY PRN
Qty: 21 TABLET | Refills: 0 | Status: SHIPPED | OUTPATIENT
Start: 2021-11-08 | End: 2021-11-18

## 2021-11-08 RX ORDER — KETOROLAC TROMETHAMINE 30 MG/ML
30 INJECTION, SOLUTION INTRAMUSCULAR; INTRAVENOUS ONCE
Status: COMPLETED | OUTPATIENT
Start: 2021-11-08 | End: 2021-11-08

## 2021-11-08 RX ADMIN — ORPHENADRINE CITRATE 60 MG: 30 INJECTION INTRAMUSCULAR; INTRAVENOUS at 18:38

## 2021-11-08 RX ADMIN — KETOROLAC TROMETHAMINE 30 MG: 30 INJECTION, SOLUTION INTRAMUSCULAR at 18:38

## 2021-11-08 ASSESSMENT — PAIN DESCRIPTION - ORIENTATION: ORIENTATION: LOWER

## 2021-11-08 ASSESSMENT — PAIN DESCRIPTION - PROGRESSION: CLINICAL_PROGRESSION: NOT CHANGED

## 2021-11-08 ASSESSMENT — PAIN DESCRIPTION - DESCRIPTORS: DESCRIPTORS: ACHING

## 2021-11-08 ASSESSMENT — PAIN DESCRIPTION - ONSET: ONSET: ON-GOING

## 2021-11-08 ASSESSMENT — PAIN DESCRIPTION - LOCATION: LOCATION: BACK

## 2021-11-08 ASSESSMENT — PAIN DESCRIPTION - PAIN TYPE: TYPE: CHRONIC PAIN

## 2021-11-08 ASSESSMENT — PAIN SCALES - GENERAL
PAINLEVEL_OUTOF10: 10
PAINLEVEL_OUTOF10: 10

## 2021-11-08 ASSESSMENT — PAIN DESCRIPTION - FREQUENCY: FREQUENCY: CONTINUOUS

## 2021-11-08 NOTE — TELEPHONE ENCOUNTER
----- Message from [address] sent at 11/6/2021  9:12 AM EDT -----  Subject: Medication Problem    QUESTIONS  Name of Medication? predniSONE (DELTASONE) 50 MG tablet  Patient-reported dosage and instructions? 1 tablet by mouth daily  What question or problem do you have with the medication? César Fisher is   experiencing a rash on her face and back after taking this med. She didn't   want to stop taking it without a medical professional's advice to do so.   César Fisher would like a call back asap to discuss what her next steps should   be. Preferred Pharmacy? [facility]  phone number (if available)? [phone]  Additional Information for Provider?   ---------------------------------------------------------------------------  --------------  [address]  What is the best way for the office to contact you? OK to leave message on   voicemail  Preferred Call Back Phone Number? [phone]  ---------------------------------------------------------------------------  --------------  SCRIPT ANSWERS  Relationship to Patient?  Self

## 2021-11-08 NOTE — TELEPHONE ENCOUNTER
Pt would like to know if she should increase her BP meds to twice daily because she took her BP today and it was  181/75.  Please Advise

## 2021-11-08 NOTE — ED PROVIDER NOTES
HPI:  21,   Time: 6:15 PM RAMONITA Rogers is a 38 y.o. female presenting to the ED for lower back pain, beginning a year ago. The complaint has been persistent, mild in severity, and worsened by nothing. Patient describes the pain as a sharp and stabbing sensation located in the lumbar spine with radiation down the left and right legs. No alleviating or exacerbating factors. States that she does have a history of degenerative disc disease. She also has arthritis in her hips. She has followed up with her primary care physician for this and is being referred to pain management. Patient denies any known numbness, tingling, weakness, bowel or bladder incontinence. She is able to ambulate despite the pain. ROS:   Pertinent positives and negatives are stated within HPI, all other systems reviewed and are negative.  --------------------------------------------- PAST HISTORY ---------------------------------------------  Past Medical History:  has a past medical history of Anxiety, Arthritis, Asthma, Bipolar 1 disorder ([facility] [address] COPD (chronic obstructive pulmonary disease) ([facility] [address] Depression, Fatty liver, Fissure, anal, GERD (gastroesophageal reflux disease), Glaucoma, Hyperlipidemia, Hypertension, Macular edema, Migraines, Neuropathy, PTSD (post-traumatic stress disorder), Splenomegaly, and Type 2 diabetes mellitus without complication, without long-term current use of insulin ([facility] [address] Past Surgical History:  has a past surgical history that includes Cholecystectomy; Tubal ligation; Anterior cruciate ligament repair (Right); Dilation and curettage of uterus (N/A, 2019); other surgical history (); Dilation and curettage of uterus (N/A, 2020);  section; Dilation and curettage of uterus; Hysterectomy (2021); and Incontinence surgery. Social History:  reports that she has been smoking cigarettes. She started smoking about 26 years ago.  She has a 20.00 pack-year smoking history. She has never used smokeless tobacco. She reports previous alcohol use. She reports that she does not use drugs. Family History: family history includes Breast Cancer in her maternal aunt; Cancer in her mother and sister; Diabetes in her father, mother, and sister; High Blood Pressure in her brother. The patients home medications have been reviewed. Allergies: Cymbalta [duloxetine hcl], Keflex [cephalexin], Neurontin [gabapentin], and Lamictal [lamotrigine]    -------------------------------------------------- RESULTS -------------------------------------------------  All laboratory and radiology results have been personally reviewed by myself   LABS:  No results found for this visit on 11/08/21. RADIOLOGY:  Interpreted by Radiologist.  No orders to display       ------------------------- NURSING NOTES AND VITALS REVIEWED ---------------------------   The nursing notes within the ED encounter and vital signs as below have been reviewed. /76   Pulse 92   Temp 98 °F (36.7 °C)   Resp 15   Ht 5' 3\" (1.6 m)   Wt 300 lb (136.1 kg)   LMP 02/22/2021 (Approximate)   SpO2 97%   BMI 53.14 kg/m²   Oxygen Saturation Interpretation: Normal      ---------------------------------------------------PHYSICAL EXAM--------------------------------------      Constitutional/General: Alert and oriented x3, well appearing, non toxic in NAD  Head: NC/AT  Eyes: PERRL, EOMI  Mouth: Oropharynx clear, handling secretions, no trismus  Neck: Supple, full ROM, no meningeal signs  Pulmonary: Lungs clear to auscultation bilaterally, no wheezes, rales, or rhonchi. Not in respiratory distress  Cardiovascular:  Regular rate and rhythm, no murmurs, gallops, or rubs. 2+ distal pulses  Abdomen: Soft, non tender, non distended,   Back: No lumbar spine tenderness to palpation. Patient does have bilateral paralumbar muscular tenderness to palpation. Extremities: Moves all extremities x 4.  Warm and well perfused  Skin: warm and dry without rash  Neurologic: GCS 15, cranial nerves II through XII intact. Psych: Normal Affect      ------------------------------ ED COURSE/MEDICAL DECISION MAKING----------------------  Medications   orphenadrine (NORFLEX) injection 60 mg (60 mg IntraMUSCular Given 11/8/21 1838)   ketorolac (TORADOL) injection 30 mg (30 mg IntraMUSCular Given 11/8/21 1838)         Medical Decision Making:    Vital signs are stable. Physical exam is remarkable for bilateral para lumbar muscular tenderness to palpation. Patient says the pain radiates down the back of both legs and feels like her sciatica is flared up. Patient states that she has followed up with her primary care physician for this and was prescribed baclofen. Her primary care physician has referred her to pain management. Patient was given Toradol and Norflex while in the emergency department. She is ambulatory in the emergency department. She denies any numbness, tingling, weakness, bowel or bladder incontinence. I told her to take her medication that was prescribed as directed, to follow-up with her primary care physician this week, and to return for worsening symptoms. She is agreeable with plan of care. Counseling: The emergency provider has spoken with the patient and discussed todays results, in addition to providing specific details for the plan of care and counseling regarding the diagnosis and prognosis. Questions are answered at this time and they are agreeable with the plan.      --------------------------------- IMPRESSION AND DISPOSITION ---------------------------------    IMPRESSION  1.  Chronic low back pain with sciatica, sciatica laterality unspecified, unspecified back pain laterality        DISPOSITION  Disposition: Discharge to home  Patient condition is stable                  Hermelinda Tolentino MD  11/08/21 2005

## 2021-11-08 NOTE — ED NOTES
Bed: 11  Expected date:   Expected time:   Means of arrival:   Comments:  Akiko Quick RN  11/08/21 0590

## 2021-11-09 ENCOUNTER — TREATMENT (OUTPATIENT)
Dept: PHYSICAL THERAPY | Age: 36
End: 2021-11-09
Payer: COMMERCIAL

## 2021-11-09 DIAGNOSIS — G89.29 CHRONIC BILATERAL LOW BACK PAIN WITH RIGHT-SIDED SCIATICA: Primary | ICD-10-CM

## 2021-11-09 DIAGNOSIS — M54.41 CHRONIC BILATERAL LOW BACK PAIN WITH RIGHT-SIDED SCIATICA: Primary | ICD-10-CM

## 2021-11-09 PROCEDURE — 97110 THERAPEUTIC EXERCISES: CPT | Performed by: PHYSICAL THERAPIST

## 2021-11-09 NOTE — TELEPHONE ENCOUNTER
Patient notified. Patient states that she has an appointment tomorrow with Dr. Marquis Casillas. (11/10/2021) and will wait until then to discuss problems. I did advise patient that if she were to have anymore problems w/ BP she should be seen.

## 2021-11-09 NOTE — PROGRESS NOTES
[facility]          Phone: [phone] Fax: [phone]    Physical Therapy Daily Treatment Note  Date:  2021    Patient Name:  Dominic Cuevas    :  1985  MRN: [id number]    Restrictions/Precautions:    Diagnosis:     Diagnosis Orders   1. Chronic bilateral low back pain with right-sided sciatica       Treatment Diagnosis:    Insurance/Certification information:  [address] [place] [address]  Referring Physician:  Kimi Hawkins DO  Plan of care signed (Y/N):    Visit# / total visits:  3/8-  Pain level: 6/10   Time In:  1037  Time Out:  1100    Subjective:  Pt reports doctor is sending her to pain management    Exercises:  Exercise/Equipment Resistance/Repetitions Other comments     stepper 5 minutes      Trunk stretch with ball 15 sec x 3 reps each      Hamstring stretch Seated, with stool 15 sec x 3 reps B                                         Sciatic nerve glides x5 reps B LE                                                Other:  Exercises ended when pt began to report nausea and B groin pain in order to avoid severe spasms that pt reported following previous visit.     Home Exercise Program:  N/A    Manual Treatments:  N/A    Modalities:  IFC/HP PRN     Time-in Time-out Total Time   29892  Evaluation Low Complexity      83929  Evaluation Med Complexity      80070  Evaluation High Complexity      43538  Ther Ex 1037 1100 23   43248  Neuro Re-ed        99879  Ther Activities        01989  Manual Therapy       11410  E-stim       07839  Ultrasound            Session 1037 1100 23       Treatment/Activity Tolerance:  [] Patient tolerated treatment well [] Patient limited by fatigue  [x] Patient limited by pain  [] Patient limited by other medical complications  [] Other:     Prognosis: [] Good [x] Fair  [] Poor    Patient Requires Follow-up: [x] Yes  [] No    Plan:   [x] Continue per plan of care [] Alter current plan (see comments)  [] Plan of care initiated [] Hold pending MD visit [] Discharge  Plan for Next Session:        Electronically signed by:  Patra Sandhoff, [place], [id number]

## 2021-11-09 NOTE — TELEPHONE ENCOUNTER
Patient notified. Patient states that she has an appointment tomorrow with Dr. Delia Gaona. (11/10/2021) and will wait until then to discuss problems. I did advise patient that if she were to have anymore problems w/ BP she should be seen.

## 2021-11-10 ENCOUNTER — OFFICE VISIT (OUTPATIENT)
Dept: FAMILY MEDICINE CLINIC | Age: 36
End: 2021-11-10
Payer: COMMERCIAL

## 2021-11-10 VITALS
TEMPERATURE: 97.3 F | DIASTOLIC BLOOD PRESSURE: 97 MMHG | WEIGHT: 293 LBS | HEIGHT: 63 IN | RESPIRATION RATE: 20 BRPM | SYSTOLIC BLOOD PRESSURE: 157 MMHG | HEART RATE: 116 BPM | BODY MASS INDEX: 51.91 KG/M2

## 2021-11-10 DIAGNOSIS — R16.1 SPLENOMEGALY: ICD-10-CM

## 2021-11-10 DIAGNOSIS — E11.9 TYPE 2 DIABETES MELLITUS WITHOUT COMPLICATION, WITHOUT LONG-TERM CURRENT USE OF INSULIN (HCC): Primary | ICD-10-CM

## 2021-11-10 DIAGNOSIS — J45.901 MODERATE ASTHMA WITH ACUTE EXACERBATION, UNSPECIFIED WHETHER PERSISTENT: ICD-10-CM

## 2021-11-10 DIAGNOSIS — S39.012D STRAIN OF LUMBAR REGION, SUBSEQUENT ENCOUNTER: ICD-10-CM

## 2021-11-10 DIAGNOSIS — I10 PRIMARY HYPERTENSION: ICD-10-CM

## 2021-11-10 DIAGNOSIS — K75.81 NASH (NONALCOHOLIC STEATOHEPATITIS): ICD-10-CM

## 2021-11-10 DIAGNOSIS — G47.33 OSA (OBSTRUCTIVE SLEEP APNEA): ICD-10-CM

## 2021-11-10 DIAGNOSIS — M25.50 ARTHRALGIA, UNSPECIFIED JOINT: ICD-10-CM

## 2021-11-10 LAB
CREATININE URINE POCT: NORMAL
MICROALBUMIN/CREAT 24H UR: NORMAL MG/G{CREAT}
MICROALBUMIN/CREAT UR-RTO: NORMAL

## 2021-11-10 PROCEDURE — 82044 UR ALBUMIN SEMIQUANTITATIVE: CPT | Performed by: FAMILY MEDICINE

## 2021-11-10 PROCEDURE — 99214 OFFICE O/P EST MOD 30 MIN: CPT | Performed by: FAMILY MEDICINE

## 2021-11-10 PROCEDURE — G8417 CALC BMI ABV UP PARAM F/U: HCPCS | Performed by: FAMILY MEDICINE

## 2021-11-10 PROCEDURE — 3044F HG A1C LEVEL LT 7.0%: CPT | Performed by: FAMILY MEDICINE

## 2021-11-10 PROCEDURE — 4004F PT TOBACCO SCREEN RCVD TLK: CPT | Performed by: FAMILY MEDICINE

## 2021-11-10 PROCEDURE — G8482 FLU IMMUNIZE ORDER/ADMIN: HCPCS | Performed by: FAMILY MEDICINE

## 2021-11-10 PROCEDURE — 2022F DILAT RTA XM EVC RTNOPTHY: CPT | Performed by: FAMILY MEDICINE

## 2021-11-10 PROCEDURE — G8427 DOCREV CUR MEDS BY ELIG CLIN: HCPCS | Performed by: FAMILY MEDICINE

## 2021-11-10 RX ORDER — DOXEPIN HYDROCHLORIDE 25 MG/1
CAPSULE ORAL
COMMUNITY
Start: 2021-11-05 | End: 2022-04-11

## 2021-11-10 RX ORDER — BUSPIRONE HYDROCHLORIDE 15 MG/1
TABLET ORAL
COMMUNITY
Start: 2021-11-08 | End: 2022-04-11

## 2021-11-10 RX ORDER — FLUTICASONE PROPIONATE 44 UG/1
1 AEROSOL, METERED RESPIRATORY (INHALATION) 2 TIMES DAILY
Qty: 1 EACH | Refills: 3 | Status: SHIPPED
Start: 2021-11-10 | End: 2022-02-09

## 2021-11-10 RX ORDER — LISINOPRIL 10 MG/1
10 TABLET ORAL DAILY
Qty: 30 TABLET | Refills: 2 | Status: SHIPPED
Start: 2021-11-10 | End: 2022-03-09

## 2021-11-10 ASSESSMENT — ENCOUNTER SYMPTOMS
SINUS PRESSURE: 0
SHORTNESS OF BREATH: 1
ABDOMINAL PAIN: 0
COUGH: 0
CONSTIPATION: 0
EYE PAIN: 0
DIARRHEA: 0
SORE THROAT: 0
BACK PAIN: 1

## 2021-11-10 NOTE — PROGRESS NOTES
Anton Price  : 1985    Chief Complaint:     Chief Complaint   Patient presents with    Hypertension       HPI  Anton Price 38 y.o. presents for   Chief Complaint   Patient presents with    Hypertension     Patient presents for follow-up. She states her blood pressure has been elevated lately. Her blood pressure is in the 150s over 90s. She is taking lisinopril 5 mg. She also continues to complain of arthralgias over her entire body. She has been going to physical therapy for her back. She also admits to history of Corrie Muster and splenomegaly. She has not had this checked in some time. She also has noted an increase in wheezing. She was given steroids but had a reaction to the steroids. She is currently on Spiriva. She does continue to smoke. She also admits to gasping for air at night. She did have a sleep study but this was many years ago. All questions were answered to patients satisfaction.     Past Medical History:   Diagnosis Date    Anxiety     Arthritis     Asthma     Bipolar 1 disorder ([facility]     COPD (chronic obstructive pulmonary disease) (HCC)     Depression     Fatty liver     Fissure, anal     GERD (gastroesophageal reflux disease)     Glaucoma     Suspected by eye specialist    Hyperlipidemia     Hypertension     Macular edema     Cm    Migraines     Neuropathy     PTSD (post-traumatic stress disorder)     Splenomegaly 2020    Type 2 diabetes mellitus without complication, without long-term current use of insulin (HCC)        Allergies   Allergen Reactions    Cymbalta [Duloxetine Hcl] Other (See Comments)     angry    Keflex [Cephalexin]      Hives, rash      Neurontin [Gabapentin] Other (See Comments)     Makes me feel very weird    Lamictal [Lamotrigine] Nausea And Vomiting       Health Maintenance Due   Topic Date Due    COVID-19 Vaccine (1) Never done    Lipid screen  2021    Diabetic retinal exam  2021    Diabetic microalbuminuria test  09/19/2021         REVIEW OF SYSTEMS  Review of Systems   Constitutional: Positive for fatigue. Negative for fever. HENT: Negative for ear pain, sinus pressure, sneezing and sore throat. Eyes: Negative for pain. Respiratory: Positive for shortness of breath. Negative for cough. Cardiovascular: Negative for chest pain and leg swelling. Gastrointestinal: Negative for abdominal pain, constipation and diarrhea. Genitourinary: Negative for dysuria and urgency. Musculoskeletal: Positive for arthralgias and back pain. Negative for myalgias. Skin: Negative for rash. Allergic/Immunologic: Negative for food allergies. Neurological: Positive for numbness. Negative for light-headedness and headaches. Hematological: Does not bruise/bleed easily. Psychiatric/Behavioral: Negative for behavioral problems and sleep disturbance. PHYSICAL EXAM  BP (!) 157/97   Pulse 116   Temp 97.3 °F (36.3 °C)   Resp 20   Ht 5' 3\" (1.6 m)   Wt (!) 302 lb (137 kg)   LMP 02/22/2021 (Approximate)   BMI 53.50 kg/m²   Physical Exam  Vitals and nursing note reviewed. Constitutional:       Appearance: She is well-developed. She is obese. HENT:      Head: Normocephalic and atraumatic. Right Ear: External ear normal.      Left Ear: External ear normal.      Nose: Nose normal.   Eyes:      Conjunctiva/sclera: Conjunctivae normal.   Neck:      Thyroid: No thyromegaly. Cardiovascular:      Rate and Rhythm: Normal rate and regular rhythm. Heart sounds: Normal heart sounds. No murmur heard. Pulmonary:      Effort: Pulmonary effort is normal.      Breath sounds: Wheezing present. Abdominal:      Palpations: Abdomen is soft. Tenderness: There is no abdominal tenderness. Musculoskeletal:         General: Tenderness (Pain to light palpation over the lumbar paraspinal musculature) present. Normal range of motion. Cervical back: Normal range of motion and neck supple. Lymphadenopathy:      Cervical: No cervical adenopathy. Skin:     General: Skin is warm and dry. Findings: No rash. Neurological:      General: No focal deficit present. Mental Status: She is alert and oriented to person, place, and time. Mental status is at baseline. Deep Tendon Reflexes: Reflexes are normal and symmetric. Psychiatric:         Mood and Affect: Mood normal.         Behavior: Behavior normal.              Laboratory: All laboratory and radiology results have been personally reviewed by myself    Lab Results   Component Value Date     08/01/2021    K 3.7 08/01/2021    K 3.8 10/30/2019     08/01/2021    CO2 23 08/01/2021    BUN 10 08/01/2021    CREATININE 0.5 08/01/2021    PROT 7.3 02/15/2021    LABALBU 4.2 02/15/2021    LABALBU 3.7 09/19/2020    CALCIUM 9.0 08/01/2021    GFRAA >60 08/01/2021    LABGLOM >60 08/01/2021    GLUCOSE 151 08/01/2021    AST 31 02/15/2021    ALT 27 02/15/2021    ALKPHOS 97 02/15/2021    BILITOT 0.5 02/15/2021    TSH 1.180 03/13/2020    CHOL 185 03/13/2020    TRIG 165 03/13/2020    HDL 40 03/13/2020    LDLCALC 112 03/13/2020    LABA1C 5.9 10/12/2021        Lab Results   Component Value Date    CHOL 185 03/13/2020     Lab Results   Component Value Date    TRIG 165 (H) 03/13/2020     Lab Results   Component Value Date    HDL 40 03/13/2020     Lab Results   Component Value Date    LDLCALC 112 (H) 03/13/2020       Lab Results   Component Value Date    LABA1C 5.9 10/12/2021     Lab Results   Component Value Date    LABMICR 15.5 09/19/2020    LDLCALC 112 (H) 03/13/2020    CREATININE 0.5 08/01/2021       ASSESSMENT/PLAN:    1. Type 2 diabetes mellitus without complication, without long-term current use of insulin (HCC)  -     POCT microalbumin  2. Primary hypertension  -     lisinopril (PRINIVIL;ZESTRIL) 10 MG tablet; Take 1 tablet by mouth daily, Disp-30 tablet, R-2Normal  3. Strain of lumbar region, subsequent encounter  4.  Arthralgia, unspecified joint  -     [address], Rheumatology, [place]  5. GIRALDO (nonalcoholic steatohepatitis)  -     US LIVER SPLEEN; Future  6. Splenomegaly  -     US LIVER SPLEEN; Future  7. Moderate asthma with acute exacerbation, unspecified whether persistent  8. CELESTINO (obstructive sleep apnea)  -     Baseline Diagnostic Sleep Study; Future     Microalbumin to be completed today. As for blood pressure will increase lisinopril to 10 mg. Side effects medication reviewed with patient. As for arthralgias will refer to rheumatology for further evaluation. We will also obtain ultrasound of the liver and spleen to further evaluate. As for her asthma will add low-dose Flovent. Side effects of medication reviewed with patient. Sleep study ordered as above    Problem list reviewed andsimplified/updated  HM reviewed today and counseled as appropriate    Call or go to ED immediately if symptoms worsen or persist.  Future Appointments   Date Time Provider Niesha Yarbrough   11/16/2021 10:00 AM Alok Epps PT [facility]   11/17/2021  3:00 PM MD FABIAN Waggoner   11/19/2021 10:00 AM Alok Epps PT [facility]   12/9/2021 11:45 AM DO Kiki Reddy [facility]   12/13/2021  8:30 AM Dileep Judge MD [facility]     Or sooner if necessary. Educational materials and/or homeexercises printed for patient's review and were included in patient instructions on his/her After Visit Summary and given to patient at the end of visit. Counseled regarding above diagnosis, including possible risks and complications,  especially if left uncontrolled. Counseled regarding the possible side effects, risks, benefits and alternatives to treatment; patient and/or guardian verbalizes understanding, agrees,feels comfortable with and wishes to proceed with above treatment plan.      Advised patient to call Yaima Minor new medication issues, and read all Rx info from pharmacy to assure aware of all possible risks and side effects of medication before taking. Reviewed age and gender appropriate health screening exams and vaccinations. Advised patient regarding importance of keeping up with recommended health maintenance and toschedule as soon as possible if overdue, as this is important in assessing for undiagnosed pathology, especially cancer, as well as protecting against potentially harmful/life threatening disease. and/or guardian verbalizes understanding and agrees with above counseling, assessment and plan. All questions answered. Ese 5, DO  11/10/21    NOTE: This report was transcribed using voice recognition software.  Every effort was made to ensure accuracy; however, inadvertent computerized transcription errors may be present

## 2021-12-03 ENCOUNTER — TELEPHONE (OUTPATIENT)
Dept: FAMILY MEDICINE CLINIC | Age: 36
End: 2021-12-03

## 2021-12-03 NOTE — TELEPHONE ENCOUNTER
Pt called states headache felt sick to her stomach BP was 137-97/134-97, It was time for her to take her BP med    Advised pt to take Medication call office back in half hour.     Pt did phone back states she was feeling so much better and headache and sick to stomach was gone, BP was 122-83    FYI

## 2021-12-09 ENCOUNTER — TELEPHONE (OUTPATIENT)
Dept: FAMILY MEDICINE CLINIC | Age: 36
End: 2021-12-09

## 2021-12-09 NOTE — PROGRESS NOTES
[address] [facility]        [address]  [facility], [address] [facility]  Dept: [phone]        Patient:  Becky Mccollum DOB 1985    Date of Service:  12/10/21     Requesting Physician:  Connie Alvarado DO    Reason for Consult:      Patient presents with complaints of bilateral, lower back pain that started 2 years ago    HISTORY OF PRESENT ILLNESS:      Pain is constant and is described as aching, stabbing and throbbing. Pain does radiate to both lower extremities. She  has numbness, tingling, weakness of the both lower extremities and does not have bladder or bowel dysfunction. Alleviating factors include: nothing. Aggravating factors include:  walking, standing. She has been on anticoagulation medications to include NSAIDS and has not been on herbal supplements. She is diabetic. Imaging:   10/2021 xray cervical and lumbar -  FINDINGS:   C-spine: No fracture or dislocation.  Disc spaces are preserved.  Normal soft   tissues.       L-spine: Minimal degenerative endplate spurring from E6-H4.  No fracture or   dislocation.  Normal soft tissues.           Impression   Unremarkable C-spine.       Minimal degenerative endplate spurring from E1-I3. Previous treatments: Physical Therapy.       Past Medical History:   Diagnosis Date    Anxiety     Arthritis     Asthma     Bipolar 1 disorder ([address]     COPD (chronic obstructive pulmonary disease) (HCC)     Depression     Fatty liver     Fissure, anal     GERD (gastroesophageal reflux disease)     Glaucoma     Suspected by eye specialist    Hyperlipidemia     Hypertension     Macular edema     Cm    Migraines     Neuropathy     PTSD (post-traumatic stress disorder)     Splenomegaly 2020    Type 2 diabetes mellitus without complication, without long-term current use of insulin (HCC)        Past Surgical History:   Procedure Laterality Date    ANTERIOR CRUCIATE LIGAMENT REPAIR Right      SECTION      2008, 2010    CHOLECYSTECTOMY      DILATION AND CURETTAGE OF UTERUS N/A 01/08/2019    DILATATION AND CURETTAGE HYSTEROSCOPY WITH REMOVAL OF CONDYLOMATA performed by Jameel Dang MD at [address] [facility] N/A 02/25/2020    DILATATION AND CURETTAGE HYSTEROSCOPY, ATTEMPTED CERVICAL BIOPSY, MARIYA OF  LABIAL ABSCESS performed by Jameel Dang MD at [address] [facility]  02/22/2021    Robotic lysis of adhesions, total hysterectomy, bilateral salpingectomy, cystoscopy    INCONTINENCE SURGERY      OTHER SURGICAL HISTORY  1997    repair anal fissure    TUBAL LIGATION         Prior to Admission medications    Medication Sig Start Date End Date Taking?  Authorizing Provider   cyclobenzaprine (FLEXERIL) 5 MG tablet Take 5 mg by mouth 3 times daily as needed for Muscle spasms   Yes Historical Provider, MD   Acetaminophen (TYLENOL) 325 MG CAPS Take by mouth   Yes Historical Provider, MD   ibuprofen (ADVIL;MOTRIN) 800 MG tablet Take 800 mg by mouth every 6 hours as needed for Pain   Yes Historical Provider, MD   doxepin (SINEQUAN) 25 MG capsule TAKE ONE CAPSULE BY MOUTH AT BEDTIME 11/5/21  Yes Historical Provider, MD   busPIRone (BUSPAR) 15 MG tablet TAKE ONE TABLET BY MOUTH THREE TIMES DAILY 11/8/21  Yes Historical Provider, MD   lisinopril (PRINIVIL;ZESTRIL) 10 MG tablet Take 1 tablet by mouth daily 11/10/21  Yes Lizzy Covington,    fluticasone (FLOVENT HFA) 44 MCG/ACT inhaler Inhale 1 puff into the lungs 2 times daily 11/10/21  Yes Zamzam Covington,    topiramate (TOPAMAX) 50 MG tablet TAKE ONE TABLET BY MOUTH EVERY NIGHT 9/15/21  Yes Historical Provider, MD   venlafaxine (EFFEXOR XR) 150 MG extended release capsule Take 150 mg by mouth daily   Yes Historical Provider, MD   albuterol sulfate HFA (PROVENTIL HFA) 108 (90 Base) MCG/ACT inhaler Inhale 2 puffs into the lungs every 4 hours as needed for Wheezing 10/12/21 10/12/22 Yes Marlane Kussmaul, DO   tiotropium (SPIRIVA HANDIHALER) 18 MCG inhalation capsule Inhale 1 capsule into the lungs daily 10/12/21  Yes Wyatt Covington, DO   diclofenac (VOLTAREN) 50 MG EC tablet Take 1 tablet by mouth 3 times daily (with meals) 10/12/21  Yes Marlane Kussmaul, DO   albuterol (PROVENTIL) (2.5 MG/3ML) 0.083% nebulizer solution Take 3 mLs by nebulization every 6 hours as needed for Wheezing 4/29/20  Yes Fela Romero PA-C       Allergies   Allergen Reactions    Cymbalta [Duloxetine Hcl] Other (See Comments)     angry    Keflex [Cephalexin]      Hives, rash      Neurontin [Gabapentin] Other (See Comments)     Makes me feel very weird    Lamictal [Lamotrigine] Nausea And Vomiting    Prednisone Rash       Social History     Socioeconomic History    Marital status:      Spouse name: Johanna Woodruff Number of children: 2    Years of education: 15    Highest education level: Not on file   Occupational History    Occupation: disability-, LPN   Tobacco Use    Smoking status: Current Every Day Smoker     Packs/day: 1.00     Years: 20.00     Pack years: 20.00     Types: Cigarettes     Start date: 11/3/1995    Smokeless tobacco: Never Used   Vaping Use    Vaping Use: Former    Substances: Never   Substance and Sexual Activity    Alcohol use: Not Currently    Drug use: Yes     Types: Marijuana Justine Lux)     Comment: medical marijuana    Sexual activity: Yes     Partners: Male     Comment: TL   Other Topics Concern    Not on file   Social History Narrative    Not on file     Social Determinants of Health     Financial Resource Strain: Low Risk     Difficulty of Paying Living Expenses: Not very hard   Food Insecurity: No Food Insecurity    Worried About Running Out of Food in the Last Year: Never true    [address] in the Last Year: Never true   Transportation Needs:     Lack of Transportation (Medical): Not on file    Lack of Transportation (Non-Medical):  Not on file Physical Activity:     Days of Exercise per Week: Not on file    Minutes of Exercise per Session: Not on file   Stress:     Feeling of Stress : Not on file   Social Connections:     Frequency of Communication with Friends and Family: Not on file    Frequency of Social Gatherings with Friends and Family: Not on file    Attends Jewish Services: Not on file    Active Member of [employer] Group or Organizations: Not on file    Attends Club or Organization Meetings: Not on file    Marital Status: Not on file   Intimate Partner Violence:     Fear of Current or Ex-Partner: Not on file    Emotionally Abused: Not on file    Physically Abused: Not on file    Sexually Abused: Not on file   Housing Stability:     Unable to Pay for Housing in the Last Year: Not on file    Number of [place] in the Last Year: Not on file    Unstable Housing in the Last Year: Not on file       Family History   Problem Relation Age of Onset    Diabetes Mother     Cancer Mother         ovarian    Diabetes Father     Cancer Sister         NHL    Diabetes Sister     High Blood Pressure Brother     Breast Cancer Maternal Aunt     Uterine Cancer Neg Hx     Colon Cancer Neg Hx        REVIEW OF SYSTEMS:     Patient specifically denies fever/chills, chest pain, shortness of breath, new bowel or bladder complaints. All other review of systems was negative.     PHYSICAL EXAMINATION:      /64   Pulse 107   Temp 97.3 °F (36.3 °C) (Infrared)   Resp 16   Ht 5' 1\" (1.549 m)   Wt (!) 305 lb (138.3 kg)   LMP 02/22/2021 (Approximate)   SpO2 98%   BMI 57.63 kg/m²     General:      General appearance:pleasant and well-hydrated, in no distress and A & O x3  Build:Obese  Function:Rises from a seated position with difficulty    HEENT:    Head:normocephalic, atraumatic  Pupils:regular, round, equal  Sclera: icterus absent    Lungs:    Breathing:normal breathing pattern    Abdomen:    Shape:non-distended  Tenderness:none  Guarding:none    Lumbar spine:    Spine inspection:normal   CVA tenderness:No   Palpation:tenderness paravertebral muscles, left, right positive. Range of motion:abnormal mildly in lateral bending, flexion, extension rotation bilateral and is painful. Musculoskeletal:    Trigger points in Paraveteral:absent bilaterally  SI joint tenderness:positive right, positive left              MEE test:not done right, not done left  Piriformis tenderness:positive right, positive left  Trochanteric bursa tenderness:not done right, not done left  SLR:negative right, negative left, sitting     Extremities:    Tremors:None bilaterally upper and lower  Range of motion:pain with internal rotation of hips negative.   Intact:Yes  Varicose veins:absent   Pulses:present Lt radial  Cyanosis:none  Edema:none x all 4 extremities    Neurological:    Sensory:normal to light touch LLE, diffusely decreased RLE    Motor:   (no atrophy noted)             Right Quadriceps3/5       - + give-away weakness   Left Quadriceps5/5           Right Gastrocnemius3/5  - + give-away weakness  Left Gastrocnemius5/5  Right Ant Tibialis3/5 - + give-away weakness  Left Ant Tibialis5/5    Reflexes:    Right Quadriceps reflex2+  Left Quadriceps reflex2+  Right Achilles reflex2+  Left Achilles reflex2+  Gait:normal station, with a walker    Dermatology:    Skin:no rashes or lesions noted    Impression:    LBP BLE pain with weakness of the RLE  She reports having intermittent incontinence of urine (chronic and associated with urogyn surgery) and stool x2  Reports vaginal numbness w/ intercourse  Was evaluated by gynecology for the above, has had hysterectomy, and was told it is [place] damage\"  She is using a walker due to reported weakness of the RLE  \"I'm not suicidal or nothing but there are times I just want to jump in front of a truck because it's constant\", she can contract for safety, denies a plan and states \"I don't plan on hurting myself\"    She has been to PT x 3 visits  Reports significant nausea when doing PT    The reported symptoms cannot be explained by her imaging as there is a lack of significant pathology on the xrays    Plan:    Reviewed referral documents and imaging  Urine screen deferred  OARRS report reviewed  Encouraged to continue with PT  EMG BLE  Lumbar MRI w/o contrast  Patient encouraged to stay active and to lose weight  Treatment plan discussed with the patient including medication and procedure side effects     CC:  Referring physician    INGRID Herrera.

## 2021-12-09 NOTE — TELEPHONE ENCOUNTER
Pt calling and states she is on zestril for BP and her psychiatrist wants to put her on prazosin for her nightmares for PTSD. They want to know if it is ok for her to be taking both of these at the same time. Please advise.

## 2021-12-10 ENCOUNTER — OFFICE VISIT (OUTPATIENT)
Dept: PAIN MANAGEMENT | Age: 36
End: 2021-12-10
Payer: COMMERCIAL

## 2021-12-10 VITALS
HEIGHT: 61 IN | BODY MASS INDEX: 55.32 KG/M2 | OXYGEN SATURATION: 98 % | DIASTOLIC BLOOD PRESSURE: 64 MMHG | TEMPERATURE: 97.3 F | SYSTOLIC BLOOD PRESSURE: 138 MMHG | WEIGHT: 293 LBS | RESPIRATION RATE: 16 BRPM | HEART RATE: 107 BPM

## 2021-12-10 DIAGNOSIS — M54.16 LUMBAR RADICULOPATHY: ICD-10-CM

## 2021-12-10 DIAGNOSIS — G89.4 CHRONIC PAIN SYNDROME: ICD-10-CM

## 2021-12-10 DIAGNOSIS — G89.29 CHRONIC BILATERAL LOW BACK PAIN WITH BILATERAL SCIATICA: Primary | ICD-10-CM

## 2021-12-10 DIAGNOSIS — M54.41 CHRONIC BILATERAL LOW BACK PAIN WITH BILATERAL SCIATICA: Primary | ICD-10-CM

## 2021-12-10 DIAGNOSIS — M54.42 CHRONIC BILATERAL LOW BACK PAIN WITH BILATERAL SCIATICA: Primary | ICD-10-CM

## 2021-12-10 DIAGNOSIS — M47.817 LUMBOSACRAL SPONDYLOSIS WITHOUT MYELOPATHY: ICD-10-CM

## 2021-12-10 PROCEDURE — G8482 FLU IMMUNIZE ORDER/ADMIN: HCPCS | Performed by: PAIN MEDICINE

## 2021-12-10 PROCEDURE — 99203 OFFICE O/P NEW LOW 30 MIN: CPT | Performed by: PAIN MEDICINE

## 2021-12-10 PROCEDURE — 99204 OFFICE O/P NEW MOD 45 MIN: CPT | Performed by: PAIN MEDICINE

## 2021-12-10 PROCEDURE — 4004F PT TOBACCO SCREEN RCVD TLK: CPT | Performed by: PAIN MEDICINE

## 2021-12-10 PROCEDURE — G8427 DOCREV CUR MEDS BY ELIG CLIN: HCPCS | Performed by: PAIN MEDICINE

## 2021-12-10 PROCEDURE — G8417 CALC BMI ABV UP PARAM F/U: HCPCS | Performed by: PAIN MEDICINE

## 2021-12-10 RX ORDER — CYCLOBENZAPRINE HCL 5 MG
5 TABLET ORAL 3 TIMES DAILY PRN
COMMUNITY
End: 2022-02-09 | Stop reason: ALTCHOICE

## 2021-12-10 RX ORDER — IBUPROFEN 800 MG/1
800 TABLET ORAL EVERY 6 HOURS PRN
COMMUNITY

## 2021-12-10 NOTE — PROGRESS NOTES
Do you currently have any of the following:    Fever: No  Headache:  No  Cough: No  Shortness of breath: No  Exposed to anyone with these symptoms: No         Shawn Whitfield presents to the [address] [place] [address] on 12/10/2021. Debo Mcgregor is complaining of pain lower back down both legs . The pain is constant. The pain is described as aching, throbbing, shooting and stabbing. Pain is rated on her best day at a 6, on her worst day at a 10, and on average at a 6 on the VAS scale. She took her last dose of      Any procedures since your last visit:     Pacemaker or defibrillator: No managed by     She is  on NSAIDS and is not on anticoagulation medications to include none and is managed by PCP    Medication Contract and Consent for Opioid Use Documents Filed      No documents found                /64   Pulse 107   Temp 97.3 °F (36.3 °C) (Infrared)   Resp 16   Ht 5' 1\" (1.549 m)   Wt (!) 305 lb (138.3 kg)   LMP 02/22/2021 (Approximate)   SpO2 98%   BMI 57.63 kg/m²      Patient's last menstrual period was 02/22/2021 (approximate).

## 2021-12-13 ENCOUNTER — TELEPHONE (OUTPATIENT)
Dept: SURGERY | Age: 36
End: 2021-12-13

## 2021-12-13 NOTE — TELEPHONE ENCOUNTER
Patient no showed 12/13/21 with Dr. Vinh Valera. Attempted to contact patient to reschedule the appointment, left message. No show letter sent.

## 2021-12-22 ENCOUNTER — TREATMENT (OUTPATIENT)
Dept: PHYSICAL THERAPY | Age: 36
End: 2021-12-22
Payer: COMMERCIAL

## 2021-12-22 DIAGNOSIS — M54.41 CHRONIC BILATERAL LOW BACK PAIN WITH RIGHT-SIDED SCIATICA: Primary | ICD-10-CM

## 2021-12-22 DIAGNOSIS — G89.29 CHRONIC BILATERAL LOW BACK PAIN WITH RIGHT-SIDED SCIATICA: Primary | ICD-10-CM

## 2021-12-22 PROCEDURE — 97110 THERAPEUTIC EXERCISES: CPT | Performed by: PHYSICAL THERAPIST

## 2021-12-22 NOTE — PROGRESS NOTES
[facility]          Phone: [phone] Fax: [phone]    Physical Therapy Daily Treatment Note  Date:  2021    Patient Name:  Deedee Brady    :  1985  MRN: [id number]    Restrictions/Precautions:    Diagnosis:     Diagnosis Orders   1. Chronic bilateral low back pain with right-sided sciatica       Treatment Diagnosis:    Insurance/Certification information:  [facility]  Referring Physician:  Hanny Galdamez DO  Plan of care signed (Y/N):    Visit# / total visits:  -  Pain level: 6/10   Time In:  1300  Time Out:  1330    Subjective:  Pt reports she saw pain management. States that have ordered an MRI and another EMG. States doctor wants her to continue with therapy. Pt reports she is also having incontinence of her bowel, states doctor is aware. Exercises:  Exercise/Equipment Resistance/Repetitions Other comments     stepper 5 minutes      Trunk stretch with ball 15 sec x 3 reps each      Hamstring stretch Seated, with stool 15 sec x 3 reps B     sciatic nerve [id number] reps B LE     seated hip flexx10 reps    Seated hip add with ball 5 sec x 10 reps     seated hip abd RTB x 10 reps    Standing hip ext x10 reps B LE    Rows, seated RTB x 10 reps                                                                      Other: Tolerated well. No c/o nausea or spasms reported on this date.     Home Exercise Program:  N/A    Manual Treatments:  N/A    Modalities:  IFC/HP PRN     Time-in Time-out Total Time   58628  Evaluation Low Complexity      72575  Evaluation Med Complexity      73264  Evaluation High Complexity      83638  Ther Ex 1300 1330 30   59759  Neuro Re-ed        50075  Ther Activities        63389  Manual Therapy       92394  E-stim       04858  Ultrasound            Session 1300 1330 30       Treatment/Activity Tolerance:  [] Patient tolerated treatment well [] Patient limited by fatigue  [x] Patient limited by pain  [] Patient limited by other medical complications  [] Other:     Prognosis: [] Good [x] Fair  [] Poor    Patient Requires Follow-up: [x] Yes  [] No    Plan:   [x] Continue per plan of care [] Alter current plan (see comments)  [] Plan of care initiated [] Hold pending MD visit [] Discharge  Plan for Next Session:        Electronically signed by:  Es [place], [id number]

## 2022-01-03 ENCOUNTER — HOSPITAL ENCOUNTER (EMERGENCY)
Age: 37
Discharge: HOME | End: 2022-01-03
Attending: EMERGENCY MEDICINE
Payer: COMMERCIAL

## 2022-01-03 ENCOUNTER — APPOINTMENT (OUTPATIENT)
Dept: GENERAL RADIOLOGY | Age: 37
End: 2022-01-03
Payer: COMMERCIAL

## 2022-01-03 VITALS
OXYGEN SATURATION: 99 % | WEIGHT: 275 LBS | HEIGHT: 64 IN | TEMPERATURE: 98 F | DIASTOLIC BLOOD PRESSURE: 84 MMHG | HEART RATE: 99 BPM | SYSTOLIC BLOOD PRESSURE: 123 MMHG | BODY MASS INDEX: 46.95 KG/M2 | RESPIRATION RATE: 14 BRPM

## 2022-01-03 DIAGNOSIS — S93.402A SPRAIN OF LEFT ANKLE, UNSPECIFIED LIGAMENT, INITIAL ENCOUNTER: Primary | ICD-10-CM

## 2022-01-03 DIAGNOSIS — S89.91XA INJURY OF RIGHT KNEE, INITIAL ENCOUNTER: ICD-10-CM

## 2022-01-03 PROCEDURE — 6370000000 HC RX 637 (ALT 250 FOR IP): Performed by: EMERGENCY MEDICINE

## 2022-01-03 PROCEDURE — 6360000002 HC RX W HCPCS: Performed by: EMERGENCY MEDICINE

## 2022-01-03 PROCEDURE — 99283 EMERGENCY DEPT VISIT LOW MDM: CPT

## 2022-01-03 PROCEDURE — 90714 TD VACC NO PRESV 7 YRS+ IM: CPT | Performed by: EMERGENCY MEDICINE

## 2022-01-03 PROCEDURE — 73564 X-RAY EXAM KNEE 4 OR MORE: CPT

## 2022-01-03 PROCEDURE — 90471 IMMUNIZATION ADMIN: CPT | Performed by: EMERGENCY MEDICINE

## 2022-01-03 PROCEDURE — 73610 X-RAY EXAM OF ANKLE: CPT

## 2022-01-03 RX ORDER — OXYCODONE HYDROCHLORIDE AND ACETAMINOPHEN 5; 325 MG/1; MG/1
1 TABLET ORAL EVERY 8 HOURS PRN
Qty: 6 TABLET | Refills: 0 | Status: SHIPPED | OUTPATIENT
Start: 2022-01-03 | End: 2022-01-05

## 2022-01-03 RX ORDER — OXYCODONE HYDROCHLORIDE AND ACETAMINOPHEN 5; 325 MG/1; MG/1
1 TABLET ORAL ONCE
Status: COMPLETED | OUTPATIENT
Start: 2022-01-03 | End: 2022-01-03

## 2022-01-03 RX ORDER — DIAPER,BRIEF,INFANT-TODD,DISP
EACH MISCELLANEOUS ONCE
Status: COMPLETED | OUTPATIENT
Start: 2022-01-03 | End: 2022-01-03

## 2022-01-03 RX ORDER — MORPHINE SULFATE 4 MG/ML
4 INJECTION, SOLUTION INTRAMUSCULAR; INTRAVENOUS ONCE
Status: DISCONTINUED | OUTPATIENT
Start: 2022-01-03 | End: 2022-01-03

## 2022-01-03 RX ORDER — TETANUS AND DIPHTHERIA TOXOIDS ADSORBED 2; 2 [LF]/.5ML; [LF]/.5ML
0.5 INJECTION INTRAMUSCULAR ONCE
Status: COMPLETED | OUTPATIENT
Start: 2022-01-03 | End: 2022-01-03

## 2022-01-03 RX ADMIN — BACITRACIN ZINC: 500 OINTMENT TOPICAL at 11:50

## 2022-01-03 RX ADMIN — OXYCODONE AND ACETAMINOPHEN 1 TABLET: 5; 325 TABLET ORAL at 11:14

## 2022-01-03 RX ADMIN — TETANUS AND DIPHTHERIA TOXOIDS ADSORBED 0.5 ML: 2; 2 INJECTION INTRAMUSCULAR at 11:15

## 2022-01-03 ASSESSMENT — PAIN DESCRIPTION - ORIENTATION: ORIENTATION: LEFT

## 2022-01-03 ASSESSMENT — PAIN DESCRIPTION - ONSET: ONSET: ON-GOING

## 2022-01-03 ASSESSMENT — PAIN DESCRIPTION - FREQUENCY: FREQUENCY: CONTINUOUS

## 2022-01-03 ASSESSMENT — PAIN SCALES - GENERAL
PAINLEVEL_OUTOF10: 10
PAINLEVEL_OUTOF10: 10

## 2022-01-03 ASSESSMENT — PAIN DESCRIPTION - DESCRIPTORS: DESCRIPTORS: ACHING

## 2022-01-03 ASSESSMENT — PAIN - FUNCTIONAL ASSESSMENT: PAIN_FUNCTIONAL_ASSESSMENT: PREVENTS OR INTERFERES SOME ACTIVE ACTIVITIES AND ADLS

## 2022-01-03 ASSESSMENT — PAIN DESCRIPTION - PAIN TYPE: TYPE: ACUTE PAIN

## 2022-01-03 ASSESSMENT — PAIN DESCRIPTION - LOCATION: LOCATION: ANKLE

## 2022-01-03 ASSESSMENT — PAIN DESCRIPTION - PROGRESSION: CLINICAL_PROGRESSION: NOT CHANGED

## 2022-01-03 NOTE — ED NOTES
Bed: 13  Expected date: 1/3/22  Expected time:   Means of arrival:   Comments:  yen Pearson RN  01/03/22 9033

## 2022-01-03 NOTE — ED NOTES
Patient unable to demonstrate use for crutches, Dr. Valderrama Cannydia and discontinued crutch order. Patient stated her  will bring her walker.       Silvia Horn  01/03/22 1151

## 2022-01-03 NOTE — ED PROVIDER NOTES
HPI:  1/3/22, Time: 9:58 AM RAMONITA Castro is a 39 y.o. female presenting to the ED for left ankle pain beginning just prior to arrival.  Patient states that she tripped down one step and fell striking her left ankle and right knee. She did not strike her head or lose consciousness. She has been having pain with deformity to her left ankle as well as pain to her right knee. Symptoms have been moderate in severity, constant, no exacerbating or alleviating factors. No associated numbness or tingling. She has an abrasion to her right knee as well. She is unsure of her last tetanus vaccine. She takes no anticoagulation. She denies any other injuries. No neck pain, back pain, chest pain, abdominal pain, nausea, vomiting, or focal numbness or weakness. Review of Systems:   Pertinent positives and negatives are stated within HPI, all other systems reviewed and are negative.          --------------------------------------------- PAST HISTORY ---------------------------------------------  Past Medical History:  has a past medical history of Anxiety, Arthritis, Asthma, Bipolar 1 disorder ([facility] COPD (chronic obstructive pulmonary disease) [facility] Depression, Fatty liver, Fissure, anal, GERD (gastroesophageal reflux disease), Glaucoma, Hyperlipidemia, Hypertension, Macular edema, Migraines, Neuropathy, PTSD (post-traumatic stress disorder), Splenomegaly, and Type 2 diabetes mellitus without complication, without long-term current use of insulin ([facility] [facility] Past Surgical History:  has a past surgical history that includes Cholecystectomy; Tubal ligation; Anterior cruciate ligament repair (Right); Dilation and curettage of uterus (N/A, 2019); other surgical history (); Dilation and curettage of uterus (N/A, 2020);  section; Dilation and curettage of uterus; Hysterectomy (2021); and Incontinence surgery. Social History:  reports that she has been smoking cigarettes.  She started smoking about 26 years ago. She has a 20.00 pack-year smoking history. She has never used smokeless tobacco. She reports previous alcohol use. She reports current drug use. Drug: Marijuana Estjaydon Nunez). Family History: family history includes Breast Cancer in her maternal aunt; Cancer in her mother and sister; Diabetes in her father, mother, and sister; High Blood Pressure in her brother. The patients home medications have been reviewed. Allergies: Cymbalta [duloxetine hcl], Keflex [cephalexin], Neurontin [gabapentin], Lamictal [lamotrigine], and Prednisone    -------------------------------------------------- RESULTS -------------------------------------------------  All laboratory and radiology results have been personally reviewed by myself   LABS:  No results found for this visit on 01/03/22. RADIOLOGY:  Interpreted by Radiologist.  XR ANKLE LEFT (MIN 3 VIEWS)   Final Result   No osseous abnormality         XR KNEE RIGHT (MIN 4 VIEWS)   Final Result   No acute abnormality of the knee.             ------------------------- NURSING NOTES AND VITALS REVIEWED ---------------------------   The nursing notes within the ED encounter and vital signs as below have been reviewed. /84   Pulse 99   Temp 98 °F (36.7 °C)   Resp 14   Ht 5' 4\" (1.626 m)   Wt 275 lb (124.7 kg)   LMP 02/22/2021 (Approximate)   SpO2 99%   BMI 47.20 kg/m²   Oxygen Saturation Interpretation: Normal      ---------------------------------------------------PHYSICAL EXAM--------------------------------------      PHYSICAL EXAM:  Vitals Reviewed  Constitutional/General: Alert and oriented x3, well appearing, non toxic in NAD. HEENT: Normocephalic and atraumatic. PERRL, EOMI. Oropharynx clear, handling secretions, no trismus. No raccoon eyes. No wilson's sign. Neck: Supple, full ROM, no cervical spine tenderness. Pulmonary: Lungs clear to auscultation bilaterally, no wheezes, rales, or rhonchi.  Not in respiratory distress. Cardiovascular:  Regular rate and rhythm, no murmurs, gallops, or rubs. 2+ distal pulses. Chest: No chest wall tenderness. No crepitus. Abdomen: Soft, non tender, non distended,   Extremities: Moves all extremities x 4. Warm and well perfused. Right knee-superficial abrasion with tenderness anteriorly, no laxity to knee, no tenderness to lower leg/ankle/foot, normal ROM, normal sensation, DP and PT pulses intact, soft and easily compressible compartments. Left ankle-deformity with swelling laterally, skin intact, able to wiggle toes, DP and PT pulses intact, normal sensation, no tenderness to proximal tib/fib or foot, soft and easily compressible compartments. Back: No midline thoracic or lumbar tenderness. Skin: warm and dry without rash. Neurologic: GCS 15, 5/5 strength in all extremities. Normal sensation in all extremities. Psych: Normal Affect.      ------------------------------ ED COURSE/MEDICAL DECISION MAKING----------------------  Medications   bacitracin zinc ointment (has no administration in time range)   diptheria-tetanus toxoids [facility]) 2-2 LF/0.5ML injection 0.5 mL (0.5 mLs IntraMUSCular Given 1/3/22 1115)   oxyCODONE-acetaminophen (PERCOCET) 5-325 MG per tablet 1 tablet (1 tablet Oral Given 1/3/22 1114)       Medical Decision Making/ED COURSE:   Patient is a 44-year-old female presenting after mechanical fall with left ankle pain and right knee pain. She was neurovascularly intact on exam.  She had a superficial abrasion to her right knee as well as a deformity to her left ankle. Tetanus vaccine was updated. X-rays obtained showing no acute findings. Given percocet for pain control. Patient will be provided with crutches and an Ace bandage. Discussed RICE. Will be discharged home with a short prescription for pain medication and orthopedic surgery as needed. Supportive care measures and strict ED return precautions discussed.   If continued or worsening symptoms, she may need repeat x-ray or further advanced imaging. Patient remained hemodynamically stable throughout ED course. ED Course as of 01/03/22 1148   Mon Jan 03, 2022   1042 Discussed findings and plan with the patient. [JA]   1148 Patient unable to use crutches. States that she has a walker at home which she normally uses. She states she fell because she was not using the walker. Her  is bringing her walker to the ED. [JA]      ED Course User Index  [JA] Rio Stern MD           Counseling: The emergency provider has spoken with the patient and discussed todays results, in addition to providing specific details for the plan of care and counseling regarding the diagnosis and prognosis. Questions are answered at this time and they are agreeable with the plan.      --------------------------------- IMPRESSION AND DISPOSITION ---------------------------------    IMPRESSION  1. Sprain of left ankle, unspecified ligament, initial encounter    2. Injury of right knee, initial encounter        DISPOSITION  Disposition: Discharge to home  Patient condition is stable      NOTE: This report was transcribed using voice recognition software.  Every effort was made to ensure accuracy; however, inadvertent computerized transcription errors may be present    I, Rio Stern MD, am the primary provider of this record       Rio Stern MD  01/03/22 1148

## 2022-01-04 ENCOUNTER — TELEPHONE (OUTPATIENT)
Dept: FAMILY MEDICINE CLINIC | Age: 37
End: 2022-01-04

## 2022-01-04 DIAGNOSIS — M79.672 LEFT FOOT PAIN: Primary | ICD-10-CM

## 2022-01-04 NOTE — TELEPHONE ENCOUNTER
----- Message from Mel sent at 1/4/2022  8:04 AM EST -----  Subject: Message to Provider    QUESTIONS  Information for Provider? Patient requesting boot for left foot fell on   1/3/22 and also seen in at [facility]   patient Devang Covington   ---------------------------------------------------------------------------  --------------  Devonte LEGER  What is the best way for the office to contact you? OK to leave message on   voicemail  Preferred Call Back Phone Number? [phone]  ---------------------------------------------------------------------------  --------------  SCRIPT ANSWERS  Relationship to Patient?  Self

## 2022-01-04 NOTE — PROGRESS NOTES
Pt called on 1/4/22, stated that she fell and went to the ER yesterday, suffered a severe sprain, possible fracture in one ankle and \"busted up\" her knee. States she is being referred to an orthopedic doctor. Therapy placed on hold this week due to new injuries and possible new recommendations from orthopedic doctor. Pt to call to schedule should she feel ready to return to therapy next week.     Nicolette Hernández [facility]

## 2022-01-04 NOTE — TELEPHONE ENCOUNTER
Pt notified she needs to see orthopedic. Pt states wants referral to Dr Jade Brian, not who ER referred her to see.     Please place referral  Thanks

## 2022-01-05 ENCOUNTER — TREATMENT (OUTPATIENT)
Dept: PHYSICAL THERAPY | Age: 37
End: 2022-01-05

## 2022-01-05 DIAGNOSIS — M54.41 CHRONIC BILATERAL LOW BACK PAIN WITH RIGHT-SIDED SCIATICA: Primary | ICD-10-CM

## 2022-01-05 DIAGNOSIS — G89.29 CHRONIC BILATERAL LOW BACK PAIN WITH RIGHT-SIDED SCIATICA: Primary | ICD-10-CM

## 2022-01-06 ENCOUNTER — TELEPHONE (OUTPATIENT)
Dept: ORTHOPEDIC SURGERY | Age: 37
End: 2022-01-06

## 2022-01-06 ENCOUNTER — TELEPHONE (OUTPATIENT)
Dept: ADMINISTRATIVE | Age: 37
End: 2022-01-06

## 2022-01-06 DIAGNOSIS — M25.572 LEFT ANKLE PAIN, UNSPECIFIED CHRONICITY: Primary | ICD-10-CM

## 2022-01-06 DIAGNOSIS — M25.561 RIGHT KNEE PAIN, UNSPECIFIED CHRONICITY: ICD-10-CM

## 2022-01-06 NOTE — TELEPHONE ENCOUNTER
Patient in the [facility] ED on 1/3/22 for \"left ankle pain beginning just prior to arrival.  Patient states that she tripped down one step and fell striking her left ankle and right knee. \"      XR ANKLE LEFT (MIN 3 VIEWS)   Final Result   No osseous abnormality           XR KNEE RIGHT (MIN 4 VIEWS)   Final Result   No acute abnormality of the knee. Routed to Providers for consideration and scheduling recommendations. No pertinent family history in first degree relatives

## 2022-01-06 NOTE — TELEPHONE ENCOUNTER
Patient called in to schedule appointment with Dr. Rosa Vann. Referral dx Left foot pain. Patient states she also injured her right knee. States she has a burning sensation in her left foot and is unable to use crutches, so she crawls around. Please advise for scheduling.

## 2022-01-10 ENCOUNTER — OFFICE VISIT (OUTPATIENT)
Dept: SPORTS MEDICINE | Age: 37
End: 2022-01-10
Payer: COMMERCIAL

## 2022-01-10 VITALS — WEIGHT: 293 LBS | BODY MASS INDEX: 50.02 KG/M2 | HEIGHT: 64 IN

## 2022-01-10 DIAGNOSIS — M25.572 ACUTE LEFT ANKLE PAIN: Primary | ICD-10-CM

## 2022-01-10 DIAGNOSIS — S93.409A SPRAIN OF LATERAL LIGAMENT OF ANKLE JOINT: ICD-10-CM

## 2022-01-10 PROCEDURE — 99213 OFFICE O/P EST LOW 20 MIN: CPT | Performed by: FAMILY MEDICINE

## 2022-01-10 PROCEDURE — G8427 DOCREV CUR MEDS BY ELIG CLIN: HCPCS | Performed by: FAMILY MEDICINE

## 2022-01-10 PROCEDURE — G8417 CALC BMI ABV UP PARAM F/U: HCPCS | Performed by: FAMILY MEDICINE

## 2022-01-10 PROCEDURE — 4004F PT TOBACCO SCREEN RCVD TLK: CPT | Performed by: FAMILY MEDICINE

## 2022-01-10 PROCEDURE — G8482 FLU IMMUNIZE ORDER/ADMIN: HCPCS | Performed by: FAMILY MEDICINE

## 2022-01-10 NOTE — PROGRESS NOTES
[facility]  PRIMARY CARE SPORTS MEDICINE  DATE OF VISIT : 1/10/2022    Patient : Tierra Valencia  Age : 39 y.o.  : 1985  MRN : [id number]   ______________________________________________________________________    Chief Complaint :   Chief Complaint   Patient presents with    Ankle Pain     (L) ankle. DOI 1/3/2022. SHRUTI patient states that she fell down the stari after losing her balance. Patient states that she heard several pops in her ankle. HPI : Tierra Valencia is 39 y.o. female who presented to the clinic today for evaluation of left ankle pain. The injury occurred on 1/3/2022while falling down the stairs. The patient states the ankle rolled inward at the time of injury. She did hear or sense a pop or snap at the time of the injury. The patient notes pain and generalized swelling of the ankle since the injury. She has treated the ankle with ice, ace wrap, OTC NSAIDs. Pain is localized to the lateral malleolar area.  She has not sprained this ankle in the past.      Past Medical History :  Past Medical History:   Diagnosis Date    Anxiety     Arthritis     Asthma     Bipolar 1 disorder ([address]     COPD (chronic obstructive pulmonary disease) ([address]     Depression     Fatty liver     Fissure, anal     GERD (gastroesophageal reflux disease)     Glaucoma     Suspected by eye specialist    Hyperlipidemia     Hypertension     Macular edema     Cm    Migraines     Neuropathy     PTSD (post-traumatic stress disorder)     Splenomegaly 2020    Type 2 diabetes mellitus without complication, without long-term current use of insulin ([address]      Past Surgical History:   Procedure Laterality Date    ANTERIOR CRUCIATE LIGAMENT REPAIR Right      SECTION      ,     CHOLECYSTECTOMY      DILATION AND CURETTAGE OF UTERUS N/A 2019    DILATATION AND CURETTAGE HYSTEROSCOPY WITH REMOVAL OF CONDYLOMATA performed by Jus Giron MD at [address] AND CURETTAGE OF UTERUS N/A 02/25/2020    DILATATION AND CURETTAGE HYSTEROSCOPY, ATTEMPTED CERVICAL BIOPSY, MARIYA OF  LABIAL ABSCESS performed by Nusrat Lee MD at [place] [address]  02/22/2021    Robotic lysis of adhesions, total hysterectomy, bilateral salpingectomy, cystoscopy    INCONTINENCE SURGERY      OTHER SURGICAL HISTORY  1997    repair anal fissure    TUBAL LIGATION         Allergies :    Allergies   Allergen Reactions    Cymbalta [Duloxetine Hcl] Other (See Comments)     angry    Keflex [Cephalexin]      Hives, rash      Neurontin [Gabapentin] Other (See Comments)     Makes me feel very weird    Lamictal [Lamotrigine] Nausea And Vomiting    Prednisone Rash       Medication List :    Current Outpatient Medications   Medication Sig Dispense Refill    cyclobenzaprine (FLEXERIL) 5 MG tablet Take 5 mg by mouth 3 times daily as needed for Muscle spasms      Acetaminophen (TYLENOL) 325 MG CAPS Take by mouth      ibuprofen (ADVIL;MOTRIN) 800 MG tablet Take 800 mg by mouth every 6 hours as needed for Pain      doxepin (SINEQUAN) 25 MG capsule TAKE ONE CAPSULE BY MOUTH AT BEDTIME      busPIRone (BUSPAR) 15 MG tablet TAKE ONE TABLET BY MOUTH THREE TIMES DAILY      lisinopril (PRINIVIL;ZESTRIL) 10 MG tablet Take 1 tablet by mouth daily 30 tablet 2    fluticasone (FLOVENT HFA) 44 MCG/ACT inhaler Inhale 1 puff into the lungs 2 times daily 1 each 3    topiramate (TOPAMAX) 50 MG tablet TAKE ONE TABLET BY MOUTH EVERY NIGHT      venlafaxine (EFFEXOR XR) 150 MG extended release capsule Take 150 mg by mouth daily      albuterol sulfate HFA (PROVENTIL HFA) 108 (90 Base) MCG/ACT inhaler Inhale 2 puffs into the lungs every 4 hours as needed for Wheezing 1 each 5    tiotropium (SPIRIVA HANDIHALER) 18 MCG inhalation capsule Inhale 1 capsule into the lungs daily 30 capsule 5    diclofenac (VOLTAREN) 50 MG EC tablet Take 1 tablet by mouth 3 times daily (with meals) 60 tablet 3    albuterol (PROVENTIL) (2.5 MG/3ML) 0.083% nebulizer solution Take 3 mLs by nebulization every 6 hours as needed for Wheezing 120 each 3     No current facility-administered medications for this visit.      ______________________________________________________________________    Physical Exam :    Vitals: Ht 5' 4\" (1.626 m)   Wt 300 lb (136.1 kg) Comment: per pt. LMP 02/22/2021 (Approximate)   BMI 51.49 kg/m²   General Appearance: Nontoxic, awake, alert, and in no acute distress. Chest wall/Lung: Respirations regular and unlabored. No cyanosis. Heart: RRR, distal pulses intact. Neurologic: Alert&Oriented x3. Sensation grossly intact. No focal motor deficits detected. Musculokeletal: LEFT Ankle:  Swelling and ecchymosis noted. TTP: ( + ) Lateral Ligaments, ( - ) Medial Ligaments, ( - ) Anterior Joint Line, ( - ) Achilles, ( - ) Lateral Malleolus, ( - ) Medial Malleolus, ( - ) 5th Metatarsal, ( - ) Plantar fascia orgin  Special tests: ( + ) Anterior drawer, ( + ) Talar Tilt, ( - ) Calcaneal Squeeze, ( - ) Barragan, ( - ) Metatarsal Squeeze, ( - ) Eversion Stress, ( - ) Tibial Squeeze, ( - ) Lisfranc Stress  ______________________________________________________________________    Assessment & Plan :    1. Acute left ankle pain  2. Sprain of lateral ligament of ankle joint  Patient presents to the office today for evaluation of left ankle pain after a fall down the stairs. History, physical exam and imaging are consistent with a lateral ankle sprain. Treatment options discussed with patient in the office today including activity modification, bracing options/walking boot, physical therapy, oral anti-inflammatories, advanced imaging in the form of a MRI and referral to orthopedic surgery for surgical opinion. Patient wishes to proceed with continued over-the-counter anti-inflammatories and was provided with a prescription for a short walking boot.   Patient is agreeable with the above plan and all questions and concerns were addressed in the office today. Return to Office: Return in about 2 weeks (around 1/24/2022) if symptoms worsen or fail to improve.     Tianna Mcduffie MD

## 2022-02-01 ENCOUNTER — HOSPITAL ENCOUNTER (OUTPATIENT)
Dept: SLEEP CENTER | Age: 37
Discharge: HOME | End: 2022-02-01
Payer: COMMERCIAL

## 2022-02-01 DIAGNOSIS — G47.33 OSA (OBSTRUCTIVE SLEEP APNEA): ICD-10-CM

## 2022-02-01 PROCEDURE — 95810 POLYSOM 6/> YRS 4/> PARAM: CPT

## 2022-02-02 VITALS
BODY MASS INDEX: 51.91 KG/M2 | WEIGHT: 293 LBS | HEART RATE: 90 BPM | DIASTOLIC BLOOD PRESSURE: 89 MMHG | HEIGHT: 63 IN | OXYGEN SATURATION: 95 % | SYSTOLIC BLOOD PRESSURE: 151 MMHG

## 2022-02-02 ASSESSMENT — SLEEP AND FATIGUE QUESTIONNAIRES
HOW LIKELY ARE YOU TO NOD OFF OR FALL ASLEEP WHILE SITTING AND TALKING TO SOMEONE: 2
ESS TOTAL SCORE: 17
HOW LIKELY ARE YOU TO NOD OFF OR FALL ASLEEP WHEN YOU ARE A PASSENGER IN A CAR FOR AN HOUR WITHOUT A BREAK: 0
HOW LIKELY ARE YOU TO NOD OFF OR FALL ASLEEP IN A CAR, WHILE STOPPED FOR A FEW MINUTES IN TRAFFIC: 0
HOW LIKELY ARE YOU TO NOD OFF OR FALL ASLEEP WHILE SITTING INACTIVE IN A PUBLIC PLACE: 3
HOW LIKELY ARE YOU TO NOD OFF OR FALL ASLEEP WHILE SITTING AND READING: 3
HOW LIKELY ARE YOU TO NOD OFF OR FALL ASLEEP WHILE SITTING QUIETLY AFTER LUNCH WITHOUT ALCOHOL: 3
HOW LIKELY ARE YOU TO NOD OFF OR FALL ASLEEP WHILE WATCHING TV: 3
HOW LIKELY ARE YOU TO NOD OFF OR FALL ASLEEP WHILE LYING DOWN TO REST IN THE AFTERNOON WHEN CIRCUMSTANCES PERMIT: 3

## 2022-02-04 NOTE — PROGRESS NOTES
[address] [address]                               SLEEP STUDY REPORT    PATIENT NAME: Akil Bender                 :        1985  MED REC NO:   [id number]                            ROOM:  ACCOUNT NO:   [de-identified]                           ADMIT DATE: 2022  PROVIDER:     Amna Berrios MD    DATE OF STUDY:  2022    REFERRING PROVIDER:  Anjel Covington DO    STUDY PERFORMED:  Polysomnography. INDICATION FOR POLYSOMNOGRAPHY:  Excessive daytime sleepiness, wakes  gasping, restless sleep, trouble with memory/concentration, nocturnal  diaphoresis, frequent waking to urinate, sleep talking, and enuresis. CURRENT MEDICATIONS:  Ibuprofen, buspirone, lisinopril, Effexor, and  melatonin. INTERPRETATION:  SLEEP ARCHITECTURE:  This patient had a total time in bed of 453  minutes. Total sleep time was 367 minutes. Sleep efficiency was 81%  and sleep latency was 15 minutes. REM latency was 404 minutes. SLEEP STAGING:  The patient was awake 19% of the time in bed. Stage N1  was 8% and N2 was 64% of the total sleep time. Slow wave sleep was 26%  of the sleep time and stage REM sleep was 2% of the sleep time. RESPIRATION SUMMARY:  APNEA:  There were no apneic events. HYPOPNEA:  There were 15 hypopneic events, which all occurred during  non-REM stage sleep. Maximum duration was 37 seconds. APNEA/HYPOPNEA INDEX:  The apnea/hypopnea index is less than 3. AROUSAL ANALYSIS:  There were 82 arousals/awakenings, the sleep  disruption index is 13 (normal less than 10). LIMB MOVEMENT SUMMARY:  There were 10 limb movements, the limb movement  index is normal.    OXYGEN SATURATION:  Average oxygen saturation while awake was 94%. Lowest saturation was 90%. HEART RATE SUMMARY:  Average heart rate while awake was 95 beats per  minute.   Maximum heart rate during sleep was 107 beats per minute and  minimum heart rate during sleep was 78 beats per minute. There were no  ectopic beats. MISCELLANEOUS:  Otis Sleepiness Scale score is 17/24. Snoring was  mild. This was graded as 2 on a 1 to 10 scale. There was no apparent  bruxism. IMPRESSION:  1. No evidence of sleep apnea. 2.  Excellent oxygen saturation. 3.  No cardiac dysrhythmia. 4.  Very mild snoring. DISCUSSION:  This patient has an apnea/hypopnea index of less than  three, which is normal.  With excellent oxygen saturation and mild  snoring, there is no reason to treat for sleep apnea. However, there is  some concern as the patient has a history of sudden muscular weakness  during emotional situations, falling limp to the ground without losing  consciousness, and a fairly high Otis Sleepiness Scale score. Therefore, although the patient does not have sleep apnea, further  evaluation may be necessary because of the possibility of narcolepsy or  some other hypersomnolence condition. PLAN:  1. No treatment for sleep apnea. 2.  The patient to be seen to discuss the results of study and determine  if further evaluation such as a multiple sleep latency test may be  indicated.         Iman Schwartz MD  Diplomat of Sleep Medicine    D: 02/03/2022 11:22:24       T: 02/03/2022 11:24:41     [email or web address]  Job#: [id number]     Doc#: [id number]    CC:

## 2022-02-09 ENCOUNTER — OFFICE VISIT (OUTPATIENT)
Dept: FAMILY MEDICINE CLINIC | Age: 37
End: 2022-02-09
Payer: COMMERCIAL

## 2022-02-09 ENCOUNTER — NURSE TRIAGE (OUTPATIENT)
Dept: OTHER | Facility: CLINIC | Age: 37
End: 2022-02-09

## 2022-02-09 VITALS
OXYGEN SATURATION: 96 % | DIASTOLIC BLOOD PRESSURE: 86 MMHG | TEMPERATURE: 97.9 F | BODY MASS INDEX: 51.91 KG/M2 | WEIGHT: 293 LBS | HEIGHT: 63 IN | HEART RATE: 117 BPM | SYSTOLIC BLOOD PRESSURE: 137 MMHG

## 2022-02-09 DIAGNOSIS — M54.42 CHRONIC BILATERAL LOW BACK PAIN WITH BILATERAL SCIATICA: ICD-10-CM

## 2022-02-09 DIAGNOSIS — J45.20 MILD INTERMITTENT ASTHMA WITHOUT COMPLICATION: Primary | ICD-10-CM

## 2022-02-09 DIAGNOSIS — G89.29 CHRONIC BILATERAL LOW BACK PAIN WITH BILATERAL SCIATICA: ICD-10-CM

## 2022-02-09 DIAGNOSIS — M54.16 LUMBAR RADICULOPATHY: ICD-10-CM

## 2022-02-09 DIAGNOSIS — M54.41 CHRONIC BILATERAL LOW BACK PAIN WITH BILATERAL SCIATICA: ICD-10-CM

## 2022-02-09 PROCEDURE — G8427 DOCREV CUR MEDS BY ELIG CLIN: HCPCS | Performed by: FAMILY MEDICINE

## 2022-02-09 PROCEDURE — G8482 FLU IMMUNIZE ORDER/ADMIN: HCPCS | Performed by: FAMILY MEDICINE

## 2022-02-09 PROCEDURE — G8417 CALC BMI ABV UP PARAM F/U: HCPCS | Performed by: FAMILY MEDICINE

## 2022-02-09 PROCEDURE — 4004F PT TOBACCO SCREEN RCVD TLK: CPT | Performed by: FAMILY MEDICINE

## 2022-02-09 PROCEDURE — 99214 OFFICE O/P EST MOD 30 MIN: CPT | Performed by: FAMILY MEDICINE

## 2022-02-09 RX ORDER — TOPIRAMATE 100 MG/1
TABLET, FILM COATED ORAL
COMMUNITY
Start: 2021-12-15 | End: 2022-06-20 | Stop reason: ALTCHOICE

## 2022-02-09 RX ORDER — FLUTICASONE PROPIONATE 44 UG/1
2 AEROSOL, METERED RESPIRATORY (INHALATION) 2 TIMES DAILY
Qty: 1 EACH | Refills: 3 | Status: SHIPPED
Start: 2022-02-09 | End: 2022-05-04 | Stop reason: SDUPTHER

## 2022-02-09 ASSESSMENT — PATIENT HEALTH QUESTIONNAIRE - PHQ9
SUM OF ALL RESPONSES TO PHQ QUESTIONS 1-9: 1
SUM OF ALL RESPONSES TO PHQ QUESTIONS 1-9: 1
SUM OF ALL RESPONSES TO PHQ9 QUESTIONS 1 & 2: 1
10. IF YOU CHECKED OFF ANY PROBLEMS, HOW DIFFICULT HAVE THESE PROBLEMS MADE IT FOR YOU TO DO YOUR WORK, TAKE CARE OF THINGS AT HOME, OR GET ALONG WITH OTHER PEOPLE: 0
1. LITTLE INTEREST OR PLEASURE IN DOING THINGS: 0
SUM OF ALL RESPONSES TO PHQ QUESTIONS 1-9: 1
SUM OF ALL RESPONSES TO PHQ QUESTIONS 1-9: 1
6. FEELING BAD ABOUT YOURSELF - OR THAT YOU ARE A FAILURE OR HAVE LET YOURSELF OR YOUR FAMILY DOWN: 0
3. TROUBLE FALLING OR STAYING ASLEEP: 0
8. MOVING OR SPEAKING SO SLOWLY THAT OTHER PEOPLE COULD HAVE NOTICED. OR THE OPPOSITE, BEING SO FIGETY OR RESTLESS THAT YOU HAVE BEEN MOVING AROUND A LOT MORE THAN USUAL: 0
7. TROUBLE CONCENTRATING ON THINGS, SUCH AS READING THE NEWSPAPER OR WATCHING TELEVISION: 0
4. FEELING TIRED OR HAVING LITTLE ENERGY: 0
2. FEELING DOWN, DEPRESSED OR HOPELESS: 1
5. POOR APPETITE OR OVEREATING: 0
9. THOUGHTS THAT YOU WOULD BE BETTER OFF DEAD, OR OF HURTING YOURSELF: 0

## 2022-02-09 ASSESSMENT — ENCOUNTER SYMPTOMS
SHORTNESS OF BREATH: 1
ABDOMINAL PAIN: 0
CONSTIPATION: 0
BACK PAIN: 1
SORE THROAT: 0
SINUS PRESSURE: 0
COUGH: 0
EYE PAIN: 0
DIARRHEA: 0

## 2022-02-09 NOTE — TELEPHONE ENCOUNTER
Received call from Quiana Oneal 894 at [facility], caller not on line.      Complaint: Shortness of breath    [address]: [address] Name: Elizabeth Zuniga telephone number verified as [phone]    Unsuccessful attempt to re-connect with caller via phone, left message for return call to office

## 2022-02-09 NOTE — PROGRESS NOTES
Iván Hernandez  : 1985    Chief Complaint:     Chief Complaint   Patient presents with    Shortness of Breath       HPI  Iván Hernandez 39 y.o. presents for   Chief Complaint   Patient presents with    Shortness of Breath     Patient presents for follow-up. She continues to have some issues with her breathing. However she was only taking the Flovent once a day. She has been taking albuterol but this does not seem to help. She also has been having some issues with balance. She did have an MRI and EMG ordered per pain management however she states no one called her to schedule these. Did advise she does need to complete these test for further evaluation. All questions were answered to patients satisfaction.     Past Medical History:   Diagnosis Date    Anxiety     Arthritis     Asthma     Bipolar 1 disorder ([facility] [address]     COPD (chronic obstructive pulmonary disease) (HCC)     Depression     Fatty liver     Fissure, anal     GERD (gastroesophageal reflux disease)     Glaucoma     Suspected by eye specialist    Hyperlipidemia     Hypertension     Macular edema     Cm    Migraines     Neuropathy     PTSD (post-traumatic stress disorder)     Splenomegaly 2020    Type 2 diabetes mellitus without complication, without long-term current use of insulin ([facility])        Allergies   Allergen Reactions    Cymbalta [Duloxetine Hcl] Other (See Comments)     angry    Keflex [Cephalexin]      Hives, rash      Neurontin [Gabapentin] Other (See Comments)     Makes me feel very weird    Lamictal [Lamotrigine] Nausea And Vomiting    Prednisone Rash       Health Maintenance Due   Topic Date Due    COVID-19 Vaccine (1) Never done    Depression Monitoring  Never done    Hepatitis B vaccine (1 of 3 - Risk 3-dose series) Never done    Lipid screen  2021    Diabetic retinal exam  2021    Diabetic foot exam  2022         REVIEW OF SYSTEMS  Review of Systems Constitutional: Positive for fatigue. Negative for fever. HENT: Negative for ear pain, sinus pressure, sneezing and sore throat. Eyes: Negative for pain. Respiratory: Positive for shortness of breath. Negative for cough. Cardiovascular: Negative for chest pain and leg swelling. Gastrointestinal: Negative for abdominal pain, constipation and diarrhea. Genitourinary: Negative for dysuria and urgency. Musculoskeletal: Positive for arthralgias and back pain. Negative for myalgias. Skin: Negative for rash. Allergic/Immunologic: Negative for food allergies. Neurological: Positive for numbness. Negative for light-headedness and headaches. Hematological: Does not bruise/bleed easily. Psychiatric/Behavioral: Negative for behavioral problems and sleep disturbance. PHYSICAL EXAM  /86   Pulse 117   Temp 97.9 °F (36.6 °C)   Ht 5' 3\" (1.6 m)   Wt (!) 310 lb (140.6 kg)   LMP 02/22/2021 (Approximate)   SpO2 96%   BMI 54.91 kg/m²   Physical Exam  Vitals and nursing note reviewed. Constitutional:       Appearance: She is well-developed. She is obese. HENT:      Head: Normocephalic and atraumatic. Right Ear: External ear normal.      Left Ear: External ear normal.      Nose: Nose normal.   Eyes:      Conjunctiva/sclera: Conjunctivae normal.   Neck:      Thyroid: No thyromegaly. Cardiovascular:      Rate and Rhythm: Normal rate and regular rhythm. Heart sounds: Normal heart sounds. No murmur heard. Pulmonary:      Effort: Pulmonary effort is normal.      Breath sounds: Wheezing present. Abdominal:      Palpations: Abdomen is soft. Tenderness: There is no abdominal tenderness. Musculoskeletal:         General: Tenderness (Pain to light palpation over the lumbar paraspinal musculature) present. Normal range of motion. Cervical back: Normal range of motion and neck supple. Lymphadenopathy:      Cervical: No cervical adenopathy.    Skin:     General: Skin is warm and dry. Findings: No rash. Neurological:      General: No focal deficit present. Mental Status: She is alert and oriented to person, place, and time. Mental status is at baseline. Deep Tendon Reflexes: Reflexes are normal and symmetric. Psychiatric:         Mood and Affect: Mood normal.         Behavior: Behavior normal.              Laboratory: All laboratory and radiology results have been personally reviewed by myself    Lab Results   Component Value Date     08/01/2021    K 3.7 08/01/2021    K 3.8 10/30/2019     08/01/2021    CO2 23 08/01/2021    BUN 10 08/01/2021    CREATININE 0.5 08/01/2021    PROT 7.3 02/15/2021    LABALBU 4.2 02/15/2021    LABALBU 3.7 09/19/2020    CALCIUM 9.0 08/01/2021    GFRAA >60 08/01/2021    LABGLOM >60 08/01/2021    GLUCOSE 151 08/01/2021    AST 31 02/15/2021    ALT 27 02/15/2021    ALKPHOS 97 02/15/2021    BILITOT 0.5 02/15/2021    TSH 1.180 03/13/2020    CHOL 185 03/13/2020    TRIG 165 03/13/2020    HDL 40 03/13/2020    LDLCALC 112 03/13/2020    LABA1C 5.9 10/12/2021        Lab Results   Component Value Date    CHOL 185 03/13/2020     Lab Results   Component Value Date    TRIG 165 (H) 03/13/2020     Lab Results   Component Value Date    HDL 40 03/13/2020     Lab Results   Component Value Date    LDLCALC 112 (H) 03/13/2020       Lab Results   Component Value Date    LABA1C 5.9 10/12/2021     Lab Results   Component Value Date    LABMICR 15.5 09/19/2020    LDLCALC 112 (H) 03/13/2020    CREATININE 0.5 08/01/2021       ASSESSMENT/PLAN:    1. Mild intermittent asthma without complication  -     Full PFT Study With Bronchodilator; Future  2. Lumbar radiculopathy  3. Chronic bilateral low back pain with bilateral sciatica     Will increase Flovent to 2 puffs twice daily. We will also obtain PFTs for further evaluation. Did highly recommend for patient to complete EMG as well as MRI of the lumbar spine.  This will give us much more information to why she is falling. Patient is agreeable with the above. She cannot take prednisone due to rash. I did review notes from pain management as well as other specialties. Problem list reviewed andsimplified/updated  HM reviewed today and counseled as appropriate    Call or go to ED immediately if symptoms worsen or persist.  Future Appointments   Date Time Provider Niesha Yarbrough   2/11/2022  9:30 AM Seb Hughes DO [facility]   3/9/2022 10:30 AM DO Radha Medleywbenjie [facility]     Or sooner if necessary. Educational materials and/or homeexercises printed for patient's review and were included in patient instructions on his/her After Visit Summary and given to patient at the end of visit. Counseled regarding above diagnosis, including possible risks and complications,  especially if left uncontrolled. Counseled regarding the possible side effects, risks, benefits and alternatives to treatment; patient and/or guardian verbalizes understanding, agrees,feels comfortable with and wishes to proceed with above treatment plan. Advised patient to call Daniel Settles new medication issues, and read all Rx info from pharmacy to assure aware of all possible risks and side effects of medication before taking. Reviewed age and gender appropriate health screening exams and vaccinations. Advised patient regarding importance of keeping up with recommended health maintenance and toschedule as soon as possible if overdue, as this is important in assessing for undiagnosed pathology, especially cancer, as well as protecting against potentially harmful/life threatening disease. and/or guardian verbalizes understanding and agrees with above counseling, assessment and plan. All questions answered. Ese Zhang DO  2/9/22    NOTE: This report was transcribed using voice recognition software.  Every effort was made to ensure accuracy; however, inadvertent computerized transcription errors may be present

## 2022-02-09 NOTE — TELEPHONE ENCOUNTER
Reason for Disposition   Message left on unidentified voice mail. Phone number verified.     Protocols used: NO CONTACT OR DUPLICATE CONTACT CALL-ADULT-[place]

## 2022-02-13 ENCOUNTER — PATIENT MESSAGE (OUTPATIENT)
Dept: FAMILY MEDICINE CLINIC | Age: 37
End: 2022-02-13

## 2022-02-13 DIAGNOSIS — Z72.0 TOBACCO ABUSE: Primary | ICD-10-CM

## 2022-02-13 DIAGNOSIS — E66.01 CLASS 3 SEVERE OBESITY DUE TO EXCESS CALORIES WITHOUT SERIOUS COMORBIDITY WITH BODY MASS INDEX (BMI) OF 50.0 TO 59.9 IN ADULT (HCC): ICD-10-CM

## 2022-02-14 NOTE — TELEPHONE ENCOUNTER
From: Ashley Tenorio  To: Dr. Clare Hartuce: 2/13/2022 8:21 PM EST  Subject: Weightloss and stop smoking for my health    Hello Dr Abena Wolfe could you please send a referral to a weighloss doctor also a place to quit smoking please and thank you

## 2022-02-18 ENCOUNTER — PATIENT MESSAGE (OUTPATIENT)
Dept: FAMILY MEDICINE CLINIC | Age: 37
End: 2022-02-18

## 2022-02-21 ENCOUNTER — HOSPITAL ENCOUNTER (OUTPATIENT)
Dept: PULMONOLOGY | Age: 37
Discharge: HOME | End: 2022-02-21
Payer: COMMERCIAL

## 2022-02-21 DIAGNOSIS — J45.20 MILD INTERMITTENT ASTHMA WITHOUT COMPLICATION: ICD-10-CM

## 2022-02-21 PROCEDURE — 94060 EVALUATION OF WHEEZING: CPT

## 2022-02-21 PROCEDURE — 94726 PLETHYSMOGRAPHY LUNG VOLUMES: CPT

## 2022-02-21 PROCEDURE — 94729 DIFFUSING CAPACITY: CPT

## 2022-02-21 RX ORDER — ONDANSETRON 4 MG/1
4 TABLET, FILM COATED ORAL 3 TIMES DAILY PRN
Qty: 15 TABLET | Refills: 0 | Status: SHIPPED
Start: 2022-02-21 | End: 2022-05-04 | Stop reason: SDUPTHER

## 2022-02-21 NOTE — TELEPHONE ENCOUNTER
From: Carline Ayers  To: Dr. Toi Aguirre: 2/18/2022 9:30 PM EST  Subject: Nausea     Can you please call me in some Zofran I get nausea really bad then I vomit this happens mostly everyday I was doing good then it started back a few weeks ago I ran out tonight I go in March for the scan of the liver I also get really mild pains in my right upper side of stomach then I start to get really tired

## 2022-02-22 ENCOUNTER — HOSPITAL ENCOUNTER (OUTPATIENT)
Age: 37
Discharge: HOME | End: 2022-02-22
Payer: COMMERCIAL

## 2022-02-22 ENCOUNTER — TELEPHONE (OUTPATIENT)
Dept: FAMILY MEDICINE CLINIC | Age: 37
End: 2022-02-22

## 2022-02-22 DIAGNOSIS — R20.2 NUMBNESS AND TINGLING IN BOTH HANDS: ICD-10-CM

## 2022-02-22 DIAGNOSIS — E11.9 TYPE 2 DIABETES MELLITUS WITHOUT COMPLICATION, WITHOUT LONG-TERM CURRENT USE OF INSULIN (HCC): ICD-10-CM

## 2022-02-22 DIAGNOSIS — R20.0 NUMBNESS AND TINGLING IN BOTH HANDS: ICD-10-CM

## 2022-02-22 DIAGNOSIS — M25.59 PAIN IN OTHER JOINT: ICD-10-CM

## 2022-02-22 PROBLEM — J44.9 CHRONIC OBSTRUCTIVE PULMONARY DISEASE, UNSPECIFIED COPD TYPE (HCC): Status: ACTIVE | Noted: 2022-02-22

## 2022-02-22 LAB
ALBUMIN SERPL-MCNC: 3.9 G/DL (ref 3.5–5.2)
ALP BLD-CCNC: 93 U/L (ref 35–104)
ALT SERPL-CCNC: 22 U/L (ref 0–32)
ANION GAP SERPL CALCULATED.3IONS-SCNC: 13 MMOL/L (ref 7–16)
AST SERPL-CCNC: 17 U/L (ref 0–31)
BILIRUB SERPL-MCNC: <0.2 MG/DL (ref 0–1.2)
BUN BLDV-MCNC: 11 MG/DL (ref 6–20)
CALCIUM SERPL-MCNC: 9.5 MG/DL (ref 8.6–10.2)
CHLORIDE BLD-SCNC: 109 MMOL/L (ref 98–107)
CHOLESTEROL, TOTAL: 177 MG/DL (ref 0–199)
CO2: 21 MMOL/L (ref 22–29)
CREAT SERPL-MCNC: 0.6 MG/DL (ref 0.5–1)
FOLATE: 7.2 NG/ML (ref 4.8–24.2)
GFR AFRICAN AMERICAN: >60
GFR NON-AFRICAN AMERICAN: >60 ML/MIN/1.73
GLUCOSE BLD-MCNC: 165 MG/DL (ref 74–99)
HDLC SERPL-MCNC: 41 MG/DL
LDL CHOLESTEROL CALCULATED: 90 MG/DL (ref 0–99)
POTASSIUM SERPL-SCNC: 3.9 MMOL/L (ref 3.5–5)
RHEUMATOID FACTOR: 18 IU/ML (ref 0–13)
SODIUM BLD-SCNC: 143 MMOL/L (ref 132–146)
TOTAL PROTEIN: 7 G/DL (ref 6.4–8.3)
TRIGL SERPL-MCNC: 229 MG/DL (ref 0–149)
TSH SERPL DL<=0.05 MIU/L-ACNC: 1.86 UIU/ML (ref 0.27–4.2)
VITAMIN B-12: 273 PG/ML (ref 211–946)
VLDLC SERPL CALC-MCNC: 46 MG/DL

## 2022-02-22 PROCEDURE — 80061 LIPID PANEL: CPT

## 2022-02-22 PROCEDURE — 82607 VITAMIN B-12: CPT

## 2022-02-22 PROCEDURE — 36415 COLL VENOUS BLD VENIPUNCTURE: CPT

## 2022-02-22 PROCEDURE — 84443 ASSAY THYROID STIM HORMONE: CPT

## 2022-02-22 PROCEDURE — 86592 SYPHILIS TEST NON-TREP QUAL: CPT

## 2022-02-22 PROCEDURE — 86038 ANTINUCLEAR ANTIBODIES: CPT

## 2022-02-22 PROCEDURE — 82746 ASSAY OF FOLIC ACID SERUM: CPT

## 2022-02-22 PROCEDURE — 80053 COMPREHEN METABOLIC PANEL: CPT

## 2022-02-22 PROCEDURE — 86431 RHEUMATOID FACTOR QUANT: CPT

## 2022-02-22 NOTE — TELEPHONE ENCOUNTER
Advised pt that results have not been read yet by pulmonologist. Harrison Ovalle we would call when we have results.

## 2022-02-23 ENCOUNTER — TELEPHONE (OUTPATIENT)
Dept: FAMILY MEDICINE CLINIC | Age: 37
End: 2022-02-23

## 2022-02-23 DIAGNOSIS — M54.16 LUMBAR RADICULOPATHY: ICD-10-CM

## 2022-02-23 DIAGNOSIS — R76.8 RHEUMATOID FACTOR POSITIVE: Primary | ICD-10-CM

## 2022-02-23 LAB
ANTI-NUCLEAR ANTIBODY (ANA): NEGATIVE
RPR: NORMAL

## 2022-02-23 NOTE — TELEPHONE ENCOUNTER
Triglycerides are elevated recommend to watch diet.  rheumatoid factor positive - consider referral to rheumatologist. All other labs normal

## 2022-02-23 NOTE — TELEPHONE ENCOUNTER
Patient called and notified voiced understanding, would like to be referred to someone in [facility] if possible

## 2022-02-25 ENCOUNTER — HOSPITAL ENCOUNTER (OUTPATIENT)
Dept: NEUROLOGY | Age: 37
Discharge: HOME | End: 2022-02-25
Payer: COMMERCIAL

## 2022-02-25 VITALS — BODY MASS INDEX: 51.91 KG/M2 | WEIGHT: 293 LBS | HEIGHT: 63 IN

## 2022-02-25 DIAGNOSIS — M54.41 CHRONIC BILATERAL LOW BACK PAIN WITH BILATERAL SCIATICA: ICD-10-CM

## 2022-02-25 DIAGNOSIS — M54.42 CHRONIC BILATERAL LOW BACK PAIN WITH BILATERAL SCIATICA: ICD-10-CM

## 2022-02-25 DIAGNOSIS — M54.16 LUMBAR RADICULOPATHY: ICD-10-CM

## 2022-02-25 DIAGNOSIS — G89.29 CHRONIC BILATERAL LOW BACK PAIN WITH BILATERAL SCIATICA: ICD-10-CM

## 2022-02-25 DIAGNOSIS — G89.4 CHRONIC PAIN SYNDROME: ICD-10-CM

## 2022-02-25 PROCEDURE — 94060 EVALUATION OF WHEEZING: CPT | Performed by: INTERNAL MEDICINE

## 2022-02-25 PROCEDURE — 95886 MUSC TEST DONE W/N TEST COMP: CPT

## 2022-02-25 PROCEDURE — 94726 PLETHYSMOGRAPHY LUNG VOLUMES: CPT | Performed by: INTERNAL MEDICINE

## 2022-02-25 PROCEDURE — 94729 DIFFUSING CAPACITY: CPT | Performed by: INTERNAL MEDICINE

## 2022-02-25 PROCEDURE — 95911 NRV CNDJ TEST 9-10 STUDIES: CPT

## 2022-02-25 NOTE — PROCEDURES
[address]                  [address] [facility],  [facility]                               PULMONARY FUNCTION    PATIENT NAME: Henri العلي                 :        1985  MED REC NO:   [id number]                            ROOM:  ACCOUNT NO:   [de-identified]                           ADMIT DATE: 2022  PROVIDER:     Michael Noel MD    DATE OF PROCEDURE:  2022    SPIROMETRY:  FVC 2.76 which is 76 of predicted, FEV1 2.32 which is 77 of  predicted. FEV1/FVC 84. There is a bronchodilator that almost meets  ATS criteria. Moving to MVV is 74. LUNG VOLUMES:  Slow vital capacity 2.74 which is 76. ERV 0.43 which is  30%. RV is 1.24 which is 83. TLC 3.98 which is 81. RV/TLC 31 which is  103 of predicted. DIFFUSION CAPACITY:  16.76 which is 73, corrects for alveolar volume  118. IMPRESSION:  This PFT is showing nonspecific spirometry with some  bronchodilator response with reduction in ERV secondary to the patient's  weight, most likely this restrictive pattern seen early in the  spirometry before bronchodilator can be contributing to the patient's  weight, especially if TLC is normal.  However, clinical and radiological  correlation indicated with the patient's history.         Martínez Jacobsen MD    D: 2022 9:56:59       T: 2022 9:59:27     [email or web address]  Job#: [id number]     Doc#: [id number]    CC:

## 2022-02-25 NOTE — PROCEDURES
[facility]  Electrodiagnostic Laboratory  [place]        Full Name: Anna Haley Gender: Female  MRN: [id number] YOB: 1985  Location[de-identified] [facility] (1)      Visit Date: 2/25/2022 09:37  Age: 39 Years 2 Months Old  Examining Physician: Dr. Patrizia Faith  Referring Physician: Dr. Cristin Llanos  Technician: Yuki Villatoro   Height: 5 feet 3 inch  Weight: 310 lbs  BMI: 55  Notes: Lumbar radic. M54.16; Low back pain w/bilat. sciatica M54.42, M54.41, G89.29; Chronic pain synd. G89.4        Motor NCS      Nerve / Sites Lat. Amplitude Amp. 1-2 Distance Lat Diff Velocity Temp.    ms mV % cm ms m/s °C   L Peroneal - EDB      Ankle 3.02 9.6 100 8   31      Pop fossa 9.32 8.3 86.6 33 6.30 52 31   R Peroneal - EDB      Ankle 3.13 6.5 100 8   31      Pop fossa 9.53 5.1 79.4 33 6.41 52 31   R Tibial - AH      Ankle 4.06 17.2 100 8   31      Pop fossa 9.95 11.4 66.6 35 5.89 59 31   L Tibial - AH      Ankle 2.71 13.1 100 8   31.2      Pop fossa 10.05 11.6 88.6 35 7.34 48 31.2       Sensory NCS      Nerve / Sites Onset Lat Peak Lat PP Amp Distance Velocity Temp.    ms ms µV cm m/s °C   L Superficial peroneal - Ankle      Lat leg 1.93 2.45 10.5 10 52 31   R Superficial peroneal - Ankle      Lat leg 1.77 2.55 9.5 10 56 31   R Sural - Ankle (Calf)      Calf 2.34 3.02 17.7 14 60 31.3   L Sural - Ankle (Calf)      Calf 2.14 2.55 16.1 14 66 31.3         F  Wave      Nerve F Lat M Lat F-M Lat    ms ms ms   L Peroneal - EDB 42.7 2.8 39.9   R Peroneal - EDB 44.9 2.8 42.1   R Tibial - AH 42.8 4.1 38.6   L Tibial - AH 44.4 3.3 41.1       H Reflex      Nerve Lat Hmax    ms   R Tibial - Sole[place] 26.7   L Tibial - Sole[place] 26. 0       EMG         EMG Summary Table     Spontaneous MUAP Recruitment   Muscle IA Fib PSW Fasc H.F. Amp Dur. PPP Pattern   L. Tibialis anterior N None None None None N N N N   L. Gastrocnemius (Medial head) N None None None None N 2+ 1+ N   L.  Flexor digitorum longus Incr 1+ 1+ None None N 2+ 1+ Sl Decr/R   L. Extensor hallucis longus N None None None None N N N N   L. Dorsal interossei (pedis) N None None None None N N N Distant MUPs   L. Extensor digitorum brevis N None None None None N N N N   L. Vastus lateralis N None None None None N N N N   L. Biceps femoris (short head) N None None None None N 2+ 1+ N   L. Gluteus medius N None None None None N N N N   L. Sacral paraspinals Incr None None None CRDs N N N N   L. Lumbar paraspinals (low) N None None None None N N N N   L. Lumbar paraspinals (mid) N None None None None N N N N       Nerve conduction studies in both legs found no abnormalities. These findings were compared to the referential values in this laboratory, available upon request.    Monopolar needle examination of the left leg displayed acute and chronic denervation changes in the muscle supplied primarily by the S1 motor root. Needle testing of the paraspinals revealed chronic denervation changes. Electrodiagnostic examination of both legs disclosed evidence diagnostic of left S1 motor radiculopathy or intracanalicular lesion, with acute and chronic denervation changes. This was proven with the abnormal needle testing of the paraspinals. There were no other motor radiculopathies or intracanalicular lesions. There were no peripheral neuropathies. Sensory radiculopathies cannot be evaluated by electrodiagnostic means. Electrodiagnostic study performed 1 year ago found no abnormalities. Clinically, the patient presented with back pains radiating into her left leg and foot. On brief neurological examination, I found no motor or sensory compromise. Her difficulties are related to her radicular disease. Imaging studies of lumbosacral spine were recommended, as well as physical therapy and weight loss. Clinical correlation was highly advised.

## 2022-02-28 ENCOUNTER — TELEPHONE (OUTPATIENT)
Dept: FAMILY MEDICINE CLINIC | Age: 37
End: 2022-02-28

## 2022-02-28 DIAGNOSIS — K75.81 NASH (NONALCOHOLIC STEATOHEPATITIS): Primary | ICD-10-CM

## 2022-03-04 ENCOUNTER — TELEPHONE (OUTPATIENT)
Dept: FAMILY MEDICINE CLINIC | Age: 37
End: 2022-03-04

## 2022-03-04 ENCOUNTER — HOSPITAL ENCOUNTER (OUTPATIENT)
Dept: PSYCHIATRY | Age: 37
Discharge: HOME | End: 2022-03-04

## 2022-03-04 NOTE — FLOWSHEET NOTE
Assessment completed.  Client verbally consented to participate in the tobacco treatment program.      Electronically signed by DURAN Morales on 3/4/2022 at 10:00 AM

## 2022-03-07 ENCOUNTER — TELEPHONE (OUTPATIENT)
Dept: FAMILY MEDICINE CLINIC | Age: 37
End: 2022-03-07

## 2022-03-07 NOTE — TELEPHONE ENCOUNTER
Please watch for a fax of paper work for the pt for [address] [facility] pt to Start this [address] they would like you to send tomorrow if you can get to it, MARIE, if you need to contact [place] call [phone]

## 2022-03-09 ENCOUNTER — HOSPITAL ENCOUNTER (OUTPATIENT)
Dept: PSYCHIATRY | Age: 37
Discharge: HOME | End: 2022-03-09

## 2022-03-09 ENCOUNTER — OFFICE VISIT (OUTPATIENT)
Dept: FAMILY MEDICINE CLINIC | Age: 37
End: 2022-03-09
Payer: COMMERCIAL

## 2022-03-09 VITALS
TEMPERATURE: 97.8 F | BODY MASS INDEX: 51.91 KG/M2 | SYSTOLIC BLOOD PRESSURE: 136 MMHG | RESPIRATION RATE: 18 BRPM | DIASTOLIC BLOOD PRESSURE: 62 MMHG | HEART RATE: 115 BPM | HEIGHT: 63 IN | WEIGHT: 293 LBS

## 2022-03-09 DIAGNOSIS — K75.81 NASH (NONALCOHOLIC STEATOHEPATITIS): ICD-10-CM

## 2022-03-09 DIAGNOSIS — E66.01 CLASS 3 SEVERE OBESITY DUE TO EXCESS CALORIES WITHOUT SERIOUS COMORBIDITY WITH BODY MASS INDEX (BMI) OF 50.0 TO 59.9 IN ADULT (HCC): ICD-10-CM

## 2022-03-09 DIAGNOSIS — J45.20 MILD INTERMITTENT ASTHMA WITHOUT COMPLICATION: ICD-10-CM

## 2022-03-09 DIAGNOSIS — M54.16 LUMBAR RADICULOPATHY: ICD-10-CM

## 2022-03-09 DIAGNOSIS — I10 PRIMARY HYPERTENSION: ICD-10-CM

## 2022-03-09 DIAGNOSIS — E11.9 TYPE 2 DIABETES MELLITUS WITHOUT COMPLICATION, WITHOUT LONG-TERM CURRENT USE OF INSULIN (HCC): Primary | ICD-10-CM

## 2022-03-09 LAB — HBA1C MFR BLD: 5.9 %

## 2022-03-09 PROCEDURE — G8427 DOCREV CUR MEDS BY ELIG CLIN: HCPCS | Performed by: FAMILY MEDICINE

## 2022-03-09 PROCEDURE — 2022F DILAT RTA XM EVC RTNOPTHY: CPT | Performed by: FAMILY MEDICINE

## 2022-03-09 PROCEDURE — 99412 PREVENTIVE COUNSELING GROUP: CPT | Performed by: COUNSELOR

## 2022-03-09 PROCEDURE — 3044F HG A1C LEVEL LT 7.0%: CPT | Performed by: FAMILY MEDICINE

## 2022-03-09 PROCEDURE — G8417 CALC BMI ABV UP PARAM F/U: HCPCS | Performed by: FAMILY MEDICINE

## 2022-03-09 PROCEDURE — G8482 FLU IMMUNIZE ORDER/ADMIN: HCPCS | Performed by: FAMILY MEDICINE

## 2022-03-09 PROCEDURE — 99214 OFFICE O/P EST MOD 30 MIN: CPT | Performed by: FAMILY MEDICINE

## 2022-03-09 PROCEDURE — 4004F PT TOBACCO SCREEN RCVD TLK: CPT | Performed by: FAMILY MEDICINE

## 2022-03-09 PROCEDURE — 83036 HEMOGLOBIN GLYCOSYLATED A1C: CPT | Performed by: FAMILY MEDICINE

## 2022-03-09 RX ORDER — LISINOPRIL 10 MG/1
TABLET ORAL
Qty: 30 TABLET | Refills: 2 | Status: SHIPPED
Start: 2022-03-09 | End: 2022-05-04 | Stop reason: SDUPTHER

## 2022-03-09 RX ORDER — TIOTROPIUM BROMIDE 18 UG/1
18 CAPSULE ORAL; RESPIRATORY (INHALATION) DAILY
Qty: 30 CAPSULE | Refills: 5 | Status: SHIPPED | OUTPATIENT
Start: 2022-03-09

## 2022-03-09 ASSESSMENT — ENCOUNTER SYMPTOMS
SHORTNESS OF BREATH: 0
SINUS PRESSURE: 0
EYE PAIN: 0
DIARRHEA: 0
COUGH: 0
BACK PAIN: 1
SORE THROAT: 0
ABDOMINAL PAIN: 0
CONSTIPATION: 0

## 2022-03-09 NOTE — PROGRESS NOTES
Lizzy Dutton  : 1985    Chief Complaint:     Chief Complaint   Patient presents with    Shortness of Breath     1 month follow up. completed pft and emg. MRI scheduled for 03/15       HPI  Lizzy Dutton 39 y.o. presents for   Chief Complaint   Patient presents with    Shortness of Breath     1 month follow up. completed pft and emg. MRI scheduled for 03/15     Patient presents for follow-up. She states she has been doing well overall. Her breathing has been much improved since starting the medication. He does wish to lose weight. She does not wish to have surgery. She does have an appoint with the bariatric surgeon in the near future. She also has been forgetful. She has not talked with her psychiatrist about this. She is on multiple psychiatric medications. All questions were answered to patients satisfaction.     Past Medical History:   Diagnosis Date    Anxiety     Arthritis     Asthma     Bipolar 1 disorder ([facility] [address]     COPD (chronic obstructive pulmonary disease) (HCC)     Depression     Fatty liver     Fissure, anal     GERD (gastroesophageal reflux disease)     Glaucoma     Suspected by eye specialist    Hyperlipidemia     Hypertension     Macular edema     Cm    Migraines     Neuropathy     PTSD (post-traumatic stress disorder)     Splenomegaly 2020    Type 2 diabetes mellitus without complication, without long-term current use of insulin (HCC)        Allergies   Allergen Reactions    Cymbalta [Duloxetine Hcl] Other (See Comments)     angry    Keflex [Cephalexin]      Hives, rash      Neurontin [Gabapentin] Other (See Comments)     Makes me feel very weird    Lamictal [Lamotrigine] Nausea And Vomiting    Prednisone Rash       Health Maintenance Due   Topic Date Due    Hepatitis B vaccine (1 of 3 - Risk 3-dose series) Never done    Diabetic retinal exam  2021    COVID-19 Vaccine (3 - Booster for Moderna series) 2021         REVIEW OF SYSTEMS  Review of Systems   Constitutional: Positive for fatigue. Negative for fever. HENT: Negative for ear pain, sinus pressure, sneezing and sore throat. Eyes: Negative for pain. Respiratory: Negative for cough and shortness of breath. Cardiovascular: Negative for chest pain and leg swelling. Gastrointestinal: Negative for abdominal pain, constipation and diarrhea. Genitourinary: Negative for dysuria and urgency. Musculoskeletal: Positive for arthralgias and back pain. Negative for myalgias. Skin: Negative for rash. Allergic/Immunologic: Negative for food allergies. Neurological: Positive for numbness. Negative for light-headedness and headaches. Hematological: Does not bruise/bleed easily. Psychiatric/Behavioral: Positive for confusion. Negative for behavioral problems and sleep disturbance. PHYSICAL EXAM  /62   Pulse 115   Temp 97.8 °F (36.6 °C)   Resp 18   Ht 5' 3\" (1.6 m)   Wt (!) 309 lb (140.2 kg)   LMP 02/22/2021 (Approximate)   BMI 54.74 kg/m²   Physical Exam  Vitals and nursing note reviewed. Constitutional:       Appearance: She is well-developed. She is obese. HENT:      Head: Normocephalic and atraumatic. Right Ear: External ear normal.      Left Ear: External ear normal.      Nose: Nose normal.   Eyes:      Conjunctiva/sclera: Conjunctivae normal.   Neck:      Thyroid: No thyromegaly. Cardiovascular:      Rate and Rhythm: Normal rate and regular rhythm. Heart sounds: Normal heart sounds. No murmur heard. Pulmonary:      Effort: Pulmonary effort is normal.      Breath sounds: No wheezing. Abdominal:      Palpations: Abdomen is soft. Tenderness: There is no abdominal tenderness. Musculoskeletal:         General: No tenderness. Normal range of motion. Cervical back: Normal range of motion and neck supple. Lymphadenopathy:      Cervical: No cervical adenopathy. Skin:     General: Skin is warm and dry.       Findings: No rash.   Neurological:      General: No focal deficit present. Mental Status: She is alert and oriented to person, place, and time. Mental status is at baseline. Deep Tendon Reflexes: Reflexes are normal and symmetric. Psychiatric:         Mood and Affect: Mood normal.         Behavior: Behavior normal.              Laboratory: All laboratory and radiology results have been personally reviewed by myself    Lab Results   Component Value Date     02/22/2022    K 3.9 02/22/2022    K 3.8 10/30/2019     02/22/2022    CO2 21 02/22/2022    BUN 11 02/22/2022    CREATININE 0.6 02/22/2022    PROT 7.0 02/22/2022    LABALBU 3.9 02/22/2022    CALCIUM 9.5 02/22/2022    GFRAA >60 02/22/2022    LABGLOM >60 02/22/2022    GLUCOSE 165 02/22/2022    AST 17 02/22/2022    ALT 22 02/22/2022    ALKPHOS 93 02/22/2022    BILITOT <0.2 02/22/2022    TSH 1.860 02/22/2022    CHOL 177 02/22/2022    TRIG 229 02/22/2022    HDL 41 02/22/2022    LDLCALC 90 02/22/2022    LABA1C 5.9 03/09/2022        Lab Results   Component Value Date    CHOL 177 02/22/2022     Lab Results   Component Value Date    TRIG 229 (H) 02/22/2022     Lab Results   Component Value Date    HDL 41 02/22/2022     Lab Results   Component Value Date    LDLCALC 90 02/22/2022       Lab Results   Component Value Date    LABA1C 5.9 03/09/2022     Lab Results   Component Value Date    LABMICR 15.5 09/19/2020    LDLCALC 90 02/22/2022    CREATININE 0.6 02/22/2022       ASSESSMENT/PLAN:    1. Type 2 diabetes mellitus without complication, without long-term current use of insulin (HCC)  -     POCT glycosylated hemoglobin (Hb A1C)  -      DIABETES FOOT EXAM  2. GIRALDO (nonalcoholic steatohepatitis)  3. Lumbar radiculopathy  4. Mild intermittent asthma without complication  5.  Class 3 severe obesity due to excess calories without serious comorbidity with body mass index (BMI) of 50.0 to 59.9 in adult [facility])     As for asthma under good control reviewed PFTs in detail today. We will continue Flovent as patient is doing well. A1c currently well controlled we will continue to monitor. Will try Saxenda to see if this help with her weight loss. Side effects medication reviewed with patient. Did review EMG today. She does have a follow-up with pain management here in the near future.   Reviewed all medication will continue all medication unless otherwise indicated    Problem list reviewed andsimplified/updated  HM reviewed today and counseled as appropriate    Call or go to ED immediately if symptoms worsen or persist.  Future Appointments   Date Time Provider Niesha Yarbrough   3/9/2022  2:30 PM SCHEDULE, [facility] SE[facility] TOB [facility]   3/10/2022  9:20 AM Omayra Parnell PT [facility]   3/15/2022  7:00 AM [facility]   3/16/2022  9:00 AM [facility] US 1 [facility] US[facility]   3/16/2022 10:00 AM [facility]   3/16/2022  2:30 PM SCHEDULE, [facility] TOBACCO [facility] SE[facility]Z TOB TRT [facility]   3/23/2022  2:30 PM SCHEDULE, [facility] TOBACCO [facility] SE[facility]Z TOB TRT [facility]   3/30/2022  2:30 PM SCHEDULE, [facility] TOBACCO [facility] TOB [facility]   3/31/2022 11:00 AM Margarito Caraballo, DO BDM PAIN MAR [facility]   4/6/2022  2:30 PM SCHEDULE, [facility] TO[facility]AC[facility] TOB [facility]   4/7/2022 12:00 PM Bibi Mi MD Surg Weight [facility]   4/13/2022  2:30 PM SCHEDULE, [facility]AC[facility] TOB T[facility]   4/18/2022  1:00 PM Meme Mendez MD [facility]   4/20/2022  2:30 PM SCHEDULE, [facility] TOBACCO [facility] SE[facility] TOB TRT [facility]   4/27/2022 10:00 AM Jeremías Robins, DO BDM RHEUM [facility]   4/27/2022  2:30 PM SCHEDULE, [facility] TOBACCO GROUP SEYZ TOB TRT [facility]   5/4/2022  2:30 PM SCHEDULE, SE[facility] TOBACCO GROUP [facility] TOB TRT [facility]   5/11/2022  2:30 PM SCHEDULE, [facility] TOBACCO GROUP SEYZ TOB TRT [facility]   5/18/2022  2:30 PM SCHEDULE, [facility] TOBACCO GROUP [facility] TOB TRT [facility] 6/9/2022 10:30 AM Willis Covington DO [facility]     Or sooner if necessary. Educational materials and/or homeexercises printed for patient's review and were included in patient instructions on his/her After Visit Summary and given to patient at the end of visit. Counseled regarding above diagnosis, including possible risks and complications,  especially if left uncontrolled. Counseled regarding the possible side effects, risks, benefits and alternatives to treatment; patient and/or guardian verbalizes understanding, agrees,feels comfortable with and wishes to proceed with above treatment plan. Advised patient to call [facility] Damon new medication issues, and read all Rx info from pharmacy to assure aware of all possible risks and side effects of medication before taking. Reviewed age and gender appropriate health screening exams and vaccinations. Advised patient regarding importance of keeping up with recommended health maintenance and toschedule as soon as possible if overdue, as this is important in assessing for undiagnosed pathology, especially cancer, as well as protecting against potentially harmful/life threatening disease. and/or guardian verbalizes understanding and agrees with above counseling, assessment and plan. All questions answered. Sean Mai DO  3/9/22    NOTE: This report was transcribed using voice recognition software.  Every effort was made to ensure accuracy; however, inadvertent computerized transcription errors may be present

## 2022-03-09 NOTE — PROGRESS NOTES
[facility]   Progress Note - 10 Week Expanded Program     Client Name: Shannan Stauffer    [] Non billable: Reason:     Treatment Site:   [x][facility]      [] [address] [place] [address]                      CO Level:  ppm    Length of Service: 60 minutes       Session Type:  [] In Person [x] Virtually - Provider Location [x][facility]      [] [address] [place] [address]                    Patient Location    [x]Patient's Home    [] Other:       Session Provided: [] Individual   [x]Group             Client/Staff Ratio: 2/1                                Clients target quit date: 3/18/2022       Last date of tobacco use: 3/9/2022    Client's actual quit date:       [x]  Client still smoking     Pharmacotherapy provided this session:  [x]  NRT: Patch  [x]21mg . / []14mg.  / []7mg. [x]  NRT:  Gum []2mg. / [x]4mg.   x (2  )boxes  []  NRT: Lozenge []2mg.  / []4mg.  x (  ) tubes      []  Varenicline []0.5 mg.  [] 1 mg. Wks. (  )  [x]  Bupropion    Wks. (1,2  )  []  Other                                                                Treatment Objectives addressed during the session:       [] Why I quit? [] Addiction   [] How am I quitting?  [] Living without   [] Coping [] Having Fun   [] Stress [x] Free for Life   [] Temptation [] The Future    [x] Other: Health section Crash Course        Clients Response to counseling:  [x] Active participant                        [] Passive listener        [] No behavior change, still participating                   [] Inappropriate:   [] Implemented other strategy/behavior changes:   [x] Discussed Quit Plan that will be implemented  [] Re-set Quit Date:     Clients Response to Pharmacotherapy:  [] Decreased cravings  [] Decrease in tobacco use:   [] Maintaining abstinence  [] No change experienced by client  [x]        Risk/Benefit Meds education provided to client  []        Adverse reaction:   []       Other:   []  None    Plan of Action:  [] Maintain abstinence  [x] Continue treatment;     [] Change pharmacotherapy:   [] Attend Nicotine Anonymous meeting   [x]        Assignments/Homework: complete health section of crash course. Read Free for Life (expanded)  []        Triggers / Concerns to be addressed:   []  Graduated / received aftercare plan  []  Discharge    Comments: discussed with client when to start Zyban. BOAZ: Signature, Date, Time: Electronically signed by Carmen Graves on 3/9/2022 at 4:35 PM      CO Level:  None taken.

## 2022-03-10 ENCOUNTER — TREATMENT (OUTPATIENT)
Dept: PHYSICAL THERAPY | Age: 37
End: 2022-03-10

## 2022-03-10 ENCOUNTER — TELEPHONE (OUTPATIENT)
Dept: FAMILY MEDICINE CLINIC | Age: 37
End: 2022-03-10

## 2022-03-10 ENCOUNTER — TELEPHONE (OUTPATIENT)
Dept: PAIN MANAGEMENT | Age: 37
End: 2022-03-10

## 2022-03-10 DIAGNOSIS — G89.29 CHRONIC BILATERAL LOW BACK PAIN WITH RIGHT-SIDED SCIATICA: Primary | ICD-10-CM

## 2022-03-10 DIAGNOSIS — M54.41 CHRONIC BILATERAL LOW BACK PAIN WITH RIGHT-SIDED SCIATICA: Primary | ICD-10-CM

## 2022-03-10 NOTE — PROGRESS NOTES
Pt presented today wanting to resume physical therapy for her back. Pt last attended therapy on 12/22/21. Pt cancelled appointment on 1/4/22 due to fall and suffering an ankle sprain. Due to extended absence from physical therapy and new injury since last visit, explained to pt that a new script was needed from the doctor and a new evaluation was necessary to update status and goals. Pt also reports that she has an MRI for her back scheduled for 3/15/22. Advised pt to wait until after MRI to obtain script and schedule physical therapy evaluation. Pt verbalized understanding.     Amina Mai, [facility]

## 2022-03-10 NOTE — TELEPHONE ENCOUNTER
She has only been seen once by Dr. Paul Demarco and that was 3 months ago. Nothing was prescribed at that time. She will need to make a follow up appointment with Dr. Paul Demarco.

## 2022-03-15 ENCOUNTER — TELEPHONE (OUTPATIENT)
Dept: FAMILY MEDICINE CLINIC | Age: 37
End: 2022-03-15

## 2022-03-15 NOTE — TELEPHONE ENCOUNTER
Pt called she called pharmacy and she checked on a medication called Admac, its used to raise Metabolism, not sure of correct spelling there, but pt states send order to [address]

## 2022-03-15 NOTE — TELEPHONE ENCOUNTER
Spoke with pt.   Notified of medication not being covered by ins, pt will contact ins to see what will be covered and call office back

## 2022-03-16 ENCOUNTER — TELEPHONE (OUTPATIENT)
Dept: FAMILY MEDICINE CLINIC | Age: 37
End: 2022-03-16

## 2022-03-16 ENCOUNTER — HOSPITAL ENCOUNTER (OUTPATIENT)
Dept: ULTRASOUND IMAGING | Age: 37
Discharge: HOME | End: 2022-03-18
Payer: COMMERCIAL

## 2022-03-16 ENCOUNTER — HOSPITAL ENCOUNTER (OUTPATIENT)
Dept: PSYCHIATRY | Age: 37
Discharge: HOME | End: 2022-03-16
Payer: COMMERCIAL

## 2022-03-16 DIAGNOSIS — K75.81 NASH (NONALCOHOLIC STEATOHEPATITIS): ICD-10-CM

## 2022-03-16 DIAGNOSIS — R13.10 DYSPHAGIA, UNSPECIFIED TYPE: ICD-10-CM

## 2022-03-16 PROCEDURE — 99411 PREVENTIVE COUNSELING GROUP: CPT

## 2022-03-16 PROCEDURE — 76700 US EXAM ABDOM COMPLETE: CPT

## 2022-03-16 PROCEDURE — 76536 US EXAM OF HEAD AND NECK: CPT

## 2022-03-16 RX ORDER — BUPROPION HYDROCHLORIDE 150 MG/1
150 TABLET, EXTENDED RELEASE ORAL 2 TIMES DAILY
Qty: 60 TABLET | Refills: 5 | Status: SHIPPED | OUTPATIENT
Start: 2022-03-16 | End: 2022-06-20 | Stop reason: ALTCHOICE

## 2022-03-16 RX ORDER — BUPROPION HYDROCHLORIDE 150 MG/1
150 TABLET, EXTENDED RELEASE ORAL 2 TIMES DAILY
Qty: 60 TABLET | Refills: 5 | Status: SHIPPED
Start: 2022-03-16 | End: 2022-03-16

## 2022-03-16 NOTE — TELEPHONE ENCOUNTER
[facility] called in looking for pt Rx for Zyban that Dr. Arabella Horowitz approved.  Please fax to [phone] [facility]. [phone]

## 2022-03-17 ENCOUNTER — TELEPHONE (OUTPATIENT)
Dept: FAMILY MEDICINE CLINIC | Age: 37
End: 2022-03-17

## 2022-03-17 NOTE — TELEPHONE ENCOUNTER
[employer] is not covered by patients insurance. Insurance will cover buproprion hcl er for smoking but not depression (unless prescribed by an approved psych dr)     Will also cover naltrexone       Can we send rx's seperately to pharmacy and link smoking cessation as dx?

## 2022-03-22 RX ORDER — NALTREXONE HYDROCHLORIDE 50 MG/1
25 TABLET, FILM COATED ORAL 2 TIMES DAILY
Qty: 30 TABLET | Refills: 2 | Status: SHIPPED
Start: 2022-03-22 | End: 2022-04-11 | Stop reason: SINTOL

## 2022-03-23 ENCOUNTER — HOSPITAL ENCOUNTER (OUTPATIENT)
Dept: PSYCHIATRY | Age: 37
Discharge: HOME | End: 2022-03-23

## 2022-03-23 NOTE — FLOWSHEET NOTE
Contacted client to have her join zoom. Client stated that she was having issues with her phone to join. Discussed how everything was going with client and her medication. Client stated that she started Zyban and can tell that it is starting to work. Client will continue to take the Zyban and her quit date will be 3/26. Will follow-up with client next week for scheduled zoom group.      Electronically signed by DURAN Anthony on 3/23/2022 at 3:08 PM

## 2022-03-24 ENCOUNTER — HOSPITAL ENCOUNTER (OUTPATIENT)
Dept: MRI IMAGING | Age: 37
Discharge: HOME | End: 2022-03-26

## 2022-03-24 DIAGNOSIS — M54.16 LUMBAR RADICULOPATHY: ICD-10-CM

## 2022-03-24 DIAGNOSIS — G89.29 CHRONIC BILATERAL LOW BACK PAIN WITH BILATERAL SCIATICA: ICD-10-CM

## 2022-03-24 DIAGNOSIS — M54.41 CHRONIC BILATERAL LOW BACK PAIN WITH BILATERAL SCIATICA: ICD-10-CM

## 2022-03-24 DIAGNOSIS — M54.42 CHRONIC BILATERAL LOW BACK PAIN WITH BILATERAL SCIATICA: ICD-10-CM

## 2022-03-24 DIAGNOSIS — G89.4 CHRONIC PAIN SYNDROME: ICD-10-CM

## 2022-03-24 PROCEDURE — 72148 MRI LUMBAR SPINE W/O DYE: CPT

## 2022-03-30 NOTE — PROGRESS NOTES
[address] [facility]        [address]  LOPEZ BUSTAMANTE [facility] - BEHAVIORAL HEALTH SERVICES, [facility]  Dept: [phone]        Follow up Note      Carline Ayers     Date of Visit:  22     CC:  Patient presents for follow up   Chief Complaint   Patient presents with    Lower Back Pain     review MRI AND EMG     HPI:    Pain is unchanged. Change in quality of symptoms:no. Medication side effects:not applicable . Recent diagnostic testing:lumbar MRI, EDX. Recent interventional procedures:none. She has been on anticoagulation medications to include NSAIDS and has not been on herbal supplements. She is diabetic.     Imagin/2022 EDX -  Electrodiagnostic examination of both legs disclosed evidence diagnostic of left S1 motor radiculopathy or intracanalicular lesion, with acute and chronic denervation changes. This was proven with the abnormal needle testing of the paraspinals.     There were no other motor radiculopathies or intracanalicular lesions. There were no peripheral neuropathies. Sensory radiculopathies cannot be evaluated by electrodiagnostic means.     Electrodiagnostic study performed 1 year ago found no abnormalities.     Clinically, the patient presented with back pains radiating into her left leg and foot. On brief neurological examination, I found no motor or sensory compromise. Her difficulties are related to her radicular disease. Imaging studies of lumbosacral spine were recommended, as well as physical therapy and weight loss.     Clinical correlation was highly advised. 3/2022 lumbar MRI -  FINDINGS:   BONES/ALIGNMENT: There is normal alignment of the spine. The vertebral body   heights are maintained.  The bone marrow signal appears unremarkable.       SPINAL CORD: The conus terminates normally.       SOFT TISSUES: No paraspinal mass identified.       L1-L2: There is no significant disc herniation, spinal canal stenosis or   neural foraminal narrowing.       L2-L3: There is no significant disc herniation, spinal canal stenosis or   neural foraminal narrowing.       L3-L4: There is no significant disc herniation, spinal canal stenosis or   neural foraminal narrowing.       L4-L5: There is no significant disc herniation.  Mild facet hypertrophy is   noted.  No significant central canal or lateral recess stenosis.  Mild neural   foraminal stenoses.       L5-S1: There is no significant disc herniation.  Mild facet hypertrophy is   noted.  No significant central canal or lateral recess stenosis.  Mild neural   foraminal stenoses.           Impression   1.  No fracture or bony destructive lesion. 2. Mild facet hypertrophic changes at L4-5 and L5-S1, resulting in mild   neural foraminal stenoses. 3.  No significant central canal or lateral recess stenosis.      10/2021 xray cervical and lumbar -  FINDINGS:   C-spine: No fracture or dislocation.  Disc spaces are preserved.  Normal soft   tissues.       L-spine: Minimal degenerative endplate spurring from I4-R0.  No fracture or   dislocation.  Normal soft tissues.           Impression   Unremarkable C-spine.       Minimal degenerative endplate spurring from T4-W7.         Potential Aberrant Drug-Related Behavior:      Urine Drug Screening:    OARRS report:    Past Medical History:   Diagnosis Date    Anxiety     Arthritis     Asthma     Bipolar 1 disorder ([address]     COPD (chronic obstructive pulmonary disease) (HCC)     Depression     Fatty liver     Fissure, anal     GERD (gastroesophageal reflux disease)     Glaucoma     Suspected by eye specialist    Hyperlipidemia     Hypertension     Macular edema     Cm    Migraines     Morbid obesity due to excess calories ([address]     Neuropathy     PTSD (post-traumatic stress disorder)     Splenomegaly 09/24/2020    Type 2 diabetes mellitus without complication, without long-term current use of insulin ([address]        Past Surgical History:   Procedure Laterality Date    ANTERIOR CRUCIATE LIGAMENT REPAIR Right      SECTION      ,     CHOLECYSTECTOMY      DILATION AND CURETTAGE OF UTERUS N/A 2019    DILATATION AND CURETTAGE HYSTEROSCOPY WITH REMOVAL OF CONDYLOMATA performed by Neela Reyes MD at [address] [place] [address] N/A 2020    DILATATION AND CURETTAGE HYSTEROSCOPY, ATTEMPTED CERVICAL BIOPSY, MARIYA OF  LABIAL ABSCESS performed by Neela Reyes MD at [place] [address]  2021    Robotic lysis of adhesions, total hysterectomy, bilateral salpingectomy, cystoscopy    INCONTINENCE SURGERY      OTHER SURGICAL HISTORY      repair anal fissure    TUBAL LIGATION         Prior to Admission medications    Medication Sig Start Date End Date Taking?  Authorizing Provider   buPROPion (ZYBAN) 150 MG extended release tablet Take 1 tablet by mouth 2 times daily 3/16/22  Yes Zamzam Covington DO   lisinopril (PRINIVIL;ZESTRIL) 10 MG tablet TAKE ONE TABLET BY MOUTH ONCE DAILY 3/9/22  Yes Arlen Covington,    tiotropium (SPIRIVA HANDIHALER) 18 MCG inhalation capsule Inhale 1 capsule into the lungs daily 3/9/22  Yes Zamzam Covington DO   ondansetron (ZOFRAN) 4 MG tablet Take 1 tablet by mouth 3 times daily as needed for Nausea or Vomiting 22  Yes Zamzam Covington DO   topiramate (TOPAMAX) 100 MG tablet TAKE ONE TABLET BY MOUTH EVERY EVENING 12/15/21  Yes Historical Provider, MD   fluticasone (FLOVENT HFA) 44 MCG/ACT inhaler Inhale 2 puffs into the lungs 2 times daily 22  Yes Karol Covington,    Acetaminophen (TYLENOL) 325 MG CAPS Take by mouth   Yes Historical Provider, MD   ibuprofen (ADVIL;MOTRIN) 800 MG tablet Take 800 mg by mouth every 6 hours as needed for Pain   Yes Historical Provider, MD   albuterol sulfate HFA (PROVENTIL HFA) 108 (90 Base) MCG/ACT inhaler Inhale 2 puffs into the lungs every 4 hours as needed for Wheezing 10/12/21 10/12/22 Yes Arlen Wilde DO Adria   albuterol (PROVENTIL) (2.5 MG/3ML) 0.083% nebulizer solution Take 3 mLs by nebulization every 6 hours as needed for Wheezing 4/29/20  Yes Shon Funez PA-C   naltrexone (DEPADE) 50 MG tablet Take 0.5 tablets by mouth 2 times daily  Patient not taking: Reported on 3/31/2022 3/22/22   Amy Alejandro DO   doxepin (SINEQUAN) 25 MG capsule TAKE ONE CAPSULE BY MOUTH AT BEDTIME  Patient not taking: Reported on 3/31/2022 11/5/21   Historical Provider, MD   busPIRone (BUSPAR) 15 MG tablet TAKE ONE TABLET BY MOUTH THREE TIMES DAILY  Patient not taking: Reported on 3/31/2022 11/8/21   Historical Provider, MD   venlafaxine (EFFEXOR XR) 150 MG extended release capsule Take 150 mg by mouth daily  Patient not taking: Reported on 3/31/2022    Historical Provider, MD       Allergies   Allergen Reactions    Cymbalta [Duloxetine Hcl] Other (See Comments)     angry    Keflex [Cephalexin]      Hives, rash      Neurontin [Gabapentin] Other (See Comments)     Makes me feel very weird    Lamictal [Lamotrigine] Nausea And Vomiting    Prednisone Rash       Social History     Socioeconomic History    Marital status:      Spouse name: Bret Mayers Number of children: 2    Years of education: 15    Highest education level: Not on file   Occupational History    Occupation: disability-, LPN   Tobacco Use    Smoking status: Current Every Day Smoker     Packs/day: 1.00     Years: 20.00     Pack years: 20.00     Types: Cigarettes     Start date: 11/3/1995    Smokeless tobacco: Never Used   Vaping Use    Vaping Use: Former    Substances: Never   Substance and Sexual Activity    Alcohol use: Not Currently    Drug use: Not Currently     Types: Marijuana Lanis Moe)     Comment: medical marijuana    Sexual activity: Yes     Partners: Male     Comment: TL   Other Topics Concern    Not on file   Social History Narrative    Not on file     Social Determinants of Health     Financial Resource Strain: Low Risk     Difficulty of Paying Living Expenses: Not very hard   Food Insecurity: No Food Insecurity    Worried About Running Out of Food in the Last Year: Never true    Ran Out of Food in the Last Year: Never true   Transportation Needs:     Lack of Transportation (Medical): Not on file    Lack of Transportation (Non-Medical): Not on file   Physical Activity:     Days of Exercise per Week: Not on file    Minutes of Exercise per Session: Not on file   Stress:     Feeling of Stress : Not on file   Social Connections:     Frequency of Communication with Friends and Family: Not on file    Frequency of Social Gatherings with Friends and Family: Not on file    Attends Congregational Services: Not on file    Active Member of Clubs or Organizations: Not on file    Attends Club or Organization Meetings: Not on file    Marital Status: Not on file   Intimate Partner Violence:     Fear of Current or Ex-Partner: Not on file    Emotionally Abused: Not on file    Physically Abused: Not on file    Sexually Abused: Not on file   Housing Stability:     Unable to Pay for Housing in the Last Year: Not on file    Number of [place] in the Last Year: Not on file    Unstable Housing in the Last Year: Not on file       Family History   Problem Relation Age of Onset    Diabetes Mother     Cancer Mother         ovarian    Diabetes Father     Cancer Sister         NHL    Diabetes Sister     High Blood Pressure Brother     Breast Cancer Maternal Aunt     Uterine Cancer Neg Hx     Colon Cancer Neg Hx        REVIEW OF SYSTEMS:     Dell So denies fever/chills, chest pain, shortness of breath, new bowel or bladder complaints. All other review of systems was negative.     PHYSICAL EXAMINATION:      BP (!) 142/83   Pulse 109   Temp 96.8 °F (36 °C) (Infrared)   LMP 02/22/2021 (Approximate)   SpO2 96%     General:       General appearance:pleasant and well-hydrated, in no distress and A & O x3  Build:Obese  Function:Rises from a seated position with difficulty     HEENT:     Head:normocephalic, atraumatic  Pupils:regular, round, equal  Sclera: icterus absent     Lungs:     Breathing:normal breathing pattern     Abdomen:     Shape:non-distended  Tenderness:none  Guarding:none     Lumbar spine:     Spine inspection:normal   CVA tenderness:No   Palpation:tenderness paravertebral muscles, left, right positive. Range of motion:abnormal mildly in lateral bending, flexion, extension rotation bilateral and is painful.     Musculoskeletal:     Trigger points in Paraveteral:absent bilaterally  SI joint tenderness:positive right, positive left              MEE test:not done right, not done left  Piriformis tenderness:positive right, positive left  Trochanteric bursa tenderness:not done right, not done left  SLR:negative right, negative left, sitting      Extremities:     Tremors:None bilaterally upper and lower  Range of motion:pain with internal rotation of hips negative.   Intact:Yes  Varicose veins:absent   Pulses:present Lt radial  Cyanosis:none  Edema:none x all 4 extremities     Neurological:     Sensory:normal to light touch LLE, diffusely decreased RLE     Motor:   (no atrophy noted)             Right Quadriceps5/5     Left Quadriceps5/5           Right Gastrocnemius5/5  Left Gastrocnemius5/5  Right Ant Tibialis5/5   Left Ant Tibialis5/5     Reflexes:  (not assessed today)  Right Quadriceps reflex2+  Left Quadriceps reflex2+  Right Achilles reflex2+  Left Achilles reflex2+    Gait:normal station, with a walker     Dermatology:     Skin:no rashes or lesions noted     Impression:     LBP BLE pain with weakness of the RLE  She reports having intermittent incontinence of urine (chronic and associated with urogyn surgery) and stool x2  Reports vaginal numbness w/ intercourse  Was evaluated by gynecology for the above, has had hysterectomy, and was told it is [place] damage\"  She is using a walker due to reported weakness of the RLE  \"I'm not suicidal or nothing but there are times I just want to jump in front of a truck because it's constant\", she can contract for safety, denies a plan and states \"I don't plan on hurting myself\"    2022 EDX shows left S1 motor radiculopathy or intracanalicular lesion, with acute and chronic denervation changes (note made of EDX done one year prior was negative)  2022 lumbar MRI shows mild lower lumbar facet degeneration without stenosis     She has been doing PT  Reports significant nausea when doing PT     The reported symptoms cannot be explained by her imaging as there is a lack of significant pathology on the xrays     Plan:    Encouraged her to continue to follow with gyn/urology for genitourinary symptoms  Urine screen deferred  OARRS report reviewed  Gabapentin causes SE's  On topamax for migraine HA's  Offered pregabalin - pt prefers to try injections first  Encouraged to continue with PT  EMG BLE reviewed as above  Lumbar MRI w/o contrast reviewed as above  Re-ordered PT, Ruy, it was put on hold until the MRI was completed  Caudal BOAZ #1 with sedation  Patient encouraged to stay active and to lose weight  Treatment plan discussed with the patient including medication and procedure side effects     Cc:  Referring physician    INGRID López.

## 2022-03-31 ENCOUNTER — PREP FOR PROCEDURE (OUTPATIENT)
Dept: PAIN MANAGEMENT | Age: 37
End: 2022-03-31

## 2022-03-31 ENCOUNTER — OFFICE VISIT (OUTPATIENT)
Dept: PAIN MANAGEMENT | Age: 37
End: 2022-03-31
Payer: COMMERCIAL

## 2022-03-31 VITALS
HEART RATE: 109 BPM | OXYGEN SATURATION: 96 % | TEMPERATURE: 96.8 F | SYSTOLIC BLOOD PRESSURE: 142 MMHG | DIASTOLIC BLOOD PRESSURE: 83 MMHG

## 2022-03-31 DIAGNOSIS — M54.16 LUMBAR RADICULOPATHY: Primary | ICD-10-CM

## 2022-03-31 DIAGNOSIS — M54.16 LUMBAR RADICULOPATHY: ICD-10-CM

## 2022-03-31 DIAGNOSIS — M54.42 CHRONIC BILATERAL LOW BACK PAIN WITH BILATERAL SCIATICA: ICD-10-CM

## 2022-03-31 DIAGNOSIS — M47.817 LUMBOSACRAL SPONDYLOSIS WITHOUT MYELOPATHY: ICD-10-CM

## 2022-03-31 DIAGNOSIS — G89.4 CHRONIC PAIN SYNDROME: Primary | ICD-10-CM

## 2022-03-31 DIAGNOSIS — M54.41 CHRONIC BILATERAL LOW BACK PAIN WITH BILATERAL SCIATICA: ICD-10-CM

## 2022-03-31 DIAGNOSIS — G89.29 CHRONIC BILATERAL LOW BACK PAIN WITH BILATERAL SCIATICA: ICD-10-CM

## 2022-03-31 PROCEDURE — 4004F PT TOBACCO SCREEN RCVD TLK: CPT | Performed by: PAIN MEDICINE

## 2022-03-31 PROCEDURE — G8427 DOCREV CUR MEDS BY ELIG CLIN: HCPCS | Performed by: PAIN MEDICINE

## 2022-03-31 PROCEDURE — G8482 FLU IMMUNIZE ORDER/ADMIN: HCPCS | Performed by: PAIN MEDICINE

## 2022-03-31 PROCEDURE — 99214 OFFICE O/P EST MOD 30 MIN: CPT | Performed by: PAIN MEDICINE

## 2022-03-31 PROCEDURE — 99203 OFFICE O/P NEW LOW 30 MIN: CPT | Performed by: PAIN MEDICINE

## 2022-03-31 PROCEDURE — G8417 CALC BMI ABV UP PARAM F/U: HCPCS | Performed by: PAIN MEDICINE

## 2022-03-31 NOTE — PROGRESS NOTES
Do you currently have any of the following:    Fever: No  Headache:  No  Cough: No  Shortness of breath: No  Exposed to anyone with these symptoms: No         Aisha Castro presents to the [facility] on 3/31/2022. Fabian Choi is complaining of pain LOWER BACK PAIN. The pain is constant. The pain is described as shooting and sharp. Pain is rated on her best day at a 6, on her worst day at a 10, and on average at a 6 on the VAS scale. She took her last dose of Tylenol this morning. Any procedures since your last visit: No, with  % relief. Pacemaker or defibrillator: No managed by . She is  on NSAIDS and is not on anticoagulation medications to include none and is managed by . Medication Contract and Consent for Opioid Use Documents Filed      No documents found                LMP 02/22/2021 (Approximate)      Patient's last menstrual period was 02/22/2021 (approximate).

## 2022-04-08 ENCOUNTER — TELEPHONE (OUTPATIENT)
Dept: PAIN MANAGEMENT | Age: 37
End: 2022-04-08

## 2022-04-08 NOTE — TELEPHONE ENCOUNTER
Call to Lupis Ayala that procedure was approved for 4/12/2022 and that the surgery center should call her a few days before for the pre op call and after 3:00 PM the business day before with the arrival time. Instructed Anjana to hold ibuprofen for 24 hours, naprosyn for 4 days and any aspirin containing products or fish oil for 7 days. Instructed to call office back if any questions. Latrice Marques verbalized understanding.

## 2022-04-11 NOTE — PROGRESS NOTES
[facility] PRE-ADMISSION TESTING INSTRUCTIONS      ARRIVAL INSTRUCTIONS:  [x] Parking the day of Surgery is located in the Main Entrance lot. Upon entering the main door make an immediate right to the surgery reception desk. [x] Bring photo ID and insurance card    [] Bring in a copy of Living will or Durable Power of  papers. [x] Please be sure to arrange for responsible adult to provide transportation to and from the hospital    [x] Please arrange for responsible adult to be with you for the 24 hour period post procedure due to having anesthesia      GENERAL INSTRUCTIONS:    [x] Nothing by mouth after midnight, including gum, candy, mints or water    [x] You may brush your teeth, but do not swallow any water    [x] Take medications as instructed with 1-2 oz of water    [x] Stop herbal supplements and vitamins 5 days prior to procedure    [x] Follow preop dosing of blood thinners per physician instructions    [x] Take 1/2 dose of evening insulin, but no insulin after midnight    [] No oral diabetic medications after midnight    [] If diabetic and have low blood sugar or feel symptomatic, take 1-2oz apple juice only    [x] Bring inhalers day of surgery    [] Bring C-PAP/ Bi-Pap day of surgery    [] Bring urine specimen day of surgery    [x] Shower or bath with soap, lather and rinse well, AM of Surgery, no lotion, powders or creams to surgical site    [] Follow bowel prep as instructed per surgeon    [x] No tobacco products within 24 hours of surgery     [x] No alcohol or illegal drug use within 24 hours of surgery.     [x] Jewelry, body piercing's, eyeglasses, contact lenses and dentures are not permitted into surgery (bring cases)      [x] Please do not wear any nail polish, make up or hair products on the day of surgery    [x] You can expect a call the business day prior to procedure to notify you if your arrival time changes    [x] If you receive a survey after surgery we would greatly appreciate your comments    [x] Please notify surgeon if you develop any illness between now and time of surgery (cold, cough, sore throat, fever, nausea, vomiting) or any signs of infections  including skin, wounds, and dental.    []  The Outpatient Pharmacy is available to fill your prescription here on your day of surgery, ask your preop nurse for details

## 2022-04-12 ENCOUNTER — ANESTHESIA EVENT (OUTPATIENT)
Dept: OPERATING ROOM | Age: 37
End: 2022-04-12
Payer: COMMERCIAL

## 2022-04-12 ENCOUNTER — HOSPITAL ENCOUNTER (OUTPATIENT)
Age: 37
Setting detail: OUTPATIENT SURGERY
Discharge: HOME | End: 2022-04-12
Attending: PAIN MEDICINE | Admitting: PAIN MEDICINE
Payer: COMMERCIAL

## 2022-04-12 ENCOUNTER — HOSPITAL ENCOUNTER (OUTPATIENT)
Dept: GENERAL RADIOLOGY | Age: 37
Discharge: HOME | End: 2022-04-14
Attending: PAIN MEDICINE
Payer: COMMERCIAL

## 2022-04-12 ENCOUNTER — ANESTHESIA (OUTPATIENT)
Dept: OPERATING ROOM | Age: 37
End: 2022-04-12
Payer: COMMERCIAL

## 2022-04-12 VITALS
HEART RATE: 84 BPM | SYSTOLIC BLOOD PRESSURE: 125 MMHG | HEIGHT: 63 IN | WEIGHT: 293 LBS | OXYGEN SATURATION: 97 % | RESPIRATION RATE: 17 BRPM | BODY MASS INDEX: 51.91 KG/M2 | TEMPERATURE: 97 F | DIASTOLIC BLOOD PRESSURE: 80 MMHG

## 2022-04-12 VITALS
DIASTOLIC BLOOD PRESSURE: 72 MMHG | OXYGEN SATURATION: 99 % | SYSTOLIC BLOOD PRESSURE: 133 MMHG | RESPIRATION RATE: 14 BRPM

## 2022-04-12 DIAGNOSIS — R52 PAIN MANAGEMENT: ICD-10-CM

## 2022-04-12 PROCEDURE — 2580000003 HC RX 258: Performed by: NURSE ANESTHETIST, CERTIFIED REGISTERED

## 2022-04-12 PROCEDURE — 62323 NJX INTERLAMINAR LMBR/SAC: CPT | Performed by: PAIN MEDICINE

## 2022-04-12 PROCEDURE — 7100000010 HC PHASE II RECOVERY - FIRST 15 MIN: Performed by: PAIN MEDICINE

## 2022-04-12 PROCEDURE — 6360000002 HC RX W HCPCS: Performed by: PAIN MEDICINE

## 2022-04-12 PROCEDURE — 3700000000 HC ANESTHESIA ATTENDED CARE: Performed by: PAIN MEDICINE

## 2022-04-12 PROCEDURE — 7100000011 HC PHASE II RECOVERY - ADDTL 15 MIN: Performed by: PAIN MEDICINE

## 2022-04-12 PROCEDURE — 3600000002 HC SURGERY LEVEL 2 BASE: Performed by: PAIN MEDICINE

## 2022-04-12 PROCEDURE — 3209999900 FLUORO FOR SURGICAL PROCEDURES

## 2022-04-12 PROCEDURE — 2500000003 HC RX 250 WO HCPCS: Performed by: PAIN MEDICINE

## 2022-04-12 PROCEDURE — 6360000002 HC RX W HCPCS: Performed by: NURSE ANESTHETIST, CERTIFIED REGISTERED

## 2022-04-12 PROCEDURE — 6360000004 HC RX CONTRAST MEDICATION: Performed by: PAIN MEDICINE

## 2022-04-12 PROCEDURE — 2709999900 HC NON-CHARGEABLE SUPPLY: Performed by: PAIN MEDICINE

## 2022-04-12 RX ORDER — SODIUM CHLORIDE 9 MG/ML
INJECTION, SOLUTION INTRAVENOUS CONTINUOUS PRN
Status: DISCONTINUED | OUTPATIENT
Start: 2022-04-12 | End: 2022-04-12 | Stop reason: SDUPTHER

## 2022-04-12 RX ORDER — MIDAZOLAM HYDROCHLORIDE 1 MG/ML
INJECTION INTRAMUSCULAR; INTRAVENOUS PRN
Status: DISCONTINUED | OUTPATIENT
Start: 2022-04-12 | End: 2022-04-12 | Stop reason: SDUPTHER

## 2022-04-12 RX ORDER — FENTANYL CITRATE 50 UG/ML
INJECTION, SOLUTION INTRAMUSCULAR; INTRAVENOUS PRN
Status: DISCONTINUED | OUTPATIENT
Start: 2022-04-12 | End: 2022-04-12 | Stop reason: SDUPTHER

## 2022-04-12 RX ORDER — METHYLPREDNISOLONE ACETATE 40 MG/ML
INJECTION, SUSPENSION INTRA-ARTICULAR; INTRALESIONAL; INTRAMUSCULAR; SOFT TISSUE PRN
Status: DISCONTINUED | OUTPATIENT
Start: 2022-04-12 | End: 2022-04-12 | Stop reason: ALTCHOICE

## 2022-04-12 RX ORDER — PROPOFOL 10 MG/ML
INJECTION, EMULSION INTRAVENOUS PRN
Status: DISCONTINUED | OUTPATIENT
Start: 2022-04-12 | End: 2022-04-12 | Stop reason: SDUPTHER

## 2022-04-12 RX ORDER — LIDOCAINE HYDROCHLORIDE 5 MG/ML
INJECTION, SOLUTION INFILTRATION; INTRAVENOUS PRN
Status: DISCONTINUED | OUTPATIENT
Start: 2022-04-12 | End: 2022-04-12 | Stop reason: ALTCHOICE

## 2022-04-12 RX ADMIN — SODIUM CHLORIDE: 9 INJECTION, SOLUTION INTRAVENOUS at 11:04

## 2022-04-12 RX ADMIN — PROPOFOL 80 MG: 10 INJECTION, EMULSION INTRAVENOUS at 11:08

## 2022-04-12 RX ADMIN — FENTANYL CITRATE 100 MCG: 50 INJECTION, SOLUTION INTRAMUSCULAR; INTRAVENOUS at 11:06

## 2022-04-12 RX ADMIN — MIDAZOLAM 2 MG: 1 INJECTION INTRAMUSCULAR; INTRAVENOUS at 11:06

## 2022-04-12 ASSESSMENT — PAIN DESCRIPTION - ORIENTATION
ORIENTATION: MID;LOWER

## 2022-04-12 ASSESSMENT — PAIN DESCRIPTION - PROGRESSION: CLINICAL_PROGRESSION: NOT CHANGED

## 2022-04-12 ASSESSMENT — PAIN DESCRIPTION - DESCRIPTORS: DESCRIPTORS: CONSTANT;SHARP;SHOOTING

## 2022-04-12 ASSESSMENT — PAIN DESCRIPTION - LOCATION
LOCATION: BACK

## 2022-04-12 ASSESSMENT — PAIN - FUNCTIONAL ASSESSMENT: PAIN_FUNCTIONAL_ASSESSMENT: 0-10

## 2022-04-12 ASSESSMENT — PAIN SCALES - GENERAL
PAINLEVEL_OUTOF10: 10

## 2022-04-12 ASSESSMENT — LIFESTYLE VARIABLES: SMOKING_STATUS: 1

## 2022-04-12 NOTE — ANESTHESIA POSTPROCEDURE EVALUATION
Department of Anesthesiology  Postprocedure Note    Patient: Bouchra Sarah  MRN: [id number]  YOB: 1985  Date of evaluation: 4/12/2022  Time:  11:15 AM     Procedure Summary     Date: 04/12/22 Room / Location: [facility] PROCEDURE ROOM 02 / [address] [facility]    Anesthesia Start: 1104 Anesthesia Stop: 1115    Procedure: CAUDAL EPIDURAL STEROID INJECTION  #1 (N/A Back) Diagnosis: (LUMBAR RADICULOPATHY)    Surgeons: Agapito Courtney DO Responsible Provider: Balbir Mills MD    Anesthesia Type: MAC ASA Status: 3          Anesthesia Type: MAC    Jeet Phase I: Jeet Score: 10    Jeet Phase II:      Last vitals: Reviewed and per EMR flowsheets.        Anesthesia Post Evaluation    Patient location during evaluation: PACU  Patient participation: complete - patient participated  Level of consciousness: awake and alert  Pain score: 2  Airway patency: patent  Nausea & Vomiting: no nausea and no vomiting  Complications: no  Cardiovascular status: hemodynamically stable  Respiratory status: acceptable

## 2022-04-12 NOTE — OP NOTE
2022    Patient: Yuli Vargas  :  1985  Age:  39 y.o. Sex:  female     PRE-OPERATIVE DIAGNOSIS: Displacement of lumbar intervertebral disc, Lumbar DDD, Spinal stenosis. POST-OPERATIVE DIAGNOSIS: Same. PROCEDURE PERFORMED:  #1 Therapeutic Caudal Epidural done under fluoroscopic guidance. SURGEON:   INGRID Velásquez. ANESTHESIA: MAC    ESTIMATED BLOOD LOSS: None. BRIEF HISTORY: Yuli Vargas comes in today for the first  therapeutic caudal epidural steroid injection under fluorsoscopic guidance. After discussing the potential risks and benefits of the procedure with the patient  Ivone Aubree did request that we proceed. A complete History & Physical was reviewed and it is unchanged. DESCRIPTION OF PROCEDURE:    After confirming written and informed consent, a time-out was performed and Alvaro name and date of birth, the procedure to be performed as well as the plan for the location of the needle insertion were confirmed. Patient was brought into the procedure room and was placed in the prone position on a fluoroscopy table. Standard monitors were placed and vital signs were observed throughout the procedure. The lumbosacral and caudal area were prepped with chloraprep and draped in a sterile manner. The sacral hiatus was identified and marked under AP fluoroscopy. A sterile gauze was placed in the midgluteal cleft for increased sterility. The overlying skin and subcutaneous tissues were anesthetized with 0.5% Lidocaine. Under lateral fluoroscopic guidance, a # 22 gauge 3.5 inch spinal needle was advanced into the sacral hiatus . After the needle passed through the sacrococcygeal ligament, the needle angle was advanced slightly. There was no evidence of paresthesia throughout the needle placement. After negative aspiration for blood and CSF, epidural spread was confirmed with injection of 2 cc of Omnipaque 240 in both live lateral and live AP fluoroscopy.  A solution consisting of 0.5% Lidocaine and 40 mg DepoMedrol, total of 15 cc, was easily injected . There was a clear outline of the epidural space and visualization of the sacral roots. The needle was then removed and a sterile Band-Aid was applied to the puncture site. Disposition the patient tolerated the procedure well and there were no complications . Vital signs remained stable throughout the procedure. The patient was escorted to the recovery area where they remained until discharge and written discharge instructions for the procedure were given. Plan: Roger Galvez will return to our pain management center as scheduled.      Donovan Ornelas DO

## 2022-04-12 NOTE — H&P
[facility]        [address]  [address] [facility],  [address], [address] [facility]  Dept: [phone]    Procedure History & Physical      [facility]     HPI:    Patient  is here for LBP BLE pain for caudal BOAZ  Labs/imaging studies reviewed   All question and concerns addressed including R/B/A associated with the procedure    Past Medical History:   Diagnosis Date    Anxiety     Arthritis     Asthma     Bipolar 1 disorder ([address]     COPD (chronic obstructive pulmonary disease) ([address]     Depression     Fatty liver     Fissure, anal     GERD (gastroesophageal reflux disease)     Glaucoma     Suspected by eye specialist . no eye meds    Hyperlipidemia     Hypertension     Macular edema     Cm    Migraines     Morbid obesity due to excess calories ([address]     Neuropathy     PTSD (post-traumatic stress disorder)     Splenomegaly 2020    Type 2 diabetes mellitus without complication, without long-term current use of insulin ([facility]        Past Surgical History:   Procedure Laterality Date    ANTERIOR CRUCIATE LIGAMENT REPAIR Right      SECTION      ,     CHOLECYSTECTOMY      DILATION AND CURETTAGE OF UTERUS N/A 2019    DILATATION AND CURETTAGE HYSTEROSCOPY WITH REMOVAL OF CONDYLOMATA performed by Gricel Borges MD at [address] [place] [address] N/A 2020    DILATATION AND CURETTAGE HYSTEROSCOPY, ATTEMPTED CERVICAL BIOPSY, MARIYA OF  LABIAL ABSCESS performed by Gricel Borges MD at [place] [address]  2021    Robotic lysis of adhesions, total hysterectomy, bilateral salpingectomy, cystoscopy    INCONTINENCE SURGERY      OTHER SURGICAL HISTORY      repair anal fissure    TUBAL LIGATION         Prior to Admission medications    Medication Sig Start Date End Date Taking?  Authorizing Provider   buPROPion (ZYBAN) 150 MG extended release tablet Take 1 tablet by mouth 2 times daily 3/16/22 Crys Hilliard DO   lisinopril (PRINIVIL;ZESTRIL) 10 MG tablet TAKE ONE TABLET BY MOUTH ONCE DAILY 3/9/22   [facility] Rogerio Covington DO   tiotropium (SPIRIVA HANDIHALER) 18 MCG inhalation capsule Inhale 1 capsule into the lungs daily 3/9/22   [facility] Rogerio Covington, DO   ondansetron (ZOFRAN) 4 MG tablet Take 1 tablet by mouth 3 times daily as needed for Nausea or Vomiting 2/21/22   [facility] Rogerio Covington, DO   topiramate (TOPAMAX) 100 MG tablet TAKE ONE TABLET BY MOUTH EVERY EVENING 12/15/21   Historical Provider, MD   fluticasone (FLOVENT HFA) 44 MCG/ACT inhaler Inhale 2 puffs into the lungs 2 times daily 2/9/22   [facility] Rogerio Covington, DO   Acetaminophen (TYLENOL) 325 MG CAPS Take by mouth    Historical Provider, MD   ibuprofen (ADVIL;MOTRIN) 800 MG tablet Take 800 mg by mouth every 6 hours as needed for Pain    Historical Provider, MD   venlafaxine (EFFEXOR XR) 150 MG extended release capsule Take 150 mg by mouth daily     Historical Provider, MD   albuterol sulfate HFA (PROVENTIL HFA) 108 (90 Base) MCG/ACT inhaler Inhale 2 puffs into the lungs every 4 hours as needed for Wheezing 10/12/21 10/12/22  Crys Hilliard DO   albuterol (PROVENTIL) (2.5 MG/3ML) 0.083% nebulizer solution Take 3 mLs by nebulization every 6 hours as needed for Wheezing 4/29/20   Hola Lira PA-C       Allergies   Allergen Reactions    Keflex [Cephalexin]      Hives, rash      Prednisone Rash    Cymbalta [Duloxetine Hcl] Other (See Comments)     angry    Lamictal [Lamotrigine] Nausea And Vomiting    Neurontin [Gabapentin] Other (See Comments)     Makes me feel very weird       Social History     Socioeconomic History    Marital status:      Spouse name: Nilda Booth Number of children: 2    Years of education: 15    Highest education level: Not on file   Occupational History    Occupation: disability-, LPN   Tobacco Use    Smoking status: Current Every Day Smoker     Packs/day: 1.00     Years: 20.00     Pack years: 20.00 Types: Cigarettes     Start date: 11/3/1995    Smokeless tobacco: Never Used   Vaping Use    Vaping Use: Former    Substances: Never   Substance and Sexual Activity    Alcohol use: Not Currently    Drug use: Not Currently     Types: Marijuana [address])     Comment: medical marijuana last used 3/2021    Sexual activity: Not on file     Comment: TL   Other Topics Concern    Not on file   Social History Narrative    Not on file     Social Determinants of Health     Financial Resource Strain: Low Risk     Difficulty of Paying Living Expenses: Not very hard   Food Insecurity: No Food Insecurity    Worried About Running Out of Food in the Last Year: Never true    Francois of Food in the Last Year: Never true   Transportation Needs:     Lack of Transportation (Medical): Not on file    Lack of Transportation (Non-Medical):  Not on file   Physical Activity:     Days of Exercise per Week: Not on file    Minutes of Exercise per Session: Not on file   Stress:     Feeling of Stress : Not on file   Social Connections:     Frequency of Communication with Friends and Family: Not on file    Frequency of Social Gatherings with Friends and Family: Not on file    Attends [employer] Services: Not on file    Active Member of Clubs or Organizations: Not on file    Attends Club or Organization Meetings: Not on file    Marital Status: Not on file   Intimate Partner Violence:     Fear of Current or Ex-Partner: Not on file    Emotionally Abused: Not on file    Physically Abused: Not on file    Sexually Abused: Not on file   Housing Stability:     Unable to Pay for Housing in the Last Year: Not on file    Number of [place] in the Last Year: Not on file    Unstable Housing in the Last Year: Not on file       Family History   Problem Relation Age of Onset    Diabetes Mother     Cancer Mother         ovarian    Diabetes Father     Cancer Sister         NHL    Diabetes Sister     High Blood Pressure Brother  Breast Cancer Maternal Aunt     Uterine Cancer Neg Hx     Colon Cancer Neg Hx          REVIEW OF SYSTEMS:    CONSTITUTIONAL:  negative for  fevers, chills, sweats and fatigue    RESPIRATORY:  negative for  dry cough, cough with sputum, dyspnea, wheezing and chest pain    CARDIOVASCULAR:  negative for chest pain, dyspnea, palpitations, syncope    GASTROINTESTINAL:  negative for nausea, vomiting, change in bowel habits, diarrhea, constipation and abdominal pain    MUSCULOSKELETAL: negative for muscle weakness    SKIN: negative for itching or rashes. BEHAVIOR/PSYCH:  negative for poor appetite, increased appetite, decreased sleep and poor concentration    All other systems negative      PHYSICAL EXAM:    VITALS:  /70   Pulse 92   Temp 98.1 °F (36.7 °C) (Temporal)   Resp 18   Ht 5' 3\" (1.6 m)   Wt (!) 309 lb (140.2 kg)   LMP 02/22/2021 (Approximate)   SpO2 96%   BMI 54.74 kg/m²     CONSTITUTIONAL:  awake, alert, cooperative, no apparent distress, and appears stated age    EYES: PERRLA, EOMI    LUNGS:  No increased work of breathing, no audible wheezing    CARDIOVASCULAR:  regular rate and rhythm    ABDOMEN:  Soft non tender non distended     EXTREMITIES: no signs of clubbing or cyanosis. MUSCULOSKELETAL: negative for flaccid muscle tone or spastic movements. SKIN: gross examination reveals no signs of rashes, or diaphoresis. NEURO: Cranial nerves II-XII grossly intact. No signs of agitated mood.        Assessment/Plan:    LBP BLE pain for caudal BOAZ

## 2022-04-12 NOTE — ANESTHESIA PRE PROCEDURE
Department of Anesthesiology  Preprocedure Note       Name:  Bouchra Sarah   Age:  39 y.o.  :  1985                                          MRN:  [id number]         Date:  2022      Surgeon: Kelly Angel):  Agapito Courtney DO    Procedure: CAUDAL EPIDURAL STEROID INJECTION  #1 (N/A )    Medications prior to admission:   Prior to Admission medications    Medication Sig Start Date End Date Taking?  Authorizing Provider   buPROPion (ZYBAN) 150 MG extended release tablet Take 1 tablet by mouth 2 times daily 3/16/22   Elena Byron Mooreton, DO   lisinopril (PRINIVIL;ZESTRIL) 10 MG tablet TAKE ONE TABLET BY MOUTH ONCE DAILY 3/9/22   Elena Byron Mooreton, DO   tiotropium (SPIRIVA HANDIHALER) 18 MCG inhalation capsule Inhale 1 capsule into the lungs daily 3/9/22   [facility] Byron Mooreton, DO   ondansetron (ZOFRAN) 4 MG tablet Take 1 tablet by mouth 3 times daily as needed for Nausea or Vomiting 22   [facility] Byron Mooreton, DO   topiramate (TOPAMAX) 100 MG tablet TAKE ONE TABLET BY MOUTH EVERY EVENING 12/15/21   Historical Provider, MD   fluticasone (FLOVENT HFA) 44 MCG/ACT inhaler Inhale 2 puffs into the lungs 2 times daily 22   [facility]   Acetaminophen (TYLENOL) 325 MG CAPS Take by mouth    Historical Provider, MD   ibuprofen (ADVIL;MOTRIN) 800 MG tablet Take 800 mg by mouth every 6 hours as needed for Pain    Historical Provider, MD   venlafaxine (EFFEXOR XR) 150 MG extended release capsule Take 150 mg by mouth daily     Historical Provider, MD   albuterol sulfate HFA (PROVENTIL HFA) 108 (90 Base) MCG/ACT inhaler Inhale 2 puffs into the lungs every 4 hours as needed for Wheezing 10/12/21 10/12/22  [address], DO   albuterol (PROVENTIL) (2.5 MG/3ML) 0.083% nebulizer solution Take 3 mLs by nebulization every 6 hours as needed for Wheezing 20   Ca Perdue PA-C       Current medications:    Current Outpatient Medications   Medication Sig Dispense Refill    buPROPion (ZYBAN) 150 MG extended release tablet Take 1 tablet by mouth 2 times daily 60 tablet 5    lisinopril (PRINIVIL;ZESTRIL) 10 MG tablet TAKE ONE TABLET BY MOUTH ONCE DAILY 30 tablet 2    tiotropium (SPIRIVA HANDIHALER) 18 MCG inhalation capsule Inhale 1 capsule into the lungs daily 30 capsule 5    ondansetron (ZOFRAN) 4 MG tablet Take 1 tablet by mouth 3 times daily as needed for Nausea or Vomiting 15 tablet 0    topiramate (TOPAMAX) 100 MG tablet TAKE ONE TABLET BY MOUTH EVERY EVENING      fluticasone (FLOVENT HFA) 44 MCG/ACT inhaler Inhale 2 puffs into the lungs 2 times daily 1 each 3    Acetaminophen (TYLENOL) 325 MG CAPS Take by mouth      ibuprofen (ADVIL;MOTRIN) 800 MG tablet Take 800 mg by mouth every 6 hours as needed for Pain      venlafaxine (EFFEXOR XR) 150 MG extended release capsule Take 150 mg by mouth daily       albuterol sulfate HFA (PROVENTIL HFA) 108 (90 Base) MCG/ACT inhaler Inhale 2 puffs into the lungs every 4 hours as needed for Wheezing 1 each 5    albuterol (PROVENTIL) (2.5 MG/3ML) 0.083% nebulizer solution Take 3 mLs by nebulization every 6 hours as needed for Wheezing 120 each 3     No current facility-administered medications for this visit. Allergies: Allergies   Allergen Reactions    Keflex [Cephalexin]      Hives, rash      Prednisone Rash    Cymbalta [Duloxetine Hcl] Other (See Comments)     angry    Lamictal [Lamotrigine] Nausea And Vomiting    Neurontin [Gabapentin] Other (See Comments)     Makes me feel very weird       Problem List:    Patient Active Problem List   Diagnosis Code    Mild intermittent asthma without complication A17.58    Elevated blood pressure reading R03.0    Bipolar 1 disorder (HCC) F31.9    Anxiety F41.9    Suicidal ideation R45.851    Major depressive disorder, recurrent ([facility] [address] F33.9    Depression with suicidal ideation F32. A, R45.851    Depression with anxiety F41.8    Strain of lumbar region S39.012A    Sciatica M54.30    Spasm of muscle Q41.237    Menorrhagia with irregular cycle N92.1    Condylomata acuminata A63.0    Endometrial thickening on ultrasound R93.89    Vulvar abscess N76.4    Cervical stenosis (uterine cervix) N88.2    Chest pain R07.9    At risk for shortness of breath Z91.89    Dizziness R42    Type 2 diabetes mellitus without complication, without long-term current use of insulin (HCC) E11.9    Tobacco abuse Z72.0    Splenomegaly R16.1    Diabetes mellitus ([facility]) E11.9    Abnormal uterine bleeding N93.9    Lumbar radiculopathy M54.16    Chronic bilateral low back pain with bilateral sciatica M54.42, M54.41, G89.29    Chronic pain syndrome G89.4    Lumbosacral spondylosis without myelopathy M47.817    Chronic obstructive pulmonary disease, unspecified COPD type ([facility]) J44.9       Past Medical History:        Diagnosis Date    Anxiety     Arthritis     Asthma     Bipolar 1 disorder ([facility]     COPD (chronic obstructive pulmonary disease) ([facility]     Depression     Fatty liver     Fissure, anal     GERD (gastroesophageal reflux disease)     Glaucoma     Suspected by eye specialist . no eye meds    Hyperlipidemia     Hypertension     Macular edema     Cm    Migraines     Morbid obesity due to excess calories ([address]     Neuropathy     PTSD (post-traumatic stress disorder)     Splenomegaly 2020    Type 2 diabetes mellitus without complication, without long-term current use of insulin ([address]        Past Surgical History:        Procedure Laterality Date    ANTERIOR CRUCIATE LIGAMENT REPAIR Right      SECTION      ,     CHOLECYSTECTOMY      DILATION AND CURETTAGE OF UTERUS N/A 2019    DILATATION AND CURETTAGE HYSTEROSCOPY WITH REMOVAL OF CONDYLOMATA performed by Thor Lainez MD at [address] [place] [address] N/A 2020    DILATATION AND CURETTAGE HYSTEROSCOPY, ATTEMPTED CERVICAL BIOPSY, MARIYA OF  LABIAL ABSCESS performed by Thor Lainez MD at [facility] 05/12/2020    INR 0.9 05/12/2020    APTT 32.2 05/12/2020       HCG (If Applicable):   Lab Results   Component Value Date    PREGTESTUR NEGATIVE 09/14/2020        ABGs: No results found for: PHART, PO2ART, YCQ0BMY, DYW2YUI, BEART, X4IGLYPF     Type & Screen (If Applicable):  Lab Results   Component Value Date    LABABO O 02/15/2021       Anesthesia Evaluation  Patient summary reviewed no history of anesthetic complications:   Airway: Mallampati: III  TM distance: <3 FB     Mouth opening: > = 3 FB Dental:      Comment: Dentition intact, multiple missing molars, denies any loose teeth. Pulmonary:   (+) COPD:  decreased breath sounds (at bases, otherwise clear),  asthma (daily inhalers): current smoker ( 1-3 PPD x 20 years currently down to 1 PPD )    (-) wheezes          Patient smoked on day of surgery. ROS comment: Probable CELESTINO based on body habitus and h/o snoring; Never had sleep study   Cardiovascular:    (+) hypertension:, hyperlipidemia    (-) past MI, CAD and CABG/stent      Rhythm: regular  Rate: normal                    Neuro/Psych:   (+) neuromuscular disease:, headaches: migraine headaches, psychiatric history (PTSD; bipolar d/o Depression with suicidal ideat):depression/anxiety             GI/Hepatic/Renal:   (+) GERD (prn Zantac (states she only takes it a \"few times\" per year)):, liver disease ( Fatty liver disease):, morbid obesity (  Super morbidly obese BMI: 54.74)          Endo/Other:    (+) DiabetesType II DM, , .                 Abdominal:   (+) obese ( Super morbidly obese),           Vascular: negative vascular ROS. Other Findings: Lower lip bruise (patient states she was biting on it)            Anesthesia Plan      MAC     ASA 3       Induction: intravenous. Anesthetic plan and risks discussed with patient. Plan discussed with CRNA.                   Pardeep White MD   4/12/2022  (this addendum was done preop but unable to be filed electronically at that time)      DOS STAFF ADDENDUM:    Patient seen and chart reviewed. No interval change in history or exam.   Anesthesia plan discussed, risk/benefits addressed. Patient's concerns and questions answered.      Iesha Clinton, VELMA - CRNA  April 12, 2022  10:50 AM

## 2022-04-19 ENCOUNTER — TELEPHONE (OUTPATIENT)
Dept: ADMINISTRATIVE | Age: 37
End: 2022-04-19

## 2022-04-19 NOTE — TELEPHONE ENCOUNTER
Pt has a New Pt appt scheduled with Dr Liz Pelayo and she called to r/s it due to a transportation issue. I was unable to see any available appts soon. Please advise how far pt can be rescheduled out.

## 2022-04-22 ENCOUNTER — OFFICE VISIT (OUTPATIENT)
Dept: PRIMARY CARE CLINIC | Age: 37
End: 2022-04-22
Payer: COMMERCIAL

## 2022-04-22 VITALS
HEIGHT: 63 IN | DIASTOLIC BLOOD PRESSURE: 100 MMHG | RESPIRATION RATE: 20 BRPM | WEIGHT: 293 LBS | SYSTOLIC BLOOD PRESSURE: 152 MMHG | HEART RATE: 102 BPM | TEMPERATURE: 97.1 F | OXYGEN SATURATION: 97 % | BODY MASS INDEX: 51.91 KG/M2

## 2022-04-22 DIAGNOSIS — N39.0 URINARY TRACT INFECTION IN FEMALE: Primary | ICD-10-CM

## 2022-04-22 DIAGNOSIS — B96.89 ACUTE BACTERIAL SINUSITIS: ICD-10-CM

## 2022-04-22 DIAGNOSIS — J01.90 ACUTE BACTERIAL SINUSITIS: ICD-10-CM

## 2022-04-22 DIAGNOSIS — R05.9 COUGH: ICD-10-CM

## 2022-04-22 DIAGNOSIS — N39.0 URINARY TRACT INFECTION IN FEMALE: ICD-10-CM

## 2022-04-22 LAB
BILIRUBIN, POC: NEGATIVE
BLOOD URINE, POC: NORMAL
CLARITY, POC: NORMAL
COLOR, POC: NORMAL
GLUCOSE URINE, POC: NEGATIVE
INFLUENZA A ANTIGEN, POC: NEGATIVE
INFLUENZA B ANTIGEN, POC: NEGATIVE
KETONES, POC: NEGATIVE
LEUKOCYTE EST, POC: NORMAL
Lab: NORMAL
NITRITE, POC: NEGATIVE
PERFORMING INSTRUMENT: NORMAL
PH, POC: 6.5
PROTEIN, POC: NORMAL
QC PASS/FAIL: NORMAL
SARS-COV-2, POC: NORMAL
SPECIFIC GRAVITY, POC: >=1.03
UROBILINOGEN, POC: NORMAL

## 2022-04-22 PROCEDURE — 87804 INFLUENZA ASSAY W/OPTIC: CPT | Performed by: NURSE PRACTITIONER

## 2022-04-22 PROCEDURE — G8427 DOCREV CUR MEDS BY ELIG CLIN: HCPCS | Performed by: NURSE PRACTITIONER

## 2022-04-22 PROCEDURE — G8417 CALC BMI ABV UP PARAM F/U: HCPCS | Performed by: NURSE PRACTITIONER

## 2022-04-22 PROCEDURE — 4004F PT TOBACCO SCREEN RCVD TLK: CPT | Performed by: NURSE PRACTITIONER

## 2022-04-22 PROCEDURE — 87426 SARSCOV CORONAVIRUS AG IA: CPT | Performed by: NURSE PRACTITIONER

## 2022-04-22 PROCEDURE — 81002 URINALYSIS NONAUTO W/O SCOPE: CPT | Performed by: NURSE PRACTITIONER

## 2022-04-22 PROCEDURE — 99213 OFFICE O/P EST LOW 20 MIN: CPT | Performed by: NURSE PRACTITIONER

## 2022-04-22 RX ORDER — BROMPHENIRAMINE MALEATE, PSEUDOEPHEDRINE HYDROCHLORIDE, AND DEXTROMETHORPHAN HYDROBROMIDE 2; 30; 10 MG/5ML; MG/5ML; MG/5ML
5 SYRUP ORAL 4 TIMES DAILY PRN
Qty: 118 ML | Refills: 0 | Status: SHIPPED
Start: 2022-04-22 | End: 2022-06-20 | Stop reason: ALTCHOICE

## 2022-04-22 RX ORDER — DOXYCYCLINE HYCLATE 100 MG
100 TABLET ORAL 2 TIMES DAILY
Qty: 20 TABLET | Refills: 0 | Status: SHIPPED | OUTPATIENT
Start: 2022-04-22 | End: 2022-05-02

## 2022-04-22 SDOH — HEALTH STABILITY: PHYSICAL HEALTH: ON AVERAGE, HOW MANY MINUTES DO YOU ENGAGE IN EXERCISE AT THIS LEVEL?: 60 MIN

## 2022-04-22 SDOH — SOCIAL STABILITY: SOCIAL INSECURITY: WITHIN THE LAST YEAR, HAVE YOU BEEN AFRAID OF YOUR PARTNER OR EX-PARTNER?: NO

## 2022-04-22 SDOH — SOCIAL STABILITY: SOCIAL INSECURITY: WITHIN THE LAST YEAR, HAVE YOU BEEN HUMILIATED OR EMOTIONALLY ABUSED IN OTHER WAYS BY YOUR PARTNER OR EX-PARTNER?: NO

## 2022-04-22 SDOH — SOCIAL STABILITY: SOCIAL NETWORK: ARE YOU MARRIED, WIDOWED, DIVORCED, SEPARATED, NEVER MARRIED, OR LIVING WITH A PARTNER?: MARRIED

## 2022-04-22 SDOH — HEALTH STABILITY: PHYSICAL HEALTH: ON AVERAGE, HOW MANY DAYS PER WEEK DO YOU ENGAGE IN MODERATE TO STRENUOUS EXERCISE (LIKE A BRISK WALK)?: 7 DAYS

## 2022-04-22 SDOH — HEALTH STABILITY: MENTAL HEALTH: HOW OFTEN DO YOU HAVE A DRINK CONTAINING ALCOHOL?: NEVER

## 2022-04-22 SDOH — ECONOMIC STABILITY: INCOME INSECURITY: HOW HARD IS IT FOR YOU TO PAY FOR THE VERY BASICS LIKE FOOD, HOUSING, MEDICAL CARE, AND HEATING?: NOT HARD AT ALL

## 2022-04-22 SDOH — SOCIAL STABILITY: SOCIAL INSECURITY
WITHIN THE LAST YEAR, HAVE TO BEEN RAPED OR FORCED TO HAVE ANY KIND OF SEXUAL ACTIVITY BY YOUR PARTNER OR EX-PARTNER?: NO

## 2022-04-22 SDOH — SOCIAL STABILITY: SOCIAL NETWORK: HOW OFTEN DO YOU ATTENT MEETINGS OF THE CLUB OR ORGANIZATION YOU BELONG TO?: NEVER

## 2022-04-22 SDOH — ECONOMIC STABILITY: TRANSPORTATION INSECURITY
IN THE PAST 12 MONTHS, HAS LACK OF TRANSPORTATION KEPT YOU FROM MEETINGS, WORK, OR FROM GETTING THINGS NEEDED FOR DAILY LIVING?: NO

## 2022-04-22 SDOH — SOCIAL STABILITY: SOCIAL NETWORK: HOW OFTEN DO YOU GET TOGETHER WITH FRIENDS OR RELATIVES?: MORE THAN THREE TIMES A WEEK

## 2022-04-22 SDOH — ECONOMIC STABILITY: INCOME INSECURITY: IN THE LAST 12 MONTHS, WAS THERE A TIME WHEN YOU WERE NOT ABLE TO PAY THE MORTGAGE OR RENT ON TIME?: NO

## 2022-04-22 SDOH — HEALTH STABILITY: MENTAL HEALTH
STRESS IS WHEN SOMEONE FEELS TENSE, NERVOUS, ANXIOUS, OR CAN'T SLEEP AT NIGHT BECAUSE THEIR MIND IS TROUBLED. HOW STRESSED ARE YOU?: ONLY A LITTLE

## 2022-04-22 SDOH — SOCIAL STABILITY: SOCIAL INSECURITY
WITHIN THE LAST YEAR, HAVE YOU BEEN KICKED, HIT, SLAPPED, OR OTHERWISE PHYSICALLY HURT BY YOUR PARTNER OR EX-PARTNER?: NO

## 2022-04-22 SDOH — ECONOMIC STABILITY: FOOD INSECURITY: WITHIN THE PAST 12 MONTHS, YOU WORRIED THAT YOUR FOOD WOULD RUN OUT BEFORE YOU GOT MONEY TO BUY MORE.: NEVER TRUE

## 2022-04-22 SDOH — SOCIAL STABILITY: SOCIAL NETWORK
DO YOU BELONG TO ANY CLUBS OR ORGANIZATIONS SUCH AS CHURCH GROUPS UNIONS, FRATERNAL OR ATHLETIC GROUPS, OR SCHOOL GROUPS?: NO

## 2022-04-22 SDOH — SOCIAL STABILITY: SOCIAL NETWORK
IN A TYPICAL WEEK, HOW MANY TIMES DO YOU TALK ON THE PHONE WITH FAMILY, FRIENDS, OR NEIGHBORS?: MORE THAN THREE TIMES A WEEK

## 2022-04-22 SDOH — ECONOMIC STABILITY: TRANSPORTATION INSECURITY
IN THE PAST 12 MONTHS, HAS THE LACK OF TRANSPORTATION KEPT YOU FROM MEDICAL APPOINTMENTS OR FROM GETTING MEDICATIONS?: NO

## 2022-04-22 SDOH — ECONOMIC STABILITY: HOUSING INSECURITY: IN THE LAST 12 MONTHS, HOW MANY PLACES HAVE YOU LIVED?: 2

## 2022-04-22 SDOH — SOCIAL STABILITY: SOCIAL NETWORK: HOW OFTEN DO YOU ATTEND CHURCH OR RELIGIOUS SERVICES?: MORE THAN 4 TIMES PER YEAR

## 2022-04-22 SDOH — ECONOMIC STABILITY: FOOD INSECURITY: WITHIN THE PAST 12 MONTHS, THE FOOD YOU BOUGHT JUST DIDN'T LAST AND YOU DIDN'T HAVE MONEY TO GET MORE.: NEVER TRUE

## 2022-04-25 LAB — URINE CULTURE, ROUTINE: NORMAL

## 2022-04-27 ENCOUNTER — HOSPITAL ENCOUNTER (OUTPATIENT)
Dept: PSYCHIATRY | Age: 37
Discharge: HOME | End: 2022-04-27

## 2022-04-27 NOTE — FLOWSHEET NOTE
Client did not show or call to cancel for group via zoom. Client will be discharged for consecutive no shows.      Electronically signed by DURAN Aly on 4/27/2022 at 4:07 PM

## 2022-05-04 ENCOUNTER — OFFICE VISIT (OUTPATIENT)
Dept: FAMILY MEDICINE CLINIC | Age: 37
End: 2022-05-04
Payer: COMMERCIAL

## 2022-05-04 VITALS
BODY MASS INDEX: 51.91 KG/M2 | HEART RATE: 102 BPM | TEMPERATURE: 97.3 F | DIASTOLIC BLOOD PRESSURE: 86 MMHG | WEIGHT: 293 LBS | OXYGEN SATURATION: 97 % | SYSTOLIC BLOOD PRESSURE: 128 MMHG | RESPIRATION RATE: 16 BRPM | HEIGHT: 63 IN

## 2022-05-04 DIAGNOSIS — I10 PRIMARY HYPERTENSION: Primary | ICD-10-CM

## 2022-05-04 DIAGNOSIS — R11.2 NON-INTRACTABLE VOMITING WITH NAUSEA, UNSPECIFIED VOMITING TYPE: ICD-10-CM

## 2022-05-04 DIAGNOSIS — E11.9 TYPE 2 DIABETES MELLITUS WITHOUT COMPLICATION, WITHOUT LONG-TERM CURRENT USE OF INSULIN (HCC): ICD-10-CM

## 2022-05-04 DIAGNOSIS — J45.20 MILD INTERMITTENT ASTHMA WITHOUT COMPLICATION: ICD-10-CM

## 2022-05-04 DIAGNOSIS — J01.90 ACUTE SINUSITIS, RECURRENCE NOT SPECIFIED, UNSPECIFIED LOCATION: ICD-10-CM

## 2022-05-04 PROCEDURE — 99214 OFFICE O/P EST MOD 30 MIN: CPT | Performed by: FAMILY MEDICINE

## 2022-05-04 PROCEDURE — 2022F DILAT RTA XM EVC RTNOPTHY: CPT | Performed by: FAMILY MEDICINE

## 2022-05-04 PROCEDURE — 4004F PT TOBACCO SCREEN RCVD TLK: CPT | Performed by: FAMILY MEDICINE

## 2022-05-04 PROCEDURE — G8417 CALC BMI ABV UP PARAM F/U: HCPCS | Performed by: FAMILY MEDICINE

## 2022-05-04 PROCEDURE — 3044F HG A1C LEVEL LT 7.0%: CPT | Performed by: FAMILY MEDICINE

## 2022-05-04 PROCEDURE — G8427 DOCREV CUR MEDS BY ELIG CLIN: HCPCS | Performed by: FAMILY MEDICINE

## 2022-05-04 RX ORDER — LISINOPRIL 10 MG/1
10 TABLET ORAL DAILY
Qty: 90 TABLET | Refills: 1 | Status: SHIPPED
Start: 2022-05-04 | End: 2022-07-06

## 2022-05-04 RX ORDER — ALBUTEROL SULFATE 90 UG/1
2 AEROSOL, METERED RESPIRATORY (INHALATION) EVERY 4 HOURS PRN
Qty: 1 EACH | Refills: 5 | Status: SHIPPED | OUTPATIENT
Start: 2022-05-04 | End: 2023-05-04

## 2022-05-04 RX ORDER — ONDANSETRON 4 MG/1
4 TABLET, FILM COATED ORAL 3 TIMES DAILY PRN
Qty: 15 TABLET | Refills: 0 | Status: SHIPPED | OUTPATIENT
Start: 2022-05-04

## 2022-05-04 RX ORDER — FLUTICASONE PROPIONATE 44 UG/1
2 AEROSOL, METERED RESPIRATORY (INHALATION) 2 TIMES DAILY
Qty: 1 EACH | Refills: 3 | Status: SHIPPED | OUTPATIENT
Start: 2022-05-04

## 2022-05-04 SDOH — ECONOMIC STABILITY: INCOME INSECURITY: IN THE LAST 12 MONTHS, WAS THERE A TIME WHEN YOU WERE NOT ABLE TO PAY THE MORTGAGE OR RENT ON TIME?: NO

## 2022-05-04 SDOH — ECONOMIC STABILITY: HOUSING INSECURITY
IN THE LAST 12 MONTHS, WAS THERE A TIME WHEN YOU DID NOT HAVE A STEADY PLACE TO SLEEP OR SLEPT IN A SHELTER (INCLUDING NOW)?: NO

## 2022-05-04 SDOH — ECONOMIC STABILITY: HOUSING INSECURITY: IN THE LAST 12 MONTHS, HOW MANY PLACES HAVE YOU LIVED?: 1

## 2022-05-07 PROBLEM — I10 PRIMARY HYPERTENSION: Status: ACTIVE | Noted: 2022-05-07

## 2022-05-07 ASSESSMENT — ENCOUNTER SYMPTOMS
SINUS PAIN: 0
RHINORRHEA: 1
EYE DISCHARGE: 0
COUGH: 1
PHOTOPHOBIA: 0
SHORTNESS OF BREATH: 0
EYE REDNESS: 0

## 2022-05-07 NOTE — PROGRESS NOTES
2022    [facility]    Chief Complaint   Patient presents with   Nelida Pina    Chest Congestion     pt was seen in walk-in care and tested negitive for flu and covid-19       HPI  History was obtained from patient. Talbot Runner is a 39 y.o. female who presents today with the followin. Primary hypertension    2. Acute sinusitis, recurrence not specified, unspecified location    3. Mild intermittent asthma without complication    4. Type 2 diabetes mellitus without complication, without long-term current use of insulin ([facility]    5. Non-intractable vomiting with nausea, unspecified vomiting type    Patient is here to establish primary care. Patient was seen about 10 days ago and diagnosed with a UTI and acute bacterial sinusitis. Patient was prescribed doxycycline and has finished that medication. Her UTI symptoms have completely resolved she still has a mild cough and rhinorrhea. Also been having some nausea and occasional vomiting. She states that this happens frequently  REVIEW OF SYMPTOMS    Review of Systems   Constitutional: Negative for chills, fatigue and fever. HENT: Positive for rhinorrhea. Negative for congestion, mouth sores, postnasal drip and sinus pain. Eyes: Negative for photophobia, discharge and redness. Respiratory: Positive for cough. Negative for shortness of breath. Cardiovascular: Negative for chest pain. Genitourinary: Negative for difficulty urinating. Neurological: Negative for headaches. Psychiatric/Behavioral: Negative for sleep disturbance.        PAST MEDICAL HISTORY  Past Medical History:   Diagnosis Date    Anxiety     Arthritis     Asthma     Bipolar 1 disorder ([address]     COPD (chronic obstructive pulmonary disease) ([facility]     Depression     Fatty liver     Fissure, anal     GERD (gastroesophageal reflux disease)     Glaucoma     Suspected by eye specialist . no eye meds    Hyperlipidemia     Hypertension     Macular edema Cm    Migraines     Morbid obesity due to excess calories (HCC)     Neuropathy     PTSD (post-traumatic stress disorder)     Splenomegaly 09/24/2020    Type 2 diabetes mellitus without complication, without long-term current use of insulin ([facility]        FAMILY HISTORY  Family History   Problem Relation Age of Onset    Diabetes Mother     Cancer Mother         ovarian    Diabetes Father     Cancer Sister         NHL    Diabetes Sister     High Blood Pressure Brother     Breast Cancer Maternal Aunt     Uterine Cancer Neg Hx     Colon Cancer Neg Hx        SOCIAL HISTORY  Social History     Socioeconomic History    Marital status:      Spouse name: Eros Flores Number of children: 2    Years of education: 15    Highest education level: None   Occupational History    Occupation: disability-, LPN   Tobacco Use    Smoking status: Current Every Day Smoker     Packs/day: 1.00     Years: 20.00     Pack years: 20.00     Types: Cigarettes     Start date: 11/3/1995    Smokeless tobacco: Never Used   Vaping Use    Vaping Use: Former    Substances: Never   Substance and Sexual Activity    Alcohol use: Not Currently    Drug use: Not Currently     Types: Marijuana [place])     Comment: medical marijuana last used 3/2021    Sexual activity: None     Comment: TL   Other Topics Concern    None   Social History Narrative    None     Social Determinants of Health     Financial Resource Strain: Low Risk     Difficulty of Paying Living Expenses: Not hard at all   Food Insecurity: No Food Insecurity    Worried About Running Out of Food in the Last Year: Never true    Francois of Food in the Last Year: Never true   Transportation Needs: No Transportation Needs    Lack of Transportation (Medical): No    Lack of Transportation (Non-Medical):  No   Physical Activity: Sufficiently Active    Days of Exercise per Week: 7 days    Minutes of Exercise per Session: 60 min   Stress: No Stress Concern Present    Feeling of Stress : Only a little   Social Connections:  Moderately Integrated    Frequency of Communication with Friends and Family: More than three times a week    Frequency of Social Gatherings with Friends and Family: More than three times a week    Attends Baptism Services: More than 4 times per year    Active Member of [address] [place] [address] [employer] or Organizations: No    Attends Club or Organization Meetings: Never    Marital Status:    Intimate Partner Violence: Not At Risk    Fear of Current or Ex-Partner: No    Emotionally Abused: No    Physically Abused: No    Sexually Abused: No   Housing Stability: [address] Unable to Pay for Housing in the Last Year: No    Number of [place] in the Last Year: 1   [address] in the Last Year: No        SURGICAL HISTORY  Past Surgical History:   Procedure Laterality Date    ANTERIOR CRUCIATE LIGAMENT REPAIR Right      SECTION      ,     CHOLECYSTECTOMY      DILATION AND CURETTAGE OF UTERUS N/A 2019    [address] performed by Ari Gordon MD at [facility] N/A 2020    DILATATION AND CURETTAGE HYSTEROSCOPY, ATTEMPTED CERVICAL BIOPSY, MARIYA OF  LABIAL ABSCESS performed by Ari Gordon MD at [place] [address]  2021    Robotic lysis of adhesions, total hysterectomy, bilateral salpingectomy, cystoscopy    INCONTINENCE SURGERY      OTHER SURGICAL HISTORY      repair anal fissure    PAIN MANAGEMENT PROCEDURE N/A 2022    CAUDAL EPIDURAL STEROID INJECTION  #1 performed by Liana Mccollum DO at [address] [facility]  Current Outpatient Medications   Medication Sig Dispense Refill    lisinopril (PRINIVIL;ZESTRIL) 10 MG tablet Take 1 tablet by mouth daily 90 tablet 1    ondansetron (ZOFRAN) 4 MG tablet Take 1 tablet by mouth 3 times daily as needed for Nausea or Vomiting 15 tablet 0    fluticasone (FLOVENT HFA) 44 MCG/ACT inhaler Inhale 2 puffs into the lungs 2 times daily 1 each 3    albuterol sulfate HFA (PROVENTIL HFA) 108 (90 Base) MCG/ACT inhaler Inhale 2 puffs into the lungs every 4 hours as needed for Wheezing 1 each 5    brompheniramine-pseudoephedrine-DM 2-30-10 MG/5ML syrup Take 5 mLs by mouth 4 times daily as needed for Congestion 118 mL 0    tiotropium (SPIRIVA HANDIHALER) 18 MCG inhalation capsule Inhale 1 capsule into the lungs daily 30 capsule 5    topiramate (TOPAMAX) 100 MG tablet TAKE ONE TABLET BY MOUTH EVERY EVENING      Acetaminophen (TYLENOL) 325 MG CAPS Take by mouth      ibuprofen (ADVIL;MOTRIN) 800 MG tablet Take 800 mg by mouth every 6 hours as needed for Pain      albuterol (PROVENTIL) (2.5 MG/3ML) 0.083% nebulizer solution Take 3 mLs by nebulization every 6 hours as needed for Wheezing 120 each 3    buPROPion (ZYBAN) 150 MG extended release tablet Take 1 tablet by mouth 2 times daily 60 tablet 5     No current facility-administered medications for this visit. ALLERGIES  Allergies   Allergen Reactions    Keflex [Cephalexin]      Hives, rash      Prednisone Rash    Cymbalta [Duloxetine Hcl] Other (See Comments)     angry    Lamictal [Lamotrigine] Nausea And Vomiting    Neurontin [Gabapentin] Other (See Comments)     Makes me feel very weird       PHYSICAL EXAM    /86   Pulse 102   Temp 97.3 °F (36.3 °C)   Resp 16   Ht 5' 3\" (1.6 m)   Wt (!) 306 lb (138.8 kg)   LMP 02/22/2021 (Approximate)   SpO2 97%   BMI 54.21 kg/m²     Physical Exam  Vitals and nursing note reviewed. Constitutional:       Appearance: Normal appearance. HENT:      Nose: No congestion or rhinorrhea. Right Sinus: No maxillary sinus tenderness or frontal sinus tenderness. Left Sinus: No maxillary sinus tenderness or frontal sinus tenderness.       Mouth/Throat:      Mouth: Mucous membranes are moist. Pharynx: Oropharynx is clear. No oropharyngeal exudate or posterior oropharyngeal erythema. Eyes:      Conjunctiva/sclera: Conjunctivae normal.   Cardiovascular:      Rate and Rhythm: Normal rate and regular rhythm. Heart sounds: Normal heart sounds. Pulmonary:      Effort: Pulmonary effort is normal.      Breath sounds: Normal breath sounds. Musculoskeletal:      Cervical back: Neck supple. Skin:     General: Skin is warm. Neurological:      Mental Status: She is alert and oriented to person, place, and time. Psychiatric:         Mood and Affect: Mood normal.         ASSESSMENT & PLAN    Flory Goss was seen today for established new doctor and chest congestion. Diagnoses and all orders for this visit:    Primary hypertension  -     lisinopril (PRINIVIL;ZESTRIL) 10 MG tablet; Take 1 tablet by mouth daily    Acute sinusitis, recurrence not specified, unspecified location    Mild intermittent asthma without complication  -     fluticasone (FLOVENT HFA) 44 MCG/ACT inhaler; Inhale 2 puffs into the lungs 2 times daily  -     albuterol sulfate HFA (PROVENTIL HFA) 108 (90 Base) MCG/ACT inhaler; Inhale 2 puffs into the lungs every 4 hours as needed for Wheezing    Type 2 diabetes mellitus without complication, without long-term current use of insulin (HCC)    Non-intractable vomiting with nausea, unspecified vomiting type  -     ondansetron (ZOFRAN) 4 MG tablet; Take 1 tablet by mouth 3 times daily as needed for Nausea or Vomiting  -     AFL - Oliverio Marie MD, Gastroenterology, [place]      Hypertension and asthma medications have been refilled. Patient will continue to follow with pain management. She has been referred to GI for her nausea and vomiting. I have prescribed some Zofran to help until she is seen there  Return in about 2 months (around 7/4/2022). Electronically signed by Joan Wallace.  Terrance Reyna DO on 5/7/2022

## 2022-06-01 ENCOUNTER — EVALUATION (OUTPATIENT)
Dept: PHYSICAL THERAPY | Age: 37
End: 2022-06-01
Payer: COMMERCIAL

## 2022-06-01 DIAGNOSIS — M54.16 LUMBAR RADICULOPATHY: ICD-10-CM

## 2022-06-01 DIAGNOSIS — M54.41 CHRONIC BILATERAL LOW BACK PAIN WITH RIGHT-SIDED SCIATICA: Primary | ICD-10-CM

## 2022-06-01 DIAGNOSIS — G89.29 CHRONIC BILATERAL LOW BACK PAIN WITH RIGHT-SIDED SCIATICA: Primary | ICD-10-CM

## 2022-06-01 PROCEDURE — 97161 PT EVAL LOW COMPLEX 20 MIN: CPT | Performed by: PHYSICAL THERAPIST

## 2022-06-01 NOTE — PROGRESS NOTES
[facility]        [address]  [address] [address]  Dept: [phone]        Follow up Note      Vinnie Reas     Date of Visit:  22     CC:  Patient presents for follow up   Chief Complaint   Patient presents with    Follow Up After Procedure     CAUDAL EPIDURAL STEROID INJECTION  #1    Lower Back Pain     HPI:    Pain is better. Change in quality of symptoms:no. Medication side effects:not applicable . Recent diagnostic testing:none  Recent interventional procedures:caudal BOAZ with 100% relief x 2 weeks. She has been on anticoagulation medications to include NSAIDS and has not been on herbal supplements. She is diabetic.     Imagin/2022 EDX -  Electrodiagnostic examination of both legs disclosed evidence diagnostic of left S1 motor radiculopathy or intracanalicular lesion, with acute and chronic denervation changes. This was proven with the abnormal needle testing of the paraspinals.     There were no other motor radiculopathies or intracanalicular lesions. There were no peripheral neuropathies. Sensory radiculopathies cannot be evaluated by electrodiagnostic means.     Electrodiagnostic study performed 1 year ago found no abnormalities.     Clinically, the patient presented with back pains radiating into her left leg and foot. On brief neurological examination, I found no motor or sensory compromise. Her difficulties are related to her radicular disease. Imaging studies of lumbosacral spine were recommended, as well as physical therapy and weight loss.     Clinical correlation was highly advised. 3/2022 lumbar MRI -  FINDINGS:   BONES/ALIGNMENT: There is normal alignment of the spine. The vertebral body   heights are maintained.  The bone marrow signal appears unremarkable.       SPINAL CORD: The conus terminates normally.       SOFT TISSUES: No paraspinal mass identified.       L1-L2: There is no significant disc herniation, spinal canal stenosis or   neural foraminal narrowing.       L2-L3: There is no significant disc herniation, spinal canal stenosis or   neural foraminal narrowing.       L3-L4: There is no significant disc herniation, spinal canal stenosis or   neural foraminal narrowing.       L4-L5: There is no significant disc herniation.  Mild facet hypertrophy is   noted.  No significant central canal or lateral recess stenosis.  Mild neural   foraminal stenoses.       L5-S1: There is no significant disc herniation.  Mild facet hypertrophy is   noted.  No significant central canal or lateral recess stenosis.  Mild neural   foraminal stenoses.           Impression   1.  No fracture or bony destructive lesion. 2. Mild facet hypertrophic changes at L4-5 and L5-S1, resulting in mild   neural foraminal stenoses. 3.  No significant central canal or lateral recess stenosis.      10/2021 xray cervical and lumbar -  FINDINGS:   C-spine: No fracture or dislocation.  Disc spaces are preserved.  Normal soft   tissues.       L-spine: Minimal degenerative endplate spurring from P0-Q4.  No fracture or   dislocation.  Normal soft tissues.           Impression   Unremarkable C-spine.       Minimal degenerative endplate spurring from N8-C5.         Potential Aberrant Drug-Related Behavior:      Urine Drug Screening:    OARRS report:    Past Medical History:   Diagnosis Date    Anxiety     Arthritis     Asthma     Bipolar 1 disorder ([address]     COPD (chronic obstructive pulmonary disease) (HCC)     Depression     Fatty liver     Fissure, anal     GERD (gastroesophageal reflux disease)     Glaucoma     Suspected by eye specialist . no eye meds    Hyperlipidemia     Hypertension     Macular edema     Cm    Migraines     Morbid obesity due to excess calories ([address]     Neuropathy     PTSD (post-traumatic stress disorder)     Splenomegaly 09/24/2020    Type 2 diabetes mellitus without complication, without long-term current use of insulin ([facility]        Past Surgical History:   Procedure Laterality Date    ANTERIOR CRUCIATE LIGAMENT REPAIR Right      SECTION      ,     CHOLECYSTECTOMY      DILATION AND CURETTAGE OF UTERUS N/A 2019    DILATATION AND CURETTAGE HYSTEROSCOPY WITH REMOVAL OF CONDYLOMATA performed by Ladonna Trent MD at [address] [facility] [address]  [address] N/A 2020    DILATATION AND CURETTAGE HYSTEROSCOPY, ATTEMPTED CERVICAL BIOPSY, MARIYA OF  LABIAL ABSCESS performed by Ladonna Trent MD at [place] [id number]  2021    Robotic lysis of adhesions, total hysterectomy, bilateral salpingectomy, cystoscopy    INCONTINENCE SURGERY      OTHER SURGICAL HISTORY      repair anal fissure    PAIN MANAGEMENT PROCEDURE N/A 2022    CAUDAL EPIDURAL STEROID INJECTION  #[facility]         Prior to Admission medications    Medication Sig Start Date End Date Taking?  Authorizing Provider   lisinopril (PRINIVIL;ZESTRIL) 10 MG tablet Take 1 tablet by mouth daily 22   Delene Balloon, DO   ondansetron (ZOFRAN) 4 MG tablet Take 1 tablet by mouth 3 times daily as needed for Nausea or Vomiting 22   Delene Balloon, DO   fluticasone (FLOVENT HFA) 44 MCG/ACT inhaler Inhale 2 puffs into the lungs 2 times daily 22   Delene Balloon, DO   albuterol sulfate HFA (PROVENTIL HFA) 108 (90 Base) MCG/ACT inhaler Inhale 2 puffs into the lungs every 4 hours as needed for Wheezing 22  Delene Balloon, DO   brompheniramine-pseudoephedrine-DM 2-30-10 MG/5ML syrup Take 5 mLs by mouth 4 times daily as needed for Congestion 22   Skeet Tamica, APRN - CNP   buPROPion (ZYBAN) 150 MG extended release tablet Take 1 tablet by mouth 2 times daily 3/16/22   Medardo Covington, DO   tiotropium (SPIRIVA HANDIHALER) 18 MCG inhalation capsule Inhale 1 capsule into the lungs daily 3/9/22 Shirley Hutchinson,    topiramate (TOPAMAX) 100 MG tablet TAKE ONE TABLET BY MOUTH EVERY EVENING 12/15/21   Historical Provider, MD   Acetaminophen (TYLENOL) 325 MG CAPS Take by mouth    Historical Provider, MD   ibuprofen (ADVIL;MOTRIN) 800 MG tablet Take 800 mg by mouth every 6 hours as needed for Pain    Historical Provider, MD   albuterol (PROVENTIL) (2.5 MG/3ML) 0.083% nebulizer solution Take 3 mLs by nebulization every 6 hours as needed for Wheezing 4/29/20   Lauri Bowling PA-C       Allergies   Allergen Reactions    Keflex [Cephalexin]      Hives, rash      Prednisone Rash    Cymbalta [Duloxetine Hcl] Other (See Comments)     angry    Lamictal [Lamotrigine] Nausea And Vomiting    Neurontin [Gabapentin] Other (See Comments)     Makes me feel very weird       Social History     Socioeconomic History    Marital status:      Spouse name: Beatriz Zurita Number of children: 2    Years of education: 15    Highest education level: Not on file   Occupational History    Occupation: disability-, LPN   Tobacco Use    Smoking status: Current Every Day Smoker     Packs/day: 1.00     Years: 20.00     Pack years: 20.00     Types: Cigarettes     Start date: 11/3/1995    Smokeless tobacco: Never Used   Vaping Use    Vaping Use: Former    Substances: Never   Substance and Sexual Activity    Alcohol use: Not Currently    Drug use: Not Currently     Types: Marijuana [place]annanmarlene Taylor)     Comment: medical marijuana last used 3/2021    Sexual activity: Not on file     Comment: TL   Other Topics Concern    Not on file   Social History Narrative    Not on file     Social Determinants of Health     Financial Resource Strain: Low Risk     Difficulty of Paying Living Expenses: Not hard at all   Food Insecurity: No Food Insecurity    Worried About [address] in the Last Year: Never true    Francois of Food in the Last Year: Never true   Transportation Needs: No Transportation Needs    Lack of Transportation (Medical): No    Lack of Transportation (Non-Medical): No   Physical Activity: Sufficiently Active    Days of Exercise per Week: 7 days    Minutes of Exercise per Session: 60 min   Stress: No Stress Concern Present    Feeling of Stress : Only a little   Social Connections: Moderately Integrated    Frequency of Communication with Friends and Family: More than three times a week    Frequency of Social Gatherings with Friends and Family: More than three times a week    Attends Islam Services: More than 4 times per year    Active Member of [address] [place] [address] [employer] or Organizations: No    Attends Club or Organization Meetings: Never    Marital Status:    Intimate Partner Violence: Not At Risk    Fear of Current or Ex-Partner: No    Emotionally Abused: No    Physically Abused: No    Sexually Abused: No   Housing Stability: Low Risk     Unable to Pay for Housing in the Last Year: No    Number of [place] in the Last Year: 1    Unstable Housing in the Last Year: No       Family History   Problem Relation Age of Onset    Diabetes Mother     Cancer Mother         ovarian    Diabetes Father     Cancer Sister         NHL    Diabetes Sister     High Blood Pressure Brother     Breast Cancer Maternal Aunt     Uterine Cancer Neg Hx     Colon Cancer Neg Hx        REVIEW OF SYSTEMS:     [facility] denies fever/chills, chest pain, shortness of breath, new bowel or bladder complaints. All other review of systems was negative.     PHYSICAL EXAMINATION:      /85   Pulse (!) 113   Temp 98.6 °F (37 °C) (Infrared)   Resp 16   Ht 5' 1\" (1.549 m)   Wt (!) 306 lb (138.8 kg)   LMP 02/22/2021 (Approximate)   SpO2 96%   BMI 57.82 kg/m²     General:       General appearance:pleasant and well-hydrated, in no distress and A & O x3  Build:Obese  Function:Rises from a seated position with difficulty, mild     HEENT:     Head:normocephalic, atraumatic  Pupils:regular, round, equal  Sclera: icterus absent     Lungs:     Breathing:normal breathing pattern     Abdomen:     Shape:non-distended  Tenderness:none  Guarding:none     Lumbar spine:     Spine inspection:normal   CVA tenderness:No   Palpation:tenderness paravertebral muscles, left, right positive. Range of motion:abnormal mildly in lateral bending, flexion, extension rotation bilateral and is painful.     Musculoskeletal:     Trigger points in Paraveteral:absent bilaterally  SI joint tenderness:positive right, positive left              MEE test:not done right, not done left  Piriformis tenderness:positive right, positive left  Trochanteric bursa tenderness:not done right, not done left  SLR:negative right, negative left, sitting      Extremities:     Tremors:None bilaterally upper and lower  Range of motion:pain with internal rotation of hips negative.   Intact:Yes  Varicose veins:absent   Pulses:present Lt radial  Cyanosis:none  Edema:none x all 4 extremities     Neurological:     Sensory:normal to light touch LLE, diffusely decreased RLE     Motor:          Right Quadriceps5/5     Left Quadriceps5/5           Right Gastrocnemius5/5  Left Gastrocnemius5/5  Right Ant Tibialis5/5   Left Ant Tibialis5/5     Reflexes:  (not assessed today)  Right Quadriceps reflex2+  Left Quadriceps reflex2+  Right Achilles reflex2+  Left Achilles reflex2+    Gait:normal station without assist device     Dermatology:     Skin:no rashes or lesions noted     Impression:     LBP BLE pain with weakness of the RLE  She reports having intermittent incontinence of urine (chronic and associated with urogyn surgery) and stool x2  Reports vaginal numbness w/ intercourse  Was evaluated by gynecology for the above, has had hysterectomy, and was told it is [place] damage\"  She is using a walker due to reported weakness of the RLE  \"I'm not suicidal or nothing but there are times I just want to jump in front of a truck because it's constant\", she can contract for safety, denies a plan and states \"I don't plan on hurting myself\"    2022 EDX shows left S1 motor radiculopathy or intracanalicular lesion, with acute and chronic denervation changes (note made of EDX done one year prior was negative)  2022 lumbar MRI shows mild lower lumbar facet degeneration without stenosis    The reported symptoms cannot be explained by her imaging as there is a lack of significant pathology on the xrays     She has been doing PT    She states she had 100% pain relief x 2 weeks after the caudal BOAZ, she does have some intermittent pain at this time          Plan:    Urine screen deferred  OARRS report reviewed  Gabapentin causes SE's  On topamax for migraine HA's  Offered pregabalin - pt prefers to try injections first  RF on Flexeril 10 mg #30, 2 RF previously prescribed by ER and pt requests RF  Encouraged to continue with PT  Caudal BOAZ #1 with sedation gave 100% pain relief x 2 weeks, pt would like to repeat - r/b and procedure discussed  Patient encouraged to stay active and to lose weight  Treatment plan discussed with the patient including medication and procedure side effects     Cc:  Referring physician    INGRID Prabhakar.

## 2022-06-01 NOTE — PROGRESS NOTES
[facility]   Phone: [phone]   Fax: [phone]           Date:  2022   Patient: Mariela Spine  : 1985  MRN: [id number]  Referring Provider: Aislinn Hoover DO   [address] [facility]     Medical Diagnosis:      Diagnosis Orders   1. Chronic bilateral low back pain with right-sided sciatica     2. Lumbar radiculopathy         SUBJECTIVE:     Onset date: 3/31/22    Onset: Insidious onset    Mechanism of Injury: Pt with a h/o chronic back pain and pain throughout her body for several years. Pt with diagnosis of arthritis in her back. Pt received an epidural injection on 22 which has helped with the pain. Previous PT: yes - helped    Medical Management for Current Problem: epidurals    Chief complaint: pain, decreased mobility, poor sleep quality    Behavior: condition is getting better    Pain: constant  Current: 3/10     Best: 3/10     Worst:6/10    Symptom Type/Quality: N/A  Location[de-identified] Back: lumbar region and midline over the spine without radiating pain (had radicular pain down B LEs prior to epidural)         Provoking Activities/Positions: walking                 Relieving Activitie/Positions: rest    Disturbed Sleep: yes  Bladder Dysfunction: yes  Bowel Dysfunction: yes     Imaging results: No results found.     Past Medical History:  Past Medical History:   Diagnosis Date    Anxiety     Arthritis     Asthma     B[facility] 1 disorder [facility]     COPD (chronic obstructive pulmonary disease) ([facility]     Depression     Fatty liver     Fissure, anal     GERD (gastroesophageal reflux disease)     Glaucoma     Suspected by eye specialist . no eye meds    Hyperlipidemia     Hypertension     Macular edema     Cm    Migraines     Morbid obesity due to excess calories (HCC)     Neuropathy     PTSD (post-traumatic stress disorder)     Splenomegaly 2020    Type 2 diabetes mellitus without complication, without long-term current use of insulin ([facility]      Past Surgical History:   Procedure Laterality Date    ANTERIOR CRUCIATE LIGAMENT REPAIR Right      SECTION      ,     CHOLECYSTECTOMY      DILATION AND CURETTAGE OF UTERUS N/A 2019    DILATATION AND CURETTAGE HYSTEROSCOPY WITH REMOVAL OF CONDYLOMATA performed by Guillermina Muniz MD at [facility] N/A 2020    DILATATION AND CURETTAGE HYSTEROSCOPY, ATTEMPTED CERVICAL BIOPSY, MARIYA OF  LABIAL ABSCESS performed by Guillermina Muniz MD at [place] [address]  2021    Robotic lysis of adhesions, total hysterectomy, bilateral salpingectomy, cystoscopy    INCONTINENCE SURGERY      OTHER SURGICAL HISTORY      repair anal fissure    PAIN MANAGEMENT PROCEDURE N/A 2022    CAUDAL EPIDURAL STEROID INJECTION  #1 performed by Jessica Doherty DO at [address] [place] [address]         Medications:   Current Outpatient Medications   Medication Sig Dispense Refill    lisinopril (PRINIVIL;ZESTRIL) 10 MG tablet Take 1 tablet by mouth daily 90 tablet 1    ondansetron (ZOFRAN) 4 MG tablet Take 1 tablet by mouth 3 times daily as needed for Nausea or Vomiting 15 tablet 0    fluticasone (FLOVENT HFA) 44 MCG/ACT inhaler Inhale 2 puffs into the lungs 2 times daily 1 each 3    albuterol sulfate HFA (PROVENTIL HFA) 108 (90 Base) MCG/ACT inhaler Inhale 2 puffs into the lungs every 4 hours as needed for Wheezing 1 each 5    brompheniramine-pseudoephedrine-DM 2-30-10 MG/5ML syrup Take 5 mLs by mouth 4 times daily as needed for Congestion 118 mL 0    buPROPion (ZYBAN) 150 MG extended release tablet Take 1 tablet by mouth 2 times daily 60 tablet 5    tiotropium (SPIRIVA HANDIHALER) 18 MCG inhalation capsule Inhale 1 capsule into the lungs daily 30 capsule 5    topiramate (TOPAMAX) 100 MG tablet TAKE ONE TABLET BY MOUTH EVERY EVENING      Acetaminophen (TYLENOL) 325 MG CAPS Take by mouth      ibuprofen (ADVIL;MOTRIN) 800 MG tablet Take 800 mg by mouth every 6 hours as needed for Pain      albuterol (PROVENTIL) (2.5 MG/3ML) 0.083% nebulizer solution Take 3 mLs by nebulization every 6 hours as needed for Wheezing 120 each 3     No current facility-administered medications for this visit. Occupation: disability. Exercise regimen: none    Hobbies: none    Patient Goals: pain relief, improve ROM, decrease stiffness    Contraindications/Precautions: none    OBJECTIVE:     Observations: well nourished female    Inspection: normal orthopedic exam         Gait: antalgic gait, independent without device. Pt reports that her right leg frequently harshil on her causing falls.     Functional Strength: slow, cautious sit to stand transfers; stair negotiation: non-reciprocal pattern with 2 rails, slow, cautious pace    Range of Motion:    Trunk: grossly WFL except extension grossly 50%     Lower Extremity:   Right:   [x] Normal   [] Limited    Left:   [x] Normal   [] Limited       Strength:     Trunk: 3+/5   R LE: 4-/5   L LE: 4-/5    Palpation: N/A     Sensation: occasional numbness and tingling in left foot    Special Tests:   [] Nerve Root Compression           Right []+ / [] -    Left []+ / [] -  [x] Slump           Right []+ / [x] -    Left []+ / [x] -  [] FADIR          Right []+ / [] -    Left []+ / [] -  [] S-I Distraction          Right []+ / [] -    Left []+ / [] -     [x] SLR           Right []+ / [x] -    Left [x]+ / [] -     [] MEE          Right []+ / [] -    Left []+ / [] -  [] S-I Compression          Right []+ / [] -    Left []+ / [] -   [] Leg Length: []+ / [] -       Special Test Comments: N/A    ASSESSMENT     Outcome Measure:   Modified Oswestry 60% disability    Problems:    Pain reported 3-6/10   ROM decreased    Strength decreased   Decreased functional ability with walking, standing, sitting, lifting    Reason for Skilled Care: Pt with chronic back pain which interferes with pt mobility, daily tasks, and decreases pt quality of life. [x] There are no barriers affecting plan of care or recovery    [] Barriers to this patient's plan of care or recovery include. Domestic Concerns:  [x] No  [] Yes:    Long Term goals (4-6 weeks)   Decrease reported pain to 2-3/10   Increase ROM to [facility] all planes   Increase Strength to 4 to 4+/5    Oswestry Low Back Disability Questionnaire 40% disability    Independent with Home Exercise Programs    Rehab Potential: [x] Good  [] Fair  [] Poor    PLAN       Treatment Plan:   [x] Therapeutic Exercise  [x] Therapeutic Activity  [x] Neuromuscular Re-education   [] Gait Training  [] Balance Training  [] Aerobic conditioning  [x] Manual Therapy  [] Massage/Fascial release   [] Work/Sport specific activities    [] Pain Neuroscience [x] Cold/hotpack  [] Vasocompression  [x] Electrical Stimulation  [] Lumbar/Cervical Traction  [] Ultrasound   [] Iontophoresis: 4 mg/mL Dexamethasone Sodium Phosphate 40-80 mAmin  [] Dry Needling      [x] Instruction in HEP      []  Medication allergies reviewed for use of Dexamethasone Sodium Phosphate 4mg/ml  with iontophoresis treatments. Patient is not allergic. The following CPT codes are likely to be used in the care of this patient: 47343 PT Evaluation: Low Complexity, 86204 PT Re-Evaluation, 55883 Therapeutic Exercise, 42064 Neuromuscular Re-Education, 71449 Therapeutic Activities, 15885 Manual Therapy and 92923 Electric Stimulation      Suggested Professional Referral: [x] No  [] Yes:     Patient Education:  [x] Plans/Goals, Risks/Benefits discussed  [x] Home exercise program  Method of Education: [x] Verbal  [x] Demo  [x] Written  Comprehension of Education:  [x] Verbalizes understanding. [x] Demonstrates understanding. [] Needs Review. [] Demonstrates/verbalizes understanding of HEP/Ed previously given.     Frequency:  2 days per week for 4-6 weeks    Patient understands diagnosis/prognosis and consents to treatment, plan and goals: [x] Yes    [] No     Thank you for the opportunity to work with your patient. If you have questions or comments, please contact me at numbers listed above. Electronically signed by: Tani Ya, [id number]    Medicare Patients Only     Please sign Physician's Certification and return to: [address]  [facility]  [address] [facility] [address] [facility] [id number]  Dept: [phone]  Dept Fax: [phone]  Loc: [phone] Certification / Comments     Frequency/Duration 2 days per week for 4-6 weeks. Certification period from 6/1/2022  to 9/1/22. I have reviewed the Plan of Care established for skilled therapy services and certify that the services are required and that they will be provided while the patient is under my care.     Physician's Comments/Revisions:               Physician's Printed Name:                                           [de-identified] Signature:                                                               Date:

## 2022-06-01 NOTE — PROGRESS NOTES
[facility]          Phone: [phone] Fax: [phone]    Physical Therapy Daily Treatment Note  Date:  2022    Patient Name:  Osbaldo Muro    :  1985  MRN: [id number]    Evaluating Therapist:  Dima Ga PT [id number]    Restrictions/Precautions:    Diagnosis:     Diagnosis Orders   1. Chronic bilateral low back pain with right-sided sciatica     2.  Lumbar radiculopathy       Treatment Diagnosis:    Insurance/Certification information:  [address] [place] [address] Dual  Referring Physician:  Sierra Chaves DO  Plan of care signed (Y/N):    Visit# / total visits:  -  Pain level: 3/10   Time In:  0958  Time Out:  1025    Subjective:  See evaluation    Exercises:  Exercise/Equipment Resistance/Repetitions Other comments     stepper       Trunk stretch with ball       Hamstring stretch       Sciatic nerve glides       Lower trunk rotation       Hip add with ball       Hip abd with tband       Pelvic tilts                                                                                       Other Therapeutic Activities:  PT evaluation completed    Home Exercise Program:  N/A    Manual Treatments:  N/A    Modalities:  N/A     Time-in Time-out Total Time   69285  Evaluation Low Complexity 0958 1025 27   72868  Evaluation Med Complexity      30106  Evaluation High Complexity      51636  Ther Ex      63547  Neuro Re-ed        85167  Ther Activities        54478  Manual Therapy       78177  E-stim       29945  Ultrasound            Session 2772 0639 27       Treatment/Activity Tolerance:  [x] Patient tolerated treatment well [] Patient limited by fatigue  [] Patient limited by pain  [] Patient limited by other medical complications  [] Other:     Prognosis: [x] Good [] Fair  [] Poor    Patient Requires Follow-up: [x] Yes  [] No    Plan:   [] Continue per plan of care [] Alter current plan (see comments)  [] Plan of care initiated [] Hold pending MD visit [] Discharge  Plan for Next Session:        Electronically signed by:  Meryl Donovan, [facility], [id number]

## 2022-06-02 ENCOUNTER — PREP FOR PROCEDURE (OUTPATIENT)
Dept: PAIN MANAGEMENT | Age: 37
End: 2022-06-02

## 2022-06-02 ENCOUNTER — TELEPHONE (OUTPATIENT)
Dept: PAIN MANAGEMENT | Age: 37
End: 2022-06-02

## 2022-06-02 ENCOUNTER — TELEPHONE (OUTPATIENT)
Dept: ADMINISTRATIVE | Age: 37
End: 2022-06-02

## 2022-06-02 ENCOUNTER — OFFICE VISIT (OUTPATIENT)
Dept: PAIN MANAGEMENT | Age: 37
End: 2022-06-02
Payer: COMMERCIAL

## 2022-06-02 VITALS
WEIGHT: 293 LBS | BODY MASS INDEX: 55.32 KG/M2 | TEMPERATURE: 98.6 F | DIASTOLIC BLOOD PRESSURE: 85 MMHG | HEIGHT: 61 IN | RESPIRATION RATE: 16 BRPM | OXYGEN SATURATION: 96 % | HEART RATE: 113 BPM | SYSTOLIC BLOOD PRESSURE: 132 MMHG

## 2022-06-02 DIAGNOSIS — M54.16 LUMBAR RADICULOPATHY: ICD-10-CM

## 2022-06-02 DIAGNOSIS — M54.42 CHRONIC BILATERAL LOW BACK PAIN WITH BILATERAL SCIATICA: ICD-10-CM

## 2022-06-02 DIAGNOSIS — M54.16 LUMBAR RADICULOPATHY: Primary | ICD-10-CM

## 2022-06-02 DIAGNOSIS — M47.817 LUMBOSACRAL SPONDYLOSIS WITHOUT MYELOPATHY: ICD-10-CM

## 2022-06-02 DIAGNOSIS — G89.29 CHRONIC BILATERAL LOW BACK PAIN WITH BILATERAL SCIATICA: ICD-10-CM

## 2022-06-02 DIAGNOSIS — G89.4 CHRONIC PAIN SYNDROME: Primary | ICD-10-CM

## 2022-06-02 DIAGNOSIS — M54.41 CHRONIC BILATERAL LOW BACK PAIN WITH BILATERAL SCIATICA: ICD-10-CM

## 2022-06-02 PROCEDURE — 99203 OFFICE O/P NEW LOW 30 MIN: CPT | Performed by: PAIN MEDICINE

## 2022-06-02 PROCEDURE — 99213 OFFICE O/P EST LOW 20 MIN: CPT | Performed by: PAIN MEDICINE

## 2022-06-02 PROCEDURE — G8427 DOCREV CUR MEDS BY ELIG CLIN: HCPCS | Performed by: PAIN MEDICINE

## 2022-06-02 PROCEDURE — 4004F PT TOBACCO SCREEN RCVD TLK: CPT | Performed by: PAIN MEDICINE

## 2022-06-02 PROCEDURE — 99203 OFFICE O/P NEW LOW 30 MIN: CPT

## 2022-06-02 PROCEDURE — G8417 CALC BMI ABV UP PARAM F/U: HCPCS | Performed by: PAIN MEDICINE

## 2022-06-02 RX ORDER — CYCLOBENZAPRINE HCL 10 MG
10 TABLET ORAL NIGHTLY PRN
Qty: 30 TABLET | Refills: 2 | Status: SHIPPED
Start: 2022-06-02 | End: 2022-09-07 | Stop reason: SDUPTHER

## 2022-06-02 NOTE — PROGRESS NOTES
Do you currently have any of the following:    Fever: No  Headache:  No  Cough: No  Shortness of breath: No  Exposed to anyone with these symptoms: No         Yuli Vargas [facility] to the [facility]. Ivone Mak is complaining of pain lower back. The pain is constant. The pain is described as sharp. Pain is rated on her best day at a 4, on her worst day at a 5, and on average at a 10 on the VAS scale. She took her last dose of Motrin and Tylenol this morning. Any procedures since your last visit: Yes, with 100 % relief for 2 weeks. Pacemaker or defibrillator: No.    She is  on NSAIDS and is not on anticoagulation medications. Medication Contract and Consent for Opioid Use Documents Filed      No documents found                /85   Pulse (!) 113   Temp 98.6 °F (37 °C) (Infrared)   Resp 16   Ht 5' 1\" (1.549 m)   Wt (!) 306 lb (138.8 kg)   LMP 02/22/2021 (Approximate)   SpO2 96%   BMI 57.82 kg/m²      Patient's last menstrual period was 02/22/2021 (approximate).

## 2022-06-02 NOTE — TELEPHONE ENCOUNTER
Patient wishes to have injection procedure with sedation can you switch prep for case from local to sedation. Thank you!

## 2022-06-02 NOTE — TELEPHONE ENCOUNTER
Pt called to cancel her New Pt appt that she had for 6/3 with Dr Leopold Hunger. She would like to r/s this appt. I was unsure if the next new pt slot was appropriate for her DX. Can you please r/s appt?

## 2022-06-07 ENCOUNTER — PATIENT MESSAGE (OUTPATIENT)
Dept: FAMILY MEDICINE CLINIC | Age: 37
End: 2022-06-07

## 2022-06-07 NOTE — TELEPHONE ENCOUNTER
From: Yuli Vargas  To: Dr. Leanne Dumont: 6/7/2022 9:35 AM EDT  Subject: G.i dr Lolly Mcdaniel I have a question for dr. Audria Brunner   He wanted to send me to a g.i and the one that he is sending me to is in [place] I would like to know if he could please send me to one that's in [facility] or [place] close to home ease and thank you :)

## 2022-06-10 ENCOUNTER — TELEPHONE (OUTPATIENT)
Dept: PAIN MANAGEMENT | Age: 37
End: 2022-06-10

## 2022-06-10 NOTE — TELEPHONE ENCOUNTER
Call to Arias Hickman that procedure was approved for 6/23/2022 and that the surgery center should call her a few days before for the pre op call and after 3:00 PM the business day before with the arrival time. Instructed Anjana to hold ibuprofen for 24 hours, naprosyn for 4 days and any aspirin containing products or fish oil for 7 days. Instructed to call office back if any questions. Flory Goss verbalized understanding.     Mukul Cole RN  Pain Management

## 2022-06-14 NOTE — TELEPHONE ENCOUNTER
Referral states that the patient can see anybody in the group at any of their locations she needs to call the gastroenterologist office and schedule a location that is convenient for her

## 2022-06-15 ENCOUNTER — TELEPHONE (OUTPATIENT)
Dept: FAMILY MEDICINE CLINIC | Age: 37
End: 2022-06-15

## 2022-06-15 ENCOUNTER — TREATMENT (OUTPATIENT)
Dept: PHYSICAL THERAPY | Age: 37
End: 2022-06-15
Payer: COMMERCIAL

## 2022-06-15 DIAGNOSIS — G89.29 CHRONIC BILATERAL LOW BACK PAIN WITH RIGHT-SIDED SCIATICA: Primary | ICD-10-CM

## 2022-06-15 DIAGNOSIS — M54.16 LUMBAR RADICULOPATHY: ICD-10-CM

## 2022-06-15 DIAGNOSIS — M54.41 CHRONIC BILATERAL LOW BACK PAIN WITH RIGHT-SIDED SCIATICA: Primary | ICD-10-CM

## 2022-06-15 PROCEDURE — 97110 THERAPEUTIC EXERCISES: CPT | Performed by: PHYSICAL THERAPIST

## 2022-06-15 NOTE — TELEPHONE ENCOUNTER
Patient states tht she is having pain in the right knee. Patient states that she's been having trouble with knee on and off for a while. Patient fell down stairs, twisted left foot and landed on right knee. Patient states having fluid on knee long time ago and had procedure done to remove fluid. Patient wants to know if she csn get an MRI of right knee now that she's dealing with pain.      Please advise

## 2022-06-15 NOTE — TELEPHONE ENCOUNTER
Patient needs to make an appointment for evaluation. We will likely get a knee x-ray to rule out any bony problems and referral can be placed if indicated at that time.

## 2022-06-23 ENCOUNTER — HOSPITAL ENCOUNTER (OUTPATIENT)
Age: 37
Setting detail: OUTPATIENT SURGERY
Discharge: HOME | End: 2022-06-23
Attending: PAIN MEDICINE | Admitting: PAIN MEDICINE
Payer: COMMERCIAL

## 2022-06-23 ENCOUNTER — ANESTHESIA (OUTPATIENT)
Dept: OPERATING ROOM | Age: 37
End: 2022-06-23
Payer: COMMERCIAL

## 2022-06-23 ENCOUNTER — HOSPITAL ENCOUNTER (OUTPATIENT)
Dept: GENERAL RADIOLOGY | Age: 37
Discharge: HOME | End: 2022-06-25
Attending: PAIN MEDICINE
Payer: COMMERCIAL

## 2022-06-23 ENCOUNTER — ANESTHESIA EVENT (OUTPATIENT)
Dept: OPERATING ROOM | Age: 37
End: 2022-06-23
Payer: COMMERCIAL

## 2022-06-23 VITALS
DIASTOLIC BLOOD PRESSURE: 64 MMHG | RESPIRATION RATE: 16 BRPM | WEIGHT: 293 LBS | BODY MASS INDEX: 53.92 KG/M2 | HEART RATE: 84 BPM | HEIGHT: 62 IN | TEMPERATURE: 97.8 F | OXYGEN SATURATION: 97 % | SYSTOLIC BLOOD PRESSURE: 136 MMHG

## 2022-06-23 DIAGNOSIS — R52 PAIN MANAGEMENT: ICD-10-CM

## 2022-06-23 PROCEDURE — 2580000003 HC RX 258: Performed by: NURSE ANESTHETIST, CERTIFIED REGISTERED

## 2022-06-23 PROCEDURE — 3209999900 FLUORO FOR SURGICAL PROCEDURES

## 2022-06-23 PROCEDURE — 2500000003 HC RX 250 WO HCPCS

## 2022-06-23 PROCEDURE — 3600000002 HC SURGERY LEVEL 2 BASE: Performed by: PAIN MEDICINE

## 2022-06-23 PROCEDURE — 7100000010 HC PHASE II RECOVERY - FIRST 15 MIN: Performed by: PAIN MEDICINE

## 2022-06-23 PROCEDURE — 6360000002 HC RX W HCPCS: Performed by: PAIN MEDICINE

## 2022-06-23 PROCEDURE — 6360000002 HC RX W HCPCS: Performed by: NURSE ANESTHETIST, CERTIFIED REGISTERED

## 2022-06-23 PROCEDURE — 2709999900 HC NON-CHARGEABLE SUPPLY: Performed by: PAIN MEDICINE

## 2022-06-23 PROCEDURE — 6360000002 HC RX W HCPCS

## 2022-06-23 PROCEDURE — 2500000003 HC RX 250 WO HCPCS: Performed by: PAIN MEDICINE

## 2022-06-23 PROCEDURE — 7100000011 HC PHASE II RECOVERY - ADDTL 15 MIN: Performed by: PAIN MEDICINE

## 2022-06-23 PROCEDURE — 2500000003 HC RX 250 WO HCPCS: Performed by: NURSE ANESTHETIST, CERTIFIED REGISTERED

## 2022-06-23 PROCEDURE — 6360000004 HC RX CONTRAST MEDICATION: Performed by: PAIN MEDICINE

## 2022-06-23 PROCEDURE — 62323 NJX INTERLAMINAR LMBR/SAC: CPT | Performed by: PAIN MEDICINE

## 2022-06-23 PROCEDURE — 3700000000 HC ANESTHESIA ATTENDED CARE: Performed by: PAIN MEDICINE

## 2022-06-23 RX ORDER — LIDOCAINE HYDROCHLORIDE 20 MG/ML
INJECTION, SOLUTION EPIDURAL; INFILTRATION; INTRACAUDAL; PERINEURAL PRN
Status: DISCONTINUED | OUTPATIENT
Start: 2022-06-23 | End: 2022-06-23 | Stop reason: SDUPTHER

## 2022-06-23 RX ORDER — FENTANYL CITRATE 50 UG/ML
INJECTION, SOLUTION INTRAMUSCULAR; INTRAVENOUS PRN
Status: DISCONTINUED | OUTPATIENT
Start: 2022-06-23 | End: 2022-06-23 | Stop reason: SDUPTHER

## 2022-06-23 RX ORDER — LIDOCAINE HYDROCHLORIDE 5 MG/ML
INJECTION, SOLUTION INFILTRATION; INTRAVENOUS PRN
Status: DISCONTINUED | OUTPATIENT
Start: 2022-06-23 | End: 2022-06-23 | Stop reason: ALTCHOICE

## 2022-06-23 RX ORDER — SODIUM CHLORIDE 9 MG/ML
INJECTION, SOLUTION INTRAVENOUS CONTINUOUS PRN
Status: DISCONTINUED | OUTPATIENT
Start: 2022-06-23 | End: 2022-06-23 | Stop reason: SDUPTHER

## 2022-06-23 RX ORDER — MIDAZOLAM HYDROCHLORIDE 1 MG/ML
INJECTION INTRAMUSCULAR; INTRAVENOUS PRN
Status: DISCONTINUED | OUTPATIENT
Start: 2022-06-23 | End: 2022-06-23 | Stop reason: SDUPTHER

## 2022-06-23 RX ORDER — PROPOFOL 10 MG/ML
INJECTION, EMULSION INTRAVENOUS PRN
Status: DISCONTINUED | OUTPATIENT
Start: 2022-06-23 | End: 2022-06-23 | Stop reason: SDUPTHER

## 2022-06-23 RX ORDER — METHYLPREDNISOLONE ACETATE 40 MG/ML
INJECTION, SUSPENSION INTRA-ARTICULAR; INTRALESIONAL; INTRAMUSCULAR; SOFT TISSUE PRN
Status: DISCONTINUED | OUTPATIENT
Start: 2022-06-23 | End: 2022-06-23 | Stop reason: ALTCHOICE

## 2022-06-23 RX ADMIN — PROPOFOL 100 MG: 10 INJECTION, EMULSION INTRAVENOUS at 09:32

## 2022-06-23 RX ADMIN — FENTANYL CITRATE 50 MCG: 50 INJECTION, SOLUTION INTRAMUSCULAR; INTRAVENOUS at 09:34

## 2022-06-23 RX ADMIN — SODIUM CHLORIDE: 9 INJECTION, SOLUTION INTRAVENOUS at 09:30

## 2022-06-23 RX ADMIN — MIDAZOLAM 2 MG: 1 INJECTION INTRAMUSCULAR; INTRAVENOUS at 09:31

## 2022-06-23 RX ADMIN — FENTANYL CITRATE 50 MCG: 50 INJECTION, SOLUTION INTRAMUSCULAR; INTRAVENOUS at 09:31

## 2022-06-23 RX ADMIN — LIDOCAINE HYDROCHLORIDE 40 MG: 20 INJECTION, SOLUTION EPIDURAL; INFILTRATION; INTRACAUDAL; PERINEURAL at 09:32

## 2022-06-23 ASSESSMENT — PAIN SCALES - GENERAL
PAINLEVEL_OUTOF10: 6
PAINLEVEL_OUTOF10: 0

## 2022-06-23 ASSESSMENT — PAIN DESCRIPTION - DESCRIPTORS: DESCRIPTORS: DISCOMFORT

## 2022-06-23 ASSESSMENT — PAIN DESCRIPTION - LOCATION
LOCATION: BACK
LOCATION: BACK

## 2022-06-23 ASSESSMENT — LIFESTYLE VARIABLES: SMOKING_STATUS: 1

## 2022-06-23 ASSESSMENT — PAIN DESCRIPTION - PAIN TYPE: TYPE: CHRONIC PAIN

## 2022-06-23 ASSESSMENT — PAIN DESCRIPTION - FREQUENCY: FREQUENCY: CONTINUOUS

## 2022-06-23 ASSESSMENT — PAIN DESCRIPTION - ORIENTATION: ORIENTATION: LOWER

## 2022-06-23 NOTE — ANESTHESIA POSTPROCEDURE EVALUATION
Department of Anesthesiology  Postprocedure Note    Patient: Jen Berrios  MRN: [id number]  YOB: 1985  Date of evaluation: 6/23/2022      Procedure Summary     Date: 06/23/22 Room / Location: [facility] PROCEDURE ROOM 02 / [address] [place] [address]    Anesthesia Start: 0930 Anesthesia Stop: 1292    Procedure: CAUDAL EPIDURAL STEROID INJECTION #2 (N/A Sacrum) Diagnosis:       Lumbar radiculopathy      (LUMBAR RADICULOPATHY)    Surgeons: Cindy Sainz DO Responsible Provider: Nita Way MD    Anesthesia Type: MAC ASA Status: 3          Anesthesia Type: No value filed.     Jeet Phase I: Jeet Score: 10    Jeet Phase II:      Last vitals:   Vitals Value Taken Time   BP  06/23/22 0939   Temp  06/23/22 0939   Pulse  06/23/22 0939   Resp  06/23/22 0939   SpO2  06/23/22 0939         Anesthesia Post Evaluation    Patient location during evaluation: PACU  Patient participation: complete - patient participated  Level of consciousness: awake and alert  Airway patency: patent  Nausea & Vomiting: no vomiting  Complications: no  Cardiovascular status: hemodynamically stable  Respiratory status: spontaneous ventilation and room air  Hydration status: stable

## 2022-06-23 NOTE — OP NOTE
2022    Patient: Filipe Brown  :  1985  Age:  39 y.o. Sex:  female     PRE-OPERATIVE DIAGNOSIS: Displacement of lumbar intervertebral disc, Lumbar DDD, Spinal stenosis. POST-OPERATIVE DIAGNOSIS: Same. PROCEDURE PERFORMED:  #2 Therapeutic Caudal Epidural done under fluoroscopic guidance. SURGEON:   INGRID Gutierrez. ANESTHESIA: MAC    ESTIMATED BLOOD LOSS: None. BRIEF HISTORY: Filipe Brown comes in today for the second  therapeutic caudal epidural steroid injection under fluorsoscopic guidance. After discussing the potential risks and benefits of the procedure with the patient  La Varma did request that we proceed. A complete History & Physical was reviewed and it is unchanged. DESCRIPTION OF PROCEDURE:    After confirming written and informed consent, a time-out was performed and Alvaro name and date of birth, the procedure to be performed as well as the plan for the location of the needle insertion were confirmed. Patient was brought into the procedure room and was placed in the prone position on a fluoroscopy table. Standard monitors were placed and vital signs were observed throughout the procedure. The lumbosacral and caudal area were prepped with chloraprep and draped in a sterile manner. The sacral hiatus was identified and marked under AP fluoroscopy. A sterile gauze was placed in the midgluteal cleft for increased sterility. The overlying skin and subcutaneous tissues were anesthetized with 0.5% Lidocaine. Under lateral fluoroscopic guidance, a # 22 gauge 3.5 inch spinal needle was advanced into the sacral hiatus . After the needle passed through the sacrococcygeal ligament, the needle angle was advanced slightly. There was no evidence of paresthesia throughout the needle placement. After negative aspiration for blood and CSF, epidural spread was confirmed with injection of 2 cc of Isovue 300 in both live lateral and live AP fluoroscopy.  A solution consisting of 0.5% Lidocaine and 40 mg DepoMedrol, total of 15 cc, was easily injected . There was a clear outline of the epidural space and visualization of the sacral roots. The needle was then removed and a sterile Band-Aid was applied to the puncture site. Disposition the patient tolerated the procedure well and there were no complications . Vital signs remained stable throughout the procedure. The patient was escorted to the recovery area where they remained until discharge and written discharge instructions for the procedure were given. Plan: Buck Bhat will return to our pain management center as scheduled.      Kelly Ackerman DO

## 2022-06-23 NOTE — ANESTHESIA PRE PROCEDURE
Department of Anesthesiology  Preprocedure Note       Name:  Mariela Spine   Age:  39 y.o.  :  1985                                          MRN:  [id number]         Date:  2022      Surgeon: Milagros Gómez):  Aislinn Hoover DO    Procedure: CAUDAL EPIDURAL STEROID INJECTION #2 (N/A )    Medications prior to admission:   Prior to Admission medications    Medication Sig Start Date End Date Taking? Authorizing Provider   cyclobenzaprine (FLEXERIL) 10 mg tablet Take 1 tablet by mouth nightly as needed for Muscle spasms 22  Aislinn Hoover DO   lisinopril (PRINIVIL;ZESTRIL) 10 MG tablet Take 1 tablet by mouth daily 22   Enedina Ray,    ondansetron (ZOFRAN) 4 MG tablet Take 1 tablet by mouth 3 times daily as needed for Nausea or Vomiting 22   Enedina Ray DO   fluticasone (FLOVENT HFA) 44 MCG/ACT inhaler Inhale 2 puffs into the lungs 2 times daily 22   Enedina Ray DO   albuterol sulfate HFA (PROVENTIL HFA) 108 (90 Base) MCG/ACT inhaler Inhale 2 puffs into the lungs every 4 hours as needed for Wheezing 22  Enedina Ray DO   tiotropium (SPIRIVA HANDIHALER) 18 MCG inhalation capsule Inhale 1 capsule into the lungs daily 3/9/22   Harry Covington, DO   Acetaminophen (TYLENOL) 325 MG CAPS Take 650 mg by mouth every 4 hours as needed (pain)     Historical Provider, MD   ibuprofen (ADVIL;MOTRIN) 800 MG tablet Take 800 mg by mouth every 6 hours as needed for Pain    Historical Provider, MD   albuterol (PROVENTIL) (2.5 MG/3ML) 0.083% nebulizer solution Take 3 mLs by nebulization every 6 hours as needed for Wheezing 20   Rafita Kebede PA-C       Current medications:    No current outpatient medications on file. No current facility-administered medications for this visit. Allergies:     Allergies   Allergen Reactions    Keflex [Cephalexin]      Hives, rash      Prednisone Rash    Cymbalta [Duloxetine Hcl] Other (See Comments)     angry    Lamictal [Lamotrigine] Nausea And Vomiting    Neurontin [Gabapentin] Other (See Comments)     Makes me feel very weird       Problem List:    Patient Active Problem List   Diagnosis Code    Mild intermittent asthma without complication O19.22    Elevated blood pressure reading R03.0    Bipolar 1 disorder ([facility] F31.9    Anxiety F41.9    Suicidal ideation R45.851    Major depressive disorder, recurrent ([facility] F33.9    Depression with suicidal ideation F32. A, R45.851    Depression with anxiety F41.8    Strain of lumbar region S39.012A    Sciatica M54.30    Spasm of muscle M62.838    Menorrhagia with irregular cycle N92.1    Condylomata acuminata A63.0    Endometrial thickening on ultrasound R93.89    Vulvar abscess N76.4    Cervical stenosis (uterine cervix) N88.2    Chest pain R07.9    At risk for shortness of breath Z91.89    Dizziness R42    Type 2 diabetes mellitus without complication, without long-term current use of insulin ([facility]) E11.9    Tobacco abuse Z72.0    Splenomegaly R16.1    Diabetes mellitus ([facility]) E11.9    Abnormal uterine bleeding N93.9    Lumbar radiculopathy M54.16    Chronic bilateral low back pain with bilateral sciatica M54.42, M54.41, G89.29    Chronic pain syndrome G89.4    Lumbosacral spondylosis without myelopathy M47.817    Chronic obstructive pulmonary disease, unspecified COPD type ([facility]) J44.9    Primary hypertension I10       Past Medical History:        Diagnosis Date    Anxiety     Arthritis     Asthma     well controlled, on inhalers    Bipolar 1 disorder ([facility]     COPD (chronic obstructive pulmonary disease) ([facility])     Depression     Fatty liver     Fissure, anal     GERD (gastroesophageal reflux disease)     Glaucoma     Suspected by eye specialist . no eye meds    Hyperlipidemia     Hypertension     Macular edema     Cm    Migraines     Morbid obesity due to excess calories ([facility]     Neuropathy     PTSD (post-traumatic stress disorder)     Splenomegaly 2020    Type 2 diabetes mellitus without complication, without long-term current use of insulin ([facility]     not taking any meds       Past Surgical History:        Procedure Laterality Date    ANTERIOR CRUCIATE LIGAMENT REPAIR Right      SECTION      ,     CHOLECYSTECTOMY      DILATION AND CURETTAGE OF UTERUS N/A 2019    DILATATION AND CURETTAGE HYSTEROSCOPY WITH REMOVAL OF CONDYLOMATA performed by Jess Arreola MD at [address] [place] [address] N/A 2020    DILATATION AND CURETTAGE HYSTEROSCOPY, ATTEMPTED CERVICAL BIOPSY, MARIYA OF  LABIAL ABSCESS performed by Jess Arreola MD at [facility] (CERVIX STATUS UNKNOWN)  2021    Robotic lysis of adhesions, total hysterectomy, bilateral salpingectomy, cystoscopy    INCONTINENCE SURGERY      OTHER SURGICAL HISTORY      repair anal fissure    PAIN MANAGEMENT PROCEDURE N/A 2022    CAUDAL EPIDURAL STEROID INJECTION  #1 performed by Laila Ocampo DO at [address] [place] [address]         Social History:    Social History     Tobacco Use    Smoking status: Current Every Day Smoker     Packs/day: 1.00     Years: 20.00     Pack years: 20.00     Types: Cigarettes     Start date: 11/3/1995    Smokeless tobacco: Never Used   Substance Use Topics    Alcohol use: Not Currently                                Ready to quit: Not Answered  Counseling given: Not Answered      Vital Signs (Current): There were no vitals filed for this visit.                                            BP Readings from Last 3 Encounters:   22 132/85   22 128/86   22 (!) 152/100       NPO Status:                                                                                 BMI:   Wt Readings from Last 3 Encounters:   22 (!) 310 lb (140.6 kg)   22 (!) 306 lb (138.8 kg)   22 (!) 306 lb (138.8 kg)     There is no height or weight on file to calculate BMI.    CBC:   Lab Results   Component Value Date    WBC 7.7 08/01/2021    RBC 4.62 08/01/2021    HGB 13.8 08/01/2021    HCT 41.2 08/01/2021    MCV 89.2 08/01/2021    RDW 13.6 08/01/2021     08/01/2021       CMP:   Lab Results   Component Value Date     02/22/2022    K 3.9 02/22/2022    K 3.8 10/30/2019     02/22/2022    CO2 21 02/22/2022    BUN 11 02/22/2022    CREATININE 0.6 02/22/2022    GFRAA >60 02/22/2022    LABGLOM >60 02/22/2022    GLUCOSE 165 02/22/2022    PROT 7.0 02/22/2022    CALCIUM 9.5 02/22/2022    BILITOT <0.2 02/22/2022    ALKPHOS 93 02/22/2022    AST 17 02/22/2022    ALT 22 02/22/2022       POC Tests: No results for input(s): POCGLU, POCNA, POCK, POCCL, POCBUN, POCHEMO, POCHCT in the last 72 hours. Coags:   Lab Results   Component Value Date    PROTIME 10.4 05/12/2020    INR 0.9 05/12/2020    APTT 32.2 05/12/2020       HCG (If Applicable):   Lab Results   Component Value Date    PREGTESTUR NEGATIVE 09/14/2020        ABGs: No results found for: PHART, PO2ART, XDD4HFF, OMB0FEF, BEART, M0QIFXZC     Type & Screen (If Applicable):  Lab Results   Component Value Date    LABABO O 02/15/2021       Anesthesia Evaluation  Patient summary reviewed no history of anesthetic complications:   Airway: Mallampati: III  TM distance: <3 FB     Mouth opening: > = 3 FB   Dental:      Comment: Dentition intact, multiple missing molars, denies any loose teeth. Pulmonary:   (+) COPD:  decreased breath sounds (at bases, otherwise clear) asthma (daily inhalers): current smoker ( 1-3 PPD x 20 years currently down to 1 PPD )          Patient smoked on day of surgery. ROS comment: Probable CELESTINO based on body habitus and h/o snoring;   Never had sleep study   Cardiovascular:    (+) hypertension:, hyperlipidemia    (-) past MI, CAD and CABG/stent      Rhythm: regular  Rate: normal                    Neuro/Psych:   (+) neuromuscular disease:, headaches: migraine headaches, psychiatric history (PTSD; bipolar d/o Depression with suicidal ideat):depression/anxiety             GI/Hepatic/Renal:   (+) GERD (prn Zantac (states she only takes it a \"few times\" per year)):, liver disease ( Fatty liver disease):, morbid obesity          Endo/Other:    (+) DiabetesType II DM, , .                 Abdominal:             Vascular: negative vascular ROS. Other Findings:             Anesthesia Plan      MAC     ASA 3       Induction: intravenous. Anesthetic plan and risks discussed with patient. Plan discussed with CRNA. Krysta Garland MD   6/23/2022  (this addendum was done preop but unable to be filed electronically at that time)      DOS STAFF ADDENDUM:    Patient seen and chart reviewed. No interval change in history or exam.   Anesthesia plan discussed, risk/benefits addressed. Patient's concerns and questions answered. Krysta Garland MD  June 23, 2022  8:01 AM    Chart reviewed, patient seen and agree with above evaluation.   VELMA Noguera - CRNA

## 2022-06-23 NOTE — H&P
[facility] Pain Management        [address] [facility], [address] [facility]  Dept: [phone]    Procedure History & Physical      Mariela Spine     HPI:    Patient  is here for LBP BLE pain for caudal BOAZ  Labs/imaging studies reviewed   All question and concerns addressed including R/B/A associated with the procedure    Past Medical History:   Diagnosis Date    Anxiety     Arthritis     Asthma     well controlled, on inhalers    Bipolar 1 disorder ([facility]     COPD (chronic obstructive pulmonary disease) ([facility]     Depression     Fatty liver     Fissure, anal     GERD (gastroesophageal reflux disease)     Glaucoma     Suspected by eye specialist . no eye meds    Hyperlipidemia     Hypertension     Macular edema     Cm    Migraines     Morbid obesity due to excess calories ([address]     Neuropathy     PTSD (post-traumatic stress disorder)     Splenomegaly 2020    Type 2 diabetes mellitus without complication, without long-term current use of insulin ([address]     not taking any meds       Past Surgical History:   Procedure Laterality Date    ANTERIOR CRUCIATE LIGAMENT REPAIR Right      SECTION      ,     CHOLECYSTECTOMY      DILATION AND CURETTAGE OF UTERUS N/A 2019    DILATATION AND CURETTAGE HYSTEROSCOPY WITH REMOVAL OF CONDYLOMATA performed by Kadi Baker MD at [address] [place] [address] N N/A 2020    DILATATION AND CURETTAGE HYSTEROSCOPY, ATTEMPTED CERVICAL BIOPSY, MARIYA OF  LABIAL ABSCESS performed by Kadi Baker MD at [place] [address] (CERVIX STATUS UNKNOWN)  2021    Robotic lysis of adhesions, total hysterectomy, bilateral salpingectomy, cystoscopy    INCONTINENCE SURGERY      OTHER SURGICAL HISTORY      repair anal fissure    PAIN MANAGEMENT PROCEDURE N/A 2022    CAUDAL EPIDURAL STEROID INJECTION  #1 performed by Aislinn Hoover DO at [address] [facility] LIGATION         Prior to Admission medications    Medication Sig Start Date End Date Taking?  Authorizing Provider   cyclobenzaprine (FLEXERIL) 10 mg tablet Take 1 tablet by mouth nightly as needed for Muscle spasms 6/2/22 7/2/22  Lia Ortiz, DO   lisinopril (PRINIVIL;ZESTRIL) 10 MG tablet Take 1 tablet by mouth daily 5/4/22   Homer Lisa, DO   ondansetron (ZOFRAN) 4 MG tablet Take 1 tablet by mouth 3 times daily as needed for Nausea or Vomiting 5/4/22   Homer Lisa, DO   fluticasone (FLOVENT HFA) 44 MCG/ACT inhaler Inhale 2 puffs into the lungs 2 times daily 5/4/22   Homer Lisa, DO   albuterol sulfate HFA (PROVENTIL HFA) 108 (90 Base) MCG/ACT inhaler Inhale 2 puffs into the lungs every 4 hours as needed for Wheezing 5/4/22 5/4/23  Homereamon Gonzalez, DO   tiotropium (SPIRIVA HANDIHALER) 18 MCG inhalation capsule Inhale 1 capsule into the lungs daily 3/9/22   Freda Covington, DO   Acetaminophen (TYLENOL) 325 MG CAPS Take 650 mg by mouth every 4 hours as needed (pain)     Historical Provider, MD   ibuprofen (ADVIL;MOTRIN) 800 MG tablet Take 800 mg by mouth every 6 hours as needed for Pain    Historical Provider, MD   albuterol (PROVENTIL) (2.5 MG/3ML) 0.083% nebulizer solution Take 3 mLs by nebulization every 6 hours as needed for Wheezing 4/29/20   Evens Egan PA-C       Allergies   Allergen Reactions    Keflex [Cephalexin]      Hives, rash      Prednisone Rash    Cymbalta [Duloxetine Hcl] Other (See Comments)     angry    Lamictal [Lamotrigine] Nausea And Vomiting    Neurontin [Gabapentin] Other (See Comments)     Makes me feel very weird       Social History     Socioeconomic History    Marital status:      Spouse name: Yanet Garzon Number of children: 2    Years of education: 15    Highest education level: Not on file   Occupational History    Occupation: disability-, LPN   Tobacco Use    Smoking status: Current Every Day Smoker     Packs/day: 1.00     Years: 20.00     Pack years: 20.00     Types: Cigarettes     Start date: 11/3/1995    Smokeless tobacco: Never Used   Vaping Use    Vaping Use: Former    Substances: Never   Substance and Sexual Activity    Alcohol use: Not Currently    Drug use: Not Currently     Types: Marijuana Jennifer Haddad)     Comment: medical marijuana last used 3/2021    Sexual activity: Not on file     Comment: TL   Other Topics Concern    Not on file   Social History Narrative    Not on file     Social Determinants of Health     Financial Resource Strain: Low Risk     Difficulty of Paying Living Expenses: Not hard at all   Food Insecurity: No Food Insecurity    Worried About [facility] in the Last Year: Never true    [address] [place] [address] [employer] in the Last Year: Never true   Transportation Needs: No Transportation Needs    Lack of Transportation (Medical): No    Lack of Transportation (Non-Medical): No   Physical Activity: Sufficiently Active    Days of Exercise per Week: 7 days    Minutes of Exercise per Session: 60 min   Stress: No Stress Concern Present    Feeling of Stress : Only a little   Social Connections:  Moderately Integrated    Frequency of Communication with Friends and Family: More than three times a week    Frequency of Social Gatherings with Friends and Family: More than three times a week    Attends [employer] Services: More than 4 times per year    Active Member of [address] [place] [address] [employer] or Organizations: No    Attends Club or Organization Meetings: Never    Marital Status:    Intimate Partner Violence: Not At Risk    Fear of Current or Ex-Partner: No    Emotionally Abused: No    Physically Abused: No    Sexually Abused: No   Housing Stability: Low Risk     Unable to Pay for Housing in the Last Year: No    Number of [place] in the Last Year: 1    Unstable Housing in the Last Year: No       Family History   Problem Relation Age of Onset    Diabetes Mother     Cancer Mother         ovarian    Diabetes Father    [facility]

## 2022-07-01 ENCOUNTER — TREATMENT (OUTPATIENT)
Dept: PHYSICAL THERAPY | Age: 37
End: 2022-07-01
Payer: COMMERCIAL

## 2022-07-01 DIAGNOSIS — M54.41 CHRONIC BILATERAL LOW BACK PAIN WITH RIGHT-SIDED SCIATICA: Primary | ICD-10-CM

## 2022-07-01 DIAGNOSIS — M54.16 LUMBAR RADICULOPATHY: ICD-10-CM

## 2022-07-01 DIAGNOSIS — G89.29 CHRONIC BILATERAL LOW BACK PAIN WITH RIGHT-SIDED SCIATICA: Primary | ICD-10-CM

## 2022-07-01 PROCEDURE — 97110 THERAPEUTIC EXERCISES: CPT | Performed by: PHYSICAL THERAPIST

## 2022-07-01 NOTE — PROGRESS NOTES
[facility]          Phone: [phone] Fax: [phone]    Physical Therapy Daily Treatment Note  Date:  2022    Patient Name:  Shawn Whitfield    :  1985  MRN: [id number]    Evaluating Therapist:  Russell Garcia PT [id number]    Restrictions/Precautions:    Diagnosis:     Diagnosis Orders   1. Chronic bilateral low back pain with right-sided sciatica     2. Lumbar radiculopathy       Treatment Diagnosis:    Insurance/Certification information:  [address] [place] [address] Dual  Referring Physician:  Fe Dominguez DO  Plan of care signed (Y/N):    Visit# / total visits:  3/8-  Pain level: 3/10   Time In:  1306  Time Out:  1333    Subjective:  Pt with no new report. Pt is s/p nerve caudal epidural. Pt states that it has helped the pain in her leg, but that she is having occasionally intense intermittent pain in the back.     Exercises:  Exercise/Equipment Resistance/Repetitions Other comments     stepper 5 min      Trunk stretch with ball 15 sec x 3 reps each flex and B rotation      Hamstring stretch Seated, foot on stool, 15 sec x 3 reps      Sciatic nerve glides x10 reps      Lower trunk rotation 15 sec x 3 reps B      Hip add with ball Seated 5 sec x 10 reps      Hip abd with tband OTB, seated, x 10 reps      Seated hip flex 3 sec x 10 reps      Rows, seated OTB x 10 reps        Seated knee flex OTB x 10 reps       Pelvic tilts 5 sec x 10 reps                                                              Other Therapeutic Activities:  N/A    Home Exercise Program:  N/A    Manual Treatments:  N/A    Modalities:  N/A     Time-in Time-out Total Time   87501  Evaluation Low Complexity      05792  Evaluation Med Complexity      38470  Evaluation High Complexity      21136  Ther Ex 1306 1333 27   28845  Neuro Re-ed        41129  Ther Activities        28851  Manual Therapy       99521  E-stim       79594  Ultrasound            Session 2811 1989 04       Treatment/Activity

## 2022-07-06 ENCOUNTER — OFFICE VISIT (OUTPATIENT)
Dept: FAMILY MEDICINE CLINIC | Age: 37
End: 2022-07-06
Payer: COMMERCIAL

## 2022-07-06 VITALS
HEART RATE: 120 BPM | DIASTOLIC BLOOD PRESSURE: 87 MMHG | HEIGHT: 63 IN | WEIGHT: 293 LBS | RESPIRATION RATE: 20 BRPM | SYSTOLIC BLOOD PRESSURE: 136 MMHG | BODY MASS INDEX: 51.91 KG/M2 | TEMPERATURE: 97.3 F

## 2022-07-06 DIAGNOSIS — G89.29 CHRONIC PAIN OF RIGHT KNEE: Primary | ICD-10-CM

## 2022-07-06 DIAGNOSIS — E11.9 TYPE 2 DIABETES MELLITUS WITHOUT COMPLICATION, WITHOUT LONG-TERM CURRENT USE OF INSULIN (HCC): ICD-10-CM

## 2022-07-06 DIAGNOSIS — I10 PRIMARY HYPERTENSION: ICD-10-CM

## 2022-07-06 DIAGNOSIS — M25.561 CHRONIC PAIN OF RIGHT KNEE: Primary | ICD-10-CM

## 2022-07-06 DIAGNOSIS — E66.01 CLASS 3 SEVERE OBESITY DUE TO EXCESS CALORIES WITHOUT SERIOUS COMORBIDITY WITH BODY MASS INDEX (BMI) OF 50.0 TO 59.9 IN ADULT (HCC): ICD-10-CM

## 2022-07-06 LAB — HBA1C MFR BLD: 5.9 %

## 2022-07-06 PROCEDURE — 2022F DILAT RTA XM EVC RTNOPTHY: CPT | Performed by: FAMILY MEDICINE

## 2022-07-06 PROCEDURE — G8417 CALC BMI ABV UP PARAM F/U: HCPCS | Performed by: FAMILY MEDICINE

## 2022-07-06 PROCEDURE — 99214 OFFICE O/P EST MOD 30 MIN: CPT | Performed by: FAMILY MEDICINE

## 2022-07-06 PROCEDURE — 83036 HEMOGLOBIN GLYCOSYLATED A1C: CPT | Performed by: FAMILY MEDICINE

## 2022-07-06 PROCEDURE — 4004F PT TOBACCO SCREEN RCVD TLK: CPT | Performed by: FAMILY MEDICINE

## 2022-07-06 PROCEDURE — G8427 DOCREV CUR MEDS BY ELIG CLIN: HCPCS | Performed by: FAMILY MEDICINE

## 2022-07-06 PROCEDURE — 3044F HG A1C LEVEL LT 7.0%: CPT | Performed by: FAMILY MEDICINE

## 2022-07-06 ASSESSMENT — ENCOUNTER SYMPTOMS
COUGH: 0
GASTROINTESTINAL NEGATIVE: 1
PHOTOPHOBIA: 0
EYE DISCHARGE: 0
SINUS PAIN: 0
SHORTNESS OF BREATH: 0
RHINORRHEA: 0
EYE REDNESS: 0

## 2022-07-21 ENCOUNTER — TREATMENT (OUTPATIENT)
Dept: PHYSICAL THERAPY | Age: 37
End: 2022-07-21
Payer: COMMERCIAL

## 2022-07-21 DIAGNOSIS — M54.16 LUMBAR RADICULOPATHY: ICD-10-CM

## 2022-07-21 DIAGNOSIS — G89.29 CHRONIC BILATERAL LOW BACK PAIN WITH RIGHT-SIDED SCIATICA: Primary | ICD-10-CM

## 2022-07-21 DIAGNOSIS — M54.41 CHRONIC BILATERAL LOW BACK PAIN WITH RIGHT-SIDED SCIATICA: Primary | ICD-10-CM

## 2022-07-21 PROCEDURE — 97110 THERAPEUTIC EXERCISES: CPT | Performed by: PHYSICAL THERAPIST

## 2022-07-21 PROCEDURE — G0283 ELEC STIM OTHER THAN WOUND: HCPCS | Performed by: PHYSICAL THERAPIST

## 2022-07-21 NOTE — PROGRESS NOTES
[facility]          Phone: [phone] Fax: [phone]    Physical Therapy Daily Treatment Note  Date:  2022    Patient Name:  Ashish Browne    :  1985  MRN: [id number]    Evaluating Therapist:  Manfred Johnston, YSABEL [id number]    Restrictions/Precautions:    Diagnosis:     Diagnosis Orders   1. Chronic bilateral low back pain with right-sided sciatica        2. Lumbar radiculopathy          Treatment Diagnosis:    Insurance/Certification information:  [address] [place] [address] Dual  Referring Physician:  Brenda Aguilar DO  Plan of care signed (Y/N):    Visit# / total visits:  -  Pain level: 3/10   Time In:  1300  Time Out:  1348    Subjective:  Pt reports that the back pain is starting to come back, \"but it's not like it was\".     Exercises:  Exercise/Equipment Resistance/Repetitions Other comments     stepper 5 min      Trunk stretch with ball 15 sec x 3 reps each flex and B rotation      Hamstring stretch Seated, foot on stool, 15 sec x 3 reps      Sciatic nerve glides x10 reps      Lower trunk rotation 15 sec x 3 reps B      Hip add with ball Seated 5 sec x 15 reps      Hip abd with tband OTB, seated, x 15 reps      Seated hip flex 3 sec x 10 reps      Rows, seated OTB x 15 reps        Seated knee flex OTB x 15 reps       Pelvic tilts 5 sec x 15 reps      Sit to stand X10 reps                                                       Other Therapeutic Activities:  N/A    Home Exercise Program:  N/A    Manual Treatments:  N/A    Modalities:  IFC to mid/low back x 15 minutes     Time-in Time-out Total Time   33007  Evaluation Low Complexity      80068  Evaluation Med Complexity      14891  Evaluation High Complexity      86890  Ther Ex 1300 1333 33   75133  Neuro Re-ed        73609  Ther Activities        72890  Manual Therapy       99350  E-stim  1333 1348 15   19364  Ultrasound            Session 1300 1348 48       Treatment/Activity Tolerance:  [x] Patient tolerated

## 2022-08-08 NOTE — ED PROVIDER NOTES
Department of Emergency Medicine   ED  Provider Note  Admit Date/RoomTime: 5/22/2019  6:17 PM  ED Room: 18/18      History of Present Illness:  5/22/19, Time: 6:41 PM        Kwadwo Menon is a 28 y.o. female presenting to the ED for HA, beginning one day ago. The complaint has been constant, moderate in severity, and worsened by nothing. Pt states that she has been having a pounding HA, nausea, chest pain, and anxiety. Pt states that she has 2 recent deaths in the family. Pt states that she took Tylenol without relief. Pt states that she goes to counseling on the 29th and goes for her anxiety and depression medications on the 30th. Pt states that she is a smoker. Pt denies pregnancy or family hx of brain tumor or aneurysm. Pt denies cough, congestion, fever, chills, emesis, diarrhea, SOB, abdominal pain, back pain, neck pain, LOC, syncope, urinary symptoms, constipation, or any other symptoms. Review of Systems:   Pertinent positives and negatives are stated within HPI, all other systems reviewed and are negative.    --------------------------------------------- PAST HISTORY ---------------------------------------------  Past Medical History:  has a past medical history of Anxiety, Asthma, Bipolar 1 disorder ([facility] [address] COPD [facility] disease[facility] Depression, Fissure, anal, Neuropathy, and PTSD (post-traumatic stress disorder). Past Surgical History:  has a past surgical history that includes Cholecystectomy; Tubal ligation; Anterior cruciate ligament repair; and Dilation and curettage of uterus (N/A, 1/8/2019). Social History:  reports that she has been smoking cigarettes. She started smoking about 23 years ago. She has a 60.00 pack-year smoking history. She has never used smokeless tobacco. She reports that she drinks about 1.2 oz of alcohol per week. She reports that she does not use drugs.     Family History: family history includes Cancer in her mother and sister; Diabetes in her father, mother, and sister; High Blood Pressure in her brother. The patients home medications have been reviewed. Allergies: Cymbalta [duloxetine hcl]; Neurontin [gabapentin]; and Lamictal [lamotrigine]    ---------------------------------------------------PHYSICAL EXAM--------------------------------------    Constitutional/General: Alert and oriented x3, tearful. Anxious  Head: Normocephalic and atraumatic  Eyes: PERRL, EOMI. Mouth: Oropharynx clear, mucous membranes moist.   Neck: Supple. Full ROM. Respiratory: Lungs clear to auscultation bilaterally, no wheezes, rales, or rhonchi. Not in respiratory distress   Cardiovascular:  Regular rate. Regular rhythm. No murmurs, gallops, or rubs. 2+ distal pulses  GI:  Abdomen Soft, Non tender, Non distended. No rebound, guarding, or rigidity. +BS  Musculoskeletal: Moves all extremities x 4. Warm and well perfused. Integument: skin warm and dry. No rashes. Neurologic: GCS 15.  CN 2-12 grossly intact. No focal deficits. No meningeal signs. Psychiatric:  Tearful, Anxious affect, no signs or symptoms of psychosis. No suicidal or homicidal ideation.     -------------------------------------------------- RESULTS -------------------------------------------------  I have personally reviewed all laboratory and imaging results for this patient. Results are listed below.      LABS:  Results for orders placed or performed during the hospital encounter of 05/22/19   CBC Auto Differential   Result Value Ref Range    WBC 10.6 4.5 - 11.5 E9/L    RBC 4.47 3.50 - 5.50 E12/L    Hemoglobin 12.9 11.5 - 15.5 g/dL    Hematocrit 39.6 34.0 - 48.0 %    MCV 88.6 80.0 - 99.9 fL    MCH 28.9 26.0 - 35.0 pg    MCHC 32.6 32.0 - 34.5 %    RDW 14.6 11.5 - 15.0 fL    Platelets 476 141 - 374 E9/L    MPV 11.6 7.0 - 12.0 fL    Neutrophils % 63.3 43.0 - 80.0 %    Immature Granulocytes % 0.4 0.0 - 5.0 %    Lymphocytes % 24.6 20.0 - 42.0 %    Monocytes % 6.2 2.0 - 12.0 % Eosinophils % 4.8 0.0 - 6.0 %    Basophils % 0.7 0.0 - 2.0 %    Neutrophils # 6.74 1.80 - 7.30 E9/L    Immature Granulocytes # 0.04 E9/L    Lymphocytes # 2.61 1.50 - 4.00 E9/L    Monocytes # 0.66 0.10 - 0.95 E9/L    Eosinophils # 0.51 (H) 0.05 - 0.50 E9/L    Basophils # 0.07 0.00 - 0.20 J2/N   Basic Metabolic Panel   Result Value Ref Range    Sodium 135 132 - 146 mmol/L    Potassium 3.6 3.5 - 5.0 mmol/L    Chloride 101 98 - 107 mmol/L    CO2 22 22 - 29 mmol/L    Anion Gap 12 7 - 16 mmol/L    Glucose 126 (H) 74 - 99 mg/dL    BUN 10 6 - 20 mg/dL    CREATININE 0.6 0.5 - 1.0 mg/dL    GFR Non-African American >60 >=60 mL/min/1.73    GFR African American >60     Calcium 8.7 8.6 - 10.2 mg/dL   Troponin   Result Value Ref Range    Troponin <0.01 0.00 - 0.03 ng/mL   EKG 12 Lead   Result Value Ref Range    Ventricular Rate 103 BPM    Atrial Rate 103 BPM    P-R Interval 152 ms    QRS Duration 76 ms    Q-T Interval 340 ms    QTc Calculation (Bazett) 445 ms    P Axis 30 degrees    R Axis 22 degrees    T Axis 19 degrees       RADIOLOGY:  Interpreted by Radiologist.  No orders to display         EKG: This EKG is signed and interpreted by the EP. Time: 1850  Rate: 103  Rhythm: Sinus tachycardia  Interpretation: rate changes, no acute changes  Comparison: stable as compared to patient's most recent EKG      ------------------------- NURSING NOTES AND VITALS REVIEWED ---------------------------  The nursing notes within the ED encounter and vital signs as below have been reviewed by myself. BP (!) 168/103   Pulse 109   Temp 98.9 °F (37.2 °C) (Oral)   Resp 16   Ht 5' 3\" (1.6 m)   Wt 260 lb (117.9 kg)   SpO2 96%   BMI 46.06 kg/m²   Oxygen Saturation Interpretation: Normal.  The patients available past medical records and past encounters were reviewed.       ------------------------------ ED COURSE/MEDICAL DECISION MAKING----------------------  Medications   0.9 % sodium chloride bolus (0 mLs Intravenous Stopped 5/22/19 )   diphenhydrAMINE (BENADRYL) injection 50 mg (50 mg Intravenous Given 19)   metoclopramide (REGLAN) injection 10 mg (10 mg Intravenous Given 19)   LORazepam (ATIVAN) injection 1 mg (1 mg Intravenous Given 19)       Medical Decision Making:   Differential Diagnosis: Anxiety, Common Migraine Headache, Classic Migraine, Muscular Tension Headache, SAH, to name a few. Symptoms primarily to focus on her anxiety and even chest pain she is having today which are felt to be associated more so with her anxiety. Patient states that her loss of family members who have  in the past several months has \"broken her heart. \"  Patient is not had any symptoms of severe depressionsuicidal thoughts or homicidal thoughts. Her headache is similar to previous migraines which she's had a symptoms are currently improved after meds administered here in the department patient is felt to be stable for discharge and close outpatient follow-up with her primary care physician and she has close family support as well. Re-Evaluations:           Re-evaluation. Patients symptoms are improving             Counseling: The emergency provider has spoken with the patient and discussed todays results, in addition to providing specific details for the plan of care and counseling regarding the diagnosis and prognosis. Questions are answered at this time and they are agreeable with the plan. Controlled Substances Monitoring:   RX Monitoring 2019   Attestation The Prescription Monitoring Report for this patient was reviewed today. Chronic Pain Routine Monitoring No signs of potential drug abuse or diversion identified: otherwise, see note documentation        --------------------------------- IMPRESSION AND DISPOSITION ---------------------------------    IMPRESSION  1. Anxiety state    2. Panic attack    3.  Tension headache        DISPOSITION  Disposition: Discharge to home  Patient condition is adult consistency food

## 2022-08-09 ENCOUNTER — OFFICE VISIT (OUTPATIENT)
Dept: PRIMARY CARE CLINIC | Age: 37
End: 2022-08-09
Payer: COMMERCIAL

## 2022-08-09 VITALS
SYSTOLIC BLOOD PRESSURE: 132 MMHG | WEIGHT: 293 LBS | OXYGEN SATURATION: 97 % | TEMPERATURE: 97 F | HEART RATE: 132 BPM | RESPIRATION RATE: 16 BRPM | BODY MASS INDEX: 51.91 KG/M2 | DIASTOLIC BLOOD PRESSURE: 96 MMHG | HEIGHT: 63 IN

## 2022-08-09 DIAGNOSIS — N39.46 MIXED STRESS AND URGE URINARY INCONTINENCE: Primary | ICD-10-CM

## 2022-08-09 DIAGNOSIS — N39.46 MIXED STRESS AND URGE URINARY INCONTINENCE: ICD-10-CM

## 2022-08-09 LAB
BILIRUBIN, POC: NORMAL
BLOOD URINE, POC: NORMAL
CLARITY, POC: NORMAL
COLOR, POC: YELLOW
GLUCOSE URINE, POC: NORMAL
KETONES, POC: NORMAL
LEUKOCYTE EST, POC: NORMAL
NITRITE, POC: NORMAL
PH, POC: 6
PROTEIN, POC: NORMAL
SPECIFIC GRAVITY, POC: >=1.03
UROBILINOGEN, POC: NORMAL

## 2022-08-09 PROCEDURE — 81002 URINALYSIS NONAUTO W/O SCOPE: CPT | Performed by: NEUROMUSCULOSKELETAL MEDICINE & OMM

## 2022-08-09 PROCEDURE — 99213 OFFICE O/P EST LOW 20 MIN: CPT | Performed by: NEUROMUSCULOSKELETAL MEDICINE & OMM

## 2022-08-09 RX ORDER — TOLTERODINE 2 MG/1
2 CAPSULE, EXTENDED RELEASE ORAL DAILY
Qty: 30 CAPSULE | Refills: 5 | Status: SHIPPED
Start: 2022-08-09 | End: 2022-09-16 | Stop reason: ALTCHOICE

## 2022-08-09 ASSESSMENT — ENCOUNTER SYMPTOMS
COUGH: 0
CHOKING: 0
CHEST TIGHTNESS: 0
SHORTNESS OF BREATH: 0

## 2022-08-09 NOTE — PROGRESS NOTES
Alexa Izaguirre (:  1985) is a 39 y.o. female,Established patient, here for evaluation of the following chief complaint(s):  Urinary Frequency (Pt stand while standing pressure and stomach cramming and blood clots passing )         ASSESSMENT/PLAN:  1. Mixed stress and urge urinary incontinence  Assessment & Plan: We will try Detrol LA 2 mg 1 by mouth in the morning, dispense 30 with refills. Advised patient to follow-up with Dr. Mercedes Jaramillo, PCP within the next 3 to 4 weeks. Orders:  -     tolterodine (DETROL LA) 2 MG extended release capsule; Take 1 capsule by mouth in the morning., Disp-30 capsule, R-5Normal  -     Culture, Urine; Future  -     POCT Urinalysis no Micro      Return if symptoms worsen or fail to improve. Subjective   SUBJECTIVE/OBJECTIVE:  HPI 24-year-old female who presents with a 1 to 2-week history of urinary incontinence when she stands from a seated position. Patient was seen in walk-in today. Patient's PCP is Dr. Mercedes Jaramillo he is unaware of this complaint. Patient just seen the urologist today regarding this issue she is being set up for CAT scan of the abdomen pelvis and in 2 weeks will likely have a cystoscopy. Urinalysis was obtained today and showed leukocytes negative nitrites and trace blood. I will send a urinalysis for culture. I will try her on some Detrol LA 2 mg 1 at every morning. She is to follow-up with urology in 2 weeks and she is scheduled to see her OB/GYN doctor Dr. Elyse Howell tomorrow. Review of Systems   Constitutional:  Negative for activity change, appetite change and unexpected weight change. Respiratory:  Negative for cough, choking, chest tightness and shortness of breath. Cardiovascular:  Negative for chest pain, palpitations and leg swelling.    Genitourinary:  Positive for difficulty urinating (Difficulty controlling her urine throughout the day patient states she goes to the restroom at least 15-20 times a day over the last 2 to 3 weeks.  No change in her diet.) and urgency. Negative for dyspareunia, dysuria, enuresis, flank pain, frequency, genital sores, hematuria, menstrual problem, pelvic pain, vaginal bleeding and vaginal discharge. Neurological:  Negative for dizziness, syncope, weakness, light-headedness and headaches. Objective   BP (!) 132/96   Pulse (!) 132   Temp 97 °F (36.1 °C)   Resp 16   Ht 5' 3\" (1.6 m)   Wt (!) 305 lb (138.3 kg)   LMP 02/22/2021 (Approximate)   SpO2 97%   BMI 54.03 kg/m²     Physical Exam  Vitals and nursing note reviewed. Constitutional:       General: She is not in acute distress. Appearance: Normal appearance. She is obese. She is not ill-appearing. HENT:      Head: Normocephalic and atraumatic. Eyes:      General: No scleral icterus. Right eye: No discharge. Left eye: No discharge. Conjunctiva/sclera: Conjunctivae normal.   Cardiovascular:      Rate and Rhythm: Normal rate and regular rhythm. Pulses: Normal pulses. Heart sounds: Normal heart sounds. No murmur heard. No friction rub. No gallop. Pulmonary:      Effort: Pulmonary effort is normal. No respiratory distress. Breath sounds: Normal breath sounds. No stridor. No wheezing, rhonchi or rales. Musculoskeletal:      Right lower leg: No edema. Left lower leg: No edema. Lymphadenopathy:      Cervical: No cervical adenopathy. Neurological:      Mental Status: She is alert and oriented to person, place, and time.    Psychiatric:         Mood and Affect: Mood normal.         Behavior: Behavior normal.           Past Medical History:   Diagnosis Date    Anxiety     Arthritis     Asthma     well controlled, on inhalers    Bipolar 1 disorder (HCC)     COPD (chronic obstructive pulmonary disease) (HCC)     Depression     Fatty liver     Fissure, anal     GERD (gastroesophageal reflux disease)     Glaucoma     Suspected by eye specialist . no eye meds    Hyperlipidemia     Hypertension Macular edema     Cm    Migraines     Morbid obesity due to excess calories ([address]     Neuropathy     PTSD (post-traumatic stress disorder)     Splenomegaly 2020    Type 2 diabetes mellitus without complication, without long-term current use of insulin ([address]     not taking any meds        Past Surgical History:   Procedure Laterality Date    ANTERIOR CRUCIATE LIGAMENT REPAIR Right      SECTION      ,     CHOLECYSTECTOMY      DILATION AND CURETTAGE OF UTERUS N/A 2019    DILATATION AND CURETTAGE HYSTEROSCOPY WITH REMOVAL OF CONDYLOMATA performed by Balaji Howard MD at [address] N/A 2020    DILATATION AND CURETTAGE HYSTEROSCOPY, ATTEMPTED CERVICAL BIOPSY, MARIYA OF  LABIAL ABSCESS performed by Balaji Howard MD at [address] [facility] [address] (CERVIX STATUS UNKNOWN)  2021    Robotic lysis of adhesions, total hysterectomy, bilateral salpingectomy, cystoscopy    INCONTINENCE SURGERY      OTHER SURGICAL HISTORY      repair anal fissure    PAIN MANAGEMENT PROCEDURE N/A 2022    CAUDAL EPIDURAL STEROID INJECTION  #1 performed by Faye Kohli DO at [address] [facility] N/A 2022    CAUDAL EPIDURAL STEROID INJECTION #2 performed by Faye Kohli DO at [address]          The ASCVD Risk score (Colin Levy, et al., 2013) failed to calculate for the following reasons: The 2013 ASCVD risk score is only valid for ages 36 to 78     Educational materials and/or home exercises printed for patient's review and were included inpatient instructions on his/her After Visit Summary and given to patient at the end of visit.     regarding above diagnosis, including possible risks and complications,  especially if left uncontrolled.      Counseled regarding the possible side effects, risks, benefits and alternatives to treatment; patient and/or guardian verbalizes understanding, agrees, feels comfortable with and wishes to proceed withabove treatment plan. Advised patient to call with any new medication issues, and read all Rx infofrom pharmacy to assure aware of all possible risks and side effects of medication before taking. age and gender appropriate health screening exams and vaccinations. Advised patient regarding importance of keeping up with recommended health maintenance and to schedule as soon as possible if overdue, as this isimportant in assessing for undiagnosed pathology, especially cancer, as well as protecting against potentially harmful/life threatening disease. Patient and/or guardian verbalizes understanding andagrees with above counseling, assessment and plan. All questions answered. An electronic signature was used to authenticate this note.     --Ashtyn Monroy, DO

## 2022-08-10 ENCOUNTER — OFFICE VISIT (OUTPATIENT)
Dept: OBGYN | Age: 37
End: 2022-08-10
Payer: COMMERCIAL

## 2022-08-10 ENCOUNTER — TELEPHONE (OUTPATIENT)
Dept: FAMILY MEDICINE CLINIC | Age: 37
End: 2022-08-10

## 2022-08-10 VITALS
WEIGHT: 293 LBS | SYSTOLIC BLOOD PRESSURE: 136 MMHG | DIASTOLIC BLOOD PRESSURE: 79 MMHG | BODY MASS INDEX: 51.91 KG/M2 | HEIGHT: 63 IN | HEART RATE: 101 BPM

## 2022-08-10 DIAGNOSIS — R23.2 HOT FLASHES: ICD-10-CM

## 2022-08-10 DIAGNOSIS — Z11.3 SCREENING EXAMINATION FOR STD (SEXUALLY TRANSMITTED DISEASE): ICD-10-CM

## 2022-08-10 DIAGNOSIS — R35.0 FREQUENCY OF URINATION: ICD-10-CM

## 2022-08-10 DIAGNOSIS — Z87.42 HX OF ABNORMAL CERVICAL PAP SMEAR: ICD-10-CM

## 2022-08-10 DIAGNOSIS — Z12.4 PAP SMEAR FOR CERVICAL CANCER SCREENING: Primary | ICD-10-CM

## 2022-08-10 DIAGNOSIS — E28.319 ASYMPTOMATIC PREMATURE MENOPAUSE: ICD-10-CM

## 2022-08-10 DIAGNOSIS — N39.46 MIXED STRESS AND URGE URINARY INCONTINENCE: ICD-10-CM

## 2022-08-10 LAB
ESTRADIOL LEVEL: 115.8 PG/ML
FOLLICLE STIMULATING HORMONE: 15.3 MIU/ML
TSH SERPL DL<=0.05 MIU/L-ACNC: 2.01 UIU/ML (ref 0.27–4.2)

## 2022-08-10 PROCEDURE — 99213 OFFICE O/P EST LOW 20 MIN: CPT | Performed by: OBSTETRICS & GYNECOLOGY

## 2022-08-10 PROCEDURE — 4004F PT TOBACCO SCREEN RCVD TLK: CPT | Performed by: OBSTETRICS & GYNECOLOGY

## 2022-08-10 PROCEDURE — G8417 CALC BMI ABV UP PARAM F/U: HCPCS | Performed by: OBSTETRICS & GYNECOLOGY

## 2022-08-10 PROCEDURE — G8428 CUR MEDS NOT DOCUMENT: HCPCS | Performed by: OBSTETRICS & GYNECOLOGY

## 2022-08-10 PROCEDURE — 36415 COLL VENOUS BLD VENIPUNCTURE: CPT | Performed by: OBSTETRICS & GYNECOLOGY

## 2022-08-10 RX ORDER — CYCLOBENZAPRINE HCL 10 MG
TABLET ORAL
COMMUNITY
Start: 2022-08-05 | End: 2022-09-07 | Stop reason: ALTCHOICE

## 2022-08-10 NOTE — PROGRESS NOTES
Patient alert and pleasant with concerns about a possible genital wart and spotting about one week ago. Requesting STI screening. Here today for annual GYN exam  Pelvic exam, pap smear obtained, labeled  and delivered to lab. Blood work obtained, labeled and sent to lab  Discharge instructions have been discussed with the patient. Patient advised to call our office with any questions or concerns. Voiced understanding.

## 2022-08-10 NOTE — PROGRESS NOTES
Here today requesting STD screening. Also last week noted some vaginal bleeding. Has had a hysterectomy almost 2 years ago at [facility]. Also notes a genital wart. Has had warts in the past.  Also has focal LGSIL at time of her Hysterectomy so I would like to do a pap on her today as well as HPV testing. Has had a bladder sling. Getting some flashes. Passed a clot last wwek. Hot flashes for a while. Wakes her up at night. Pelvic: Vulva: No condylomata, has a very small inclusion cyst left inside labia, requires no treatment   Vagina:Completely clear, no lesions, vault clear, TPP of vault done with cultures. Bleeding is not found. Bladder support is actually quite good. [address]: I feel no masses    Hot flashes?, Frequency of urination      RPR, FSH, Estradiol, Pap of vault, HIV, urine culture. RTC 10 days for results.

## 2022-08-11 LAB
HIV-1 AND HIV-2 ANTIBODIES: NORMAL
RPR: NORMAL

## 2022-08-12 DIAGNOSIS — N39.46 MIXED STRESS AND URGE URINARY INCONTINENCE: Primary | ICD-10-CM

## 2022-08-12 LAB — URINE CULTURE, ROUTINE: NORMAL

## 2022-08-12 RX ORDER — OXYBUTYNIN CHLORIDE 10 MG/1
10 TABLET, EXTENDED RELEASE ORAL DAILY
Qty: 30 TABLET | Refills: 3 | Status: SHIPPED
Start: 2022-08-12 | End: 2022-09-16 | Stop reason: ALTCHOICE

## 2022-08-16 LAB
CHLAMYDIA BY THIN PREP: NEGATIVE
N. GONORRHOEAE DNA, THIN PREP: NEGATIVE
SOURCE: NORMAL

## 2022-08-17 LAB
HPV SAMPLE: ABNORMAL
HPV TYPE 16: NOT DETECTED
HPV TYPE 18: NOT DETECTED
HPV, HIGH RISK OTHER: DETECTED
INTERPRETATION: ABNORMAL
SOURCE: ABNORMAL

## 2022-08-28 DIAGNOSIS — G89.4 CHRONIC PAIN SYNDROME: ICD-10-CM

## 2022-08-28 DIAGNOSIS — M54.42 LUMBAGO WITH SCIATICA, LEFT SIDE: ICD-10-CM

## 2022-08-31 ENCOUNTER — HOSPITAL ENCOUNTER (OUTPATIENT)
Dept: CT IMAGING | Age: 37
Discharge: HOME | End: 2022-09-02
Payer: COMMERCIAL

## 2022-08-31 ENCOUNTER — HOSPITAL ENCOUNTER (OUTPATIENT)
Age: 37
Discharge: HOME | End: 2022-08-31
Payer: COMMERCIAL

## 2022-08-31 DIAGNOSIS — R31.0 GROSS HEMATURIA: ICD-10-CM

## 2022-08-31 LAB
BUN BLDV-MCNC: 8 MG/DL (ref 6–20)
CREAT SERPL-MCNC: 0.6 MG/DL (ref 0.5–1)
GFR AFRICAN AMERICAN: >60
GFR NON-AFRICAN AMERICAN: >60 ML/MIN/1.73

## 2022-08-31 PROCEDURE — 74178 CT ABD&PLV WO CNTR FLWD CNTR: CPT

## 2022-08-31 PROCEDURE — 84520 ASSAY OF UREA NITROGEN: CPT

## 2022-08-31 PROCEDURE — 6360000004 HC RX CONTRAST MEDICATION: Performed by: RADIOLOGY

## 2022-08-31 PROCEDURE — 36415 COLL VENOUS BLD VENIPUNCTURE: CPT

## 2022-08-31 PROCEDURE — 82565 ASSAY OF CREATININE: CPT

## 2022-08-31 RX ORDER — CYCLOBENZAPRINE HCL 10 MG
TABLET ORAL
Qty: 30 TABLET | Refills: 2 | OUTPATIENT
Start: 2022-08-31

## 2022-08-31 RX ORDER — SODIUM CHLORIDE 0.9 % (FLUSH) 0.9 %
10 SYRINGE (ML) INJECTION PRN
Status: DISCONTINUED | OUTPATIENT
Start: 2022-08-31 | End: 2022-09-03 | Stop reason: HOSPADM

## 2022-08-31 RX ADMIN — IOPAMIDOL 120 ML: 755 INJECTION, SOLUTION INTRAVENOUS at 12:41

## 2022-09-02 NOTE — PROGRESS NOTES
[facility]          Phone: [phone] Fax: [phone]    Physical Therapy Daily Treatment Note  Date:  6/15/2022    Patient Name:  Tyree Reeves    :  1985  MRN: [id number]    Evaluating Therapist:  Iggy Bruno PT [id number]    Restrictions/Precautions:    Diagnosis:     Diagnosis Orders   1. Chronic bilateral low back pain with right-sided sciatica     2. Lumbar radiculopathy       Treatment Diagnosis:    Insurance/Certification information:  [address] [place] [address] Dual  Referring Physician:  Elisha Haley DO  Plan of care signed (Y/N):    Visit# / total visits:  -  Pain level: 3/10   Time In:  1346  Time Out:  1415    Subjective:  Pt reports that she has been having pain in her knee.     Exercises:  Exercise/Equipment Resistance/Repetitions Other comments     stepper 5 min      Trunk stretch with ball 15 sec x 3 reps each flex and B rotation      Hamstring stretch Seated, foot on stool, 15 sec x 3 reps      Sciatic nerve glides x10 reps      Lower trunk rotation 15 sec x 3 reps B      Hip add with ball Seated 5 sec x 10 reps      Hip abd with tband OTB, seated, x 10 reps      Seated hip flex 3 sec x 10 reps                                                                                      Other Therapeutic Activities:  N/A    Home Exercise Program:  N/A    Manual Treatments:  N/A    Modalities:  N/A     Time-in Time-out Total Time   43076  Evaluation Low Complexity      24124  Evaluation Med Complexity      57082  Evaluation High Complexity      14818  Ther Ex [id number]   33844  Neuro Re-ed        59209  Ther Activities        19978  Manual Therapy       32992  E-stim       34883  Ultrasound            Session [id number]       Treatment/Activity Tolerance:  [x] Patient tolerated treatment well [] Patient limited by fatigue  [] Patient limited by pain  [] Patient limited by other medical complications  [] Other:     Prognosis: [x] Good [] Fair  [] Notified patient via portal of new sports medicine referral.    Poor    Patient Requires Follow-up: [x] Yes  [] No    Plan:   [x] Continue per plan of care [] Alter current plan (see comments)  [] Plan of care initiated [] Hold pending MD visit [] Discharge  Plan for Next Session:        Electronically signed by:  Dionte Cuello, [address] [facility], [id number]

## 2022-09-07 ENCOUNTER — PREP FOR PROCEDURE (OUTPATIENT)
Dept: PAIN MANAGEMENT | Age: 37
End: 2022-09-07

## 2022-09-07 ENCOUNTER — OFFICE VISIT (OUTPATIENT)
Dept: OBGYN | Age: 37
End: 2022-09-07
Payer: COMMERCIAL

## 2022-09-07 ENCOUNTER — OFFICE VISIT (OUTPATIENT)
Dept: PAIN MANAGEMENT | Age: 37
End: 2022-09-07
Payer: COMMERCIAL

## 2022-09-07 VITALS
SYSTOLIC BLOOD PRESSURE: 152 MMHG | BODY MASS INDEX: 51.91 KG/M2 | HEIGHT: 63 IN | TEMPERATURE: 97.3 F | OXYGEN SATURATION: 97 % | HEART RATE: 112 BPM | WEIGHT: 293 LBS | RESPIRATION RATE: 16 BRPM | DIASTOLIC BLOOD PRESSURE: 95 MMHG

## 2022-09-07 VITALS — HEART RATE: 101 BPM | SYSTOLIC BLOOD PRESSURE: 133 MMHG | DIASTOLIC BLOOD PRESSURE: 79 MMHG

## 2022-09-07 DIAGNOSIS — M54.42 CHRONIC BILATERAL LOW BACK PAIN WITH BILATERAL SCIATICA: ICD-10-CM

## 2022-09-07 DIAGNOSIS — M47.817 LUMBOSACRAL SPONDYLOSIS WITHOUT MYELOPATHY: ICD-10-CM

## 2022-09-07 DIAGNOSIS — Z86.19 HISTORY OF HPV INFECTION: Primary | ICD-10-CM

## 2022-09-07 DIAGNOSIS — M54.16 LUMBAR RADICULOPATHY: ICD-10-CM

## 2022-09-07 DIAGNOSIS — M54.41 CHRONIC BILATERAL LOW BACK PAIN WITH BILATERAL SCIATICA: ICD-10-CM

## 2022-09-07 DIAGNOSIS — G89.4 CHRONIC PAIN SYNDROME: Primary | ICD-10-CM

## 2022-09-07 DIAGNOSIS — M54.16 LUMBAR RADICULOPATHY: Primary | ICD-10-CM

## 2022-09-07 DIAGNOSIS — G89.29 CHRONIC BILATERAL LOW BACK PAIN WITH BILATERAL SCIATICA: ICD-10-CM

## 2022-09-07 PROCEDURE — 99212 OFFICE O/P EST SF 10 MIN: CPT | Performed by: OBSTETRICS & GYNECOLOGY

## 2022-09-07 PROCEDURE — 4004F PT TOBACCO SCREEN RCVD TLK: CPT | Performed by: OBSTETRICS & GYNECOLOGY

## 2022-09-07 PROCEDURE — 99213 OFFICE O/P EST LOW 20 MIN: CPT | Performed by: PAIN MEDICINE

## 2022-09-07 PROCEDURE — G8427 DOCREV CUR MEDS BY ELIG CLIN: HCPCS | Performed by: PAIN MEDICINE

## 2022-09-07 PROCEDURE — G8417 CALC BMI ABV UP PARAM F/U: HCPCS | Performed by: OBSTETRICS & GYNECOLOGY

## 2022-09-07 PROCEDURE — 4004F PT TOBACCO SCREEN RCVD TLK: CPT | Performed by: PAIN MEDICINE

## 2022-09-07 PROCEDURE — G8417 CALC BMI ABV UP PARAM F/U: HCPCS | Performed by: PAIN MEDICINE

## 2022-09-07 PROCEDURE — G8427 DOCREV CUR MEDS BY ELIG CLIN: HCPCS | Performed by: OBSTETRICS & GYNECOLOGY

## 2022-09-07 RX ORDER — CYCLOBENZAPRINE HCL 10 MG
10 TABLET ORAL NIGHTLY PRN
Qty: 30 TABLET | Refills: 2 | Status: SHIPPED | OUTPATIENT
Start: 2022-09-07 | End: 2022-10-07

## 2022-09-07 RX ORDER — VIBEGRON 75 MG/1
75 TABLET, FILM COATED ORAL NIGHTLY
COMMUNITY

## 2022-09-07 NOTE — PROGRESS NOTES
[facility]        [address]  [facility]  Dept: [phone]        Follow up Note      Oniel Nails     Date of Visit:  22     CC:  Patient presents for follow up   Chief Complaint   Patient presents with    Lower Back Pain    Follow Up After Procedure     CAUDAL EPIDURAL STEROID INJECTION #2     HPI:    Pain is better. Change in quality of symptoms:no. Medication side effects:not applicable . Recent diagnostic testing:none  Recent interventional procedures:caudal BOAZ #2 with 50% relief (resolution of LLE pain, continued RLE pain)    She has been on anticoagulation medications to include NSAIDS and has not been on herbal supplements. She is diabetic. Imagin/2022 EDX -  Electrodiagnostic examination of both legs disclosed evidence diagnostic of left S1 motor radiculopathy or intracanalicular lesion, with acute and chronic denervation changes. This was proven with the abnormal needle testing of the paraspinals. There were no other motor radiculopathies or intracanalicular lesions. There were no peripheral neuropathies. Sensory radiculopathies cannot be evaluated by electrodiagnostic means. Electrodiagnostic study performed 1 year ago found no abnormalities. Clinically, the patient presented with back pains radiating into her left leg and foot. On brief neurological examination, I found no motor or sensory compromise. Her difficulties are related to her radicular disease. Imaging studies of lumbosacral spine were recommended, as well as physical therapy and weight loss. Clinical correlation was highly advised. 3/2022 lumbar MRI -  FINDINGS:   BONES/ALIGNMENT: There is normal alignment of the spine. The vertebral body   heights are maintained. The bone marrow signal appears unremarkable. SPINAL CORD: The conus terminates normally. SOFT TISSUES: No paraspinal mass identified.        L1-L2: There is no significant disc herniation, spinal canal stenosis or   neural foraminal narrowing. L2-L3: There is no significant disc herniation, spinal canal stenosis or   neural foraminal narrowing. L3-L4: There is no significant disc herniation, spinal canal stenosis or   neural foraminal narrowing. L4-L5: There is no significant disc herniation. Mild facet hypertrophy is   noted. No significant central canal or lateral recess stenosis. Mild neural   foraminal stenoses. L5-S1: There is no significant disc herniation. Mild facet hypertrophy is   noted. No significant central canal or lateral recess stenosis. Mild neural   foraminal stenoses. Impression   1. No fracture or bony destructive lesion. 2. Mild facet hypertrophic changes at L4-5 and L5-S1, resulting in mild   neural foraminal stenoses. 3.  No significant central canal or lateral recess stenosis. 10/2021 xray cervical and lumbar -  FINDINGS:   C-spine: No fracture or dislocation. Disc spaces are preserved. Normal soft   tissues. L-spine: Minimal degenerative endplate spurring from T9-T5. No fracture or   dislocation. Normal soft tissues. Impression   Unremarkable C-spine. Minimal degenerative endplate spurring from Q1-P2.          Potential Aberrant Drug-Related Behavior:      Urine Drug Screening:    OARRS report:    Past Medical History:   Diagnosis Date    Anxiety     Arthritis     Asthma     well controlled, on inhalers    Bipolar 1 disorder ([address]     COPD (chronic obstructive pulmonary disease) (HCC)     Depression     Fatty liver     Fissure, anal     GERD (gastroesophageal reflux disease)     Glaucoma     Suspected by eye specialist . no eye meds    Hyperlipidemia     Hypertension     Macular edema     Cm    Migraines     Morbid obesity due to excess calories ([address]     Neuropathy     PTSD (post-traumatic stress disorder)     Splenomegaly 09/24/2020    Type 2 diabetes mellitus without complication, without long-term current use of insulin ([facility] [address]     not taking any meds       Past Surgical History:   Procedure Laterality Date    ANTERIOR CRUCIATE LIGAMENT REPAIR Right      SECTION      ,     CHOLECYSTECTOMY      DILATION AND CURETTAGE OF UTERUS N/A 2019    DILATATION AND CURETTAGE HYSTEROSCOPY WITH REMOVAL OF CONDYLOMATA performed by Christine Trotter MD at [address] [facility] N/A 2020    DILATATION AND CURETTAGE HYSTEROSCOPY, ATTEMPTED CERVICAL BIOPSY, MARIYA OF  LABIAL ABSCESS performed by Christine Trotter MD at [address] [address] (CERVIX STATUS UNKNOWN)  2021    Robotic lysis of adhesions, total hysterectomy, bilateral salpingectomy, cystoscopy    INCONTINENCE SURGERY      OTHER SURGICAL HISTORY      repair anal fissure    PAIN MANAGEMENT PROCEDURE N/A 2022    CAUDAL EPIDURAL STEROID INJECTION  #1 performed by Catherine Gray DO at [facility] N/A 2022    CAUDAL EPIDURAL STEROID INJECTION #2 performed by Catherine Gray DO at [address] [place] [address]         Prior to Admission medications    Medication Sig Start Date End Date Taking? Authorizing Provider   Vibegron (Tosha Petties) 75 MG TABS Take by mouth   Yes Historical Provider, MD   cyclobenzaprine (FLEXERIL) 10 MG tablet Take 1 tablet by mouth nightly as needed for Muscle spasms 9/7/22 10/7/22 Yes Catherine Gray,    tolterodine (DETROL LA) 2 MG extended release capsule Take 1 capsule by mouth in the morning.  22  Yes Raad Wellington, DO   ondansetron (ZOFRAN) 4 MG tablet Take 1 tablet by mouth 3 times daily as needed for Nausea or Vomiting 22  Yes Enid Washburn, DO   fluticasone (FLOVENT HFA) 44 MCG/ACT inhaler Inhale 2 puffs into the lungs 2 times daily 22  Yes Enid Min, DO   albuterol sulfate HFA (PROVENTIL HFA) 108 (90 Base) MCG/ACT inhaler Inhale 2 puffs into the lungs every 4 hours as needed for Wheezing 5/4/22 5/4/23 Yes Gi Sanches DO   tiotropium (SPIRIVA HANDIHALER) 18 MCG inhalation capsule Inhale 1 capsule into the lungs daily 3/9/22  Yes Lizzy Covington DO   Acetaminophen (TYLENOL) 325 MG CAPS Take 650 mg by mouth every 4 hours as needed (pain)    Yes Historical Provider, MD   ibuprofen (ADVIL;MOTRIN) 800 MG tablet Take 800 mg by mouth every 6 hours as needed for Pain   Yes Historical Provider, MD   albuterol (PROVENTIL) (2.5 MG/3ML) 0.083% nebulizer solution Take 3 mLs by nebulization every 6 hours as needed for Wheezing 4/29/20  Yes Sly Garcia PA-C   oxybutynin (DITROPAN XL) 10 MG extended release tablet Take 1 tablet by mouth in the morning.   Patient not taking: Reported on 9/7/2022 8/12/22   Gi Sanches DO       Allergies   Allergen Reactions    Keflex [Cephalexin]      Hives, rash      Prednisone Rash    Cymbalta [Duloxetine Hcl] Other (See Comments)     angry    Lamictal [Lamotrigine] Nausea And Vomiting    Neurontin [Gabapentin] Other (See Comments)     Makes me feel very weird       Social History     Socioeconomic History    Marital status:      Spouse name: Suad Granados    Number of children: 2    Years of education: 13    Highest education level: Not on file   Occupational History    Occupation: disability-, LPN   Tobacco Use    Smoking status: Every Day     Packs/day: 1.00     Years: 20.00     Pack years: 20.00     Types: Cigarettes     Start date: 11/3/1995    Smokeless tobacco: Never   Vaping Use    Vaping Use: Former    Substances: Never   Substance and Sexual Activity    Alcohol use: Not Currently    Drug use: Not Currently     Types: Marijuana Berneta Antonio)     Comment: medical marijuana last used 3/2021    Sexual activity: Yes     Partners: Male     Comment: TL   Other Topics Concern    Not on file   Social History Narrative    Not on file     Social Determinants of Health     Financial Resource Strain: Low Risk     Difficulty of Paying Living Expenses: Not hard at all   Food Insecurity: No Food Insecurity    Worried About [address] [employer] in the Last Year: Never true    Ran Out of Food in the Last Year: Never true   Transportation Needs: No Transportation Needs    Lack of Transportation (Medical): No    Lack of Transportation (Non-Medical): No   Physical Activity: Sufficiently Active    Days of Exercise per Week: 7 days    Minutes of Exercise per Session: 60 min   Stress: No Stress Concern Present    Feeling of Stress : Only a little   Social Connections: Moderately Integrated    Frequency of Communication with Friends and Family: More than three times a week    Frequency of Social Gatherings with Friends and Family: More than three times a week    Attends Confucianism Services: More than 4 times per year    Active Member of [address] [place] [address] or Organizations: No    Attends Club or Organization Meetings: Never    Marital Status:    Intimate Partner Violence: Not At Risk    Fear of Current or Ex-Partner: No    Emotionally Abused: No    Physically Abused: No    Sexually Abused: No   Housing Stability: Low Risk     Unable to Pay for Housing in the Last Year: No    Number of [place] in the Last Year: 1    Unstable Housing in the Last Year: No       Family History   Problem Relation Age of Onset    Diabetes Mother     Cancer Mother         ovarian    Diabetes Father     Cancer Sister         NHL    Diabetes Sister     High Blood Pressure Brother     Breast Cancer Maternal Aunt     Uterine Cancer Neg Hx     Colon Cancer Neg Hx        REVIEW OF SYSTEMS:     [facility] denies fever/chills, chest pain, shortness of breath, new bowel or bladder complaints. All other review of systems was negative.     PHYSICAL EXAMINATION:      BP (!) 152/95   Pulse (!) 112   Temp 97.3 °F (36.3 °C) (Infrared)   Resp 16   Ht 5' 3\" (1.6 m)   Wt (!) 310 lb (140.6 kg)   LMP 02/22/2021 (Approximate)   SpO2 97%   BMI 54.91 kg/m²     General:       General appearance:pleasant and well-hydrated, in no distress and A & O x3  Build:Obese  Function:Rises from a seated position with difficulty, mild     HEENT:     Head:normocephalic, atraumatic  Pupils:regular, round, equal  Sclera: icterus absent     Lungs:     Breathing:normal breathing pattern     Abdomen:     Shape:non-distended  Tenderness:none  Guarding:none     Lumbar spine:     Spine inspection:normal   CVA tenderness:No   Palpation:tenderness paravertebral muscles, left, right positive. Range of motion:abnormal mildly in lateral bending, flexion, extension rotation bilateral and is painful. Musculoskeletal:     Trigger points in Paraveteral:absent bilaterally  SI joint tenderness:positive right, positive left              MEE test:not done right, not done left  Piriformis tenderness:positive right, positive left  Trochanteric bursa tenderness:not done right, not done left  SLR:negative right, negative left, sitting      Extremities:     Tremors:None bilaterally upper and lower  Range of motion:pain with internal rotation of hips negative.   Intact:Yes  Varicose veins:absent   Pulses:present Lt radial  Cyanosis:none  Edema:none x all 4 extremities     Neurological:     Sensory:normal to light touch LLE, diffusely decreased RLE     Motor:          Right Quadriceps5/5     Left Quadriceps5/5           Right Gastrocnemius5/5  Left Gastrocnemius5/5  Right Ant Tibialis5/5   Left Ant Tibialis5/5     Reflexes:  (not assessed today)  Right Quadriceps reflex2+  Left Quadriceps reflex2+  Right Achilles reflex2+  Left Achilles reflex2+    Gait:normal station without assist device     Dermatology:     Skin:no rashes or lesions noted     Impression:     LBP BLE pain with weakness of the RLE  She reports having intermittent incontinence of urine (chronic and associated with urogyn surgery) and stool x2  Reports vaginal numbness w/ intercourse  Was evaluated by gynecology for the above, has had hysterectomy, and was told it is [place] damage\"  She is using a walker due to reported weakness of the RLE  \"I'm not suicidal or nothing but there are times I just want to jump in front of a truck because it's constant\", she can contract for safety, denies a plan and states \"I don't plan on hurting myself\"  2022 EDX shows left S1 motor radiculopathy or intracanalicular lesion, with acute and chronic denervation changes (note made of EDX done one year prior was negative)  2022 lumbar MRI shows mild lower lumbar facet degeneration without stenosis  The reported symptoms cannot be explained by her imaging as there is a lack of significant pathology on the xrays     She states she had 50% pain relief after the caudal BOAZ (complete resolution of LLE pain, improvement in RLE pain) - she would like to repeat      Plan:    Urine screen deferred  OARRS report reviewed  Gabapentin causes SE's  On topamax for migraine HA's  Offered pregabalin - pt prefers to try injections first  RF on Flexeril 10 mg #30, 2 RF previously prescribed by ER and pt requests RF  Encouraged to continue with PT  Caudal BOAZ #1 with sedation gave 100% pain relief x 2 weeks, #2 gave 50% relief, pt would like to repeat  Patient encouraged to stay active and to lose weight  Treatment plan discussed with the patient including medication and procedure side effects     Cc:  Referring physician    INGRID Dougherty.

## 2022-09-07 NOTE — PROGRESS NOTES
Do you currently have any of the following:    Fever: No  Headache:  No  Cough: No  Shortness of breath: No  Exposed to anyone with these symptoms: No         Reid Sanchez presents to the [facility] on 9/7/2022. Valeriy Gustafson is complaining of pain lower back. The pain is constant. The pain is described as sharp. Pain is rated on her best day at a 6, on her worst day at a 10, and on average at a 6 on the VAS scale. She took her last dose of Motrin and Tylenol today. Any procedures since your last visit: Yes, with 50 % relief. Pacemaker or defibrillator: No.    She is  on NSAIDS and is not on anticoagulation medications . Medication Contract and Consent for Opioid Use Documents Filed        No documents found                    BP (!) 152/95   Pulse (!) 112   Temp 97.3 °F (36.3 °C) (Infrared)   Resp 16   Ht 5' 3\" (1.6 m)   Wt (!) 310 lb (140.6 kg)   LMP 02/22/2021 (Approximate)   SpO2 97%   BMI 54.91 kg/m²      Patient's last menstrual period was 02/22/2021 (approximate).

## 2022-09-07 NOTE — PROGRESS NOTES
Patient alert and pleasant with no complaints  Here today for results. Discharge instructions have been discussed with the patient. Patient advised to call our office with any questions or concerns. Voiced understanding.

## 2022-09-07 NOTE — PROGRESS NOTES
Here today or review of her labs. No evidence of menopause, FSH and TSH were normal.  Pap was negative however the HPV was positive so she will need a repap and HPV without fail in 1 year. Remained of the testing was normal as well. RTC 1year for pap and HPV, booklet also given to the patient.

## 2022-09-14 ENCOUNTER — TELEPHONE (OUTPATIENT)
Dept: PAIN MANAGEMENT | Age: 37
End: 2022-09-14

## 2022-09-14 NOTE — TELEPHONE ENCOUNTER
Call to [facility], left message that procedure was approved for 9/22/2022 and that the surgery center should call her a few days before for the pre op call and after 3:00 PM the business day before with the arrival time. Instructed Anjana to hold ibuprofen for 24 hours, naprosyn for 4 days and any aspirin containing products or fish oil for 7 days. Instructed to call office back.     Antonia Yadav RN  Pain Management

## 2022-09-20 ENCOUNTER — HOSPITAL ENCOUNTER (EMERGENCY)
Age: 37
Discharge: HOME | End: 2022-09-20
Attending: EMERGENCY MEDICINE
Payer: COMMERCIAL

## 2022-09-20 ENCOUNTER — APPOINTMENT (OUTPATIENT)
Dept: GENERAL RADIOLOGY | Age: 37
End: 2022-09-20
Payer: COMMERCIAL

## 2022-09-20 VITALS
RESPIRATION RATE: 16 BRPM | SYSTOLIC BLOOD PRESSURE: 185 MMHG | OXYGEN SATURATION: 97 % | DIASTOLIC BLOOD PRESSURE: 90 MMHG | HEIGHT: 63 IN | HEART RATE: 101 BPM | WEIGHT: 293 LBS | BODY MASS INDEX: 51.91 KG/M2 | TEMPERATURE: 97.9 F

## 2022-09-20 DIAGNOSIS — I10 ESSENTIAL HYPERTENSION: ICD-10-CM

## 2022-09-20 DIAGNOSIS — J01.90 ACUTE NON-RECURRENT SINUSITIS, UNSPECIFIED LOCATION: Primary | ICD-10-CM

## 2022-09-20 LAB
SARS-COV-2, NAAT: NOT DETECTED
STREP GRP A PCR: NEGATIVE

## 2022-09-20 PROCEDURE — 87635 SARS-COV-2 COVID-19 AMP PRB: CPT

## 2022-09-20 PROCEDURE — 99284 EMERGENCY DEPT VISIT MOD MDM: CPT

## 2022-09-20 PROCEDURE — 87880 STREP A ASSAY W/OPTIC: CPT

## 2022-09-20 PROCEDURE — 71045 X-RAY EXAM CHEST 1 VIEW: CPT

## 2022-09-20 RX ORDER — AZITHROMYCIN 250 MG/1
TABLET, FILM COATED ORAL
Qty: 1 PACKET | Refills: 0 | Status: SHIPPED | OUTPATIENT
Start: 2022-09-20 | End: 2022-09-21 | Stop reason: SINTOL

## 2022-09-20 ASSESSMENT — PAIN DESCRIPTION - FREQUENCY: FREQUENCY: CONTINUOUS

## 2022-09-20 ASSESSMENT — PAIN DESCRIPTION - DESCRIPTORS: DESCRIPTORS: SORE;ACHING

## 2022-09-20 ASSESSMENT — PAIN - FUNCTIONAL ASSESSMENT: PAIN_FUNCTIONAL_ASSESSMENT: 0-10

## 2022-09-20 ASSESSMENT — PAIN DESCRIPTION - LOCATION: LOCATION: HEAD;THROAT

## 2022-09-20 ASSESSMENT — PAIN SCALES - GENERAL: PAINLEVEL_OUTOF10: 6

## 2022-09-20 ASSESSMENT — PAIN DESCRIPTION - ONSET: ONSET: SUDDEN

## 2022-09-20 ASSESSMENT — PAIN DESCRIPTION - PAIN TYPE: TYPE: ACUTE PAIN

## 2022-09-20 NOTE — ED NOTES
Pt informed this rn that her and her pmd was doing a 3 month trial or no bp medications. Pt informed that her bp is elevated and needs to follow up with her pmd as requested by [facility] do.       Geneva Vance RN  09/20/22 [id number]

## 2022-09-20 NOTE — ED PROVIDER NOTES
has a 20.00 pack-year smoking history. She has never used smokeless tobacco. She reports that she does not currently use alcohol. She reports that she does not currently use drugs after having used the following drugs: Marijuana Charmayne Stageorge). Family History: family history includes Breast Cancer in her maternal aunt; Cancer in her mother and sister; Diabetes in her father, mother, and sister; High Blood Pressure in her brother. The patients home medications have been reviewed. Allergies: Keflex [cephalexin], Prednisone, Cymbalta [duloxetine hcl], Lamictal [lamotrigine], and Neurontin [gabapentin]        ----------------------------------------PHYSICAL EXAM--------------------------------------  Constitutional:  Well developed, well nourished, obese, no acute distress, non-toxic appearance   Eyes:  PERRL, conjunctiva normal, EOMI  HENT:  Atraumatic, external ears normal, nose normal, oropharynx moist, no pharyngeal exudates or swelling but mildly red. TMs clear and intact bilaterally. Bilateral external auditory canals pink and dry. No mastoid tenderness bilaterally. Sinus congestion noted. Neck- normal range of motion, no nuchal rigidity   Respiratory:  No respiratory distress, normal breath sounds, no rales, no wheezing   Cardiovascular:  Normal rate, normal rhythm, no murmurs, no gallops, no rubs. Radial pulses 2+ bilaterally. GI:  Soft, nondistended, normal bowel sounds, nontender, no organomegaly, no mass, no rebound, no guarding   :  No costovertebral angle tenderness   Musculoskeletal:  No edema, no tenderness, no deformities. Back- no tenderness  Integument:  Well hydrated, no visible rash. Adequate perfusion. Lymphatic:  No cervical lymphadenopathy noted   Neurologic:  Alert & oriented x 3, CN 2-12 normal,no focal deficits noted. Normal gait.     Psychiatric:  Speech and behavior appropriate       -------------------------------------------------- RESULTS -------------------------------------------------  I have personally reviewed all laboratory and imaging results for this patient. Results are listed below. LABS:  Results for orders placed or performed during the hospital encounter of 09/20/22   COVID-19, Rapid    Specimen: Nasopharyngeal Swab   Result Value Ref Range    SARS-CoV-2, NAAT Not Detected Not Detected   Strep Screen Group A Throat    Specimen: Throat   Result Value Ref Range    Strep Grp A PCR Negative Negative       RADIOLOGY:  Interpreted by Radiologist.  XR CHEST PORTABLE   Final Result   No acute process. ------------------------- NURSING NOTES AND VITALS REVIEWED ---------------------------  The nursing notes within the ED encounter and vital signs as below have been reviewed by myself. BP (!) 185/90   Pulse (!) 101   Temp 97.9 °F (36.6 °C) (Oral)   Resp 16   Ht 5' 3\" (1.6 m)   Wt (!) 310 lb (140.6 kg)   LMP 02/22/2021 (Approximate)   SpO2 97%   BMI 54.91 kg/m²   Oxygen Saturation Interpretation: Normal      The patients available past medical records and past encounters were reviewed. ------------------------------ ED COURSE/MEDICAL DECISION MAKING----------------------  Medications - No data to display        Procedures:   none      Medical Decision Making:    COVID-negative, strep negative, chest x-ray clear. Mother would like an antibiotic for sinusitis, will provide a Z-Ronald as she has multiple allergies. Tachycardia resolved upon discharge BP elevated. Patient states she is on a trial.  For going off of her hypertension medication. She will follow-up with her primary care physician this week. she appears well, nontoxic, neurovascularly intact and ambulatory upon discharge. Patient was explicitly instructed on specific signs and symptoms on which to return to the emergency room for. Patient was instructed to return to the ER for any new or worsening symptoms.  Additional discharge instructions were given verbally. All questions were answered. Patient is comfortable and agreeable with discharge plan. Patient in no acute distress and non-toxic in appearance. This patient's ED course included: re-evaluation prior to disposition and a personal history and physicial eaxmination    This patient has remained hemodynamically stable and remained unchanged during their ED course. Re-Evaluations:  Time: 11:40 AM EDT   Re-evaluation. Patients symptoms show no change  Repeat physical examination is not changed      Consultations:   none    Critical Care: none    I, Almita Carvalho, DO, am the Primary Provider of Record    Counseling: The emergency provider has spoken with the patient and discussed todays results, in addition to providing specific details for the plan of care and counseling regarding the diagnosis and prognosis. Questions are answered at this time and they are agreeable with the plan.    --------------------------- IMPRESSION AND DISPOSITION ---------------------------------    IMPRESSION  1. Acute non-recurrent sinusitis, unspecified location    2.  Essential hypertension        DISPOSITION  Disposition: Discharge to home  Patient condition is stable              Humberto Uribe, DO  09/20/22 Leila Anand, DO  09/20/22 Leila 49, DO  09/20/22 1149

## 2022-09-20 NOTE — ED NOTES
Rapid strep and rapid covid swab obtained on pt.  Pt tolerated well with no complications     Kassandra (MALATHI [address]) [facility]  09/20/22 1016

## 2022-09-21 ENCOUNTER — APPOINTMENT (OUTPATIENT)
Dept: CT IMAGING | Age: 37
End: 2022-09-21
Payer: COMMERCIAL

## 2022-09-21 ENCOUNTER — APPOINTMENT (OUTPATIENT)
Dept: GENERAL RADIOLOGY | Age: 37
End: 2022-09-21
Payer: COMMERCIAL

## 2022-09-21 ENCOUNTER — HOSPITAL ENCOUNTER (EMERGENCY)
Age: 37
Discharge: HOME | End: 2022-09-21
Payer: COMMERCIAL

## 2022-09-21 VITALS
RESPIRATION RATE: 16 BRPM | BODY MASS INDEX: 51.91 KG/M2 | HEIGHT: 63 IN | DIASTOLIC BLOOD PRESSURE: 79 MMHG | WEIGHT: 293 LBS | OXYGEN SATURATION: 97 % | SYSTOLIC BLOOD PRESSURE: 147 MMHG | TEMPERATURE: 99.5 F | HEART RATE: 106 BPM

## 2022-09-21 DIAGNOSIS — J01.40 ACUTE PANSINUSITIS, RECURRENCE NOT SPECIFIED: Primary | ICD-10-CM

## 2022-09-21 DIAGNOSIS — R51.9 SINUS HEADACHE: ICD-10-CM

## 2022-09-21 DIAGNOSIS — J20.9 ACUTE BRONCHITIS, UNSPECIFIED ORGANISM: ICD-10-CM

## 2022-09-21 LAB
ALBUMIN SERPL-MCNC: 3.4 G/DL (ref 3.5–5.2)
ALP BLD-CCNC: 82 U/L (ref 35–104)
ALT SERPL-CCNC: 13 U/L (ref 0–32)
ANION GAP SERPL CALCULATED.3IONS-SCNC: 9 MMOL/L (ref 7–16)
AST SERPL-CCNC: 30 U/L (ref 0–31)
BACTERIA: ABNORMAL /HPF
BASOPHILS ABSOLUTE: 0.06 E9/L (ref 0–0.2)
BASOPHILS RELATIVE PERCENT: 0.8 % (ref 0–2)
BILIRUB SERPL-MCNC: 0.3 MG/DL (ref 0–1.2)
BILIRUBIN URINE: NEGATIVE
BLOOD, URINE: ABNORMAL
BUN BLDV-MCNC: 6 MG/DL (ref 6–20)
CALCIUM SERPL-MCNC: 8.3 MG/DL (ref 8.6–10.2)
CHLORIDE BLD-SCNC: 108 MMOL/L (ref 98–107)
CLARITY: ABNORMAL
CO2: 20 MMOL/L (ref 22–29)
COLOR: YELLOW
CREAT SERPL-MCNC: 0.5 MG/DL (ref 0.5–1)
EOSINOPHILS ABSOLUTE: 0.4 E9/L (ref 0.05–0.5)
EOSINOPHILS RELATIVE PERCENT: 5.6 % (ref 0–6)
EPITHELIAL CELLS, UA: ABNORMAL /HPF
GFR AFRICAN AMERICAN: >60
GFR NON-AFRICAN AMERICAN: >60 ML/MIN/1.73
GLUCOSE BLD-MCNC: 145 MG/DL (ref 74–99)
GLUCOSE URINE: NEGATIVE MG/DL
HCT VFR BLD CALC: 39.6 % (ref 34–48)
HEMOGLOBIN: 13.6 G/DL (ref 11.5–15.5)
IMMATURE GRANULOCYTES #: 0.02 E9/L
IMMATURE GRANULOCYTES %: 0.3 % (ref 0–5)
KETONES, URINE: NEGATIVE MG/DL
LEUKOCYTE ESTERASE, URINE: ABNORMAL
LYMPHOCYTES ABSOLUTE: 1.77 E9/L (ref 1.5–4)
LYMPHOCYTES RELATIVE PERCENT: 24.8 % (ref 20–42)
MCH RBC QN AUTO: 30.4 PG (ref 26–35)
MCHC RBC AUTO-ENTMCNC: 34.3 % (ref 32–34.5)
MCV RBC AUTO: 88.6 FL (ref 80–99.9)
MONOCYTES ABSOLUTE: 0.56 E9/L (ref 0.1–0.95)
MONOCYTES RELATIVE PERCENT: 7.8 % (ref 2–12)
NEUTROPHILS ABSOLUTE: 4.33 E9/L (ref 1.8–7.3)
NEUTROPHILS RELATIVE PERCENT: 60.7 % (ref 43–80)
NITRITE, URINE: NEGATIVE
PDW BLD-RTO: 13.2 FL (ref 11.5–15)
PH UA: 5.5 (ref 5–9)
PLATELET # BLD: 213 E9/L (ref 130–450)
PMV BLD AUTO: 11.1 FL (ref 7–12)
POTASSIUM REFLEX MAGNESIUM: 4.5 MMOL/L (ref 3.5–5)
PROTEIN UA: NEGATIVE MG/DL
RBC # BLD: 4.47 E12/L (ref 3.5–5.5)
RBC UA: ABNORMAL /HPF (ref 0–2)
REASON FOR REJECTION: NORMAL
REJECTED TEST: NORMAL
SODIUM BLD-SCNC: 137 MMOL/L (ref 132–146)
SPECIFIC GRAVITY UA: 1.02 (ref 1–1.03)
TOTAL PROTEIN: 6.7 G/DL (ref 6.4–8.3)
TROPONIN, HIGH SENSITIVITY: <6 NG/L (ref 0–9)
UROBILINOGEN, URINE: 0.2 E.U./DL
WBC # BLD: 7.1 E9/L (ref 4.5–11.5)
WBC UA: ABNORMAL /HPF (ref 0–5)

## 2022-09-21 PROCEDURE — 6360000002 HC RX W HCPCS: Performed by: PHYSICIAN ASSISTANT

## 2022-09-21 PROCEDURE — 36415 COLL VENOUS BLD VENIPUNCTURE: CPT

## 2022-09-21 PROCEDURE — 71046 X-RAY EXAM CHEST 2 VIEWS: CPT

## 2022-09-21 PROCEDURE — 93005 ELECTROCARDIOGRAM TRACING: CPT | Performed by: PHYSICIAN ASSISTANT

## 2022-09-21 PROCEDURE — 81001 URINALYSIS AUTO W/SCOPE: CPT

## 2022-09-21 PROCEDURE — 87088 URINE BACTERIA CULTURE: CPT

## 2022-09-21 PROCEDURE — 85025 COMPLETE CBC W/AUTO DIFF WBC: CPT

## 2022-09-21 PROCEDURE — 84484 ASSAY OF TROPONIN QUANT: CPT

## 2022-09-21 PROCEDURE — 99285 EMERGENCY DEPT VISIT HI MDM: CPT

## 2022-09-21 PROCEDURE — 80053 COMPREHEN METABOLIC PANEL: CPT

## 2022-09-21 PROCEDURE — 96374 THER/PROPH/DIAG INJ IV PUSH: CPT

## 2022-09-21 PROCEDURE — 2580000003 HC RX 258: Performed by: PHYSICIAN ASSISTANT

## 2022-09-21 PROCEDURE — 70450 CT HEAD/BRAIN W/O DYE: CPT

## 2022-09-21 PROCEDURE — 6370000000 HC RX 637 (ALT 250 FOR IP): Performed by: PHYSICIAN ASSISTANT

## 2022-09-21 RX ORDER — BENZONATATE 100 MG/1
100 CAPSULE ORAL 3 TIMES DAILY PRN
Qty: 21 CAPSULE | Refills: 0 | Status: SHIPPED | OUTPATIENT
Start: 2022-09-21 | End: 2022-09-28

## 2022-09-21 RX ORDER — 0.9 % SODIUM CHLORIDE 0.9 %
1000 INTRAVENOUS SOLUTION INTRAVENOUS ONCE
Status: COMPLETED | OUTPATIENT
Start: 2022-09-21 | End: 2022-09-21

## 2022-09-21 RX ORDER — AMOXICILLIN AND CLAVULANATE POTASSIUM 875; 125 MG/1; MG/1
1 TABLET, FILM COATED ORAL 2 TIMES DAILY
Qty: 20 TABLET | Refills: 0 | Status: SHIPPED | OUTPATIENT
Start: 2022-09-21 | End: 2022-10-01

## 2022-09-21 RX ORDER — MECLIZINE HCL 12.5 MG/1
25 TABLET ORAL ONCE
Status: COMPLETED | OUTPATIENT
Start: 2022-09-21 | End: 2022-09-21

## 2022-09-21 RX ORDER — IPRATROPIUM BROMIDE AND ALBUTEROL SULFATE 2.5; .5 MG/3ML; MG/3ML
1 SOLUTION RESPIRATORY (INHALATION) ONCE
Status: COMPLETED | OUTPATIENT
Start: 2022-09-21 | End: 2022-09-21

## 2022-09-21 RX ORDER — KETOROLAC TROMETHAMINE 30 MG/ML
30 INJECTION, SOLUTION INTRAMUSCULAR; INTRAVENOUS ONCE
Status: COMPLETED | OUTPATIENT
Start: 2022-09-21 | End: 2022-09-21

## 2022-09-21 RX ORDER — GUAIFENESIN, PSEUDOEPHEDRINE HYDROCHLORIDE 600; 60 MG/1; MG/1
1 TABLET, EXTENDED RELEASE ORAL EVERY 12 HOURS
Qty: 10 TABLET | Refills: 0 | Status: SHIPPED | OUTPATIENT
Start: 2022-09-21 | End: 2022-09-26

## 2022-09-21 RX ADMIN — MECLIZINE 25 MG: 12.5 TABLET ORAL at 15:00

## 2022-09-21 RX ADMIN — IPRATROPIUM BROMIDE AND ALBUTEROL SULFATE 1 AMPULE: .5; 2.5 SOLUTION RESPIRATORY (INHALATION) at 15:06

## 2022-09-21 RX ADMIN — SODIUM CHLORIDE 1000 ML: 9 INJECTION, SOLUTION INTRAVENOUS at 14:59

## 2022-09-21 RX ADMIN — KETOROLAC TROMETHAMINE 30 MG: 30 INJECTION, SOLUTION INTRAMUSCULAR; INTRAVENOUS at 15:01

## 2022-09-21 ASSESSMENT — PAIN SCALES - GENERAL
PAINLEVEL_OUTOF10: 2
PAINLEVEL_OUTOF10: 8

## 2022-09-21 ASSESSMENT — PAIN DESCRIPTION - FREQUENCY: FREQUENCY: CONTINUOUS

## 2022-09-21 ASSESSMENT — PAIN DESCRIPTION - DESCRIPTORS
DESCRIPTORS: GNAWING
DESCRIPTORS: SHARP

## 2022-09-21 ASSESSMENT — PAIN DESCRIPTION - LOCATION
LOCATION: HEAD
LOCATION: HEAD

## 2022-09-21 NOTE — ED PROVIDER NOTES
[facility]  Department of Emergency Medicine   ED  Encounter Note  Admit Date/RoomTime: 2022  2:07 PM  ED Room:   NAME: Roxanne Copeland  : 1985  MRN: [id number]     Chief Complaint:  Headache (PATIENT HERE YESTERDAY FOR SAME COMPLAINT. PATIENT DID NOT GET SCRIPTS FILLED.)    HISTORY OF PRESENT ILLNESS        Roxanne Copeland is a 39 y.o. female who presents to the ED with multiple complaints. Patient states she has been sick now for 4 days. It did start with sinus congestion and runny nose. Sore throat. Cough. Patient was seen here yesterday-COVID-negative, strep negative, chest x-ray no pneumonia. Patient was discharged with azithromycin prescription, but has not been able to pick it up because she does not feel good enough to get out to the pharmacy. She now presents stating she feels even worse and has additional complaints. Patient complains of a headache. Feels like somebody is pounding on the top of her head with a hammer. Patient complains of feeling lightheaded and dizzy. Does describe some spinning when she goes to sit or stand. She complains of continued sinus congestion and drainage. Ears feel full. Patient complains of cough. States it initially was green, but she thinks there is a blood in the sputum now. She does get short of breath with exertion. Also she is getting sharp chest pain associated with the coughing. Patient complains of nausea. Denies vomiting, denies diarrhea. Symptoms are moderate in severity. She was taking an over-the-counter cold medicine, but does not feel its helping. ROS   Pertinent positives and negatives are stated within HPI, all other systems reviewed and are negative.     Past Medical History:  has a past medical history of Anxiety, Arthritis, Asthma, Bipolar 1 disorder ([address] COPD (chronic obstructive pulmonary disease) ([facility] [place] [address] Depression, GERD (gastroesophageal reflux disease), Glaucoma, Hyperlipidemia, Hypertension, Macular edema, Migraines, Morbid obesity due to excess calories ([facility] [address] Neuropathy, PTSD (post-traumatic stress disorder), Splenomegaly, and Type 2 diabetes mellitus without complication, without long-term current use of insulin ([facility] [address] Surgical History:  has a past surgical history that includes Cholecystectomy; Tubal ligation; Anterior cruciate ligament repair (Right); Dilation and curettage of uterus (N/A, 2019); other surgical history (); Dilation and curettage of uterus (N/A, 2020);  section; Dilation and curettage of uterus; Hysterectomy (2021); Incontinence surgery; Pain management procedure (N/A, 2022); and Pain management procedure (N/A, 2022). Social History:  reports that she has been smoking cigarettes. She started smoking about 26 years ago. She has a 20.00 pack-year smoking history. She has never used smokeless tobacco. She reports that she does not currently use alcohol. She reports that she does not currently use drugs after having used the following drugs: Marijuana Frederick Vazquez). Family History: family history includes Breast Cancer in her maternal aunt; Cancer in her mother and sister; Diabetes in her father, mother, and sister; High Blood Pressure in her brother. Allergies: Keflex [cephalexin], Prednisone, Cymbalta [duloxetine hcl], Lamictal [lamotrigine], and Neurontin [gabapentin]  Did clarify with patient she is not allergic to penicillin antibiotics. PHYSICAL EXAM   Oxygen Saturation Interpretation: Normal on room air analysis. ED Triage Vitals   BP Temp Temp Source Heart Rate Resp SpO2 Height Weight   22 1416 22 1413 22 1413 22 1413 22 1413 22 1413 22 1413 22 1413   (!) 158/100 99.5 °F (37.5 °C) Oral (!) 130 16 95 % 5' 3\" (1.6 m) (!) 310 lb (140.6 kg)         General:  NAD. Alert and Oriented. Well-appearing. Skin:  Warm, dry. No rashes.   Head: Normocephalic. Atraumatic. Eyes:  EOMI. Conjunctiva normal.  ENT:  Oral mucosa moist.  Airway patent. Posterior pharynx without erythema, without swelling, that exudate. Uvula is midline with equal rise. Bilateral TMs without erythema, without bulging. Nasal turbinates significantly swollen. Nose is all red from wiping. Positive tenderness to palpation across the maxillary and frontal sinuses. Neck:  Supple. Normal ROM. No nuchal rigidity. Respiratory:  No respiratory distress. No labored breathing. Lungs with expiratory wheezing. Positive hacking cough. Oxygen saturation 95% on room air. Cardiovascular: Tachycardia with a heart rate of 122. No Murmur. No peripheral edema. Extremities warm and good color. Chest:  Abdomen:  Soft, nondistended. Normal bowel sounds. Nontender to palpation all 4 quadrants. Negative rebound, negative guarding. Rectal:  Gu: Bladder nontender and non distended. No CVA tenderness. Pelvic:  Extremities:  Normal ROM. Nontender to palpation. Atraumatic. Back:  Normal ROM. Nontender to palpation. Neuro:  Alert and Oriented to person, place, time and situation. Normal LOC. Moves all extremities. Speech fluent. Equal smile. Equal puff cheeks. Psych:  Calm and Cooperative. Normal thought process. Normal judgement.     Lab / Imaging Results   (All laboratory and radiology results have been personally reviewed by myself)  Labs:  Results for orders placed or performed during the hospital encounter of 09/21/22   CBC with Auto Differential   Result Value Ref Range    WBC 7.1 4.5 - 11.5 E9/L    RBC 4.47 3.50 - 5.50 E12/L    Hemoglobin 13.6 11.5 - 15.5 g/dL    Hematocrit 39.6 34.0 - 48.0 %    MCV 88.6 80.0 - 99.9 fL    MCH 30.4 26.0 - 35.0 pg    MCHC 34.3 32.0 - 34.5 %    RDW 13.2 11.5 - 15.0 fL    Platelets 514 682 - 305 E9/L    MPV 11.1 7.0 - 12.0 fL    Neutrophils % 60.7 43.0 - 80.0 %    Immature Granulocytes % 0.3 0.0 - 5.0 %    Lymphocytes % 24.8 20.0 - ipratropium-albuterol (DUONEB) nebulizer solution 1 ampule (1 ampule Inhalation Given 9/21/22 1506)   meclizine (ANTIVERT) tablet 25 mg (25 mg Oral Given 9/21/22 1500)   ketorolac (TORADOL) injection 30 mg (30 mg IntraVENous Given 9/21/22 1501)        Re-examination:  9/21/22       Time: 3108 she feels much much better after the IV fluids, breathing treatment and Toradol. States her headache is almost gone. She does not feel any more spinning sensation. Is still complaining of the cough. Patients condition . Consult(s):   None    Procedure(s):   None    MDM:   Decisions been made to stop the azithromycin and switch her to Augmentin. I think this is better sinus and ENT coverage. UA will be sent off for culture. In addition I will send her with some cough medicine which I think will of course help the sore throat and the pleuritic type chest pain. Patient unfortunate list an allergy to prednisone, so I will not prescribe that. Cardiac work-up was negative. Neuro exam was negative and CT of the head also showed no acute process. Plan of Care/Counseling:  Antonio Martinez reviewed today's visit with the patient in addition to providing specific details for the plan of care and counseling regarding the diagnosis and prognosis. Questions are answered at this time and are agreeable with the plan. ASSESSMENT     1. Acute pansinusitis, recurrence not specified    2. Acute bronchitis, unspecified organism    3. Sinus headache      PLAN   Discharged home. Patient condition is good    New Medications     New Prescriptions    AMOXICILLIN-CLAVULANATE (AUGMENTIN) 875-125 MG PER TABLET    Take 1 tablet by mouth 2 times daily for 10 days    BENZONATATE (TESSALON PERLES) 100 MG CAPSULE    Take 1 capsule by mouth 3 times daily as needed for Cough    PSEUDOEPHEDRINE-GUAIFENESIN (MUCINEX D)  MG PER EXTENDED RELEASE TABLET    Take 1 tablet by mouth in the morning and 1 tablet in the evening.  Do all this for 5 days. Electronically signed by BRANDEN Aldridge   DD: 9/21/22  **This report was transcribed using voice recognition software. Every effort was made to ensure accuracy; however, inadvertent computerized transcription errors may be present.   END OF ED PROVIDER NOTE       Antonio Aldridge  09/21/22 7126

## 2022-09-21 NOTE — ED NOTES
Pt seen here yesterday  And diagnosed with a sinus infection per pt, Rx for antibiotics not picked up yet. Today c/o bilateral upper and lower lip tingling sensation R > L, denies hyperventalation PTA. Neuro assessment completed and no deficits noted NIH scale 0, dysphagia  Screening competed and passed without incident.      Jimi Chamorro RN  09/21/22 2691

## 2022-09-22 LAB
EKG ATRIAL RATE: 112 BPM
EKG P AXIS: 48 DEGREES
EKG P-R INTERVAL: 158 MS
EKG Q-T INTERVAL: 344 MS
EKG QRS DURATION: 76 MS
EKG QTC CALCULATION (BAZETT): 469 MS
EKG R AXIS: 36 DEGREES
EKG T AXIS: 32 DEGREES
EKG VENTRICULAR RATE: 112 BPM

## 2022-09-24 LAB — URINE CULTURE, ROUTINE: NORMAL

## 2022-09-27 ENCOUNTER — OFFICE VISIT (OUTPATIENT)
Dept: RHEUMATOLOGY | Age: 37
End: 2022-09-27
Payer: COMMERCIAL

## 2022-09-27 VITALS
HEART RATE: 122 BPM | DIASTOLIC BLOOD PRESSURE: 86 MMHG | RESPIRATION RATE: 18 BRPM | WEIGHT: 293 LBS | OXYGEN SATURATION: 99 % | SYSTOLIC BLOOD PRESSURE: 144 MMHG | HEIGHT: 63 IN | BODY MASS INDEX: 51.91 KG/M2

## 2022-09-27 DIAGNOSIS — R76.8 RHEUMATOID FACTOR POSITIVE: ICD-10-CM

## 2022-09-27 DIAGNOSIS — M13.0 POLYARTHRITIS: Primary | ICD-10-CM

## 2022-09-27 DIAGNOSIS — M13.0 POLYARTHRITIS: ICD-10-CM

## 2022-09-27 LAB
C-REACTIVE PROTEIN: 1.3 MG/DL (ref 0–0.4)
RHEUMATOID FACTOR: 19 IU/ML (ref 0–13)
SEDIMENTATION RATE, ERYTHROCYTE: 35 MM/HR (ref 0–20)

## 2022-09-27 PROCEDURE — 4004F PT TOBACCO SCREEN RCVD TLK: CPT | Performed by: INTERNAL MEDICINE

## 2022-09-27 PROCEDURE — G8417 CALC BMI ABV UP PARAM F/U: HCPCS | Performed by: INTERNAL MEDICINE

## 2022-09-27 PROCEDURE — 99204 OFFICE O/P NEW MOD 45 MIN: CPT | Performed by: INTERNAL MEDICINE

## 2022-09-27 PROCEDURE — G8427 DOCREV CUR MEDS BY ELIG CLIN: HCPCS | Performed by: INTERNAL MEDICINE

## 2022-09-27 ASSESSMENT — ENCOUNTER SYMPTOMS
COLOR CHANGE: 0
ABDOMINAL PAIN: 0
BACK PAIN: 1
NAUSEA: 0
COUGH: 0
VOMITING: 0
TROUBLE SWALLOWING: 0
DIARRHEA: 0

## 2022-09-27 NOTE — PATIENT INSTRUCTIONS
You have some degenerative osteoarthritis and likely some component of fibromyalgia as well    I see no striking features of rheumatoid arthritis but will need further workup

## 2022-09-27 NOTE — PROGRESS NOTES
Juan Aldrich 1985 is a 39 y.o. female, here for evaluation of the following chief complaint(s):  New Patient (Patient here as a new patient for positive rheumatoid factor. )         ASSESSMENT/PLAN:    Juan Aldrich 1985 is a 39 y.o. female seen in consult for polyarthritis and positive rheumatoid factor. 1.  Polyarthritis-she has some mild neuroforaminal stenosis on MRI of the lumbar spine and likely more diffuse degenerative arthritis. With pain all over she also likely has some component of fibromyalgia. However, I see no obvious synovitis on exam to suggest an underlying inflammatory arthritis but, exam is somewhat difficult due to body habitus. She does have a low positive rheumatoid factor of 18 which is not overly impressive. Rheumatoid factor in of itself does not equal a rheumatoid arthritis diagnosis. My suspicion for underlying inflammatory arthritis is not overly high but certainly will need further work-up as below. Of note she does not tolerate prednisone so if she does turn out to have an inflammatory arthritis would need to avoid prednisone obviously. 2.  Rheumatoid factor positive-again she has a low positive rheumatoid factor of 18 which is not overly impressive. With small percentage of the population will have a low positive rheumatoid factor without any underlying associated disorder. As above I see no striking synovitis on exam to suggest rheumatoid arthritis but will need further work-up as below. We will also check for other causes of positive rheumatoid factor. If work-up below is unrevealing she can follow-up with me on a as needed basis. If I see anything concerning on work-up below I will have her back for follow-up as appropriate. 1. Polyarthritis  -     C-Reactive Protein; Future  -     Sedimentation Rate; Future  -     Rheumatoid Factor; Future  -     Cyclic Citrul Peptide Antibody, IgG; Future  -     Miscellaneous Sendout;  Future  - XR HAND LEFT (MIN 3 VIEWS); Future  -     XR HAND RIGHT (MIN 3 VIEWS); Future  -     XR FOOT LEFT (MIN 3 VIEWS); Future  -     XR FOOT RIGHT (MIN 3 VIEWS); Future  -     Sjogren's Ab (SS-A, SS-B); Future  -     Hepatitis C Antibody; Future  2. Rheumatoid factor positive      Return if symptoms worsen or fail to improve. Subjective   SUBJECTIVE/OBJECTIVE:    HPI: Jacque Colon 1985 is a 39 y.o. female seen in consult for polyarthritis and positive rheumatoid factor. Patient states she has had diffuse pain essentially all over for quite some time now. She states she feels like it is muscles and joints. She also feels like it is deep in the bones. She has a lot of issues with her back and sees pain management. She is getting injections. She states that her hands and feet do bother her. She feels like she may get some diffuse swelling in the hands and feet. She feels stiff in the morning. She states that her pain is certainly worse in the winter and worse with activity. Also worse at the end of the day. She does take Flexeril at night which helps to some degree. She also takes ibuprofen and Tylenol which sometimes helps. She has some baseline shortness of breath from asthma but no other extra-articular manifestations. She had blood work back in February which did show a low positive rheumatoid factor of 18 and negative AMANDA.     Past Medical History:   Diagnosis Date    Anxiety     Arthritis     Asthma     well controlled, on inhalers    Bipolar 1 disorder ([facility] [address]     COPD (chronic obstructive pulmonary disease) ([facility])     Depression     GERD (gastroesophageal reflux disease)     Glaucoma     Suspected by eye specialist . no eye meds    Hyperlipidemia     Hypertension     Macular edema     Cm    Migraines     Morbid obesity due to excess calories ([facility])     Neuropathy     PTSD (post-traumatic stress disorder)     Splenomegaly 09/24/2020    Type 2 diabetes mellitus without complication, without long-term current use of insulin ([facility] [address]     not taking any meds        Review of Systems   Constitutional:  Negative for fatigue and fever. HENT:  Negative for mouth sores and trouble swallowing. Respiratory:  Negative for cough. Cardiovascular:  Negative for chest pain. Gastrointestinal:  Negative for abdominal pain, diarrhea, nausea and vomiting. Genitourinary:  Negative for dysuria and hematuria. Musculoskeletal:  Positive for arthralgias, back pain, joint swelling, myalgias, neck pain and neck stiffness. Skin:  Negative for color change. Neurological:  Negative for weakness and numbness. Hematological:  Negative for adenopathy. All other systems reviewed and are negative. Objective   Vitals:    09/27/22 1052   BP: (!) 144/86   Pulse: (!) 122   Resp: 18   SpO2: 99%      Physical Exam  Constitutional:       General: She is not in acute distress. Appearance: Normal appearance. HENT:      Head: Normocephalic and atraumatic. Right Ear: External ear normal.      Left Ear: External ear normal.      Nose: Nose normal.   Eyes:      General: No scleral icterus. Pulmonary:      Effort: Pulmonary effort is normal.   Musculoskeletal:         General: Tenderness present. No swelling or deformity. Comments: She has diffuse allodynia. There is no obvious synovitis on exam but exam is somewhat difficult due to body habitus. Skin:     General: Skin is warm and dry. Findings: No rash. Neurological:      General: No focal deficit present. Mental Status: She is alert and oriented to person, place, and time. Mental status is at baseline.    Psychiatric:         Mood and Affect: Mood normal.         Behavior: Behavior normal.          Lab Results   Component Value Date    WBC 7.1 09/21/2022    HGB 13.6 09/21/2022    HCT 39.6 09/21/2022    MCV 88.6 09/21/2022     09/21/2022     Lab Results   Component Value Date     09/21/2022    K 4.5 09/21/2022     (H) 09/21/2022    CO2 20 (L) 09/21/2022    BUN 6 09/21/2022    CREATININE 0.5 09/21/2022    GLUCOSE 145 (H) 09/21/2022    CALCIUM 8.3 (L) 09/21/2022    PROT 6.7 09/21/2022    LABALBU 3.4 (L) 09/21/2022    BILITOT 0.3 09/21/2022    ALKPHOS 82 09/21/2022    AST 30 09/21/2022    ALT 13 09/21/2022    LABGLOM >60 09/21/2022    GFRAA >60 09/21/2022     Lab Results   Component Value Date    AMANDA NEGATIVE 02/22/2022     No results found for: RHEUMFACTOR  Lab Results   Component Value Date    SEDRATE 30 (H) 03/13/2020     Lab Results   Component Value Date    CRP 1.0 (H) 09/19/2020            An electronic signature was used to authenticate this note. This note was generated with a voice recognition dictation system. Please disregard any errors or omission which have escaped my review.     --Enriqueta Yadav, DO

## 2022-09-28 LAB
ENA TO SSA (RO) ANTIBODY: NEGATIVE
ENA TO SSB (LA) ANTIBODY: NEGATIVE
HEPATITIS C ANTIBODY INTERPRETATION: NORMAL

## 2022-09-29 ENCOUNTER — HOSPITAL ENCOUNTER (OUTPATIENT)
Dept: GENERAL RADIOLOGY | Age: 37
Discharge: HOME | End: 2022-10-01
Payer: COMMERCIAL

## 2022-09-29 ENCOUNTER — HOSPITAL ENCOUNTER (OUTPATIENT)
Age: 37
Discharge: HOME | End: 2022-10-01
Payer: COMMERCIAL

## 2022-09-29 DIAGNOSIS — M13.0 POLYARTHRITIS: ICD-10-CM

## 2022-09-29 PROCEDURE — 73130 X-RAY EXAM OF HAND: CPT

## 2022-09-29 PROCEDURE — 73630 X-RAY EXAM OF FOOT: CPT

## 2022-09-30 LAB — CYCLIC CITRULLINATED PEPTIDE ANTIBODY IGG: 0.8 U/ML (ref 0–7)

## 2022-10-03 DIAGNOSIS — B37.9 MONILIA INFECTION: Primary | ICD-10-CM

## 2022-10-09 LAB
Lab: NORMAL
REPORT: NORMAL
THIS TEST SENT TO: NORMAL

## 2022-10-12 ENCOUNTER — TELEPHONE (OUTPATIENT)
Dept: PAIN MANAGEMENT | Age: 37
End: 2022-10-12

## 2022-10-12 RX ORDER — CYCLOBENZAPRINE HCL 10 MG
10 TABLET ORAL 3 TIMES DAILY PRN
COMMUNITY

## 2022-10-12 NOTE — PROGRESS NOTES
[facility] PRE-ADMISSION TESTING INSTRUCTIONS    The Preadmission Testing patient is instructed accordingly using the following criteria (check applicable):    ARRIVAL INSTRUCTIONS:  [x] Parking the day of Surgery is located in the Main Entrance lot. Upon entering the door, make an immediate right to the surgery reception desk    [x] Bring photo ID and insurance card    [] Bring in a copy of Living will or Durable Power of  papers. [x] Please be sure to arrange for responsible adult to provide transportation to and from the hospital    [x] Please arrange for responsible adult to be with you for the 24 hour period post procedure due to having anesthesia    [x] If you awake am of surgery not feeling well or have temperature >100 please call [phone]    GENERAL INSTRUCTIONS:    [x] Nothing by mouth after midnight, including gum, candy, mints or water    [x] You may brush your teeth, but do not swallow any water    [] Take medications as instructed with 1-2 oz of water    [] Stop herbal supplements and vitamins 5 days prior to procedure    [] Follow preop dosing of blood thinners per physician instructions    [] Take 1/2 dose of evening insulin, but no insulin after midnight    [] No oral diabetic medications after midnight    [] If diabetic and have low blood sugar or feel symptomatic, take 1-2oz apple juice only    [] Bring inhalers day of surgery    [] Bring C-PAP/ Bi-Pap day of surgery    [] Bring urine specimen day of surgery    [x] Shower or bath with soap, lather and rinse well, AM of Surgery, no lotion, powders or creams to surgical site    [] Follow bowel prep as instructed per surgeon    [x] No tobacco products within 24 hours of surgery     [x] No alcohol or illegal drug use within 24 hours of surgery.     [x] Jewelry, body piercing's, eyeglasses, contact lenses and dentures are not permitted into surgery (bring cases)      [x] Please do not wear any nail polish, make up or hair products on the day of surgery    [x] You can expect a call the business day prior to procedure to notify you if your arrival time changes    [x] If you receive a survey after surgery we would greatly appreciate your comments    [] Parent/guardian of a minor must accompany their child and remain on the premises  the entire time they are under our care     [] Pediatric patients may bring favorite toy, blanket or comfort item with them    [] A caregiver or family member must remain with the patient during their stay if they are mentally handicapped, have dementia, disoriented or unable to use a call light or would be a safety concern if left unattended    [x] Please notify surgeon if you develop any illness between now and time of surgery (cold, cough, sore throat, fever, nausea, vomiting) or any signs of infections  including skin, wounds, and dental.    [x]  The Outpatient Pharmacy is available to fill your prescription here on your day of surgery, ask your preop nurse for details    [] Other instructions    EDUCATIONAL MATERIALS PROVIDED:    [] PAT Preoperative Education Packet/Booklet     [] Medication List    [] Transfusion bracelet applied with instructions    [] Shower with soap, lather and rinse well, and use CHG wipes provided the evening before surgery as instructed    [] Incentive spirometer with instructions

## 2022-10-12 NOTE — TELEPHONE ENCOUNTER
Call to Michael Moore that procedure was approved for 10/13/2022 and that Premaliana should call her a few days before for the pre op call and between 2:00 PM and 4:00 PM  the business day before with the arrival time. Instructed Anjana to hold ibuprofen for 24 hours, naprosyn for 4 days and any aspirin containing products or fish oil for 7 days. Instructed to call office back if any questions. Yessy Urbina verbalized understanding.       Barb Azar RN  Pain Management

## 2022-10-13 ENCOUNTER — ANESTHESIA (OUTPATIENT)
Dept: OPERATING ROOM | Age: 37
End: 2022-10-13
Payer: COMMERCIAL

## 2022-10-13 ENCOUNTER — ANESTHESIA EVENT (OUTPATIENT)
Dept: OPERATING ROOM | Age: 37
End: 2022-10-13
Payer: COMMERCIAL

## 2022-10-13 ENCOUNTER — HOSPITAL ENCOUNTER (OUTPATIENT)
Dept: GENERAL RADIOLOGY | Age: 37
Setting detail: OUTPATIENT SURGERY
Discharge: HOME | End: 2022-10-15
Attending: PAIN MEDICINE
Payer: COMMERCIAL

## 2022-10-13 ENCOUNTER — HOSPITAL ENCOUNTER (OUTPATIENT)
Age: 37
Setting detail: OUTPATIENT SURGERY
Discharge: HOME | End: 2022-10-13
Attending: PAIN MEDICINE | Admitting: PAIN MEDICINE
Payer: COMMERCIAL

## 2022-10-13 VITALS
TEMPERATURE: 98 F | HEIGHT: 63 IN | OXYGEN SATURATION: 98 % | HEART RATE: 65 BPM | BODY MASS INDEX: 51.91 KG/M2 | RESPIRATION RATE: 18 BRPM | SYSTOLIC BLOOD PRESSURE: 128 MMHG | DIASTOLIC BLOOD PRESSURE: 76 MMHG | WEIGHT: 293 LBS

## 2022-10-13 DIAGNOSIS — R52 PAIN MANAGEMENT: ICD-10-CM

## 2022-10-13 PROCEDURE — 3700000000 HC ANESTHESIA ATTENDED CARE: Performed by: PAIN MEDICINE

## 2022-10-13 PROCEDURE — 6360000002 HC RX W HCPCS: Performed by: PAIN MEDICINE

## 2022-10-13 PROCEDURE — 3600000002 HC SURGERY LEVEL 2 BASE: Performed by: PAIN MEDICINE

## 2022-10-13 PROCEDURE — 7100000010 HC PHASE II RECOVERY - FIRST 15 MIN: Performed by: PAIN MEDICINE

## 2022-10-13 PROCEDURE — 3209999900 FLUORO FOR SURGICAL PROCEDURES

## 2022-10-13 PROCEDURE — 2500000003 HC RX 250 WO HCPCS: Performed by: PAIN MEDICINE

## 2022-10-13 PROCEDURE — 6360000004 HC RX CONTRAST MEDICATION: Performed by: PAIN MEDICINE

## 2022-10-13 PROCEDURE — 2709999900 HC NON-CHARGEABLE SUPPLY: Performed by: PAIN MEDICINE

## 2022-10-13 PROCEDURE — 6360000002 HC RX W HCPCS: Performed by: NURSE ANESTHETIST, CERTIFIED REGISTERED

## 2022-10-13 PROCEDURE — 7100000011 HC PHASE II RECOVERY - ADDTL 15 MIN: Performed by: PAIN MEDICINE

## 2022-10-13 PROCEDURE — 2580000003 HC RX 258: Performed by: NURSE ANESTHETIST, CERTIFIED REGISTERED

## 2022-10-13 PROCEDURE — 62323 NJX INTERLAMINAR LMBR/SAC: CPT | Performed by: PAIN MEDICINE

## 2022-10-13 RX ORDER — LIDOCAINE HYDROCHLORIDE 5 MG/ML
INJECTION, SOLUTION INFILTRATION; INTRAVENOUS PRN
Status: DISCONTINUED | OUTPATIENT
Start: 2022-10-13 | End: 2022-10-13 | Stop reason: ALTCHOICE

## 2022-10-13 RX ORDER — SODIUM CHLORIDE 9 MG/ML
INJECTION, SOLUTION INTRAVENOUS CONTINUOUS PRN
Status: DISCONTINUED | OUTPATIENT
Start: 2022-10-13 | End: 2022-10-13 | Stop reason: SDUPTHER

## 2022-10-13 RX ORDER — MIDAZOLAM HYDROCHLORIDE 1 MG/ML
INJECTION INTRAMUSCULAR; INTRAVENOUS PRN
Status: DISCONTINUED | OUTPATIENT
Start: 2022-10-13 | End: 2022-10-13 | Stop reason: SDUPTHER

## 2022-10-13 RX ORDER — FENTANYL CITRATE 50 UG/ML
INJECTION, SOLUTION INTRAMUSCULAR; INTRAVENOUS PRN
Status: DISCONTINUED | OUTPATIENT
Start: 2022-10-13 | End: 2022-10-13 | Stop reason: SDUPTHER

## 2022-10-13 RX ORDER — METHYLPREDNISOLONE ACETATE 40 MG/ML
INJECTION, SUSPENSION INTRA-ARTICULAR; INTRALESIONAL; INTRAMUSCULAR; SOFT TISSUE PRN
Status: DISCONTINUED | OUTPATIENT
Start: 2022-10-13 | End: 2022-10-13 | Stop reason: ALTCHOICE

## 2022-10-13 RX ORDER — PROPOFOL 10 MG/ML
INJECTION, EMULSION INTRAVENOUS PRN
Status: DISCONTINUED | OUTPATIENT
Start: 2022-10-13 | End: 2022-10-13 | Stop reason: SDUPTHER

## 2022-10-13 RX ADMIN — PROPOFOL 30 MG: 10 INJECTION, EMULSION INTRAVENOUS at 10:03

## 2022-10-13 RX ADMIN — FENTANYL CITRATE 50 MCG: 50 INJECTION, SOLUTION INTRAMUSCULAR; INTRAVENOUS at 10:03

## 2022-10-13 RX ADMIN — SODIUM CHLORIDE: 9 INJECTION, SOLUTION INTRAVENOUS at 09:57

## 2022-10-13 RX ADMIN — MIDAZOLAM 1 MG: 1 INJECTION INTRAMUSCULAR; INTRAVENOUS at 10:00

## 2022-10-13 RX ADMIN — PROPOFOL 100 MG: 10 INJECTION, EMULSION INTRAVENOUS at 10:00

## 2022-10-13 RX ADMIN — FENTANYL CITRATE 50 MCG: 50 INJECTION, SOLUTION INTRAMUSCULAR; INTRAVENOUS at 10:00

## 2022-10-13 RX ADMIN — MIDAZOLAM 1 MG: 1 INJECTION INTRAMUSCULAR; INTRAVENOUS at 10:03

## 2022-10-13 ASSESSMENT — LIFESTYLE VARIABLES: SMOKING_STATUS: 1

## 2022-10-13 ASSESSMENT — PAIN DESCRIPTION - DESCRIPTORS: DESCRIPTORS: DISCOMFORT

## 2022-10-13 ASSESSMENT — PAIN - FUNCTIONAL ASSESSMENT: PAIN_FUNCTIONAL_ASSESSMENT: 0-10

## 2022-10-13 ASSESSMENT — COPD QUESTIONNAIRES: CAT_SEVERITY: MILD

## 2022-10-13 NOTE — ANESTHESIA POSTPROCEDURE EVALUATION
Department of Anesthesiology  Postprocedure Note    Patient: Flaquito Blank  MRN: [id number]  YOB: 1985  Date of evaluation: 10/13/2022      Procedure Summary     Date: 10/13/22 Room / Location: [facility] PROCEDURE ROOM 02 / [facility]    Anesthesia Start: 4887 Anesthesia Stop: 1009    Procedure: CAUDAL EPIDURAL STEROID INJECTION  #3 (Coccyx) Diagnosis:       Lumbar radiculopathy      (Lumbar radiculopathy [M54.16])    Surgeons: Todd Lee DO Responsible Provider: Monika Covington DO    Anesthesia Type: MAC ASA Status: 3          Anesthesia Type: No value filed.     Jeet Phase I: Jeet Score: 10    Jeet Phase II:        Anesthesia Post Evaluation    Patient location during evaluation: PACU  Patient participation: complete - patient participated  Level of consciousness: awake and alert  Airway patency: patent  Nausea & Vomiting: no nausea  Complications: no  Cardiovascular status: hemodynamically stable  Respiratory status: room air  Hydration status: euvolemic

## 2022-10-13 NOTE — ANESTHESIA PRE PROCEDURE
Department of Anesthesiology  Preprocedure Note       Name:  Karlos Machado   Age:  39 y.o.  :  1985                                          MRN:  [id number]         Date:  10/13/2022      Surgeon: Luis Samuels):  Madiha Greenwood DO    Procedure: Procedure(s):  CAUDAL EPIDURAL STEROID INJECTION  #3    Medications prior to admission:   Prior to Admission medications    Medication Sig Start Date End Date Taking? Authorizing Provider   cyclobenzaprine (FLEXERIL) 10 MG tablet Take 10 mg by mouth 3 times daily as needed for Muscle spasms   Yes Historical Provider, MD   Vibegron (GEMTESA) 75 MG TABS Take 75 mg by mouth nightly    Historical Provider, MD   ondansetron (ZOFRAN) 4 MG tablet Take 1 tablet by mouth 3 times daily as needed for Nausea or Vomiting 22   Bernard Garcia DO   fluticasone (FLOVENT HFA) 44 MCG/ACT inhaler Inhale 2 puffs into the lungs 2 times daily 22   Bernard Garcia DO   albuterol sulfate HFA (PROVENTIL HFA) 108 (90 Base) MCG/ACT inhaler Inhale 2 puffs into the lungs every 4 hours as needed for Wheezing 22  Bernard Garcia DO   tiotropium (SPIRIVA HANDIHALER) 18 MCG inhalation capsule Inhale 1 capsule into the lungs daily 3/9/22   Reyna Covington, DO   Acetaminophen (TYLENOL) 325 MG CAPS Take 650 mg by mouth every 4 hours as needed (pain)     Historical Provider, MD   ibuprofen (ADVIL;MOTRIN) 800 MG tablet Take 800 mg by mouth every 6 hours as needed for Pain    Historical Provider, MD       Current medications:    No current facility-administered medications for this encounter.      Current Outpatient Medications   Medication Sig Dispense Refill    cyclobenzaprine (FLEXERIL) 10 MG tablet Take 10 mg by mouth 3 times daily as needed for Muscle spasms      Vibegron (GEMTESA) 75 MG TABS Take 75 mg by mouth nightly      ondansetron (ZOFRAN) 4 MG tablet Take 1 tablet by mouth 3 times daily as needed for Nausea or Vomiting 15 tablet 0    fluticasone (FLOVENT HFA) 44 MCG/ACT inhaler Inhale 2 puffs into the lungs 2 times daily 1 each 3    albuterol sulfate HFA (PROVENTIL HFA) 108 (90 Base) MCG/ACT inhaler Inhale 2 puffs into the lungs every 4 hours as needed for Wheezing 1 each 5    tiotropium (SPIRIVA HANDIHALER) 18 MCG inhalation capsule Inhale 1 capsule into the lungs daily 30 capsule 5    Acetaminophen (TYLENOL) 325 MG CAPS Take 650 mg by mouth every 4 hours as needed (pain)       ibuprofen (ADVIL;MOTRIN) 800 MG tablet Take 800 mg by mouth every 6 hours as needed for Pain         Allergies: Allergies   Allergen Reactions    Keflex [Cephalexin]      Hives, rash      Prednisone Rash    Cymbalta [Duloxetine Hcl] Other (See Comments)     angry    Lamictal [Lamotrigine] Nausea And Vomiting    Neurontin [Gabapentin] Other (See Comments)     Makes me feel very weird       Problem List:    Patient Active Problem List   Diagnosis Code    Mild intermittent asthma without complication K69.80    Elevated blood pressure reading R03.0    Bipolar 1 disorder ([facility]) F31.9    Anxiety F41.9    Suicidal ideation R45.851    Major depressive disorder, recurrent ([facility] F33.9    Depression with suicidal ideation F32. A, R45.851    Depression with anxiety F41.8    Strain of lumbar region S39.012A    Sciatica M54.30    Spasm of muscle M62.838    Menorrhagia with irregular cycle N92.1    Condylomata acuminata A63.0    Endometrial thickening on ultrasound R93.89    Vulvar abscess N76.4    Cervical stenosis (uterine cervix) N88.2    Chest pain R07.9    At risk for shortness of breath Z91.89    Dizziness R42    Type 2 diabetes mellitus without complication, without long-term current use of insulin ([facility]) E11.9    Tobacco abuse Z72.0    Splenomegaly R16.1    Diabetes mellitus ([facility]) E11.9    Abnormal uterine bleeding N93.9    Lumbar radiculopathy M54.16    Chronic bilateral low back pain with bilateral sciatica M54.42, M54.41, G89.29    Chronic pain syndrome G89.4    Lumbosacral spondylosis without myelopathy M47.817    Chronic obstructive pulmonary disease, unspecified COPD type ([address] J44.9    Primary hypertension I10    Mixed stress and urge urinary incontinence N39.46    Polyarthritis M13.0       Past Medical History:        Diagnosis Date    Anxiety     Arthritis     Asthma     well controlled, on inhalers    Bipolar 1 disorder ([address]     COPD (chronic obstructive pulmonary disease) ([address]     Depression     Fibromyalgia     GERD (gastroesophageal reflux disease)     Glaucoma     Suspected by eye specialist . no eye meds    Hypertension     Macular edema     Cm    Migraines     Morbid obesity due to excess calories ([address]     Neuropathy     PTSD (post-traumatic stress disorder)     Splenomegaly 2020       Past Surgical History:        Procedure Laterality Date    ANTERIOR CRUCIATE LIGAMENT REPAIR Right      SECTION      ,     CHOLECYSTECTOMY      DILATION AND CURETTAGE OF UTERUS N/A 2019    DILATATION AND CURETTAGE HYSTEROSCOPY WITH REMOVAL OF CONDYLOMATA performed by Dionte Dunlap MD at [facility] N/A 2020    DILATATION AND CURETTAGE HYSTEROSCOPY, ATTEMPTED CERVICAL BIOPSY, MARIYA OF  LABIAL ABSCESS performed by Dionte Dunlap MD at [place] [address] (CERVIX STATUS UNKNOWN)  2021    Robotic lysis of adhesions, total hysterectomy, bilateral salpingectomy, cystoscopy    INCONTINENCE SURGERY      OTHER SURGICAL HISTORY      repair anal fissure    PAIN MANAGEMENT PROCEDURE N/A 2022    CAUDAL EPIDURAL STEROID INJECTION  #1 performed by Madiha Greenwood DO at [address] [place] [address] N/A 2022    CAUDAL EPIDURAL STEROID INJECTION #2 performed by Mad[facility]ha Greenwood DO at [facility] OR    TUBAL LIGATION         Social History:    Social History     Tobacco Use    Smoking status: Every Day     Packs/day: 1.00 Years: 20.00     Pack years: 20.00     Types: Cigarettes     Start date: 11/3/1995    Smokeless tobacco: Never   Substance Use Topics    Alcohol use: Not Currently                                Ready to quit: Not Answered  Counseling given: Not Answered      Vital Signs (Current):   Vitals:    10/12/22 1114   Weight: (!) 310 lb (140.6 kg)   Height: 5' 3\" (1.6 m)                                              BP Readings from Last 3 Encounters:   09/27/22 (!) 144/86   09/21/22 (!) 147/79   09/20/22 (!) 185/90       NPO Status:                                                                                 BMI:   Wt Readings from Last 3 Encounters:   10/12/22 (!) 310 lb (140.6 kg)   09/27/22 (!) 310 lb (140.6 kg)   09/21/22 (!) 310 lb (140.6 kg)     Body mass index is 54.91 kg/m². CBC:   Lab Results   Component Value Date/Time    WBC 7.1 09/21/2022 02:50 PM    RBC 4.47 09/21/2022 02:50 PM    HGB 13.6 09/21/2022 02:50 PM    HCT 39.6 09/21/2022 02:50 PM    MCV 88.6 09/21/2022 02:50 PM    RDW 13.2 09/21/2022 02:50 PM     09/21/2022 02:50 PM       CMP:   Lab Results   Component Value Date/Time     09/21/2022 02:50 PM    K 4.5 09/21/2022 02:50 PM     09/21/2022 02:50 PM    CO2 20 09/21/2022 02:50 PM    BUN 6 09/21/2022 02:50 PM    CREATININE 0.5 09/21/2022 02:50 PM    GFRAA >60 09/21/2022 02:50 PM    LABGLOM >60 09/21/2022 02:50 PM    GLUCOSE 145 09/21/2022 02:50 PM    PROT 6.7 09/21/2022 02:50 PM    CALCIUM 8.3 09/21/2022 02:50 PM    BILITOT 0.3 09/21/2022 02:50 PM    ALKPHOS 82 09/21/2022 02:50 PM    AST 30 09/21/2022 02:50 PM    ALT 13 09/21/2022 02:50 PM       POC Tests: No results for input(s): POCGLU, POCNA, POCK, POCCL, POCBUN, POCHEMO, POCHCT in the last 72 hours.     Coags:   Lab Results   Component Value Date/Time    PROTIME 10.4 05/12/2020 08:23 PM    INR 0.9 05/12/2020 08:23 PM    APTT 32.2 05/12/2020 08:23 PM       HCG (If Applicable):   Lab Results   Component Value Date PREGTESTUR NEGATIVE 09/14/2020        ABGs: No results found for: PHART, PO2ART, IIM7ZUG, OPS2JBS, BEART, S0WXOATE     Type & Screen (If Applicable):  Lab Results   Component Value Date    LABABO O 02/15/2021       Drug/Infectious Status (If Applicable):  No results found for: HIV, HEPCAB    COVID-19 Screening (If Applicable):   Lab Results   Component Value Date/Time    COVID19 Not Detected 09/20/2022 10:18 AM    COVID19 Not Detected 11/23/2020 03:45 PM           Anesthesia Evaluation  Patient summary reviewed and Nursing notes reviewed no history of anesthetic complications:   Airway: Mallampati: III  TM distance: >3 FB   Neck ROM: limited  Mouth opening: > = 3 FB   Dental:          Pulmonary:   (+) COPD: mild and no interval change,  decreased breath sounds: bilateral asthma (Mild intermittent - denies recent exacerbations or increased inhaler usage): current smoker    (-) sleep apnea          Patient did not smoke on day of surgery.                  Cardiovascular:  Exercise tolerance: good (>4 METS),   (+) hypertension: mild and no interval change,       ECG reviewed  Rhythm: regular  Rate: abnormal           Beta Blocker:  Not on Beta Blocker      ROS comment: EKG:  Sinus tachycardia  Otherwise normal ECG  When compared with ECG of 03-NOV-2021 18:55,  No significant change was found    PE comment: Tachycardia   Neuro/Psych:   (+) neuromuscular disease (Neuropathy):, headaches: migraine headaches, psychiatric history (Bipolar with suicidal ideation):depression/anxiety  (PTSD)             ROS comment: Strain of lumbar region  Sciatica  Cervical stenosis  Lumbar radiculopathy  Chronic bilateral low back pain with bilateral sciatica  Chronic pain syndrome  Lumbosacral spondylosis without myelopathy  Glaucoma  Macular edema  Ambulatory dysfunction secondary to pain, weakness, and unsteady gait necessitating a cane for ambulation assistance GI/Hepatic/Renal:   (+) GERD: no interval change, morbid obesity (SMO with a BMI of 55 kg/m2)         ROS comment: Splenomegaly  Mixed stress and urge urinary incontinence. Endo/Other:    (+) Diabetes (A1C 5.9% in March of '22)Type II DM, , : arthritis (Fibromyalgia): OA., .    (-) blood dyscrasia        Pt had no PAT visit        ROS comment: Condyloma acuminata  Endometrial thickening Abdominal:   (+) obese,           Vascular: negative vascular ROS. Other Findings:           Anesthesia Plan      MAC     ASA 3       Induction: intravenous. MIPS: Prophylactic antiemetics administered. Anesthetic plan and risks discussed with patient. Plan discussed with CRNA. Vj Hui DO   10/13/2022    Pt. Assessed, chart reviewed, agree to proceed.   Johana Veliz CRNA

## 2022-10-13 NOTE — PROGRESS NOTES
1012 nourishment provided SUBJECTIVE:   Lexi Bee is a 63 y.o. male seen for a visit regarding   Chief Complaint   Patient presents with    Hypertension     6 month f/u        Hypertension  This is a chronic problem. The current episode started more than 1 year ago. The problem is unchanged. Pertinent negatives include no shortness of breath. Past treatments include ACE inhibitors. Other  Associated symptoms include abdominal pain (ventral herias act up at times -uses muscle relaxer and helps-sx are several times a week, several hours of discomfort), arthralgias and numbness (feeling of tingle on bottom of feet). Arthritis  The history is provided by the patient. This is a chronic problem. The current episode started more than 1 week ago. The problem occurs daily (hurts in right ring and index -is on ibuprofen already-800mg tid-limits ability to grasp now). The problem has not changed (knee arthritis same -uses cane -stands 10 min and then has to sit -knees worse in winter-can sit but can feel the ventral hernia and has to change positions to get comfortable) since onset  Tries to walk with walker to end of street. Past Medical History, Past Surgical History, Family history, Social History, and Medications were all reviewed with the patient today and updated as necessary. Current Outpatient Medications   Medication Sig Dispense Refill    cyclobenzaprine (FLEXERIL) 10 MG tablet Take 10 mg by mouth 2 times daily as needed      ibuprofen (ADVIL;MOTRIN) 800 MG tablet TAKE ONE TABLET BY MOUTH THREE TIMES A DAY      lisinopril (PRINIVIL;ZESTRIL) 20 MG tablet Take 20 mg by mouth daily      torsemide (DEMADEX) 20 MG tablet Take 20 mg by mouth 2 times daily       No current facility-administered medications for this visit.      No Known Allergies  Patient Active Problem List   Diagnosis    Ventral hernia    Edema    Hypertension    Primary osteoarthritis of both knees    Toenail fungus    Morbid obesity due to excess calories ([address] Angioedema    Venous insufficiency of both lower extremities     Social History     Tobacco Use    Smoking status: Never    Smokeless tobacco: Never   Substance Use Topics    Alcohol use: Yes     Alcohol/week: 1.0 standard drink          Review of Systems   Constitutional:  Negative for unexpected weight change. Had covid in Feb -sick 1.5 to 2 wk --had first 2 shot   Respiratory:  Negative for shortness of breath. Cardiovascular:  Positive for leg swelling. Gastrointestinal:  Positive for abdominal pain (ventral herias act up at times -uses muscle relaxer and helps-sx are several times a week, several hours of discomfort). Musculoskeletal:  Positive for arthralgias. Neurological:  Positive for numbness (feeling of tingle on bottom of feet). OBJECTIVE:  /80   Ht 5' 7\" (1.702 m)   Wt (!) 346 lb (156.9 kg)   BMI 54.19 kg/m²      Physical Exam  Constitutional:       General: He is not in acute distress. Appearance: He is obese. Cardiovascular:      Rate and Rhythm: Normal rate and regular rhythm. Musculoskeletal:         General: Swelling (legs large as before) present. Medical problems and test results were reviewed with the patient today. ASSESSMENT and PLAN     1. Hypertension, unspecified type  2. Morbid obesity due to excess calories ([address]  3. Primary osteoarthritis of both knees  4. Ventral hernia without obstruction or gangrene     Current treatment plan is effective. no changes in therapy  reviewed diet, exercise and weight control   reviewed medications and side effects in detail     Return in about 4 months (around 11/28/2022) for For Medicare wellness.

## 2022-10-13 NOTE — DISCHARGE INSTRUCTIONS
Mirtha Officer  Dr. Paras Bansal  Dr. Zunilda Yang Block/Radiofrequency  Home Going Instructions    1-Go home, rest for the remainder of the day  2-Please do not lift over 20 pounds the day of the injection  3-If you received sedation No: alcohol, driving, operating lawn mowers, plows, tractors or other dangerous equipment until next morning. Do not make important decisions or sign legal documents for 24 hours. You may experience light headedness, dizziness, nausea or sleepiness after sedation. Do not stay alone. A responsible adult must be with you for 24 hours. You could be nauseated from the medications you have received. Your IV site may be sore and bruised. 4-No dietary restrictions     5-Resume all medications the same day, blood thinners to be resumed 24 hours after injection if you were instructed to stop any. 6-Keep the surgical site clean and dry, you may shower the next morning and remove the      dressing. 7- No sitz baths, tub baths or hot tubs/swimming for 24 hours. 8- If you have any pain at the injection site(s), application of an ice pack to the area should be       helpful, 20 minutes on/20 minutes off for next 48 hours. 9- Call [facility] immediately at if you develop.   Fever greater than 100.4 F  Have bleeding or drainage from the puncture site  Have progressive Leg/arm numbness and or weakness  Loss of control of bowel and or bladder (wet/soil yourself)  Severe headache with inability to lift head  10-You may return to work the next day

## 2022-10-13 NOTE — OP NOTE
10/13/2022    Patient: Isaura Black  :  1985  Age:  39 y.o. Sex:  female     PRE-OPERATIVE DIAGNOSIS: Displacement of lumbar intervertebral disc, Lumbar DDD, Spinal stenosis. POST-OPERATIVE DIAGNOSIS: Same. PROCEDURE PERFORMED:  #3 Therapeutic Caudal Epidural done under fluoroscopic guidance. SURGEON:   M. Antonio Dancer. ANESTHESIA: MAC    ESTIMATED BLOOD LOSS: None. BRIEF HISTORY: Isaura Black comes in today for the third  therapeutic caudal epidural steroid injection under fluorsoscopic guidance. After discussing the potential risks and benefits of the procedure with the patient  Milton Jaramillo did request that we proceed. A complete History & Physical was reviewed and it is unchanged. DESCRIPTION OF PROCEDURE:    After confirming written and informed consent, a time-out was performed and Alvaro name and date of birth, the procedure to be performed as well as the plan for the location of the needle insertion were confirmed. Patient was brought into the procedure room and was placed in the prone position on a fluoroscopy table. Standard monitors were placed and vital signs were observed throughout the procedure. The lumbosacral and caudal area were prepped with chloraprep and draped in a sterile manner. The sacral hiatus was identified and marked under AP fluoroscopy. A sterile gauze was placed in the midgluteal cleft for increased sterility. The overlying skin and subcutaneous tissues were anesthetized with 0.5% Lidocaine. Under lateral fluoroscopic guidance, a # 22 gauge 3.5 inch spinal needle was advanced into the sacral hiatus . After the needle passed through the sacrococcygeal ligament, the needle angle was advanced slightly. There was no evidence of paresthesia throughout the needle placement. After negative aspiration for blood and CSF, epidural spread was confirmed with injection of 2 cc of Isovue-M 200 in both live lateral and live AP fluoroscopy.  A solution consisting of 0.5% Lidocaine and 40 mg DepoMedrol, total of 15 cc, was easily injected . There was a clear outline of the epidural space and visualization of the sacral roots. The needle was then removed and a sterile Band-Aid was applied to the puncture site. Disposition the patient tolerated the procedure well and there were no complications . Vital signs remained stable throughout the procedure. The patient was escorted to the recovery area where they remained until discharge and written discharge instructions for the procedure were given. Plan: Rose Azar will return to our pain management center as scheduled.      Anne-Marie Rosales DO

## 2022-10-13 NOTE — H&P
[facility]        [address] [facility], [facility] Kathleen Rangel  Dept: [phone]    Procedure History & Physical      Crystal Mcdonald     HPI:    Patient  is here for LBP BLE pain for caudal BOAZ  Labs/imaging studies reviewed   All question and concerns addressed including R/B/A associated with the procedure    Past Medical History:   Diagnosis Date    Anxiety     Arthritis     Asthma     well controlled, on inhalers    Bipolar 1 disorder ([address]     COPD (chronic obstructive pulmonary disease) ([address]     Depression     Fibromyalgia     GERD (gastroesophageal reflux disease)     Glaucoma     Suspected by eye specialist . no eye meds    Hypertension     Macular edema     Cm    Migraines     Morbid obesity due to excess calories ([address]     Neuropathy     PTSD (post-traumatic stress disorder)     Splenomegaly 2020       Past Surgical History:   Procedure Laterality Date    ANTERIOR CRUCIATE LIGAMENT REPAIR Right      SECTION      ,     CHOLECYSTECTOMY      DILATION AND CURETTAGE OF UTERUS N/A 2019    DILATATION AND CURETTAGE HYSTEROSCOPY WITH REMOVAL OF CONDYLOMATA performed by Tarik Delatorre MD at [address] N/A 2020    DILATATION AND CURETTAGE HYSTEROSCOPY, ATTEMPTED CERVICAL BIOPSY, MARIYA OF  LABIAL ABSCESS performed by Tarik Delatorre MD at [address] (CERVIX STATUS UNKNOWN)  2021    Robotic lysis of adhesions, total hysterectomy, bilateral salpingectomy, cystoscopy    INCONTINENCE SURGERY      OTHER SURGICAL HISTORY      repair anal fissure    PAIN MANAGEMENT PROCEDURE N/A 2022    CAUDAL EPIDURAL STEROID INJECTION  #1 performed by Ama Bates DO at [address] [facility] N/A 2022    CAUDAL EPIDURAL STEROID INJECTION #2 performed by Ama Bates DO at [address] [facility]         Prior to Admission medications    Medication Sig Start Date End Date Taking?  Authorizing Provider   cyclobenzaprine (FLEXERIL) 10 MG tablet Take 10 mg by mouth 3 times daily as needed for Muscle spasms   Yes Historical Provider, MD   Vibegron (GEMTESA) 75 MG TABS Take 75 mg by mouth nightly    Historical Provider, MD   ondansetron (ZOFRAN) 4 MG tablet Take 1 tablet by mouth 3 times daily as needed for Nausea or Vomiting 5/4/22   Tiff Police, DO   fluticasone (FLOVENT HFA) 44 MCG/ACT inhaler Inhale 2 puffs into the lungs 2 times daily 5/4/22   Tiff Police, DO   albuterol sulfate HFA (PROVENTIL HFA) 108 (90 Base) MCG/ACT inhaler Inhale 2 puffs into the lungs every 4 hours as needed for Wheezing 5/4/22 5/4/23  Tiff Police, DO   tiotropium (Caty Raquel) 18 MCG inhalation capsule Inhale 1 capsule into the lungs daily 3/9/22   Jazmin Covington, DO   Acetaminophen (TYLENOL) 325 MG CAPS Take 650 mg by mouth every 4 hours as needed (pain)     Historical Provider, MD   ibuprofen (ADVIL;MOTRIN) 800 MG tablet Take 800 mg by mouth every 6 hours as needed for Pain    Historical Provider, MD       Allergies   Allergen Reactions    Keflex [Cephalexin]      Hives, rash      Prednisone Rash    Cymbalta [Duloxetine Hcl] Other (See Comments)     angry    Lamictal [Lamotrigine] Nausea And Vomiting    Neurontin [Gabapentin] Other (See Comments)     Makes me feel very weird       Social History     Socioeconomic History    Marital status:      Spouse name: Hannah Carvalho    Number of children: 2    Years of education: 13    Highest education level: Not on file   Occupational History    Occupation: disability-, LPN   Tobacco Use    Smoking status: Every Day     Packs/day: 1.00     Years: 20.00     Pack years: 20.00     Types: Cigarettes     Start date: 11/3/1995    Smokeless tobacco: Never   Vaping Use    Vaping Use: Former    Substances: Never   Substance and Sexual Activity    Alcohol use: Not Currently    Drug use: Not Currently     Types: Marijuana Azam Greer     Comment: medical marijuana last used 3/2021    Sexual activity: Not on file   Other Topics Concern    Not on file   Social History Narrative    Not on file     Social Determinants of Health     Financial Resource Strain: Low Risk     Difficulty of Paying Living Expenses: Not hard at all   Food Insecurity: No Food Insecurity    Worried About [address] in the Last Year: Never true    [address] [facility] [address] in the Last Year: Never true   Transportation Needs: No Transportation Needs    Lack of Transportation (Medical): No    Lack of Transportation (Non-Medical): No   Physical Activity: Sufficiently Active    Days of Exercise per Week: 7 days    Minutes of Exercise per Session: 60 min   Stress: No Stress Concern Present    Feeling of Stress : Only a little   Social Connections:  Moderately Integrated    Frequency of Communication with Friends and Family: More than three times a week    Frequency of Social Gatherings with Friends and Family: More than three times a week    Attends Oriental orthodox Services: More than 4 times per year    Active Member of [address] [place] [address] or Organizations: No    Attends Club or Organization Meetings: Never    Marital Status:    Intimate Partner Violence: Not At Risk    Fear of Current or Ex-Partner: No    Emotionally Abused: No    Physically Abused: No    Sexually Abused: No   Housing Stability: Low Risk     Unable to Pay for Housing in the Last Year: No    Number of [place] in the Last Year: 1    Unstable Housing in the Last Year: No       Family History   Problem Relation Age of Onset    Diabetes Mother     Cancer Mother         ovarian    Diabetes Father     Cancer Sister         NHL    Diabetes Sister     High Blood Pressure Brother     Breast Cancer Maternal Aunt     Uterine Cancer Neg Hx     Colon Cancer Neg Hx          REVIEW OF SYSTEMS:    CONSTITUTIONAL:  negative for  fevers, chills, sweats and fatigue    RESPIRATORY:  negative for  dry cough, cough with sputum, dyspnea, wheezing and chest pain    CARDIOVASCULAR:  negative for chest pain, dyspnea, palpitations, syncope    GASTROINTESTINAL:  negative for nausea, vomiting, change in bowel habits, diarrhea, constipation and abdominal pain    MUSCULOSKELETAL: negative for muscle weakness    SKIN: negative for itching or rashes. BEHAVIOR/PSYCH:  negative for poor appetite, increased appetite, decreased sleep and poor concentration    All other systems negative      PHYSICAL EXAM:    VITALS:  /84   Pulse (!) 106   Temp 97.8 °F (36.6 °C) (Temporal)   Resp 18   Ht 5' 3\" (1.6 m)   Wt (!) 310 lb (140.6 kg)   LMP 02/22/2021 (Approximate)   SpO2 96%   BMI 54.91 kg/m²     CONSTITUTIONAL:  awake, alert, cooperative, no apparent distress, and appears stated age    EYES: PERRLA, EOMI    LUNGS:  No increased work of breathing, no audible wheezing    CARDIOVASCULAR:  regular rate and rhythm    ABDOMEN:  Soft non tender non distended     EXTREMITIES: no signs of clubbing or cyanosis. MUSCULOSKELETAL: negative for flaccid muscle tone or spastic movements. SKIN: gross examination reveals no signs of rashes, or diaphoresis. NEURO: Cranial nerves II-XII grossly intact. No signs of agitated mood.        Assessment/Plan:    LBP BLE pain for caudal BOAZ

## 2022-10-14 DIAGNOSIS — M54.16 LUMBAR RADICULOPATHY: Primary | ICD-10-CM

## 2022-10-14 DIAGNOSIS — G89.29 CHRONIC BILATERAL LOW BACK PAIN WITH BILATERAL SCIATICA: ICD-10-CM

## 2022-10-14 DIAGNOSIS — M54.42 CHRONIC BILATERAL LOW BACK PAIN WITH BILATERAL SCIATICA: ICD-10-CM

## 2022-10-14 DIAGNOSIS — M54.41 CHRONIC BILATERAL LOW BACK PAIN WITH BILATERAL SCIATICA: ICD-10-CM

## 2022-10-14 DIAGNOSIS — G89.4 CHRONIC PAIN SYNDROME: ICD-10-CM

## 2022-10-14 DIAGNOSIS — M47.817 LUMBOSACRAL SPONDYLOSIS WITHOUT MYELOPATHY: ICD-10-CM

## 2022-10-14 RX ORDER — PREGABALIN 25 MG/1
25 CAPSULE ORAL DAILY
Qty: 30 CAPSULE | Refills: 0 | Status: SHIPPED | OUTPATIENT
Start: 2022-10-14 | End: 2022-11-13

## 2022-10-24 ENCOUNTER — TELEPHONE (OUTPATIENT)
Dept: PAIN MANAGEMENT | Age: 37
End: 2022-10-24

## 2022-10-24 NOTE — TELEPHONE ENCOUNTER
Patient called in and stated that the 25 mg Pregabalin seems to be working very little, she states that she is still having a burning sensation in bilateral feet. Please advise.

## 2022-10-24 NOTE — TELEPHONE ENCOUNTER
Notified patient, she stated understanding. She will call in a few days to less us know if there are any changes.

## 2022-10-28 ENCOUNTER — PATIENT MESSAGE (OUTPATIENT)
Dept: RHEUMATOLOGY | Age: 37
End: 2022-10-28

## 2022-10-28 NOTE — TELEPHONE ENCOUNTER
From: Te Baron  To: Dr. Rica Novoa: 10/28/2022 12:47 PM EDT  Subject: Bump on knuckle     I have gotten a bump that started on my right hand on my knuckle do you think it would be ok if I tried to pick it off I don't want it to get big it is hard and just showed up on my finger

## 2022-11-02 ENCOUNTER — TELEPHONE (OUTPATIENT)
Dept: PAIN MANAGEMENT | Age: 37
End: 2022-11-02

## 2022-11-02 DIAGNOSIS — M54.42 CHRONIC BILATERAL LOW BACK PAIN WITH BILATERAL SCIATICA: Primary | ICD-10-CM

## 2022-11-02 DIAGNOSIS — G89.29 CHRONIC BILATERAL LOW BACK PAIN WITH BILATERAL SCIATICA: Primary | ICD-10-CM

## 2022-11-02 DIAGNOSIS — M54.41 CHRONIC BILATERAL LOW BACK PAIN WITH BILATERAL SCIATICA: Primary | ICD-10-CM

## 2022-11-02 NOTE — TELEPHONE ENCOUNTER
Carley Doran calling asking for a referral to [facility] physical therapy. She has an appointment on 11/8/2022. She wanted me to ask Dr. Jimmy Aguillon for a referral for therapy before her appointment.

## 2022-11-08 ENCOUNTER — OFFICE VISIT (OUTPATIENT)
Dept: PAIN MANAGEMENT | Age: 37
End: 2022-11-08
Payer: COMMERCIAL

## 2022-11-08 ENCOUNTER — PREP FOR PROCEDURE (OUTPATIENT)
Dept: PAIN MANAGEMENT | Age: 37
End: 2022-11-08

## 2022-11-08 VITALS
HEIGHT: 63 IN | BODY MASS INDEX: 51.91 KG/M2 | HEART RATE: 110 BPM | RESPIRATION RATE: 16 BRPM | SYSTOLIC BLOOD PRESSURE: 143 MMHG | OXYGEN SATURATION: 96 % | TEMPERATURE: 97.3 F | WEIGHT: 293 LBS | DIASTOLIC BLOOD PRESSURE: 87 MMHG

## 2022-11-08 DIAGNOSIS — M54.16 LUMBAR RADICULOPATHY: ICD-10-CM

## 2022-11-08 DIAGNOSIS — M54.42 CHRONIC BILATERAL LOW BACK PAIN WITH BILATERAL SCIATICA: ICD-10-CM

## 2022-11-08 DIAGNOSIS — M54.41 CHRONIC BILATERAL LOW BACK PAIN WITH BILATERAL SCIATICA: ICD-10-CM

## 2022-11-08 DIAGNOSIS — G89.4 CHRONIC PAIN SYNDROME: Primary | ICD-10-CM

## 2022-11-08 DIAGNOSIS — G89.29 CHRONIC BILATERAL LOW BACK PAIN WITH BILATERAL SCIATICA: ICD-10-CM

## 2022-11-08 DIAGNOSIS — M47.817 LUMBOSACRAL SPONDYLOSIS WITHOUT MYELOPATHY: ICD-10-CM

## 2022-11-08 DIAGNOSIS — M54.16 LUMBAR RADICULOPATHY: Primary | ICD-10-CM

## 2022-11-08 PROCEDURE — 3074F SYST BP LT 130 MM HG: CPT | Performed by: PAIN MEDICINE

## 2022-11-08 PROCEDURE — 99213 OFFICE O/P EST LOW 20 MIN: CPT | Performed by: PAIN MEDICINE

## 2022-11-08 PROCEDURE — G8484 FLU IMMUNIZE NO ADMIN: HCPCS | Performed by: PAIN MEDICINE

## 2022-11-08 PROCEDURE — G8417 CALC BMI ABV UP PARAM F/U: HCPCS | Performed by: PAIN MEDICINE

## 2022-11-08 PROCEDURE — 3078F DIAST BP <80 MM HG: CPT | Performed by: PAIN MEDICINE

## 2022-11-08 PROCEDURE — 4004F PT TOBACCO SCREEN RCVD TLK: CPT | Performed by: PAIN MEDICINE

## 2022-11-08 PROCEDURE — G8427 DOCREV CUR MEDS BY ELIG CLIN: HCPCS | Performed by: PAIN MEDICINE

## 2022-11-08 RX ORDER — PREGABALIN 50 MG/1
50 CAPSULE ORAL 3 TIMES DAILY
Qty: 90 CAPSULE | Refills: 2 | Status: SHIPPED | OUTPATIENT
Start: 2022-11-08 | End: 2023-02-06

## 2022-11-08 RX ORDER — CYCLOBENZAPRINE HCL 10 MG
10 TABLET ORAL 2 TIMES DAILY PRN
Qty: 60 TABLET | Refills: 2 | Status: SHIPPED | OUTPATIENT
Start: 2022-11-08 | End: 2022-12-08

## 2022-11-08 NOTE — PROGRESS NOTES
Do you currently have any of the following:    Fever: No  Headache:  No  Cough: No  Shortness of breath: No  Exposed to anyone with these symptoms: No         Sunny Shearer presents to the [facility] on 11/8/2022. Magdaleno Goodman is complaining of pain in her low back. The pain is constant. The pain is described as aching and miserable. Pain is rated on her best day at a 3, on her worst day at a 10, and on average at a 6 on the VAS scale. She took her last dose of Lyrica two days ago. Any procedures since your last visit: Yes, with 30 % relief. Pacemaker or defibrillator: No     She is  on NSAIDS and is not on anticoagulation medications. Medication Contract and Consent for Opioid Use Documents Filed        No documents found                    BP (!) 143/87   Pulse (!) 126   Temp 97.3 °F (36.3 °C) (Infrared)   Resp 16   Ht 5' 3\" (1.6 m)   Wt (!) 310 lb (140.6 kg)   LMP 02/22/2021 (Approximate)   SpO2 96%   BMI 54.91 kg/m²      Patient's last menstrual period was 02/22/2021 (approximate).

## 2022-11-08 NOTE — PROGRESS NOTES
[facility]        [address]  [facility], [address] [place] [address]  Dept: [phone]        Follow up Note      Camille Flowers     Date of Visit:  22     CC:  Patient presents for follow up   Chief Complaint   Patient presents with    Follow-up     :  #3 Therapeutic Caudal Epidural done under fluoroscopic guidance. HPI:    Pain is better. Change in quality of symptoms:no. Medication side effects:not applicable . Recent diagnostic testing:none  Recent interventional procedures:caudal BOAZ with 100% relief for two weeks    She has been on anticoagulation medications to include NSAIDS and has not been on herbal supplements. She is diabetic. Imagin/2022 EDX -  Electrodiagnostic examination of both legs disclosed evidence diagnostic of left S1 motor radiculopathy or intracanalicular lesion, with acute and chronic denervation changes. This was proven with the abnormal needle testing of the paraspinals. There were no other motor radiculopathies or intracanalicular lesions. There were no peripheral neuropathies. Sensory radiculopathies cannot be evaluated by electrodiagnostic means. Electrodiagnostic study performed 1 year ago found no abnormalities. Clinically, the patient presented with back pains radiating into her left leg and foot. On brief neurological examination, I found no motor or sensory compromise. Her difficulties are related to her radicular disease. Imaging studies of lumbosacral spine were recommended, as well as physical therapy and weight loss. Clinical correlation was highly advised. 3/2022 lumbar MRI -  FINDINGS:   BONES/ALIGNMENT: There is normal alignment of the spine. The vertebral body   heights are maintained. The bone marrow signal appears unremarkable. SPINAL CORD: The conus terminates normally. SOFT TISSUES: No paraspinal mass identified.        L1-L2: There is no significant disc herniation, spinal canal stenosis or   neural foraminal narrowing. L2-L3: There is no significant disc herniation, spinal canal stenosis or   neural foraminal narrowing. L3-L4: There is no significant disc herniation, spinal canal stenosis or   neural foraminal narrowing. L4-L5: There is no significant disc herniation. Mild facet hypertrophy is   noted. No significant central canal or lateral recess stenosis. Mild neural   foraminal stenoses. L5-S1: There is no significant disc herniation. Mild facet hypertrophy is   noted. No significant central canal or lateral recess stenosis. Mild neural   foraminal stenoses. Impression   1. No fracture or bony destructive lesion. 2. Mild facet hypertrophic changes at L4-5 and L5-S1, resulting in mild   neural foraminal stenoses. 3.  No significant central canal or lateral recess stenosis. 10/2021 xray cervical and lumbar -  FINDINGS:   C-spine: No fracture or dislocation. Disc spaces are preserved. Normal soft   tissues. L-spine: Minimal degenerative endplate spurring from Z9-E2. No fracture or   dislocation. Normal soft tissues. Impression   Unremarkable C-spine. Minimal degenerative endplate spurring from Y4-J3.          Potential Aberrant Drug-Related Behavior:      Urine Drug Screening:    OARRS report:  10/2022 consistent    Past Medical History:   Diagnosis Date    Anxiety     Arthritis     Asthma     well controlled, on inhalers    Bipolar 1 disorder ([address]     COPD (chronic obstructive pulmonary disease) (HCC)     Depression     Fibromyalgia     GERD (gastroesophageal reflux disease)     Glaucoma     Suspected by eye specialist . no eye meds    Hypertension     Macular edema     Cm    Migraines     Morbid obesity due to excess calories ([address]     Neuropathy     PTSD (post-traumatic stress disorder)     Splenomegaly 09/24/2020       Past Surgical History:   Procedure Laterality Date    ANTERIOR CRUCIATE LIGAMENT REPAIR Right      SECTION      ,     CHOLECYSTECTOMY      DILATION AND CURETTAGE OF UTERUS N/A 2019    DILATATION AND CURETTAGE HYSTEROSCOPY WITH REMOVAL OF CONDYLOMATA performed by Nirmala Kang MD at [address] N/A 2020    DILATATION AND CURETTAGE HYSTEROSCOPY, ATTEMPTED CERVICAL BIOPSY, MARIYA OF  LABIAL ABSCESS performed by Nirmala Kang MD at [address] (CERVIX STATUS UNKNOWN)  2021    Robotic lysis of adhesions, total hysterectomy, bilateral salpingectomy, cystoscopy    INCONTINENCE SURGERY      OTHER SURGICAL HISTORY      repair anal fissure    PAIN MANAGEMENT PROCEDURE N/A 2022    CAUDAL EPIDURAL STEROID INJECTION  #1 performed by Tracy Garcia DO at [address] N/A 2022    CAUDAL EPIDURAL STEROID INJECTION #2 performed by Tracy Garcia DO at [address] N/A 10/13/2022    CAUDAL EPIDURAL STEROID INJECTION  #3 performed by Tracy Garcia DO at [address]         Prior to Admission medications    Medication Sig Start Date End Date Taking? Authorizing Provider   pregabalin (LYRICA) 25 MG capsule Take 1 capsule by mouth daily for 30 days.  10/14/22 11/13/22 Yes BRANDEN Lopez   cyclobenzaprine (FLEXERIL) 10 MG tablet Take 10 mg by mouth 3 times daily as needed for Muscle spasms   Yes Historical Provider, MD   Vibegron (GEMTESA) 75 MG TABS Take 75 mg by mouth nightly   Yes Historical Provider, MD   ondansetron (ZOFRAN) 4 MG tablet Take 1 tablet by mouth 3 times daily as needed for Nausea or Vomiting 22  Yes Alex Hickman,    fluticasone (FLOVENT HFA) 44 MCG/ACT inhaler Inhale 2 puffs into the lungs 2 times daily 22  Yes Alex Hickman, DO   albuterol sulfate HFA (PROVENTIL HFA) 108 (90 Base) MCG/ACT inhaler Inhale 2 puffs into the lungs every 4 hours as needed for Wheezing 22 5/4/23 Yes Park Juárez, DO   tiotropium (SPIRIVA HANDIHALER) 18 MCG inhalation capsule Inhale 1 capsule into the lungs daily 3/9/22  Yes Megan Covington, DO   Acetaminophen (TYLENOL) 325 MG CAPS Take 650 mg by mouth every 4 hours as needed (pain)    Yes Historical Provider, MD   ibuprofen (ADVIL;MOTRIN) 800 MG tablet Take 800 mg by mouth every 6 hours as needed for Pain   Yes Historical Provider, MD       Allergies   Allergen Reactions    Keflex [Cephalexin]      Hives, rash      Prednisone Rash    Cymbalta [Duloxetine Hcl] Other (See Comments)     angry    Lamictal [Lamotrigine] Nausea And Vomiting    Neurontin [Gabapentin] Other (See Comments)     Makes me feel very weird       Social History     Socioeconomic History    Marital status:      Spouse name: Melody Weeks    Number of children: 2    Years of education: 13    Highest education level: Not on file   Occupational History    Occupation: disability-, LPN   Tobacco Use    Smoking status: Every Day     Packs/day: 1.00     Years: 20.00     Pack years: 20.00     Types: Cigarettes     Start date: 11/3/1995    Smokeless tobacco: Never   Vaping Use    Vaping Use: Former    Substances: Never   Substance and Sexual Activity    Alcohol use: Not Currently    Drug use: Not Currently     Types: Marijuana Garrel Calender)     Comment: medical marijuana last used 3/2021    Sexual activity: Not on file   Other Topics Concern    Not on file   Social History Narrative    Not on file     Social Determinants of Health     Financial Resource Strain: Low Risk     Difficulty of Paying Living Expenses: Not hard at all   Food Insecurity: No Food Insecurity    Worried About Running Out of Food in the Last Year: Never true    [address] in the Last Year: Never true   Transportation Needs: No Transportation Needs    Lack of Transportation (Medical): No    Lack of Transportation (Non-Medical):  No   Physical Activity: Sufficiently Active    Days of Exercise per Week: 7 days Minutes of Exercise per Session: 60 min   Stress: No Stress Concern Present    Feeling of Stress : Only a little   Social Connections: Moderately Integrated    Frequency of Communication with Friends and Family: More than three times a week    Frequency of Social Gatherings with Friends and Family: More than three times a week    Attends Moravian Services: More than 4 times per year    Active Member of [employer] or Organizations: No    Attends Club or Organization Meetings: Never    Marital Status:    Intimate Partner Violence: Not At Risk    Fear of Current or Ex-Partner: No    Emotionally Abused: No    Physically Abused: No    Sexually Abused: No   Housing Stability: Low Risk     Unable to Pay for Housing in the Last Year: No    Number of [place] in the Last Year: 1    Unstable Housing in the Last Year: No       Family History   Problem Relation Age of Onset    Diabetes Mother     Cancer Mother         ovarian    Diabetes Father     Cancer Sister         NHL    Diabetes Sister     High Blood Pressure Brother     Breast Cancer Maternal Aunt     Uterine Cancer Neg Hx     Colon Cancer Neg Hx        REVIEW OF SYSTEMS:     Cullen Vaughan denies fever/chills, chest pain, shortness of breath, new bowel or bladder complaints. All other review of systems was negative.     PHYSICAL EXAMINATION:      BP (!) 143/87   Pulse (!) 110   Temp 97.3 °F (36.3 °C) (Infrared)   Resp 16   Ht 5' 3\" (1.6 m)   Wt (!) 310 lb (140.6 kg)   LMP 02/22/2021 (Approximate)   SpO2 96%   BMI 54.91 kg/m²     General:       General appearance:pleasant and well-hydrated, in no distress and A & O x3  Build:Obese  Function:Rises from a seated position with difficulty, mild     HEENT:     Head:normocephalic, atraumatic  Pupils:regular, round, equal  Sclera: icterus absent     Lungs:     Breathing:normal breathing pattern     Abdomen:     Shape:non-distended  Tenderness:none  Guarding:none     Lumbar spine:     Spine inspection:normal   CVA tenderness:No   Palpation:tenderness paravertebral muscles, left, right positive. Range of motion:abnormal mildly in lateral bending, flexion, extension rotation bilateral and is painful. Musculoskeletal:     Trigger points in Paraveteral:absent bilaterally  SI joint tenderness:positive right, positive left              MEE test:not done right, not done left  Piriformis tenderness:positive right, positive left  Trochanteric bursa tenderness:not done right, not done left  SLR:negative right, negative left, sitting      Extremities:     Tremors:None bilaterally upper and lower  Range of motion:pain with internal rotation of hips negative.   Intact:Yes  Varicose veins:absent   Pulses:present Lt radial  Cyanosis:none  Edema:none x all 4 extremities     Neurological:     Sensory:normal to light touch LLE, diffusely decreased RLE     Motor:          Right Quadriceps5/5     Left Quadriceps5/5           Right Gastrocnemius5/5  Left Gastrocnemius5/5  Right Ant Tibialis5/5   Left Ant Tibialis5/5     Reflexes:  (not assessed today)  Right Quadriceps reflex2+  Left Quadriceps reflex2+  Right Achilles reflex2+  Left Achilles reflex2+    Gait:normal station without assist device     Dermatology:     Skin:no rashes or lesions noted     Impression:     LBP BLE pain with weakness of the RLE  She reports having intermittent incontinence of urine (chronic and associated with urogyn surgery) and stool x2  Reports vaginal numbness w/ intercourse  Was evaluated by gynecology for the above, has had hysterectomy, and was told it is [place] damage\"  She is using a walker due to reported weakness of the RLE  \"I'm not suicidal or nothing but there are times I just want to jump in front of a truck because it's constant\", she can contract for safety, denies a plan and states \"I don't plan on hurting myself\"  2022 EDX shows left S1 motor radiculopathy or intracanalicular lesion, with acute and chronic denervation changes (note made of EDX done one year prior was negative)  2022 lumbar MRI shows mild lower lumbar facet degeneration without stenosis  The reported symptoms cannot be explained by her imaging as there is a lack of significant pathology on the xrays     She states she had 100% pain relief after the caudal BOAZ but it lasted only two weeks  She is interested in a repeat close to the last one with hopes of more prolonged benefit  Pregabalin has been helpful, without SE's, but not yet strong enough  Same as above with Flexeril      Plan:    Urine screen deferred  OARRS report reviewed  Gabapentin causes SE's  On topamax for migraine HA's  Increase Pregabalin 50 mg TID, 2 RF  Increase Flexeril 10 mg #60, 2 RF  Encouraged to continue with PT as ordered recently  Caudal BOAZ #1 with sedation gave 100% pain relief x 2 weeks, pt would like to repeat - r/b and procedure discussed  Patient encouraged to stay active and to lose weight  Treatment plan discussed with the patient including medication and procedure side effects     Cc:  Referring physician    INGRID James.

## 2022-11-08 NOTE — PROGRESS NOTES
Anjana's blood pressure was elevated today. She was instructed to contact his primary care provider as soon as possible. Her heart rate is elevated, she states that she has a Cardiologist that monitors it, she states that she is always tachycardic.

## 2022-11-11 ENCOUNTER — EVALUATION (OUTPATIENT)
Dept: PHYSICAL THERAPY | Age: 37
End: 2022-11-11
Payer: COMMERCIAL

## 2022-11-11 DIAGNOSIS — G89.29 CHRONIC BILATERAL LOW BACK PAIN WITH RIGHT-SIDED SCIATICA: Primary | ICD-10-CM

## 2022-11-11 DIAGNOSIS — M54.41 CHRONIC BILATERAL LOW BACK PAIN WITH RIGHT-SIDED SCIATICA: Primary | ICD-10-CM

## 2022-11-11 PROCEDURE — 97161 PT EVAL LOW COMPLEX 20 MIN: CPT | Performed by: PHYSICAL THERAPIST

## 2022-11-11 NOTE — PROGRESS NOTES
[facility]          Phone: [phone] Fax: [phone]    Physical Therapy Daily Treatment Note  Date:  2022    Patient Name:  Karlos Machado    :  1985  MRN: [id number]    Evaluating Therapist:  Huy Ga, YSABEL [id number]    Restrictions/Precautions:    Diagnosis:     Diagnosis Orders   1.  Chronic bilateral low back pain with right-sided sciatica          Treatment Diagnosis:    Insurance/Certification information:  [facility]  Referring Physician:  Weston Gibson DO  Plan of care signed (Y/N):    Visit# / total visits:    Pain level: 6/10   Time In:  1338  Time Out:  1410    Subjective:  See evaluation    Exercises:  Exercise/Equipment Resistance/Repetitions Other comments     stepper       Trunk stretch with ball       Hamstring stretch       Sciatic nerve glides       Hip add with ball       Hip abd with tband       Seated hip flex       Seated knee flex       Rows, seated                                                                                Other Therapeutic Activities:  PT evaluation completed    Home Exercise Program:  N/A    Manual Treatments:  N/A    Modalities:  IFC PRN     Time-in Time-out Total Time   05865  Evaluation Low Complexity 1338 1410 32   88153  Evaluation Med Complexity      80477  Evaluation High Complexity      41435  Ther Ex      71828  Neuro Re-ed        99032  Ther Activities        74277  Manual Therapy       79962  E-stim       20943  Ultrasound            Session 1338 1410 32       Treatment/Activity Tolerance:  [] Patient tolerated treatment well [] Patient limited by fatigue  [x] Patient limited by pain  [] Patient limited by other medical complications  [] Other:     Prognosis: [] Good [x] Fair  [] Poor    Patient Requires Follow-up: [x] Yes  [] No    Plan:   [] Continue per plan of care [] Alter current plan (see comments)  [x] Plan of care initiated [] Hold pending MD visit [] Discharge  Plan for Next Session: Electronically signed by:  Gretta Pacheco [place], [id number]

## 2022-11-11 NOTE — PROGRESS NOTES
[facility]   Phone: [phone]   Fax: [phone]           Date:  2022   Patient: Santiago Kim  : 1985  MRN: [id number]  Referring Provider: Guru Hill DO   [address] [facility]     Medical Diagnosis:      Diagnosis Orders   1. Chronic bilateral low back pain with right-sided sciatica            SUBJECTIVE:     Onset date: 22    Onset: Insidious onset    Mechanism of Injury: Pt with a h/o chronic back pain due to arthritis with h/o multiple epidural injections, most recent one on 10/13/22. Pt presents to physical therapy due to stiffness and weakness. Previous PT: yes - helped    Medical Management for Current Problem: epidurals    Chief complaint: pain, decreased motion, weakness, difficulty walking    Behavior: condition is getting worse    Pain: constant  Current: 6/10         Symptom Type/Quality: shooting, numbness, pinching, tenderness  Location[de-identified] Back: right lateral side and left lateral side radiates down the posterior right leg and radiates down the posterior left leg         Provoking Activities/Positions: standing, walking                 Relieving Activitie/Positions:  leaning forwad    Disturbed Sleep: yes  Bladder Dysfunction: yes  Bowel Dysfunction: no     Imaging results: Fluoro For Surgical Procedures    Result Date: 10/13/2022  EXAMINATION: SPOT FLUOROSCOPIC IMAGES 10/13/2022 9:56 am TECHNIQUE: Fluoroscopy was provided by the radiology department for procedure. Radiologist was not present during examination. FLUOROSCOPY DOSE AND TYPE OR TIME AND EXPOSURES: Fluoro time: 16.8 seconds. Images: 2 COMPARISON: None HISTORY: ORDERING SYSTEM PROVIDED HISTORY: Pain management TECHNOLOGIST PROVIDED HISTORY: Reason for exam:->caudal epidural steroid injection #3 Intraprocedural imaging. FINDINGS: Intraoperative fluoroscopy utilized for caudal epidural steroid injection.      Intraprocedural fluoroscopic spot images as above. See separate procedure report for more information.        Past Medical History:  Past Medical History:   Diagnosis Date    Anxiety     Arthritis     Asthma     well controlled, on inhalers    Bipolar 1 disorder ([address]     COPD (chronic obstructive pulmonary disease) ([address]     Depression     Fibromyalgia     GERD (gastroesophageal reflux disease)     Glaucoma     Suspected by eye specialist . no eye meds    Hypertension     Macular edema     Cm    Migraines     Morbid obesity due to excess calories ([address]     Neuropathy     PTSD (post-traumatic stress disorder)     Splenomegaly 2020     Past Surgical History:   Procedure Laterality Date    ANTERIOR CRUCIATE LIGAMENT REPAIR Right      SECTION      ,     CHOLECYSTECTOMY      DILATION AND CURETTAGE OF UTERUS N/A 2019    DILATATION AND CURETTAGE HYSTEROSCOPY WITH REMOVAL OF CONDYLOMATA performed by Nini New MD at [address] N/A 2020    DILATATION AND CURETTAGE HYSTEROSCOPY, ATTEMPTED CERVICAL BIOPSY, MARIYA OF  LABIAL ABSCESS performed by Nini New MD at [address] (CERVIX STATUS UNKNOWN)  2021    Robotic lysis of adhesions, total hysterectomy, bilateral salpingectomy, cystoscopy    INCONTINENCE SURGERY      OTHER SURGICAL HISTORY      repair anal fissure    PAIN MANAGEMENT PROCEDURE N/A 2022    CAUDAL EPIDURAL STEROID INJECTION  #1 performed by Dell Landeros DO at [facility] N/A 2022    CAUDAL EPIDURAL STEROID INJECTION #2 performed by Dell Landeros DO at [facility] N/A 10/13/2022    CAUDAL EPIDURAL STEROID INJECTION  #3 performed by Dell Landeros DO at [address]         Medications:   Current Outpatient Medications   Medication Sig Dispense Refill    cyclobenzaprine (FLEXERIL) 10 MG tablet Take 1 tablet by mouth 2 times daily as needed for Muscle spasms 60 tablet 2    pregabalin (LYRICA) 50 MG capsule Take 1 capsule by mouth 3 times daily for 90 days. 90 capsule 2    Vibegron (GEMTESA) 75 MG TABS Take 75 mg by mouth nightly      ondansetron (ZOFRAN) 4 MG tablet Take 1 tablet by mouth 3 times daily as needed for Nausea or Vomiting 15 tablet 0    fluticasone (FLOVENT HFA) 44 MCG/ACT inhaler Inhale 2 puffs into the lungs 2 times daily 1 each 3    albuterol sulfate HFA (PROVENTIL HFA) 108 (90 Base) MCG/ACT inhaler Inhale 2 puffs into the lungs every 4 hours as needed for Wheezing 1 each 5    tiotropium (SPIRIVA HANDIHALER) 18 MCG inhalation capsule Inhale 1 capsule into the lungs daily 30 capsule 5    Acetaminophen (TYLENOL) 325 MG CAPS Take 650 mg by mouth every 4 hours as needed (pain)       ibuprofen (ADVIL;MOTRIN) 800 MG tablet Take 800 mg by mouth every 6 hours as needed for Pain       No current facility-administered medications for this visit. Occupation: disability. Exercise regimen: none    Hobbies: none    Patient Goals: pain relief, improve ROM, decrease stiffness    Contraindications/Precautions: falls risk    OBJECTIVE:     Observations: well nourished female    Inspection:  no significant postural abnormalities          Gait: antalgic gait, ambulates with cane    Functional Strength: Somewhat painful transfers, but independent    Range of Motion:    Trunk: Grossly WFL throughout     Lower Extremity:   Right:   [] Normal   [x] Limited: hip ROM grossly 50%   Left:   [] Normal   [x] Limited: hip ROM grossly 50%      Strength:     Trunk: 3+/5   R LE: 4-/5   L LE: 4-/5    Palpation: tenderness across lumbar spine region     Sensation: numbness bottom of bilateral feet and up lateral ankle.     Special Tests:   [] Nerve Root Compression           Right []+ / [] -    Left []+ / [] -  [x] Slump           Right []+ / [x] -    Left [x]+ / [] -  [] FADIR          Right []+ / [] -    Left []+ / [] -  [] S-I Distraction Right []+ / [] -    Left []+ / [] -     [] SLR           Right []+ / [] -    Left []+ / [] -     [] MEE          Right []+ / [] -    Left []+ / [] -  [] S-I Compression          Right []+ / [] -    Left []+ / [] -   [] Leg Length: []+ / [] -       Special Test Comments: N/A    ASSESSMENT     Outcome Measure:   Modified Oswestry 78% disability    Problems:   Pain reported 6/10  Strength decreased  Decreased functional ability with walking, standing, lifting, carrying    Reason for Skilled Care: Pt with decreased strength and decreased tolerance to functional activities due to chronic back pain and radiculopathy. [x] There are no barriers affecting plan of care or recovery    [] Barriers to this patient's plan of care or recovery include. Domestic Concerns:  [x] No  [] Yes:      Long Term goals (4-6 weeks)  Decrease reported pain to 2-3/10  Increase Strength by 1/2 to 1 MMT grade   Able to perform/complete the following functions/tasks: Pt will demonstrate a minimally painful gait with/without cane   Oswestry Low Back Disability Questionnaire 55% to 60% disability   Independent with Home Exercise Programs    Rehab Potential: [x] Good  [] Fair  [] Poor    PLAN       Treatment Plan:   [x] Therapeutic Exercise  [x] Therapeutic Activity  [x] Neuromuscular Re-education   [] Gait Training  [] Balance Training  [] Aerobic conditioning  [x] Manual Therapy  [] Massage/Fascial release   [] Work/Sport specific activities    [] Pain Neuroscience [x] Cold/hotpack  [] Vasocompression  [x] Electrical Stimulation  [] Lumbar/Cervical Traction  [] Ultrasound   [] Iontophoresis: 4 mg/mL Dexamethasone Sodium Phosphate 40-80 mAmin  [] Dry Needling      [x] Instruction in HEP      []  Medication allergies reviewed for use of Dexamethasone Sodium Phosphate 4mg/ml  with iontophoresis treatments. Patient is not allergic.       The following CPT codes are likely to be used in the care of this patient: 70835 PT Evaluation: Low Complexity, [id number] PT Re-Evaluation, 99273 Therapeutic Exercise, [id number] Neuromuscular Re-Education, 93312 Therapeutic Activities, 74507 Manual Therapy, 95088 Electric Stimulation, and 41397 Ultrasound      Suggested Professional Referral: [x] No  [] Yes:     Patient Education:  [x] Plans/Goals, Risks/Benefits discussed  [x] Home exercise program  Method of Education: [x] Verbal  [x] Demo  [x] Written  Comprehension of Education:  [x] Verbalizes understanding. [x] Demonstrates understanding. [] Needs Review. [] Demonstrates/verbalizes understanding of HEP/Ed previously given. Frequency:  1-2 days per week for 4-6 weeks    Patient understands diagnosis/prognosis and consents to treatment, plan and goals: [x] Yes    [] No     Thank you for the opportunity to work with your patient. If you have questions or comments, please contact me at numbers listed above. Electronically signed by: Tani Cabezas, [id number]    Medicare Patients Only     Please sign Physician's Certification and return to: [address]  [facility]  [address] [facility] [address] [facility] [id number]  Dept: [phone]  Dept Fax: [phone]  Loc: [phone] Certification / Comments     Frequency/Duration 1-2 days per week for 4-6 weeks. Certification period from 11/11/2022  to 2/10/23. I have reviewed the Plan of Care established for skilled therapy services and certify that the services are required and that they will be provided while the patient is under my care.     Physician's Comments/Revisions:               Physician's Printed Name:                                           [de-identified] Signature:                                                               Date:

## 2022-11-23 ENCOUNTER — OFFICE VISIT (OUTPATIENT)
Dept: FAMILY MEDICINE CLINIC | Age: 37
End: 2022-11-23
Payer: COMMERCIAL

## 2022-11-23 VITALS
BODY MASS INDEX: 51.91 KG/M2 | RESPIRATION RATE: 20 BRPM | HEART RATE: 107 BPM | DIASTOLIC BLOOD PRESSURE: 97 MMHG | TEMPERATURE: 98.1 F | WEIGHT: 293 LBS | SYSTOLIC BLOOD PRESSURE: 148 MMHG | HEIGHT: 63 IN

## 2022-11-23 DIAGNOSIS — I10 PRIMARY HYPERTENSION: Primary | ICD-10-CM

## 2022-11-23 DIAGNOSIS — R42 VERTIGO: ICD-10-CM

## 2022-11-23 PROCEDURE — G8417 CALC BMI ABV UP PARAM F/U: HCPCS | Performed by: FAMILY MEDICINE

## 2022-11-23 PROCEDURE — 99214 OFFICE O/P EST MOD 30 MIN: CPT | Performed by: FAMILY MEDICINE

## 2022-11-23 PROCEDURE — 3074F SYST BP LT 130 MM HG: CPT | Performed by: FAMILY MEDICINE

## 2022-11-23 PROCEDURE — G8427 DOCREV CUR MEDS BY ELIG CLIN: HCPCS | Performed by: FAMILY MEDICINE

## 2022-11-23 PROCEDURE — 4004F PT TOBACCO SCREEN RCVD TLK: CPT | Performed by: FAMILY MEDICINE

## 2022-11-23 PROCEDURE — G0008 ADMIN INFLUENZA VIRUS VAC: HCPCS | Performed by: FAMILY MEDICINE

## 2022-11-23 PROCEDURE — 90674 CCIIV4 VAC NO PRSV 0.5 ML IM: CPT | Performed by: FAMILY MEDICINE

## 2022-11-23 PROCEDURE — 3078F DIAST BP <80 MM HG: CPT | Performed by: FAMILY MEDICINE

## 2022-11-23 PROCEDURE — G8482 FLU IMMUNIZE ORDER/ADMIN: HCPCS | Performed by: FAMILY MEDICINE

## 2022-11-23 RX ORDER — BENAZEPRIL HYDROCHLORIDE 20 MG/1
20 TABLET ORAL DAILY
Qty: 30 TABLET | Refills: 3 | Status: SHIPPED | OUTPATIENT
Start: 2022-11-23

## 2022-11-23 ASSESSMENT — ENCOUNTER SYMPTOMS
SHORTNESS OF BREATH: 1
GASTROINTESTINAL NEGATIVE: 1
RHINORRHEA: 0
PHOTOPHOBIA: 0
COUGH: 0
SINUS PAIN: 0
EYE REDNESS: 0
EYE DISCHARGE: 0

## 2022-11-25 ENCOUNTER — TELEPHONE (OUTPATIENT)
Dept: FAMILY MEDICINE CLINIC | Age: 37
End: 2022-11-25

## 2022-11-25 NOTE — TELEPHONE ENCOUNTER
Pt called states she was up all  last night with frequent urination, did not sleep, is there a side effect from benzapril that would cause that?   Call pt

## 2022-12-01 ENCOUNTER — TELEPHONE (OUTPATIENT)
Dept: PAIN MANAGEMENT | Age: 37
End: 2022-12-01

## 2022-12-01 DIAGNOSIS — R11.0 NAUSEA: ICD-10-CM

## 2022-12-01 NOTE — TELEPHONE ENCOUNTER
Call to Jose Hawkins that procedure was approved for 12/8/2022 and that Keri should call her a few days before for the pre op call and between 2:00 PM and 4:00 PM  the business day before with the arrival time. Instructed Anjana to hold ibuprofen for 24 hours, naprosyn for 4 days and any aspirin containing products or fish oil for 7 days. Instructed to call office back if any questions. Antonio Powell verbalized understanding.     Electronically signed by Husam Polanco RN on 12/1/2022 at 11:20 AM

## 2022-12-03 RX ORDER — ONDANSETRON 4 MG/1
4 TABLET, FILM COATED ORAL 3 TIMES DAILY PRN
Qty: 15 TABLET | Refills: 0 | Status: SHIPPED | OUTPATIENT
Start: 2022-12-03

## 2022-12-05 RX ORDER — DOCUSATE SODIUM 100 MG/1
100 CAPSULE, LIQUID FILLED ORAL 2 TIMES DAILY
COMMUNITY

## 2022-12-05 NOTE — PROGRESS NOTES
[facility] PRE-ADMISSION TESTING INSTRUCTIONS    The Preadmission Testing patient is instructed accordingly using the following criteria (check applicable):    ARRIVAL INSTRUCTIONS:  [x] Parking the day of Surgery is located in the Main Entrance lot. Upon entering the door, make an immediate right to the surgery reception desk    [x] Bring photo ID and insurance card    [] Bring in a copy of Living will or Durable Power of  papers. [x] Please be sure to arrange for responsible adult to provide transportation to and from the hospital    [x] Please arrange for responsible adult to be with you for the 24 hour period post procedure due to having anesthesia    [x] If you awake am of surgery not feeling well or have temperature >100 please call [phone]    GENERAL INSTRUCTIONS:    [x] Nothing by mouth after midnight, including gum, candy, mints or water    [x] You may brush your teeth, but do not swallow any water    [x] Take medications as instructed with 1-2 oz of water    [x] Stop herbal supplements and vitamins 5 days prior to procedure    [] Follow preop dosing of blood thinners per physician instructions    [] Take 1/2 dose of evening insulin, but no insulin after midnight    [] No oral diabetic medications after midnight    [] If diabetic and have low blood sugar or feel symptomatic, take 1-2oz apple juice only    [x] Bring inhalers day of surgery    [] Bring C-PAP/ Bi-Pap day of surgery    [] Bring urine specimen day of surgery    [x] Shower or bath with soap, lather and rinse well, AM of Surgery, no lotion, powders or creams to surgical site    [] Follow bowel prep as instructed per surgeon    [x] No tobacco products within 24 hours of surgery     [x] No alcohol or illegal drug use within 24 hours of surgery.     [x] Jewelry, body piercing's, eyeglasses, contact lenses and dentures are not permitted into surgery (bring cases)      [x] Please do not wear any nail polish, make up or hair products on the day of surgery    [x] You can expect a call the business day prior to procedure to notify you if your arrival time changes    [x] If you receive a survey after surgery we would greatly appreciate your comments    [] Parent/guardian of a minor must accompany their child and remain on the premises  the entire time they are under our care     [] Pediatric patients may bring favorite toy, blanket or comfort item with them    [] A caregiver or family member must remain with the patient during their stay if they are mentally handicapped, have dementia, disoriented or unable to use a call light or would be a safety concern if left unattended    [x] Please notify surgeon if you develop any illness between now and time of surgery (cold, cough, sore throat, fever, nausea, vomiting) or any signs of infections  including skin, wounds, and dental.    [x]  The Outpatient Pharmacy is available to fill your prescription here on your day of surgery, ask your preop nurse for details    [] Other instructions    EDUCATIONAL MATERIALS PROVIDED:    [] PAT Preoperative Education Packet/Booklet     [] Medication List    [] Transfusion bracelet applied with instructions    [] Shower with soap, lather and rinse well, and use CHG wipes provided the evening before surgery as instructed    [] Incentive spirometer with instructions

## 2022-12-06 ENCOUNTER — TREATMENT (OUTPATIENT)
Dept: PHYSICAL THERAPY | Age: 37
End: 2022-12-06

## 2022-12-06 DIAGNOSIS — M54.41 CHRONIC BILATERAL LOW BACK PAIN WITH RIGHT-SIDED SCIATICA: Primary | ICD-10-CM

## 2022-12-06 DIAGNOSIS — G89.29 CHRONIC BILATERAL LOW BACK PAIN WITH RIGHT-SIDED SCIATICA: Primary | ICD-10-CM

## 2022-12-06 NOTE — PROGRESS NOTES
40296  Ther Activities        56630  Manual Therapy       21849  E-stim       57860  Ultrasound            Session [id number]       Treatment/Activity Tolerance:  [x] Patient tolerated treatment well [] Patient limited by fatigue  [] Patient limited by pain  [] Patient limited by other medical complications  [] Other:     Prognosis: [] Good [x] Fair  [] Poor    Patient Requires Follow-up: [x] Yes  [] No    Plan:   [x] Continue per plan of care [] Alter current plan (see comments)  [] Plan of care initiated [] Hold pending MD visit [] Discharge  Plan for Next Session:        Electronically signed by:  Tani Piper, [id number]

## 2022-12-08 ENCOUNTER — HOSPITAL ENCOUNTER (OUTPATIENT)
Age: 37
Setting detail: OUTPATIENT SURGERY
Discharge: HOME | End: 2022-12-08
Attending: PAIN MEDICINE | Admitting: PAIN MEDICINE
Payer: COMMERCIAL

## 2022-12-08 ENCOUNTER — ANESTHESIA (OUTPATIENT)
Dept: OPERATING ROOM | Age: 37
End: 2022-12-08
Payer: COMMERCIAL

## 2022-12-08 ENCOUNTER — HOSPITAL ENCOUNTER (OUTPATIENT)
Dept: GENERAL RADIOLOGY | Age: 37
Setting detail: OUTPATIENT SURGERY
Discharge: HOME | End: 2022-12-10
Attending: PAIN MEDICINE
Payer: COMMERCIAL

## 2022-12-08 ENCOUNTER — ANESTHESIA EVENT (OUTPATIENT)
Dept: OPERATING ROOM | Age: 37
End: 2022-12-08
Payer: COMMERCIAL

## 2022-12-08 VITALS
TEMPERATURE: 97.5 F | SYSTOLIC BLOOD PRESSURE: 136 MMHG | BODY MASS INDEX: 51.91 KG/M2 | HEART RATE: 94 BPM | WEIGHT: 293 LBS | HEIGHT: 63 IN | RESPIRATION RATE: 18 BRPM | OXYGEN SATURATION: 97 % | DIASTOLIC BLOOD PRESSURE: 78 MMHG

## 2022-12-08 DIAGNOSIS — R52 PAIN MANAGEMENT: ICD-10-CM

## 2022-12-08 PROCEDURE — 3600000002 HC SURGERY LEVEL 2 BASE: Performed by: PAIN MEDICINE

## 2022-12-08 PROCEDURE — 7100000011 HC PHASE II RECOVERY - ADDTL 15 MIN: Performed by: PAIN MEDICINE

## 2022-12-08 PROCEDURE — 3700000000 HC ANESTHESIA ATTENDED CARE: Performed by: PAIN MEDICINE

## 2022-12-08 PROCEDURE — 2709999900 HC NON-CHARGEABLE SUPPLY: Performed by: PAIN MEDICINE

## 2022-12-08 PROCEDURE — 3209999900 FLUORO FOR SURGICAL PROCEDURES

## 2022-12-08 PROCEDURE — 6360000002 HC RX W HCPCS: Performed by: NURSE ANESTHETIST, CERTIFIED REGISTERED

## 2022-12-08 PROCEDURE — 2580000003 HC RX 258: Performed by: NURSE ANESTHETIST, CERTIFIED REGISTERED

## 2022-12-08 PROCEDURE — 2500000003 HC RX 250 WO HCPCS: Performed by: PAIN MEDICINE

## 2022-12-08 PROCEDURE — 7100000010 HC PHASE II RECOVERY - FIRST 15 MIN: Performed by: PAIN MEDICINE

## 2022-12-08 PROCEDURE — 6360000002 HC RX W HCPCS

## 2022-12-08 PROCEDURE — 62323 NJX INTERLAMINAR LMBR/SAC: CPT | Performed by: PAIN MEDICINE

## 2022-12-08 PROCEDURE — 6360000004 HC RX CONTRAST MEDICATION: Performed by: PAIN MEDICINE

## 2022-12-08 PROCEDURE — 6360000002 HC RX W HCPCS: Performed by: PAIN MEDICINE

## 2022-12-08 RX ORDER — FENTANYL CITRATE 50 UG/ML
INJECTION, SOLUTION INTRAMUSCULAR; INTRAVENOUS PRN
Status: DISCONTINUED | OUTPATIENT
Start: 2022-12-08 | End: 2022-12-08 | Stop reason: SDUPTHER

## 2022-12-08 RX ORDER — LIDOCAINE HYDROCHLORIDE 5 MG/ML
INJECTION, SOLUTION INFILTRATION; INTRAVENOUS PRN
Status: DISCONTINUED | OUTPATIENT
Start: 2022-12-08 | End: 2022-12-08 | Stop reason: ALTCHOICE

## 2022-12-08 RX ORDER — METHYLPREDNISOLONE ACETATE 40 MG/ML
INJECTION, SUSPENSION INTRA-ARTICULAR; INTRALESIONAL; INTRAMUSCULAR; SOFT TISSUE PRN
Status: DISCONTINUED | OUTPATIENT
Start: 2022-12-08 | End: 2022-12-08 | Stop reason: ALTCHOICE

## 2022-12-08 RX ORDER — SODIUM CHLORIDE 9 MG/ML
INJECTION, SOLUTION INTRAVENOUS CONTINUOUS PRN
Status: DISCONTINUED | OUTPATIENT
Start: 2022-12-08 | End: 2022-12-08 | Stop reason: SDUPTHER

## 2022-12-08 RX ORDER — MIDAZOLAM HYDROCHLORIDE 1 MG/ML
INJECTION INTRAMUSCULAR; INTRAVENOUS PRN
Status: DISCONTINUED | OUTPATIENT
Start: 2022-12-08 | End: 2022-12-08 | Stop reason: SDUPTHER

## 2022-12-08 RX ORDER — PROPOFOL 10 MG/ML
INJECTION, EMULSION INTRAVENOUS PRN
Status: DISCONTINUED | OUTPATIENT
Start: 2022-12-08 | End: 2022-12-08 | Stop reason: SDUPTHER

## 2022-12-08 RX ADMIN — FENTANYL CITRATE 50 MCG: 50 INJECTION, SOLUTION INTRAMUSCULAR; INTRAVENOUS at 10:24

## 2022-12-08 RX ADMIN — PROPOFOL 40 MG: 10 INJECTION, EMULSION INTRAVENOUS at 10:26

## 2022-12-08 RX ADMIN — PROPOFOL 30 MG: 10 INJECTION, EMULSION INTRAVENOUS at 10:28

## 2022-12-08 RX ADMIN — SODIUM CHLORIDE: 9 INJECTION, SOLUTION INTRAVENOUS at 10:16

## 2022-12-08 RX ADMIN — MIDAZOLAM 2 MG: 1 INJECTION INTRAMUSCULAR; INTRAVENOUS at 10:23

## 2022-12-08 ASSESSMENT — COPD QUESTIONNAIRES: CAT_SEVERITY: MILD

## 2022-12-08 ASSESSMENT — PAIN - FUNCTIONAL ASSESSMENT: PAIN_FUNCTIONAL_ASSESSMENT: PREVENTS OR INTERFERES SOME ACTIVE ACTIVITIES AND ADLS

## 2022-12-08 ASSESSMENT — PAIN DESCRIPTION - PAIN TYPE: TYPE: CHRONIC PAIN

## 2022-12-08 ASSESSMENT — PAIN DESCRIPTION - DIRECTION: RADIATING_TOWARDS: BLE

## 2022-12-08 ASSESSMENT — PAIN SCALES - GENERAL: PAINLEVEL_OUTOF10: 8

## 2022-12-08 ASSESSMENT — PAIN DESCRIPTION - FREQUENCY: FREQUENCY: CONTINUOUS

## 2022-12-08 ASSESSMENT — PAIN DESCRIPTION - LOCATION: LOCATION: BACK

## 2022-12-08 ASSESSMENT — PAIN DESCRIPTION - ORIENTATION: ORIENTATION: LOWER;RIGHT;LEFT

## 2022-12-08 ASSESSMENT — LIFESTYLE VARIABLES: SMOKING_STATUS: 1

## 2022-12-08 ASSESSMENT — PAIN DESCRIPTION - DESCRIPTORS: DESCRIPTORS: ACHING;DISCOMFORT

## 2022-12-08 NOTE — H&P
[facility]        [address] [facility], [facility] Kathleen Rangel  Dept: [phone]    Procedure History & Physical      Sol Grullon     HPI:    Patient  is here for LBP BLE pain for caudal BOAZ  Labs/imaging studies reviewed   All question and concerns addressed including R/B/A associated with the procedure    Past Medical History:   Diagnosis Date    Anxiety     Arthritis     Asthma     well controlled, on inhalers    Bipolar 1 disorder ([address]     COPD (chronic obstructive pulmonary disease) ([address]     Depression     Fibromyalgia     GERD (gastroesophageal reflux disease)     Glaucoma     Suspected by eye specialist . no eye meds    Hypertension     Macular edema     Cm    Migraines     Morbid obesity due to excess calories ([address]     Neuropathy     PTSD (post-traumatic stress disorder)     Splenomegaly 2020       Past Surgical History:   Procedure Laterality Date    ANTERIOR CRUCIATE LIGAMENT REPAIR Right      SECTION      ,     CHOLECYSTECTOMY      DILATION AND CURETTAGE OF UTERUS N/A 2019    DILATATION AND CURETTAGE HYSTEROSCOPY WITH REMOVAL OF CONDYLOMATA performed by Sophie Wilkinson MD at [address] N/A 2020    DILATATION AND CURETTAGE HYSTEROSCOPY, ATTEMPTED CERVICAL BIOPSY, MARIYA OF  LABIAL ABSCESS performed by Sophie Wilkinson MD at [address] (CERVIX STATUS UNKNOWN)  2021    Robotic lysis of adhesions, total hysterectomy, bilateral salpingectomy, cystoscopy    INCONTINENCE SURGERY      OTHER SURGICAL HISTORY      repair anal fissure    PAIN MANAGEMENT PROCEDURE N/A 2022    CAUDAL EPIDURAL STEROID INJECTION  #1 performed by Serina Chappell DO at [address] N/A 2022    CAUDAL EPIDURAL STEROID INJECTION #2 performed by Serina Chappell DO at [address] N/A 10/13/2022    CAUDAL EPIDURAL STEROID INJECTION  #3 performed by Beto Parker, DO at [address]         Prior to Admission medications    Medication Sig Start Date End Date Taking? Authorizing Provider   docusate sodium (COLACE) 100 MG capsule Take 100 mg by mouth 2 times daily   Yes Historical Provider, MD   ondansetron (ZOFRAN) 4 MG tablet Take 1 tablet by mouth 3 times daily as needed for Nausea or Vomiting 12/3/22   Carlos Carrasco,    benazepril (LOTENSIN) 20 MG tablet Take 1 tablet by mouth daily 11/23/22   Carlos Carrasco DO   cyclobenzaprine (FLEXERIL) 10 MG tablet Take 1 tablet by mouth 2 times daily as needed for Muscle spasms 11/8/22 12/8/22  Beto Parker DO   pregabalin (LYRICA) 50 MG capsule Take 1 capsule by mouth 3 times daily for 90 days.  11/8/22 2/6/23  Beto Parker,    Vibegron (GEMTESA) 75 MG TABS Take 75 mg by mouth nightly    Historical Provider, MD   fluticasone (FLOVENT HFA) 44 MCG/ACT inhaler Inhale 2 puffs into the lungs 2 times daily 5/4/22   Carlos Carrasco DO   albuterol sulfate HFA (PROVENTIL HFA) 108 (90 Base) MCG/ACT inhaler Inhale 2 puffs into the lungs every 4 hours as needed for Wheezing 5/4/22 5/4/23  Carlos Carrasco DO   tiotropium (SPIRIVA HANDIHALER) 18 MCG inhalation capsule Inhale 1 capsule into the lungs daily 3/9/22   Finn Covington, DO   Acetaminophen (TYLENOL) 325 MG CAPS Take 650 mg by mouth every 4 hours as needed (pain)     Historical Provider, MD   ibuprofen (ADVIL;MOTRIN) 800 MG tablet Take 800 mg by mouth every 6 hours as needed for Pain    Historical Provider, MD       Allergies   Allergen Reactions    Keflex [Cephalexin]      Hives, rash      Prednisone Rash    Cymbalta [Duloxetine Hcl] Other (See Comments)     angry    Lamictal [Lamotrigine] Nausea And Vomiting    Neurontin [Gabapentin] Other (See Comments)     Makes me feel very weird       Social History     Socioeconomic History    Marital status:      Spouse name: Julianne Filter    Number of children: 2    Years of education: 13    Highest education level: Not on file   Occupational History    Occupation: disability-, LPN   Tobacco Use    Smoking status: Every Day     Packs/day: 2.00     Years: 20.00     Pack years: 40.00     Types: Cigarettes     Start date: 11/3/1995    Smokeless tobacco: Never   Vaping Use    Vaping Use: Former    Substances: Never   Substance and Sexual Activity    Alcohol use: Not Currently    Drug use: Not Currently     Types: Marijuana Garrel Calender)     Comment: medical marijuana last used 3/2021    Sexual activity: Not on file   Other Topics Concern    Not on file   Social History Narrative    Not on file     Social Determinants of Health     Financial Resource Strain: Low Risk     Difficulty of Paying Living Expenses: Not hard at all   Food Insecurity: No Food Insecurity    Worried About Running Out of Food in the Last Year: Never true    [address] in the Last Year: Never true   Transportation Needs: No Transportation Needs    Lack of Transportation (Medical): No    Lack of Transportation (Non-Medical): No   Physical Activity: Sufficiently Active    Days of Exercise per Week: 7 days    Minutes of Exercise per Session: 60 min   Stress: No Stress Concern Present    Feeling of Stress : Only a little   Social Connections:  Moderately Integrated    Frequency of Communication with Friends and Family: More than three times a week    Frequency of Social Gatherings with Friends and Family: More than three times a week    Attends Oriental orthodox Services: More than 4 times per year    Active Member of [employer] or Organizations: No    Attends Club or Organization Meetings: Never    Marital Status:    Intimate Partner Violence: Not At Risk    Fear of Current or Ex-Partner: No    Emotionally Abused: No    Physically Abused: No    Sexually Abused: No   Housing Stability: Low Risk     Unable to Pay for Housing in the Last Year: No    Number of [place] in the Last Year: 1    Unstable Housing in the Last Year: No       Family History   Problem Relation Age of Onset    Diabetes Mother     Cancer Mother         ovarian    Diabetes Father     Cancer Sister         NHL    Diabetes Sister     High Blood Pressure Brother     Breast Cancer Maternal Aunt     Uterine Cancer Neg Hx     Colon Cancer Neg Hx          REVIEW OF SYSTEMS:    CONSTITUTIONAL:  negative for  fevers, chills, sweats and fatigue    RESPIRATORY:  negative for  dry cough, cough with sputum, dyspnea, wheezing and chest pain    CARDIOVASCULAR:  negative for chest pain, dyspnea, palpitations, syncope    GASTROINTESTINAL:  negative for nausea, vomiting, change in bowel habits, diarrhea, constipation and abdominal pain    MUSCULOSKELETAL: negative for muscle weakness    SKIN: negative for itching or rashes. BEHAVIOR/PSYCH:  negative for poor appetite, increased appetite, decreased sleep and poor concentration    All other systems negative      PHYSICAL EXAM:    VITALS:  BP (!) 154/89   Pulse (!) 105   Temp 97.5 °F (36.4 °C)   Resp 18   Ht 5' 3\" (1.6 m)   Wt (!) 310 lb (140.6 kg)   LMP 02/22/2021 (Approximate)   SpO2 97%   BMI 54.91 kg/m²     CONSTITUTIONAL:  awake, alert, cooperative, no apparent distress, and appears stated age    EYES: PERRLA, EOMI    LUNGS:  No increased work of breathing, no audible wheezing    CARDIOVASCULAR:  regular rate and rhythm    ABDOMEN:  Soft non tender non distended     EXTREMITIES: no signs of clubbing or cyanosis. MUSCULOSKELETAL: negative for flaccid muscle tone or spastic movements. SKIN: gross examination reveals no signs of rashes, or diaphoresis. NEURO: Cranial nerves II-XII grossly intact. No signs of agitated mood.        Assessment/Plan:    LBP BLE pain for caudal BOAZ

## 2022-12-08 NOTE — OP NOTE
2022    Patient: Noris Reaves  :  1985  Age:  44 y.o. Sex:  female     PRE-OPERATIVE DIAGNOSIS: Lumbar radiculopathy    POST-OPERATIVE DIAGNOSIS: Same. PROCEDURE PERFORMED:  #1 Therapeutic Caudal Epidural done under fluoroscopic guidance. SURGEON:   INGRID Lopez. ANESTHESIA: MAC    ESTIMATED BLOOD LOSS: None. BRIEF HISTORY: Noris Reaves comes in today for the first  therapeutic caudal epidural steroid injection under fluorsoscopic guidance. After discussing the potential risks and benefits of the procedure with the patient  Giorgi Hernandez did request that we proceed. A complete History & Physical was reviewed and it is unchanged. DESCRIPTION OF PROCEDURE:    After confirming written and informed consent, a time-out was performed and Alvaro name and date of birth, the procedure to be performed as well as the plan for the location of the needle insertion were confirmed. Patient was brought into the procedure room and was placed in the prone position on a fluoroscopy table. Standard monitors were placed and vital signs were observed throughout the procedure. The lumbosacral and caudal area were prepped with chloraprep and draped in a sterile manner. The sacral hiatus was identified and marked under AP fluoroscopy. A sterile gauze was placed in the midgluteal cleft for increased sterility. The overlying skin and subcutaneous tissues were anesthetized with 0.5% Lidocaine. Under lateral fluoroscopic guidance, a # 22 gauge 3.5 inch spinal needle was advanced into the sacral hiatus . After the needle passed through the sacrococcygeal ligament, the needle angle was advanced slightly. There was no evidence of paresthesia throughout the needle placement. After negative aspiration for blood and CSF, epidural spread was confirmed with injection of 2 cc of Isovue-M 200 in both live lateral and live AP fluoroscopy.  A solution consisting of 0.5% Lidocaine and 40 mg DepoMedrol, total of 15 cc, was easily injected . There was a clear outline of the epidural space and visualization of the sacral roots. The needle was then removed and a sterile Band-Aid was applied to the puncture site. Disposition the patient tolerated the procedure well and there were no complications . Vital signs remained stable throughout the procedure. The patient was escorted to the recovery area where they remained until discharge and written discharge instructions for the procedure were given. Plan: Alvarado will return to our pain management center as scheduled.      Dell Landeros DO

## 2022-12-08 NOTE — ANESTHESIA PRE PROCEDURE
Department of Anesthesiology  Preprocedure Note       Name:  Ag Mathur   Age:  44 y.o.  :  1985                                          MRN:  [id number]         Date:  2022      Surgeon: Omar Moffett):  Ag Pretty DO    Procedure: Procedure(s):  CAUDAL EPIDURAL STEROID INJECTION    Medications prior to admission:   Prior to Admission medications    Medication Sig Start Date End Date Taking? Authorizing Provider   ondansetron (ZOFRAN) 4 MG tablet Take 1 tablet by mouth 3 times daily as needed for Nausea or Vomiting 12/3/22   Donovan Leyva DO   docusate sodium (COLACE) 100 MG capsule Take 100 mg by mouth 2 times daily    Historical Provider, MD   benazepril (LOTENSIN) 20 MG tablet Take 1 tablet by mouth daily 22   Donovan Leyva DO   cyclobenzaprine (FLEXERIL) 10 MG tablet Take 1 tablet by mouth 2 times daily as needed for Muscle spasms 22  Ag Pretty DO   pregabalin (LYRICA) 50 MG capsule Take 1 capsule by mouth 3 times daily for 90 days. 22  Ag Pretty DO   Vibegron (GEMTESA) 75 MG TABS Take 75 mg by mouth nightly    Historical Provider, MD   fluticasone (FLOVENT HFA) 44 MCG/ACT inhaler Inhale 2 puffs into the lungs 2 times daily 22   Donovan Leyva DO   albuterol sulfate HFA (PROVENTIL HFA) 108 (90 Base) MCG/ACT inhaler Inhale 2 puffs into the lungs every 4 hours as needed for Wheezing 22  Donovan Leyva DO   tiotropium (SPIRIVA HANDIHALER) 18 MCG inhalation capsule Inhale 1 capsule into the lungs daily 3/9/22   Lavelle Covington,    Acetaminophen (TYLENOL) 325 MG CAPS Take 650 mg by mouth every 4 hours as needed (pain)     Historical Provider, MD   ibuprofen (ADVIL;MOTRIN) 800 MG tablet Take 800 mg by mouth every 6 hours as needed for Pain    Historical Provider, MD       Current medications:    No current outpatient medications on file. No current facility-administered medications for this visit.        Allergies: PT C/O BLOOD LEAKING AT URETHRA AROUND CATHETER AND HEMATURIA IN LEG BAG X 1 DAY.  PT STATES HE TAKES BLOOD THINNERS.  PT GOT THIS CATHETER 24AUG AFTER PROSTATE PROCEDURE.  NO PAIN.     Allergies   Allergen Reactions    Keflex [Cephalexin]      Hives, rash      Prednisone Rash    Cymbalta [Duloxetine Hcl] Other (See Comments)     angry    Lamictal [Lamotrigine] Nausea And Vomiting    Neurontin [Gabapentin] Other (See Comments)     Makes me feel very weird       Problem List:    Patient Active Problem List   Diagnosis Code    Mild intermittent asthma without complication J99.55    Elevated blood pressure reading R03.0    Bipolar 1 disorder ([facility]) F31.9    Anxiety F41.9    Suicidal ideation R45.851    Major depressive disorder, recurrent ([facility] F33.9    Depression with suicidal ideation F32. A, R45.851    Depression with anxiety F41.8    Strain of lumbar region S39.012A    Sciatica M54.30    Spasm of muscle M62.838    Menorrhagia with irregular cycle N92.1    Condylomata acuminata A63.0    Endometrial thickening on ultrasound R93.89    Vulvar abscess N76.4    Cervical stenosis (uterine cervix) N88.2    Chest pain R07.9    At risk for shortness of breath Z91.89    Dizziness R42    Type 2 diabetes mellitus without complication, without long-term current use of insulin ([facility]) E11.9    Tobacco abuse Z72.0    Splenomegaly R16.1    Diabetes mellitus ([facility]) E11.9    Abnormal uterine bleeding N93.9    Lumbar radiculopathy M54.16    Chronic bilateral low back pain with bilateral sciatica M54.42, M54.41, G89.29    Chronic pain syndrome G89.4    Lumbosacral spondylosis without myelopathy M47.817    Chronic obstructive pulmonary disease, unspecified COPD type ([facility]) J44.9    Primary hypertension I10    Mixed stress and urge urinary incontinence N39.46    Polyarthritis M13.0       Past Medical History:        Diagnosis Date    Anxiety     Arthritis     Asthma     well controlled, on inhalers    Bipolar 1 disorder ([facility] [address]     COPD (chronic obstructive pulmonary disease) ([facility])     Depression     Fibromyalgia     GERD (gastroesophageal reflux disease)     Glaucoma Suspected by eye specialist . no eye meds    Hypertension     Macular edema     Cm    Migraines     Morbid obesity due to excess calories ([address]     Neuropathy     PTSD (post-traumatic stress disorder)     Splenomegaly 2020       Past Surgical History:        Procedure Laterality Date    ANTERIOR CRUCIATE LIGAMENT REPAIR Right      SECTION      ,     CHOLECYSTECTOMY      DILATION AND CURETTAGE OF UTERUS N/A 2019    DILATATION AND CURETTAGE HYSTEROSCOPY WITH REMOVAL OF CONDYLOMATA performed by Cortez Harp MD at [address] [place] [address] N/A 2020    DILATATION AND CURETTAGE HYSTEROSCOPY, ATTEMPTED CERVICAL BIOPSY, MARIYA OF  LABIAL ABSCESS performed by Cortez Harp MD at [place] [id number] (CERVIX STATUS UNKNOWN)  2021    Robotic lysis of adhesions, total hysterectomy, bilateral salpingectomy, cystoscopy    INCONTINENCE SURGERY      OTHER SURGICAL HISTORY      repair anal fissure    PAIN MANAGEMENT PROCEDURE N/A 2022    CAUDAL EPIDURAL STEROID INJECTION  #1 performed by Tamiko Hampton DO at [facility] N/A 2022    CAUDAL EPIDURAL STEROID INJECTION #2 performed by Tamiko Hampton DO at [facility] N/A 10/13/2022    CAUDAL EPIDURAL STEROID INJECTION  #3 performed by Tamiko Hampton DO at [address]         Social History:    Social History     Tobacco Use    Smoking status: Every Day     Packs/day: 2.00     Years: 20.00     Pack years: 40.00     Types: Cigarettes     Start date: 11/3/1995    Smokeless tobacco: Never   Substance Use Topics    Alcohol use: Not Currently                                Ready to quit: Not Answered  Counseling given: Not Answered      Vital Signs (Current): There were no vitals filed for this visit.                                            BP Readings from Last 3 Encounters:   11/23/22 (!) 148/97   11/08/22 (!) 143/87   10/13/22 128/76       NPO Status:                                                                                 BMI:   Wt Readings from Last 3 Encounters:   12/05/22 (!) 310 lb (140.6 kg)   11/23/22 (!) 313 lb (142 kg)   11/08/22 (!) 310 lb (140.6 kg)     There is no height or weight on file to calculate BMI.    CBC:   Lab Results   Component Value Date/Time    WBC 7.1 09/21/2022 02:50 PM    RBC 4.47 09/21/2022 02:50 PM    HGB 13.6 09/21/2022 02:50 PM    HCT 39.6 09/21/2022 02:50 PM    MCV 88.6 09/21/2022 02:50 PM    RDW 13.2 09/21/2022 02:50 PM     09/21/2022 02:50 PM       CMP:   Lab Results   Component Value Date/Time     09/21/2022 02:50 PM    K 4.5 09/21/2022 02:50 PM     09/21/2022 02:50 PM    CO2 20 09/21/2022 02:50 PM    BUN 6 09/21/2022 02:50 PM    CREATININE 0.5 09/21/2022 02:50 PM    GFRAA >60 09/21/2022 02:50 PM    LABGLOM >60 09/21/2022 02:50 PM    GLUCOSE 145 09/21/2022 02:50 PM    PROT 6.7 09/21/2022 02:50 PM    CALCIUM 8.3 09/21/2022 02:50 PM    BILITOT 0.3 09/21/2022 02:50 PM    ALKPHOS 82 09/21/2022 02:50 PM    AST 30 09/21/2022 02:50 PM    ALT 13 09/21/2022 02:50 PM       POC Tests: No results for input(s): POCGLU, POCNA, POCK, POCCL, POCBUN, POCHEMO, POCHCT in the last 72 hours.     Coags:   Lab Results   Component Value Date/Time    PROTIME 10.4 05/12/2020 08:23 PM    INR 0.9 05/12/2020 08:23 PM    APTT 32.2 05/12/2020 08:23 PM       HCG (If Applicable):   Lab Results   Component Value Date    PREGTESTUR NEGATIVE 09/14/2020        ABGs: No results found for: PHART, PO2ART, BWO4JBE, KDM2OMX, BEART, Y1OANBGZ     Type & Screen (If Applicable):  Lab Results   Component Value Date    LABABO O 02/15/2021       Drug/Infectious Status (If Applicable):  No results found for: HIV, HEPCAB    COVID-19 Screening (If Applicable):   Lab Results   Component Value Date/Time    COVID19 Not Detected 09/20/2022 10:18 AM    COVID19 Not Detected 11/23/2020 03:45 PM           Anesthesia Evaluation  Patient summary reviewed and Nursing notes reviewed no history of anesthetic complications:   Airway: Mallampati: III  TM distance: >3 FB   Neck ROM: limited  Mouth opening: > = 3 FB   Dental:          Pulmonary:   (+) COPD: mild and no interval change,  decreased breath sounds: bilateral asthma (Mild intermittent - denies recent exacerbations or increased inhaler usage): current smoker    (-) sleep apnea          Patient did not smoke on day of surgery. Cardiovascular:  Exercise tolerance: good (>4 METS),   (+) hypertension: mild and no interval change,       ECG reviewed  Rhythm: regular  Rate: abnormal           Beta Blocker:  Not on Beta Blocker      ROS comment: EKG:  Sinus tachycardia  Otherwise normal ECG  When compared with ECG of 03-NOV-2021 18:55,  No significant change was found    PE comment: Tachycardia   Neuro/Psych:   (+) neuromuscular disease (Neuropathy):, headaches: migraine headaches, psychiatric history (Bipolar with suicidal ideation):depression/anxiety  (PTSD)             ROS comment: Strain of lumbar region  Sciatica  Cervical stenosis  Lumbar radiculopathy  Chronic bilateral low back pain with bilateral sciatica  Chronic pain syndrome  Lumbosacral spondylosis without myelopathy  Glaucoma  Macular edema  Ambulatory dysfunction secondary to pain, weakness, and unsteady gait necessitating a cane for ambulation assistance  PTSD GI/Hepatic/Renal:   (+) GERD: no interval change, morbid obesity (SMO with a BMI of 55 kg/m2)         ROS comment: Splenomegaly  Mixed stress and urge urinary incontinence. Endo/Other:    (+) Diabetes (A1C 5.9% in March of '22)Type II DM, , : arthritis (Fibromyalgia): OA., . Pt had no PAT visit        ROS comment: Condyloma acuminata  Endometrial thickening Abdominal:             Vascular: negative vascular ROS.          Other Findings:             Anesthesia Plan      MAC     ASA 3 Induction: intravenous. Anesthetic plan and risks discussed with patient. Plan discussed with CRNA.                 Humberto Guillaume MD   12/8/2022    Note reviewed and agree with plan VELMA June - CRNA

## 2022-12-08 NOTE — ANESTHESIA POSTPROCEDURE EVALUATION
Department of Anesthesiology  Postprocedure Note    Patient: Isaura Black  MRN: [id number]  YOB: 1985  Date of evaluation: 12/8/2022      Procedure Summary     Date: 12/08/22 Room / Location: [facility] PROCEDURE ROOM 02 / [facility]    Anesthesia Start: 6387 Anesthesia Stop: 1033    Procedure: CAUDAL EPIDURAL STEROID INJECTION (Coccyx) Diagnosis:       Lumbar radiculopathy      (Lumbar radiculopathy [M54.16])    Surgeons: Ray Pollard DO Responsible Provider: Leticia Lindsay MD    Anesthesia Type: MAC ASA Status: 3          Anesthesia Type: No value filed.     Jeet Phase I: Jeet Score: 10    Jeet Phase II:        Anesthesia Post Evaluation    Patient location during evaluation: PACU  Patient participation: complete - patient participated  Level of consciousness: awake and alert  Pain score: 0  Airway patency: patent  Nausea & Vomiting: no nausea and no vomiting  Complications: no  Cardiovascular status: blood pressure returned to baseline  Respiratory status: acceptable  Hydration status: euvolemic

## 2022-12-08 NOTE — DISCHARGE INSTRUCTIONS
[facility] Block/Radiofrequency  Home Going Instructions    1-Go home, rest for the remainder of the day  2-Please do not lift over 20 pounds the day of the injection  3-If you received sedation No: alcohol, driving, operating lawn mowers, plows, tractors or other dangerous equipment until next morning. Do not make important decisions or sign legal documents for 24 hours. You may experience light headedness, dizziness, nausea or sleepiness after sedation. Do not stay alone. A responsible adult must be with you for 24 hours. You could be nauseated from the medications you have received. Your IV site may be sore and bruised. 4-No dietary restrictions     5-Resume all medications the same day, blood thinners to be resumed 24 hours after injection if you were instructed to stop any. 6-Keep the surgical site clean and dry, you may shower the next morning and remove the      dressing. 7- No sitz baths, tub baths or hot tubs/swimming for 24 hours. 8- If you have any pain at the injection site(s), application of an ice pack to the area should be       helpful, 20 minutes on/20 minutes off for next 48 hours. 9- Call [facility] immediately at if you develop.   Fever greater than 100.4 F  Have bleeding or drainage from the puncture site  Have progressive Leg/arm numbness and or weakness  Loss of control of bowel and or bladder (wet/soil yourself)  Severe headache with inability to lift head  10-You may return to work the next day

## 2022-12-13 ENCOUNTER — TREATMENT (OUTPATIENT)
Dept: PHYSICAL THERAPY | Age: 37
End: 2022-12-13

## 2022-12-13 DIAGNOSIS — M54.41 CHRONIC BILATERAL LOW BACK PAIN WITH RIGHT-SIDED SCIATICA: Primary | ICD-10-CM

## 2022-12-13 DIAGNOSIS — G89.29 CHRONIC BILATERAL LOW BACK PAIN WITH RIGHT-SIDED SCIATICA: Primary | ICD-10-CM

## 2022-12-13 NOTE — PROGRESS NOTES
[facility]          Phone: [phone] Fax: [phone]    Physical Therapy Daily Treatment Note  Date:  2022    Patient Name:  Jess Kingsley    :  1985  MRN: [id number]    Evaluating Therapist:  Yanick Araujo, YSABEL [id number]    Restrictions/Precautions:    Diagnosis:     Diagnosis Orders   1. Chronic bilateral low back pain with right-sided sciatica          Treatment Diagnosis:    Insurance/Certification information:  [facility]  Referring Physician:  Mariaelena Riggins DO  Plan of care signed (Y/N):    Visit# / total visits:  3/8  Pain level: /10   Time In:  1039  Time Out:  1107    Subjective:  Pt reports she had another epidural last week and feels that it has helped.     Exercises:  Exercise/Equipment Resistance/Repetitions Other comments     stepper 5 minutes      Trunk stretch with ball  Sec x 3 reps each flex and B rotation       Hamstring stretch With stool, 0 sec x 3 reps       Sciatic nerve glides X10 reps B      Hip add with ball 5 sec x 10 reps      Hip abd with tband OTB x 10 rep B      Seated hip flex With abdominal bracing 5 x 10 reps      Seated knee flex OTB x 10 reps      Rows, seated OTB x 10 reps        Standing hip ext X10 reps B LE                                                                      Other Therapeutic Activities:  N/A    Home Exercise Program:  22 - modified child's pose, sciatic nerve glides, seated hip flex, seated hip add, seated hip abd (OTB provided), seated hamstring stretch    Manual Treatments:  N/A    Modalities:  IFC PRN     Time-in Time-out Total Time   57954  Evaluation Low Complexity      78339  Evaluation Med Complexity      60839  Evaluation High Complexity      78838  Ther Ex 1039 1107 28   93083  Neuro Re-ed        22343  Ther Activities        62024  Manual Therapy       79234  E-stim       91262  Ultrasound            Session 0249 7930 02       Treatment/Activity Tolerance:  [x] Patient tolerated treatment well [] Patient limited by fatigue  [] Patient limited by pain  [] Patient limited by other medical complications  [] Other:     Prognosis: [] Good [x] Fair  [] Poor    Patient Requires Follow-up: [x] Yes  [] No    Plan:   [x] Continue per plan of care [] Alter current plan (see comments)  [] Plan of care initiated [] Hold pending MD visit [] Discharge  Plan for Next Session:        Electronically signed by:  Sammy Baca, [facility], [id number]

## 2022-12-23 ENCOUNTER — TELEPHONE (OUTPATIENT)
Dept: PAIN MANAGEMENT | Age: 37
End: 2022-12-23

## 2022-12-23 NOTE — TELEPHONE ENCOUNTER
Okay to take flexeril TID prn for 3 days to try to settle it down if she can tolerate it TID and it does not cause significant SEs (drowsiness). Can consider steroid dose pack if that is not helpful.

## 2022-12-23 NOTE — TELEPHONE ENCOUNTER
Malon Client called in and stated that she is having spasms on the top pf her right foot. She states it is a 10/10 pain level and almost went to the ER last night because of the pain. She currently takes Lyrica and Flexeril but states that it is not helping much. Elevation is helping the pain a little. She denies any calf swelling, pain, heat  or pain when she dorsiflexes her right foot. Please advise.

## 2022-12-27 ENCOUNTER — TELEPHONE (OUTPATIENT)
Dept: FAMILY MEDICINE CLINIC | Age: 37
End: 2022-12-27

## 2022-12-27 NOTE — TELEPHONE ENCOUNTER
Spasms in bladder and on top of rt foot. Pain mgmt  gave her flexiril for back and neck spasms. She is asking what she can take to settle the spasms for her bladder and the top of her foot. She states the spasms make her want to vomit. Pain mgmt told her to contact pcp. Please advise.

## 2022-12-28 ENCOUNTER — TREATMENT (OUTPATIENT)
Dept: PHYSICAL THERAPY | Age: 37
End: 2022-12-28
Payer: COMMERCIAL

## 2022-12-28 ENCOUNTER — OFFICE VISIT (OUTPATIENT)
Dept: PRIMARY CARE CLINIC | Age: 37
End: 2022-12-28
Payer: COMMERCIAL

## 2022-12-28 ENCOUNTER — PROCEDURE VISIT (OUTPATIENT)
Dept: AUDIOLOGY | Age: 37
End: 2022-12-28
Payer: COMMERCIAL

## 2022-12-28 ENCOUNTER — OFFICE VISIT (OUTPATIENT)
Dept: ENT CLINIC | Age: 37
End: 2022-12-28
Payer: COMMERCIAL

## 2022-12-28 VITALS
HEIGHT: 63 IN | RESPIRATION RATE: 16 BRPM | TEMPERATURE: 97.9 F | SYSTOLIC BLOOD PRESSURE: 134 MMHG | OXYGEN SATURATION: 99 % | DIASTOLIC BLOOD PRESSURE: 94 MMHG | WEIGHT: 293 LBS | BODY MASS INDEX: 51.91 KG/M2 | HEART RATE: 131 BPM

## 2022-12-28 VITALS — HEIGHT: 63 IN | WEIGHT: 293 LBS | BODY MASS INDEX: 51.91 KG/M2

## 2022-12-28 DIAGNOSIS — N39.0 URINARY TRACT INFECTION IN FEMALE: ICD-10-CM

## 2022-12-28 DIAGNOSIS — J34.89 NASAL DRYNESS: ICD-10-CM

## 2022-12-28 DIAGNOSIS — R42 VERTIGO: ICD-10-CM

## 2022-12-28 DIAGNOSIS — H90.3 SENSORINEURAL HEARING LOSS (SNHL) OF BOTH EARS: Primary | ICD-10-CM

## 2022-12-28 DIAGNOSIS — G89.29 CHRONIC BILATERAL LOW BACK PAIN WITH RIGHT-SIDED SCIATICA: Primary | ICD-10-CM

## 2022-12-28 DIAGNOSIS — M54.41 CHRONIC BILATERAL LOW BACK PAIN WITH RIGHT-SIDED SCIATICA: Primary | ICD-10-CM

## 2022-12-28 DIAGNOSIS — N32.89 BLADDER SPASMS: ICD-10-CM

## 2022-12-28 DIAGNOSIS — H93.13 TINNITUS OF BOTH EARS: ICD-10-CM

## 2022-12-28 DIAGNOSIS — R42 DIZZINESS: ICD-10-CM

## 2022-12-28 LAB
BILIRUBIN, POC: NEGATIVE
BLOOD URINE, POC: NORMAL
CLARITY, POC: CLEAR
COLOR, POC: YELLOW
GLUCOSE URINE, POC: NORMAL
KETONES, POC: NEGATIVE
LEUKOCYTE EST, POC: NORMAL
NITRITE, POC: NEGATIVE
PH, POC: 5.5
PROTEIN, POC: NORMAL
SPECIFIC GRAVITY, POC: >=1.03
UROBILINOGEN, POC: 0.2

## 2022-12-28 PROCEDURE — 92557 COMPREHENSIVE HEARING TEST: CPT | Performed by: AUDIOLOGIST

## 2022-12-28 PROCEDURE — G8427 DOCREV CUR MEDS BY ELIG CLIN: HCPCS | Performed by: NURSE PRACTITIONER

## 2022-12-28 PROCEDURE — 97110 THERAPEUTIC EXERCISES: CPT | Performed by: PHYSICAL THERAPIST

## 2022-12-28 PROCEDURE — 81002 URINALYSIS NONAUTO W/O SCOPE: CPT | Performed by: NURSE PRACTITIONER

## 2022-12-28 PROCEDURE — 4004F PT TOBACCO SCREEN RCVD TLK: CPT | Performed by: NURSE PRACTITIONER

## 2022-12-28 PROCEDURE — 3078F DIAST BP <80 MM HG: CPT | Performed by: NURSE PRACTITIONER

## 2022-12-28 PROCEDURE — G8482 FLU IMMUNIZE ORDER/ADMIN: HCPCS | Performed by: NURSE PRACTITIONER

## 2022-12-28 PROCEDURE — G8417 CALC BMI ABV UP PARAM F/U: HCPCS | Performed by: NURSE PRACTITIONER

## 2022-12-28 PROCEDURE — 3074F SYST BP LT 130 MM HG: CPT | Performed by: NURSE PRACTITIONER

## 2022-12-28 PROCEDURE — 92567 TYMPANOMETRY: CPT | Performed by: AUDIOLOGIST

## 2022-12-28 PROCEDURE — 99213 OFFICE O/P EST LOW 20 MIN: CPT | Performed by: NURSE PRACTITIONER

## 2022-12-28 PROCEDURE — 99203 OFFICE O/P NEW LOW 30 MIN: CPT | Performed by: NURSE PRACTITIONER

## 2022-12-28 RX ORDER — VALACYCLOVIR HCL 1000 MG
2000 TABLET ORAL 2 TIMES DAILY
Qty: 4 TABLET | Refills: 5 | Status: SHIPPED | OUTPATIENT
Start: 2022-12-28

## 2022-12-28 RX ORDER — PHENAZOPYRIDINE HYDROCHLORIDE 100 MG/1
100 TABLET, FILM COATED ORAL 3 TIMES DAILY PRN
Qty: 12 TABLET | Refills: 0 | Status: SHIPPED | OUTPATIENT
Start: 2022-12-28 | End: 2023-12-28

## 2022-12-28 RX ORDER — CYCLOBENZAPRINE HCL 10 MG
TABLET ORAL
COMMUNITY
Start: 2022-12-08

## 2022-12-28 RX ORDER — NITROFURANTOIN 25; 75 MG/1; MG/1
100 CAPSULE ORAL 2 TIMES DAILY
Qty: 20 CAPSULE | Refills: 0 | Status: SHIPPED | OUTPATIENT
Start: 2022-12-28 | End: 2023-01-07

## 2022-12-28 ASSESSMENT — ENCOUNTER SYMPTOMS
STRIDOR: 0
RESPIRATORY NEGATIVE: 1
SHORTNESS OF BREATH: 0
SINUS PAIN: 0
SINUS PRESSURE: 0
RHINORRHEA: 0
EYES NEGATIVE: 1

## 2022-12-28 NOTE — TELEPHONE ENCOUNTER
Patient is already on a medication for bladder spasms, vibegron. If that is not effective she needs to follow-up with whoever prescribed this medication for her. If she is not currently seeing anyone for this I can put a referral in for urology.

## 2022-12-28 NOTE — PROGRESS NOTES
[facility]          Phone: [phone] Fax: [phone]    Physical Therapy Daily Treatment Note  Date:  2022    Patient Name:  Lio Cameron    :  1985  MRN: [id number]    Evaluating Therapist:  Hina Alvarado PT [id number]    Restrictions/Precautions:    Diagnosis:     Diagnosis Orders   1. Chronic bilateral low back pain with right-sided sciatica          Treatment Diagnosis:    Insurance/Certification information:  [facility]  Referring Physician:  Kaushik Vieyra DO  Plan of care signed (Y/N):    Visit# / total visits:    Pain level: /10   Time In:  1027  Time Out:  1100    Subjective:  Pt reports she has been having spasms everywhere. States she is also being checked for possible MS.     Exercises:  Exercise/Equipment Resistance/Repetitions Other comments     stepper 5 minutes      Trunk stretch with ball  Sec x 3 reps each flex and B rotation       Hamstring stretch With stool, 20 sec x 3 reps       Sciatic nerve glides X10 reps B      Hip add with ball 5 sec x 10 reps      Hip abd with tband OTB x 10 rep B      Seated hip flex With abdominal bracing 5 x 10 reps      Seated knee flex OTB x 10 reps      Rows, seated OTB x 10 reps        Standing hip ext and abd X10 reps B LE                                                                      Other Therapeutic Activities:  N/A    Home Exercise Program:  22 - modified child's pose, sciatic nerve glides, seated hip flex, seated hip add, seated hip abd (OTB provided), seated hamstring stretch    Manual Treatments:  N/A    Modalities:  IFC PRN     Time-in Time-out Total Time   13957  Evaluation Low Complexity      59261  Evaluation Med Complexity      43136  Evaluation High Complexity      44175  Ther Ex 1027 1100 33   80961  Neuro Re-ed        23768  Ther Activities        94407  Manual Therapy       83993  E-stim       61417  Ultrasound            Session 1117 0318 60       Treatment/Activity Tolerance:  [x] Patient tolerated treatment well [] Patient limited by fatigue  [] Patient limited by pain  [] Patient limited by other medical complications  [] Other:     Prognosis: [] Good [x] Fair  [] Poor    Patient Requires Follow-up: [x] Yes  [] No    Plan:   [x] Continue per plan of care [] Alter current plan (see comments)  [] Plan of care initiated [] Hold pending MD visit [] Discharge  Plan for Next Session:        Electronically signed by:  Tani Hong, [id number]

## 2022-12-28 NOTE — PROGRESS NOTES
This patient was referred for audiometric and tympanometric testing by CATRACHITA Ortega due to  dizziness . Audiometry using pure tone air and bone conduction testing revealed a borderline mild sloping to mild sensorineural hearing loss, in the right ear and an essentially mild sensorineural hearing loss, in the left ear. Reliability was good. Speech reception thresholds were in good agreement with the pure tone averages, bilaterally. Speech discrimination scores were excellent, bilaterally. Tympanometry revealed normal middle ear peak pressure and compliance, bilaterally. The results were reviewed with the patient. Recommendations for follow up will be made pending physician consult.     Katalina Gr [facility]  [address] [facility] [id number]  Electronically signed by Katalina Gr on 12/28/2022 at 9:22 AM
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
## 2022-12-28 NOTE — PROGRESS NOTES
Chief Complaint:   Other (Bladder spasms)      History of Present Illness   Source of history provided by:  patient. Jose Hawkins is a 44 y.o. old female who has a past medical history of:   Past Medical History:   Diagnosis Date    Anxiety     Arthritis     Asthma     well controlled, on inhalers    Bipolar 1 disorder (HCC)     COPD (chronic obstructive pulmonary disease) (HCC)     Depression     Fibromyalgia     GERD (gastroesophageal reflux disease)     Glaucoma     Suspected by eye specialist . no eye meds    Hypertension     Macular edema     Cm    Migraines     Morbid obesity due to excess calories ([address]     Neuropathy     PTSD (post-traumatic stress disorder)     Splenomegaly 2020        Pt presents to the [facility] care for bladder spasms  Pt states the symptoms have progressed over the past few days. no flank pain. Denies any vaginal discharge, vaginal bleeding, vomiting, diarrhea, or lethargy. Patient's last menstrual period was 2021 (approximate). ROS    Unless otherwise stated in this report or unable to obtain because of the patient's clinical or mental status as evidenced by the medical record, this patients's positive and negative responses for Review of Systems, constitutional, psych, eyes, ENT, cardiovascular, respiratory, gastrointestinal, neurological, genitourinary, musculoskeletal, integument systems and systems related to the presenting problem are either stated in the preceding or were not pertinent or were negative for the symptoms and/or complaints related to the medical problem.     Past Surgical history:   Past Surgical History:   Procedure Laterality Date    ANTERIOR CRUCIATE LIGAMENT REPAIR Right      SECTION      ,     CHOLECYSTECTOMY      DILATION AND CURETTAGE OF UTERUS N/A 2019    DILATATION AND CURETTAGE HYSTEROSCOPY WITH REMOVAL OF CONDYLOMATA performed by Mitul Paez MD at [address] [place] [address] N/A 02/25/2020    DILATATION AND CURETTAGE HYSTEROSCOPY, ATTEMPTED CERVICAL BIOPSY, MARIYA OF  LABIAL ABSCESS performed by Chaitanya Muro MD at [address] (CERVIX STATUS UNKNOWN)  02/22/2021    Robotic lysis of adhesions, total hysterectomy, bilateral salpingectomy, cystoscopy    INCONTINENCE SURGERY      OTHER SURGICAL HISTORY  1997    repair anal fissure    PAIN MANAGEMENT PROCEDURE N/A 4/12/2022    CAUDAL EPIDURAL STEROID INJECTION  #1 performed by Lou Edwards DO at [address] N/A 6/23/2022    CAUDAL EPIDURAL STEROID INJECTION #2 performed by Lou Edwards DO at [address] N/A 10/13/2022    CAUDAL EPIDURAL STEROID INJECTION  #3 performed by Lou Edwards DO at [address] N/A 12/8/2022    CAUDAL EPIDURAL STEROID INJECTION performed by Lou Edwards DO at [address] History:  reports that she has been smoking cigarettes. She started smoking about 27 years ago. She has a 40.00 pack-year smoking history. She has never used smokeless tobacco. She reports that she does not currently use alcohol. She reports that she does not currently use drugs after having used the following drugs: Marijuana Storey Fogo). Family History: family history includes Breast Cancer in her maternal aunt; Cancer in her mother and sister; Diabetes in her father, mother, and sister; High Blood Pressure in her brother. Allergies: Keflex [cephalexin], Prednisone, Cymbalta [duloxetine hcl], Lamictal [lamotrigine], and Neurontin [gabapentin]    Physical Exam         VS:  BP (!) 134/94 (Site: Right Upper Arm)   Pulse (!) 131   Temp 97.9 °F (36.6 °C)   Resp 16   Ht 5' 3\" (1.6 m)   Wt (!) 309 lb (140.2 kg)   LMP 02/22/2021 (Approximate)   SpO2 99%   BMI 54.74 kg/m²    Oxygen Saturation Interpretation: Normal.    Constitutional:  Alert, development consistent with age.   HEENT: Atraumatic. Airway patent. PERRL. Neck:  Normal ROM. Supple. Lungs:  Clear to auscultation and breath sounds equal.  Heart:  Regular rate and rhythm, normal heart sounds, without pathological murmurs, ectopy, gallops, or rubs. Abdomen: Soft, mild suprapubic tenderness without rebound or guarding. No organomegaly. Back: No CVA tenderness. Skin:  Normal turgor. Warm, dry, without visible rash, unless noted elsewhere. Neurological:  Alert and oriented. Motor functions intact. Responds to verbal commands. Lab / Imaging Results   (All laboratory and radiology results have been personally reviewed by myself)  Labs:  Results for orders placed or performed in visit on 12/28/22   POCT Urinalysis no Micro   Result Value Ref Range    Color, UA yellow     Clarity, UA clear     Glucose, UA POC 250mg /dl     Bilirubin, UA negative     Ketones, UA negative     Spec Grav, UA >=1.030     Blood, UA POC trace     pH, UA 5.5     Protein, UA POC trace     Urobilinogen, UA 0.2     Leukocytes, UA trace     Nitrite, UA negative        Imaging: All Radiology results interpreted by Radiologist unless otherwise noted. No orders to display       Medical Decision Making:    Patient is well appearing, non toxic and appropriate for outpatient management. Plan is for symptom management and PCP follow up. Assessment / Plan     Impression(s):  1. Urinary tract infection in female    2. Bladder spasms      Disposition:  Disposition: Start Macrobid today, Pyridium as needed. Increase water intake, do not hold urine.  Urine culture to lab  Return to walk in care as needed

## 2022-12-29 DIAGNOSIS — J45.20 MILD INTERMITTENT ASTHMA WITHOUT COMPLICATION: ICD-10-CM

## 2022-12-29 RX ORDER — FLUTICASONE PROPIONATE 44 MCG
AEROSOL WITH ADAPTER (GRAM) INHALATION
Qty: 10.6 G | Refills: 3 | Status: SHIPPED | OUTPATIENT
Start: 2022-12-29

## 2022-12-30 LAB — URINE CULTURE, ROUTINE: NORMAL

## 2023-01-16 ENCOUNTER — HOSPITAL ENCOUNTER (EMERGENCY)
Age: 38
Discharge: HOME | End: 2023-01-16
Attending: EMERGENCY MEDICINE
Payer: COMMERCIAL

## 2023-01-16 ENCOUNTER — APPOINTMENT (OUTPATIENT)
Dept: CT IMAGING | Age: 38
End: 2023-01-16
Payer: COMMERCIAL

## 2023-01-16 VITALS
HEIGHT: 63 IN | SYSTOLIC BLOOD PRESSURE: 162 MMHG | BODY MASS INDEX: 51.91 KG/M2 | DIASTOLIC BLOOD PRESSURE: 100 MMHG | HEART RATE: 94 BPM | TEMPERATURE: 97.9 F | RESPIRATION RATE: 16 BRPM | OXYGEN SATURATION: 96 % | WEIGHT: 293 LBS

## 2023-01-16 DIAGNOSIS — M79.89 LEG SWELLING: ICD-10-CM

## 2023-01-16 DIAGNOSIS — K76.9 LIVER LESION: Primary | ICD-10-CM

## 2023-01-16 LAB
ALBUMIN SERPL-MCNC: 3.9 G/DL (ref 3.5–5.2)
ALP BLD-CCNC: 97 U/L (ref 35–104)
ALT SERPL-CCNC: 18 U/L (ref 0–32)
ANION GAP SERPL CALCULATED.3IONS-SCNC: 13 MMOL/L (ref 7–16)
AST SERPL-CCNC: 14 U/L (ref 0–31)
BASOPHILS ABSOLUTE: 0.06 E9/L (ref 0–0.2)
BASOPHILS RELATIVE PERCENT: 0.6 % (ref 0–2)
BILIRUB SERPL-MCNC: <0.2 MG/DL (ref 0–1.2)
BUN BLDV-MCNC: 11 MG/DL (ref 6–20)
CALCIUM SERPL-MCNC: 9 MG/DL (ref 8.6–10.2)
CHLORIDE BLD-SCNC: 102 MMOL/L (ref 98–107)
CO2: 24 MMOL/L (ref 22–29)
CREAT SERPL-MCNC: 0.9 MG/DL (ref 0.5–1)
D DIMER: 326 NG/ML DDU
EOSINOPHILS ABSOLUTE: 0.56 E9/L (ref 0.05–0.5)
EOSINOPHILS RELATIVE PERCENT: 5.3 % (ref 0–6)
GFR SERPL CREATININE-BSD FRML MDRD: >60 ML/MIN/1.73
GLUCOSE BLD-MCNC: 129 MG/DL (ref 74–99)
HCT VFR BLD CALC: 40.2 % (ref 34–48)
HEMOGLOBIN: 13.6 G/DL (ref 11.5–15.5)
IMMATURE GRANULOCYTES #: 0.06 E9/L
IMMATURE GRANULOCYTES %: 0.6 % (ref 0–5)
LYMPHOCYTES ABSOLUTE: 3.54 E9/L (ref 1.5–4)
LYMPHOCYTES RELATIVE PERCENT: 33.3 % (ref 20–42)
MCH RBC QN AUTO: 30.5 PG (ref 26–35)
MCHC RBC AUTO-ENTMCNC: 33.8 % (ref 32–34.5)
MCV RBC AUTO: 90.1 FL (ref 80–99.9)
MONOCYTES ABSOLUTE: 0.65 E9/L (ref 0.1–0.95)
MONOCYTES RELATIVE PERCENT: 6.1 % (ref 2–12)
NEUTROPHILS ABSOLUTE: 5.75 E9/L (ref 1.8–7.3)
NEUTROPHILS RELATIVE PERCENT: 54.1 % (ref 43–80)
PDW BLD-RTO: 13.3 FL (ref 11.5–15)
PLATELET # BLD: 210 E9/L (ref 130–450)
PMV BLD AUTO: 10.9 FL (ref 7–12)
POTASSIUM SERPL-SCNC: 4.3 MMOL/L (ref 3.5–5)
RBC # BLD: 4.46 E12/L (ref 3.5–5.5)
SODIUM BLD-SCNC: 139 MMOL/L (ref 132–146)
TOTAL PROTEIN: 6.8 G/DL (ref 6.4–8.3)
TROPONIN, HIGH SENSITIVITY: <6 NG/L (ref 0–9)
WBC # BLD: 10.6 E9/L (ref 4.5–11.5)

## 2023-01-16 PROCEDURE — 99285 EMERGENCY DEPT VISIT HI MDM: CPT

## 2023-01-16 PROCEDURE — 96372 THER/PROPH/DIAG INJ SC/IM: CPT

## 2023-01-16 PROCEDURE — 6360000002 HC RX W HCPCS: Performed by: EMERGENCY MEDICINE

## 2023-01-16 PROCEDURE — 85025 COMPLETE CBC W/AUTO DIFF WBC: CPT

## 2023-01-16 PROCEDURE — 93005 ELECTROCARDIOGRAM TRACING: CPT | Performed by: NURSE PRACTITIONER

## 2023-01-16 PROCEDURE — 6360000004 HC RX CONTRAST MEDICATION: Performed by: RADIOLOGY

## 2023-01-16 PROCEDURE — 85378 FIBRIN DEGRADE SEMIQUANT: CPT

## 2023-01-16 PROCEDURE — 84484 ASSAY OF TROPONIN QUANT: CPT

## 2023-01-16 PROCEDURE — 36415 COLL VENOUS BLD VENIPUNCTURE: CPT

## 2023-01-16 PROCEDURE — 71275 CT ANGIOGRAPHY CHEST: CPT

## 2023-01-16 PROCEDURE — 80053 COMPREHEN METABOLIC PANEL: CPT

## 2023-01-16 RX ORDER — ONDANSETRON 2 MG/ML
4 INJECTION INTRAMUSCULAR; INTRAVENOUS ONCE
Status: COMPLETED | OUTPATIENT
Start: 2023-01-16 | End: 2023-01-16

## 2023-01-16 RX ORDER — ENOXAPARIN SODIUM 100 MG/ML
1 INJECTION SUBCUTANEOUS ONCE
Status: COMPLETED | OUTPATIENT
Start: 2023-01-16 | End: 2023-01-16

## 2023-01-16 RX ADMIN — ENOXAPARIN SODIUM 140 MG: 100 INJECTION SUBCUTANEOUS at 23:17

## 2023-01-16 RX ADMIN — ONDANSETRON 4 MG: 2 INJECTION INTRAMUSCULAR; INTRAVENOUS at 21:25

## 2023-01-16 RX ADMIN — IOPAMIDOL 75 ML: 755 INJECTION, SOLUTION INTRAVENOUS at 21:42

## 2023-01-16 ASSESSMENT — ENCOUNTER SYMPTOMS
COUGH: 0
ABDOMINAL PAIN: 0
BACK PAIN: 0
SHORTNESS OF BREATH: 0

## 2023-01-16 ASSESSMENT — PAIN SCALES - GENERAL
PAINLEVEL_OUTOF10: 10
PAINLEVEL_OUTOF10: 2

## 2023-01-16 ASSESSMENT — PAIN DESCRIPTION - LOCATION
LOCATION: LEG
LOCATION: LEG

## 2023-01-16 ASSESSMENT — PAIN DESCRIPTION - DESCRIPTORS: DESCRIPTORS: BURNING;DISCOMFORT;SHOOTING

## 2023-01-16 ASSESSMENT — PAIN - FUNCTIONAL ASSESSMENT: PAIN_FUNCTIONAL_ASSESSMENT: 0-10

## 2023-01-16 ASSESSMENT — PAIN DESCRIPTION - ORIENTATION: ORIENTATION: RIGHT

## 2023-01-17 ENCOUNTER — HOSPITAL ENCOUNTER (OUTPATIENT)
Dept: ULTRASOUND IMAGING | Age: 38
Discharge: HOME | End: 2023-01-19
Payer: COMMERCIAL

## 2023-01-17 ENCOUNTER — HOSPITAL ENCOUNTER (OUTPATIENT)
Age: 38
Discharge: HOME | End: 2023-01-19
Payer: COMMERCIAL

## 2023-01-17 DIAGNOSIS — M79.89 LEG SWELLING: ICD-10-CM

## 2023-01-17 LAB
EKG ATRIAL RATE: 124 BPM
EKG P AXIS: 45 DEGREES
EKG P-R INTERVAL: 142 MS
EKG Q-T INTERVAL: 304 MS
EKG QRS DURATION: 68 MS
EKG QTC CALCULATION (BAZETT): 436 MS
EKG R AXIS: 16 DEGREES
EKG T AXIS: 26 DEGREES
EKG VENTRICULAR RATE: 124 BPM

## 2023-01-17 PROCEDURE — 93971 EXTREMITY STUDY: CPT

## 2023-01-17 NOTE — ED NOTES
Name: Lupis Ayala  : 1985  MRN: [id number]    Date: 2023    Benefits of immediately proceeding with Radiology exam outweigh the risks and therefore the following is being waived:      [x] Pregnancy test    [] Protocol for Iodine allergy    [] MRI questionnaire    [] BUN/Creatinine        DO Angelica Girard DO  [id number]

## 2023-01-17 NOTE — ED NOTES
Department of Emergency Medicine  FIRST PROVIDER TRIAGE NOTE             Independent MLP           1/16/23  7:35 PM EST    Date of Encounter: 1/16/23   MRN: [id number]      HPI: Filipe Brown is a 44 y.o. female who presents to the ED for Leg Pain (Right calf pain and burning; swelling )   Patient also states that  she is having chest pain and shortness of breath     ROS: Negative for abd pain or back pain. PE: Gen Appearance/Constitutional: alert  HEENT: NC/NT. PERRLA,  Airway patent. Initial Plan of Care: All treatment areas with department are currently occupied. Plan to order/Initiate the following while awaiting opening in ED: labs, EKG, and imaging studies.   Initiate Treatment-Testing, Proceed toTreatment Area When Bed Available for ED Attending/MLP to Continue Care    Electronically signed by VELMA Smith CNP   DD: 1/16/23       VELMA Putnam CNP  01/16/23 1936

## 2023-01-17 NOTE — ED PROVIDER NOTES
This is a 74-year-old female with a past medical history of hypertension bipolar disorder as well as sleep apnea and COPD who presents to the ED for evaluation of leg pain. Patient states for the past 2 days has been feeling a tightness in her right lower leg. Patient also states that he has developed some left sided chest tightness which she describes as a muscle spasm. Patient remarks that she has no shortness of breath. She states that her leg feels like a cramping like sensation. Patient has no diarrhea or constipation. Patient denies any recent travel recent surgeries or history of any type of blood clots. The history is provided by the patient. Review of Systems   Constitutional:  Negative for fever. HENT:  Negative for congestion. Eyes:  Negative for visual disturbance. Respiratory:  Negative for cough and shortness of breath. Cardiovascular:  Positive for chest pain and leg swelling. Gastrointestinal:  Negative for abdominal pain. Endocrine: Negative for polyuria. Genitourinary:  Negative for dysuria. Musculoskeletal:  Negative for back pain. Skin:  Negative for rash. Allergic/Immunologic: Negative for immunocompromised state. Neurological:  Negative for headaches. Hematological:  Does not bruise/bleed easily. Physical Exam  Vitals and nursing note reviewed. Constitutional:       General: She is not in acute distress. Appearance: She is well-developed. She is obese. HENT:      Head: Normocephalic and atraumatic. Eyes:      Extraocular Movements: Extraocular movements intact. Pupils: Pupils are equal, round, and reactive to light. Neck:      Vascular: No JVD. Cardiovascular:      Rate and Rhythm: Regular rhythm. Tachycardia present. Pulmonary:      Effort: Pulmonary effort is normal.      Comments: Diminished breath sounds bilaterally  Abdominal:      General: There is no distension. Palpations: Abdomen is soft.    Musculoskeletal: Cervical back: Normal range of motion and neck supple. Right lower leg: No edema. Left lower leg: No edema. Comments: Pulses to DP bilateraly and symmetrical, good color, brisk capillary refill, no obvious edema, no open wounds, compressible and soft   Skin:     General: Skin is warm and dry. Capillary Refill: Capillary refill takes less than 2 seconds. Neurological:      General: No focal deficit present. Mental Status: She is alert and oriented to person, place, and time. Cranial Nerves: No cranial nerve deficit. Psychiatric:         Mood and Affect: Mood normal.         Behavior: Behavior normal.        Procedures     MDM  Number of Diagnoses or Management Options  Leg swelling  Liver lesion  Diagnosis management comments: This is a 65-year-old female who presented to the ED for evaluation of right cramping and swelling of her leg who stated that she was having some muscle spasms of her chest as well. Patient had a broad differential which was listed below however is not limited to such patient was tachycardic although had no chest pain here in the department. Patient notes that her heart rate runs in the 100s at her baseline. Patient no intervention her heart rate dropped into the 90s. CTA showed no signs of obvious pulmonary emboli I did discuss other findings on imaging.   Patient felt well had no significant findings on physical exam was neurovascular intact I cannot rule out a DVT at this time because our facility does not have ultrasound 24/7 and she was here after hours we agreed on one-time dose of Lovenox and I did write her prescription for an ultrasound to be performed in the morning we discussed the importance of follow-up with this she was agreeable and given return precautions                 Differential diagnosis: PE, DVT, Pneumonia, Cellulitis, Arterial occlusion, MSK strain  Review of ED/ Outpatient Records: Previous Pulmonology Notes   Historians that case was discussed with: none  My EKG interpretation:   EKG: This EKG is signed and interpreted by me. Rate: 124  Rhythm: Sinus Tachycardia, left atrial enlargement, Qtc at 436, no st elevation, no st depressions  Interpretation: non-specific EKG  Comparison: was normal   Imaging interpretation by myself: None  Independent Interpretation of labs: Elevated d-dimer  Discussed with other providers: None  Tests Considered but not ordered: US, not available at this time  Decision making tools/risks stratification: None  Disposition decision making/shared decision making: Shared  Chronic Conditions affecting care: COPD, Bipolar  Social Determinants of health impacting treatment or disposition: None  CODE status and Discussions: Full  ED Course as of 23 0133      2247 Rechecked patient, HR in the mid 90's does go up while talking to me in the mid 100's [BP]      ED Course User Index  [BP] Lucía Moffett DO             ED Course as of 23 0133      2247 Rechecked patient, HR in the mid 90's does go up while talking to me in the mid 100's [BP]      ED Course User Index  [BP] Lucía Moffett DO       --------------------------------------------- PAST HISTORY ---------------------------------------------  Past Medical History:  has a past medical history of Anxiety, Arthritis, Asthma, Bipolar 1 disorder ([facility] [address] COPD (chronic obstructive pulmonary disease) ([facility] [address] Depression, Fibromyalgia, GERD (gastroesophageal reflux disease), Glaucoma, Hypertension, Macular edema, Migraines, Morbid obesity due to excess calories ([facility] [address] Neuropathy, PTSD (post-traumatic stress disorder), and Splenomegaly. Past Surgical History:  has a past surgical history that includes Cholecystectomy; Tubal ligation; Anterior cruciate ligament repair (Right); Dilation and curettage of uterus (N/A, 2019); other surgical history (); Dilation and curettage of uterus (N/A, 2020);   section; Dilation and curettage of uterus; Hysterectomy (02/22/2021); Incontinence surgery; Pain management procedure (N/A, 4/12/2022); Pain management procedure (N/A, 6/23/2022); Pain management procedure (N/A, 10/13/2022); and Pain management procedure (N/A, 12/8/2022). Social History:  reports that she has been smoking cigarettes. She started smoking about 27 years ago. She has a 40.00 pack-year smoking history. She has never used smokeless tobacco. She reports that she does not currently use alcohol. She reports that she does not currently use drugs after having used the following drugs: Marijuana Shannan Dasen). Family History: family history includes Breast Cancer in her maternal aunt; Cancer in her mother and sister; Diabetes in her father, mother, and sister; High Blood Pressure in her brother. The patients home medications have been reviewed.     Allergies: Keflex [cephalexin], Prednisone, Cymbalta [duloxetine hcl], Lamictal [lamotrigine], and Neurontin [gabapentin]    -------------------------------------------------- RESULTS -------------------------------------------------  Labs:  Results for orders placed or performed during the hospital encounter of 01/16/23   CBC with Auto Differential   Result Value Ref Range    WBC 10.6 4.5 - 11.5 E9/L    RBC 4.46 3.50 - 5.50 E12/L    Hemoglobin 13.6 11.5 - 15.5 g/dL    Hematocrit 40.2 34.0 - 48.0 %    MCV 90.1 80.0 - 99.9 fL    MCH 30.5 26.0 - 35.0 pg    MCHC 33.8 32.0 - 34.5 %    RDW 13.3 11.5 - 15.0 fL    Platelets 580 996 - 105 E9/L    MPV 10.9 7.0 - 12.0 fL    Neutrophils % 54.1 43.0 - 80.0 %    Immature Granulocytes % 0.6 0.0 - 5.0 %    Lymphocytes % 33.3 20.0 - 42.0 %    Monocytes % 6.1 2.0 - 12.0 %    Eosinophils % 5.3 0.0 - 6.0 %    Basophils % 0.6 0.0 - 2.0 %    Neutrophils Absolute 5.75 1.80 - 7.30 E9/L    Immature Granulocytes # 0.06 E9/L    Lymphocytes Absolute 3.54 1.50 - 4.00 E9/L    Monocytes Absolute 0.65 0.10 - 0.95 E9/L    Eosinophils Absolute 0.56 (H) 0.05 - 0.50 E9/L    Basophils Absolute 0.06 0.00 - 0.20 E9/L   Comprehensive Metabolic Panel   Result Value Ref Range    Sodium 139 132 - 146 mmol/L    Potassium 4.3 3.5 - 5.0 mmol/L    Chloride 102 98 - 107 mmol/L    CO2 24 22 - 29 mmol/L    Anion Gap 13 7 - 16 mmol/L    Glucose 129 (H) 74 - 99 mg/dL    BUN 11 6 - 20 mg/dL    Creatinine 0.9 0.5 - 1.0 mg/dL    Est, Glom Filt Rate >60 >=60 mL/min/1.73    Calcium 9.0 8.6 - 10.2 mg/dL    Total Protein 6.8 6.4 - 8.3 g/dL    Albumin 3.9 3.5 - 5.2 g/dL    Total Bilirubin <0.2 0.0 - 1.2 mg/dL    Alkaline Phosphatase 97 35 - 104 U/L    ALT 18 0 - 32 U/L    AST 14 0 - 31 U/L   Troponin   Result Value Ref Range    Troponin, High Sensitivity <6 0 - 9 ng/L   D-Dimer, Quantitative   Result Value Ref Range    D-Dimer, Quant 326 ng/mL DDU   EKG 12 Lead   Result Value Ref Range    Ventricular Rate 124 BPM    Atrial Rate 124 BPM    P-R Interval 142 ms    QRS Duration 68 ms    Q-T Interval 304 ms    QTc Calculation (Bazett) 436 ms    P Axis 45 degrees    R Axis 16 degrees    T Axis 26 degrees       Radiology:  CTA PULMONARY W CONTRAST   Final Result   1. Limited pulmonary angiogram.  Within this limitation, no pulmonary   embolism is seen. 2.  No acute cardiopulmonary abnormality. 3. Evidence of prior granulomatous infection. 4. 1.2 cm focus of enhancement in the left lobe of the liver, which in the   absence of known malignancy, likely represents a cavernous hemangioma or   focal nodular hyperplasia. RECOMMENDATIONS:   Unavailable         US DUP LOWER EXTREMITY RIGHT MATEO    (Results Pending)       ------------------------- NURSING NOTES AND VITALS REVIEWED ---------------------------  Date / Time Roomed:  1/16/2023  7:46 PM  ED Bed Assignment:  11/11    The nursing notes within the ED encounter and vital signs as below have been reviewed.    BP (!) 162/100   Pulse 94   Temp 97.9 °F (36.6 °C)   Resp 16   Ht 5' 3\" (1.6 m)   Wt (!) 310 lb (140.6 kg)   LMP 02/22/2021 (Approximate)   SpO2 96%   BMI 54.91 kg/m²   Oxygen Saturation Interpretation: Normal      ------------------------------------------ PROGRESS NOTES ------------------------------------------  8:14 PM EST  I have spoken with the patient and discussed todays results, in addition to providing specific details for the plan of care and counseling regarding the diagnosis and prognosis. Their questions are answered at this time and they are agreeable with the plan. I discussed at length with them reasons for immediate return here for re evaluation. They will followup with their PCP      --------------------------------- ADDITIONAL PROVIDER NOTES ---------------------------------  At this time the patient is without objective evidence of an acute process requiring hospitalization or inpatient management. They have remained hemodynamically stable throughout their entire ED visit and are stable for discharge with outpatient follow-up. The plan has been discussed in detail and they are aware of the specific conditions for emergent return, as well as the importance of follow-up. Discharge Medication List as of 1/16/2023 10:42 PM          Diagnosis:  1. Liver lesion    2. Leg swelling        Disposition:  Patient's disposition: Discharge to home  Patient's condition is stable.         Chio Maxwell DO  01/17/23 8428

## 2023-01-18 ENCOUNTER — OFFICE VISIT (OUTPATIENT)
Dept: FAMILY MEDICINE CLINIC | Age: 38
End: 2023-01-18
Payer: COMMERCIAL

## 2023-01-18 ENCOUNTER — TELEPHONE (OUTPATIENT)
Dept: BARIATRICS/WEIGHT MGMT | Age: 38
End: 2023-01-18

## 2023-01-18 VITALS
HEART RATE: 125 BPM | OXYGEN SATURATION: 98 % | RESPIRATION RATE: 19 BRPM | DIASTOLIC BLOOD PRESSURE: 100 MMHG | WEIGHT: 293 LBS | BODY MASS INDEX: 51.91 KG/M2 | HEIGHT: 63 IN | TEMPERATURE: 97.5 F | SYSTOLIC BLOOD PRESSURE: 156 MMHG

## 2023-01-18 DIAGNOSIS — I10 PRIMARY HYPERTENSION: Primary | ICD-10-CM

## 2023-01-18 DIAGNOSIS — E66.01 CLASS 3 SEVERE OBESITY DUE TO EXCESS CALORIES WITHOUT SERIOUS COMORBIDITY WITH BODY MASS INDEX (BMI) OF 50.0 TO 59.9 IN ADULT (HCC): ICD-10-CM

## 2023-01-18 PROCEDURE — 3078F DIAST BP <80 MM HG: CPT | Performed by: FAMILY MEDICINE

## 2023-01-18 PROCEDURE — G8482 FLU IMMUNIZE ORDER/ADMIN: HCPCS | Performed by: FAMILY MEDICINE

## 2023-01-18 PROCEDURE — 99214 OFFICE O/P EST MOD 30 MIN: CPT | Performed by: FAMILY MEDICINE

## 2023-01-18 PROCEDURE — 4004F PT TOBACCO SCREEN RCVD TLK: CPT | Performed by: FAMILY MEDICINE

## 2023-01-18 PROCEDURE — G8427 DOCREV CUR MEDS BY ELIG CLIN: HCPCS | Performed by: FAMILY MEDICINE

## 2023-01-18 PROCEDURE — 3074F SYST BP LT 130 MM HG: CPT | Performed by: FAMILY MEDICINE

## 2023-01-18 PROCEDURE — G8417 CALC BMI ABV UP PARAM F/U: HCPCS | Performed by: FAMILY MEDICINE

## 2023-01-18 RX ORDER — AMLODIPINE BESYLATE 5 MG/1
5 TABLET ORAL DAILY
Qty: 30 TABLET | Refills: 3 | Status: SHIPPED | OUTPATIENT
Start: 2023-01-18

## 2023-01-18 ASSESSMENT — PATIENT HEALTH QUESTIONNAIRE - PHQ9
SUM OF ALL RESPONSES TO PHQ9 QUESTIONS 1 & 2: 4
SUM OF ALL RESPONSES TO PHQ QUESTIONS 1-9: 13
7. TROUBLE CONCENTRATING ON THINGS, SUCH AS READING THE NEWSPAPER OR WATCHING TELEVISION: 3
9. THOUGHTS THAT YOU WOULD BE BETTER OFF DEAD, OR OF HURTING YOURSELF: 0
1. LITTLE INTEREST OR PLEASURE IN DOING THINGS: 3
8. MOVING OR SPEAKING SO SLOWLY THAT OTHER PEOPLE COULD HAVE NOTICED. OR THE OPPOSITE, BEING SO FIGETY OR RESTLESS THAT YOU HAVE BEEN MOVING AROUND A LOT MORE THAN USUAL: 0
SUM OF ALL RESPONSES TO PHQ QUESTIONS 1-9: 13
2. FEELING DOWN, DEPRESSED OR HOPELESS: 1
SUM OF ALL RESPONSES TO PHQ QUESTIONS 1-9: 13
SUM OF ALL RESPONSES TO PHQ QUESTIONS 1-9: 13
6. FEELING BAD ABOUT YOURSELF - OR THAT YOU ARE A FAILURE OR HAVE LET YOURSELF OR YOUR FAMILY DOWN: 0
5. POOR APPETITE OR OVEREATING: 0
3. TROUBLE FALLING OR STAYING ASLEEP: 3
10. IF YOU CHECKED OFF ANY PROBLEMS, HOW DIFFICULT HAVE THESE PROBLEMS MADE IT FOR YOU TO DO YOUR WORK, TAKE CARE OF THINGS AT HOME, OR GET ALONG WITH OTHER PEOPLE: 2
4. FEELING TIRED OR HAVING LITTLE ENERGY: 3

## 2023-01-18 NOTE — PROGRESS NOTES
2023    Ravinfatoumata Miranda    Chief Complaint   Patient presents with    Spasms    Follow-up     Pt was seen in Ed 2022       HPI  History was obtained from patient. Marlena Melo is a 44 y.o. female who presents today with the followin. Primary hypertension    2. Class 3 severe obesity due to excess calories without serious comorbidity with body mass index (BMI) of 50.0 to 59.9 in adult [facility])    Patient is here for follow-up of hypertension and ER visit. Patient had edema in her right leg and was seen in emergency room on . No acute findings were found and ultrasound was done the following day of the lower extremities which showed no DVT. Patient has some spasms in her right leg but otherwise has no complaints today. Patient is taking her benazepril as prescribed 2 months ago. Her blood pressure continues to be elevated. REVIEW OF SYMPTOMS    Review of Systems   Constitutional:  Negative for chills, fatigue and fever. HENT:  Negative for congestion, mouth sores, postnasal drip, rhinorrhea and sinus pain. Eyes:  Negative for photophobia, discharge and redness. Respiratory:  Negative for cough and shortness of breath. Cardiovascular:  Negative for chest pain. Genitourinary:  Negative for difficulty urinating, dysuria, hematuria and urgency. Neurological:  Negative for headaches. Psychiatric/Behavioral:  Negative for sleep disturbance.       PAST MEDICAL HISTORY  Past Medical History:   Diagnosis Date    Anxiety     Arthritis     Asthma     well controlled, on inhalers    Bipolar 1 disorder ([address]     COPD (chronic obstructive pulmonary disease) ([address]     Depression     Fibromyalgia     GERD (gastroesophageal reflux disease)     Glaucoma     Suspected by eye specialist . no eye meds    Hypertension     Macular edema     Cm    Migraines     Morbid obesity due to excess calories (HCC)     Neuropathy     PTSD (post-traumatic stress disorder)     Splenomegaly 2020 FAMILY HISTORY  Family History   Problem Relation Age of Onset    Diabetes Mother     Cancer Mother         ovarian    Diabetes Father     Cancer Sister         NHL    Diabetes Sister     High Blood Pressure Brother     Breast Cancer Maternal Aunt     Uterine Cancer Neg Hx     Colon Cancer Neg Hx        SOCIAL HISTORY  Social History     Socioeconomic History    Marital status:      Spouse name: Abdulaziz Vivar    Number of children: 2    Years of education: 13    Highest education level: None   Occupational History    Occupation: disability-, LPN   Tobacco Use    Smoking status: Every Day     Packs/day: 2.00     Years: 20.00     Pack years: 40.00     Types: Cigarettes     Start date: 11/3/1995    Smokeless tobacco: Never   Vaping Use    Vaping Use: Former    Substances: Never   Substance and Sexual Activity    Alcohol use: Not Currently    Drug use: Not Currently     Types: Marijuana Juanetta Buckley)     Comment: medical marijuana last used 3/2021     Social Determinants of Health     Financial Resource Strain: Low Risk     Difficulty of Paying Living Expenses: Not hard at all   Food Insecurity: No Food Insecurity    Worried About Running Out of Food in the Last Year: Never true    Ran Out of Food in the Last Year: Never true   Transportation Needs: No Transportation Needs    Lack of Transportation (Medical): No    Lack of Transportation (Non-Medical): No   Physical Activity: Sufficiently Active    Days of Exercise per Week: 7 days    Minutes of Exercise per Session: 60 min   Stress: No Stress Concern Present    Feeling of Stress : Only a little   Social Connections:  Moderately Integrated    Frequency of Communication with Friends and Family: More than three times a week    Frequency of Social Gatherings with Friends and Family: More than three times a week    Attends Protestant Services: More than 4 times per year    Active Member of [email or web address] Group or Organizations: No    Attends Club or Organization Meetings: Never Marital Status:    Intimate Partner Violence: Not At Risk    Fear of Current or Ex-Partner: No    Emotionally Abused: No    Physically Abused: No    Sexually Abused: No   Housing Stability: Low Risk     Unable to Pay for Housing in the Last Year: No    Number of Places Lived in the Last Year: 1    Unstable Housing in the Last Year: No        SURGICAL HISTORY  Past Surgical History:   Procedure Laterality Date    ANTERIOR CRUCIATE LIGAMENT REPAIR Right      SECTION      ,     [address] N/A 2019    DILATATION AND CURETTAGE HYSTEROSCOPY WITH REMOVAL OF CONDYLOMATA performed by Aleena Maldonado MD at [address] [facility] N/A 2020    DILATATION AND CURETTAGE HYSTEROSCOPY, ATTEMPTED CERVICAL BIOPSY, MARIYA OF  LABIAL ABSCESS performed by Aleena Maldonado MD at [address] [facility] (CERVIX STATUS UNKNOWN)  2021    Robotic lysis of adhesions, total hysterectomy, bilateral salpingectomy, cystoscopy    INCONTINENCE SURGERY      OTHER SURGICAL HISTORY      repair anal fissure    PAIN MANAGEMENT PROCEDURE N/A 2022    CAUDAL EPIDURAL STEROID INJECTION  #1 performed by Delphine Lopez DO at [address] N/A 2022    CAUDAL EPIDURAL STEROID INJECTION #2 performed by Delphine Lopez DO at [address] N/A 10/13/2022    CAUDAL EPIDURAL STEROID INJECTION  #3 performed by Delphine Lopez DO at [address] N/A 2022    CAUDAL EPIDURAL STEROID INJECTION performed by Delphine Lopez DO at [facility]  Current Outpatient Medications   Medication Sig Dispense Refill    amLODIPine (NORVASC) 5 MG tablet Take 1 tablet by mouth daily 30 tablet 3    FLOVENT HFA 44 MCG/ACT inhaler INHALE TWO PUFFS BY MOUTH TWICE DAILY 10.6 g 3    cyclobenzaprine (FLEXERIL) 10 MG tablet TAKE ONE TABLET BY MOUTH TWICE DAILY AS NEEDED FOR muscle SPASMS. phenazopyridine (PYRIDIUM) 100 MG tablet Take 1 tablet by mouth 3 times daily as needed for Pain 12 tablet 0    VALTREX 1 g tablet Take 2 tablets by mouth 2 times daily 4 tablet 5    docusate sodium (COLACE) 100 MG capsule Take 100 mg by mouth 2 times daily      ondansetron (ZOFRAN) 4 MG tablet Take 1 tablet by mouth 3 times daily as needed for Nausea or Vomiting 15 tablet 0    benazepril (LOTENSIN) 20 MG tablet Take 1 tablet by mouth daily 30 tablet 3    pregabalin (LYRICA) 50 MG capsule Take 1 capsule by mouth 3 times daily for 90 days. 90 capsule 2    Vibegron (GEMTESA) 75 MG TABS Take 75 mg by mouth nightly      albuterol sulfate HFA (PROVENTIL HFA) 108 (90 Base) MCG/ACT inhaler Inhale 2 puffs into the lungs every 4 hours as needed for Wheezing 1 each 5    tiotropium (SPIRIVA HANDIHALER) 18 MCG inhalation capsule Inhale 1 capsule into the lungs daily 30 capsule 5    Acetaminophen (TYLENOL) 325 MG CAPS Take 650 mg by mouth every 4 hours as needed (pain)      ibuprofen (ADVIL;MOTRIN) 800 MG tablet Take 800 mg by mouth every 6 hours as needed for Pain       No current facility-administered medications for this visit. ALLERGIES  Allergies   Allergen Reactions    Keflex [Cephalexin]      Hives, rash      Prednisone Rash    Cymbalta [Duloxetine Hcl] Other (See Comments)     angry    Lamictal [Lamotrigine] Nausea And Vomiting    Neurontin [Gabapentin] Other (See Comments)     Makes me feel very weird       PHYSICAL EXAM    BP (!) 156/100   Pulse (!) 125   Temp 97.5 °F (36.4 °C)   Resp 19   Ht 5' 3\" (1.6 m)   Wt (!) 319 lb (144.7 kg)   LMP 02/22/2021 (Approximate)   SpO2 98%   BMI 56.51 kg/m²     Physical Exam  Vitals and nursing note reviewed. Constitutional:       Appearance: Normal appearance. She is morbidly obese. HENT:      Nose: No congestion or rhinorrhea.       Mouth/Throat:      Mouth: Mucous membranes are moist.      Pharynx: Oropharynx is clear. Eyes:      Conjunctiva/sclera: Conjunctivae normal.   Cardiovascular:      Rate and Rhythm: Normal rate and regular rhythm. Heart sounds: Normal heart sounds. Pulmonary:      Effort: Pulmonary effort is normal.      Breath sounds: Normal breath sounds. Musculoskeletal:      Cervical back: Neck supple. Skin:     General: Skin is warm. Neurological:      Mental Status: She is alert and oriented to person, place, and time. Psychiatric:         Mood and Affect: Mood normal.       ASSESSMENT & PLAN    Katarina Andrews was seen today for spasms and follow-up. Diagnoses and all orders for this visit:    Primary hypertension  -     amLODIPine (NORVASC) 5 MG tablet; Take 1 tablet by mouth daily  Will continue with her benazepril 20 mg.  I have added today amlodipine 5 mg daily. Patient is to follow-up in 1 month for recheck of her blood pressure. Class 3 severe obesity due to excess calories without serious comorbidity with body mass index (BMI) of 50.0 to 59.9 in adult [facility])  -     Cinthya Hubbard MD, [address] [place] [address] [facility]  Patient is agreeable to a referral to the bariatric center for help with weight loss. Return in about 4 weeks (around 2/15/2023). Electronically signed by Ilda Becerra.  Severiano Cake, DO on 1/25/2023

## 2023-01-19 ENCOUNTER — TREATMENT (OUTPATIENT)
Dept: PHYSICAL THERAPY | Age: 38
End: 2023-01-19
Payer: COMMERCIAL

## 2023-01-19 DIAGNOSIS — M54.41 CHRONIC BILATERAL LOW BACK PAIN WITH RIGHT-SIDED SCIATICA: Primary | ICD-10-CM

## 2023-01-19 DIAGNOSIS — G89.29 CHRONIC BILATERAL LOW BACK PAIN WITH RIGHT-SIDED SCIATICA: Primary | ICD-10-CM

## 2023-01-19 PROCEDURE — 97110 THERAPEUTIC EXERCISES: CPT | Performed by: PHYSICAL THERAPIST

## 2023-01-19 NOTE — PROGRESS NOTES
[facility]          Phone: [phone] Fax: [phone]    Physical Therapy Daily Treatment Note  Date:  2023    Patient Name:  Tyree Reeves    :  1985  MRN: [id number]    Evaluating Therapist:  Iggy Bruno PT [id number]    Restrictions/Precautions:    Diagnosis:     Diagnosis Orders   1. Chronic bilateral low back pain with right-sided sciatica          Treatment Diagnosis:    Insurance/Certification information:  [facility]  Referring Physician:  Naren Sheppard DO  Plan of care signed (Y/N):    Visit# / total visits:    Pain level: 5/10   Time In:  5576  Time Out:  1030    Subjective:  Pt reports pain in buttock today. States she feels therapy has helped her mobility.     Exercises:  Exercise/Equipment Resistance/Repetitions Other comments     stepper 5 minutes      Trunk stretch with ball  Sec x 3 reps each flex and B rotation       Hamstring stretch With stool, 20 sec x 3 reps       Sciatic nerve glides X15 reps B      Hip add with ball 5 sec x 15 reps      Hip abd with tband OTB x 15 rep B      Seated hip flex With abdominal bracing 5 x 15 reps      Seated knee flex OTB x 15 reps      Rows, seated OTB x 15 reps        Standing hip ext and abd X10 reps B LE                                                                      Other Therapeutic Activities:  N/A    Home Exercise Program:  22 - modified child's pose, sciatic nerve glides, seated hip flex, seated hip add, seated hip abd (OTB provided), seated hamstring stretch    Manual Treatments:  N/A    Modalities:  IFC PRN     Time-in Time-out Total Time   11957  Evaluation Low Complexity      23268  Evaluation Med Complexity      63675  Evaluation High Complexity      73911  Ther Ex 0959 1030 31   07296  Neuro Re-ed        53165  Ther Activities        58199  Manual Therapy       91341  E-stim       85901  Ultrasound            Session 9739 [id number]       Treatment/Activity Tolerance:  [x] Patient tolerated treatment well [] Patient limited by fatigue  [] Patient limited by pain  [] Patient limited by other medical complications  [] Other:     Prognosis: [] Good [x] Fair  [] Poor    Patient Requires Follow-up: [x] Yes  [] No    Plan:   [x] Continue per plan of care [] Alter current plan (see comments)  [] Plan of care initiated [] Hold pending MD visit [] Discharge  Plan for Next Session:        Electronically signed by:  Ochoa Hager, [address] [place] [address], [id number]

## 2023-01-22 DIAGNOSIS — M54.42 LUMBAGO WITH SCIATICA, LEFT SIDE: ICD-10-CM

## 2023-01-22 DIAGNOSIS — G89.4 CHRONIC PAIN SYNDROME: ICD-10-CM

## 2023-01-24 RX ORDER — CYCLOBENZAPRINE HCL 10 MG
TABLET ORAL
Qty: 60 TABLET | Refills: 2 | OUTPATIENT
Start: 2023-01-24

## 2023-01-25 ASSESSMENT — ENCOUNTER SYMPTOMS
EYE DISCHARGE: 0
SINUS PAIN: 0
COUGH: 0
RHINORRHEA: 0
PHOTOPHOBIA: 0
SHORTNESS OF BREATH: 0
EYE REDNESS: 0

## 2023-01-27 DIAGNOSIS — G89.29 CHRONIC BILATERAL LOW BACK PAIN WITH BILATERAL SCIATICA: ICD-10-CM

## 2023-01-27 DIAGNOSIS — M54.42 CHRONIC BILATERAL LOW BACK PAIN WITH BILATERAL SCIATICA: ICD-10-CM

## 2023-01-27 DIAGNOSIS — M54.16 LUMBAR RADICULOPATHY: ICD-10-CM

## 2023-01-27 DIAGNOSIS — M54.41 CHRONIC BILATERAL LOW BACK PAIN WITH BILATERAL SCIATICA: ICD-10-CM

## 2023-01-27 DIAGNOSIS — G89.4 CHRONIC PAIN SYNDROME: ICD-10-CM

## 2023-01-27 DIAGNOSIS — M47.817 LUMBOSACRAL SPONDYLOSIS WITHOUT MYELOPATHY: ICD-10-CM

## 2023-01-27 RX ORDER — PREGABALIN 50 MG/1
CAPSULE ORAL
Qty: 90 CAPSULE | Refills: 2 | OUTPATIENT
Start: 2023-01-27

## 2023-02-01 ENCOUNTER — TREATMENT (OUTPATIENT)
Dept: PHYSICAL THERAPY | Age: 38
End: 2023-02-01
Payer: COMMERCIAL

## 2023-02-01 DIAGNOSIS — G89.29 CHRONIC BILATERAL LOW BACK PAIN WITH RIGHT-SIDED SCIATICA: Primary | ICD-10-CM

## 2023-02-01 DIAGNOSIS — M54.41 CHRONIC BILATERAL LOW BACK PAIN WITH RIGHT-SIDED SCIATICA: Primary | ICD-10-CM

## 2023-02-01 PROCEDURE — 97110 THERAPEUTIC EXERCISES: CPT | Performed by: PHYSICAL THERAPIST

## 2023-02-01 NOTE — PROGRESS NOTES
[facility]          Phone: [phone] Fax: [phone]    Physical Therapy Daily Treatment Note  Date:  2023    Patient Name:  Azar Chavez    :  1985  MRN: [id number]    Evaluating Therapist:  Lynnette Kilgore, YSABEL [id number]    Restrictions/Precautions:    Diagnosis:     Diagnosis Orders   1. Chronic bilateral low back pain with right-sided sciatica          Treatment Diagnosis:    Insurance/Certification information:  [facility]  Referring Physician:  Martínez Harrell DO  Plan of care signed (Y/N):    Visit# / total visits:    Pain level: 5/10   Time In:  1005  Time Out:  1037    Subjective:  Pt reports she's been getting numbness in her legs.     Exercises:  Exercise/Equipment Resistance/Repetitions Other comments     stepper 5 minutes      Trunk stretch with ball  Sec x 3 reps each flex and B rotation       Hamstring stretch With stool, 20 sec x 3 reps       Sciatic nerve glides X15 reps B      Hip add with ball 5 sec x 15 reps      Hip abd with tband OTB x 15 rep B      Seated hip flex With abdominal bracing 5 x 15 reps      Seated knee flex OTB x 15 reps      Rows, seated OTB x 15 reps        Standing hip ext and abd X10 reps B LE       Sit to stand From chair x 10 reps                                                              Other Therapeutic Activities:  N/A    Home Exercise Program:  22 - modified child's pose, sciatic nerve glides, seated hip flex, seated hip add, seated hip abd (OTB provided), seated hamstring stretch    Manual Treatments:  N/A    Modalities:  IFC PRN     Time-in Time-out Total Time   87622  Evaluation Low Complexity      52537  Evaluation Med Complexity      33561  Evaluation High Complexity      75325  Ther Ex 1005 1037 32   90046  Neuro Re-ed        79309  Ther Activities        41592  Manual Therapy       14175  E-stim       99264  Ultrasound            Session 4345 5117 03       Treatment/Activity Tolerance:  [x] Patient tolerated treatment well [] Patient limited by fatigue  [] Patient limited by pain  [] Patient limited by other medical complications  [] Other:     Prognosis: [] Good [x] Fair  [] Poor    Patient Requires Follow-up: [x] Yes  [] No    Plan:   [x] Continue per plan of care [] Alter current plan (see comments)  [] Plan of care initiated [] Hold pending MD visit [] Discharge  Plan for Next Session:        Electronically signed by:  Tani So, [id number]

## 2023-02-04 DIAGNOSIS — M54.42 CHRONIC BILATERAL LOW BACK PAIN WITH BILATERAL SCIATICA: ICD-10-CM

## 2023-02-04 DIAGNOSIS — G89.4 CHRONIC PAIN SYNDROME: ICD-10-CM

## 2023-02-04 DIAGNOSIS — M54.16 LUMBAR RADICULOPATHY: ICD-10-CM

## 2023-02-04 DIAGNOSIS — G89.29 CHRONIC BILATERAL LOW BACK PAIN WITH BILATERAL SCIATICA: ICD-10-CM

## 2023-02-04 DIAGNOSIS — M54.42 LUMBAGO WITH SCIATICA, LEFT SIDE: ICD-10-CM

## 2023-02-04 DIAGNOSIS — M47.817 LUMBOSACRAL SPONDYLOSIS WITHOUT MYELOPATHY: ICD-10-CM

## 2023-02-04 DIAGNOSIS — M54.41 CHRONIC BILATERAL LOW BACK PAIN WITH BILATERAL SCIATICA: ICD-10-CM

## 2023-02-06 RX ORDER — CYCLOBENZAPRINE HCL 10 MG
TABLET ORAL
Qty: 60 TABLET | Refills: 2 | OUTPATIENT
Start: 2023-02-06

## 2023-02-06 RX ORDER — PREGABALIN 50 MG/1
CAPSULE ORAL
Qty: 90 CAPSULE | Refills: 2 | OUTPATIENT
Start: 2023-02-06

## 2023-02-08 ENCOUNTER — OFFICE VISIT (OUTPATIENT)
Dept: PAIN MANAGEMENT | Age: 38
End: 2023-02-08
Payer: COMMERCIAL

## 2023-02-08 VITALS
BODY MASS INDEX: 53.92 KG/M2 | RESPIRATION RATE: 16 BRPM | TEMPERATURE: 98.2 F | DIASTOLIC BLOOD PRESSURE: 89 MMHG | HEIGHT: 62 IN | SYSTOLIC BLOOD PRESSURE: 143 MMHG | OXYGEN SATURATION: 95 % | WEIGHT: 293 LBS | HEART RATE: 117 BPM

## 2023-02-08 DIAGNOSIS — M54.41 CHRONIC BILATERAL LOW BACK PAIN WITH BILATERAL SCIATICA: ICD-10-CM

## 2023-02-08 DIAGNOSIS — M54.16 LUMBAR RADICULOPATHY: ICD-10-CM

## 2023-02-08 DIAGNOSIS — M54.42 CHRONIC BILATERAL LOW BACK PAIN WITH BILATERAL SCIATICA: ICD-10-CM

## 2023-02-08 DIAGNOSIS — G89.4 CHRONIC PAIN SYNDROME: Primary | ICD-10-CM

## 2023-02-08 DIAGNOSIS — G89.29 CHRONIC BILATERAL LOW BACK PAIN WITH BILATERAL SCIATICA: ICD-10-CM

## 2023-02-08 DIAGNOSIS — M47.817 LUMBOSACRAL SPONDYLOSIS WITHOUT MYELOPATHY: ICD-10-CM

## 2023-02-08 DIAGNOSIS — G89.4 CHRONIC PAIN SYNDROME: ICD-10-CM

## 2023-02-08 PROCEDURE — 99213 OFFICE O/P EST LOW 20 MIN: CPT | Performed by: PAIN MEDICINE

## 2023-02-08 RX ORDER — CYCLOBENZAPRINE HCL 10 MG
10 TABLET ORAL 2 TIMES DAILY PRN
Qty: 60 TABLET | Refills: 2 | Status: SHIPPED | OUTPATIENT
Start: 2023-02-08 | End: 2023-03-10

## 2023-02-08 RX ORDER — ASPIRIN 81 MG/1
81 TABLET, CHEWABLE ORAL DAILY
COMMUNITY

## 2023-02-08 RX ORDER — PREGABALIN 75 MG/1
75 CAPSULE ORAL 3 TIMES DAILY
Qty: 90 CAPSULE | Refills: 2 | Status: SHIPPED | OUTPATIENT
Start: 2023-02-08 | End: 2023-03-10

## 2023-02-08 NOTE — PROGRESS NOTES
Do you currently have any of the following:    Fever: No  Headache:  No  Cough: No  Shortness of breath: No  Exposed to anyone with these symptoms: No         Eddye Antoni presents to the [facility] on 2/8/2023. Bonnie Ervin is complaining of pain in her low back. The pain is constant. The pain is described as aching, dull, sharp, and miserable. Pain is rated on her best day at a 5, on her worst day at a 10, and on average at a 7 on the VAS scale. She took her last dose of Lyrica today. Any procedures since your last visit: Yes, with 50 % relief. Pacemaker or defibrillator: No     She is  on NSAIDS and is  on anticoagulation medications to include ASA and is managed by Mykel Ugalde. DO Pedro Pablo  .     Medication Contract and Consent for Opioid Use Documents Filed        No documents found                    BP (!) 143/89   Temp 98.2 °F (36.8 °C) (Infrared)   Resp 16   Ht 5' 2\" (1.575 m)   Wt (!) 319 lb (144.7 kg)   LMP 02/22/2021 (Approximate)   SpO2 95%   BMI 58.35 kg/m²      Patient's last menstrual period was 02/22/2021 (approximate). Yes

## 2023-02-08 NOTE — PROGRESS NOTES
Anjana's blood pressure was elevated today. She was instructed to contact his primary care provider as soon as possible. Patient stated that they just added another BP medication and are aware she is elevated.

## 2023-02-08 NOTE — PROGRESS NOTES
[facility]        [facility] [address]  [facility] [address] [facility]  Dept: [phone]        Follow up Note      Megan Casas     Date of Visit:  23     CC:  Patient presents for follow up   Chief Complaint   Patient presents with    Follow-up      #1 Therapeutic Caudal Epidural done under fluoroscopic guidance. HPI:    Pain is better. Change in quality of symptoms:no. Medication side effects:dry mouth with pregabalin. Recent diagnostic testing:none  Recent interventional procedures:caudal BOAZ with 50% relief for four weeks    She has been on anticoagulation medications to include ASA and NSAIDS and has not been on herbal supplements. She is diabetic. Imagin/2022 EDX -  Electrodiagnostic examination of both legs disclosed evidence diagnostic of left S1 motor radiculopathy or intracanalicular lesion, with acute and chronic denervation changes. This was proven with the abnormal needle testing of the paraspinals. There were no other motor radiculopathies or intracanalicular lesions. There were no peripheral neuropathies. Sensory radiculopathies cannot be evaluated by electrodiagnostic means. Electrodiagnostic study performed 1 year ago found no abnormalities. Clinically, the patient presented with back pains radiating into her left leg and foot. On brief neurological examination, I found no motor or sensory compromise. Her difficulties are related to her radicular disease. Imaging studies of lumbosacral spine were recommended, as well as physical therapy and weight loss. Clinical correlation was highly advised. 3/2022 lumbar MRI -  FINDINGS:   BONES/ALIGNMENT: There is normal alignment of the spine. The vertebral body   heights are maintained. The bone marrow signal appears unremarkable. SPINAL CORD: The conus terminates normally. SOFT TISSUES: No paraspinal mass identified.        L1-L2: There is no significant disc herniation, spinal canal stenosis or   neural foraminal narrowing. L2-L3: There is no significant disc herniation, spinal canal stenosis or   neural foraminal narrowing. L3-L4: There is no significant disc herniation, spinal canal stenosis or   neural foraminal narrowing. L4-L5: There is no significant disc herniation. Mild facet hypertrophy is   noted. No significant central canal or lateral recess stenosis. Mild neural   foraminal stenoses. L5-S1: There is no significant disc herniation. Mild facet hypertrophy is   noted. No significant central canal or lateral recess stenosis. Mild neural   foraminal stenoses. Impression   1. No fracture or bony destructive lesion. 2. Mild facet hypertrophic changes at L4-5 and L5-S1, resulting in mild   neural foraminal stenoses. 3.  No significant central canal or lateral recess stenosis. 10/2021 xray cervical and lumbar -  FINDINGS:   C-spine: No fracture or dislocation. Disc spaces are preserved. Normal soft   tissues. L-spine: Minimal degenerative endplate spurring from N2-C5. No fracture or   dislocation. Normal soft tissues. Impression   Unremarkable C-spine. Minimal degenerative endplate spurring from V7-D7.          Potential Aberrant Drug-Related Behavior:      Urine Drug Screening:    OARRS report:  10/2022 consistent    Past Medical History:   Diagnosis Date    Anxiety     Arthritis     Asthma     well controlled, on inhalers    Bipolar 1 disorder ([address]     COPD (chronic obstructive pulmonary disease) (HCC)     Depression     Fibromyalgia     GERD (gastroesophageal reflux disease)     Glaucoma     Suspected by eye specialist . no eye meds    Hypertension     Macular edema     Cm    Migraines     Morbid obesity due to excess calories ([address]     Neuropathy     PTSD (post-traumatic stress disorder)     Splenomegaly 09/24/2020       Past Surgical History:   Procedure Laterality Date    ANTERIOR CRUCIATE LIGAMENT REPAIR Right      SECTION      ,     CHOLECYSTECTOMY      DILATION AND CURETTAGE OF UTERUS N/A 2019    DILATATION AND CURETTAGE HYSTEROSCOPY WITH REMOVAL OF CONDYLOMATA performed by Christiano Tyler MD at [address] N/A 2020    DILATATION AND CURETTAGE HYSTEROSCOPY, ATTEMPTED CERVICAL BIOPSY, MARIYA OF  LABIAL ABSCESS performed by Christiano Tyler MD at [address] (CERVIX STATUS UNKNOWN)  2021    Robotic lysis of adhesions, total hysterectomy, bilateral salpingectomy, cystoscopy    INCONTINENCE SURGERY      OTHER SURGICAL HISTORY      repair anal fissure    PAIN MANAGEMENT PROCEDURE N/A 2022    CAUDAL EPIDURAL STEROID INJECTION  #1 performed by Tran Hager DO at [address] N/A 2022    CAUDAL EPIDURAL STEROID INJECTION #2 performed by Tran Hager DO at [address] N/A 10/13/2022    CAUDAL EPIDURAL STEROID INJECTION  #3 performed by Tran Hager DO at [address] N/A 2022    CAUDAL EPIDURAL STEROID INJECTION performed by Tran Hager DO at [address]         Prior to Admission medications    Medication Sig Start Date End Date Taking? Authorizing Provider   aspirin 81 MG chewable tablet Take 81 mg by mouth daily   Yes Historical Provider, MD   cyclobenzaprine (FLEXERIL) 10 MG tablet Take 1 tablet by mouth 2 times daily as needed for Muscle spasms 2/8/23 3/10/23 Yes Tran Hager DO   pregabalin (LYRICA) 75 MG capsule Take 1 capsule by mouth in the morning, at noon, and at bedtime for 30 days.  Max Daily Amount: 225 mg 2/8/23 3/10/23 Yes Tran Hager DO   amLODIPine (NORVASC) 5 MG tablet Take 1 tablet by mouth daily 23  Yes Alaina Small DO   FLOVENT HFA 44 MCG/ACT inhaler INHALE TWO PUFFS BY MOUTH TWICE DAILY 22  Yes Alaina Small DO VALTREX 1 g tablet Take 2 tablets by mouth 2 times daily 12/28/22  Yes Eros Umana, DO   docusate sodium (COLACE) 100 MG capsule Take 100 mg by mouth 2 times daily   Yes Historical Provider, MD   ondansetron (ZOFRAN) 4 MG tablet Take 1 tablet by mouth 3 times daily as needed for Nausea or Vomiting 12/3/22  Yes Eros Umana,    benazepril (LOTENSIN) 20 MG tablet Take 1 tablet by mouth daily 11/23/22  Yes Eros Umana DO   Vibegron (GEMTESA) 75 MG TABS Take 75 mg by mouth nightly   Yes Historical Provider, MD   albuterol sulfate HFA (PROVENTIL HFA) 108 (90 Base) MCG/ACT inhaler Inhale 2 puffs into the lungs every 4 hours as needed for Wheezing 5/4/22 5/4/23 Yes Eros Umana DO   tiotropium (Olga Lie) 18 MCG inhalation capsule Inhale 1 capsule into the lungs daily 3/9/22  Yes Joselin Covington,    Acetaminophen (TYLENOL) 325 MG CAPS Take 650 mg by mouth every 4 hours as needed (pain)   Yes Historical Provider, MD   ibuprofen (ADVIL;MOTRIN) 800 MG tablet Take 800 mg by mouth every 6 hours as needed for Pain   Yes Historical Provider, MD   phenazopyridine (PYRIDIUM) 100 MG tablet Take 1 tablet by mouth 3 times daily as needed for Pain  Patient not taking: Reported on 2/8/2023 12/28/22 12/28/23  Ardyth , APRN - CNP       Allergies   Allergen Reactions    Keflex [Cephalexin]      Hives, rash      Prednisone Rash    Cymbalta [Duloxetine Hcl] Other (See Comments)     angry    Lamictal [Lamotrigine] Nausea And Vomiting    Neurontin [Gabapentin] Other (See Comments)     Makes me feel very weird       Social History     Socioeconomic History    Marital status:      Spouse name: Ephraim Damian    Number of children: 2    Years of education: 13    Highest education level: Not on file   Occupational History    Occupation: disability-, LPN   Tobacco Use    Smoking status: Every Day     Packs/day: 2.00     Years: 20.00     Pack years: 40.00     Types: Cigarettes     Start date: 11/3/1995    Smokeless tobacco: Never   Vaping Use    Vaping Use: Former    Substances: Never   Substance and Sexual Activity    Alcohol use: Not Currently    Drug use: Not Currently     Types: Marijuana Osvaldo Stout     Comment: medical marijuana last used 3/2021    Sexual activity: Not on file   Other Topics Concern    Not on file   Social History Narrative    Not on file     Social Determinants of Health     Financial Resource Strain: Low Risk     Difficulty of Paying Living Expenses: Not hard at all   Food Insecurity: No Food Insecurity    Worried About Running Out of Food in the Last Year: Never true    [address] in the Last Year: Never true   Transportation Needs: No Transportation Needs    Lack of Transportation (Medical): No    Lack of Transportation (Non-Medical): No   Physical Activity: Sufficiently Active    Days of Exercise per Week: 7 days    Minutes of Exercise per Session: 60 min   Stress: No Stress Concern Present    Feeling of Stress : Only a little   Social Connections:  Moderately Integrated    Frequency of Communication with Friends and Family: More than three times a week    Frequency of Social Gatherings with Friends and Family: More than three times a week    Attends Congregation Services: More than 4 times per year    Active Member of [employer] or Organizations: No    Attends Club or Organization Meetings: Never    Marital Status:    Intimate Partner Violence: Not At Risk    Fear of Current or Ex-Partner: No    Emotionally Abused: No    Physically Abused: No    Sexually Abused: No   Housing Stability: Low Risk     Unable to Pay for Housing in the Last Year: No    Number of Places Lived in the Last Year: 1    Unstable Housing in the Last Year: No       Family History   Problem Relation Age of Onset    Diabetes Mother     Cancer Mother         ovarian    Diabetes Father     Cancer Sister         NHL    Diabetes Sister     High Blood Pressure Brother     Breast Cancer Maternal Aunt     Uterine Cancer Neg Hx     Colon Cancer Neg Hx        REVIEW OF SYSTEMS:     Celine Mckee denies fever/chills, chest pain, shortness of breath, new bowel or bladder complaints. All other review of systems was negative. PHYSICAL EXAMINATION:      BP (!) 143/89   Pulse (!) 117   Temp 98.2 °F (36.8 °C) (Infrared)   Resp 16   Ht 5' 2\" (1.575 m)   Wt (!) 319 lb (144.7 kg)   LMP 02/22/2021 (Approximate)   SpO2 95%   BMI 58.35 kg/m²     General:       General appearance:pleasant and well-hydrated, in no distress and A & O x3  Build:Obese  Function:Rises from a seated position with difficulty, mild     HEENT:     Head:normocephalic, atraumatic  Pupils:regular, round, equal  Sclera: icterus absent     Lungs:     Breathing:normal breathing pattern     Abdomen:     Shape:non-distended  Tenderness:none  Guarding:none     Lumbar spine:     Spine inspection:normal   CVA tenderness:No   Palpation:tenderness paravertebral muscles, left, right positive. Range of motion:abnormal mildly in lateral bending, flexion, extension rotation bilateral and is painful. Musculoskeletal:     Trigger points in Paraveteral:absent bilaterally  SI joint tenderness:positive right, positive left              MEE test:not done right, not done left  Piriformis tenderness:positive right, positive left  Trochanteric bursa tenderness:not done right, not done left  SLR:negative right, negative left, sitting      Extremities:     Tremors:None bilaterally upper and lower  Range of motion:pain with internal rotation of hips negative.   Intact:Yes  Varicose veins:absent   Pulses:present Lt radial  Cyanosis:none  Edema:none x all 4 extremities     Neurological:     Sensory:normal to light touch LLE, diffusely decreased RLE     Motor:          Right Quadriceps5/5     Left Quadriceps5/5           Right Gastrocnemius5/5  Left Gastrocnemius5/5  Right Ant Tibialis5/5   Left Ant Tibialis5/5     Reflexes:  (not assessed today)  Right Quadriceps reflex2+  Left Quadriceps reflex2+  Right Achilles reflex2+  Left Achilles reflex2+    Gait:normal station without assist device     Dermatology:     Skin:no rashes or lesions noted     Impression:     LBP BLE pain with weakness of the RLE  She reports having intermittent incontinence of urine (chronic and associated with urogyn surgery) and stool x2  Reports vaginal numbness w/ intercourse  Was evaluated by gynecology for the above, has had hysterectomy, and was told it is [place] damage\"  She is using a walker due to reported weakness of the RLE  \"I'm not suicidal or nothing but there are times I just want to jump in front of a truck because it's constant\", she can contract for safety, denies a plan and states \"I don't plan on hurting myself\"  2022 EDX shows left S1 motor radiculopathy or intracanalicular lesion, with acute and chronic denervation changes (note made of EDX done one year prior was negative)  2022 lumbar MRI shows mild lower lumbar facet degeneration without stenosis  The reported symptoms cannot be explained by her imaging as there is a lack of significant pathology on the xrays     She states she had 100% pain relief after the caudal BOZA but it lasted only two weeks  She is interested in a repeat close to the last one with hopes of more prolonged benefit  Pregabalin has been helpful, without SE's, but not yet strong enough  Same as above with Flexeril    She is having dry mouth, she thought it was from the pregabalin, discussed today it may be more likely from the flexeril  She will try to hold the flexeril to see if the dry mouth improves      Plan:    Urine screen deferred  OARRS report reviewed  Gabapentin causes SE's  On topamax for migraine HA's  Increase Pregabalin 75 mg TID, 2 RF  RF Flexeril 10 mg #60, 2 RF - she will hold until she determines if the dry mouth is causing this  Continues with PT   Caudal BOAZ with sedation gave 100% pain relief x 2 weeks, repeat gave 50% relief x 4 weeks, repeat ordered  Patient encouraged to stay active and to lose weight  Treatment plan discussed with the patient including medication and procedure side effects     Cc:  Referring physician    INGRID Estevez.

## 2023-02-09 ENCOUNTER — TREATMENT (OUTPATIENT)
Dept: PHYSICAL THERAPY | Age: 38
End: 2023-02-09

## 2023-02-09 DIAGNOSIS — M54.41 CHRONIC BILATERAL LOW BACK PAIN WITH RIGHT-SIDED SCIATICA: Primary | ICD-10-CM

## 2023-02-09 DIAGNOSIS — G89.29 CHRONIC BILATERAL LOW BACK PAIN WITH RIGHT-SIDED SCIATICA: Primary | ICD-10-CM

## 2023-02-09 NOTE — PROGRESS NOTES
[facility]          Phone: [phone] Fax: [phone]    Physical Therapy Daily Treatment Note  Date:  2023    Patient Name:  Steven Marquez    :  1985  MRN: [id number]    Evaluating Therapist:  Hemalatha Tolentino PT [id number]    Restrictions/Precautions:    Diagnosis:     Diagnosis Orders   1. Chronic bilateral low back pain with right-sided sciatica          Treatment Diagnosis:    Insurance/Certification information:  [facility]  Referring Physician:  Margie Orta DO  Plan of care signed (Y/N):    Visit# / total visits:    Pain level: 5/10   Time In:  1035  Time Out:  1105    Subjective:  Pt has another injection scheduled.     Exercises:  Exercise/Equipment Resistance/Repetitions Other comments     stepper 5 minutes      Trunk stretch with ball  Sec x 3 reps each flex and B rotation       Hamstring stretch With stool, 20 sec x 3 reps       LAQ 1# x 15 reps B      Hip add with ball 5 sec x 15 reps      Hip abd with tband GTB x 15 rep B      Seated hip flex 1#, with abdominal bracing 5 x 15 reps      Seated knee flex GTB x 15 reps      Rows, seated GTB x 15 reps        Standing hip ext and abd X10 reps B LE       Sit to stand From chair x 15 reps                                                              Other Therapeutic Activities:  N/A    Home Exercise Program:  22 - modified child's pose, sciatic nerve glides, seated hip flex, seated hip add, seated hip abd (OTB provided), seated hamstring stretch    Manual Treatments:  N/A    Modalities:  IFC PRN     Time-in Time-out Total Time   42737  Evaluation Low Complexity      09915  Evaluation Med Complexity      52907  Evaluation High Complexity      07048  Ther Ex 1035 1105 30   67962  Neuro Re-ed        97260  Ther Activities        78505  Manual Therapy       35625  E-stim       37131  Ultrasound            Session 3260 7865 30       Treatment/Activity Tolerance:  [x] Patient tolerated treatment well [] Patient limited by fatigue  [] Patient limited by pain  [] Patient limited by other medical complications  [] Other:     Prognosis: [] Good [x] Fair  [] Poor    Patient Requires Follow-up: [x] Yes  [] No    Plan:   [x] Continue per plan of care [] Alter current plan (see comments)  [] Plan of care initiated [] Hold pending MD visit [] Discharge  Plan for Next Session:        Electronically signed by:  Mikey Maciel [place], [id number]

## 2023-02-15 ENCOUNTER — TREATMENT (OUTPATIENT)
Dept: PHYSICAL THERAPY | Age: 38
End: 2023-02-15

## 2023-02-15 ENCOUNTER — OFFICE VISIT (OUTPATIENT)
Dept: FAMILY MEDICINE CLINIC | Age: 38
End: 2023-02-15
Payer: COMMERCIAL

## 2023-02-15 VITALS
HEART RATE: 133 BPM | TEMPERATURE: 97.3 F | WEIGHT: 293 LBS | HEIGHT: 62 IN | BODY MASS INDEX: 53.92 KG/M2 | DIASTOLIC BLOOD PRESSURE: 84 MMHG | SYSTOLIC BLOOD PRESSURE: 122 MMHG | RESPIRATION RATE: 20 BRPM | OXYGEN SATURATION: 98 %

## 2023-02-15 DIAGNOSIS — J45.20 MILD INTERMITTENT ASTHMA WITHOUT COMPLICATION: Primary | ICD-10-CM

## 2023-02-15 DIAGNOSIS — M54.41 CHRONIC BILATERAL LOW BACK PAIN WITH RIGHT-SIDED SCIATICA: Primary | ICD-10-CM

## 2023-02-15 DIAGNOSIS — E66.01 CLASS 3 SEVERE OBESITY DUE TO EXCESS CALORIES WITHOUT SERIOUS COMORBIDITY WITH BODY MASS INDEX (BMI) OF 50.0 TO 59.9 IN ADULT (HCC): ICD-10-CM

## 2023-02-15 DIAGNOSIS — R73.9 HYPERGLYCEMIA: ICD-10-CM

## 2023-02-15 DIAGNOSIS — I10 PRIMARY HYPERTENSION: ICD-10-CM

## 2023-02-15 DIAGNOSIS — G89.29 CHRONIC BILATERAL LOW BACK PAIN WITH RIGHT-SIDED SCIATICA: Primary | ICD-10-CM

## 2023-02-15 PROCEDURE — 4004F PT TOBACCO SCREEN RCVD TLK: CPT | Performed by: FAMILY MEDICINE

## 2023-02-15 PROCEDURE — 99214 OFFICE O/P EST MOD 30 MIN: CPT | Performed by: FAMILY MEDICINE

## 2023-02-15 PROCEDURE — G8417 CALC BMI ABV UP PARAM F/U: HCPCS | Performed by: FAMILY MEDICINE

## 2023-02-15 PROCEDURE — G8427 DOCREV CUR MEDS BY ELIG CLIN: HCPCS | Performed by: FAMILY MEDICINE

## 2023-02-15 PROCEDURE — 3074F SYST BP LT 130 MM HG: CPT | Performed by: FAMILY MEDICINE

## 2023-02-15 PROCEDURE — G8482 FLU IMMUNIZE ORDER/ADMIN: HCPCS | Performed by: FAMILY MEDICINE

## 2023-02-15 PROCEDURE — 3078F DIAST BP <80 MM HG: CPT | Performed by: FAMILY MEDICINE

## 2023-02-15 RX ORDER — BENAZEPRIL HYDROCHLORIDE 20 MG/1
20 TABLET ORAL DAILY
Qty: 90 TABLET | Refills: 1 | Status: SHIPPED | OUTPATIENT
Start: 2023-02-15

## 2023-02-15 RX ORDER — AMLODIPINE BESYLATE 5 MG/1
5 TABLET ORAL DAILY
Qty: 90 TABLET | Refills: 1 | Status: SHIPPED | OUTPATIENT
Start: 2023-02-15

## 2023-02-15 RX ORDER — FLUTICASONE PROPIONATE 44 UG/1
2 AEROSOL, METERED RESPIRATORY (INHALATION) 2 TIMES DAILY
Qty: 10.6 G | Refills: 5 | Status: SHIPPED | OUTPATIENT
Start: 2023-02-15

## 2023-02-15 RX ORDER — ALBUTEROL SULFATE 90 UG/1
2 AEROSOL, METERED RESPIRATORY (INHALATION) EVERY 4 HOURS PRN
Qty: 1 EACH | Refills: 5 | Status: SHIPPED | OUTPATIENT
Start: 2023-02-15 | End: 2024-02-15

## 2023-02-15 RX ORDER — TIOTROPIUM BROMIDE 18 UG/1
18 CAPSULE ORAL; RESPIRATORY (INHALATION) DAILY
Qty: 30 CAPSULE | Refills: 5 | Status: SHIPPED | OUTPATIENT
Start: 2023-02-15

## 2023-02-15 NOTE — PROGRESS NOTES
[facility]                Phone: [phone] Fax: [phone]    Physical Therapy  Outpatient Discharge Summary     Date:  2/15/2023    Patient Name:  Linnea Norris    :  1985  MRN: [id number]    DIAGNOSIS:     Diagnosis Orders   1. Chronic bilateral low back pain with right-sided sciatica          REFERRING PHYSICIAN:  Luz Macario DO    ATTENDANCE:  Pt has attended 8 of 8 scheduled treatments from 22 to 2/15/23. TREATMENTS RECEIVED:  stretching, strength training, education in a HEP    INITIAL STATUS:  Pain in low back and down B LEs 6/10  Strength: trunk 3+/5, B LEs 4-/5  Gait: slow, antalgic with cane  Oswestry: 78% disability    FINAL STATUS:  Pain in low back and intermittently in B LEs 5-6/10  Strength: trunk 4-/5, B LEs 4/5  Gait: independent with spc, improved pacing, no significant evidence of pain  Oswestry: 48% disability  Pt is independent with HEP    GOALS:  4 out of 5 Long Term Goals were obtained. LONG TERM GOALS NOT OBTAINED/REASON:  All goals met except no significant changes in pain rating noted with therapy interventions. PATIENT GOALS:  pain relief, improve ROM, decrease stiffness    REASON FOR DISCHARGE:  Pt has met most goals and received maximum benefit from physical therapy. PATIENT EDUCATION/INSTRUCTIONS:  Pt educated in stretching and strength training activities for HEP. RECOMMENDATIONS:  Discharge to HEP with recommendation to follow-up with physician due to continued pain and recommendation to return to therapy if a significant change in current status occurs. Thank you for the opportunity to work with your patient. If you have questions or comments, please feel free to contact me by phone or fax.       Electronically Signed by: Patra Sandhoff, [place], [id number]  2/15/2023

## 2023-02-15 NOTE — PROGRESS NOTES
[facility]          Phone: [phone] Fax: [phone]    Physical Therapy Daily Treatment Note  Date:  2/15/2023    Patient Name:  Anjana Epperson    :  1985  MRN: [id number]    Evaluating Therapist:  Nicole Doe, PT [id number]    Restrictions/Precautions:    Diagnosis:     Diagnosis Orders   1. Chronic bilateral low back pain with right-sided sciatica          Treatment Diagnosis:    Insurance/Certification information:  [facility]  Referring Physician:  Mary Rhodes DO  Plan of care signed (Y/N):    Visit# / total visits:    Pain level: 6/10   Time In:  917  Time Out:  955    Subjective:  Pt reports that the day after her last therapy session she started to having increased back pain, like her back was twisting. States she has a call into Dr. Rhodes to discuss the symptoms, but wants to try therapy today.    Exercises:  Exercise/Equipment Resistance/Repetitions Other comments     stepper 5 minutes      Trunk stretch with ball  Sec x 3 reps each flex and B rotation       Hamstring stretch With stool, 20 sec x 3 reps       LAQ 0# x 15 reps B      Hip add with ball 5 sec x 15 reps      Hip abd with tband RTB x 15 rep B      Seated hip flex 0#, with abdominal bracing 5 x 15 reps      Seated knee flex RTB x 15 reps      Rows, seated RTB x 15 reps        Standing hip ext and abd X10 reps B LE    NT due to c/o increased pain                                                             Other Therapeutic Activities:  Discussed discharge from therapy at this time, with an emphasis on continuing with HEP, due to pt reaching a plateau with therapy. Pt verbalized understanding and agreement with recommendation.    Home Exercise Program:  22 - modified child's pose, sciatic nerve glides, seated hip flex, seated hip add, seated hip abd (OTB provided), seated hamstring stretch    Manual Treatments:  N/A    Modalities:  IFC PRN     Time-in Time-out Total Time   37452   Evaluation Low Complexity      [id number]  Evaluation Med Complexity      [id number]  Evaluation High Complexity      31202  Ther Ex [id number]   [id number]  Neuro Re-ed        21644  Ther Activities        92150  Manual Therapy       42340  E-stim       86271  Ultrasound            Session 2356 [id number]       Treatment/Activity Tolerance:  [x] Patient tolerated treatment well [] Patient limited by fatigue  [] Patient limited by pain  [] Patient limited by other medical complications  [] Other:     Prognosis: [] Good [x] Fair  [] Poor    Patient Requires Follow-up: [] Yes  [x] No    Plan:   [] Continue per plan of care [] Alter current plan (see comments)  [] Plan of care initiated [] Hold pending MD visit [x] Discharge  Plan for Next Session:        Electronically signed by:  Thony Fulton, [address] [place] [address], [id number]

## 2023-02-17 ENCOUNTER — TELEPHONE (OUTPATIENT)
Dept: PAIN MANAGEMENT | Age: 38
End: 2023-02-17

## 2023-02-17 NOTE — TELEPHONE ENCOUNTER
Call to Starr Del Angel that procedure was approved for 2/28/2023 and that Premaliana should call her a few days before for the pre op call and between 2:00 PM and 4:00 PM  the business day before with the arrival time. Instructed Anjana to hold ibuprofen for 24 hours, naprosyn for 4 days and any aspirin containing products or fish oil for 7 days. Instructed to call office back if any questions. Steffi Oconnor verbalized understanding.     Electronically signed by Marla Cruz RN on 2/17/2023 at 10:06 AM

## 2023-02-19 ASSESSMENT — ENCOUNTER SYMPTOMS
PHOTOPHOBIA: 0
GASTROINTESTINAL NEGATIVE: 1
SINUS PAIN: 0
EYE DISCHARGE: 0
EYE REDNESS: 0
COUGH: 0
WHEEZING: 0
SHORTNESS OF BREATH: 0
RHINORRHEA: 0

## 2023-02-19 NOTE — PROGRESS NOTES
2023    Willian Sue    Chief Complaint   Patient presents with    Hypertension     4 week check up    Fatigue       HPI  History was obtained from patient. Leigh Ann Scherer is a 44 y.o. female who presents today with the followin. Mild intermittent asthma without complication    2. Primary hypertension    3. Hyperglycemia    4. Class 3 severe obesity due to excess calories without serious comorbidity with body mass index (BMI) of 50.0 to 59.9 in adult [facility])    Patient is here for follow-up of hypertension asthma and hyperglycemia. Patient was started on amlodipine 5 mg at her last appointment and she has continued also with her benazepril 20 mg daily. Her blood pressure has improved. She has had no side effects from the change in medication. REVIEW OF SYMPTOMS    Review of Systems   Constitutional:  Negative for chills, fatigue and fever. HENT:  Negative for congestion, mouth sores, postnasal drip, rhinorrhea and sinus pain. Eyes:  Negative for photophobia, discharge and redness. Respiratory:  Negative for cough, shortness of breath and wheezing. Cardiovascular:  Negative for chest pain. Gastrointestinal: Negative. Genitourinary:  Negative for difficulty urinating, dysuria, hematuria and urgency. Neurological:  Negative for headaches. Psychiatric/Behavioral:  Negative for sleep disturbance.       PAST MEDICAL HISTORY  Past Medical History:   Diagnosis Date    Anxiety     Arthritis     Asthma     well controlled, on inhalers    Bipolar 1 disorder ([address]     COPD (chronic obstructive pulmonary disease) ([address]     Depression     Fibromyalgia     GERD (gastroesophageal reflux disease)     Glaucoma     Suspected by eye specialist . no eye meds    Hypertension     Macular edema     Cm    Migraines     Morbid obesity due to excess calories (HCC)     Neuropathy     PTSD (post-traumatic stress disorder)     Splenomegaly 2020       FAMILY HISTORY  Family History   Problem Relation Age of Onset    Diabetes Mother     Cancer Mother         ovarian    Diabetes Father     Cancer Sister         NHL    Diabetes Sister     High Blood Pressure Brother     Breast Cancer Maternal Aunt     Uterine Cancer Neg Hx     Colon Cancer Neg Hx        SOCIAL HISTORY  Social History     Socioeconomic History    Marital status:      Spouse name: Faye Plata    Number of children: 2    Years of education: 15   Occupational History    Occupation: disability-, LPN   Tobacco Use    Smoking status: Every Day     Packs/day: 2.00     Years: 20.00     Pack years: 40.00     Types: Cigarettes     Start date: 11/3/1995    Smokeless tobacco: Never   Vaping Use    Vaping Use: Former    Substances: Never   Substance and Sexual Activity    Alcohol use: Not Currently    Drug use: Not Currently     Types: Marijuana [place])     Comment: medical marijuana last used 3/2021     Social Determinants of Health     Financial Resource Strain: Low Risk     Difficulty of Paying Living Expenses: Not hard at all   Food Insecurity: No Food Insecurity    Worried About Running Out of Food in the Last Year: Never true    [address] in the Last Year: Never true   Transportation Needs: No Transportation Needs    Lack of Transportation (Medical): No    Lack of Transportation (Non-Medical): No   Physical Activity: Sufficiently Active    Days of Exercise per Week: 7 days    Minutes of Exercise per Session: 60 min   Stress: No Stress Concern Present    Feeling of Stress : Only a little   Social Connections:  Moderately Integrated    Frequency of Communication with Friends and Family: More than three times a week    Frequency of Social Gatherings with Friends and Family: More than three times a week    Attends Synagogue Services: More than 4 times per year    Active Member of [address] [place] [address] [employer] or Organizations: No    Attends Club or Organization Meetings: Never    Marital Status:    Intimate Partner Violence: Not At Risk    Fear of Current or Ex-Partner: No    Emotionally Abused: No    Physically Abused: No    Sexually Abused: No   Housing Stability: Low Risk     Unable to Pay for Housing in the Last Year: No    Number of Places Lived in the Last Year: 1    Unstable Housing in the Last Year: No        SURGICAL HISTORY  Past Surgical History:   Procedure Laterality Date    ANTERIOR CRUCIATE LIGAMENT REPAIR Right      SECTION      ,     [address] OF UTERUS N/A 2019    DILATATION AND CURETTAGE HYSTEROSCOPY WITH REMOVAL OF CONDYLOMATA performed by Joseph Stewart MD at [address] [facility] N/A 2020    DILATATION AND CURETTAGE HYSTEROSCOPY, ATTEMPTED CERVICAL BIOPSY, MARIYA OF  LABIAL ABSCESS performed by Joseph Stewart MD at [address] [facility] [address] (CERVIX STATUS UNKNOWN)  2021    Robotic lysis of adhesions, total hysterectomy, bilateral salpingectomy, cystoscopy    INCONTINENCE SURGERY      OTHER SURGICAL HISTORY      repair anal fissure    PAIN MANAGEMENT PROCEDURE N/A 2022    CAUDAL EPIDURAL STEROID INJECTION  #1 performed by Abelardo Elliott DO at [facility] N/A 2022    CAUDAL EPIDURAL STEROID INJECTION #2 performed by Abelardo Elliott DO at [facility] N/A 10/13/2022    CAUDAL EPIDURAL STEROID INJECTION  #3 performed by Abelardo Elliott DO at [facility] N/A 2022    CAUDAL EPIDURAL STEROID INJECTION performed by Abelardo Elliott DO at [address] [facility]  Current Outpatient Medications   Medication Sig Dispense Refill    tiotropium (SPIRIVA HANDIHALER) 18 MCG inhalation capsule Inhale 1 capsule into the lungs daily 30 capsule 5    albuterol sulfate HFA (PROVENTIL HFA) 108 (90 Base) MCG/ACT inhaler Inhale 2 puffs into the lungs every 4 hours as needed for Wheezing 1 each 5    amLODIPine (NORVASC) 5 MG tablet Take 1 tablet by mouth daily 90 tablet 1    benazepril (LOTENSIN) 20 MG tablet Take 1 tablet by mouth daily 90 tablet 1    fluticasone (FLOVENT HFA) 44 MCG/ACT inhaler Inhale 2 puffs into the lungs 2 times daily 10.6 g 5    aspirin 81 MG chewable tablet Take 81 mg by mouth daily      cyclobenzaprine (FLEXERIL) 10 MG tablet Take 1 tablet by mouth 2 times daily as needed for Muscle spasms 60 tablet 2    pregabalin (LYRICA) 75 MG capsule Take 1 capsule by mouth in the morning, at noon, and at bedtime for 30 days. Max Daily Amount: 225 mg 90 capsule 2    phenazopyridine (PYRIDIUM) 100 MG tablet Take 1 tablet by mouth 3 times daily as needed for Pain 12 tablet 0    VALTREX 1 g tablet Take 2 tablets by mouth 2 times daily 4 tablet 5    docusate sodium (COLACE) 100 MG capsule Take 100 mg by mouth 2 times daily      ondansetron (ZOFRAN) 4 MG tablet Take 1 tablet by mouth 3 times daily as needed for Nausea or Vomiting 15 tablet 0    Vibegron (GEMTESA) 75 MG TABS Take 75 mg by mouth nightly      Acetaminophen (TYLENOL) 325 MG CAPS Take 650 mg by mouth every 4 hours as needed (pain)      ibuprofen (ADVIL;MOTRIN) 800 MG tablet Take 800 mg by mouth every 6 hours as needed for Pain       No current facility-administered medications for this visit. ALLERGIES  Allergies   Allergen Reactions    Keflex [Cephalexin]      Hives, rash      Prednisone Rash    Cymbalta [Duloxetine Hcl] Other (See Comments)     angry    Lamictal [Lamotrigine] Nausea And Vomiting    Neurontin [Gabapentin] Other (See Comments)     Makes me feel very weird       PHYSICAL EXAM    /84   Pulse (!) 133   Temp 97.3 °F (36.3 °C)   Resp 20   Ht 5' 2\" (1.575 m)   Wt (!) 316 lb (143.3 kg)   LMP 02/22/2021 (Approximate)   SpO2 98%   BMI 57.80 kg/m²     Physical Exam  Vitals and nursing note reviewed. Constitutional:       Appearance: Normal appearance. She is morbidly obese. Eyes:      General: No scleral icterus. Conjunctiva/sclera: Conjunctivae normal.   Neurological:      Mental Status: She is alert and oriented to person, place, and time. Psychiatric:         Mood and Affect: Mood normal.       ASSESSMENT & PLAN    Katarina Andrews was seen today for hypertension and fatigue. Diagnoses and all orders for this visit:    Mild intermittent asthma without complication  -     tiotropium (SPIRIVA HANDIHALER) 18 MCG inhalation capsule; Inhale 1 capsule into the lungs daily  -     albuterol sulfate HFA (PROVENTIL HFA) 108 (90 Base) MCG/ACT inhaler; Inhale 2 puffs into the lungs every 4 hours as needed for Wheezing  -     fluticasone (FLOVENT HFA) 44 MCG/ACT inhaler; Inhale 2 puffs into the lungs 2 times daily  Continue with the current medication as her asthma is well controlled. Primary hypertension  -     amLODIPine (NORVASC) 5 MG tablet; Take 1 tablet by mouth daily  -     benazepril (LOTENSIN) 20 MG tablet; Take 1 tablet by mouth daily  Patient's blood pressure is well controlled on current medication. Hyperglycemia  -     Comprehensive Metabolic Panel; Future  -     Hemoglobin A1C; Future  Patient's last hemoglobin A1c was 5.9 in July of last year. We will check again before next appointment. Class 3 severe obesity due to excess calories without serious comorbidity with body mass index (BMI) of 50.0 to 59.9 in adult [facility])  Patient has an appointment in May with the bariatric center. Return in about 3 months (around 5/15/2023). Electronically signed by Ilda Becerra.  Severiano Cake, DO on 2/19/2023

## 2023-02-24 RX ORDER — CHOLECALCIFEROL (VITAMIN D3) 125 MCG
5 CAPSULE ORAL NIGHTLY
COMMUNITY

## 2023-02-24 NOTE — PROGRESS NOTES
[facility] PRE-ADMISSION TESTING INSTRUCTIONS    The Preadmission Testing patient is instructed accordingly using the following criteria (check applicable):    ARRIVAL INSTRUCTIONS:  [x] Parking the day of Surgery is located in the Main Entrance lot. Upon entering the door, make an immediate right to the surgery reception desk    [x] Bring photo ID and insurance card    [] Bring in a copy of Living will or Durable Power of  papers. [x] Please be sure to arrange for responsible adult to provide transportation to and from the hospital    [x] Please arrange for responsible adult to be with you for the 24 hour period post procedure due to having anesthesia    [x] If you awake am of surgery not feeling well or have temperature >100 please call [phone]    GENERAL INSTRUCTIONS:    [x] Nothing by mouth after midnight, including gum, candy, mints or water    [x] You may brush your teeth, but do not swallow any water    [x] Take medications as instructed with 1-2 oz of water    [x] Stop herbal supplements and vitamins 5 days prior to procedure    [x] Follow preop dosing of blood thinners per physician instructions    [] Take 1/2 dose of evening insulin, but no insulin after midnight    [] No oral diabetic medications after midnight    [] If diabetic and have low blood sugar or feel symptomatic, take 1-2oz apple juice only    [] Bring inhalers day of surgery    [] Bring C-PAP/ Bi-Pap day of surgery    [] Bring urine specimen day of surgery    [x] Shower or bath with soap, lather and rinse well, AM of Surgery, no lotion, powders or creams to surgical site    [] Follow bowel prep as instructed per surgeon    [x] No tobacco products within 24 hours of surgery     [x] No alcohol or illegal drug use within 24 hours of surgery.     [x] Jewelry, body piercing's, eyeglasses, contact lenses and dentures are not permitted into surgery (bring cases)      [x] Please do not wear any nail polish, make up or hair products on the day of surgery    [x] You can expect a call the business day prior to procedure to notify you if your arrival time changes    [x] If you receive a survey after surgery we would greatly appreciate your comments    [] Parent/guardian of a minor must accompany their child and remain on the premises  the entire time they are under our care     [] Pediatric patients may bring favorite toy, blanket or comfort item with them    [] A caregiver or family member must remain with the patient during their stay if they are mentally handicapped, have dementia, disoriented or unable to use a call light or would be a safety concern if left unattended    [x] Please notify surgeon if you develop any illness between now and time of surgery (cold, cough, sore throat, fever, nausea, vomiting) or any signs of infections  including skin, wounds, and dental.    [x]  The Outpatient Pharmacy is available to fill your prescription here on your day of surgery, ask your preop nurse for details    [] Other instructions    EDUCATIONAL MATERIALS PROVIDED:    [] PAT Preoperative Education Packet/Booklet     [] Medication List    [] Transfusion bracelet applied with instructions    [] Shower with soap, lather and rinse well, and use CHG wipes provided the evening before surgery as instructed    [] Incentive spirometer with instructions

## 2023-02-25 ENCOUNTER — HOSPITAL ENCOUNTER (OUTPATIENT)
Dept: GENERAL RADIOLOGY | Age: 38
End: 2023-02-25
Payer: MEDICARE

## 2023-02-25 ENCOUNTER — HOSPITAL ENCOUNTER (OUTPATIENT)
Age: 38
End: 2023-02-25
Payer: MEDICARE

## 2023-02-25 DIAGNOSIS — M54.41 CHRONIC BILATERAL LOW BACK PAIN WITH BILATERAL SCIATICA: ICD-10-CM

## 2023-02-25 DIAGNOSIS — M54.16 LUMBAR RADICULOPATHY: ICD-10-CM

## 2023-02-25 DIAGNOSIS — M47.817 LUMBOSACRAL SPONDYLOSIS WITHOUT MYELOPATHY: ICD-10-CM

## 2023-02-25 DIAGNOSIS — G89.29 CHRONIC BILATERAL LOW BACK PAIN WITH BILATERAL SCIATICA: ICD-10-CM

## 2023-02-25 DIAGNOSIS — M54.42 CHRONIC BILATERAL LOW BACK PAIN WITH BILATERAL SCIATICA: ICD-10-CM

## 2023-02-25 PROCEDURE — 72120 X-RAY BEND ONLY L-S SPINE: CPT

## 2023-02-28 ENCOUNTER — HOSPITAL ENCOUNTER (OUTPATIENT)
Age: 38
Setting detail: OUTPATIENT SURGERY
Discharge: HOME | End: 2023-02-28
Attending: PAIN MEDICINE | Admitting: PAIN MEDICINE
Payer: MEDICARE

## 2023-02-28 ENCOUNTER — ANESTHESIA (OUTPATIENT)
Dept: OPERATING ROOM | Age: 38
End: 2023-02-28
Payer: MEDICARE

## 2023-02-28 ENCOUNTER — ANESTHESIA EVENT (OUTPATIENT)
Dept: OPERATING ROOM | Age: 38
End: 2023-02-28
Payer: MEDICARE

## 2023-02-28 ENCOUNTER — HOSPITAL ENCOUNTER (OUTPATIENT)
Dept: GENERAL RADIOLOGY | Age: 38
Setting detail: OUTPATIENT SURGERY
Discharge: HOME | End: 2023-03-02
Attending: PAIN MEDICINE
Payer: MEDICARE

## 2023-02-28 VITALS
RESPIRATION RATE: 20 BRPM | BODY MASS INDEX: 53.92 KG/M2 | WEIGHT: 293 LBS | HEART RATE: 95 BPM | DIASTOLIC BLOOD PRESSURE: 71 MMHG | HEIGHT: 62 IN | TEMPERATURE: 98.5 F | SYSTOLIC BLOOD PRESSURE: 121 MMHG | OXYGEN SATURATION: 95 %

## 2023-02-28 DIAGNOSIS — R52 PAIN MANAGEMENT: ICD-10-CM

## 2023-02-28 PROCEDURE — 7100000011 HC PHASE II RECOVERY - ADDTL 15 MIN: Performed by: PAIN MEDICINE

## 2023-02-28 PROCEDURE — 7100000010 HC PHASE II RECOVERY - FIRST 15 MIN: Performed by: PAIN MEDICINE

## 2023-02-28 PROCEDURE — 2709999900 HC NON-CHARGEABLE SUPPLY: Performed by: PAIN MEDICINE

## 2023-02-28 PROCEDURE — 3700000000 HC ANESTHESIA ATTENDED CARE: Performed by: PAIN MEDICINE

## 2023-02-28 PROCEDURE — 2580000003 HC RX 258: Performed by: NURSE ANESTHETIST, CERTIFIED REGISTERED

## 2023-02-28 PROCEDURE — 6360000002 HC RX W HCPCS: Performed by: NURSE ANESTHETIST, CERTIFIED REGISTERED

## 2023-02-28 PROCEDURE — 6360000002 HC RX W HCPCS: Performed by: PAIN MEDICINE

## 2023-02-28 PROCEDURE — 3209999900 FLUORO FOR SURGICAL PROCEDURES

## 2023-02-28 PROCEDURE — 3600000002 HC SURGERY LEVEL 2 BASE: Performed by: PAIN MEDICINE

## 2023-02-28 PROCEDURE — 2500000003 HC RX 250 WO HCPCS: Performed by: PAIN MEDICINE

## 2023-02-28 PROCEDURE — 6360000004 HC RX CONTRAST MEDICATION: Performed by: PAIN MEDICINE

## 2023-02-28 RX ORDER — SODIUM CHLORIDE 9 MG/ML
INJECTION, SOLUTION INTRAVENOUS CONTINUOUS PRN
Status: DISCONTINUED | OUTPATIENT
Start: 2023-02-28 | End: 2023-02-28 | Stop reason: SDUPTHER

## 2023-02-28 RX ORDER — MIDAZOLAM HYDROCHLORIDE 1 MG/ML
INJECTION INTRAMUSCULAR; INTRAVENOUS PRN
Status: DISCONTINUED | OUTPATIENT
Start: 2023-02-28 | End: 2023-02-28 | Stop reason: SDUPTHER

## 2023-02-28 RX ORDER — LIDOCAINE HYDROCHLORIDE 5 MG/ML
INJECTION, SOLUTION INFILTRATION; INTRAVENOUS PRN
Status: DISCONTINUED | OUTPATIENT
Start: 2023-02-28 | End: 2023-02-28 | Stop reason: ALTCHOICE

## 2023-02-28 RX ORDER — METHYLPREDNISOLONE ACETATE 40 MG/ML
INJECTION, SUSPENSION INTRA-ARTICULAR; INTRALESIONAL; INTRAMUSCULAR; SOFT TISSUE PRN
Status: DISCONTINUED | OUTPATIENT
Start: 2023-02-28 | End: 2023-02-28 | Stop reason: ALTCHOICE

## 2023-02-28 RX ORDER — PROPOFOL 10 MG/ML
INJECTION, EMULSION INTRAVENOUS PRN
Status: DISCONTINUED | OUTPATIENT
Start: 2023-02-28 | End: 2023-02-28 | Stop reason: SDUPTHER

## 2023-02-28 RX ADMIN — MIDAZOLAM 1 MG: 1 INJECTION INTRAMUSCULAR; INTRAVENOUS at 12:35

## 2023-02-28 RX ADMIN — SODIUM CHLORIDE: 9 INJECTION, SOLUTION INTRAVENOUS at 12:35

## 2023-02-28 RX ADMIN — PROPOFOL 140 MG: 10 INJECTION, EMULSION INTRAVENOUS at 12:35

## 2023-02-28 ASSESSMENT — PAIN - FUNCTIONAL ASSESSMENT: PAIN_FUNCTIONAL_ASSESSMENT: 0-10

## 2023-02-28 ASSESSMENT — COPD QUESTIONNAIRES: CAT_SEVERITY: MILD

## 2023-02-28 ASSESSMENT — PAIN DESCRIPTION - DESCRIPTORS: DESCRIPTORS: DISCOMFORT

## 2023-02-28 ASSESSMENT — LIFESTYLE VARIABLES: SMOKING_STATUS: 1

## 2023-02-28 ASSESSMENT — PAIN SCALES - GENERAL: PAINLEVEL_OUTOF10: 0

## 2023-02-28 NOTE — H&P
[facility]        [address] [facility], [address]  Dept: [phone]    Procedure History & Physical      Dima Ramierz     HPI:    Patient  is here for low back pain bilateral lower extremity pain for caudal epidural steroid injection  Labs/imaging studies reviewed   All question and concerns addressed including R/B/A associated with the procedure    Past Medical History:   Diagnosis Date    Anxiety     Arthritis     Asthma     well controlled, on inhalers    Back pain     Bipolar 1 disorder (HCC)     COPD (chronic obstructive pulmonary disease) ([address]     Depression     Fibromyalgia     GERD (gastroesophageal reflux disease)     Hypertension     Macular edema     Cm    Migraines     Morbid obesity due to excess calories [address]     Neuropathy     PTSD (post-traumatic stress disorder)        Past Surgical History:   Procedure Laterality Date    ANTERIOR CRUCIATE LIGAMENT REPAIR Right      SECTION      ,     CHOLECYSTECTOMY      DILATION AND CURETTAGE OF UTERUS N/A 2019    DILATATION AND CURETTAGE HYSTEROSCOPY WITH REMOVAL OF CONDYLOMATA performed by Linda Gorman MD at [address] N/A 2020    DILATATION AND CURETTAGE HYSTEROSCOPY, ATTEMPTED CERVICAL BIOPSY, MARIYA OF  LABIAL ABSCESS performed by Linda Gorman MD at [address] [facility] [address] (CERVIX STATUS UNKNOWN)  2021    Robotic lysis of adhesions, total hysterectomy, bilateral salpingectomy, cystoscopy    INCONTINENCE SURGERY      OTHER SURGICAL HISTORY      repair anal fissure    PAIN MANAGEMENT PROCEDURE N/A 2022    CAUDAL EPIDURAL STEROID INJECTION  #1 performed by Maribel Maher DO at [address] N/A 2022    CAUDAL EPIDURAL STEROID INJECTION #2 performed by Maribel Maher DO at [address] N/A 10/13/2022    CAUDAL EPIDURAL STEROID INJECTION  #3 performed by Don Gardner DO at [address] N/A 12/8/2022    CAUDAL EPIDURAL STEROID INJECTION performed by Don Gardner DO at [address]         Prior to Admission medications    Medication Sig Start Date End Date Taking? Authorizing Provider   melatonin 5 MG TABS tablet Take 5 mg by mouth nightly   Yes Historical Provider, MD   tiotropium ([place] Angella) 18 MCG inhalation capsule Inhale 1 capsule into the lungs daily 2/15/23   Goyo San DO   albuterol sulfate HFA (PROVENTIL HFA) 108 (90 Base) MCG/ACT inhaler Inhale 2 puffs into the lungs every 4 hours as needed for Wheezing 2/15/23 2/15/24  Goyo San DO   amLODIPine (NORVASC) 5 MG tablet Take 1 tablet by mouth daily 2/15/23   Goyo San DO   benazepril (LOTENSIN) 20 MG tablet Take 1 tablet by mouth daily 2/15/23   Goyo San DO   fluticasone (FLOVENT HFA) 44 MCG/ACT inhaler Inhale 2 puffs into the lungs 2 times daily 2/15/23   Goyo San DO   aspirin 81 MG chewable tablet Take 81 mg by mouth daily    Historical Provider, MD   cyclobenzaprine (FLEXERIL) 10 MG tablet Take 1 tablet by mouth 2 times daily as needed for Muscle spasms 2/8/23 3/10/23  Don Gardner DO   pregabalin (LYRICA) 75 MG capsule Take 1 capsule by mouth in the morning, at noon, and at bedtime for 30 days.  Max Daily Amount: 225 mg 2/8/23 3/10/23  Don Gardner DO   phenazopyridine (PYRIDIUM) 100 MG tablet Take 1 tablet by mouth 3 times daily as needed for Pain 12/28/22 12/28/23  VELMA Padilla - CNP   VALTREX 1 g tablet Take 2 tablets by mouth 2 times daily 12/28/22   Goyo San DO   docusate sodium (COLACE) 100 MG capsule Take 100 mg by mouth 2 times daily    Historical Provider, MD   ondansetron (ZOFRAN) 4 MG tablet Take 1 tablet by mouth 3 times daily as needed for Nausea or Vomiting 12/3/22   Goyo San DO   Vibegron (GEMTESA) 75 MG TABS Take 75 mg by mouth nightly    Historical Provider, MD   Acetaminophen (TYLENOL) 325 MG CAPS Take 650 mg by mouth every 4 hours as needed (pain)    Historical Provider, MD   ibuprofen (ADVIL;MOTRIN) 800 MG tablet Take 800 mg by mouth every 6 hours as needed for Pain    Historical Provider, MD       Allergies   Allergen Reactions    Keflex [Cephalexin]      Hives, rash      Prednisone Rash    Cymbalta [Duloxetine Hcl] Other (See Comments)     angry    Lamictal [Lamotrigine] Nausea And Vomiting    Neurontin [Gabapentin] Other (See Comments)     Makes me feel very weird       Social History     Socioeconomic History    Marital status:      Spouse name: Sujey Barahona    Number of children: 2    Years of education: 13    Highest education level: Not on file   Occupational History    Occupation: disability-, LPN   Tobacco Use    Smoking status: Every Day     Packs/day: 2.00     Years: 20.00     Pack years: 40.00     Types: Cigarettes     Start date: 11/3/1995    Smokeless tobacco: Never   Vaping Use    Vaping Use: Former    Substances: Never   Substance and Sexual Activity    Alcohol use: Not Currently    Drug use: Not Currently     Types: Marijuana [place])     Comment: medical marijuana    Sexual activity: Not on file   Other Topics Concern    Not on file   Social History Narrative    Not on file     Social Determinants of Health     Financial Resource Strain: Low Risk     Difficulty of Paying Living Expenses: Not hard at all   Food Insecurity: No Food Insecurity    Worried About Running Out of Food in the Last Year: Never true    Ran Out of Food in the Last Year: Never true   Transportation Needs: No Transportation Needs    Lack of Transportation (Medical): No    Lack of Transportation (Non-Medical): No   Physical Activity: Sufficiently Active    Days of Exercise per Week: 7 days    Minutes of Exercise per Session: 60 min   Stress: No Stress Concern Present    Feeling of Stress : Only a little   Social Connections:  Moderately Integrated    Frequency of Communication with Friends and Family: More than three times a week    Frequency of Social Gatherings with Friends and Family: More than three times a week    Attends Zoroastrian Services: More than 4 times per year    Active Member of Clubs or Organizations: No    Attends Club or Organization Meetings: Never    Marital Status:    Intimate Partner Violence: Not At Risk    Fear of Current or Ex-Partner: No    Emotionally Abused: No    Physically Abused: No    Sexually Abused: No   Housing Stability: Low Risk     Unable to Pay for Housing in the Last Year: No    Number of Places Lived in the Last Year: 1    Unstable Housing in the Last Year: No       Family History   Problem Relation Age of Onset    Diabetes Mother     Cancer Mother         ovarian    Diabetes Father     Cancer Sister         NHL    Diabetes Sister     High Blood Pressure Brother     Breast Cancer Maternal Aunt     Uterine Cancer Neg Hx     Colon Cancer Neg Hx          REVIEW OF SYSTEMS:    CONSTITUTIONAL:  negative for  fevers, chills, sweats and fatigue    RESPIRATORY:  negative for  dry cough, cough with sputum, dyspnea, wheezing and chest pain    CARDIOVASCULAR:  negative for chest pain, dyspnea, palpitations, syncope    GASTROINTESTINAL:  negative for nausea, vomiting, change in bowel habits, diarrhea, constipation and abdominal pain    MUSCULOSKELETAL: negative for muscle weakness    SKIN: negative for itching or rashes.    BEHAVIOR/PSYCH:  negative for poor appetite, increased appetite, decreased sleep and poor concentration    All other systems negative      PHYSICAL EXAM:    VITALS:  /82   Pulse (!) 106   Temp 98.5 °F (36.9 °C) (Temporal)   Resp 18   Ht 5' 2\" (1.575 m)   Wt (!) 316 lb (143.3 kg)   LMP 02/22/2021 (Approximate)   SpO2 98%   BMI 57.80 kg/m²     CONSTITUTIONAL:  awake, alert, cooperative, no apparent distress, and appears stated age    EYES: PERRLA, EOMI    LUNGS:  No increased work of breathing, no audible  wheezing    CARDIOVASCULAR:  regular rate and rhythm    ABDOMEN:  Soft non tender non distended     EXTREMITIES: no signs of clubbing or cyanosis. MUSCULOSKELETAL: negative for flaccid muscle tone or spastic movements. SKIN: gross examination reveals no signs of rashes, or diaphoresis. NEURO: Cranial nerves II-XII grossly intact. No signs of agitated mood.        Assessment/Plan:    Low back pain bilateral lower extremity pain for caudal epidural steroid injection

## 2023-02-28 NOTE — DISCHARGE INSTRUCTIONS
[facility] S[facility] Block/Radiofrequency  Home Going Instructions    1-Go home, rest for the remainder of the day  2-Please do not lift over 20 pounds the day of the injection  3-If you received sedation No: alcohol, driving, operating lawn mowers, plows, tractors or other dangerous equipment until next morning. Do not make important decisions or sign legal documents for 24 hours. You may experience light headedness, dizziness, nausea or sleepiness after sedation. Do not stay alone. A responsible adult must be with you for 24 hours. You could be nauseated from the medications you have received. Your IV site may be sore and bruised. 4-No dietary restrictions     5-Resume all medications the same day, blood thinners to be resumed 24 hours after injection if you were instructed to stop any. 6-Keep the surgical site clean and dry, you may shower the next morning and remove the      dressing. 7- No sitz baths, tub baths or hot tubs/swimming for 24 hours. 8- If you have any pain at the injection site(s), application of an ice pack to the area should be       helpful, 20 minutes on/20 minutes off for next 48 hours. 9- Call [facility] immediately at if you develop.   Fever greater than 100.4 F  Have bleeding or drainage from the puncture site  Have progressive Leg/arm numbness and or weakness  Loss of control of bowel and or bladder (wet/soil yourself)  Severe headache with inability to lift head  10-You may return to work the next day

## 2023-02-28 NOTE — ANESTHESIA POSTPROCEDURE EVALUATION
11/28/22 1907   Provider Notification   Provider Name/Title Etelvina Rivera   Method of Notification Electronic Page   Request Evaluate-Remote   Notification Reason Vital Signs Change  (Pt most recent BP was 148/96 and have been elevated since arriving on postpartum.  One severe range of 167/96 but then with recheck was 147/107. Thanks, Candy Graves RN *[id number])      Department of Anesthesiology  Postprocedure Note    Patient: Madelaine Mosley  MRN: [id number]  YOB: 1985  Date of evaluation: 2/28/2023      Procedure Summary     Date: 02/28/23 Room / Location: [facility] PROCEDURE ROOM 02 / [facility]    Anesthesia Start: 1758 Anesthesia Stop: 1254    Procedure: CAUDAL EPIDURAL STEROID INJECTION (Sacrum) Diagnosis:       Lumbar radiculopathy      (Lumbar radiculopathy [M54.16])    Surgeons: Triny Mendenhall DO Responsible Provider: Megan Brown MD    Anesthesia Type: MAC ASA Status: 3          Anesthesia Type: MAC    Jeet Phase I: Jeet Score: 10    Jeet Phase II: Jeet Score: 10      Anesthesia Post Evaluation    Patient location during evaluation: bedside  Patient participation: complete - patient participated  Level of consciousness: awake  Pain score: 0  Airway patency: patent  Nausea & Vomiting: no nausea and no vomiting  Complications: no  Cardiovascular status: blood pressure returned to baseline  Respiratory status: acceptable and spontaneous ventilation  Hydration status: euvolemic    antonio wagoner, APRN - CRNA

## 2023-02-28 NOTE — LETTER
[facility]  [address] [facility] [address]  Phone: [phone]             February 28, 2023    Patient: Lincoln Reddy   YOB: 1985   Date of Visit: 2/28/2023       To Whom It May Concern:    Juanpablo Marcial was seen and treated in our facility  beginning 2/28/2023 .  She may return to work 03/01/2023      Sincerely,       Jim Sorenson RN         Signature:__________________________________

## 2023-02-28 NOTE — ANESTHESIA PRE PROCEDURE
Department of Anesthesiology  Preprocedure Note       Name:  Richard Tarango   Age:  44 y.o.  :  1985                                          MRN:  [id number]         Date:  2023      Surgeon: Valeriano Bains):  Jia Breaux DO    Procedure: Procedure(s):  CAUDAL EPIDURAL STEROID INJECTION    Medications prior to admission:   Prior to Admission medications    Medication Sig Start Date End Date Taking? Authorizing Provider   melatonin 5 MG TABS tablet Take 5 mg by mouth nightly    Historical Provider, MD   tiotropium (Darus Cruel) 18 MCG inhalation capsule Inhale 1 capsule into the lungs daily 2/15/23   Pamella Nicole, DO   albuterol sulfate HFA (PROVENTIL HFA) 108 (90 Base) MCG/ACT inhaler Inhale 2 puffs into the lungs every 4 hours as needed for Wheezing 2/15/23 2/15/24  Pamella Nicole, DO   amLODIPine (NORVASC) 5 MG tablet Take 1 tablet by mouth daily 2/15/23   Pamella Nicole, DO   benazepril (LOTENSIN) 20 MG tablet Take 1 tablet by mouth daily 2/15/23   Pamella Nicole, DO   fluticasone (FLOVENT HFA) 44 MCG/ACT inhaler Inhale 2 puffs into the lungs 2 times daily 2/15/23   Pamella Nicole, DO   aspirin 81 MG chewable tablet Take 81 mg by mouth daily    Historical Provider, MD   cyclobenzaprine (FLEXERIL) 10 MG tablet Take 1 tablet by mouth 2 times daily as needed for Muscle spasms 2/8/23 3/10/23  Jia Breaux DO   pregabalin (LYRICA) 75 MG capsule Take 1 capsule by mouth in the morning, at noon, and at bedtime for 30 days.  Max Daily Amount: 225 mg 2/8/23 3/10/23  Jia Breaux DO   phenazopyridine (PYRIDIUM) 100 MG tablet Take 1 tablet by mouth 3 times daily as needed for Pain 22  VELMA Solano CNP   VALTREX 1 g tablet Take 2 tablets by mouth 2 times daily 22   Pamella Nicole, DO   docusate sodium (COLACE) 100 MG capsule Take 100 mg by mouth 2 times daily    Historical Provider, MD   ondansetron (ZOFRAN) 4 MG tablet Take 1 tablet by mouth 3 times daily as needed for Nausea or Vomiting 12/3/22   Brianne Manzanares DO   Vibegron (GEMTESA) 75 MG TABS Take 75 mg by mouth nightly    Historical Provider, MD   Acetaminophen (TYLENOL) 325 MG CAPS Take 650 mg by mouth every 4 hours as needed (pain)    Historical Provider, MD   ibuprofen (ADVIL;MOTRIN) 800 MG tablet Take 800 mg by mouth every 6 hours as needed for Pain    Historical Provider, MD       Current medications:    No current outpatient medications on file. No current facility-administered medications for this visit. Allergies: Allergies   Allergen Reactions    Keflex [Cephalexin]      Hives, rash      Prednisone Rash    Cymbalta [Duloxetine Hcl] Other (See Comments)     angry    Lamictal [Lamotrigine] Nausea And Vomiting    Neurontin [Gabapentin] Other (See Comments)     Makes me feel very weird       Problem List:    Patient Active Problem List   Diagnosis Code    Mild intermittent asthma without complication B81.85    Elevated blood pressure reading R03.0    Bipolar 1 disorder (formerly [facility]) F31.9    Anxiety F41.9    Suicidal ideation R45.851    Major depressive disorder, recurrent ([facility] F33.9    Depression with suicidal ideation F32. A, R45.851    Depression with anxiety F41.8    Strain of lumbar region S39.012A    Sciatica M54.30    Spasm of muscle M62.838    Menorrhagia with irregular cycle N92.1    Condylomata acuminata A63.0    Endometrial thickening on ultrasound R93.89    Vulvar abscess N76.4    Cervical stenosis (uterine cervix) N88.2    Chest pain R07.9    At risk for shortness of breath Z91.89    Dizziness R42    Type 2 diabetes mellitus without complication, without long-term current use of insulin (formerly [facility]) E11.9    Tobacco abuse Z72.0    Splenomegaly R16.1    Diabetes mellitus (formerly [facility]) E11.9    Abnormal uterine bleeding N93.9    Lumbar radiculopathy M54.16    Chronic bilateral low back pain with bilateral sciatica M54.42, M54.41, G89.29    Chronic pain syndrome G89.4    Lumbosacral spondylosis without myelopathy M47.817    Chronic obstructive pulmonary disease, unspecified COPD type ([facility]) J44.9    Primary hypertension I10    Mixed stress and urge urinary incontinence N39.46    Polyarthritis M13.0       Past Medical History:        Diagnosis Date    Anxiety     Arthritis     Asthma     well controlled, on inhalers    Back pain     Bipolar 1 disorder ([address]     COPD (chronic obstructive pulmonary disease) ([address]     Depression     Fibromyalgia     GERD (gastroesophageal reflux disease)     Hypertension     Macular edema     Cm    Migraines     Morbid obesity due to excess calories ([facility]     Neuropathy     PTSD (post-traumatic stress disorder)        Past Surgical History:        Procedure Laterality Date    ANTERIOR CRUCIATE LIGAMENT REPAIR Right      SECTION      ,     CHOLECYSTECTOMY      DILATION AND CURETTAGE OF UTERUS N/A 2019    DILATATION AND CURETTAGE HYSTEROSCOPY WITH REMOVAL OF CONDYLOMATA performed by Partha Raines MD at [address] [place] [address] N/A 2020    DILATATION AND CURETTAGE HYSTEROSCOPY, ATTEMPTED CERVICAL BIOPSY, MARIYA OF  LABIAL ABSCESS performed by Partha Raines MD at [place] [address] (CERVIX STATUS UNKNOWN)  2021    Robotic lysis of adhesions, total hysterectomy, bilateral salpingectomy, cystoscopy    INCONTINENCE SURGERY      OTHER SURGICAL HISTORY      repair anal fissure    PAIN MANAGEMENT PROCEDURE N/A 2022    CAUDAL EPIDURAL STEROID INJECTION  #1 performed by Sherryle Crimes, DO at [facility] N/A 2022    CAUDAL EPIDURAL STEROID INJECTION #2 performed by Sherryle Crimes, DO at [facility] N/A 10/13/2022    CAUDAL EPIDURAL STEROID INJECTION  #3 performed by Sherryle Crimes, DO at [facility] N/A 2022    CAUDAL EPIDURAL STEROID INJECTION performed by Jacoby Cobb DO at [address] [place] [address]         Social History:    Social History     Tobacco Use    Smoking status: Every Day     Packs/day: 2.00     Years: 20.00     Pack years: 40.00     Types: Cigarettes     Start date: 11/3/1995    Smokeless tobacco: Never   Substance Use Topics    Alcohol use: Not Currently                                Ready to quit: Not Answered  Counseling given: Not Answered      Vital Signs (Current): There were no vitals filed for this visit. BP Readings from Last 3 Encounters:   02/28/23 135/82   02/15/23 122/84   02/08/23 (!) 143/89       NPO Status:                                                                                 BMI:   Wt Readings from Last 3 Encounters:   02/28/23 (!) 316 lb (143.3 kg)   02/15/23 (!) 316 lb (143.3 kg)   02/08/23 (!) 319 lb (144.7 kg)     There is no height or weight on file to calculate BMI.    CBC:   Lab Results   Component Value Date/Time    WBC 10.6 01/16/2023 08:46 PM    RBC 4.46 01/16/2023 08:46 PM    HGB 13.6 01/16/2023 08:46 PM    HCT 40.2 01/16/2023 08:46 PM    MCV 90.1 01/16/2023 08:46 PM    RDW 13.3 01/16/2023 08:46 PM     01/16/2023 08:46 PM       CMP:   Lab Results   Component Value Date/Time     01/16/2023 08:46 PM    K 4.3 01/16/2023 08:46 PM    K 4.5 09/21/2022 02:50 PM     01/16/2023 08:46 PM    CO2 24 01/16/2023 08:46 PM    BUN 11 01/16/2023 08:46 PM    CREATININE 0.9 01/16/2023 08:46 PM    GFRAA >60 09/21/2022 02:50 PM    LABGLOM >60 01/16/2023 08:46 PM    GLUCOSE 129 01/16/2023 08:46 PM    PROT 6.8 01/16/2023 08:46 PM    CALCIUM 9.0 01/16/2023 08:46 PM    BILITOT <0.2 01/16/2023 08:46 PM    ALKPHOS 97 01/16/2023 08:46 PM    AST 14 01/16/2023 08:46 PM    ALT 18 01/16/2023 08:46 PM       POC Tests: No results for input(s): POCGLU, POCNA, POCK, POCCL, POCBUN, POCHEMO, POCHCT in the last 72 hours.     Coags:   Lab Results Component Value Date/Time    PROTIME 10.4 05/12/2020 08:23 PM    INR 0.9 05/12/2020 08:23 PM    APTT 32.2 05/12/2020 08:23 PM       HCG (If Applicable):   Lab Results   Component Value Date    PREGTESTUR NEGATIVE 09/14/2020        ABGs: No results found for: PHART, PO2ART, YVN4QSX, XPH5BNI, BEART, L4HQSESN     Type & Screen (If Applicable):  Lab Results   Component Value Date    LABABO O 02/15/2021       Drug/Infectious Status (If Applicable):  No results found for: HIV, HEPCAB    COVID-19 Screening (If Applicable):   Lab Results   Component Value Date/Time    COVID19 Not Detected 09/20/2022 10:18 AM    COVID19 Not Detected 11/23/2020 03:45 PM           Anesthesia Evaluation  Patient summary reviewed and Nursing notes reviewed no history of anesthetic complications:   Airway: Mallampati: III  TM distance: >3 FB   Neck ROM: limited  Mouth opening: > = 3 FB   Dental:          Pulmonary:   (+) COPD: mild and no interval change,  decreased breath sounds: bilateral asthma (Mild intermittent - denies recent exacerbations or increased inhaler usage): current smoker    (-) sleep apnea          Patient did not smoke on day of surgery.                  Cardiovascular:  Exercise tolerance: good (>4 METS),   (+) hypertension: mild and no interval change,       ECG reviewed  Rhythm: regular  Rate: abnormal           Beta Blocker:  Not on Beta Blocker      ROS comment: EKG:  Sinus tachycardia  Otherwise normal ECG  When compared with ECG of 03-NOV-2021 18:55,  No significant change was found    PE comment: Tachycardia   Neuro/Psych:   (+) neuromuscular disease (Neuropathy):, headaches: migraine headaches, psychiatric history (Bipolar with suicidal ideation):depression/anxiety  (PTSD)             ROS comment: Strain of lumbar region  Sciatica  Cervical stenosis  Lumbar radiculopathy  Chronic bilateral low back pain with bilateral sciatica  Chronic pain syndrome  Lumbosacral spondylosis without myelopathy  Glaucoma  Macular edema  Ambulatory dysfunction secondary to pain, weakness, and unsteady gait necessitating a cane for ambulation assistance  PTSD GI/Hepatic/Renal:   (+) GERD: no interval change, morbid obesity (SMO with a BMI of 55 kg/m2)         ROS comment: Splenomegaly  Mixed stress and urge urinary incontinence. Endo/Other:    (+) Diabetes (A1C 5.9% in March of '22)Type II DM, , : arthritis (Fibromyalgia): OA., . Pt had no PAT visit        ROS comment: Condyloma acuminata  Endometrial thickening Abdominal:             Vascular: negative vascular ROS. Other Findings:             Anesthesia Plan      MAC     ASA 3       Induction: intravenous. Anesthetic plan and risks discussed with patient and spouse. Plan discussed with CRNA.                 Emilio Garcia MD   2/28/2023    Note reviewed and agree with merari Garcia MD

## 2023-02-28 NOTE — OP NOTE
2023    Patient: Lincoln Reddy  :  1985  Age:  44 y.o. Sex:  female     PRE-OPERATIVE DIAGNOSIS: Lumbar radiculopathy    POST-OPERATIVE DIAGNOSIS: Same. PROCEDURE PERFORMED: Therapeutic Caudal Epidural done under fluoroscopic guidance. SURGEON:   INGRID Estevez. ANESTHESIA: MAC    ESTIMATED BLOOD LOSS: None. BRIEF HISTORY: Lincoln Reddy comes in today for the therapeutic caudal epidural steroid injection under fluorsoscopic guidance. After discussing the potential risks and benefits of the procedure with the patient  Farhat Eaton did request that we proceed. A complete History & Physical was reviewed and it is unchanged. DESCRIPTION OF PROCEDURE:    After confirming written and informed consent, a time-out was performed and Anjanas name and date of birth, the procedure to be performed as well as the plan for the location of the needle insertion were confirmed. Patient was brought into the procedure room and was placed in the prone position on a fluoroscopy table. Standard monitors were placed and vital signs were observed throughout the procedure. The lumbosacral and caudal area were prepped with chloraprep and draped in a sterile manner. The sacral hiatus was identified and marked under AP fluoroscopy. A sterile gauze was placed in the midgluteal cleft for increased sterility. The overlying skin and subcutaneous tissues were anesthetized with 0.5% Lidocaine. Under lateral fluoroscopic guidance, a # 22 gauge 3.5 inch spinal needle was advanced into the sacral hiatus . After the needle passed through the sacrococcygeal ligament, the needle angle was advanced slightly. There was no evidence of paresthesia throughout the needle placement. After negative aspiration for blood and CSF, epidural spread was confirmed with injection of 2 cc of Isovue-M 300 in both live lateral and live AP fluoroscopy.  A solution consisting of 0.5% Lidocaine and 40 mg DepoMedrol, total of 15 cc, was easily injected . There was a clear outline of the epidural space and visualization of the sacral roots. The needle was then removed and a sterile Band-Aid was applied to the puncture site. Disposition the patient tolerated the procedure well and there were no complications . Vital signs remained stable throughout the procedure. The patient was escorted to the recovery area where they remained until discharge and written discharge instructions for the procedure were given. Plan: Harshil Acosta will return to our pain management center as scheduled.      Kalani Castro, DO

## 2023-03-02 ENCOUNTER — TELEPHONE (OUTPATIENT)
Dept: PHARMACY | Facility: CLINIC | Age: 38
End: 2023-03-02

## 2023-03-02 NOTE — TELEPHONE ENCOUNTER
[facility]: ADHERENCE REVIEW  Identified care gap per [employer]: fills at [address] : ACE/ARB adherence    Last Visit: 02.15.23    Patient identified as LIS = 2, therefore patient may be able to receive a 90-day supply for the same cost as a 30-day supply      ASSESSMENT  ACE/ARB ADHERENCE    Insurance Records claims through 02.20.23 (Prior Year Tarik Sommersandra = FAILED; YTD Tarik Lydia = Filled only once; Potential Fail Date: 05.07.23 ):   Benazepril last filled on 01.30.23 for 30 day supply. Next refill due: 03.01.23    Per  [facility] Portal:  (same as above). BP Readings from Last 3 Encounters:   02/28/23 121/71   02/15/23 122/84   02/08/23 (!) 143/89     Estimated Creatinine Clearance: 117 mL/min (based on SCr of 0.9 mg/dL). PLAN  The following are interventions that have been identified:   - Patient overdue refilling Benazepril and active on home medication list.    -eligible for 90/100 day supply    Reached patient to review. Patient confirmed she is taking medication as directed without issues/concerns. Patient states she is having a 30 day supply delivered to her home tomorrow, and she prefers to receive 30 day supply, due to her having small children in the home and not wanting that much medication on hand at one time.          Future Appointments   Date Time Provider Niesha Yarbrough   3/28/2023 10:30 AM SCHEDULE, [facility] AUDIOLOG[facility] AUDIO Dimitrios HOD   4/5/2023 10:20 AM Sierra Robins DO BDM RHEUM [facility]   5/5/2023 11:30 AM Kalani Castro DO BDM PAIN MAR [facility]   5/17/2023 10:15 AM DO Daniel Villasenortwbenjie PC [facility]   5/23/2023  9:00 AM Camila Watts MD Surg Weight [facility], [address] [facility]  Phone: toll free [phone]        ===================================================================    For Pharmacy Admin Tracking Only    Program: [address] in place:  No  Gap Closed?: No   Time Spent (min): 15

## 2023-03-08 ENCOUNTER — TELEPHONE (OUTPATIENT)
Dept: PAIN MANAGEMENT | Age: 38
End: 2023-03-08

## 2023-03-08 NOTE — TELEPHONE ENCOUNTER
Ashish Browne called said she is taking lyrica 75mg TID. She has been prescirbed Huang orlistat 50 mg for weight loss. She is wondering if it is ok to take the two medication together. Please advise.

## 2023-03-15 ENCOUNTER — OFFICE VISIT (OUTPATIENT)
Dept: FAMILY MEDICINE CLINIC | Age: 38
End: 2023-03-15
Payer: MEDICARE

## 2023-03-15 VITALS
WEIGHT: 293 LBS | RESPIRATION RATE: 20 BRPM | HEIGHT: 62 IN | SYSTOLIC BLOOD PRESSURE: 123 MMHG | DIASTOLIC BLOOD PRESSURE: 77 MMHG | OXYGEN SATURATION: 99 % | HEART RATE: 138 BPM | BODY MASS INDEX: 53.92 KG/M2 | TEMPERATURE: 95.9 F

## 2023-03-15 DIAGNOSIS — K21.9 GASTROESOPHAGEAL REFLUX DISEASE, UNSPECIFIED WHETHER ESOPHAGITIS PRESENT: ICD-10-CM

## 2023-03-15 DIAGNOSIS — I10 PRIMARY HYPERTENSION: ICD-10-CM

## 2023-03-15 DIAGNOSIS — R53.83 FATIGUE, UNSPECIFIED TYPE: Primary | ICD-10-CM

## 2023-03-15 DIAGNOSIS — R11.0 NAUSEA: ICD-10-CM

## 2023-03-15 DIAGNOSIS — J45.20 MILD INTERMITTENT ASTHMA WITHOUT COMPLICATION: ICD-10-CM

## 2023-03-15 PROCEDURE — G8427 DOCREV CUR MEDS BY ELIG CLIN: HCPCS | Performed by: FAMILY MEDICINE

## 2023-03-15 PROCEDURE — G8482 FLU IMMUNIZE ORDER/ADMIN: HCPCS | Performed by: FAMILY MEDICINE

## 2023-03-15 PROCEDURE — 99214 OFFICE O/P EST MOD 30 MIN: CPT | Performed by: FAMILY MEDICINE

## 2023-03-15 PROCEDURE — 3078F DIAST BP <80 MM HG: CPT | Performed by: FAMILY MEDICINE

## 2023-03-15 PROCEDURE — 4004F PT TOBACCO SCREEN RCVD TLK: CPT | Performed by: FAMILY MEDICINE

## 2023-03-15 PROCEDURE — G8417 CALC BMI ABV UP PARAM F/U: HCPCS | Performed by: FAMILY MEDICINE

## 2023-03-15 PROCEDURE — 3074F SYST BP LT 130 MM HG: CPT | Performed by: FAMILY MEDICINE

## 2023-03-15 RX ORDER — FAMOTIDINE 20 MG/1
20 TABLET, FILM COATED ORAL 2 TIMES DAILY
Qty: 60 TABLET | Refills: 3 | Status: SHIPPED | OUTPATIENT
Start: 2023-03-15

## 2023-03-15 RX ORDER — ONDANSETRON 4 MG/1
4 TABLET, FILM COATED ORAL 3 TIMES DAILY PRN
Qty: 15 TABLET | Refills: 0 | Status: SHIPPED | OUTPATIENT
Start: 2023-03-15

## 2023-03-18 PROBLEM — K21.9 GASTROESOPHAGEAL REFLUX DISEASE: Status: ACTIVE | Noted: 2023-03-18

## 2023-03-18 ASSESSMENT — ENCOUNTER SYMPTOMS
RHINORRHEA: 0
PHOTOPHOBIA: 0
COUGH: 0
EYE REDNESS: 0
EYE DISCHARGE: 0
SINUS PAIN: 0
SHORTNESS OF BREATH: 0

## 2023-03-18 NOTE — PROGRESS NOTES
3/18/2023    [facility]    Chief Complaint   Patient presents with    Fatigue       HPI  History was obtained from patient. Ludwig Rodriguez is a 44 y.o. female who presents today with the followin. Fatigue, unspecified type    2. Gastroesophageal reflux disease, unspecified whether esophagitis present    3. Nausea    4. Primary hypertension    5. Mild intermittent asthma without complication    Patient presents today follow-up of chronic medical problems including hypertension asthma and GERD. Her chief complaint today  fatigue. Patient states this has been going on for few months. Patient is taking all of her medications for her blood pressure and asthma. She is also on Lyrica for chronic low back pain. Has lost 5 pounds in the last month. REVIEW OF SYMPTOMS    Review of Systems   Constitutional:  Positive for fatigue. Negative for chills and fever. HENT:  Negative for congestion, mouth sores, postnasal drip, rhinorrhea and sinus pain. Eyes:  Negative for photophobia, discharge and redness. Respiratory:  Negative for cough and shortness of breath. Cardiovascular:  Negative for chest pain. Genitourinary:  Negative for difficulty urinating, dysuria, hematuria and urgency. Neurological:  Negative for headaches. Psychiatric/Behavioral:  Negative for sleep disturbance.       PAST MEDICAL HISTORY  Past Medical History:   Diagnosis Date    Anxiety     Arthritis     Asthma     well controlled, on inhalers    Back pain     Bipolar 1 disorder ([facility] [address]     COPD (chronic obstructive pulmonary disease) (HCC)     Depression     Fibromyalgia     GERD (gastroesophageal reflux disease)     Hypertension     Macular edema     Cm    Migraines     Morbid obesity due to excess calories ([facility])     Neuropathy     PTSD (post-traumatic stress disorder)        FAMILY HISTORY  Family History   Problem Relation Age of Onset    Diabetes Mother     Cancer Mother         ovarian    Diabetes Father     Cancer Sister NHL    Diabetes Sister     High Blood Pressure Brother     Breast Cancer Maternal Aunt     Uterine Cancer Neg Hx     Colon Cancer Neg Hx        SOCIAL HISTORY  Social History     Socioeconomic History    Marital status:      Spouse name: Roxane Addison    Number of children: 2    Years of education: 13    Highest education level: None   Occupational History    Occupation: disability-, LPN   Tobacco Use    Smoking status: Every Day     Packs/day: 2.00     Years: 20.00     Pack years: 40.00     Types: Cigarettes     Start date: 11/3/1995    Smokeless tobacco: Never   Vaping Use    Vaping Use: Former    Substances: Never   Substance and Sexual Activity    Alcohol use: Not Currently    Drug use: Not Currently     Types: Marijuana Trudy Cortes     Comment: medical marijuana     Social Determinants of Health     Financial Resource Strain: Low Risk     Difficulty of Paying Living Expenses: Not hard at all   Food Insecurity: No Food Insecurity    Worried About Running Out of Food in the Last Year: Never true    [address] in the Last Year: Never true   Transportation Needs: No Transportation Needs    Lack of Transportation (Medical): No    Lack of Transportation (Non-Medical): No   Physical Activity: Sufficiently Active    Days of Exercise per Week: 7 days    Minutes of Exercise per Session: 60 min   Stress: No Stress Concern Present    Feeling of Stress : Only a little   Social Connections: Moderately Integrated    Frequency of Communication with Friends and Family: More than three times a week    Frequency of Social Gatherings with Friends and Family: More than three times a week    Attends Baptism Services: More than 4 times per year    Active Member of [employer] Group or Organizations: No    Attends Club or Organization Meetings: Never    Marital Status:    Intimate Partner Violence: Not At Risk    Fear of Current or Ex-Partner: No    Emotionally Abused: No    Physically Abused: No    Sexually Abused:  No Housing Stability: Low Risk     Unable to Pay for Housing in the Last Year: No    Number of Places Lived in the Last Year: 1    Unstable Housing in the Last Year: No        SURGICAL HISTORY  Past Surgical History:   Procedure Laterality Date    ANTERIOR CRUCIATE LIGAMENT REPAIR Right      SECTION      ,     CHOLECYSTECTOMY      DILATION AND CURETTAGE OF UTERUS N/A 2019    DILATATION AND CURETTAGE HYSTEROSCOPY WITH REMOVAL OF CONDYLOMATA performed by Henderson Cowden, MD at [place] [address] N/A 2020    DILATATION AND CURETTAGE HYSTEROSCOPY, ATTEMPTED CERVICAL BIOPSY, MARIYA OF  LABIAL ABSCESS performed by Henderson Cowden, MD at [address] (CERVIX STATUS UNKNOWN)  2021    Robotic lysis of adhesions, total hysterectomy, bilateral salpingectomy, cystoscopy    INCONTINENCE SURGERY      OTHER SURGICAL HISTORY      repair anal fissure    PAIN MANAGEMENT PROCEDURE N/A 2022    CAUDAL EPIDURAL STEROID INJECTION  #1 performed by Bere Maurre DO at [address] N/A 2022    CAUDAL EPIDURAL STEROID INJECTION #2 performed by Bere Maurer DO at [address] N/A 10/13/2022    CAUDAL EPIDURAL STEROID INJECTION  #3 performed by Bere Maurer DO at [address] N/A 2022    CAUDAL EPIDURAL STEROID INJECTION performed by Bere Maurer DO at [address] N/A 2023    CAUDAL EPIDURAL STEROID INJECTION performed by Bere Maurer DO at [address] [facility]  Current Outpatient Medications   Medication Sig Dispense Refill    ondansetron (ZOFRAN) 4 MG tablet Take 1 tablet by mouth 3 times daily as needed for Nausea or Vomiting 15 tablet 0    famotidine (PEPCID) 20 MG tablet Take 1 tablet by mouth 2 times daily 60 tablet 3    melatonin 5 MG TABS tablet Take 5 mg by mouth nightly      tiotropium (SPIRIVA HANDIHALER) 18 MCG inhalation capsule Inhale 1 capsule into the lungs daily 30 capsule 5    albuterol sulfate HFA (PROVENTIL HFA) 108 (90 Base) MCG/ACT inhaler Inhale 2 puffs into the lungs every 4 hours as needed for Wheezing 1 each 5    amLODIPine (NORVASC) 5 MG tablet Take 1 tablet by mouth daily 90 tablet 1    benazepril (LOTENSIN) 20 MG tablet Take 1 tablet by mouth daily 90 tablet 1    fluticasone (FLOVENT HFA) 44 MCG/ACT inhaler Inhale 2 puffs into the lungs 2 times daily 10.6 g 5    aspirin 81 MG chewable tablet Take 81 mg by mouth daily      VALTREX 1 g tablet Take 2 tablets by mouth 2 times daily 4 tablet 5    docusate sodium (COLACE) 100 MG capsule Take 100 mg by mouth 2 times daily      Vibegron (GEMTESA) 75 MG TABS Take 75 mg by mouth nightly      Acetaminophen (TYLENOL) 325 MG CAPS Take 650 mg by mouth every 4 hours as needed (pain)      ibuprofen (ADVIL;MOTRIN) 800 MG tablet Take 800 mg by mouth every 6 hours as needed for Pain      pregabalin (LYRICA) 75 MG capsule Take 1 capsule by mouth in the morning, at noon, and at bedtime for 30 days. Max Daily Amount: 225 mg 90 capsule 2     No current facility-administered medications for this visit. ALLERGIES  Allergies   Allergen Reactions    Keflex [Cephalexin]      Hives, rash      Prednisone Rash    Cymbalta [Duloxetine Hcl] Other (See Comments)     angry    Lamictal [Lamotrigine] Nausea And Vomiting    Neurontin [Gabapentin] Other (See Comments)     Makes me feel very weird       PHYSICAL EXAM    /77   Pulse (!) 138   Temp (!) 95.9 °F (35.5 °C) (Temporal)   Resp 20   Ht 5' 2\" (1.575 m)   Wt (!) 311 lb (141.1 kg)   LMP 02/22/2021 (Approximate)   SpO2 99%   BMI 56.88 kg/m²     Physical Exam  Vitals and nursing note reviewed. Constitutional:       Appearance: Normal appearance. She is morbidly obese. HENT:      Nose: No congestion or rhinorrhea.       Mouth/Throat:      Mouth: Mucous membranes are moist.      Pharynx: Oropharynx is clear. Eyes:      Conjunctiva/sclera: Conjunctivae normal.   Cardiovascular:      Rate and Rhythm: Normal rate and regular rhythm. Heart sounds: Normal heart sounds. Pulmonary:      Effort: Pulmonary effort is normal.      Breath sounds: Normal breath sounds. Abdominal:      Palpations: Abdomen is soft. Tenderness: There is no abdominal tenderness. Musculoskeletal:      Cervical back: Neck supple. Skin:     General: Skin is warm. Neurological:      Mental Status: She is alert and oriented to person, place, and time. Psychiatric:         Mood and Affect: Mood normal.       ASSESSMENT & PLAN    Song Arredondo was seen today for fatigue. Diagnoses and all orders for this visit:    Fatigue, unspecified type  Patient has not had anemia or thyroid problems in the past. She has had no change in her medications. She has been actively trying to lose weight which may have some bearing on her fatigue. Gastroesophageal reflux disease, unspecified whether esophagitis present  -     famotidine (PEPCID) 20 MG tablet; Take 1 tablet by mouth 2 times daily  Patient is started on Pepcid 20 mg twice a day for her GERD and see if this does not improve her nausea. Nausea  -     ondansetron (ZOFRAN) 4 MG tablet; Take 1 tablet by mouth 3 times daily as needed for Nausea or Vomiting    Primary hypertension  Patient will continue with amlodipine and benazepril as her blood pressure is well controlled. Mild intermittent asthma without complication  Patient will continue with Spiriva and Flovent and albuterol as needed. Return in about 9 weeks (around 5/17/2023). Electronically signed by Jude Valle.  Nunu Nichole DO on 3/18/2023

## 2023-03-21 ENCOUNTER — TELEPHONE (OUTPATIENT)
Dept: FAMILY MEDICINE CLINIC | Age: 38
End: 2023-03-21

## 2023-03-21 NOTE — TELEPHONE ENCOUNTER
Pt called in and would like to know if something for anxiety and depression. Pt does see a counsler for this. please advise

## 2023-03-22 NOTE — TELEPHONE ENCOUNTER
We can discuss this at her next appointment or if she does not wait that long she can make an appointment specifically for this problem.

## 2023-03-22 NOTE — TELEPHONE ENCOUNTER
Pt called stated she is seeing a counselor at [address] [place] [address] in [facility], but that counselor do not prescribe meds.   Please advise

## 2023-03-23 ENCOUNTER — TELEPHONE (OUTPATIENT)
Dept: AUDIOLOGY | Age: 38
End: 2023-03-23

## 2023-03-23 NOTE — TELEPHONE ENCOUNTER
Called patient and spoke with her that we need to reschedule her 3/28 appt for VNG due to staff family emergency.      Rescheduled for 3/31/23 at 8am  arrival 745 am

## 2023-03-31 ENCOUNTER — HOSPITAL ENCOUNTER (OUTPATIENT)
Dept: AUDIOLOGY | Age: 38
Discharge: HOME | End: 2023-03-31
Payer: MEDICARE

## 2023-03-31 PROCEDURE — 92540 BASIC VESTIBULAR EVALUATION: CPT | Performed by: AUDIOLOGIST

## 2023-03-31 PROCEDURE — 92537 CALORIC VSTBLR TEST W/REC: CPT | Performed by: AUDIOLOGIST

## 2023-03-31 NOTE — PROGRESS NOTES
VNG EVALUATION    REASON FOR REFERRAL:  This patient was referred for VNG testing by Cara Sims CNP due to vertigo. Patient reports that she experiences room spinning dizziness. Patient reports that she falls a lot and uses a cane to walk. She feels sensation like someone is pushing her. RESULTS:  Bithermal caloric irrigations revealed a Reduced Vestibular Response (RVR) of 27% in the left ear. Directional preponderance and fixation suppression were within normal limits. No irregular eye movements were recorded during saccades, pendular tracking, or optokinetic testing. No significant nystagmus was recorded during gaze or positional testing. IMPRESSION:  VNG test results indicates a left sided unilateral weakness. Oculomotor and positional test results were within normal limits. If I can be of further assistance or provide additional information, please do not hesitate to contact this office.     Thank you for the referral.      __________________________________    Katalina Negrete/CCC-A  [place] Lic # [id number]

## 2023-04-03 ENCOUNTER — OFFICE VISIT (OUTPATIENT)
Dept: FAMILY MEDICINE CLINIC | Age: 38
End: 2023-04-03
Payer: MEDICARE

## 2023-04-03 VITALS
SYSTOLIC BLOOD PRESSURE: 120 MMHG | WEIGHT: 293 LBS | HEIGHT: 62 IN | DIASTOLIC BLOOD PRESSURE: 86 MMHG | RESPIRATION RATE: 19 BRPM | TEMPERATURE: 97.5 F | BODY MASS INDEX: 53.92 KG/M2 | OXYGEN SATURATION: 98 % | HEART RATE: 117 BPM

## 2023-04-03 DIAGNOSIS — J44.9 CHRONIC OBSTRUCTIVE PULMONARY DISEASE, UNSPECIFIED COPD TYPE (HCC): ICD-10-CM

## 2023-04-03 DIAGNOSIS — E66.01 CLASS 3 SEVERE OBESITY DUE TO EXCESS CALORIES WITHOUT SERIOUS COMORBIDITY WITH BODY MASS INDEX (BMI) OF 50.0 TO 59.9 IN ADULT (HCC): ICD-10-CM

## 2023-04-03 DIAGNOSIS — F41.9 ANXIETY: ICD-10-CM

## 2023-04-03 DIAGNOSIS — I10 PRIMARY HYPERTENSION: ICD-10-CM

## 2023-04-03 DIAGNOSIS — E11.9 TYPE 2 DIABETES MELLITUS WITHOUT COMPLICATION, WITHOUT LONG-TERM CURRENT USE OF INSULIN (HCC): Primary | ICD-10-CM

## 2023-04-03 DIAGNOSIS — F32.A DEPRESSION, UNSPECIFIED DEPRESSION TYPE: ICD-10-CM

## 2023-04-03 DIAGNOSIS — K21.9 GASTROESOPHAGEAL REFLUX DISEASE, UNSPECIFIED WHETHER ESOPHAGITIS PRESENT: ICD-10-CM

## 2023-04-03 LAB — HBA1C MFR BLD: 6.4 %

## 2023-04-03 PROCEDURE — 83036 HEMOGLOBIN GLYCOSYLATED A1C: CPT | Performed by: FAMILY MEDICINE

## 2023-04-03 PROCEDURE — G8417 CALC BMI ABV UP PARAM F/U: HCPCS | Performed by: FAMILY MEDICINE

## 2023-04-03 PROCEDURE — 82044 UR ALBUMIN SEMIQUANTITATIVE: CPT | Performed by: FAMILY MEDICINE

## 2023-04-03 PROCEDURE — 3074F SYST BP LT 130 MM HG: CPT | Performed by: FAMILY MEDICINE

## 2023-04-03 PROCEDURE — 4004F PT TOBACCO SCREEN RCVD TLK: CPT | Performed by: FAMILY MEDICINE

## 2023-04-03 PROCEDURE — 3023F SPIROM DOC REV: CPT | Performed by: FAMILY MEDICINE

## 2023-04-03 PROCEDURE — 3078F DIAST BP <80 MM HG: CPT | Performed by: FAMILY MEDICINE

## 2023-04-03 PROCEDURE — 99214 OFFICE O/P EST MOD 30 MIN: CPT | Performed by: FAMILY MEDICINE

## 2023-04-03 PROCEDURE — G8427 DOCREV CUR MEDS BY ELIG CLIN: HCPCS | Performed by: FAMILY MEDICINE

## 2023-04-03 PROCEDURE — 3044F HG A1C LEVEL LT 7.0%: CPT | Performed by: FAMILY MEDICINE

## 2023-04-03 PROCEDURE — 2022F DILAT RTA XM EVC RTNOPTHY: CPT | Performed by: FAMILY MEDICINE

## 2023-04-03 RX ORDER — BUPROPION HYDROCHLORIDE 150 MG/1
150 TABLET ORAL EVERY MORNING
Qty: 30 TABLET | Refills: 1 | Status: SHIPPED | OUTPATIENT
Start: 2023-04-03

## 2023-04-04 ENCOUNTER — OFFICE VISIT (OUTPATIENT)
Dept: ENT CLINIC | Age: 38
End: 2023-04-04
Payer: MEDICARE

## 2023-04-04 VITALS — HEIGHT: 62 IN | BODY MASS INDEX: 53.92 KG/M2 | WEIGHT: 293 LBS

## 2023-04-04 DIAGNOSIS — H83.2X2 VESTIBULAR DISEQUILIBRIUM INVOLVING LEFT INNER EAR: Primary | ICD-10-CM

## 2023-04-04 DIAGNOSIS — H93.13 TINNITUS OF BOTH EARS: ICD-10-CM

## 2023-04-04 PROCEDURE — 99213 OFFICE O/P EST LOW 20 MIN: CPT | Performed by: NURSE PRACTITIONER

## 2023-04-04 PROCEDURE — G8417 CALC BMI ABV UP PARAM F/U: HCPCS | Performed by: NURSE PRACTITIONER

## 2023-04-04 PROCEDURE — 4004F PT TOBACCO SCREEN RCVD TLK: CPT | Performed by: NURSE PRACTITIONER

## 2023-04-04 PROCEDURE — G8427 DOCREV CUR MEDS BY ELIG CLIN: HCPCS | Performed by: NURSE PRACTITIONER

## 2023-04-04 ASSESSMENT — ENCOUNTER SYMPTOMS
RHINORRHEA: 0
SINUS PRESSURE: 0
SINUS PAIN: 0
SHORTNESS OF BREATH: 0
STRIDOR: 0
RESPIRATORY NEGATIVE: 1
EYES NEGATIVE: 1

## 2023-04-04 NOTE — PROGRESS NOTES
Skin: Negative. Neurological:  Positive for dizziness. Hematological: Negative. Psychiatric/Behavioral: Negative. Ht 5' 2\" (1.575 m)   Wt (!) 315 lb (142.9 kg)   LMP 02/22/2021 (Approximate)   BMI 57.61 kg/m²   Physical Exam  Constitutional:       Appearance: Normal appearance. She is obese. HENT:      Head: Normocephalic. Right Ear: Tympanic membrane, ear canal and external ear normal.      Left Ear: Tympanic membrane, ear canal and external ear normal.      Nose: Nose normal. No rhinorrhea. Right Turbinates: Not pale. Left Turbinates: Not pale. Mouth/Throat:      Lips: Pink. Mouth: Mucous membranes are moist.      Pharynx: Oropharynx is clear. Eyes:      Conjunctiva/sclera: Conjunctivae normal.      Pupils: Pupils are equal, round, and reactive to light. Cardiovascular:      Rate and Rhythm: Normal rate and regular rhythm. Pulses: Normal pulses. Pulmonary:      Effort: Pulmonary effort is normal. No respiratory distress. Breath sounds: No stridor. Musculoskeletal:      Cervical back: Normal range of motion. No rigidity. No muscular tenderness. Skin:     General: Skin is warm and dry. Neurological:      General: No focal deficit present. Mental Status: She is alert and oriented to person, place, and time. Psychiatric:         Mood and Affect: Mood normal.         Behavior: Behavior normal.         Thought Content: Thought content normal.         Judgment: Judgment normal.     VNG:  VNG EVALUATION     REASON FOR REFERRAL:  This patient was referred for VNG testing by Sydni Mott CNP due to vertigo. Patient reports that she experiences room spinning dizziness. Patient reports that she falls a lot and uses a cane to walk. She feels sensation like someone is pushing her. RESULTS:  Bithermal caloric irrigations revealed a Reduced Vestibular Response (RVR) of 27% in the left ear.     Directional preponderance and fixation suppression were

## 2023-04-05 ENCOUNTER — OFFICE VISIT (OUTPATIENT)
Dept: RHEUMATOLOGY | Age: 38
End: 2023-04-05
Payer: MEDICARE

## 2023-04-05 VITALS — WEIGHT: 293 LBS | BODY MASS INDEX: 53.92 KG/M2 | HEIGHT: 62 IN

## 2023-04-05 DIAGNOSIS — M13.0 POLYARTHRITIS: ICD-10-CM

## 2023-04-05 DIAGNOSIS — M35.3 POLYMYALGIA (HCC): ICD-10-CM

## 2023-04-05 DIAGNOSIS — M13.0 POLYARTHRITIS: Primary | ICD-10-CM

## 2023-04-05 DIAGNOSIS — R76.8 RHEUMATOID FACTOR POSITIVE: ICD-10-CM

## 2023-04-05 LAB
CRP SERPL HS-MCNC: 2.4 MG/DL (ref 0–0.4)
ERYTHROCYTE [SEDIMENTATION RATE] IN BLOOD BY WESTERGREN METHOD: 33 MM/HR (ref 0–20)
RHEUMATOID FACT SER NEPH-ACNC: 19 IU/ML (ref 0–13)

## 2023-04-05 PROCEDURE — G8427 DOCREV CUR MEDS BY ELIG CLIN: HCPCS | Performed by: INTERNAL MEDICINE

## 2023-04-05 PROCEDURE — 99214 OFFICE O/P EST MOD 30 MIN: CPT | Performed by: INTERNAL MEDICINE

## 2023-04-05 PROCEDURE — G8417 CALC BMI ABV UP PARAM F/U: HCPCS | Performed by: INTERNAL MEDICINE

## 2023-04-05 PROCEDURE — 4004F PT TOBACCO SCREEN RCVD TLK: CPT | Performed by: INTERNAL MEDICINE

## 2023-04-05 ASSESSMENT — ENCOUNTER SYMPTOMS
DIARRHEA: 0
NAUSEA: 0
ABDOMINAL PAIN: 0
BACK PAIN: 1
VOMITING: 0
TROUBLE SWALLOWING: 0
COLOR CHANGE: 0
COUGH: 0

## 2023-04-05 NOTE — PROGRESS NOTES
01/16/2023    K 4.3 01/16/2023     01/16/2023    CO2 24 01/16/2023    BUN 11 01/16/2023    CREATININE 0.9 01/16/2023    GLUCOSE 129 (H) 01/16/2023    CALCIUM 9.0 01/16/2023    PROT 6.8 01/16/2023    LABALBU 3.9 01/16/2023    BILITOT <0.2 01/16/2023    ALKPHOS 97 01/16/2023    AST 14 01/16/2023    ALT 18 01/16/2023    LABGLOM >60 01/16/2023    GFRAA >60 09/21/2022     Lab Results   Component Value Date    AMANDA NEGATIVE 02/22/2022     No results found for: RHEUMFACTOR  Lab Results   Component Value Date    SEDRATE 35 (H) 09/27/2022     Lab Results   Component Value Date    CRP 1.3 (H) 09/27/2022            An electronic signature was used to authenticate this note. This note was generated with a voice recognition dictation system. Please disregard any errors or omission which have escaped my review.     --Balbina Maxwell, DO

## 2023-04-08 PROBLEM — E66.01 CLASS 3 SEVERE OBESITY DUE TO EXCESS CALORIES WITHOUT SERIOUS COMORBIDITY WITH BODY MASS INDEX (BMI) OF 50.0 TO 59.9 IN ADULT (HCC): Status: ACTIVE | Noted: 2023-04-08

## 2023-04-08 PROBLEM — E66.813 CLASS 3 SEVERE OBESITY DUE TO EXCESS CALORIES WITHOUT SERIOUS COMORBIDITY WITH BODY MASS INDEX (BMI) OF 50.0 TO 59.9 IN ADULT: Status: ACTIVE | Noted: 2023-04-08

## 2023-04-08 ASSESSMENT — ENCOUNTER SYMPTOMS
PHOTOPHOBIA: 0
COUGH: 0
EYE REDNESS: 0
RHINORRHEA: 0
SHORTNESS OF BREATH: 0
SINUS PAIN: 0
GASTROINTESTINAL NEGATIVE: 1
EYE DISCHARGE: 0

## 2023-04-08 NOTE — PROGRESS NOTES
Ht 5' 2\" (1.575 m)   Wt (!) 315 lb (142.9 kg)   LMP 02/22/2021 (Approximate)   SpO2 98%   BMI 57.61 kg/m²     Physical Exam  Vitals and nursing note reviewed. Constitutional:       Appearance: Normal appearance. She is obese. Eyes:      General: No scleral icterus. Conjunctiva/sclera: Conjunctivae normal.   Neurological:      Mental Status: She is alert and oriented to person, place, and time. Psychiatric:         Mood and Affect: Mood normal.       ASSESSMENT & PLAN    Nidia Pagan was seen today for anxiety and depression. Diagnoses and all orders for this visit:    Type 2 diabetes mellitus without complication, without long-term current use of insulin ([facility])  -     POCT glycosylated hemoglobin (Hb A1C)  -     POCT Microalbumin  Patient's hemoglobin A1c today is 6.4. No medications have been prescribed for her diabetes at this time. She is to continue following a diabetic diet. Primary hypertension  Patient's blood pressure is well controlled and she will continue with Amlodipine and benazepril    Chronic obstructive pulmonary disease, unspecified COPD type ([address]  Patient will continue with her Flovent and albuterol and Spiriva which is controlling her COPD well. Gastroesophageal reflux disease, unspecified whether esophagitis present  Patient will continue with famotidine which is controlling her reflux symptoms. Anxiety    Depression, unspecified depression type  -     buPROPion (WELLBUTRIN XL) 150 MG extended release tablet; Take 1 tablet by mouth every morning  Patient is started on Wellbutrin  mg. Patient is advised of possible side effects and is to call if she has any untoward side effects. Class III severe obesity  Patient has an initial appointment with the bariatric center next month. Return in about 6 weeks (around 5/17/2023). Electronically signed by Herber Mesa.  Talon Castañeda DO on 4/8/2023

## 2023-04-09 LAB — CCP AB SER IA-ACNC: 1.3 U/ML (ref 0–7)

## 2023-04-16 LAB
Lab: 14 3 3
REPORT: NORMAL
THIS TEST SENT TO: NORMAL

## 2023-04-22 DIAGNOSIS — F32.A DEPRESSION, UNSPECIFIED DEPRESSION TYPE: ICD-10-CM

## 2023-04-24 ENCOUNTER — OFFICE VISIT (OUTPATIENT)
Dept: RHEUMATOLOGY | Age: 38
End: 2023-04-24
Payer: MEDICARE

## 2023-04-24 ENCOUNTER — TELEPHONE (OUTPATIENT)
Dept: FAMILY MEDICINE CLINIC | Age: 38
End: 2023-04-24

## 2023-04-24 VITALS — HEIGHT: 62 IN | BODY MASS INDEX: 53.92 KG/M2 | WEIGHT: 293 LBS

## 2023-04-24 DIAGNOSIS — M05.79 RHEUMATOID ARTHRITIS INVOLVING MULTIPLE SITES WITH POSITIVE RHEUMATOID FACTOR (HCC): Primary | ICD-10-CM

## 2023-04-24 DIAGNOSIS — D84.9 IMMUNOSUPPRESSION (HCC): ICD-10-CM

## 2023-04-24 DIAGNOSIS — Z11.59 ENCOUNTER FOR SCREENING FOR OTHER VIRAL DISEASES: ICD-10-CM

## 2023-04-24 DIAGNOSIS — Z79.899 HIGH RISK MEDICATION USE: ICD-10-CM

## 2023-04-24 DIAGNOSIS — I10 ESSENTIAL HYPERTENSION: ICD-10-CM

## 2023-04-24 DIAGNOSIS — M54.16 LUMBAR RADICULOPATHY: ICD-10-CM

## 2023-04-24 PROCEDURE — G8417 CALC BMI ABV UP PARAM F/U: HCPCS | Performed by: INTERNAL MEDICINE

## 2023-04-24 PROCEDURE — 4004F PT TOBACCO SCREEN RCVD TLK: CPT | Performed by: INTERNAL MEDICINE

## 2023-04-24 PROCEDURE — G8427 DOCREV CUR MEDS BY ELIG CLIN: HCPCS | Performed by: INTERNAL MEDICINE

## 2023-04-24 PROCEDURE — 99215 OFFICE O/P EST HI 40 MIN: CPT | Performed by: INTERNAL MEDICINE

## 2023-04-24 RX ORDER — BUPROPION HYDROCHLORIDE 150 MG/1
150 TABLET ORAL EVERY MORNING
Qty: 30 TABLET | Refills: 1 | OUTPATIENT
Start: 2023-04-24

## 2023-04-24 RX ORDER — FOLIC ACID 1 MG/1
1 TABLET ORAL DAILY
Qty: 30 TABLET | Refills: 3 | Status: SHIPPED | OUTPATIENT
Start: 2023-04-24

## 2023-04-24 ASSESSMENT — ENCOUNTER SYMPTOMS
COUGH: 0
VOMITING: 0
TROUBLE SWALLOWING: 0
NAUSEA: 0
DIARRHEA: 0
COLOR CHANGE: 0
BACK PAIN: 1
ABDOMINAL PAIN: 0

## 2023-04-24 NOTE — PROGRESS NOTES
Sunny Shearer 1985 is a 44 y.o. female, here for evaluation of the following chief complaint(s):  Follow-up (Patient here for follow up on labs. )         ASSESSMENT/PLAN:    Sunny Shearer 1985 is a 44 y.o. female seen in follow-up for rheumatoid arthritis. 1.  RF positive, CCP negative, 14 3 3 eta positive, nonerosive rheumatoid arthritis-she does likely have a little synovitis in the MCP joints today but exam is somewhat difficult due to body habitus. We will start her on methotrexate 15 mg weekly plus folic acid 1 mg daily. We discussed the risks, benefits, side effects. She has an allergy to prednisone so we will avoid that. 2.  Immunosuppression-I recommend she stay up-to-date on vaccinations. In the event of any acute infectious illness she should hold methotrexate. 3.  Medication monitoring-we will update TB and hepatitis. She had a recent CT of the chest so we can hold off on chest x-ray. We will need to monitor basic blood work monthly for the first 3 months on methotrexate. 4.  Chronic low back pain-rheumatoid arthritis spares the back. She is following with pain management. 5.  Hypertension-patient's with inflammatory arthritis have an increased cardiovascular risk. She is on blood pressure medication. 1. Rheumatoid arthritis involving multiple sites with positive rheumatoid factor (HCC)  -     CBC with Auto Differential; Standing  -     Comprehensive Metabolic Panel; Standing  -     Hepatitis B Core Antibody, IgM; Future  -     Hepatitis B Surface Antibody; Future  -     Hepatitis B Surface Antigen; Future  -     T-Spot TB Test; Future  -     Comprehensive Metabolic Panel; Future  -     MICROALBUMIN, UR; Future  2. Immunosuppression (HCC)  -     CBC with Auto Differential; Standing  -     Comprehensive Metabolic Panel; Standing  -     Hepatitis B Core Antibody, IgM; Future  -     Hepatitis B Surface Antibody;  Future  -     Hepatitis B Surface Antigen;

## 2023-04-25 ENCOUNTER — HOSPITAL ENCOUNTER (OUTPATIENT)
Age: 38
Discharge: HOME | End: 2023-04-25
Payer: MEDICARE

## 2023-04-25 DIAGNOSIS — F32.A DEPRESSION, UNSPECIFIED DEPRESSION TYPE: ICD-10-CM

## 2023-04-25 DIAGNOSIS — Z11.59 ENCOUNTER FOR SCREENING FOR OTHER VIRAL DISEASES: ICD-10-CM

## 2023-04-25 DIAGNOSIS — Z79.899 HIGH RISK MEDICATION USE: ICD-10-CM

## 2023-04-25 DIAGNOSIS — M05.79 RHEUMATOID ARTHRITIS INVOLVING MULTIPLE SITES WITH POSITIVE RHEUMATOID FACTOR (HCC): ICD-10-CM

## 2023-04-25 DIAGNOSIS — D84.9 IMMUNOSUPPRESSION (HCC): ICD-10-CM

## 2023-04-25 LAB
ALBUMIN SERPL-MCNC: 3.5 G/DL (ref 3.5–5.2)
ALP SERPL-CCNC: 101 U/L (ref 35–104)
ALT SERPL-CCNC: 14 U/L (ref 0–32)
ANION GAP SERPL CALCULATED.3IONS-SCNC: 12 MMOL/L (ref 7–16)
AST SERPL-CCNC: 17 U/L (ref 0–31)
BASOPHILS # BLD: 0.03 E9/L (ref 0–0.2)
BASOPHILS NFR BLD: 0.4 % (ref 0–2)
BILIRUB SERPL-MCNC: 0.3 MG/DL (ref 0–1.2)
BUN SERPL-MCNC: 7 MG/DL (ref 6–20)
CALCIUM SERPL-MCNC: 8.5 MG/DL (ref 8.6–10.2)
CHLORIDE SERPL-SCNC: 106 MMOL/L (ref 98–107)
CO2 SERPL-SCNC: 21 MMOL/L (ref 22–29)
CREAT SERPL-MCNC: 0.5 MG/DL (ref 0.5–1)
EOSINOPHIL # BLD: 0.29 E9/L (ref 0.05–0.5)
EOSINOPHIL NFR BLD: 3.5 % (ref 0–6)
ERYTHROCYTE [DISTWIDTH] IN BLOOD BY AUTOMATED COUNT: 13.8 FL (ref 11.5–15)
GLUCOSE SERPL-MCNC: 155 MG/DL (ref 74–99)
HCT VFR BLD AUTO: 40.8 % (ref 34–48)
HGB BLD-MCNC: 13.6 G/DL (ref 11.5–15.5)
IMM GRANULOCYTES # BLD: 0.02 E9/L
IMM GRANULOCYTES NFR BLD: 0.2 % (ref 0–5)
LYMPHOCYTES # BLD: 2.31 E9/L (ref 1.5–4)
LYMPHOCYTES NFR BLD: 28.2 % (ref 20–42)
MCH RBC QN AUTO: 29.8 PG (ref 26–35)
MCHC RBC AUTO-ENTMCNC: 33.3 % (ref 32–34.5)
MCV RBC AUTO: 89.3 FL (ref 80–99.9)
MICROALBUMIN UR-MCNC: 15.5 MG/L
MONOCYTES # BLD: 0.52 E9/L (ref 0.1–0.95)
MONOCYTES NFR BLD: 6.3 % (ref 2–12)
NEUTROPHILS # BLD: 5.03 E9/L (ref 1.8–7.3)
NEUTS SEG NFR BLD: 61.4 % (ref 43–80)
PLATELET # BLD AUTO: 223 E9/L (ref 130–450)
PMV BLD AUTO: 10.6 FL (ref 7–12)
POTASSIUM SERPL-SCNC: 3.9 MMOL/L (ref 3.5–5)
PROT SERPL-MCNC: 6.6 G/DL (ref 6.4–8.3)
RBC # BLD AUTO: 4.57 E12/L (ref 3.5–5.5)
SODIUM SERPL-SCNC: 139 MMOL/L (ref 132–146)
WBC # BLD: 8.2 E9/L (ref 4.5–11.5)

## 2023-04-25 PROCEDURE — 85025 COMPLETE CBC W/AUTO DIFF WBC: CPT

## 2023-04-25 PROCEDURE — 36415 COLL VENOUS BLD VENIPUNCTURE: CPT

## 2023-04-25 PROCEDURE — 87340 HEPATITIS B SURFACE AG IA: CPT

## 2023-04-25 PROCEDURE — 86706 HEP B SURFACE ANTIBODY: CPT

## 2023-04-25 PROCEDURE — 80053 COMPREHEN METABOLIC PANEL: CPT

## 2023-04-25 PROCEDURE — 86705 HEP B CORE ANTIBODY IGM: CPT

## 2023-04-25 PROCEDURE — 82044 UR ALBUMIN SEMIQUANTITATIVE: CPT

## 2023-04-25 RX ORDER — BUPROPION HYDROCHLORIDE 300 MG/1
300 TABLET ORAL EVERY MORNING
Qty: 90 TABLET | Refills: 1 | Status: SHIPPED | OUTPATIENT
Start: 2023-04-25

## 2023-04-26 ENCOUNTER — TREATMENT (OUTPATIENT)
Dept: PHYSICAL THERAPY | Age: 38
End: 2023-04-26
Payer: MEDICARE

## 2023-04-26 DIAGNOSIS — H83.2X2 VESTIBULAR DISEQUILIBRIUM INVOLVING LEFT INNER EAR: Primary | ICD-10-CM

## 2023-04-26 PROCEDURE — 97112 NEUROMUSCULAR REEDUCATION: CPT | Performed by: PHYSICAL THERAPIST

## 2023-04-26 NOTE — PROGRESS NOTES
[facility]          Phone: [phone] Fax: [phone]    Physical Therapy Daily Treatment Note  Date:  2023    Patient Name:  Crystal Mcdonald    :  1985  MRN: [id number]    Evaluating Therapist:  Dustin Mills, YSABEL [id number]    Restrictions/Precautions:    Diagnosis:     Diagnosis Orders   1. Vestibular disequilibrium involving left inner ear          Treatment Diagnosis:    Insurance/Certification information:  [facility]  Referring Physician:  CATRACHITA Tam  Plan of care signed (Y/N):    Visit# / total visits:    Pain level: N/A   Time In:  09  Time Out:  8114    Subjective:  Pt reports she is starting to have some pain in the left ear. Denies any changes in symptoms. Reports she hasn't had any time to work on her HEP. Exercises:  Exercise/Equipment Resistance/Repetitions Other comments     VORx1 Seated, left/right and up/down x 20 reps Mild to mod dizziness     VORx2 Seated, left/right x 20 reps Mild dizziness     targets Seated, left/right and up/down x 20 reps each Mild to mod dizziness     Head movements eyes closed Seated, left/right and up/down x 20 reps each Mod dizziness     Head circles EO and EC x 10 reps each CW and CCW Mod dizziness, CW worse than CCW, nausea reported with EC                                                                                                          Other: Emphasized the importance of performing HEP daily in order to make progress with symptoms. Pt verbalized understanding.       Home Exercise Program:  23 - VORx1, targets    Manual Treatments:  N/A    Modalities:  N/A     Time-in Time-out Total Time   66740  Evaluation Low Complexity      17408  Evaluation Med Complexity      [id number]  Evaluation High Complexity      53374  Ther Ex      [id number]  Neuro Re-ed   7945 [id number]   52458  Ther Activities        85687  Manual Therapy       58175  E-stim       52051  Ultrasound            Session [id number]

## 2023-04-27 LAB
HBV CORE IGM SERPL QL IA: NORMAL
HBV SURFACE AB SERPL IA-ACNC: NORMAL M[IU]/ML
HBV SURFACE AG SERPL QL IA: NORMAL

## 2023-05-01 ENCOUNTER — OFFICE VISIT (OUTPATIENT)
Dept: PRIMARY CARE CLINIC | Age: 38
End: 2023-05-01
Payer: MEDICARE

## 2023-05-01 VITALS — HEART RATE: 123 BPM | DIASTOLIC BLOOD PRESSURE: 83 MMHG | SYSTOLIC BLOOD PRESSURE: 117 MMHG | TEMPERATURE: 97.9 F

## 2023-05-01 DIAGNOSIS — N39.0 URINARY TRACT INFECTION IN FEMALE: ICD-10-CM

## 2023-05-01 DIAGNOSIS — N39.0 URINARY TRACT INFECTION IN FEMALE: Primary | ICD-10-CM

## 2023-05-01 LAB
BILIRUBIN, POC: NEGATIVE
BLOOD URINE, POC: NORMAL
CLARITY, POC: NORMAL
COLOR, POC: YELLOW
GLUCOSE URINE, POC: NEGATIVE
KETONES, POC: NEGATIVE
LEUKOCYTE EST, POC: NORMAL
NITRITE, POC: POSITIVE
PH, POC: 6
PROTEIN, POC: NEGATIVE
SPECIFIC GRAVITY, POC: 1.02
UROBILINOGEN, POC: 0.2

## 2023-05-01 PROCEDURE — 99213 OFFICE O/P EST LOW 20 MIN: CPT | Performed by: NURSE PRACTITIONER

## 2023-05-01 PROCEDURE — 81002 URINALYSIS NONAUTO W/O SCOPE: CPT | Performed by: NURSE PRACTITIONER

## 2023-05-01 PROCEDURE — 3079F DIAST BP 80-89 MM HG: CPT | Performed by: NURSE PRACTITIONER

## 2023-05-01 PROCEDURE — 3074F SYST BP LT 130 MM HG: CPT | Performed by: NURSE PRACTITIONER

## 2023-05-01 PROCEDURE — G8417 CALC BMI ABV UP PARAM F/U: HCPCS | Performed by: NURSE PRACTITIONER

## 2023-05-01 PROCEDURE — G8427 DOCREV CUR MEDS BY ELIG CLIN: HCPCS | Performed by: NURSE PRACTITIONER

## 2023-05-01 PROCEDURE — 4004F PT TOBACCO SCREEN RCVD TLK: CPT | Performed by: NURSE PRACTITIONER

## 2023-05-01 RX ORDER — NITROFURANTOIN 25; 75 MG/1; MG/1
100 CAPSULE ORAL 2 TIMES DAILY
Qty: 20 CAPSULE | Refills: 0 | Status: SHIPPED | OUTPATIENT
Start: 2023-05-01 | End: 2023-05-11

## 2023-05-01 NOTE — PROGRESS NOTES
MARIYA OF  LABIAL ABSCESS performed by Amado Hopson MD at [address] (CERVIX STATUS UNKNOWN)  02/22/2021    Robotic lysis of adhesions, total hysterectomy, bilateral salpingectomy, cystoscopy    INCONTINENCE SURGERY      OTHER SURGICAL HISTORY  1997    repair anal fissure    PAIN MANAGEMENT PROCEDURE N/A 4/12/2022    CAUDAL EPIDURAL STEROID INJECTION  #1 performed by Shannon Schwarz DO at [address] N/A 6/23/2022    CAUDAL EPIDURAL STEROID INJECTION #2 performed by Shannon Schwarz DO at [address] N/A 10/13/2022    CAUDAL EPIDURAL STEROID INJECTION  #3 performed by Shannon Schwarz DO at [address] N/A 12/8/2022    CAUDAL EPIDURAL STEROID INJECTION performed by Shannon Schwarz DO at [address] N/A 2/28/2023    CAUDAL EPIDURAL STEROID INJECTION performed by Shannon Schwarz DO at [address] History:  reports that she has been smoking cigarettes. She started smoking about 27 years ago. She has a 40.00 pack-year smoking history. She has never used smokeless tobacco. She reports that she does not currently use alcohol. She reports that she does not currently use drugs after having used the following drugs: Marijuana Yessy Escobar). Family History: family history includes Breast Cancer in her maternal aunt; Cancer in her mother and sister; Diabetes in her father, mother, and sister; High Blood Pressure in her brother. Allergies: Keflex [cephalexin], Prednisone, Cymbalta [duloxetine hcl], Lamictal [lamotrigine], and Neurontin [gabapentin]    Physical Exam         VS:  Temp 97.9 °F (36.6 °C) (Temporal)   LMP 02/22/2021 (Approximate)    Oxygen Saturation Interpretation: Normal.    Constitutional:  Alert, development consistent with age. HEENT:  Atraumatic. Airway patent. PERRL. Neck:  Normal ROM. Supple.   Lungs:  Clear to auscultation and

## 2023-05-03 ENCOUNTER — HOSPITAL ENCOUNTER (OUTPATIENT)
Age: 38
Discharge: HOME | End: 2023-05-03
Payer: MEDICARE

## 2023-05-03 DIAGNOSIS — M05.79 RHEUMATOID ARTHRITIS INVOLVING MULTIPLE SITES WITH POSITIVE RHEUMATOID FACTOR (HCC): ICD-10-CM

## 2023-05-03 DIAGNOSIS — Z79.899 HIGH RISK MEDICATION USE: ICD-10-CM

## 2023-05-03 DIAGNOSIS — D84.9 IMMUNOSUPPRESSION (HCC): ICD-10-CM

## 2023-05-03 LAB
BACTERIA UR CULT: ABNORMAL
COMMENT: NORMAL
ORGANISM: ABNORMAL

## 2023-05-03 PROCEDURE — 36415 COLL VENOUS BLD VENIPUNCTURE: CPT

## 2023-05-03 PROCEDURE — 86481 TB AG RESPONSE T-CELL SUSP: CPT

## 2023-05-04 NOTE — PROGRESS NOTES
[facility]        [address]  [facility], [address] [facility]  Dept: [phone]        Follow up Note      Abdi Chaudhry     Date of Visit:  23     CC:  Patient presents for follow up   Chief Complaint   Patient presents with    Follow Up After Procedure     CAUDAL EPIDURAL STEROID INJECTION    Lower Back Pain     HPI:    Pain is worse. Change in quality of symptoms:no. Medication side effects:dry mouth with pregabalin. Recent diagnostic testing:none  Recent interventional procedures:caudal BOAZ with 75% relief    She has been on anticoagulation medications to include ASA and NSAIDS and has not been on herbal supplements. She is diabetic. Imagin/2022 EDX -  Electrodiagnostic examination of both legs disclosed evidence diagnostic of left S1 motor radiculopathy or intracanalicular lesion, with acute and chronic denervation changes. This was proven with the abnormal needle testing of the paraspinals. There were no other motor radiculopathies or intracanalicular lesions. There were no peripheral neuropathies. Sensory radiculopathies cannot be evaluated by electrodiagnostic means. Electrodiagnostic study performed 1 year ago found no abnormalities. Clinically, the patient presented with back pains radiating into her left leg and foot. On brief neurological examination, I found no motor or sensory compromise. Her difficulties are related to her radicular disease. Imaging studies of lumbosacral spine were recommended, as well as physical therapy and weight loss. Clinical correlation was highly advised. 3/2022 lumbar MRI -  FINDINGS:   BONES/ALIGNMENT: There is normal alignment of the spine. The vertebral body   heights are maintained. The bone marrow signal appears unremarkable. SPINAL CORD: The conus terminates normally. SOFT TISSUES: No paraspinal mass identified.        L1-L2: There is no significant disc herniation, spinal

## 2023-05-05 ENCOUNTER — OFFICE VISIT (OUTPATIENT)
Dept: PAIN MANAGEMENT | Age: 38
End: 2023-05-05
Payer: MEDICARE

## 2023-05-05 ENCOUNTER — PREP FOR PROCEDURE (OUTPATIENT)
Dept: PAIN MANAGEMENT | Age: 38
End: 2023-05-05

## 2023-05-05 VITALS
BODY MASS INDEX: 51.91 KG/M2 | HEART RATE: 119 BPM | HEIGHT: 63 IN | WEIGHT: 293 LBS | DIASTOLIC BLOOD PRESSURE: 86 MMHG | RESPIRATION RATE: 16 BRPM | TEMPERATURE: 98.1 F | SYSTOLIC BLOOD PRESSURE: 126 MMHG | OXYGEN SATURATION: 96 %

## 2023-05-05 DIAGNOSIS — M47.817 LUMBOSACRAL SPONDYLOSIS WITHOUT MYELOPATHY: ICD-10-CM

## 2023-05-05 DIAGNOSIS — G89.4 CHRONIC PAIN SYNDROME: ICD-10-CM

## 2023-05-05 DIAGNOSIS — G89.29 CHRONIC BILATERAL LOW BACK PAIN WITH BILATERAL SCIATICA: ICD-10-CM

## 2023-05-05 DIAGNOSIS — M54.41 CHRONIC BILATERAL LOW BACK PAIN WITH BILATERAL SCIATICA: ICD-10-CM

## 2023-05-05 DIAGNOSIS — M54.16 LUMBAR RADICULOPATHY: Primary | ICD-10-CM

## 2023-05-05 DIAGNOSIS — M54.42 CHRONIC BILATERAL LOW BACK PAIN WITH BILATERAL SCIATICA: ICD-10-CM

## 2023-05-05 DIAGNOSIS — M47.817 LUMBOSACRAL SPONDYLOSIS WITHOUT MYELOPATHY: Primary | ICD-10-CM

## 2023-05-05 DIAGNOSIS — M54.16 LUMBAR RADICULOPATHY: ICD-10-CM

## 2023-05-05 PROCEDURE — 4004F PT TOBACCO SCREEN RCVD TLK: CPT | Performed by: PAIN MEDICINE

## 2023-05-05 PROCEDURE — 3074F SYST BP LT 130 MM HG: CPT | Performed by: PAIN MEDICINE

## 2023-05-05 PROCEDURE — G8427 DOCREV CUR MEDS BY ELIG CLIN: HCPCS | Performed by: PAIN MEDICINE

## 2023-05-05 PROCEDURE — 99213 OFFICE O/P EST LOW 20 MIN: CPT | Performed by: PAIN MEDICINE

## 2023-05-05 PROCEDURE — G8417 CALC BMI ABV UP PARAM F/U: HCPCS | Performed by: PAIN MEDICINE

## 2023-05-05 PROCEDURE — 3079F DIAST BP 80-89 MM HG: CPT | Performed by: PAIN MEDICINE

## 2023-05-05 RX ORDER — CYCLOBENZAPRINE HCL 10 MG
10 TABLET ORAL 2 TIMES DAILY PRN
COMMUNITY
End: 2023-05-05

## 2023-05-05 RX ORDER — PREGABALIN 150 MG/1
150 CAPSULE ORAL 2 TIMES DAILY
Qty: 60 CAPSULE | Refills: 2 | Status: SHIPPED | OUTPATIENT
Start: 2023-05-05 | End: 2023-06-04

## 2023-05-05 RX ORDER — CYCLOBENZAPRINE HCL 10 MG
10 TABLET ORAL 2 TIMES DAILY PRN
Qty: 60 TABLET | Refills: 2 | Status: SHIPPED | OUTPATIENT
Start: 2023-05-05 | End: 2023-06-04

## 2023-05-05 NOTE — PROGRESS NOTES
Glenn Corrales presents to the [facility] on 5/5/2023. Bonnie Ervin is complaining of pain lower back. The pain is constant. The pain is described as sharp. Pain is rated on her best day at a 4, on her worst day at a 9, and on average at a 5 on the VAS scale. She took her last dose of Lyrica today. Any procedures since your last visit: Yes, with 75 % relief. Pacemaker or defibrillator: No     She is  on NSAIDS and is  on anticoagulation medications to include ASA and is managed by self. Medication Contract and Consent for Opioid Use Documents Filed        No documents found                    /86   Pulse (!) 119   Temp 98.1 °F (36.7 °C) (Infrared)   Resp 16   Ht 5' 3\" (1.6 m)   Wt (!) 310 lb (140.6 kg)   LMP 02/22/2021 (Approximate)   SpO2 96%   BMI 54.91 kg/m²      Patient's last menstrual period was 02/22/2021 (approximate).

## 2023-05-08 DIAGNOSIS — M47.817 LUMBOSACRAL SPONDYLOSIS WITHOUT MYELOPATHY: ICD-10-CM

## 2023-05-08 DIAGNOSIS — M54.41 CHRONIC BILATERAL LOW BACK PAIN WITH BILATERAL SCIATICA: ICD-10-CM

## 2023-05-08 DIAGNOSIS — M54.42 CHRONIC BILATERAL LOW BACK PAIN WITH BILATERAL SCIATICA: ICD-10-CM

## 2023-05-08 DIAGNOSIS — G89.4 CHRONIC PAIN SYNDROME: ICD-10-CM

## 2023-05-08 DIAGNOSIS — M54.16 LUMBAR RADICULOPATHY: ICD-10-CM

## 2023-05-08 DIAGNOSIS — G89.29 CHRONIC BILATERAL LOW BACK PAIN WITH BILATERAL SCIATICA: ICD-10-CM

## 2023-05-08 RX ORDER — PREGABALIN 75 MG/1
CAPSULE ORAL
Qty: 90 CAPSULE | Refills: 2 | OUTPATIENT
Start: 2023-05-08

## 2023-05-10 ENCOUNTER — TREATMENT (OUTPATIENT)
Dept: PHYSICAL THERAPY | Age: 38
End: 2023-05-10
Payer: MEDICARE

## 2023-05-10 DIAGNOSIS — H83.2X2 VESTIBULAR DISEQUILIBRIUM INVOLVING LEFT INNER EAR: Primary | ICD-10-CM

## 2023-05-10 PROCEDURE — 97112 NEUROMUSCULAR REEDUCATION: CPT | Performed by: PHYSICAL THERAPIST

## 2023-05-10 NOTE — PROGRESS NOTES
[facility]          Phone: [phone] Fax: [phone]    Physical Therapy Daily Treatment Note  Date:  5/10/2023    Patient Name:  Chas Donohue    :  1985  MRN: [id number]    Evaluating Therapist:  Es Jason, YSABEL [id number]    Restrictions/Precautions:    Diagnosis:     Diagnosis Orders   1. Vestibular disequilibrium involving left inner ear          Treatment Diagnosis:    Insurance/Certification information:  [facility] Medicare  Referring Physician:  CATRACHITA Gan  Plan of care signed (Y/N):    Visit# / total visits:  3/8  Pain level: N/A   Time In:  4570  Time Out:  910    Subjective:  Pt reports dizziness has improved since starting therapy.     Exercises:  Exercise/Equipment Resistance/Repetitions Other comments     VORx1 Seated, left/right and up/down x 20 reps    Seated, reading number card 1x through each left/right and up/down Mild dizziness, increased speed     VORx2 Seated, left/right x 20 reps Mild dizziness, good speed     targets Seated, left/right and up/down x 20 reps each Mild dizziness, increased speed     Head movements eyes closed Seated, left/right and up/down x 20 reps each Mod dizziness, increased speed     Head circles EO and EC x 10 reps each CW and CCW Mod dizziness, CW worse than CCW, no nausea reported     Ball circles X10 reps each CW and CCW No dizziness                                                                                                   Other:      Home Exercise Program:  23 - VORx1, targets; 5/10/23 - head circles, head movements eyes closed, add reading task to VORx1 and targets     Manual Treatments:  N/A    Modalities:  N/A     Time-in Time-out Total Time   29778  Evaluation Low Complexity      43925  Evaluation Med Complexity      42769  Evaluation High Complexity      95251  Ther Ex      [id number]  Neuro Re-ed   [id number]   88733  Ther Activities        73883  Manual Therapy       90974  E-stim

## 2023-05-11 ENCOUNTER — HOSPITAL ENCOUNTER (EMERGENCY)
Age: 38
Discharge: HOME | End: 2023-05-11
Attending: EMERGENCY MEDICINE
Payer: MEDICARE

## 2023-05-11 ENCOUNTER — TELEPHONE (OUTPATIENT)
Dept: PAIN MANAGEMENT | Age: 38
End: 2023-05-11

## 2023-05-11 ENCOUNTER — APPOINTMENT (OUTPATIENT)
Dept: ULTRASOUND IMAGING | Age: 38
End: 2023-05-11
Payer: MEDICARE

## 2023-05-11 ENCOUNTER — APPOINTMENT (OUTPATIENT)
Dept: GENERAL RADIOLOGY | Age: 38
End: 2023-05-11
Payer: MEDICARE

## 2023-05-11 ENCOUNTER — TELEPHONE (OUTPATIENT)
Dept: FAMILY MEDICINE CLINIC | Age: 38
End: 2023-05-11

## 2023-05-11 VITALS
TEMPERATURE: 98.5 F | WEIGHT: 293 LBS | SYSTOLIC BLOOD PRESSURE: 128 MMHG | DIASTOLIC BLOOD PRESSURE: 69 MMHG | OXYGEN SATURATION: 96 % | HEART RATE: 106 BPM | BODY MASS INDEX: 57 KG/M2 | RESPIRATION RATE: 18 BRPM

## 2023-05-11 DIAGNOSIS — E66.01 MORBID OBESITY (HCC): ICD-10-CM

## 2023-05-11 DIAGNOSIS — R00.0 SINUS TACHYCARDIA: ICD-10-CM

## 2023-05-11 DIAGNOSIS — N39.0 URINARY TRACT INFECTION WITHOUT HEMATURIA, SITE UNSPECIFIED: ICD-10-CM

## 2023-05-11 DIAGNOSIS — R60.0 BILATERAL LOWER EXTREMITY EDEMA: Primary | ICD-10-CM

## 2023-05-11 LAB
ALBUMIN SERPL-MCNC: 3.5 G/DL (ref 3.5–5.2)
ALP SERPL-CCNC: 98 U/L (ref 35–104)
ALT SERPL-CCNC: 14 U/L (ref 0–32)
AMORPH SED URNS QL MICRO: ABNORMAL
ANION GAP SERPL CALCULATED.3IONS-SCNC: 12 MMOL/L (ref 7–16)
AST SERPL-CCNC: 9 U/L (ref 0–31)
BACTERIA URNS QL MICRO: ABNORMAL /HPF
BASOPHILS # BLD: 0.04 E9/L (ref 0–0.2)
BASOPHILS NFR BLD: 0.4 % (ref 0–2)
BILIRUB SERPL-MCNC: <0.2 MG/DL (ref 0–1.2)
BILIRUB UR QL STRIP: ABNORMAL
BNP BLD-MCNC: 21 PG/ML (ref 0–125)
BUN SERPL-MCNC: 9 MG/DL (ref 6–20)
CALCIUM SERPL-MCNC: 8.8 MG/DL (ref 8.6–10.2)
CHLORIDE SERPL-SCNC: 102 MMOL/L (ref 98–107)
CK SERPL-CCNC: 20 U/L (ref 20–180)
CLARITY UR: ABNORMAL
CO2 SERPL-SCNC: 23 MMOL/L (ref 22–29)
COLOR UR: ABNORMAL
CREAT SERPL-MCNC: 0.7 MG/DL (ref 0.5–1)
EKG ATRIAL RATE: 120 BPM
EKG P AXIS: 49 DEGREES
EKG P-R INTERVAL: 148 MS
EKG Q-T INTERVAL: 310 MS
EKG QRS DURATION: 80 MS
EKG QTC CALCULATION (BAZETT): 438 MS
EKG R AXIS: 27 DEGREES
EKG T AXIS: 31 DEGREES
EKG VENTRICULAR RATE: 120 BPM
EOSINOPHIL # BLD: 0.98 E9/L (ref 0.05–0.5)
EOSINOPHIL NFR BLD: 9.5 % (ref 0–6)
EPI CELLS #/AREA URNS HPF: ABNORMAL /HPF
ERYTHROCYTE [DISTWIDTH] IN BLOOD BY AUTOMATED COUNT: 14.6 FL (ref 11.5–15)
GLUCOSE SERPL-MCNC: 253 MG/DL (ref 74–99)
GLUCOSE UR STRIP-MCNC: NEGATIVE MG/DL
HCT VFR BLD AUTO: 39.7 % (ref 34–48)
HGB BLD-MCNC: 13.1 G/DL (ref 11.5–15.5)
HGB UR QL STRIP: NEGATIVE
IMM GRANULOCYTES # BLD: 0.04 E9/L
IMM GRANULOCYTES NFR BLD: 0.4 % (ref 0–5)
KETONES UR STRIP-MCNC: 15 MG/DL
LACTATE BLDV-SCNC: 2.3 MMOL/L (ref 0.5–2.2)
LEUKOCYTE ESTERASE UR QL STRIP: ABNORMAL
LYMPHOCYTES # BLD: 2.62 E9/L (ref 1.5–4)
LYMPHOCYTES NFR BLD: 25.3 % (ref 20–42)
MCH RBC QN AUTO: 29.9 PG (ref 26–35)
MCHC RBC AUTO-ENTMCNC: 33 % (ref 32–34.5)
MCV RBC AUTO: 90.6 FL (ref 80–99.9)
MONOCYTES # BLD: 0.51 E9/L (ref 0.1–0.95)
MONOCYTES NFR BLD: 4.9 % (ref 2–12)
NEUTROPHILS # BLD: 6.15 E9/L (ref 1.8–7.3)
NEUTS SEG NFR BLD: 59.5 % (ref 43–80)
NITRITE UR QL STRIP: NEGATIVE
PH UR STRIP: 5.5 [PH] (ref 5–9)
PLATELET # BLD AUTO: 209 E9/L (ref 130–450)
PMV BLD AUTO: 10.2 FL (ref 7–12)
POTASSIUM SERPL-SCNC: 3.9 MMOL/L (ref 3.5–5)
PROT SERPL-MCNC: 6.4 G/DL (ref 6.4–8.3)
PROT UR STRIP-MCNC: ABNORMAL MG/DL
RBC # BLD AUTO: 4.38 E12/L (ref 3.5–5.5)
RBC #/AREA URNS HPF: ABNORMAL /HPF (ref 0–2)
SODIUM SERPL-SCNC: 137 MMOL/L (ref 132–146)
SP GR UR STRIP: 1.02 (ref 1–1.03)
TROPONIN, HIGH SENSITIVITY: <6 NG/L (ref 0–9)
UROBILINOGEN UR STRIP-ACNC: 1 E.U./DL
WBC # BLD: 10.3 E9/L (ref 4.5–11.5)
WBC #/AREA URNS HPF: ABNORMAL /HPF (ref 0–5)

## 2023-05-11 PROCEDURE — 83605 ASSAY OF LACTIC ACID: CPT

## 2023-05-11 PROCEDURE — 87040 BLOOD CULTURE FOR BACTERIA: CPT

## 2023-05-11 PROCEDURE — 93010 ELECTROCARDIOGRAM REPORT: CPT | Performed by: INTERNAL MEDICINE

## 2023-05-11 PROCEDURE — 36415 COLL VENOUS BLD VENIPUNCTURE: CPT

## 2023-05-11 PROCEDURE — 83880 ASSAY OF NATRIURETIC PEPTIDE: CPT

## 2023-05-11 PROCEDURE — 99285 EMERGENCY DEPT VISIT HI MDM: CPT

## 2023-05-11 PROCEDURE — 96360 HYDRATION IV INFUSION INIT: CPT

## 2023-05-11 PROCEDURE — 84484 ASSAY OF TROPONIN QUANT: CPT

## 2023-05-11 PROCEDURE — 93005 ELECTROCARDIOGRAM TRACING: CPT | Performed by: EMERGENCY MEDICINE

## 2023-05-11 PROCEDURE — 80053 COMPREHEN METABOLIC PANEL: CPT

## 2023-05-11 PROCEDURE — 71045 X-RAY EXAM CHEST 1 VIEW: CPT

## 2023-05-11 PROCEDURE — 93970 EXTREMITY STUDY: CPT

## 2023-05-11 PROCEDURE — 81001 URINALYSIS AUTO W/SCOPE: CPT

## 2023-05-11 PROCEDURE — 87088 URINE BACTERIA CULTURE: CPT

## 2023-05-11 PROCEDURE — 2580000003 HC RX 258: Performed by: EMERGENCY MEDICINE

## 2023-05-11 PROCEDURE — 85025 COMPLETE CBC W/AUTO DIFF WBC: CPT

## 2023-05-11 PROCEDURE — 6370000000 HC RX 637 (ALT 250 FOR IP): Performed by: EMERGENCY MEDICINE

## 2023-05-11 PROCEDURE — 82550 ASSAY OF CK (CPK): CPT

## 2023-05-11 RX ORDER — 0.9 % SODIUM CHLORIDE 0.9 %
1000 INTRAVENOUS SOLUTION INTRAVENOUS ONCE
Status: COMPLETED | OUTPATIENT
Start: 2023-05-11 | End: 2023-05-11

## 2023-05-11 RX ORDER — GRANULES FOR ORAL 3 G/1
3 POWDER ORAL ONCE
Status: COMPLETED | OUTPATIENT
Start: 2023-05-11 | End: 2023-05-11

## 2023-05-11 RX ADMIN — SODIUM CHLORIDE 1000 ML: 9 INJECTION, SOLUTION INTRAVENOUS at 12:36

## 2023-05-11 RX ADMIN — GRANULES FOR ORAL SOLUTION 1 PACKET: 3 POWDER ORAL at 14:01

## 2023-05-11 ASSESSMENT — PAIN - FUNCTIONAL ASSESSMENT: PAIN_FUNCTIONAL_ASSESSMENT: NONE - DENIES PAIN

## 2023-05-11 NOTE — TELEPHONE ENCOUNTER
Noted.  Perhaps it is from the lyrica if nothing else is found as she was just increased less than a week ago and that is a common side effect.

## 2023-05-11 NOTE — TELEPHONE ENCOUNTER
Liana Taylor called in and stated that she has bilateral leg edema and is going to the ER to be evaluated. She wanted to make sure that Dr. Loki Osorio knows so she can see the ER notes. Her next visit is scheduled on 8-4-23.

## 2023-05-11 NOTE — ED PROVIDER NOTES
lower extremity edema    2. Urinary tract infection without hematuria, site unspecified    3. Sinus tachycardia    4. Morbid obesity ([address]        DISPOSITION  Disposition: Discharge to home  Patient condition is stable    PATIENT REFERRED TO:  Aleks Kaminski DO  [address]   [address]  [facility] [address]  [phone]    Call today  cardiac ECHO: physical therapy    [address] [facility]  [facility] [address]  [phone]  Go to   If symptoms worsen      DISCHARGE MEDICATIONS:  New Prescriptions    No medications on file       DISCONTINUED MEDICATIONS:  Discontinued Medications    VALTREX 1 G TABLET    Take 2 tablets by mouth 2 times daily              (Please note that portions of this note were completed with a voice recognition program.  Efforts were made to edit the dictations but occasionally words are mis-transcribed.)    Ryan Lomax DO (electronically signed)           Ryan Lomax DO  05/11/23 1411

## 2023-05-12 LAB
BACTERIA BLD CULT ORG #2: NORMAL
BACTERIA BLD CULT: NORMAL

## 2023-05-13 LAB
BACTERIA UR CULT: ABNORMAL
BACTERIA UR CULT: ABNORMAL
ORGANISM: ABNORMAL

## 2023-05-15 LAB
COMMENT: NORMAL
REPORT: NORMAL

## 2023-05-16 LAB
BACTERIA BLD CULT ORG #2: NORMAL
BACTERIA BLD CULT: NORMAL

## 2023-05-17 ENCOUNTER — OFFICE VISIT (OUTPATIENT)
Dept: FAMILY MEDICINE CLINIC | Age: 38
End: 2023-05-17
Payer: MEDICARE

## 2023-05-17 VITALS
WEIGHT: 293 LBS | DIASTOLIC BLOOD PRESSURE: 78 MMHG | TEMPERATURE: 97.5 F | SYSTOLIC BLOOD PRESSURE: 113 MMHG | HEART RATE: 107 BPM | HEIGHT: 63 IN | OXYGEN SATURATION: 98 % | RESPIRATION RATE: 19 BRPM | BODY MASS INDEX: 51.91 KG/M2

## 2023-05-17 DIAGNOSIS — R11.0 NAUSEA: ICD-10-CM

## 2023-05-17 DIAGNOSIS — F32.A DEPRESSION, UNSPECIFIED DEPRESSION TYPE: Primary | ICD-10-CM

## 2023-05-17 DIAGNOSIS — E11.9 TYPE 2 DIABETES MELLITUS WITHOUT COMPLICATION, WITHOUT LONG-TERM CURRENT USE OF INSULIN (HCC): ICD-10-CM

## 2023-05-17 DIAGNOSIS — I10 PRIMARY HYPERTENSION: ICD-10-CM

## 2023-05-17 DIAGNOSIS — E78.5 HYPERLIPIDEMIA, UNSPECIFIED HYPERLIPIDEMIA TYPE: ICD-10-CM

## 2023-05-17 PROCEDURE — 3078F DIAST BP <80 MM HG: CPT | Performed by: FAMILY MEDICINE

## 2023-05-17 PROCEDURE — 2022F DILAT RTA XM EVC RTNOPTHY: CPT | Performed by: FAMILY MEDICINE

## 2023-05-17 PROCEDURE — 3044F HG A1C LEVEL LT 7.0%: CPT | Performed by: FAMILY MEDICINE

## 2023-05-17 PROCEDURE — G8427 DOCREV CUR MEDS BY ELIG CLIN: HCPCS | Performed by: FAMILY MEDICINE

## 2023-05-17 PROCEDURE — G8417 CALC BMI ABV UP PARAM F/U: HCPCS | Performed by: FAMILY MEDICINE

## 2023-05-17 PROCEDURE — 3074F SYST BP LT 130 MM HG: CPT | Performed by: FAMILY MEDICINE

## 2023-05-17 PROCEDURE — 99214 OFFICE O/P EST MOD 30 MIN: CPT | Performed by: FAMILY MEDICINE

## 2023-05-17 PROCEDURE — 4004F PT TOBACCO SCREEN RCVD TLK: CPT | Performed by: FAMILY MEDICINE

## 2023-05-17 RX ORDER — BUPROPION HYDROCHLORIDE 150 MG/1
150 TABLET ORAL EVERY MORNING
COMMUNITY
Start: 2023-05-05 | End: 2023-05-17 | Stop reason: ALTCHOICE

## 2023-05-17 SDOH — ECONOMIC STABILITY: FOOD INSECURITY: WITHIN THE PAST 12 MONTHS, THE FOOD YOU BOUGHT JUST DIDN'T LAST AND YOU DIDN'T HAVE MONEY TO GET MORE.: NEVER TRUE

## 2023-05-17 SDOH — ECONOMIC STABILITY: FOOD INSECURITY: WITHIN THE PAST 12 MONTHS, YOU WORRIED THAT YOUR FOOD WOULD RUN OUT BEFORE YOU GOT MONEY TO BUY MORE.: NEVER TRUE

## 2023-05-17 SDOH — ECONOMIC STABILITY: INCOME INSECURITY: HOW HARD IS IT FOR YOU TO PAY FOR THE VERY BASICS LIKE FOOD, HOUSING, MEDICAL CARE, AND HEATING?: NOT HARD AT ALL

## 2023-05-18 ENCOUNTER — HOSPITAL ENCOUNTER (OUTPATIENT)
Age: 38
Discharge: HOME | End: 2023-05-18
Payer: MEDICARE

## 2023-05-18 DIAGNOSIS — M05.79 RHEUMATOID ARTHRITIS INVOLVING MULTIPLE SITES WITH POSITIVE RHEUMATOID FACTOR (HCC): ICD-10-CM

## 2023-05-18 LAB
ALBUMIN SERPL-MCNC: 3.9 G/DL (ref 3.5–5.2)
ALP SERPL-CCNC: 116 U/L (ref 35–104)
ALT SERPL-CCNC: 20 U/L (ref 0–32)
ANION GAP SERPL CALCULATED.3IONS-SCNC: 11 MMOL/L (ref 7–16)
AST SERPL-CCNC: 15 U/L (ref 0–31)
BASOPHILS # BLD: 0.06 E9/L (ref 0–0.2)
BASOPHILS NFR BLD: 0.5 % (ref 0–2)
BILIRUB SERPL-MCNC: 0.3 MG/DL (ref 0–1.2)
BUN SERPL-MCNC: 10 MG/DL (ref 6–20)
CALCIUM SERPL-MCNC: 8.8 MG/DL (ref 8.6–10.2)
CHLORIDE SERPL-SCNC: 105 MMOL/L (ref 98–107)
CO2 SERPL-SCNC: 22 MMOL/L (ref 22–29)
CREAT SERPL-MCNC: 0.6 MG/DL (ref 0.5–1)
EOSINOPHIL # BLD: 0.72 E9/L (ref 0.05–0.5)
EOSINOPHIL NFR BLD: 6.4 % (ref 0–6)
ERYTHROCYTE [DISTWIDTH] IN BLOOD BY AUTOMATED COUNT: 14.6 FL (ref 11.5–15)
GLUCOSE SERPL-MCNC: 235 MG/DL (ref 74–99)
HCT VFR BLD AUTO: 38.4 % (ref 34–48)
HGB BLD-MCNC: 13.1 G/DL (ref 11.5–15.5)
IMM GRANULOCYTES # BLD: 0.04 E9/L
IMM GRANULOCYTES NFR BLD: 0.4 % (ref 0–5)
LYMPHOCYTES # BLD: 2.4 E9/L (ref 1.5–4)
LYMPHOCYTES NFR BLD: 21.2 % (ref 20–42)
MCH RBC QN AUTO: 30.6 PG (ref 26–35)
MCHC RBC AUTO-ENTMCNC: 34.1 % (ref 32–34.5)
MCV RBC AUTO: 89.7 FL (ref 80–99.9)
MONOCYTES # BLD: 0.54 E9/L (ref 0.1–0.95)
MONOCYTES NFR BLD: 4.8 % (ref 2–12)
NEUTROPHILS # BLD: 7.57 E9/L (ref 1.8–7.3)
NEUTS SEG NFR BLD: 66.7 % (ref 43–80)
PLATELET # BLD AUTO: 203 E9/L (ref 130–450)
PMV BLD AUTO: 10 FL (ref 7–12)
POTASSIUM SERPL-SCNC: 3.8 MMOL/L (ref 3.5–5)
PROT SERPL-MCNC: 7 G/DL (ref 6.4–8.3)
RBC # BLD AUTO: 4.28 E12/L (ref 3.5–5.5)
SODIUM SERPL-SCNC: 138 MMOL/L (ref 132–146)
WBC # BLD: 11.3 E9/L (ref 4.5–11.5)

## 2023-05-18 PROCEDURE — 85025 COMPLETE CBC W/AUTO DIFF WBC: CPT

## 2023-05-18 PROCEDURE — 80053 COMPREHEN METABOLIC PANEL: CPT

## 2023-05-18 PROCEDURE — 36415 COLL VENOUS BLD VENIPUNCTURE: CPT

## 2023-05-18 RX ORDER — ONDANSETRON 4 MG/1
TABLET, FILM COATED ORAL
Qty: 15 TABLET | Refills: 0 | OUTPATIENT
Start: 2023-05-18

## 2023-05-21 PROBLEM — E78.5 HYPERLIPIDEMIA: Status: ACTIVE | Noted: 2023-05-21

## 2023-05-21 RX ORDER — ONDANSETRON 4 MG/1
4 TABLET, FILM COATED ORAL 3 TIMES DAILY PRN
Qty: 15 TABLET | Refills: 0 | Status: SHIPPED | OUTPATIENT
Start: 2023-05-21

## 2023-05-21 ASSESSMENT — ENCOUNTER SYMPTOMS
PHOTOPHOBIA: 0
NAUSEA: 1
COUGH: 0
RHINORRHEA: 0
EYE REDNESS: 0
SHORTNESS OF BREATH: 0
SINUS PAIN: 0
EYE DISCHARGE: 0

## 2023-05-23 ENCOUNTER — TELEPHONE (OUTPATIENT)
Dept: PAIN MANAGEMENT | Age: 38
End: 2023-05-23

## 2023-05-23 ENCOUNTER — OFFICE VISIT (OUTPATIENT)
Dept: BARIATRICS/WEIGHT MGMT | Age: 38
End: 2023-05-23
Payer: MEDICARE

## 2023-05-23 VITALS
SYSTOLIC BLOOD PRESSURE: 133 MMHG | BODY MASS INDEX: 53.92 KG/M2 | WEIGHT: 293 LBS | HEART RATE: 116 BPM | DIASTOLIC BLOOD PRESSURE: 89 MMHG | TEMPERATURE: 97.2 F | HEIGHT: 62 IN

## 2023-05-23 DIAGNOSIS — E11.69 DIABETES MELLITUS TYPE 2 IN OBESE (HCC): Primary | ICD-10-CM

## 2023-05-23 DIAGNOSIS — E66.01 CLASS 3 SEVERE OBESITY DUE TO EXCESS CALORIES WITH SERIOUS COMORBIDITY AND BODY MASS INDEX (BMI) OF 50.0 TO 59.9 IN ADULT (HCC): ICD-10-CM

## 2023-05-23 DIAGNOSIS — E66.9 DIABETES MELLITUS TYPE 2 IN OBESE (HCC): Primary | ICD-10-CM

## 2023-05-23 PROCEDURE — 4004F PT TOBACCO SCREEN RCVD TLK: CPT | Performed by: INTERNAL MEDICINE

## 2023-05-23 PROCEDURE — 3075F SYST BP GE 130 - 139MM HG: CPT | Performed by: INTERNAL MEDICINE

## 2023-05-23 PROCEDURE — 3079F DIAST BP 80-89 MM HG: CPT | Performed by: INTERNAL MEDICINE

## 2023-05-23 PROCEDURE — 3044F HG A1C LEVEL LT 7.0%: CPT | Performed by: INTERNAL MEDICINE

## 2023-05-23 PROCEDURE — G8428 CUR MEDS NOT DOCUMENT: HCPCS | Performed by: INTERNAL MEDICINE

## 2023-05-23 PROCEDURE — 99205 OFFICE O/P NEW HI 60 MIN: CPT | Performed by: INTERNAL MEDICINE

## 2023-05-23 PROCEDURE — 99202 OFFICE O/P NEW SF 15 MIN: CPT

## 2023-05-23 PROCEDURE — G8417 CALC BMI ABV UP PARAM F/U: HCPCS | Performed by: INTERNAL MEDICINE

## 2023-05-23 PROCEDURE — 2022F DILAT RTA XM EVC RTNOPTHY: CPT | Performed by: INTERNAL MEDICINE

## 2023-05-23 RX ORDER — DULAGLUTIDE 0.75 MG/.5ML
0.75 INJECTION, SOLUTION SUBCUTANEOUS WEEKLY
Qty: 4 ADJUSTABLE DOSE PRE-FILLED PEN SYRINGE | Refills: 1 | Status: SHIPPED | OUTPATIENT
Start: 2023-05-23

## 2023-05-23 NOTE — PATIENT INSTRUCTIONS
Breakfast -     one high protein shake                            + 20 grams of fiber. Do this by either eating 12 tablespoons of the original, plain Fiber One cereal every day or 4 tablespoons of wheat dextrin powder (Benefiber or a generic brand) every day. Work up to this amount slowly by starting with only one-eighth to one-fourth of the target amount and then adding another one-eighth to one-fourth every one or two weeks until reaching the target. Lunch -           one high protein shake                           + one a fat snack item     Dinner -          one frozen meal                           + one snack item     Shake options (<200 donald, >25 grams/protein) :  Nectar, Pure Protein, Premier, Fairlife, Boost Max, [employer] Max, BeneProtein and [employer] [employer] (which is lactose-free) are milk-based options; Nectar, Premier Protein Clear, IsoPure Protein Drink, and Protein 2 O are water-based options; (Premier Protein Clear, the water-based option, comes in a 20 oz bottle with 20 grams of protein and 90 calories. So you have to drink three each day which increases the cost.)  (Disclaimer: Dietary supplements rarely have their listed ingredients and the amount of each verified by a third party other. Sometimes they give verification for their claims to be GMO and gluten free and to be organic.  However, even such verifications as these may still be untrustworthy.)     Fat snack options (<150 donald, >11 grams of fat): 22 almonds or cashews, 1 1/2 tablespoon of a oil-based dressing or 4 tablespoons of [place] dressing on a bed of salad greens, 1 1/2 tablespoons of peanut butter, 1 Cranberry [place] [employer] options (<100 donald, no sweets): fruit, low fat/high protein [place] yogurt, mozzarella cheese stick, nuts, salad with dressing, peanut butter, chips/crackers/pretzels     Frozen meal options (<350 donald, <800 mg sodium):  Weight Watchers Smart Ones, Wipit, Healthy Choice, Fiorella's, Linh's, etc     Food

## 2023-05-23 NOTE — PROGRESS NOTES
CC -   Multiple medical problems due to weight - obesity hypoventillation, HTN, RA/joint pain, Obesity    BACKGROUND -   First visit: 5/23/23    Obesity (all weight in lbs)  Initial Ht 61.5, Wt 317.0,  BMI 58.93  Began after first child at 22 yrs of age, and then with pregnancies (4 total)  HS Grad wt 130   Lowest   wt 130   Highest  wt 317.0  Pattern of wt gain: gradual  Wt change past yr: ~+37 lbs  Most wt lost: ~40 lbs  Other diets attempted: watched diet, low fat diet, also walked a lot (now it is hard)    Desire to lose weight: 10/10 (has thought about surgery - does not want at this time)    Initial Diet:    Number of meals per day - 2    Number of snacks per day - 5-6    Meal volume - 12\" plate,  occasional seconds    Fast food/convenience store - 3x/week    Restaurants (not fast food) - ---x/week (included)   Sweets - 7d/week (night guillaume - chocolates)   Chips - 0-1d/week   Crackers/pretzels - 0d/week   Nuts - 0d/week   Peanut Butter - 0-1d/week (BGL drops - gets shaky - BGL goes down to 60s)   Popcorn - 0-1d/week   Dried fruit - 0d/week   Whole fruit - 7d/week (almost daily)   Breakfast cereal - 0d/week   Granola/Protein/Energy bar - 0d/week   Sugar sweetened beverages - one can of pop/soda with dinner, fruit juice from can 2x/wk and apple juice 1 cup daily and just started cranberry juice 1 cup daily, 1 cup coffee FV cream ~2tbsp, no tea tea, no energy drinks   Protein - No supplements   Fiber - No supplements    Wt effect of HR foods = 2800 Ezequiel/wk = 400 Ezequiel/d= 20% DEN = 42 lb/year. Initial Exercise:    Gym membership - planning to get one at     Walking - about 30 min 7d/wk    Running - no    Resistance - no    Aerobic class - no     Sleep: poor ~ 5-6 hrs, tired every day. Daytime naps everyday.  Does not have CELESTINO.  ______________________    [facility] -  Past Medical History:   Diagnosis Date    Anxiety     Arthritis     Asthma     well controlled, on inhalers    Back pain     Bipolar 1 disorder ([facility] [address]

## 2023-05-23 NOTE — TELEPHONE ENCOUNTER
Call to Linda Joyner that procedure was approved for 5/30/2023 and that [place] should call her a few days before for the pre op call and between 2:00 PM and 4:00 PM  the business day before with the arrival time. Instructed Anjana to hold ibuprofen for 24 hours, naprosyn for 4 days and any aspirin containing products or fish oil for 7 days. Instructed to call office back if any questions. Shavon Ventura verbalized understanding. She states that she only takes ASA when she has bad cramps. She does not take it daily.     Electronically signed by Bashir Vivar RN on 5/23/2023 at 10:19 AM

## 2023-05-24 ENCOUNTER — TREATMENT (OUTPATIENT)
Dept: PHYSICAL THERAPY | Age: 38
End: 2023-05-24

## 2023-05-24 DIAGNOSIS — H83.2X2 VESTIBULAR DISEQUILIBRIUM INVOLVING LEFT INNER EAR: Primary | ICD-10-CM

## 2023-05-24 NOTE — PROGRESS NOTES
[facility]          Phone: [phone] Fax: [phone]    Physical Therapy Daily Treatment Note  Date:  2023    Patient Name:  Bradley Ernandez    :  1985  MRN: [id number]    Evaluating Therapist:  Edmond Doyle PT [id number]    Restrictions/Precautions:    Diagnosis:     Diagnosis Orders   1. Vestibular disequilibrium involving left inner ear          Treatment Diagnosis:    Insurance/Certification information:  [facility] Medicare  Referring Physician:  CATRACHITA Baird  Plan of care signed (Y/N):    Visit# / total visits:    Pain level: N/A   Time In:  7885  Time Out:  1024    Subjective:  Pt reports compliance to HEP. Reports she still feels drunk, but symptoms are less intense.     Exercises:  Exercise/Equipment Resistance/Repetitions Other comments     VORx1 Standing, reading number card 1x through each left/right and up/down    Seated with optokinetics, x 20 reps lines moving left>right, x 20 reps lines moving right>left Mild dizziness, good speed      No dizziness     VORx2 Seated, left/right x 20 reps Mild dizziness, good speed     targets Standing, left/right and up/down x 20 reps each    Seated, reading number cars, 1x through each up/down and left/right Mild dizziness, good speed     Head movements eyes closed Seated, left/right and up/down x 20 reps each Mod dizziness, good speed     Head circles Seated, EO and EC x 10 reps each CW and CCW Mod dizziness, CCW worse than CW     Ball circles X10 reps each CW and CCW No to mild dizziness     Gait with head movements Left/right and up/down x 1 lap each                                                                                             Other:      Home Exercise Program:  23 - VORx1, targets; 5/10/23 - head circles, head movements eyes closed, add reading task to VORx1 and targets     Manual Treatments:  N/A    Modalities:  N/A     Time-in Time-out Total Time   61267  Evaluation Low

## 2023-05-28 DIAGNOSIS — F32.A DEPRESSION, UNSPECIFIED DEPRESSION TYPE: ICD-10-CM

## 2023-05-30 ENCOUNTER — ANESTHESIA (OUTPATIENT)
Dept: OPERATING ROOM | Age: 38
End: 2023-05-30
Payer: MEDICARE

## 2023-05-30 ENCOUNTER — HOSPITAL ENCOUNTER (OUTPATIENT)
Age: 38
Setting detail: OUTPATIENT SURGERY
Discharge: HOME | End: 2023-05-30
Attending: PAIN MEDICINE | Admitting: PAIN MEDICINE
Payer: MEDICARE

## 2023-05-30 ENCOUNTER — ANESTHESIA EVENT (OUTPATIENT)
Dept: OPERATING ROOM | Age: 38
End: 2023-05-30
Payer: MEDICARE

## 2023-05-30 ENCOUNTER — HOSPITAL ENCOUNTER (OUTPATIENT)
Dept: GENERAL RADIOLOGY | Age: 38
Discharge: HOME | End: 2023-06-01
Attending: PAIN MEDICINE
Payer: MEDICARE

## 2023-05-30 VITALS
TEMPERATURE: 97 F | BODY MASS INDEX: 55.32 KG/M2 | HEART RATE: 101 BPM | DIASTOLIC BLOOD PRESSURE: 80 MMHG | RESPIRATION RATE: 18 BRPM | WEIGHT: 293 LBS | HEIGHT: 61 IN | OXYGEN SATURATION: 97 % | SYSTOLIC BLOOD PRESSURE: 130 MMHG

## 2023-05-30 DIAGNOSIS — R52 PAIN MANAGEMENT: ICD-10-CM

## 2023-05-30 LAB — METER GLUCOSE: 137 MG/DL (ref 74–99)

## 2023-05-30 PROCEDURE — 6360000002 HC RX W HCPCS

## 2023-05-30 PROCEDURE — 82962 GLUCOSE BLOOD TEST: CPT

## 2023-05-30 PROCEDURE — 2580000003 HC RX 258

## 2023-05-30 PROCEDURE — 7100000010 HC PHASE II RECOVERY - FIRST 15 MIN: Performed by: PAIN MEDICINE

## 2023-05-30 PROCEDURE — 7100000011 HC PHASE II RECOVERY - ADDTL 15 MIN: Performed by: PAIN MEDICINE

## 2023-05-30 PROCEDURE — 2709999900 HC NON-CHARGEABLE SUPPLY: Performed by: PAIN MEDICINE

## 2023-05-30 PROCEDURE — 3600000012 HC SURGERY LEVEL 2 ADDTL 15MIN: Performed by: PAIN MEDICINE

## 2023-05-30 PROCEDURE — 3700000001 HC ADD 15 MINUTES (ANESTHESIA): Performed by: PAIN MEDICINE

## 2023-05-30 PROCEDURE — 3209999900 FLUORO FOR SURGICAL PROCEDURES

## 2023-05-30 PROCEDURE — 6360000004 HC RX CONTRAST MEDICATION: Performed by: PAIN MEDICINE

## 2023-05-30 PROCEDURE — 2500000003 HC RX 250 WO HCPCS: Performed by: PAIN MEDICINE

## 2023-05-30 PROCEDURE — 3600000002 HC SURGERY LEVEL 2 BASE: Performed by: PAIN MEDICINE

## 2023-05-30 PROCEDURE — 3700000000 HC ANESTHESIA ATTENDED CARE: Performed by: PAIN MEDICINE

## 2023-05-30 PROCEDURE — 6360000002 HC RX W HCPCS: Performed by: PAIN MEDICINE

## 2023-05-30 RX ORDER — PROPOFOL 10 MG/ML
INJECTION, EMULSION INTRAVENOUS PRN
Status: DISCONTINUED | OUTPATIENT
Start: 2023-05-30 | End: 2023-05-30 | Stop reason: SDUPTHER

## 2023-05-30 RX ORDER — METHYLPREDNISOLONE ACETATE 40 MG/ML
INJECTION, SUSPENSION INTRA-ARTICULAR; INTRALESIONAL; INTRAMUSCULAR; SOFT TISSUE PRN
Status: DISCONTINUED | OUTPATIENT
Start: 2023-05-30 | End: 2023-05-30 | Stop reason: ALTCHOICE

## 2023-05-30 RX ORDER — SODIUM CHLORIDE 9 MG/ML
INJECTION, SOLUTION INTRAVENOUS CONTINUOUS PRN
Status: DISCONTINUED | OUTPATIENT
Start: 2023-05-30 | End: 2023-05-30 | Stop reason: SDUPTHER

## 2023-05-30 RX ORDER — LIDOCAINE HYDROCHLORIDE 5 MG/ML
INJECTION, SOLUTION INFILTRATION; INTRAVENOUS PRN
Status: DISCONTINUED | OUTPATIENT
Start: 2023-05-30 | End: 2023-05-30 | Stop reason: ALTCHOICE

## 2023-05-30 RX ORDER — BUPROPION HYDROCHLORIDE 150 MG/1
TABLET ORAL
Qty: 30 TABLET | Refills: 1 | OUTPATIENT
Start: 2023-05-30

## 2023-05-30 RX ADMIN — SODIUM CHLORIDE: 9 INJECTION, SOLUTION INTRAVENOUS at 12:37

## 2023-05-30 RX ADMIN — PROPOFOL 150 MG: 10 INJECTION, EMULSION INTRAVENOUS at 12:39

## 2023-05-30 ASSESSMENT — COPD QUESTIONNAIRES: CAT_SEVERITY: MILD

## 2023-05-30 ASSESSMENT — LIFESTYLE VARIABLES: SMOKING_STATUS: 1

## 2023-05-30 ASSESSMENT — PAIN - FUNCTIONAL ASSESSMENT: PAIN_FUNCTIONAL_ASSESSMENT: 0-10

## 2023-05-30 ASSESSMENT — PAIN DESCRIPTION - DESCRIPTORS: DESCRIPTORS: DISCOMFORT

## 2023-05-30 NOTE — ANESTHESIA PRE PROCEDURE
Department of Anesthesiology  Preprocedure Note       Name:  Gato Lakhani   Age:  44 y.o.  :  1985                                          MRN:  [id number]         Date:  2023      Surgeon: Ruthann Ramos):  Tara Hawk DO    Procedure: Procedure(s):  CAUDAL EPIDURAL STEROID INJECTION    Medications prior to admission:   Prior to Admission medications    Medication Sig Start Date End Date Taking? Authorizing Provider   Dulaglutide (TRULICITY) 2.76 KF/1.6RL SOPN Inject 0.75 mg into the skin once a week 23   Ruby Dennison MD   ondansetron (ZOFRAN) 4 MG tablet Take 1 tablet by mouth 3 times daily as needed for Nausea or Vomiting 23   Cuate Hu, DO   Calcium Carbonate (CALCIUM 600 PO) Take by mouth    Historical Provider, MD   cyclobenzaprine (FLEXERIL) 10 MG tablet Take 1 tablet by mouth 2 times daily as needed for Muscle spasms 23  Tara Hawk DO   pregabalin (LYRICA) 150 MG capsule Take 1 capsule by mouth 2 times daily for 30 days.  Max Daily Amount: 300 mg 23  Tara Hawk DO   buPROPion (WELLBUTRIN XL) 300 MG extended release tablet Take 1 tablet by mouth every morning 23   Cuate Hu, DO   methotrexate (RHEUMATREX) 2.5 MG chemo tablet Take 6 tablets by mouth once a week  Patient taking differently: Take 6 tablets by mouth once a week Takes every 23   Chay Robins DO   folic acid (FOLVITE) 1 MG tablet Take 1 tablet by mouth daily 23   Chay Robins DO   famotidine (PEPCID) 20 MG tablet Take 1 tablet by mouth 2 times daily 3/15/23   Cuate Hu, DO   melatonin 5 MG TABS tablet Take 1 tablet by mouth nightly    Historical Provider, MD   tiotropium (SPIRIVA HANDIHALER) 18 MCG inhalation capsule Inhale 1 capsule into the lungs daily 2/15/23   Cuate Hu, DO   albuterol sulfate HFA (PROVENTIL HFA) 108 (90 Base) MCG/ACT inhaler Inhale 2 puffs into the lungs every 4 hours as needed for Wheezing 2/15/23

## 2023-05-30 NOTE — H&P
[facility]        [address] [facility], [address]  Dept: [phone]    Procedure History & Physical      Keyanna Flatness     HPI:    Patient  is here for LBP BLE pain for caudal BOAZ  Labs/imaging studies reviewed   All question and concerns addressed including R/B/A associated with the procedure    Past Medical History:   Diagnosis Date    Anxiety     Arthritis     Asthma     well controlled, on inhalers    Back pain     Bipolar 1 disorder ([address]     COPD (chronic obstructive pulmonary disease) ([address]     Depression     Fibromyalgia     GERD (gastroesophageal reflux disease)     Hypertension     Macular edema     Cm    Migraines     Morbid obesity due to excess calories ([address]     Neuropathy     PTSD (post-traumatic stress disorder)     Rheumatoid arthritis ([address]        Past Surgical History:   Procedure Laterality Date    ANTERIOR CRUCIATE LIGAMENT REPAIR Right      SECTION      ,     CHOLECYSTECTOMY      DILATION AND CURETTAGE OF UTERUS N/A 2019    DILATATION AND CURETTAGE HYSTEROSCOPY WITH REMOVAL OF CONDYLOMATA performed by Leatha Murray MD at [address] N/A 2020    DILATATION AND CURETTAGE HYSTEROSCOPY, ATTEMPTED CERVICAL BIOPSY, MARIYA OF  LABIAL ABSCESS performed by Leatha Murray MD at [address] (CERVIX STATUS UNKNOWN)  2021    Robotic lysis of adhesions, total hysterectomy, bilateral salpingectomy, cystoscopy    INCONTINENCE SURGERY      OTHER SURGICAL HISTORY      repair anal fissure    PAIN MANAGEMENT PROCEDURE N/A 2022    CAUDAL EPIDURAL STEROID INJECTION  #1 performed by Lawson Blanc DO at [address] N/A 2022    CAUDAL EPIDURAL STEROID INJECTION #2 performed by Lawson Blanc DO at [address] N/A 10/13/2022    CAUDAL EPIDURAL STEROID INJECTION  #3 performed by Bethany Vargas

## 2023-05-30 NOTE — ANESTHESIA POSTPROCEDURE EVALUATION
Department of Anesthesiology  Postprocedure Note    Patient: Reyes Patron  MRN: [id number]  YOB: 1985  Date of evaluation: 5/30/2023      Procedure Summary     Date: 05/30/23 Room / Location: [facility] PROCEDURE ROOM 02 / [facility]    Anesthesia Start: 6341 Anesthesia Stop: 3007    Procedure: CAUDAL EPIDURAL STEROID INJECTION ([place]) Diagnosis:       Lumbar radiculopathy      (Lumbar radiculopathy [M54.16])    Surgeons: Lillian Charles DO Responsible Provider: Jenni Ervin MD    Anesthesia Type: MAC ASA Status: 3          Anesthesia Type: No value filed.     Jeet Phase I: Jeet Score: 10    Jeet Phase II: Jeet Score: 10      Anesthesia Post Evaluation    Patient location during evaluation: PACU  Patient participation: complete - patient participated  Level of consciousness: awake and alert  Airway patency: patent  Nausea & Vomiting: no nausea and no vomiting  Complications: no  Cardiovascular status: blood pressure returned to baseline  Respiratory status: acceptable  Hydration status: euvolemic

## 2023-05-30 NOTE — DISCHARGE INSTRUCTIONS
[facility] Nail Block/Radiofrequency  Home Going Instructions    1-Go home, rest for the remainder of the day  2-Please do not lift over 20 pounds the day of the injection  3-If you received sedation No: alcohol, driving, operating lawn mowers, plows, tractors or other dangerous equipment until next morning. Do not make important decisions or sign legal documents for 24 hours. You may experience light headedness, dizziness, nausea or sleepiness after sedation. Do not stay alone. A responsible adult must be with you for 24 hours. You could be nauseated from the medications you have received. Your IV site may be sore and bruised. 4-No dietary restrictions     5-Resume all medications the same day, blood thinners to be resumed 24 hours after injection if you were instructed to stop any. 6-Keep the surgical site clean and dry, you may shower the next morning and remove the      dressing. 7- No sitz baths, tub baths or hot tubs/swimming for 24 hours. 8- If you have any pain at the injection site(s), application of an ice pack to the area should be       helpful, 20 minutes on/20 minutes off for next 48 hours. 9- Call [facility] immediately at if you develop.   Fever greater than 100.4 F  Have bleeding or drainage from the puncture site  Have progressive Leg/arm numbness and or weakness  Loss of control of bowel and or bladder (wet/soil yourself)  Severe headache with inability to lift head  10-You may return to work the next day

## 2023-05-30 NOTE — OP NOTE
2023    Patient: Zoila Wooten  :  1985  Age:  44 y.o. Sex:  female     PRE-OPERATIVE DIAGNOSIS: Lumbar radiculopathy    POST-OPERATIVE DIAGNOSIS: Same. PROCEDURE PERFORMED: Therapeutic Caudal Epidural done under fluoroscopic guidance. SURGEON:   INGRID Robert. ANESTHESIA: MAC    ESTIMATED BLOOD LOSS: None. BRIEF HISTORY: Zoila Wooten comes in today for the therapeutic caudal epidural steroid injection under fluorsoscopic guidance. After discussing the potential risks and benefits of the procedure with the patient  Geoff Divers did request that we proceed. A complete History & Physical was reviewed and it is unchanged. DESCRIPTION OF PROCEDURE:    After confirming written and informed consent, a time-out was performed and Anjanas name and date of birth, the procedure to be performed as well as the plan for the location of the needle insertion were confirmed. Patient was brought into the procedure room and was placed in the prone position on a fluoroscopy table. Standard monitors were placed and vital signs were observed throughout the procedure. The lumbosacral and caudal area were prepped with chloraprep and draped in a sterile manner. The sacral hiatus was identified and marked under AP fluoroscopy. A sterile gauze was placed in the midgluteal cleft for increased sterility. The overlying skin and subcutaneous tissues were anesthetized with 0.5% Lidocaine. Under lateral fluoroscopic guidance, a # 22 gauge 3.5 inch spinal needle was advanced into the sacral hiatus . After the needle passed through the sacrococcygeal ligament, the needle angle was advanced slightly. There was no evidence of paresthesia throughout the needle placement. After negative aspiration for blood and CSF, epidural spread was confirmed with injection of 2 cc of Isovue-M 300 in both live lateral and live AP fluoroscopy.  A solution consisting of 0.5% Lidocaine and 40 mg DepoMedrol, total of 15 cc, was

## 2023-06-05 ENCOUNTER — PATIENT MESSAGE (OUTPATIENT)
Dept: FAMILY MEDICINE CLINIC | Age: 38
End: 2023-06-05

## 2023-06-06 NOTE — TELEPHONE ENCOUNTER
From: Jose Hawkins  To: Dr. Hoang Hector: 6/5/2023 10:29 AM EDT  Subject: Scalp burning    Hi my scalp burns feels like someone is poured hot water on my head what can I do to ease this thanks

## 2023-06-06 NOTE — PROGRESS NOTES
[facility] PRE-ADMISSION TESTING INSTRUCTIONS    The Preadmission Testing patient is instructed accordingly using the following criteria (check applicable):    ARRIVAL INSTRUCTIONS:  [x] Parking the day of Surgery is located in the Main Entrance lot. Upon entering the door, make an immediate right to the surgery reception desk    [x] Bring photo ID and insurance card    [] Bring in a copy of Living will or Durable Power of  papers. [x] Please be sure to arrange for responsible adult to provide transportation to and from the hospital    [x] Please arrange for responsible adult to be with you for the 24 hour period post procedure due to having anesthesia    [x] If you awake am of surgery not feeling well or have temperature >100 please call [phone]    GENERAL INSTRUCTIONS:    [x] Nothing by mouth after midnight, including gum, candy, mints or water    [x] You may brush your teeth, but do not swallow any water    [x] Take medications as instructed with 1-2 oz of water    [x] Stop herbal supplements and vitamins 5 days prior to procedure    [x] Follow preop dosing of blood thinners per physician instructions    [] Take 1/2 dose of evening insulin, but no insulin after midnight    [] No oral diabetic medications after midnight    [] If diabetic and have low blood sugar or feel symptomatic, take 1-2oz apple juice only    [x] Bring inhalers day of surgery    [] Bring C-PAP/ Bi-Pap day of surgery    [] Bring urine specimen day of surgery    [x] Shower or bath with soap, lather and rinse well, AM of Surgery, no lotion, powders or creams to surgical site    [] Follow bowel prep as instructed per surgeon    [x] No tobacco products within 24 hours of surgery     [x] No alcohol or illegal drug use within 24 hours of surgery.     [x] Jewelry, body piercing's, eyeglasses, contact lenses and dentures are not permitted into surgery (bring cases)      [x] Please do not wear any nail polish, make up or hair products on the day of surgery    [x] You can expect a call the business day prior to procedure to notify you if your arrival time changes    [x] If you receive a survey after surgery we would greatly appreciate your comments    [] Parent/guardian of a minor must accompany their child and remain on the premises  the entire time they are under our care     [] Pediatric patients may bring favorite toy, blanket or comfort item with them    [] A caregiver or family member must remain with the patient during their stay if they are mentally handicapped, have dementia, disoriented or unable to use a call light or would be a safety concern if left unattended    [x] Please notify surgeon if you develop any illness between now and time of surgery (cold, cough, sore throat, fever, nausea, vomiting) or any signs of infections  including skin, wounds, and dental.    [x]  The Outpatient Pharmacy is available to fill your prescription here on your day of surgery, ask your preop nurse for details    [] Other instructions    EDUCATIONAL MATERIALS PROVIDED:    [] PAT Preoperative Education Packet/Booklet     [] Medication List    [] Transfusion bracelet applied with instructions    [] Shower with soap, lather and rinse well, and use CHG wipes provided the evening before surgery as instructed    [] Incentive spirometer with instructions None

## 2023-06-09 ENCOUNTER — OFFICE VISIT (OUTPATIENT)
Dept: ENT CLINIC | Age: 38
End: 2023-06-09
Payer: MEDICARE

## 2023-06-09 VITALS
BODY MASS INDEX: 55.32 KG/M2 | SYSTOLIC BLOOD PRESSURE: 119 MMHG | HEART RATE: 128 BPM | HEIGHT: 61 IN | WEIGHT: 293 LBS | DIASTOLIC BLOOD PRESSURE: 86 MMHG

## 2023-06-09 DIAGNOSIS — H92.02 OTALGIA, LEFT EAR: ICD-10-CM

## 2023-06-09 DIAGNOSIS — H83.2X2 VESTIBULAR DISEQUILIBRIUM INVOLVING LEFT INNER EAR: Primary | ICD-10-CM

## 2023-06-09 DIAGNOSIS — M26.622 TENDERNESS OF LEFT TEMPOROMANDIBULAR JOINT: ICD-10-CM

## 2023-06-09 DIAGNOSIS — H93.13 TINNITUS OF BOTH EARS: ICD-10-CM

## 2023-06-09 PROCEDURE — 4004F PT TOBACCO SCREEN RCVD TLK: CPT | Performed by: NURSE PRACTITIONER

## 2023-06-09 PROCEDURE — 3079F DIAST BP 80-89 MM HG: CPT | Performed by: NURSE PRACTITIONER

## 2023-06-09 PROCEDURE — 3074F SYST BP LT 130 MM HG: CPT | Performed by: NURSE PRACTITIONER

## 2023-06-09 PROCEDURE — 99213 OFFICE O/P EST LOW 20 MIN: CPT | Performed by: NURSE PRACTITIONER

## 2023-06-09 PROCEDURE — G8427 DOCREV CUR MEDS BY ELIG CLIN: HCPCS | Performed by: NURSE PRACTITIONER

## 2023-06-09 PROCEDURE — G8417 CALC BMI ABV UP PARAM F/U: HCPCS | Performed by: NURSE PRACTITIONER

## 2023-06-09 ASSESSMENT — ENCOUNTER SYMPTOMS
RESPIRATORY NEGATIVE: 1
SINUS PRESSURE: 0
RHINORRHEA: 0
EYES NEGATIVE: 1
SINUS PAIN: 0
SHORTNESS OF BREATH: 0
STRIDOR: 0

## 2023-06-09 NOTE — PROGRESS NOTES
High Blood Pressure Brother     Breast Cancer Maternal Aunt     Uterine Cancer Neg Hx     Colon Cancer Neg Hx        Review of Systems   Constitutional: Negative. Negative for activity change and appetite change. HENT:  Positive for ear pain (intermittent) and tinnitus. Negative for congestion, ear discharge, hearing loss, postnasal drip, rhinorrhea, sinus pressure and sinus pain. Eyes: Negative. Respiratory: Negative. Negative for shortness of breath and stridor. Cardiovascular: Negative. Negative for chest pain and palpitations. Endocrine: Negative. Musculoskeletal:  Positive for gait problem. Poor balance     Skin: Negative. Neurological:  Positive for dizziness. Hematological: Negative. Psychiatric/Behavioral: Negative. /86   Pulse (!) 128   Ht 5' 1\" (1.549 m)   Wt (!) 317 lb (143.8 kg)   LMP 02/22/2021 (Approximate)   BMI 59.90 kg/m²   Physical Exam  Constitutional:       Appearance: Normal appearance. She is obese. HENT:      Head: Normocephalic. Jaw: Tenderness present. Right Ear: Tympanic membrane, ear canal and external ear normal.      Left Ear: Tympanic membrane, ear canal and external ear normal.      Nose: Nose normal. No rhinorrhea. Right Turbinates: Not pale. Left Turbinates: Not pale. Mouth/Throat:      Lips: Pink. Mouth: Mucous membranes are moist.      Pharynx: Oropharynx is clear. Eyes:      Conjunctiva/sclera: Conjunctivae normal.      Pupils: Pupils are equal, round, and reactive to light. Cardiovascular:      Rate and Rhythm: Normal rate and regular rhythm. Pulses: Normal pulses. Pulmonary:      Effort: Pulmonary effort is normal. No respiratory distress. Breath sounds: No stridor. Musculoskeletal:      Cervical back: Normal range of motion. No rigidity. No muscular tenderness. Skin:     General: Skin is warm and dry. Neurological:      General: No focal deficit present.       Mental

## 2023-06-28 ENCOUNTER — TELEPHONE (OUTPATIENT)
Dept: ADMINISTRATIVE | Age: 38
End: 2023-06-28

## 2023-06-28 ENCOUNTER — OFFICE VISIT (OUTPATIENT)
Dept: BARIATRICS/WEIGHT MGMT | Age: 38
End: 2023-06-28
Payer: MEDICARE

## 2023-06-28 VITALS
TEMPERATURE: 96.6 F | BODY MASS INDEX: 53.92 KG/M2 | HEIGHT: 62 IN | SYSTOLIC BLOOD PRESSURE: 118 MMHG | WEIGHT: 293 LBS | HEART RATE: 116 BPM | DIASTOLIC BLOOD PRESSURE: 74 MMHG

## 2023-06-28 DIAGNOSIS — E66.01 CLASS 3 SEVERE OBESITY DUE TO EXCESS CALORIES WITH SERIOUS COMORBIDITY AND BODY MASS INDEX (BMI) OF 50.0 TO 59.9 IN ADULT (HCC): ICD-10-CM

## 2023-06-28 DIAGNOSIS — R00.0 TACHYCARDIA: ICD-10-CM

## 2023-06-28 DIAGNOSIS — E66.9 DIABETES MELLITUS TYPE 2 IN OBESE (HCC): Primary | ICD-10-CM

## 2023-06-28 DIAGNOSIS — E11.69 DIABETES MELLITUS TYPE 2 IN OBESE (HCC): Primary | ICD-10-CM

## 2023-06-28 PROCEDURE — 99214 OFFICE O/P EST MOD 30 MIN: CPT | Performed by: INTERNAL MEDICINE

## 2023-06-28 PROCEDURE — 2022F DILAT RTA XM EVC RTNOPTHY: CPT | Performed by: INTERNAL MEDICINE

## 2023-06-28 PROCEDURE — G8417 CALC BMI ABV UP PARAM F/U: HCPCS | Performed by: INTERNAL MEDICINE

## 2023-06-28 PROCEDURE — 3074F SYST BP LT 130 MM HG: CPT | Performed by: INTERNAL MEDICINE

## 2023-06-28 PROCEDURE — 4004F PT TOBACCO SCREEN RCVD TLK: CPT | Performed by: INTERNAL MEDICINE

## 2023-06-28 PROCEDURE — G8428 CUR MEDS NOT DOCUMENT: HCPCS | Performed by: INTERNAL MEDICINE

## 2023-06-28 PROCEDURE — 99211 OFF/OP EST MAY X REQ PHY/QHP: CPT

## 2023-06-28 PROCEDURE — 3078F DIAST BP <80 MM HG: CPT | Performed by: INTERNAL MEDICINE

## 2023-06-28 PROCEDURE — 3044F HG A1C LEVEL LT 7.0%: CPT | Performed by: INTERNAL MEDICINE

## 2023-06-28 RX ORDER — GLUCOSAMINE HCL/CHONDROITIN SU 500-400 MG
CAPSULE ORAL
Qty: 100 STRIP | Refills: 3 | Status: SHIPPED | OUTPATIENT
Start: 2023-06-28 | End: 2024-06-22

## 2023-06-28 RX ORDER — M-VIT,TX,IRON,MINS/CALC/FOLIC 27MG-0.4MG
1 TABLET ORAL DAILY
Qty: 90 TABLET | Refills: 3 | Status: SHIPPED | OUTPATIENT
Start: 2023-06-28

## 2023-06-28 RX ORDER — LANCETS 30 GAUGE
1 EACH MISCELLANEOUS DAILY
Qty: 100 EACH | Refills: 3 | Status: SHIPPED | OUTPATIENT
Start: 2023-06-28

## 2023-06-28 RX ORDER — DULAGLUTIDE 1.5 MG/.5ML
1.5 INJECTION, SOLUTION SUBCUTANEOUS WEEKLY
Qty: 4 ADJUSTABLE DOSE PRE-FILLED PEN SYRINGE | Refills: 2 | Status: SHIPPED | OUTPATIENT
Start: 2023-06-28

## 2023-06-30 ENCOUNTER — TREATMENT (OUTPATIENT)
Dept: PHYSICAL THERAPY | Age: 38
End: 2023-06-30

## 2023-06-30 DIAGNOSIS — H83.2X2 VESTIBULAR DISEQUILIBRIUM INVOLVING LEFT INNER EAR: Primary | ICD-10-CM

## 2023-07-09 DIAGNOSIS — K21.9 GASTROESOPHAGEAL REFLUX DISEASE, UNSPECIFIED WHETHER ESOPHAGITIS PRESENT: ICD-10-CM

## 2023-07-10 RX ORDER — FAMOTIDINE 20 MG/1
TABLET, FILM COATED ORAL
Qty: 60 TABLET | Refills: 3 | Status: SHIPPED | OUTPATIENT
Start: 2023-07-10

## 2023-07-11 ENCOUNTER — OFFICE VISIT (OUTPATIENT)
Dept: ENT CLINIC | Age: 38
End: 2023-07-11
Payer: MEDICARE

## 2023-07-11 VITALS — BODY MASS INDEX: 55.32 KG/M2 | WEIGHT: 293 LBS | HEIGHT: 61 IN

## 2023-07-11 DIAGNOSIS — H83.2X2 VESTIBULAR DISEQUILIBRIUM INVOLVING LEFT INNER EAR: Primary | ICD-10-CM

## 2023-07-11 DIAGNOSIS — R42 VERTIGO: ICD-10-CM

## 2023-07-11 PROCEDURE — G8427 DOCREV CUR MEDS BY ELIG CLIN: HCPCS | Performed by: NURSE PRACTITIONER

## 2023-07-11 PROCEDURE — G8417 CALC BMI ABV UP PARAM F/U: HCPCS | Performed by: NURSE PRACTITIONER

## 2023-07-11 PROCEDURE — 99213 OFFICE O/P EST LOW 20 MIN: CPT | Performed by: NURSE PRACTITIONER

## 2023-07-11 PROCEDURE — 4004F PT TOBACCO SCREEN RCVD TLK: CPT | Performed by: NURSE PRACTITIONER

## 2023-07-11 ASSESSMENT — ENCOUNTER SYMPTOMS
STRIDOR: 0
SINUS PRESSURE: 0
RHINORRHEA: 0
SINUS PAIN: 0
SHORTNESS OF BREATH: 0
EYES NEGATIVE: 1
RESPIRATORY NEGATIVE: 1

## 2023-07-11 NOTE — PROGRESS NOTES
[facility]  Dr. Mary Ellen Vallecillo. Ms.Ed        Patient Name:  Jaguar Torres  :  1985     CHIEF C/O:    Chief Complaint   Patient presents with    Follow-up     Patient states that the dizzy spells have now become daily. States that they have gotten so bad that they are causing vomiting. Increase risk of falling. States that there is some confusion when these episodes are happening but everything is spinning so much she is unsure of other symptoms. Spells last about a minute. She is using staring at one thing and that does seem to help. HISTORY OBTAINED FROM:  patient    HISTORY OF PRESENT ILLNESS:       Husam Rose is a 44y.o. year old female, here today for follow up of:        Dizziness. Patient was last seen 3 months ago and completed vestibular rehab for left-sided vestibular disequilibrium. Patient at that time was doing very well with no further complaints. She states over the past 3 months her symptoms have returned and states they are worse than they were originally. She is having more frequent episodes daily of room spinning vertigo with associated nausea. She denies any ear pain or pressure. She denies any changes to her hearing. She does state her symptoms are aggravated by rapid movements of her head as well as positional changes. She also complains of new headaches on the crown of her head which she has been referred to neurology for by her PCP.          Past Medical History:   Diagnosis Date    Anxiety     Arthritis     Asthma     well controlled, on inhalers    Back pain     Bipolar 1 disorder (HCC)     COPD (chronic obstructive pulmonary disease) (HCC)     Depression     Fibromyalgia     GERD (gastroesophageal reflux disease)     Hypertension     Macular edema     Cm    Migraines     Morbid obesity due to excess calories ([facility])     Neuropathy     PTSD (post-traumatic stress disorder)     Rheumatoid arthritis ([address])      Past Surgical History:   Procedure

## 2023-07-14 ENCOUNTER — HOSPITAL ENCOUNTER (OUTPATIENT)
Age: 38
Discharge: HOME | End: 2023-07-14
Payer: MEDICARE

## 2023-07-14 DIAGNOSIS — M05.79 RHEUMATOID ARTHRITIS INVOLVING MULTIPLE SITES WITH POSITIVE RHEUMATOID FACTOR (HCC): ICD-10-CM

## 2023-07-14 LAB
ALBUMIN SERPL-MCNC: 4.3 G/DL (ref 3.5–5.2)
ALP SERPL-CCNC: 116 U/L (ref 35–104)
ALT SERPL-CCNC: 18 U/L (ref 0–32)
ANION GAP SERPL CALCULATED.3IONS-SCNC: 16 MMOL/L (ref 7–16)
AST SERPL-CCNC: 15 U/L (ref 0–31)
BASOPHILS # BLD: 0.05 E9/L (ref 0–0.2)
BASOPHILS NFR BLD: 0.5 % (ref 0–2)
BILIRUB SERPL-MCNC: 0.3 MG/DL (ref 0–1.2)
BUN SERPL-MCNC: 12 MG/DL (ref 6–20)
CALCIUM SERPL-MCNC: 9.3 MG/DL (ref 8.6–10.2)
CHLORIDE SERPL-SCNC: 106 MMOL/L (ref 98–107)
CO2 SERPL-SCNC: 19 MMOL/L (ref 22–29)
CREAT SERPL-MCNC: 0.8 MG/DL (ref 0.5–1)
EOSINOPHIL # BLD: 0.29 E9/L (ref 0.05–0.5)
EOSINOPHIL NFR BLD: 2.8 % (ref 0–6)
ERYTHROCYTE [DISTWIDTH] IN BLOOD BY AUTOMATED COUNT: 14.6 FL (ref 11.5–15)
GLUCOSE SERPL-MCNC: 166 MG/DL (ref 74–99)
HCT VFR BLD AUTO: 40.6 % (ref 34–48)
HGB BLD-MCNC: 13.8 G/DL (ref 11.5–15.5)
IMM GRANULOCYTES # BLD: 0.04 E9/L
IMM GRANULOCYTES NFR BLD: 0.4 % (ref 0–5)
LYMPHOCYTES # BLD: 2.79 E9/L (ref 1.5–4)
LYMPHOCYTES NFR BLD: 26.9 % (ref 20–42)
MCH RBC QN AUTO: 31.4 PG (ref 26–35)
MCHC RBC AUTO-ENTMCNC: 34 % (ref 32–34.5)
MCV RBC AUTO: 92.3 FL (ref 80–99.9)
MONOCYTES # BLD: 0.73 E9/L (ref 0.1–0.95)
MONOCYTES NFR BLD: 7 % (ref 2–12)
NEUTROPHILS # BLD: 6.46 E9/L (ref 1.8–7.3)
NEUTS SEG NFR BLD: 62.4 % (ref 43–80)
PLATELET # BLD AUTO: 297 E9/L (ref 130–450)
PMV BLD AUTO: 10.1 FL (ref 7–12)
POTASSIUM SERPL-SCNC: 3.9 MMOL/L (ref 3.5–5)
PROT SERPL-MCNC: 7.5 G/DL (ref 6.4–8.3)
RBC # BLD AUTO: 4.4 E12/L (ref 3.5–5.5)
SODIUM SERPL-SCNC: 141 MMOL/L (ref 132–146)
WBC # BLD: 10.4 E9/L (ref 4.5–11.5)

## 2023-07-14 PROCEDURE — 80053 COMPREHEN METABOLIC PANEL: CPT

## 2023-07-14 PROCEDURE — 85025 COMPLETE CBC W/AUTO DIFF WBC: CPT

## 2023-07-14 PROCEDURE — 36415 COLL VENOUS BLD VENIPUNCTURE: CPT

## 2023-07-19 ENCOUNTER — OFFICE VISIT (OUTPATIENT)
Dept: PAIN MANAGEMENT | Age: 38
End: 2023-07-19
Payer: MEDICARE

## 2023-07-19 ENCOUNTER — PREP FOR PROCEDURE (OUTPATIENT)
Dept: PAIN MANAGEMENT | Age: 38
End: 2023-07-19

## 2023-07-19 VITALS
OXYGEN SATURATION: 96 % | HEIGHT: 61 IN | RESPIRATION RATE: 18 BRPM | BODY MASS INDEX: 55.32 KG/M2 | WEIGHT: 293 LBS | SYSTOLIC BLOOD PRESSURE: 114 MMHG | TEMPERATURE: 98.2 F | HEART RATE: 105 BPM | DIASTOLIC BLOOD PRESSURE: 76 MMHG

## 2023-07-19 DIAGNOSIS — M54.16 LUMBAR RADICULOPATHY: ICD-10-CM

## 2023-07-19 DIAGNOSIS — G89.4 CHRONIC PAIN SYNDROME: ICD-10-CM

## 2023-07-19 DIAGNOSIS — M79.10 MYALGIA: ICD-10-CM

## 2023-07-19 DIAGNOSIS — G89.29 CHRONIC BILATERAL LOW BACK PAIN WITH BILATERAL SCIATICA: ICD-10-CM

## 2023-07-19 DIAGNOSIS — M54.42 CHRONIC BILATERAL LOW BACK PAIN WITH BILATERAL SCIATICA: ICD-10-CM

## 2023-07-19 DIAGNOSIS — M47.817 LUMBOSACRAL SPONDYLOSIS WITHOUT MYELOPATHY: ICD-10-CM

## 2023-07-19 DIAGNOSIS — M54.41 CHRONIC BILATERAL LOW BACK PAIN WITH BILATERAL SCIATICA: ICD-10-CM

## 2023-07-19 DIAGNOSIS — G89.4 CHRONIC PAIN SYNDROME: Primary | ICD-10-CM

## 2023-07-19 DIAGNOSIS — M54.16 LUMBAR RADICULOPATHY: Primary | ICD-10-CM

## 2023-07-19 PROCEDURE — 4004F PT TOBACCO SCREEN RCVD TLK: CPT | Performed by: PAIN MEDICINE

## 2023-07-19 PROCEDURE — G8417 CALC BMI ABV UP PARAM F/U: HCPCS | Performed by: PAIN MEDICINE

## 2023-07-19 PROCEDURE — G8427 DOCREV CUR MEDS BY ELIG CLIN: HCPCS | Performed by: PAIN MEDICINE

## 2023-07-19 PROCEDURE — 99213 OFFICE O/P EST LOW 20 MIN: CPT | Performed by: PAIN MEDICINE

## 2023-07-19 PROCEDURE — 3078F DIAST BP <80 MM HG: CPT | Performed by: PAIN MEDICINE

## 2023-07-19 PROCEDURE — 3074F SYST BP LT 130 MM HG: CPT | Performed by: PAIN MEDICINE

## 2023-07-19 RX ORDER — MULTIVITAMIN WITH FOLIC ACID 400 MCG
1 TABLET ORAL DAILY
COMMUNITY
Start: 2023-06-28

## 2023-07-19 RX ORDER — PREGABALIN 150 MG/1
150 CAPSULE ORAL 2 TIMES DAILY
Qty: 60 CAPSULE | Refills: 2 | Status: SHIPPED | OUTPATIENT
Start: 2023-07-19 | End: 2023-10-17

## 2023-07-19 RX ORDER — CYCLOBENZAPRINE HCL 10 MG
TABLET ORAL
COMMUNITY
Start: 2023-06-20 | End: 2023-07-19

## 2023-07-19 RX ORDER — CYCLOBENZAPRINE HCL 10 MG
10 TABLET ORAL 2 TIMES DAILY PRN
Qty: 60 TABLET | Refills: 2 | Status: SHIPPED | OUTPATIENT
Start: 2023-07-19 | End: 2023-10-17

## 2023-07-19 RX ORDER — FLUTICASONE PROPIONATE 44 MCG
2 AEROSOL WITH ADAPTER (GRAM) INHALATION 2 TIMES DAILY
COMMUNITY
Start: 2023-06-30

## 2023-07-19 NOTE — PROGRESS NOTES
Bess Blanton presents to the [facility] on 7/19/2023. [place] is complaining of pain back. The pain is constant. The pain is described as aching, throbbing, shooting, stabbing, sharp, penetrating, nagging, and miserable. Pain is rated on her best day at a 3, on her worst day at a 10, and on average at a 10 on the VAS scale. She took her last dose of Lyrica, Motrin, and Flexeril  today. What number best describes how, during the past week, pain has interfered with your enjoyment of life 10    What number best describes how, during the past week, pain has interfered with your general activity 10    Any procedures since your last visit: Yes, with 50 % relief x 2 weeks    Pacemaker or defibrillator: No.    She is  on NSAIDS and is  on anticoagulation medications to include ASA and is managed by PCP. Medication Contract and Consent for Opioid Use Documents Filed        No documents found                    LMP 02/22/2021 (Approximate)      Patient's last menstrual period was 02/22/2021 (approximate).

## 2023-07-19 NOTE — PROGRESS NOTES
[facility]        [address]  Dept: [phone]        Follow up Note      Kassie Mcrae     Date of Visit:  23     CC:  Patient presents for follow up   Chief Complaint   Patient presents with    Back Pain    Follow-up     HPI:    Pain is worse, originally improved from injection. Change in quality of symptoms:no. Medication side effects:dry mouth with pregabalin. Recent diagnostic testing:none  Recent interventional procedures:caudal BOAZ with 75% relief    She has been on anticoagulation medications to include ASA and NSAIDS and has not been on herbal supplements. She is diabetic. Imagin/2022 EDX -  Electrodiagnostic examination of both legs disclosed evidence diagnostic of left S1 motor radiculopathy or intracanalicular lesion, with acute and chronic denervation changes. This was proven with the abnormal needle testing of the paraspinals. There were no other motor radiculopathies or intracanalicular lesions. There were no peripheral neuropathies. Sensory radiculopathies cannot be evaluated by electrodiagnostic means. Electrodiagnostic study performed 1 year ago found no abnormalities. Clinically, the patient presented with back pains radiating into her left leg and foot. On brief neurological examination, I found no motor or sensory compromise. Her difficulties are related to her radicular disease. Imaging studies of lumbosacral spine were recommended, as well as physical therapy and weight loss. Clinical correlation was highly advised. 3/2022 lumbar MRI -  FINDINGS:   BONES/ALIGNMENT: There is normal alignment of the spine. The vertebral body   heights are maintained. The bone marrow signal appears unremarkable. SPINAL CORD: The conus terminates normally. SOFT TISSUES: No paraspinal mass identified.        L1-L2: There is no significant disc herniation, spinal canal stenosis or

## 2023-07-27 ENCOUNTER — OFFICE VISIT (OUTPATIENT)
Dept: RHEUMATOLOGY | Age: 38
End: 2023-07-27
Payer: MEDICARE

## 2023-07-27 VITALS — HEIGHT: 61 IN | BODY MASS INDEX: 55.32 KG/M2 | WEIGHT: 293 LBS

## 2023-07-27 DIAGNOSIS — M05.79 RHEUMATOID ARTHRITIS INVOLVING MULTIPLE SITES WITH POSITIVE RHEUMATOID FACTOR (HCC): Primary | ICD-10-CM

## 2023-07-27 DIAGNOSIS — D84.9 IMMUNOSUPPRESSION (HCC): ICD-10-CM

## 2023-07-27 DIAGNOSIS — I10 ESSENTIAL HYPERTENSION: ICD-10-CM

## 2023-07-27 DIAGNOSIS — M54.16 LUMBAR RADICULOPATHY: ICD-10-CM

## 2023-07-27 DIAGNOSIS — E11.9 TYPE 2 DIABETES MELLITUS WITHOUT COMPLICATION, WITHOUT LONG-TERM CURRENT USE OF INSULIN (HCC): ICD-10-CM

## 2023-07-27 DIAGNOSIS — Z79.899 HIGH RISK MEDICATION USE: ICD-10-CM

## 2023-07-27 PROCEDURE — 3044F HG A1C LEVEL LT 7.0%: CPT | Performed by: INTERNAL MEDICINE

## 2023-07-27 PROCEDURE — G8428 CUR MEDS NOT DOCUMENT: HCPCS | Performed by: INTERNAL MEDICINE

## 2023-07-27 PROCEDURE — G8417 CALC BMI ABV UP PARAM F/U: HCPCS | Performed by: INTERNAL MEDICINE

## 2023-07-27 PROCEDURE — 2022F DILAT RTA XM EVC RTNOPTHY: CPT | Performed by: INTERNAL MEDICINE

## 2023-07-27 PROCEDURE — 4004F PT TOBACCO SCREEN RCVD TLK: CPT | Performed by: INTERNAL MEDICINE

## 2023-07-27 PROCEDURE — 99215 OFFICE O/P EST HI 40 MIN: CPT | Performed by: INTERNAL MEDICINE

## 2023-07-27 RX ORDER — FOLIC ACID 1 MG/1
1 TABLET ORAL DAILY
Qty: 30 TABLET | Refills: 11 | Status: SHIPPED | OUTPATIENT
Start: 2023-07-27

## 2023-07-27 ASSESSMENT — ENCOUNTER SYMPTOMS
BACK PAIN: 1
ABDOMINAL PAIN: 0
NAUSEA: 0
TROUBLE SWALLOWING: 0
COLOR CHANGE: 0
COUGH: 0
VOMITING: 0
DIARRHEA: 0

## 2023-07-27 NOTE — PROGRESS NOTES
Bess Blanton 1985 is a 44 y.o. female, here for evaluation of the following chief complaint(s):  Follow-up (Patient here for follow up on RA. )         ASSESSMENT/PLAN:    Bess Blanton 1985 is a 44 y.o. female seen in follow-up for rheumatoid arthritis. 1.  RF positive, CCP negative, 14 3 3 eta positive, nonerosive rheumatoid arthritis-she has had a very good response to methotrexate 15 mg weekly plus folic acid 1 mg daily. I see no synovitis on exam today. I would not make any changes. 2.  Immunosuppression-I recommend she stay up-to-date on vaccinations. In the event of any acute infectious illness she should hold methotrexate. 3.  High risk medication use-up-to-date on TB and hepatitis. We will monitor basic blood work every 3 months on methotrexate. She just had blood work less than 2 weeks ago which I have personally reviewed. 4.  Chronic low back pain-rheumatoid arthritis spares the back. She is following with pain management. 5.  Hypertension-patient's with inflammatory arthritis have an increased cardiovascular risk. She is on blood pressure medication. 6.  Type 2 diabetes-we will avoid steroids is much as possible. 1. Rheumatoid arthritis involving multiple sites with positive rheumatoid factor ([address])  2. Immunosuppression ([address])  3. High risk medication use  4. Essential hypertension  5. Type 2 diabetes mellitus without complication, without long-term current use of insulin (HCC)  6. Lumbar radiculopathy        Return in about 3 months (around 10/27/2023). Subjective   SUBJECTIVE/OBJECTIVE:    HPI: Bess Blanton 1985 is a 44 y.o. female seen in follow-up for rheumatoid arthritis. Last visit we started her on methotrexate and she has had a very good response. Her stiffness has improved significantly. She denies any significantly painful or swollen joints.     Initial history: Patient states she has had diffuse pain essentially all

## 2023-08-10 ENCOUNTER — TELEPHONE (OUTPATIENT)
Dept: BARIATRICS/WEIGHT MGMT | Age: 38
End: 2023-08-10

## 2023-08-16 ENCOUNTER — TELEPHONE (OUTPATIENT)
Dept: PAIN MANAGEMENT | Age: 38
End: 2023-08-16

## 2023-08-16 DIAGNOSIS — M54.16 LUMBAR RADICULOPATHY: Primary | ICD-10-CM

## 2023-08-16 NOTE — TELEPHONE ENCOUNTER
Call to Rohit Myers that procedure was approved for 8/24/2023 and that Niesha Vaca should call her a few days before for the pre op call and between 2:00 PM and 4:00 PM  the business day before with the arrival time. Instructed Anjana to hold ibuprofen for 24 hours, naprosyn for 4 days and any aspirin containing products or fish oil for 7 days. Instructed to call office back if any questions. Shereen Sloan verbalized understanding.     Electronically signed by Ade Coyne RN on 8/16/2023 at 12:50 PM

## 2023-08-21 NOTE — PROGRESS NOTES
[facility] [employer] PRE-ADMISSION TESTING INSTRUCTIONS    The Preadmission Testing patient is instructed accordingly using the following criteria (check applicable):    ARRIVAL INSTRUCTIONS:  [x] Parking the day of Surgery is located in the Main Entrance lot. Upon entering the door, make an immediate right to the surgery reception desk    [x] Bring photo ID and insurance card    [] Bring in a copy of Living will or Durable Power of  papers. [x] Please be sure to arrange for responsible adult to provide transportation to and from the hospital    [x] Please arrange for responsible adult to be with you for the 24 hour period post procedure due to having anesthesia    [x] If you awake am of surgery not feeling well or have temperature >100 please call [phone]    GENERAL INSTRUCTIONS:    [x] Nothing by mouth after midnight, including gum, candy, mints or water    [x] You may brush your teeth, but do not swallow any water    [x] Take medications as instructed with 1-2 oz of water    [x] Stop herbal supplements and vitamins 5 days prior to procedure    [x] Follow preop dosing of blood thinners per physician instructions    [] Take 1/2 dose of evening insulin, but no insulin after midnight    [] No oral diabetic medications after midnight    [x] If diabetic and have low blood sugar or feel symptomatic, take 1-2oz apple juice only    [] Bring inhalers day of surgery    [] Bring C-PAP/ Bi-Pap day of surgery    [] Bring urine specimen day of surgery    [x] Shower or bath with soap, lather and rinse well, AM of Surgery, no lotion, powders or creams to surgical site    [] Follow bowel prep as instructed per surgeon    [x] No tobacco products within 24 hours of surgery     [x] No alcohol or illegal drug use within 24 hours of surgery.     [x] Jewelry, body piercing's, eyeglasses, contact lenses and dentures are not permitted into surgery (bring cases)      [x] Please do not wear any nail polish,

## 2023-08-22 ENCOUNTER — TELEPHONE (OUTPATIENT)
Dept: BARIATRICS/WEIGHT MGMT | Age: 38
End: 2023-08-22

## 2023-08-22 ENCOUNTER — TELEPHONE (OUTPATIENT)
Dept: PAIN MANAGEMENT | Age: 38
End: 2023-08-22

## 2023-08-22 DIAGNOSIS — M54.41 CHRONIC BILATERAL LOW BACK PAIN WITH BILATERAL SCIATICA: ICD-10-CM

## 2023-08-22 DIAGNOSIS — G89.4 CHRONIC PAIN SYNDROME: ICD-10-CM

## 2023-08-22 DIAGNOSIS — M54.42 CHRONIC BILATERAL LOW BACK PAIN WITH BILATERAL SCIATICA: ICD-10-CM

## 2023-08-22 DIAGNOSIS — M54.16 LUMBAR RADICULOPATHY: ICD-10-CM

## 2023-08-22 DIAGNOSIS — M47.817 LUMBOSACRAL SPONDYLOSIS WITHOUT MYELOPATHY: ICD-10-CM

## 2023-08-22 DIAGNOSIS — G89.29 CHRONIC BILATERAL LOW BACK PAIN WITH BILATERAL SCIATICA: ICD-10-CM

## 2023-08-22 RX ORDER — PREGABALIN 150 MG/1
150 CAPSULE ORAL 2 TIMES DAILY
Qty: 60 CAPSULE | Refills: 2 | Status: SHIPPED | OUTPATIENT
Start: 2023-08-22 | End: 2023-09-21

## 2023-08-22 NOTE — TELEPHONE ENCOUNTER
Olita Baron called requesting her lyrica be sent to a new pharmacy as she has moved to [facility].  Pharmacy updated

## 2023-08-22 NOTE — TELEPHONE ENCOUNTER
Pt was scheduled for initial nutrition medical wt loss consult today, via phone. Pt was not available when RD called. LM on voicemail to please call [phone] to complete the appt or reschedule.

## 2023-08-24 ENCOUNTER — HOSPITAL ENCOUNTER (OUTPATIENT)
Age: 38
Setting detail: OUTPATIENT SURGERY
Discharge: HOME | End: 2023-08-24
Attending: PAIN MEDICINE | Admitting: PAIN MEDICINE
Payer: MEDICARE

## 2023-08-24 ENCOUNTER — ANESTHESIA EVENT (OUTPATIENT)
Dept: OPERATING ROOM | Age: 38
End: 2023-08-24
Payer: MEDICARE

## 2023-08-24 ENCOUNTER — HOSPITAL ENCOUNTER (OUTPATIENT)
Dept: GENERAL RADIOLOGY | Age: 38
Setting detail: OUTPATIENT SURGERY
Discharge: HOME | End: 2023-08-26
Attending: PAIN MEDICINE
Payer: MEDICARE

## 2023-08-24 ENCOUNTER — ANESTHESIA (OUTPATIENT)
Dept: OPERATING ROOM | Age: 38
End: 2023-08-24
Payer: MEDICARE

## 2023-08-24 VITALS
HEIGHT: 62 IN | BODY MASS INDEX: 53.92 KG/M2 | RESPIRATION RATE: 16 BRPM | SYSTOLIC BLOOD PRESSURE: 103 MMHG | HEART RATE: 100 BPM | TEMPERATURE: 97.8 F | OXYGEN SATURATION: 97 % | WEIGHT: 293 LBS | DIASTOLIC BLOOD PRESSURE: 64 MMHG

## 2023-08-24 DIAGNOSIS — R52 PAIN MANAGEMENT: ICD-10-CM

## 2023-08-24 LAB — GLUCOSE BLD-MCNC: 108 MG/DL (ref 74–99)

## 2023-08-24 PROCEDURE — 82962 GLUCOSE BLOOD TEST: CPT

## 2023-08-24 PROCEDURE — 3600000012 HC SURGERY LEVEL 2 ADDTL 15MIN: Performed by: PAIN MEDICINE

## 2023-08-24 PROCEDURE — 2580000003 HC RX 258

## 2023-08-24 PROCEDURE — 3600000002 HC SURGERY LEVEL 2 BASE: Performed by: PAIN MEDICINE

## 2023-08-24 PROCEDURE — 6360000004 HC RX CONTRAST MEDICATION: Performed by: PAIN MEDICINE

## 2023-08-24 PROCEDURE — 6360000002 HC RX W HCPCS

## 2023-08-24 PROCEDURE — 7100000010 HC PHASE II RECOVERY - FIRST 15 MIN: Performed by: PAIN MEDICINE

## 2023-08-24 PROCEDURE — 6360000002 HC RX W HCPCS: Performed by: PAIN MEDICINE

## 2023-08-24 PROCEDURE — 7100000011 HC PHASE II RECOVERY - ADDTL 15 MIN: Performed by: PAIN MEDICINE

## 2023-08-24 PROCEDURE — 2500000003 HC RX 250 WO HCPCS: Performed by: PAIN MEDICINE

## 2023-08-24 PROCEDURE — 2709999900 HC NON-CHARGEABLE SUPPLY: Performed by: PAIN MEDICINE

## 2023-08-24 PROCEDURE — 3700000001 HC ADD 15 MINUTES (ANESTHESIA): Performed by: PAIN MEDICINE

## 2023-08-24 PROCEDURE — 3700000000 HC ANESTHESIA ATTENDED CARE: Performed by: PAIN MEDICINE

## 2023-08-24 PROCEDURE — 62323 NJX INTERLAMINAR LMBR/SAC: CPT | Performed by: PAIN MEDICINE

## 2023-08-24 RX ORDER — SODIUM CHLORIDE 9 MG/ML
INJECTION, SOLUTION INTRAVENOUS CONTINUOUS PRN
Status: DISCONTINUED | OUTPATIENT
Start: 2023-08-24 | End: 2023-08-24 | Stop reason: SDUPTHER

## 2023-08-24 RX ORDER — ONDANSETRON 2 MG/ML
INJECTION INTRAMUSCULAR; INTRAVENOUS PRN
Status: DISCONTINUED | OUTPATIENT
Start: 2023-08-24 | End: 2023-08-24 | Stop reason: SDUPTHER

## 2023-08-24 RX ORDER — LIDOCAINE HYDROCHLORIDE 5 MG/ML
INJECTION, SOLUTION INFILTRATION; INTRAVENOUS PRN
Status: DISCONTINUED | OUTPATIENT
Start: 2023-08-24 | End: 2023-08-24 | Stop reason: ALTCHOICE

## 2023-08-24 RX ORDER — METHYLPREDNISOLONE ACETATE 40 MG/ML
INJECTION, SUSPENSION INTRA-ARTICULAR; INTRALESIONAL; INTRAMUSCULAR; SOFT TISSUE PRN
Status: DISCONTINUED | OUTPATIENT
Start: 2023-08-24 | End: 2023-08-24 | Stop reason: ALTCHOICE

## 2023-08-24 RX ORDER — MIDAZOLAM HYDROCHLORIDE 1 MG/ML
INJECTION INTRAMUSCULAR; INTRAVENOUS PRN
Status: DISCONTINUED | OUTPATIENT
Start: 2023-08-24 | End: 2023-08-24 | Stop reason: SDUPTHER

## 2023-08-24 RX ORDER — PROPOFOL 10 MG/ML
INJECTION, EMULSION INTRAVENOUS PRN
Status: DISCONTINUED | OUTPATIENT
Start: 2023-08-24 | End: 2023-08-24 | Stop reason: SDUPTHER

## 2023-08-24 RX ADMIN — ONDANSETRON 4 MG: 2 INJECTION INTRAMUSCULAR; INTRAVENOUS at 09:14

## 2023-08-24 RX ADMIN — PROPOFOL 180 MG: 10 INJECTION, EMULSION INTRAVENOUS at 09:09

## 2023-08-24 RX ADMIN — SODIUM CHLORIDE: 9 INJECTION, SOLUTION INTRAVENOUS at 09:09

## 2023-08-24 RX ADMIN — MIDAZOLAM 1 MG: 1 INJECTION INTRAMUSCULAR; INTRAVENOUS at 09:09

## 2023-08-24 ASSESSMENT — PAIN DESCRIPTION - ORIENTATION: ORIENTATION: LEFT

## 2023-08-24 ASSESSMENT — PAIN DESCRIPTION - DESCRIPTORS: DESCRIPTORS: ACHING

## 2023-08-24 ASSESSMENT — PAIN DESCRIPTION - LOCATION: LOCATION: BACK

## 2023-08-24 ASSESSMENT — PAIN DESCRIPTION - PAIN TYPE: TYPE: CHRONIC PAIN

## 2023-08-24 ASSESSMENT — LIFESTYLE VARIABLES: SMOKING_STATUS: 1

## 2023-08-24 ASSESSMENT — COPD QUESTIONNAIRES: CAT_SEVERITY: MILD

## 2023-08-24 ASSESSMENT — PAIN SCALES - GENERAL: PAINLEVEL_OUTOF10: 5

## 2023-08-24 NOTE — H&P
[facility]        [address] [facility], [address] [facility]  Dept: [phone]    Procedure History & Physical      Valentino Hodgkins     HPI:    Patient  is here for low back pain bilateral lower extremity pain for caudal epidural steroid injection  Labs/imaging studies reviewed   All question and concerns addressed including R/B/A associated with the procedure    Past Medical History:   Diagnosis Date    Anxiety     Arthritis     Asthma     well controlled, on inhalers    Back pain     Bipolar 1 disorder (HCC)     COPD (chronic obstructive pulmonary disease) ([address])     Depression     Diabetes mellitus ([address])     Fibromyalgia     GERD (gastroesophageal reflux disease)     Hypertension     Macular edema     Cm    Migraines     Morbid obesity due to excess calories ([address])     Neuropathy     PTSD (post-traumatic stress disorder)     Rheumatoid arthritis ([address])        Past Surgical History:   Procedure Laterality Date    ANTERIOR CRUCIATE LIGAMENT REPAIR Right      SECTION      ,     CHOLECYSTECTOMY      DILATION AND CURETTAGE OF UTERUS N/A 2019    DILATATION AND CURETTAGE HYSTEROSCOPY WITH REMOVAL OF CONDYLOMATA performed by Travis Gentile MD at [address] [facility] N/A 2020    DILATATION AND CURETTAGE HYSTEROSCOPY, ATTEMPTED CERVICAL BIOPSY, MARIYA OF  LABIAL ABSCESS performed by Travis Gentile MD at [address] [place] [address] (CERVIX STATUS UNKNOWN)  2021    Robotic lysis of adhesions, total hysterectomy, bilateral salpingectomy, cystoscopy    INCONTINENCE SURGERY      OTHER SURGICAL HISTORY      repair anal fissure    PAIN MANAGEMENT PROCEDURE N/A 2022    CAUDAL EPIDURAL STEROID INJECTION  #1 performed by Lia Salazar DO at [address] [place] [address] N/A 2022    CAUDAL EPIDURAL STEROID INJECTION #2 performed by Lia Salazar DO at [address] [place] [address]

## 2023-08-24 NOTE — ANESTHESIA POSTPROCEDURE EVALUATION
Department of Anesthesiology  Postprocedure Note    Patient: Lilli Rashid  MRN: [id number]  YOB: 1985  Date of evaluation: 8/24/2023      Procedure Summary     Date: 08/24/23 Room / Location: [facility] PROCEDURE ROOM 02 / [address] [facility] [facility]    Anesthesia Start: 0705 Anesthesia Stop: 3950    Procedure: CAUDAL EPIDURAL STEROID INJECTION (Coccyx) Diagnosis:       Lumbar radiculopathy      (Lumbar radiculopathy [M54.16])    Surgeons: Albino Wright DO Responsible Provider: Elizabeth Houston MD    Anesthesia Type: MAC ASA Status: 3          Anesthesia Type: No value filed.     Jeet Phase I:      Jeet Phase II:        Anesthesia Post Evaluation    Patient location during evaluation: PACU  Patient participation: complete - patient participated  Level of consciousness: awake and alert  Pain score: 0  Airway patency: patent  Nausea & Vomiting: no nausea and no vomiting  Complications: no  Cardiovascular status: blood pressure returned to baseline  Respiratory status: acceptable  Hydration status: euvolemic

## 2023-08-24 NOTE — OP NOTE
2023    Patient: Judith Erickson  :  1985  Age:  44 y.o. Sex:  female     PRE-OPERATIVE DIAGNOSIS: Lumbar radiculopathy    POST-OPERATIVE DIAGNOSIS: Same. PROCEDURE PERFORMED: Therapeutic Caudal Epidural done under fluoroscopic guidance. SURGEON:   INGRID Dewey. ANESTHESIA: MAC    ESTIMATED BLOOD LOSS: None. BRIEF HISTORY: Judith Erickson comes in today for the therapeutic caudal epidural steroid injection under fluorsoscopic guidance. After discussing the potential risks and benefits of the procedure with the patient  Majel Apgar did request that we proceed. A complete History & Physical was reviewed and it is unchanged. DESCRIPTION OF PROCEDURE:    After confirming written and informed consent, a time-out was performed and David name and date of birth, the procedure to be performed as well as the plan for the location of the needle insertion were confirmed. Patient was brought into the procedure room and was placed in the prone position on a fluoroscopy table. Standard monitors were placed and vital signs were observed throughout the procedure. The lumbosacral and caudal area were prepped with chloraprep and draped in a sterile manner. The sacral hiatus was identified and marked under AP fluoroscopy. A sterile gauze was placed in the midgluteal cleft for increased sterility. The overlying skin and subcutaneous tissues were anesthetized with 0.5% Lidocaine. Under lateral fluoroscopic guidance, a # 22 gauge 3.5 inch spinal needle was advanced into the sacral hiatus . After the needle passed through the sacrococcygeal ligament, the needle angle was advanced slightly. There was no evidence of paresthesia throughout the needle placement. After negative aspiration for blood and CSF, epidural spread was confirmed with injection of 2 cc of Isovue-M 300 in both live lateral and live AP fluoroscopy.  A solution consisting of 0.5% Lidocaine and 40 mg DepoMedrol, total of 15 cc, was

## 2023-08-24 NOTE — ANESTHESIA PRE PROCEDURE
Normal right ventricular size and function. Left Atrium   Normal left atrium. L atrial pressure not elevated      Right Atrium   Normal right atrium size. Mitral Valve   Normal mitral valve structure and function. Tricuspid Valve   The tricuspid valve appears structurally normal.   Mild tricuspid regurgitation. RVSP is 22 mmHg. TR velocity = 2.18 m/s   No pulmonary hypertension      Aortic Valve   Structurally normal aortic valve. Pulmonic Valve   The pulmonic valve was not well visualized. Pericardial Effusion   No pericardial effusion. Aorta   Aortic root dimension within normal limits. Conclusions      Summary   Ejection fraction is visually estimated at 60-70%. Normal diastolic function. Mild basilar asymmetric septal   Normal left atrium. L atrial pressure not elevated   ormal mitral valve structure and function. Structurally normal aortic valve. No pulmonary hypertension   No pericardial effusion. Signature      ----------------------------------------------------------------   Electronically signed by Lonny Elias MD(Interpreting   physician) on 10/01/2020 06:04 PM               Anesthesia Evaluation  Patient summary reviewed and Nursing notes reviewed no history of anesthetic complications:   Airway: Mallampati: III  TM distance: >3 FB   Neck ROM: limited  Mouth opening: > = 3 FB   Dental:          Pulmonary:   (+) COPD: mild and no interval change,  decreased breath sounds: bilateral asthma (Mild intermittent - denies recent exacerbations or increased inhaler usage): current smoker    (-) sleep apnea          Patient did not smoke on day of surgery.                  Cardiovascular:  Exercise tolerance: good (>4 METS),   (+) hypertension: mild and no interval change, hyperlipidemia      ECG reviewed  Rhythm: regular  Rate: abnormal           Beta Blocker:  Not on Beta Blocker      ROS comment: EKG:  Sinus tachycardia  Otherwise normal ECG  When

## 2023-08-24 NOTE — DISCHARGE INSTRUCTIONS
[facility]  Home Going Instructions    1-Go home, rest for the remainder of the day  2-Please do not lift over 20 pounds the day of the injection  3-If you received sedation No: alcohol, driving, operating lawn mowers, plows, tractors or other dangerous equipment until next morning. Do not make important decisions or sign legal documents for 24 hours. You may experience light headedness, dizziness, nausea or sleepiness after sedation. Do not stay alone. A responsible adult must be with you for 24 hours. You could be nauseated from the medications you have received. Your IV site may be sore and bruised. 4-No dietary restrictions     5-Resume all medications the same day, blood thinners to be resumed 24 hours after injection if you were instructed to stop any. 6-Keep the surgical site clean and dry, you may shower the next morning and remove the      dressing. 7- No sitz baths, tub baths or hot tubs/swimming for 24 hours. 8- If you have any pain at the injection site(s), application of an ice pack to the area should be       helpful, 20 minutes on/20 minutes off for next 48 hours. 9- Call [facility] immediately at if you develop.   Fever greater than 100.4 F  Have bleeding or drainage from the puncture site  Have progressive Leg/arm numbness and or weakness  Loss of control of bowel and or bladder (wet/soil yourself)  Severe headache with inability to lift head  10-You may return to work the next day

## 2023-10-11 DIAGNOSIS — I10 PRIMARY HYPERTENSION: ICD-10-CM

## 2023-10-11 RX ORDER — BENAZEPRIL HYDROCHLORIDE 20 MG/1
20 TABLET ORAL DAILY
Qty: 100 TABLET | Refills: 1 | OUTPATIENT
Start: 2023-10-11

## 2023-11-02 ENCOUNTER — OFFICE VISIT (OUTPATIENT)
Dept: RHEUMATOLOGY | Age: 38
End: 2023-11-02
Payer: MEDICARE

## 2023-11-02 ENCOUNTER — TELEPHONE (OUTPATIENT)
Dept: RHEUMATOLOGY | Age: 38
End: 2023-11-02

## 2023-11-02 VITALS — HEIGHT: 63 IN | BODY MASS INDEX: 51.91 KG/M2 | WEIGHT: 293 LBS

## 2023-11-02 DIAGNOSIS — M05.79 RHEUMATOID ARTHRITIS INVOLVING MULTIPLE SITES WITH POSITIVE RHEUMATOID FACTOR (HCC): ICD-10-CM

## 2023-11-02 DIAGNOSIS — Z79.899 HIGH RISK MEDICATION USE: ICD-10-CM

## 2023-11-02 DIAGNOSIS — M05.79 RHEUMATOID ARTHRITIS INVOLVING MULTIPLE SITES WITH POSITIVE RHEUMATOID FACTOR (HCC): Primary | ICD-10-CM

## 2023-11-02 DIAGNOSIS — E11.9 TYPE 2 DIABETES MELLITUS WITHOUT COMPLICATION, WITHOUT LONG-TERM CURRENT USE OF INSULIN (HCC): ICD-10-CM

## 2023-11-02 DIAGNOSIS — D84.9 IMMUNOSUPPRESSION (HCC): ICD-10-CM

## 2023-11-02 DIAGNOSIS — I10 ESSENTIAL HYPERTENSION: ICD-10-CM

## 2023-11-02 LAB
ABSOLUTE IMMATURE GRANULOCYTE: 0.04 K/UL (ref 0–0.58)
ALT SERPL-CCNC: 20 U/L (ref 0–32)
AST SERPL-CCNC: 20 U/L (ref 0–31)
BASOPHILS ABSOLUTE: 0.05 K/UL (ref 0–0.2)
BASOPHILS RELATIVE PERCENT: 0 % (ref 0–2)
C-REACTIVE PROTEIN: 20 MG/L (ref 0–5)
CREAT SERPL-MCNC: 0.7 MG/DL (ref 0.5–1)
EOSINOPHILS ABSOLUTE: 0.26 K/UL (ref 0.05–0.5)
EOSINOPHILS RELATIVE PERCENT: 2 % (ref 0–6)
GFR SERPL CREATININE-BSD FRML MDRD: >60 ML/MIN/1.73M2
HCT VFR BLD CALC: 41.9 % (ref 34–48)
HEMOGLOBIN: 13.1 G/DL (ref 11.5–15.5)
IMMATURE GRANULOCYTES: 0 % (ref 0–5)
LYMPHOCYTES ABSOLUTE: 2.93 K/UL (ref 1.5–4)
LYMPHOCYTES RELATIVE PERCENT: 26 % (ref 20–42)
MCH RBC QN AUTO: 31 PG (ref 26–35)
MCHC RBC AUTO-ENTMCNC: 31.3 G/DL (ref 32–34.5)
MCV RBC AUTO: 99.1 FL (ref 80–99.9)
MONOCYTES ABSOLUTE: 0.68 K/UL (ref 0.1–0.95)
MONOCYTES RELATIVE PERCENT: 6 % (ref 2–12)
NEUTROPHILS ABSOLUTE: 7.32 K/UL (ref 1.8–7.3)
NEUTROPHILS RELATIVE PERCENT: 65 % (ref 43–80)
PDW BLD-RTO: 15.3 % (ref 11.5–15)
PLATELET # BLD: 260 K/UL (ref 130–450)
PMV BLD AUTO: 10.5 FL (ref 7–12)
RBC # BLD: 4.23 M/UL (ref 3.5–5.5)
SEDIMENTATION RATE, ERYTHROCYTE: 34 MM/HR (ref 0–20)
WBC # BLD: 11.3 K/UL (ref 4.5–11.5)

## 2023-11-02 PROCEDURE — G8484 FLU IMMUNIZE NO ADMIN: HCPCS | Performed by: INTERNAL MEDICINE

## 2023-11-02 PROCEDURE — G8428 CUR MEDS NOT DOCUMENT: HCPCS | Performed by: INTERNAL MEDICINE

## 2023-11-02 PROCEDURE — 3044F HG A1C LEVEL LT 7.0%: CPT | Performed by: INTERNAL MEDICINE

## 2023-11-02 PROCEDURE — 4004F PT TOBACCO SCREEN RCVD TLK: CPT | Performed by: INTERNAL MEDICINE

## 2023-11-02 PROCEDURE — 99215 OFFICE O/P EST HI 40 MIN: CPT | Performed by: INTERNAL MEDICINE

## 2023-11-02 PROCEDURE — 2022F DILAT RTA XM EVC RTNOPTHY: CPT | Performed by: INTERNAL MEDICINE

## 2023-11-02 PROCEDURE — G8417 CALC BMI ABV UP PARAM F/U: HCPCS | Performed by: INTERNAL MEDICINE

## 2023-11-02 RX ORDER — ADALIMUMAB 40MG/0.4ML
40 KIT SUBCUTANEOUS
Qty: 2 EACH | Refills: 3 | Status: SHIPPED | OUTPATIENT
Start: 2023-11-02

## 2023-11-02 RX ORDER — FOLIC ACID 1 MG/1
2 TABLET ORAL DAILY
Qty: 180 TABLET | Refills: 3 | Status: SHIPPED | OUTPATIENT
Start: 2023-11-02

## 2023-11-02 RX ORDER — METHOTREXATE 2.5 MG/1
15 TABLET ORAL WEEKLY
Qty: 72 TABLET | Refills: 1 | Status: SHIPPED | OUTPATIENT
Start: 2023-11-02

## 2023-11-02 ASSESSMENT — ENCOUNTER SYMPTOMS
COLOR CHANGE: 0
TROUBLE SWALLOWING: 0
VOMITING: 0
COUGH: 0
NAUSEA: 0
BACK PAIN: 1
DIARRHEA: 0
ABDOMINAL PAIN: 0

## 2023-11-02 NOTE — TELEPHONE ENCOUNTER
Patient's medication Humira required a prior auth, it has been approved with insurance. Patient is notified of the approval and was advised to call the pharmacy when ready to start the medication. Approval notification is scanned in chart.       Electronically signed by Elena Horowitz [address] on 11/2/2023 at 4:21 PM

## 2023-11-02 NOTE — PROGRESS NOTES
Kassie Mcrae 1985 is a 44 y.o. female, here for evaluation of the following chief complaint(s):  Follow-up (Patient here for follow up on RA. )         ASSESSMENT/PLAN:    Kassie Mcrae 1985 is a 44 y.o. female seen in follow-up for rheumatoid arthritis. 1.  RF positive, CCP negative, 14 3 3 eta positive, nonerosive rheumatoid arthritis-she initially had a good response to methotrexate 15 mg weekly plus folic acid 1 mg daily. However she is having progression with severe flare of rheumatoid arthritis. She has synovitis throughout the hands and wrists. We will start her on Humira. We discussed the risks, benefits, side effects. She is noticing some hair thinning so we will also increase her folic acid to 2 mg daily. 2.  Immunosuppression-I recommend she stay up-to-date on vaccinations. In the event of any acute infectious illness she should hold methotrexate and Humira. 3.  High risk medication use-up-to-date on TB and hepatitis. We will monitor blood work every 3 months on methotrexate. We will monitor for infection. 4.  Chronic low back pain-rheumatoid arthritis spares the back. She is following with pain management. 5.  Hypertension-patient's with inflammatory arthritis have an increased cardiovascular risk. She is on blood pressure medication. 6.  Type 2 diabetes-we will avoid steroids is much as possible. 1. Rheumatoid arthritis involving multiple sites with positive rheumatoid factor (HCC)  -     folic acid (FOLVITE) 1 MG tablet; Take 2 tablets by mouth daily, Disp-180 tablet, R-3Normal  -     CBC with Auto Differential; Standing  -     ALT; Standing  -     AST; Standing  -     Creatinine; Standing  -     Sedimentation Rate; Standing  -     C-Reactive Protein; Standing  2. Immunosuppression (HCC)  -     CBC with Auto Differential; Standing  -     ALT; Standing  -     AST;  Standing  -     Creatinine; Standing  -     Sedimentation Rate; Standing  -

## 2023-11-07 ENCOUNTER — OFFICE VISIT (OUTPATIENT)
Dept: PAIN MANAGEMENT | Age: 38
End: 2023-11-07
Payer: MEDICARE

## 2023-11-07 ENCOUNTER — PREP FOR PROCEDURE (OUTPATIENT)
Dept: PAIN MANAGEMENT | Age: 38
End: 2023-11-07

## 2023-11-07 VITALS
OXYGEN SATURATION: 95 % | HEART RATE: 105 BPM | BODY MASS INDEX: 51.91 KG/M2 | WEIGHT: 293 LBS | DIASTOLIC BLOOD PRESSURE: 78 MMHG | HEIGHT: 63 IN | RESPIRATION RATE: 16 BRPM | TEMPERATURE: 97.9 F | SYSTOLIC BLOOD PRESSURE: 116 MMHG

## 2023-11-07 DIAGNOSIS — M76.52 PATELLAR TENDINITIS OF BOTH KNEES: ICD-10-CM

## 2023-11-07 DIAGNOSIS — M76.51 PATELLAR TENDINITIS OF BOTH KNEES: ICD-10-CM

## 2023-11-07 DIAGNOSIS — M25.561 CHRONIC PAIN OF BOTH KNEES: Primary | ICD-10-CM

## 2023-11-07 DIAGNOSIS — M54.41 CHRONIC BILATERAL LOW BACK PAIN WITH BILATERAL SCIATICA: ICD-10-CM

## 2023-11-07 DIAGNOSIS — G89.29 CHRONIC PAIN OF BOTH KNEES: Primary | ICD-10-CM

## 2023-11-07 DIAGNOSIS — M54.16 LUMBAR RADICULOPATHY: Primary | ICD-10-CM

## 2023-11-07 DIAGNOSIS — M54.42 CHRONIC BILATERAL LOW BACK PAIN WITH BILATERAL SCIATICA: ICD-10-CM

## 2023-11-07 DIAGNOSIS — G89.4 CHRONIC PAIN SYNDROME: ICD-10-CM

## 2023-11-07 DIAGNOSIS — M54.16 LUMBAR RADICULOPATHY: ICD-10-CM

## 2023-11-07 DIAGNOSIS — M25.562 CHRONIC PAIN OF BOTH KNEES: Primary | ICD-10-CM

## 2023-11-07 DIAGNOSIS — M47.817 LUMBOSACRAL SPONDYLOSIS WITHOUT MYELOPATHY: ICD-10-CM

## 2023-11-07 DIAGNOSIS — G89.29 CHRONIC BILATERAL LOW BACK PAIN WITH BILATERAL SCIATICA: ICD-10-CM

## 2023-11-07 PROCEDURE — G8484 FLU IMMUNIZE NO ADMIN: HCPCS | Performed by: PAIN MEDICINE

## 2023-11-07 PROCEDURE — G8417 CALC BMI ABV UP PARAM F/U: HCPCS | Performed by: PAIN MEDICINE

## 2023-11-07 PROCEDURE — 3078F DIAST BP <80 MM HG: CPT | Performed by: PAIN MEDICINE

## 2023-11-07 PROCEDURE — G8427 DOCREV CUR MEDS BY ELIG CLIN: HCPCS | Performed by: PAIN MEDICINE

## 2023-11-07 PROCEDURE — 99214 OFFICE O/P EST MOD 30 MIN: CPT | Performed by: PAIN MEDICINE

## 2023-11-07 PROCEDURE — 3074F SYST BP LT 130 MM HG: CPT | Performed by: PAIN MEDICINE

## 2023-11-07 PROCEDURE — 4004F PT TOBACCO SCREEN RCVD TLK: CPT | Performed by: PAIN MEDICINE

## 2023-11-07 RX ORDER — PREGABALIN 150 MG/1
150 CAPSULE ORAL 2 TIMES DAILY
Qty: 180 CAPSULE | Refills: 0 | Status: SHIPPED | OUTPATIENT
Start: 2023-11-07 | End: 2024-02-05

## 2023-11-07 RX ORDER — CYCLOBENZAPRINE HCL 10 MG
10 TABLET ORAL 2 TIMES DAILY PRN
Qty: 180 TABLET | Refills: 0 | Status: SHIPPED | OUTPATIENT
Start: 2023-11-07 | End: 2024-02-05

## 2023-11-16 ENCOUNTER — TELEPHONE (OUTPATIENT)
Dept: PAIN MANAGEMENT | Age: 38
End: 2023-11-16

## 2023-11-16 DIAGNOSIS — M54.16 LUMBAR RADICULOPATHY: Primary | ICD-10-CM

## 2023-11-16 NOTE — TELEPHONE ENCOUNTER
Call to Zohra Seamus that procedure was approved for 11/22/2023 and that Niesha Vaca should call her a few days before for the pre op call and between 2:00 PM and 4:00 PM  the business day before with the arrival time. Instructed Anjana to hold ibuprofen for 24 hours, naprosyn for 4 days and any aspirin containing products or fish oil for 7 days. Instructed to call office back if any questions. Regla Evans verbalized understanding.     Electronically signed by Hunter Barrow RN on 11/16/2023 at 1:27 PM

## 2023-11-17 NOTE — PROGRESS NOTES
[employer] PRE-ADMISSION TESTING INSTRUCTIONS    The Preadmission Testing patient is instructed accordingly using the following criteria (check applicable):    ARRIVAL INSTRUCTIONS:  [x] Parking the day of Surgery is located in the Main Entrance lot. Upon entering the door, make an immediate right to the surgery reception desk    [x] Bring photo ID and insurance card    [] Bring in a copy of Living will or Durable Power of  papers. [x] Please be sure to arrange for responsible adult to provide transportation to and from the hospital    [x] Please arrange for responsible adult to be with you for the 24 hour period post procedure due to having anesthesia    [x] If you awake am of surgery not feeling well or have temperature >100 please call [phone]    GENERAL INSTRUCTIONS:    [x] Nothing by mouth after midnight, including gum, candy, mints or water    [x] You may brush your teeth, but do not swallow any water    [x] Take medications as instructed with 1-2 oz of water    [x] Stop herbal supplements and vitamins 5 days prior to procedure    [x] Follow preop dosing of blood thinners per physician instructions    [] Take 1/2 dose of evening insulin, but no insulin after midnight    [x] No oral diabetic medications after midnight    [x] If diabetic and have low blood sugar or feel symptomatic, take 1-2oz apple juice only    [x] Bring inhalers day of surgery    [] Bring C-PAP/ Bi-Pap day of surgery    [] Bring urine specimen day of surgery    [x] Shower or bath with soap, lather and rinse well, AM of Surgery, no lotion, powders or creams to surgical site    [] Follow bowel prep as instructed per surgeon    [x] No tobacco products within 24 hours of surgery     [x] No alcohol or illegal drug use within 24 hours of surgery.     [x] Jewelry, body piercing's, eyeglasses, contact lenses and dentures are not permitted into surgery (bring cases)      [x] Please do not wear any nail

## 2023-11-21 ENCOUNTER — ANESTHESIA EVENT (OUTPATIENT)
Dept: OPERATING ROOM | Age: 38
End: 2023-11-21
Payer: MEDICARE

## 2023-11-22 ENCOUNTER — HOSPITAL ENCOUNTER (OUTPATIENT)
Dept: GENERAL RADIOLOGY | Age: 38
Discharge: HOME | End: 2023-11-24
Attending: PAIN MEDICINE
Payer: MEDICARE

## 2023-11-22 ENCOUNTER — HOSPITAL ENCOUNTER (OUTPATIENT)
Age: 38
Setting detail: OUTPATIENT SURGERY
Discharge: HOME | End: 2023-11-22
Attending: PAIN MEDICINE | Admitting: PAIN MEDICINE
Payer: MEDICARE

## 2023-11-22 ENCOUNTER — ANESTHESIA (OUTPATIENT)
Dept: OPERATING ROOM | Age: 38
End: 2023-11-22
Payer: MEDICARE

## 2023-11-22 VITALS
BODY MASS INDEX: 51.91 KG/M2 | RESPIRATION RATE: 12 BRPM | TEMPERATURE: 98 F | DIASTOLIC BLOOD PRESSURE: 72 MMHG | WEIGHT: 293 LBS | HEART RATE: 100 BPM | SYSTOLIC BLOOD PRESSURE: 124 MMHG | HEIGHT: 63 IN | OXYGEN SATURATION: 98 %

## 2023-11-22 DIAGNOSIS — R52 PAIN MANAGEMENT: ICD-10-CM

## 2023-11-22 LAB — GLUCOSE BLD-MCNC: 85 MG/DL (ref 74–99)

## 2023-11-22 PROCEDURE — 7100000011 HC PHASE II RECOVERY - ADDTL 15 MIN: Performed by: PAIN MEDICINE

## 2023-11-22 PROCEDURE — 6360000002 HC RX W HCPCS

## 2023-11-22 PROCEDURE — 2709999900 HC NON-CHARGEABLE SUPPLY: Performed by: PAIN MEDICINE

## 2023-11-22 PROCEDURE — 3700000000 HC ANESTHESIA ATTENDED CARE: Performed by: PAIN MEDICINE

## 2023-11-22 PROCEDURE — 82962 GLUCOSE BLOOD TEST: CPT

## 2023-11-22 PROCEDURE — 2500000003 HC RX 250 WO HCPCS: Performed by: PAIN MEDICINE

## 2023-11-22 PROCEDURE — 7100000010 HC PHASE II RECOVERY - FIRST 15 MIN: Performed by: PAIN MEDICINE

## 2023-11-22 PROCEDURE — 6360000004 HC RX CONTRAST MEDICATION: Performed by: PAIN MEDICINE

## 2023-11-22 PROCEDURE — 6360000002 HC RX W HCPCS: Performed by: ANESTHESIOLOGY

## 2023-11-22 PROCEDURE — 2580000003 HC RX 258

## 2023-11-22 PROCEDURE — 3600000002 HC SURGERY LEVEL 2 BASE: Performed by: PAIN MEDICINE

## 2023-11-22 PROCEDURE — 6360000002 HC RX W HCPCS: Performed by: PAIN MEDICINE

## 2023-11-22 RX ORDER — DIPHENHYDRAMINE HYDROCHLORIDE 50 MG/ML
25 INJECTION INTRAMUSCULAR; INTRAVENOUS EVERY 6 HOURS PRN
Status: DISCONTINUED | OUTPATIENT
Start: 2023-11-22 | End: 2023-11-22 | Stop reason: HOSPADM

## 2023-11-22 RX ORDER — SODIUM CHLORIDE 9 MG/ML
INJECTION, SOLUTION INTRAVENOUS CONTINUOUS PRN
Status: DISCONTINUED | OUTPATIENT
Start: 2023-11-22 | End: 2023-11-22 | Stop reason: SDUPTHER

## 2023-11-22 RX ORDER — LIDOCAINE HYDROCHLORIDE 5 MG/ML
INJECTION, SOLUTION INFILTRATION; INTRAVENOUS PRN
Status: DISCONTINUED | OUTPATIENT
Start: 2023-11-22 | End: 2023-11-22 | Stop reason: ALTCHOICE

## 2023-11-22 RX ORDER — PROPOFOL 10 MG/ML
INJECTION, EMULSION INTRAVENOUS PRN
Status: DISCONTINUED | OUTPATIENT
Start: 2023-11-22 | End: 2023-11-22 | Stop reason: SDUPTHER

## 2023-11-22 RX ORDER — METHYLPREDNISOLONE ACETATE 40 MG/ML
INJECTION, SUSPENSION INTRA-ARTICULAR; INTRALESIONAL; INTRAMUSCULAR; SOFT TISSUE PRN
Status: DISCONTINUED | OUTPATIENT
Start: 2023-11-22 | End: 2023-11-22 | Stop reason: ALTCHOICE

## 2023-11-22 RX ORDER — IOPAMIDOL 612 MG/ML
INJECTION, SOLUTION INTRATHECAL PRN
Status: DISCONTINUED | OUTPATIENT
Start: 2023-11-22 | End: 2023-11-22 | Stop reason: ALTCHOICE

## 2023-11-22 RX ADMIN — PROPOFOL 230 MG: 10 INJECTION, EMULSION INTRAVENOUS at 13:39

## 2023-11-22 RX ADMIN — SODIUM CHLORIDE: 9 INJECTION, SOLUTION INTRAVENOUS at 13:39

## 2023-11-22 RX ADMIN — DIPHENHYDRAMINE HYDROCHLORIDE 25 MG: 50 INJECTION, SOLUTION INTRAMUSCULAR; INTRAVENOUS at 13:12

## 2023-11-22 ASSESSMENT — LIFESTYLE VARIABLES: SMOKING_STATUS: 1

## 2023-11-22 ASSESSMENT — COPD QUESTIONNAIRES: CAT_SEVERITY: MILD

## 2023-11-22 NOTE — H&P
[facility] Pain Management        [address], [address] [facility]  Dept: [phone]    Procedure History & Physical      Zack Dakin     HPI:    Patient  is here for LBP BLE pain for caudal BOAZ  Labs/imaging studies reviewed   All question and concerns addressed including R/B/A associated with the procedure    Past Medical History:   Diagnosis Date    Anxiety     Arthritis     Asthma     well controlled, on inhalers    Back pain     Bipolar 1 disorder ([address])     COPD (chronic obstructive pulmonary disease) ([address])     Depression     Diabetes mellitus ([address])     Fibromyalgia     GERD (gastroesophageal reflux disease)     Hypertension     Macular edema     Cm    Migraines     Morbid obesity due to excess calories ([address])     Neuropathy     PTSD (post-traumatic stress disorder)     Rheumatoid arthritis ([address])        Past Surgical History:   Procedure Laterality Date    ANTERIOR CRUCIATE LIGAMENT REPAIR Right      SECTION      ,     CHOLECYSTECTOMY      DILATION AND CURETTAGE OF UTERUS N/A 2019    DILATATION AND CURETTAGE HYSTEROSCOPY WITH REMOVAL OF CONDYLOMATA performed by Caroline Taveras MD at [address] [facility] N/A 2020    DILATATION AND CURETTAGE HYSTEROSCOPY, ATTEMPTED CERVICAL BIOPSY, MARIYA OF  LABIAL ABSCESS performed by Caroline Taveras MD at [address] [place] [address] (CERVIX STATUS UNKNOWN)  2021    Robotic lysis of adhesions, total hysterectomy, bilateral salpingectomy, cystoscopy    INCONTINENCE SURGERY      OTHER SURGICAL HISTORY      repair anal fissure    PAIN MANAGEMENT PROCEDURE N/A 2022    CAUDAL EPIDURAL STEROID INJECTION  #1 performed by Aide Gallego DO at [address] [place] [address] N/A 2022    CAUDAL EPIDURAL STEROID INJECTION #2 performed by Aide Gallego DO at [address] [place] [address] N/A 10/13/2022    CAUDAL EPIDURAL STEROID INJECTION

## 2023-11-22 NOTE — ANESTHESIA PRE PROCEDURE
Department of Anesthesiology  Preprocedure Note       Name:  Pauline Meeks   Age:  44 y.o.  :  1985                                          MRN:  [id number]         Date:  2023      Surgeon: Jana Ivory): Zac Muñoz DO    Procedure: Procedure(s):  CAUDAL EPIDURAL STEROID INJECTION    Medications prior to admission:   Prior to Admission medications    Medication Sig Start Date End Date Taking? Authorizing Provider   pregabalin (LYRICA) 150 MG capsule Take 1 capsule by mouth 2 times daily for 90 days. Max Daily Amount: 300 mg 23  Zac Muñoz DO   cyclobenzaprine (FLEXERIL) 10 MG tablet Take 1 tablet by mouth 2 times daily as needed for Muscle spasms 23  Zac Muñoz DO   folic acid (FOLVITE) 1 MG tablet Take 2 tablets by mouth daily 23   Robert Robins DO   methotrexate (RHEUMATREX) 2.5 MG chemo tablet Take 6 tablets by mouth once a week 23   Robert Robins DO   adalimumab (HUMIRA PEN) 40 MG/0.4ML PNKT Inject 40 mg into the skin every 14 days 23   Norman Rodriguez DO   FLOVENT HFA 44 MCG/ACT inhaler Inhale 2 puffs into the lungs 2 times daily 23   Provider, MD Ephraim   famotidine (PEPCID) 20 MG tablet TAKE ONE TABLET BY MOUTH TWICE DAILY 7/10/23   Manuelito Del Real,    Multiple Vitamins-Minerals (THERAPEUTIC MULTIVITAMIN-MINERALS) tablet Take 1 tablet by mouth daily 23   Ruby Dennison MD   blood glucose monitor strips Test twice daily using In Vitro Route according to your doctor's instructions.  23  Matthew Dennison MD   Lancets MISC 1 each by Does not apply route daily 23   Matthew Dennison MD   dulaglutide (TRULICITY) 1.5 MG/4.8AH SC injection Inject 0.5 mLs into the skin once a week 23   Matthew Dennison MD   ondansetron (ZOFRAN) 4 MG tablet Take 1 tablet by mouth 3 times daily as needed for Nausea or Vomiting 23   Manuelito Shorts, DO   Calcium Carbonate (CALCIUM 600 PO)

## 2023-11-22 NOTE — ANESTHESIA POSTPROCEDURE EVALUATION
Department of Anesthesiology  Postprocedure Note    Patient: Amber Hong  MRN: [id number]  YOB: 1985  Date of evaluation: 11/22/2023      Procedure Summary     Date: 11/22/23 Room / Location: [facility] PROCEDURE ROOM 02 / [facility]    Anesthesia Start: 9338 Anesthesia Stop: 5323    Procedure: CAUDAL EPIDURAL STEROID INJECTION (Coccyx) Diagnosis:       Lumbar radiculopathy      (Lumbar radiculopathy [M54.16])    Surgeons: Colt Kay DO Responsible Provider: Arjun Colón MD    Anesthesia Type: MAC ASA Status: 3          Anesthesia Type: No value filed.     Jeet Phase I:      Jeet Phase II:        Anesthesia Post Evaluation    Patient location during evaluation: PACU  Patient participation: complete - patient participated  Level of consciousness: awake and alert  Pain score: 0  Airway patency: patent  Nausea & Vomiting: no nausea and no vomiting  Complications: no  Cardiovascular status: blood pressure returned to baseline  Respiratory status: acceptable  Hydration status: euvolemic

## 2023-11-22 NOTE — OP NOTE
easily injected . There was a clear outline of the epidural space and visualization of the sacral roots. The needle was then removed and a sterile Band-Aid was applied to the puncture site. Disposition the patient tolerated the procedure well and there were no complications . Vital signs remained stable throughout the procedure. The patient was escorted to the recovery area where they remained until discharge and written discharge instructions for the procedure were given. Plan: Scott Salazar will return to our pain management center as scheduled.      Brandon Garcia DO

## 2023-11-22 NOTE — DISCHARGE INSTRUCTIONS
Maria R Solders  Dr. Gonzales July  Dr. Teresia Leyden Block/Radiofrequency  Home Going Instructions    1-Go home, rest for the remainder of the day  2-Please do not lift over 20 pounds the day of the injection  3-If you received sedation No: alcohol, driving, operating lawn mowers, plows, tractors or other dangerous equipment until next morning. Do not make important decisions or sign legal documents for 24 hours. You may experience light headedness, dizziness, nausea or sleepiness after sedation. Do not stay alone. A responsible adult must be with you for 24 hours. You could be nauseated from the medications you have received. Your IV site may be sore and bruised. 4-No dietary restrictions     5-Resume all medications the same day, blood thinners to be resumed 24 hours after injection if you were instructed to stop any. 6-Keep the surgical site clean and dry, you may shower the next morning and remove the      dressing. 7- No sitz baths, tub baths or hot tubs/swimming for 24 hours. 8- If you have any pain at the injection site(s), application of an ice pack to the area should be       helpful, 20 minutes on/20 minutes off for next 48 hours. 9- Call [facility] immediately at if you develop.   Fever greater than 100.4 F  Have bleeding or drainage from the puncture site  Have progressive Leg/arm numbness and or weakness  Loss of control of bowel and or bladder (wet/soil yourself)  Severe headache with inability to lift head  10-You may return to work the next day

## 2024-01-16 ENCOUNTER — PREP FOR PROCEDURE (OUTPATIENT)
Dept: PAIN MANAGEMENT | Age: 39
End: 2024-01-16

## 2024-02-07 ENCOUNTER — OFFICE VISIT (OUTPATIENT)
Dept: PAIN MANAGEMENT | Age: 39
End: 2024-02-07
Payer: MEDICARE

## 2024-02-07 VITALS
WEIGHT: 293 LBS | SYSTOLIC BLOOD PRESSURE: 162 MMHG | DIASTOLIC BLOOD PRESSURE: 97 MMHG | HEART RATE: 97 BPM | BODY MASS INDEX: 51.91 KG/M2 | HEIGHT: 63 IN | OXYGEN SATURATION: 96 % | RESPIRATION RATE: 16 BRPM | TEMPERATURE: 97.8 F

## 2024-02-07 DIAGNOSIS — M25.561 CHRONIC PAIN OF BOTH KNEES: ICD-10-CM

## 2024-02-07 DIAGNOSIS — M54.16 LUMBAR RADICULOPATHY: ICD-10-CM

## 2024-02-07 DIAGNOSIS — M25.532 BILATERAL WRIST PAIN: ICD-10-CM

## 2024-02-07 DIAGNOSIS — M25.531 BILATERAL WRIST PAIN: ICD-10-CM

## 2024-02-07 DIAGNOSIS — G89.29 CHRONIC PAIN OF BOTH KNEES: ICD-10-CM

## 2024-02-07 DIAGNOSIS — M47.817 LUMBOSACRAL SPONDYLOSIS WITHOUT MYELOPATHY: ICD-10-CM

## 2024-02-07 DIAGNOSIS — M76.51 PATELLAR TENDINITIS OF BOTH KNEES: ICD-10-CM

## 2024-02-07 DIAGNOSIS — G89.29 CHRONIC BILATERAL LOW BACK PAIN WITH BILATERAL SCIATICA: ICD-10-CM

## 2024-02-07 DIAGNOSIS — G89.4 CHRONIC PAIN SYNDROME: Primary | ICD-10-CM

## 2024-02-07 DIAGNOSIS — M76.52 PATELLAR TENDINITIS OF BOTH KNEES: ICD-10-CM

## 2024-02-07 DIAGNOSIS — M25.562 CHRONIC PAIN OF BOTH KNEES: ICD-10-CM

## 2024-02-07 DIAGNOSIS — M54.41 CHRONIC BILATERAL LOW BACK PAIN WITH BILATERAL SCIATICA: ICD-10-CM

## 2024-02-07 DIAGNOSIS — M54.42 CHRONIC BILATERAL LOW BACK PAIN WITH BILATERAL SCIATICA: ICD-10-CM

## 2024-02-07 DIAGNOSIS — G89.4 CHRONIC PAIN SYNDROME: ICD-10-CM

## 2024-02-07 PROCEDURE — G8427 DOCREV CUR MEDS BY ELIG CLIN: HCPCS | Performed by: PAIN MEDICINE

## 2024-02-07 PROCEDURE — 4004F PT TOBACCO SCREEN RCVD TLK: CPT | Performed by: PAIN MEDICINE

## 2024-02-07 PROCEDURE — G8484 FLU IMMUNIZE NO ADMIN: HCPCS | Performed by: PAIN MEDICINE

## 2024-02-07 PROCEDURE — 3077F SYST BP >= 140 MM HG: CPT | Performed by: PAIN MEDICINE

## 2024-02-07 PROCEDURE — 99214 OFFICE O/P EST MOD 30 MIN: CPT | Performed by: PAIN MEDICINE

## 2024-02-07 PROCEDURE — G8417 CALC BMI ABV UP PARAM F/U: HCPCS | Performed by: PAIN MEDICINE

## 2024-02-07 PROCEDURE — 99214 OFFICE O/P EST MOD 30 MIN: CPT

## 2024-02-07 PROCEDURE — 3080F DIAST BP >= 90 MM HG: CPT | Performed by: PAIN MEDICINE

## 2024-02-07 RX ORDER — ZOLPIDEM TARTRATE 5 MG/1
TABLET ORAL
COMMUNITY
Start: 2024-01-25

## 2024-02-07 RX ORDER — SITAGLIPTIN 25 MG/1
25 TABLET, FILM COATED ORAL DAILY
COMMUNITY
Start: 2024-01-30

## 2024-02-07 RX ORDER — PREGABALIN 150 MG/1
150 CAPSULE ORAL 2 TIMES DAILY
Qty: 180 CAPSULE | Refills: 0 | Status: SHIPPED | OUTPATIENT
Start: 2024-02-07 | End: 2024-05-07

## 2024-02-07 RX ORDER — CYCLOBENZAPRINE HCL 10 MG
10 TABLET ORAL 2 TIMES DAILY PRN
Qty: 180 TABLET | Refills: 0 | Status: SHIPPED | OUTPATIENT
Start: 2024-02-07 | End: 2024-05-07

## 2024-02-07 RX ORDER — VENLAFAXINE HYDROCHLORIDE 75 MG/1
CAPSULE, EXTENDED RELEASE ORAL
COMMUNITY
Start: 2024-01-25

## 2024-02-07 RX ORDER — TRAZODONE HYDROCHLORIDE 50 MG/1
TABLET ORAL
COMMUNITY
Start: 2024-01-25

## 2024-02-07 RX ORDER — PANTOPRAZOLE SODIUM 40 MG/1
40 TABLET, DELAYED RELEASE ORAL NIGHTLY
COMMUNITY
Start: 2024-01-30

## 2024-02-07 NOTE — PROGRESS NOTES
Anjana Epperson presents to the [facility] on 2/7/2024. Anjana is complaining of pain in her low back. The pain is constant. The pain is described as aching, dull, and sharp. Pain is rated on her best day at a 6, on her worst day at a 10, and on average at a 7 on the VAS scale. She took her last dose of Lyrica today.     Any procedures since your last visit: Yes, with 50 % relief.    Pacemaker or defibrillator: No    She is  on NSAIDS and is not on anticoagulation medications.        Medication Contract and Consent for Opioid Use Documents Filed        No documents found                    LMP 02/22/2021 (Approximate)      Patient's last menstrual period was 02/22/2021 (approximate).    
imaging as there is a lack of significant pathology on the imaging  PMHx: bipolar d/o, PTSD, anxiety, RA (Dr. Robins), morbid obesity, macular edema, HTN, GERD, COPD, asthma     Moved [place] to be closer to family August 2023  She states she had 80% pain relief after the caudal BOAZ   Pregabalin and flexeril have been helpful, without SE's  Was scheduled to see neurology for head burning and tremors - was scheduled for October, referred by PCP, but is now working on getting a neurologist in [place] as she moved there  Has been following with Dr. Dennison for nonsurgical weight loss, he is transferring her care to [facility] for consideration of weight loss surgery  BOAZ's do not typically raise her FSBS    She reports b/l knee pain, states she feels \"something move\" in the left knee  She requests b/l knee MRI  Reviewed prior records and she had a Rt knee xray in 2022 which was normal and a left knee xray in 2018 which was also normal  + pain with valgus maneuver in the left knee  + patellar tendon tenderness    R>L wrist pain with palpable cyst on ventral aspect of rt wrist      Plan:    Referral to dr. Curiel for wrist symptoms  Rec PT for the knees, she will coordinate in [place] - reprinted order today  B/l knee MRI ordered previously, she has not been able to coordinate in [place] and will schedule at [facility]  Reviewed prior knee xrays  Urine screen deferred  [facility] report reviewed  Gabapentin causes SE's  On topamax for migraine HA's  RF Pregabalin 150 mg BID, 2 RF  RF Flexeril 10 mg #60, 2 RF  Caudal BOAZ with sedation gave 80% pain relief x 2 months, repeat ordered - r/b and procedure discussed  Continue NexWave TENS, had teaching  Patient encouraged to stay active and to lose weight  Treatment plan discussed with the patient including medication and procedure side effects     Cc:  Referring physician    INGRID Rhodes D.O.

## 2024-02-16 ENCOUNTER — TELEPHONE (OUTPATIENT)
Dept: PAIN MANAGEMENT | Age: 39
End: 2024-02-16

## 2024-02-16 DIAGNOSIS — M05.79 RHEUMATOID ARTHRITIS INVOLVING MULTIPLE SITES WITH POSITIVE RHEUMATOID FACTOR (HCC): Primary | ICD-10-CM

## 2024-02-16 NOTE — TELEPHONE ENCOUNTER
Call to Anjana Epperson that procedure was approved for 2/22/2024 and that [facility] should call her a few days before for the pre op call and after 3:00 PM the business day before with the arrival time. Instructed Anjana to hold ibuprofen for 24 hours, naprosyn for 4 days and any aspirin containing products or fish oil for 7 days. Instructed to call office back if any questions. Anjana verbalized understanding.    Electronically signed by Julio Paredes RN on 2/16/2024 at 12:58 PM

## 2024-02-19 RX ORDER — METHOTREXATE 2.5 MG/1
15 TABLET ORAL WEEKLY
Qty: 72 TABLET | Refills: 1 | Status: SHIPPED | OUTPATIENT
Start: 2024-02-19

## 2024-02-19 NOTE — TELEPHONE ENCOUNTER
Called and spoke with patient and informed that Dr. Robins sent the script to be refilled. Instructed patient to get overdue standing labs drawn ASAP.  Patient states she will be going Thursday for injections and will have the blood work done at that time.

## 2024-02-20 ENCOUNTER — OFFICE VISIT (OUTPATIENT)
Dept: PAIN MANAGEMENT | Age: 39
End: 2024-02-20
Payer: MEDICARE

## 2024-02-20 VITALS
DIASTOLIC BLOOD PRESSURE: 84 MMHG | RESPIRATION RATE: 16 BRPM | OXYGEN SATURATION: 97 % | WEIGHT: 293 LBS | BODY MASS INDEX: 53.92 KG/M2 | HEIGHT: 62 IN | SYSTOLIC BLOOD PRESSURE: 134 MMHG | HEART RATE: 96 BPM | TEMPERATURE: 98.2 F

## 2024-02-20 DIAGNOSIS — M54.42 CHRONIC BILATERAL LOW BACK PAIN WITH BILATERAL SCIATICA: ICD-10-CM

## 2024-02-20 DIAGNOSIS — M47.817 LUMBOSACRAL SPONDYLOSIS WITHOUT MYELOPATHY: ICD-10-CM

## 2024-02-20 DIAGNOSIS — M79.10 MYALGIA: ICD-10-CM

## 2024-02-20 DIAGNOSIS — G89.4 CHRONIC PAIN SYNDROME: Primary | ICD-10-CM

## 2024-02-20 DIAGNOSIS — M25.561 CHRONIC PAIN OF BOTH KNEES: ICD-10-CM

## 2024-02-20 DIAGNOSIS — Z79.891 ENCOUNTER FOR LONG-TERM OPIATE ANALGESIC USE: ICD-10-CM

## 2024-02-20 DIAGNOSIS — M54.41 CHRONIC BILATERAL LOW BACK PAIN WITH BILATERAL SCIATICA: ICD-10-CM

## 2024-02-20 DIAGNOSIS — M25.562 CHRONIC PAIN OF BOTH KNEES: ICD-10-CM

## 2024-02-20 DIAGNOSIS — G89.29 CHRONIC BILATERAL LOW BACK PAIN WITH BILATERAL SCIATICA: ICD-10-CM

## 2024-02-20 DIAGNOSIS — M54.16 LUMBAR RADICULOPATHY: ICD-10-CM

## 2024-02-20 DIAGNOSIS — G89.29 CHRONIC PAIN OF BOTH KNEES: ICD-10-CM

## 2024-02-20 PROCEDURE — 3079F DIAST BP 80-89 MM HG: CPT | Performed by: PHYSICIAN ASSISTANT

## 2024-02-20 PROCEDURE — 99213 OFFICE O/P EST LOW 20 MIN: CPT | Performed by: PHYSICIAN ASSISTANT

## 2024-02-20 PROCEDURE — 4004F PT TOBACCO SCREEN RCVD TLK: CPT | Performed by: PHYSICIAN ASSISTANT

## 2024-02-20 PROCEDURE — G8484 FLU IMMUNIZE NO ADMIN: HCPCS | Performed by: PHYSICIAN ASSISTANT

## 2024-02-20 PROCEDURE — G8427 DOCREV CUR MEDS BY ELIG CLIN: HCPCS | Performed by: PHYSICIAN ASSISTANT

## 2024-02-20 PROCEDURE — 3075F SYST BP GE 130 - 139MM HG: CPT | Performed by: PHYSICIAN ASSISTANT

## 2024-02-20 PROCEDURE — G8417 CALC BMI ABV UP PARAM F/U: HCPCS | Performed by: PHYSICIAN ASSISTANT

## 2024-02-20 RX ORDER — VENLAFAXINE 75 MG/1
75 TABLET ORAL EVERY MORNING
COMMUNITY

## 2024-02-20 NOTE — PROGRESS NOTES
Anjana Epperson presents to the [facility] on 2/20/2024. Anjana is complaining of pain in her low back. The pain is constant. The pain is described as aching, stabbing, and sharp. Pain is rated on her best day at a 10, on her worst day at a 10, and on average at a 10 on the VAS scale. She took her last dose of Lyrica today.     Any procedures since your last visit: No    Pacemaker or defibrillator: No     She is  on NSAIDS and is not on anticoagulation medications.    Medication Contract and Consent for Opioid Use Documents Filed        No documents found                    /84   Pulse 96   Temp 98.2 °F (36.8 °C)   Resp 16   Ht 1.575 m (5' 2\")   Wt (!) 145.2 kg (320 lb)   LMP 02/22/2021 (Approximate)   SpO2 97%   BMI 58.53 kg/m²      Patient's last menstrual period was 02/22/2021 (approximate).  
Ephraim Escalera MD   famotidine (PEPCID) 20 MG tablet TAKE ONE TABLET BY MOUTH TWICE DAILY 7/10/23  Yes Kenrick Chamorro DO   Multiple Vitamins-Minerals (THERAPEUTIC MULTIVITAMIN-MINERALS) tablet Take 1 tablet by mouth daily 6/28/23  Yes Ruby Dennison MD   blood glucose monitor strips Test twice daily using In Vitro Route according to your doctor's instructions. 6/28/23 6/22/24 Yes Ruby Dennison MD   Lancets MISC 1 each by Does not apply route daily 6/28/23  Yes Ruby Dennison MD   dulaglutide (TRULICITY) 1.5 MG/0.5ML SC injection Inject 0.5 mLs into the skin once a week 6/28/23  Yes Ruby Dennison MD   ondansetron (ZOFRAN) 4 MG tablet Take 1 tablet by mouth 3 times daily as needed for Nausea or Vomiting 5/21/23  Yes Kenrick Chamorro DO   Calcium Carbonate (CALCIUM 600 PO) Take by mouth   Yes Ephraim Escalera MD   buPROPion (WELLBUTRIN XL) 300 MG extended release tablet Take 1 tablet by mouth every morning 4/25/23  Yes Kenrick Chamorro DO   melatonin 5 MG TABS tablet Take 1 tablet by mouth nightly   Yes Ephraim Escalera MD   tiotropium (SPIRIVA HANDIHALER) 18 MCG inhalation capsule Inhale 1 capsule into the lungs daily 2/15/23  Yes Kenrick Chamorro DO   amLODIPine (NORVASC) 5 MG tablet Take 1 tablet by mouth daily 2/15/23  Yes Kenrick Chamorro DO   benazepril (LOTENSIN) 20 MG tablet Take 1 tablet by mouth daily  Patient taking differently: Take 1 tablet by mouth daily Takes nightly 2/15/23  Yes Kenrick Chamorro DO   docusate sodium (COLACE) 100 MG capsule Take 1 capsule by mouth 2 times daily as needed   Yes Ephraim Escalera MD   Vibegron (GEMTESA) 75 MG TABS Take 1 tablet by mouth nightly   Yes Ephraim Escalera MD   Acetaminophen (TYLENOL) 325 MG CAPS Take 650 mg by mouth every 4 hours as needed (pain)   Yes Ephraim Escalera MD   ibuprofen (ADVIL;MOTRIN) 800 MG tablet Take 1 tablet by mouth every 6 hours as needed for Pain   Yes Lali, Historical,

## 2024-02-20 NOTE — PROGRESS NOTES
[facility] PRE-ADMISSION TESTING INSTRUCTIONS    The Preadmission Testing patient is instructed accordingly using the following criteria (check applicable):    ARRIVAL INSTRUCTIONS:  [x] Parking the day of Surgery is located in the Main Entrance lot.  Upon entering the door, make an immediate right to the surgery reception desk    [x] Bring photo ID and insurance card    [] Bring in a copy of Living will or Durable Power of  papers.    [x] Please be sure to arrange for responsible adult to provide transportation to and from the hospital    [x] Please arrange for responsible adult to be with you for the 24 hour period post procedure due to having anesthesia    [x] If you awake am of surgery not feeling well or have temperature >100 please call [phone]    GENERAL INSTRUCTIONS:    [x] May have clear liquids until 2 hours prior to surgery. Examples include water, fruit juices (no pulp), jello, popsicles, black coffee or tea, beef or chicken broth.       May have light meal 6 hours prior to surgery. Example include toast and clear liquids.        No gum, candy or mints.    [x] You may brush your teeth, but do not swallow any water    [x] Take medications as instructed with 1-2 oz of water    [x] Stop herbal supplements and vitamins 5 days prior to procedure    [x] Follow preop dosing of blood thinners per physician instructions    [] Take 1/2 dose of evening insulin, but no insulin after midnight    [x] No oral diabetic medications after midnight    [x] If diabetic and have low blood sugar or feel symptomatic, take 1-2oz apple juice only    [x] Bring inhalers day of surgery    [] Bring C-PAP/ Bi-Pap day of surgery    [] Bring urine specimen day of surgery    [x] Shower or bath with soap, lather and rinse well, AM of Surgery, no lotion, powders or creams to surgical site    [] Follow bowel prep as instructed per surgeon    [x] No tobacco products within 24 hours of surgery     [x]

## 2024-02-21 LAB
6-MONOACETYLMORPHINE, URINE: NEGATIVE
ABNORMAL SPECIMEN VALIDITY TEST: NORMAL
ALCOHOL URINE: NOT DETECTED MG/DL
AMPHETAMINE SCREEN URINE: NEGATIVE
BARBITURATE SCREEN URINE: NEGATIVE
BENZODIAZEPINE SCREEN, URINE: NEGATIVE
BUPRENORPHINE URINE: NEGATIVE
CANNABINOID SCREEN URINE: NEGATIVE
COCAINE METABOLITE, URINE: NEGATIVE
FENTANYL URINE: NEGATIVE
INTEGRITY CHECK, CREATININE, URINE: 23.9 MG/DL (ref 22–250)
INTEGRITY CHECK, OXIDANT, URINE: <40 MG/L
INTEGRITY CHECK, PH, URINE: 7.4 (ref 4.5–9)
INTEGRITY CHECK, SPECIFIC GRAVITY, URINE: 1.01 (ref 1–1.03)
METHADONE SCREEN, URINE: NEGATIVE
OPIATES, URINE: NEGATIVE
OXYCODONE SCREEN URINE: NEGATIVE
PHENCYCLIDINE, URINE: NEGATIVE
TEST INFORMATION: NORMAL
TRAMADOL, URINE: NEGATIVE

## 2024-02-22 ENCOUNTER — ANESTHESIA EVENT (OUTPATIENT)
Dept: OPERATING ROOM | Age: 39
End: 2024-02-22
Payer: MEDICARE

## 2024-02-22 ENCOUNTER — HOSPITAL ENCOUNTER (OUTPATIENT)
Age: 39
Setting detail: OUTPATIENT SURGERY
Discharge: HOME | End: 2024-02-22
Attending: PAIN MEDICINE | Admitting: PAIN MEDICINE
Payer: MEDICARE

## 2024-02-22 ENCOUNTER — HOSPITAL ENCOUNTER (OUTPATIENT)
Age: 39
Discharge: HOME | End: 2024-02-22
Payer: MEDICARE

## 2024-02-22 ENCOUNTER — HOSPITAL ENCOUNTER (OUTPATIENT)
Dept: GENERAL RADIOLOGY | Age: 39
Setting detail: OUTPATIENT SURGERY
Discharge: HOME | End: 2024-02-24
Attending: PAIN MEDICINE
Payer: MEDICARE

## 2024-02-22 ENCOUNTER — ANESTHESIA (OUTPATIENT)
Dept: OPERATING ROOM | Age: 39
End: 2024-02-22
Payer: MEDICARE

## 2024-02-22 VITALS
TEMPERATURE: 97 F | OXYGEN SATURATION: 97 % | RESPIRATION RATE: 18 BRPM | BODY MASS INDEX: 53.92 KG/M2 | SYSTOLIC BLOOD PRESSURE: 123 MMHG | HEIGHT: 62 IN | WEIGHT: 293 LBS | HEART RATE: 99 BPM | DIASTOLIC BLOOD PRESSURE: 68 MMHG

## 2024-02-22 DIAGNOSIS — R52 PAIN MANAGEMENT: ICD-10-CM

## 2024-02-22 LAB
6-MAM, QUANTITATIVE, URINE: <10 NG/ML
7-AMINOCLONAZEPAM, QUANTITATIVE, URINE: <50 NG/ML
ALPHA-HYDROXYALPRAZOLAM, QUANTITATIVE, URINE: <50 NG/ML
ALPHA-HYDROXYMIDAZOLAM, QUANTITATIVE, URINE: <50 NG/ML
ALPHA-HYDROXYTRIAZOLAM, QUANTITATIVE, URINE: <50 NG/ML
ALPRAZOLAM URINE QUANT: <50 NG/ML
ALT SERPL-CCNC: 15 U/L (ref 0–32)
AST SERPL-CCNC: 13 U/L (ref 0–31)
BASOPHILS # BLD: 0.02 K/UL (ref 0–0.2)
BASOPHILS NFR BLD: 0 % (ref 0–2)
CHLORDIAZEPOXIDE, QUANTITATIVE, URINE: <50 NG/ML
CLONAZEPAM, QUANTITATIVE, URINE: <50 NG/ML
CODEINE, QUANTITATIVE, URINE: <50 NG/ML
COMPLIANCE DRUG ANALYSIS, URINE: NORMAL
CREAT SERPL-MCNC: 0.6 MG/DL (ref 0.5–1)
CRP SERPL HS-MCNC: 14 MG/L (ref 0–5)
DIAZEPAM URINE QUANT: <50 NG/ML
EOSINOPHIL # BLD: 0.26 K/UL (ref 0.05–0.5)
EOSINOPHILS RELATIVE PERCENT: 3 % (ref 0–6)
ERYTHROCYTE [DISTWIDTH] IN BLOOD BY AUTOMATED COUNT: 14.6 % (ref 11.5–15)
ERYTHROCYTE [SEDIMENTATION RATE] IN BLOOD BY WESTERGREN METHOD: 15 MM/HR (ref 0–20)
FLUNITRAZEPAM, QUANTITATIVE, URINE: <50 NG/ML
FLURAZEPAM, QUANTITATIVE, URINE: <50 NG/ML
GFR SERPL CREATININE-BSD FRML MDRD: >60 ML/MIN/1.73M2
GLUCOSE BLD-MCNC: 106 MG/DL (ref 74–99)
HCT VFR BLD AUTO: 41.7 % (ref 34–48)
HGB BLD-MCNC: 13.3 G/DL (ref 11.5–15.5)
HYDROCODONE, QUANTITATIVE, URINE: <50 NG/ML
HYDROMORPHONE, QUANTITATIVE, URINE: <50 NG/ML
IMM GRANULOCYTES # BLD AUTO: 0.04 K/UL (ref 0–0.58)
IMM GRANULOCYTES NFR BLD: 0 % (ref 0–5)
LORAZEPAM URINE QUANT: <50 NG/ML
LYMPHOCYTES NFR BLD: 3.43 K/UL (ref 1.5–4)
LYMPHOCYTES RELATIVE PERCENT: 33 % (ref 20–42)
MCH RBC QN AUTO: 31.1 PG (ref 26–35)
MCHC RBC AUTO-ENTMCNC: 31.9 G/DL (ref 32–34.5)
MCV RBC AUTO: 97.4 FL (ref 80–99.9)
MIDAZOLAM URINE QUANT: <50 NG/ML
MONOCYTES NFR BLD: 0.75 K/UL (ref 0.1–0.95)
MONOCYTES NFR BLD: 7 % (ref 2–12)
MORPHINE, QUANTITATIVE, URINE: <50 NG/ML
NEUTROPHILS NFR BLD: 57 % (ref 43–80)
NEUTS SEG NFR BLD: 5.89 K/UL (ref 1.8–7.3)
NORDIAZEPAM URINE QUANT: <50 NG/ML
NORHYDROCODONE, QUANTITATIVE, URINE: <50 NG/ML
NOROXYCODONE, QUANTITATIVE, URINE: <50 NG/ML
OXAZEPAM URINE QUANT: <50 NG/ML
OXYCODONE URINE, QUANTITATIVE: <50 NG/ML
OXYMORPHONE, QUANTITATIVE, URINE: <50 NG/ML
PLATELET # BLD AUTO: 219 K/UL (ref 130–450)
PMV BLD AUTO: 10.6 FL (ref 7–12)
RBC # BLD AUTO: 4.28 M/UL (ref 3.5–5.5)
TEMAZEPAM, QUANTITATIVE, URINE: <50 NG/ML
WBC OTHER # BLD: 10.4 K/UL (ref 4.5–11.5)

## 2024-02-22 PROCEDURE — 85652 RBC SED RATE AUTOMATED: CPT

## 2024-02-22 PROCEDURE — 84460 ALANINE AMINO (ALT) (SGPT): CPT

## 2024-02-22 PROCEDURE — 7100000011 HC PHASE II RECOVERY - ADDTL 15 MIN: Performed by: PAIN MEDICINE

## 2024-02-22 PROCEDURE — 6360000004 HC RX CONTRAST MEDICATION: Performed by: PAIN MEDICINE

## 2024-02-22 PROCEDURE — 86140 C-REACTIVE PROTEIN: CPT

## 2024-02-22 PROCEDURE — 85025 COMPLETE CBC W/AUTO DIFF WBC: CPT

## 2024-02-22 PROCEDURE — 82565 ASSAY OF CREATININE: CPT

## 2024-02-22 PROCEDURE — 2709999900 HC NON-CHARGEABLE SUPPLY: Performed by: PAIN MEDICINE

## 2024-02-22 PROCEDURE — 3600000002 HC SURGERY LEVEL 2 BASE: Performed by: PAIN MEDICINE

## 2024-02-22 PROCEDURE — 3700000000 HC ANESTHESIA ATTENDED CARE: Performed by: PAIN MEDICINE

## 2024-02-22 PROCEDURE — 82962 GLUCOSE BLOOD TEST: CPT

## 2024-02-22 PROCEDURE — 2500000003 HC RX 250 WO HCPCS: Performed by: PAIN MEDICINE

## 2024-02-22 PROCEDURE — 6360000002 HC RX W HCPCS: Performed by: PAIN MEDICINE

## 2024-02-22 PROCEDURE — 7100000010 HC PHASE II RECOVERY - FIRST 15 MIN: Performed by: PAIN MEDICINE

## 2024-02-22 PROCEDURE — 36415 COLL VENOUS BLD VENIPUNCTURE: CPT

## 2024-02-22 PROCEDURE — 84450 TRANSFERASE (AST) (SGOT): CPT

## 2024-02-22 PROCEDURE — 6360000002 HC RX W HCPCS: Performed by: REGISTERED NURSE

## 2024-02-22 RX ORDER — MIDAZOLAM HYDROCHLORIDE 1 MG/ML
INJECTION INTRAMUSCULAR; INTRAVENOUS PRN
Status: DISCONTINUED | OUTPATIENT
Start: 2024-02-22 | End: 2024-02-22 | Stop reason: SDUPTHER

## 2024-02-22 RX ORDER — SODIUM CHLORIDE 0.9 % (FLUSH) 0.9 %
5-40 SYRINGE (ML) INJECTION PRN
Status: CANCELLED | OUTPATIENT
Start: 2024-02-22

## 2024-02-22 RX ORDER — FENTANYL CITRATE 50 UG/ML
25 INJECTION, SOLUTION INTRAMUSCULAR; INTRAVENOUS EVERY 5 MIN PRN
Status: CANCELLED | OUTPATIENT
Start: 2024-02-22

## 2024-02-22 RX ORDER — PROCHLORPERAZINE EDISYLATE 5 MG/ML
5 INJECTION INTRAMUSCULAR; INTRAVENOUS
Status: CANCELLED | OUTPATIENT
Start: 2024-02-22 | End: 2024-02-23

## 2024-02-22 RX ORDER — SODIUM CHLORIDE 9 MG/ML
INJECTION, SOLUTION INTRAVENOUS PRN
Status: CANCELLED | OUTPATIENT
Start: 2024-02-22

## 2024-02-22 RX ORDER — ONDANSETRON 2 MG/ML
4 INJECTION INTRAMUSCULAR; INTRAVENOUS
Status: CANCELLED | OUTPATIENT
Start: 2024-02-22 | End: 2024-02-23

## 2024-02-22 RX ORDER — DEXTROSE MONOHYDRATE 100 MG/ML
INJECTION, SOLUTION INTRAVENOUS CONTINUOUS PRN
Status: CANCELLED | OUTPATIENT
Start: 2024-02-22

## 2024-02-22 RX ORDER — IOPAMIDOL 612 MG/ML
INJECTION, SOLUTION INTRATHECAL PRN
Status: DISCONTINUED | OUTPATIENT
Start: 2024-02-22 | End: 2024-02-22 | Stop reason: ALTCHOICE

## 2024-02-22 RX ORDER — FENTANYL CITRATE 50 UG/ML
INJECTION, SOLUTION INTRAMUSCULAR; INTRAVENOUS PRN
Status: DISCONTINUED | OUTPATIENT
Start: 2024-02-22 | End: 2024-02-22 | Stop reason: SDUPTHER

## 2024-02-22 RX ORDER — METHYLPREDNISOLONE ACETATE 40 MG/ML
INJECTION, SUSPENSION INTRA-ARTICULAR; INTRALESIONAL; INTRAMUSCULAR; SOFT TISSUE PRN
Status: DISCONTINUED | OUTPATIENT
Start: 2024-02-22 | End: 2024-02-22 | Stop reason: ALTCHOICE

## 2024-02-22 RX ORDER — LIDOCAINE HYDROCHLORIDE 5 MG/ML
INJECTION, SOLUTION INFILTRATION; INTRAVENOUS PRN
Status: DISCONTINUED | OUTPATIENT
Start: 2024-02-22 | End: 2024-02-22 | Stop reason: ALTCHOICE

## 2024-02-22 RX ORDER — DIPHENHYDRAMINE HYDROCHLORIDE 50 MG/ML
12.5 INJECTION INTRAMUSCULAR; INTRAVENOUS
Status: CANCELLED | OUTPATIENT
Start: 2024-02-22 | End: 2024-02-23

## 2024-02-22 RX ORDER — PROPOFOL 10 MG/ML
INJECTION, EMULSION INTRAVENOUS PRN
Status: DISCONTINUED | OUTPATIENT
Start: 2024-02-22 | End: 2024-02-22 | Stop reason: SDUPTHER

## 2024-02-22 RX ORDER — SODIUM CHLORIDE 0.9 % (FLUSH) 0.9 %
5-40 SYRINGE (ML) INJECTION EVERY 12 HOURS SCHEDULED
Status: CANCELLED | OUTPATIENT
Start: 2024-02-22

## 2024-02-22 RX ADMIN — FENTANYL CITRATE 50 MCG: 50 INJECTION, SOLUTION INTRAMUSCULAR; INTRAVENOUS at 11:54

## 2024-02-22 RX ADMIN — PROPOFOL 50 MG: 10 INJECTION, EMULSION INTRAVENOUS at 11:52

## 2024-02-22 RX ADMIN — MIDAZOLAM 1 MG: 1 INJECTION INTRAMUSCULAR; INTRAVENOUS at 11:54

## 2024-02-22 RX ADMIN — PROPOFOL 50 MG: 10 INJECTION, EMULSION INTRAVENOUS at 11:55

## 2024-02-22 ASSESSMENT — LIFESTYLE VARIABLES: SMOKING_STATUS: 1

## 2024-02-22 ASSESSMENT — PAIN - FUNCTIONAL ASSESSMENT: PAIN_FUNCTIONAL_ASSESSMENT: 0-10

## 2024-02-22 NOTE — H&P
[facility]        [address] [place] [address]  Dept: [phone]    Procedure History & Physical      Anjana Epperson     HPI:    Patient  is here for LBP BLE pain for caudal BOAZ  Labs/imaging studies reviewed   All question and concerns addressed including R/B/A associated with the procedure    Past Medical History:   Diagnosis Date    Anxiety     Arthritis     Asthma     well controlled, on inhalers    Back pain     Bipolar 1 disorder (HCC)     COPD (chronic obstructive pulmonary disease) (HCC)     Depression     Diabetes mellitus (HCC)     Fibromyalgia     GERD (gastroesophageal reflux disease)     Hypertension     Macular edema     Cm    Migraines     Morbid obesity due to excess calories (HCC)     Neuropathy     PTSD (post-traumatic stress disorder)     Rheumatoid arthritis ([facility])        Past Surgical History:   Procedure Laterality Date    ANTERIOR CRUCIATE LIGAMENT REPAIR Right      SECTION      ,     CHOLECYSTECTOMY      DILATION AND CURETTAGE OF UTERUS N/A 2019    DILATATION AND CURETTAGE HYSTEROSCOPY WITH REMOVAL OF CONDYLOMATA performed by Yuriy Cancino MD at [facility] OR    DILATION AND CURETTAGE OF UTERUS N/A 2020    DILATATION AND CURETTAGE HYSTEROSCOPY, ATTEMPTED CERVICAL BIOPSY, MARIYA OF  LABIAL ABSCESS performed by Yuriy Cancino MD at [facility] OR    DILATION AND CURETTAGE OF UTERUS      HYSTERECTOMY (CERVIX STATUS UNKNOWN)  2021    Robotic lysis of adhesions, total hysterectomy, bilateral salpingectomy, cystoscopy    INCONTINENCE SURGERY      OTHER SURGICAL HISTORY  1997    repair anal fissure    PAIN MANAGEMENT PROCEDURE N/A 2022    CAUDAL EPIDURAL STEROID INJECTION  #1 performed by Neela Rhodes DO at [facility] OR    PAIN MANAGEMENT PROCEDURE N/A 2022    CAUDAL EPIDURAL STEROID INJECTION #2 performed by Neela Rhodes DO at [facility] OR    PAIN MANAGEMENT PROCEDURE N/A 10/13/2022    CAUDAL EPIDURAL STEROID INJECTION

## 2024-02-22 NOTE — DISCHARGE INSTRUCTIONS
[facility] Pain Management Department  Beauty Zgotzi-[phone]  Dr. Dao Rhodes   Post-Pain Block/Radiofrequency  Home Going Instructions    1-Go home, rest for the remainder of the day  2-Please do not lift over 20 pounds the day of the injection  3-If you received sedation No: alcohol, driving, operating lawn mowers, plows, tractors or other dangerous equipment until next morning. Do not make important decisions or sign legal documents for 24 hours. You may experience light headedness, dizziness, nausea or sleepiness after sedation. Do not stay alone. A responsible adult must be with you for 24 hours. You could be nauseated from the medications you have received. Your IV site may be sore and bruised.    4-No dietary restrictions     5-Resume all medications the same day, blood thinners to be resumed 24 hours after injection if you were instructed to stop any.    6-Keep the surgical site clean and dry, you may shower the next morning and remove the      dressing.     7- No sitz baths, tub baths or hot tubs/swimming for 24 hours.       8- If you have any pain at the injection site(s), application of an ice pack to the area should be       helpful, 20 minutes on/20 minutes off for next 48 hours.  9- Call [facility] immediately at if you develop.  Fever greater than 100.4 F  Have bleeding or drainage from the puncture site  Have progressive Leg/arm numbness and or weakness  Loss of control of bowel and or bladder (wet/soil yourself)  Severe headache with inability to lift head  10-You may return to work the next day

## 2024-02-22 NOTE — ANESTHESIA POSTPROCEDURE EVALUATION
Department of Anesthesiology  Postprocedure Note    Patient: Anjana Epperson  MRN: [id number]  YOB: 1985  Date of evaluation: 2/22/2024    Procedure Summary       Date: 02/22/24 Room / Location: [facility] PROCEDURE ROOM 02 / [facility]    Anesthesia Start: 1152 Anesthesia Stop: 1203    Procedure: CAUDAL EPIDURAL STEROID INJECTION (Coccyx) Diagnosis:       Lumbar radiculopathy      (Lumbar radiculopathy [M54.16])    Surgeons: Neela Rhodes DO Responsible Provider: Noah Bass DO    Anesthesia Type: MAC ASA Status: 3            Anesthesia Type: No value filed.    Jeet Phase I: Jeet Score: 10    Jeet Phase II: Jeet Score: 10    Anesthesia Post Evaluation    Patient location during evaluation: PACU  Patient participation: complete - patient participated  Level of consciousness: awake  Airway patency: patent  Nausea & Vomiting: no nausea and no vomiting  Cardiovascular status: hemodynamically stable  Respiratory status: acceptable  Hydration status: stable  Pain management: adequate    No notable events documented.

## 2024-02-22 NOTE — OP NOTE
easily injected . There was a clear outline of the epidural space and visualization of the sacral roots. The needle was then removed and a sterile Band-Aid was applied to the puncture site.    Disposition the patient tolerated the procedure well and there were no complications . Vital signs remained stable throughout the procedure. The patient was escorted to the recovery area where they remained until discharge and written discharge instructions for the procedure were given.    Plan: Anjana will return to our pain management center as scheduled.     Neela Rhodes DO

## 2024-02-22 NOTE — ANESTHESIA PRE PROCEDURE
Anesthesia Plan      MAC     ASA 3       Induction: intravenous.      Anesthetic plan and risks discussed with patient.      Plan discussed with CRNA.                    Noah Bass,    2/22/2024

## 2024-02-28 ENCOUNTER — TELEMEDICINE (OUTPATIENT)
Dept: PAIN MANAGEMENT | Age: 39
End: 2024-02-28
Payer: MEDICARE

## 2024-02-28 DIAGNOSIS — Z79.891 ENCOUNTER FOR LONG-TERM OPIATE ANALGESIC USE: ICD-10-CM

## 2024-02-28 DIAGNOSIS — M79.10 MYALGIA: ICD-10-CM

## 2024-02-28 DIAGNOSIS — G89.29 CHRONIC PAIN OF BOTH KNEES: ICD-10-CM

## 2024-02-28 DIAGNOSIS — G89.4 CHRONIC PAIN SYNDROME: ICD-10-CM

## 2024-02-28 DIAGNOSIS — M54.16 LUMBAR RADICULOPATHY: Primary | ICD-10-CM

## 2024-02-28 DIAGNOSIS — M47.817 LUMBOSACRAL SPONDYLOSIS WITHOUT MYELOPATHY: ICD-10-CM

## 2024-02-28 DIAGNOSIS — M25.561 CHRONIC PAIN OF BOTH KNEES: ICD-10-CM

## 2024-02-28 DIAGNOSIS — M54.42 CHRONIC BILATERAL LOW BACK PAIN WITH BILATERAL SCIATICA: ICD-10-CM

## 2024-02-28 DIAGNOSIS — G89.29 CHRONIC BILATERAL LOW BACK PAIN WITH BILATERAL SCIATICA: ICD-10-CM

## 2024-02-28 DIAGNOSIS — M25.562 CHRONIC PAIN OF BOTH KNEES: ICD-10-CM

## 2024-02-28 DIAGNOSIS — M54.41 CHRONIC BILATERAL LOW BACK PAIN WITH BILATERAL SCIATICA: ICD-10-CM

## 2024-02-28 PROCEDURE — G8427 DOCREV CUR MEDS BY ELIG CLIN: HCPCS | Performed by: PHYSICIAN ASSISTANT

## 2024-02-28 PROCEDURE — 99213 OFFICE O/P EST LOW 20 MIN: CPT | Performed by: PHYSICIAN ASSISTANT

## 2024-02-28 PROCEDURE — 4004F PT TOBACCO SCREEN RCVD TLK: CPT | Performed by: PHYSICIAN ASSISTANT

## 2024-02-28 PROCEDURE — G8484 FLU IMMUNIZE NO ADMIN: HCPCS | Performed by: PHYSICIAN ASSISTANT

## 2024-02-28 PROCEDURE — G8417 CALC BMI ABV UP PARAM F/U: HCPCS | Performed by: PHYSICIAN ASSISTANT

## 2024-02-28 NOTE — PROGRESS NOTES
Anjana Epperson presents to the [facility] on 2/28/2024. Anjana is complaining of pain in her low back. The pain is constant. The pain is described as aching, dull, and sharp. Pain is rated on her best day at a 8, on her worst day at a 10, and on average at a 9 on the VAS scale. She took her last dose of Lyrica today.     Any procedures since your last visit: Yes    Pacemaker or defibrillator: No    She is  on NSAIDS and is not on anticoagulation medications.    Medication Contract and Consent for Opioid Use Documents Filed        No documents found                    LMP 02/22/2021 (Approximate)      Patient's last menstrual period was 02/22/2021 (approximate).  
Anjana Epperson was read the following message “We want to confirm that, for purposes of billing, this is a virtual visit with your provider for which we will submit a claim for reimbursement with your insurance company. You will be responsible for any copays, coinsurance amounts or other amounts not covered by your insurance company. If you do not accept this, unfortunately we will not be able to schedule or proceed with a virtual visit with the provider. Do you accept? Anjana responded Yes .   
appropriate.   The patient was located at Home: [address] [place] [id number]  Provider was located at Facility (Appt Dept): [address] [place], [place] [id number]       Total time spent for this encounter: 15 minutes    --BRANDEN Atwood on 2/28/2024 at 1:00 PM    An electronic signature was used to authenticate this note.                      We discussed with the patient that combining opioids, benzodiazepines, alcohol, illicit drugs or sleep aids increases the risk of respiratory depression including death. We discussed that these medications may cause drowsiness, sedation or dizziness and have counseled the patient not to drive or operate machinery. We have discussed that these medications will be prescribed only by one provider. We have discussed with the patient about age related risk factors and have thoroughly discussed the importance of taking these medications as prescribed. The patient verbalizes understanding.    ccreferring physic

## 2024-04-04 ENCOUNTER — PATIENT MESSAGE (OUTPATIENT)
Dept: PAIN MANAGEMENT | Age: 39
End: 2024-04-04

## 2024-04-05 ENCOUNTER — PREP FOR PROCEDURE (OUTPATIENT)
Dept: PAIN MANAGEMENT | Age: 39
End: 2024-04-05

## 2024-04-08 ENCOUNTER — OFFICE VISIT (OUTPATIENT)
Dept: RHEUMATOLOGY | Age: 39
End: 2024-04-08
Payer: MEDICARE

## 2024-04-08 VITALS — HEIGHT: 62 IN | WEIGHT: 293 LBS | BODY MASS INDEX: 53.92 KG/M2

## 2024-04-08 DIAGNOSIS — I10 ESSENTIAL HYPERTENSION: ICD-10-CM

## 2024-04-08 DIAGNOSIS — E11.9 TYPE 2 DIABETES MELLITUS WITHOUT COMPLICATION, WITHOUT LONG-TERM CURRENT USE OF INSULIN (HCC): ICD-10-CM

## 2024-04-08 DIAGNOSIS — M05.79 RHEUMATOID ARTHRITIS INVOLVING MULTIPLE SITES WITH POSITIVE RHEUMATOID FACTOR (HCC): Primary | ICD-10-CM

## 2024-04-08 DIAGNOSIS — D84.9 IMMUNOSUPPRESSION (HCC): ICD-10-CM

## 2024-04-08 DIAGNOSIS — Z79.899 HIGH RISK MEDICATION USE: ICD-10-CM

## 2024-04-08 PROCEDURE — G2211 COMPLEX E/M VISIT ADD ON: HCPCS | Performed by: INTERNAL MEDICINE

## 2024-04-08 PROCEDURE — 99215 OFFICE O/P EST HI 40 MIN: CPT | Performed by: INTERNAL MEDICINE

## 2024-04-08 PROCEDURE — 2022F DILAT RTA XM EVC RTNOPTHY: CPT | Performed by: INTERNAL MEDICINE

## 2024-04-08 PROCEDURE — 4004F PT TOBACCO SCREEN RCVD TLK: CPT | Performed by: INTERNAL MEDICINE

## 2024-04-08 PROCEDURE — G8417 CALC BMI ABV UP PARAM F/U: HCPCS | Performed by: INTERNAL MEDICINE

## 2024-04-08 PROCEDURE — 3046F HEMOGLOBIN A1C LEVEL >9.0%: CPT | Performed by: INTERNAL MEDICINE

## 2024-04-08 PROCEDURE — G8427 DOCREV CUR MEDS BY ELIG CLIN: HCPCS | Performed by: INTERNAL MEDICINE

## 2024-04-08 RX ORDER — ADALIMUMAB 40MG/0.4ML
40 KIT SUBCUTANEOUS
Qty: 2 EACH | Refills: 5 | Status: SHIPPED | OUTPATIENT
Start: 2024-04-08

## 2024-04-08 ASSESSMENT — ENCOUNTER SYMPTOMS
VOMITING: 0
TROUBLE SWALLOWING: 0
DIARRHEA: 0
COLOR CHANGE: 0
NAUSEA: 0
COUGH: 0
BACK PAIN: 1
ABDOMINAL PAIN: 0

## 2024-04-08 NOTE — PROGRESS NOTES
Anjana Epperson 1985 is a 38 y.o. female, here for evaluation of the following chief complaint(s):  Follow-up (Patient is here today to follow up on RA)         ASSESSMENT/PLAN:    Anjana Epperson 1985 is a 38 y.o. female seen in follow-up for rheumatoid arthritis.    1.  RF positive, CCP negative, 14 3 3 eta positive, nonerosive rheumatoid arthritis-last visit we added Humira to methotrexate 15 mg weekly plus folic acid 2 mg daily.  She had a good response but when her refills ran out we did not get a notification for renewal so she has been out of Humira for 2 months and so now.  She is having severe exacerbation being off of it with synovitis in several MCP and PIP joints.  We will start her back on Humira.    2.  Immunosuppression-I recommend she stay up-to-date on vaccinations.  In the event of any acute infectious illness she should hold methotrexate and Humira.    3.  High risk medication use-up-to-date on TB and hepatitis.  We will monitor blood work every 3 months on methotrexate.  We will monitor for infection.    4.  Chronic low back pain-rheumatoid arthritis spares the back.  She is following with pain management.    5.  Hypertension-patient's with inflammatory arthritis have an increased cardiovascular risk.  She is on blood pressure medication.    6.  Type 2 diabetes-we will avoid steroids is much as possible.      1. Rheumatoid arthritis involving multiple sites with positive rheumatoid factor (HCC)  2. High risk medication use  3. Immunosuppression (HCC)  4. Essential hypertension  5. Type 2 diabetes mellitus without complication, without long-term current use of insulin (HCC)        Return in about 4 months (around 8/8/2024).         Subjective   SUBJECTIVE/OBJECTIVE:    HPI: Anjana Epperson 1985 is a 38 y.o. female seen in follow-up for rheumatoid arthritis.  Last visit we added Humira to methotrexate and she had a good response but when her refills ran out we did not

## 2024-04-21 DIAGNOSIS — M54.42 CHRONIC BILATERAL LOW BACK PAIN WITH BILATERAL SCIATICA: ICD-10-CM

## 2024-04-21 DIAGNOSIS — G89.29 CHRONIC BILATERAL LOW BACK PAIN WITH BILATERAL SCIATICA: ICD-10-CM

## 2024-04-21 DIAGNOSIS — M54.16 LUMBAR RADICULOPATHY: ICD-10-CM

## 2024-04-21 DIAGNOSIS — M54.41 CHRONIC BILATERAL LOW BACK PAIN WITH BILATERAL SCIATICA: ICD-10-CM

## 2024-04-21 DIAGNOSIS — G89.4 CHRONIC PAIN SYNDROME: ICD-10-CM

## 2024-04-21 DIAGNOSIS — M47.817 LUMBOSACRAL SPONDYLOSIS WITHOUT MYELOPATHY: ICD-10-CM

## 2024-04-22 ENCOUNTER — TELEPHONE (OUTPATIENT)
Dept: PAIN MANAGEMENT | Age: 39
End: 2024-04-22

## 2024-04-22 RX ORDER — PREGABALIN 150 MG/1
150 CAPSULE ORAL 2 TIMES DAILY
Qty: 180 CAPSULE | Refills: 0 | OUTPATIENT
Start: 2024-04-22 | End: 2024-07-21

## 2024-04-22 RX ORDER — CYCLOBENZAPRINE HCL 10 MG
10 TABLET ORAL 2 TIMES DAILY PRN
Qty: 180 TABLET | Refills: 0 | OUTPATIENT
Start: 2024-04-22 | End: 2024-07-21

## 2024-04-22 NOTE — PROGRESS NOTES
Pt took trulicity on 4/19/2024. Reviewed with Dr. Rivera that it is only 6 days. Per Dr. Rivera, pt needs off 7 days and needs to be rescheduled. Jt, at Dr. Rhodes's office, notified of this.

## 2024-04-22 NOTE — TELEPHONE ENCOUNTER
Call to Anjana R Zhou that procedure was approved for 4/25/2024 and that [facility] should call her a few days before for the pre op call and after 3:00 PM the business day before with the arrival time. Instructed Anjana to hold ibuprofen for 24 hours, naprosyn for 4 days and any aspirin containing products or fish oil for 7 days. Instructed to call office back if any questions. Anjana verbalized understanding.    She took trulicity on 4/19/2024.    Electronically signed by Julio Paredes RN on 4/22/2024 at 9:49 AM

## 2024-04-22 NOTE — TELEPHONE ENCOUNTER
Received call from BRAYAN that since she took Trulicity on Friday, she will need to be rescheduled or changed to local. I left a message for her to call us back.

## 2024-04-24 DIAGNOSIS — G89.4 CHRONIC PAIN SYNDROME: ICD-10-CM

## 2024-04-24 DIAGNOSIS — G89.29 CHRONIC BILATERAL LOW BACK PAIN WITH BILATERAL SCIATICA: ICD-10-CM

## 2024-04-24 DIAGNOSIS — M54.41 CHRONIC BILATERAL LOW BACK PAIN WITH BILATERAL SCIATICA: ICD-10-CM

## 2024-04-24 DIAGNOSIS — M47.817 LUMBOSACRAL SPONDYLOSIS WITHOUT MYELOPATHY: ICD-10-CM

## 2024-04-24 DIAGNOSIS — M54.42 CHRONIC BILATERAL LOW BACK PAIN WITH BILATERAL SCIATICA: ICD-10-CM

## 2024-04-24 DIAGNOSIS — M54.16 LUMBAR RADICULOPATHY: ICD-10-CM

## 2024-04-24 RX ORDER — PREGABALIN 150 MG/1
150 CAPSULE ORAL 2 TIMES DAILY
Qty: 60 CAPSULE | Refills: 0 | Status: SHIPPED | OUTPATIENT
Start: 2024-04-24 | End: 2024-05-24

## 2024-04-24 RX ORDER — CYCLOBENZAPRINE HCL 10 MG
10 TABLET ORAL 2 TIMES DAILY PRN
Qty: 60 TABLET | Refills: 0 | Status: SHIPPED
Start: 2024-04-24 | End: 2024-04-24 | Stop reason: SDUPTHER

## 2024-04-24 RX ORDER — CYCLOBENZAPRINE HCL 10 MG
10 TABLET ORAL 3 TIMES DAILY PRN
Qty: 90 TABLET | Refills: 0 | Status: SHIPPED | OUTPATIENT
Start: 2024-04-24 | End: 2024-05-24

## 2024-05-03 RX ORDER — BUSPIRONE HYDROCHLORIDE 10 MG/1
10 TABLET ORAL 2 TIMES DAILY
COMMUNITY

## 2024-05-03 NOTE — PROGRESS NOTES
[facility] PRE-ADMISSION TESTING INSTRUCTIONS    The Preadmission Testing patient is instructed accordingly using the following criteria (check applicable):    ARRIVAL INSTRUCTIONS:  [x] Parking the day of Surgery is located in the Main Entrance lot.  Upon entering the door, make an immediate right to the surgery reception desk    [x] Bring photo ID and insurance card    [x] Bring in a copy of Living will or Durable Power of  papers.    [x] Please be sure to arrange for a responsible adult to provide transportation to and from the hospital    [x] Please arrange for a responsible adult to be with you for the 24 hour period post procedure due to having anesthesia    [x] If you awake am of surgery not feeling well or have temperature >100 please call [phone]    GENERAL INSTRUCTIONS:    [x] No solid foods after midnight, may have up to 8oz of water until 4 hours prior to surgery. No gum, no candy, no mints.        [x] You may brush your teeth, do not swallow any toothpaste    [x] Take medications as instructed     [x] Stop herbal supplements and vitamins 5 days prior to procedure    [x] Follow preop dosing of blood thinners per physician instructions    [] Take 1/2 dose of evening insulin, but no insulin after midnight    [x] No oral diabetic medications after midnight    [x] If diabetic and have low blood sugar or feel symptomatic, take 1-2oz apple juice only    [] Bring inhalers day of surgery    [] Bring urine specimen day of surgery    [x] Shower or bath with soap, lather and rinse well, AM of Surgery, no lotion, powders or creams to surgical site    [] Follow bowel prep as instructed per surgeon    [x] No tobacco products within 24 hours of surgery     [x] No alcohol or illegal drug use, marijuana included, within 24 hours of surgery.    [x] Jewelry, body piercing's, eyeglasses, contact lenses and dentures are not permitted into surgery (bring cases)      [x] Please do not

## 2024-05-07 ENCOUNTER — HOSPITAL ENCOUNTER (OUTPATIENT)
Dept: GENERAL RADIOLOGY | Age: 39
Setting detail: OUTPATIENT SURGERY
Discharge: HOME | End: 2024-05-09
Attending: PAIN MEDICINE
Payer: MEDICARE

## 2024-05-07 ENCOUNTER — ANESTHESIA (OUTPATIENT)
Dept: OPERATING ROOM | Age: 39
End: 2024-05-07
Payer: MEDICARE

## 2024-05-07 ENCOUNTER — ANESTHESIA EVENT (OUTPATIENT)
Dept: OPERATING ROOM | Age: 39
End: 2024-05-07
Payer: MEDICARE

## 2024-05-07 ENCOUNTER — HOSPITAL ENCOUNTER (OUTPATIENT)
Age: 39
Setting detail: OUTPATIENT SURGERY
Discharge: HOME | End: 2024-05-07
Attending: PAIN MEDICINE | Admitting: PAIN MEDICINE
Payer: MEDICARE

## 2024-05-07 ENCOUNTER — HOSPITAL ENCOUNTER (OUTPATIENT)
Age: 39
Discharge: HOME | End: 2024-05-07
Payer: MEDICARE

## 2024-05-07 VITALS
TEMPERATURE: 97 F | HEIGHT: 62 IN | HEART RATE: 112 BPM | WEIGHT: 293 LBS | DIASTOLIC BLOOD PRESSURE: 85 MMHG | RESPIRATION RATE: 16 BRPM | BODY MASS INDEX: 53.92 KG/M2 | SYSTOLIC BLOOD PRESSURE: 138 MMHG | OXYGEN SATURATION: 97 %

## 2024-05-07 DIAGNOSIS — D84.9 IMMUNOSUPPRESSION (HCC): ICD-10-CM

## 2024-05-07 DIAGNOSIS — Z79.899 HIGH RISK MEDICATION USE: ICD-10-CM

## 2024-05-07 DIAGNOSIS — R52 PAIN MANAGEMENT: ICD-10-CM

## 2024-05-07 DIAGNOSIS — M05.79 RHEUMATOID ARTHRITIS INVOLVING MULTIPLE SITES WITH POSITIVE RHEUMATOID FACTOR (HCC): ICD-10-CM

## 2024-05-07 LAB
ALT SERPL-CCNC: 19 U/L (ref 0–32)
AST SERPL-CCNC: 15 U/L (ref 0–31)
BASOPHILS # BLD: 0.03 K/UL (ref 0–0.2)
BASOPHILS NFR BLD: 0 % (ref 0–2)
CREAT SERPL-MCNC: 0.6 MG/DL (ref 0.5–1)
CRP SERPL HS-MCNC: 15 MG/L (ref 0–5)
EOSINOPHIL # BLD: 0.35 K/UL (ref 0.05–0.5)
EOSINOPHILS RELATIVE PERCENT: 3 % (ref 0–6)
ERYTHROCYTE [DISTWIDTH] IN BLOOD BY AUTOMATED COUNT: 14.8 % (ref 11.5–15)
ERYTHROCYTE [SEDIMENTATION RATE] IN BLOOD BY WESTERGREN METHOD: 19 MM/HR (ref 0–20)
GFR, ESTIMATED: >90 ML/MIN/1.73M2
GLUCOSE BLD-MCNC: 98 MG/DL (ref 74–99)
HCT VFR BLD AUTO: 40.5 % (ref 34–48)
HGB BLD-MCNC: 12.9 G/DL (ref 11.5–15.5)
IMM GRANULOCYTES # BLD AUTO: 0.07 K/UL (ref 0–0.58)
IMM GRANULOCYTES NFR BLD: 1 % (ref 0–5)
LYMPHOCYTES NFR BLD: 3.06 K/UL (ref 1.5–4)
LYMPHOCYTES RELATIVE PERCENT: 27 % (ref 20–42)
MCH RBC QN AUTO: 30.4 PG (ref 26–35)
MCHC RBC AUTO-ENTMCNC: 31.9 G/DL (ref 32–34.5)
MCV RBC AUTO: 95.5 FL (ref 80–99.9)
MONOCYTES NFR BLD: 0.71 K/UL (ref 0.1–0.95)
MONOCYTES NFR BLD: 6 % (ref 2–12)
NEUTROPHILS NFR BLD: 62 % (ref 43–80)
NEUTS SEG NFR BLD: 6.93 K/UL (ref 1.8–7.3)
PLATELET # BLD AUTO: 269 K/UL (ref 130–450)
PMV BLD AUTO: 9.9 FL (ref 7–12)
RBC # BLD AUTO: 4.24 M/UL (ref 3.5–5.5)
WBC OTHER # BLD: 11.2 K/UL (ref 4.5–11.5)

## 2024-05-07 PROCEDURE — 6360000002 HC RX W HCPCS: Performed by: NURSE ANESTHETIST, CERTIFIED REGISTERED

## 2024-05-07 PROCEDURE — 85025 COMPLETE CBC W/AUTO DIFF WBC: CPT

## 2024-05-07 PROCEDURE — 84460 ALANINE AMINO (ALT) (SGPT): CPT

## 2024-05-07 PROCEDURE — 2500000003 HC RX 250 WO HCPCS: Performed by: NURSE ANESTHETIST, CERTIFIED REGISTERED

## 2024-05-07 PROCEDURE — 3700000000 HC ANESTHESIA ATTENDED CARE: Performed by: PAIN MEDICINE

## 2024-05-07 PROCEDURE — 36415 COLL VENOUS BLD VENIPUNCTURE: CPT

## 2024-05-07 PROCEDURE — 6360000004 HC RX CONTRAST MEDICATION: Performed by: PAIN MEDICINE

## 2024-05-07 PROCEDURE — 62323 NJX INTERLAMINAR LMBR/SAC: CPT | Performed by: PAIN MEDICINE

## 2024-05-07 PROCEDURE — 7100000011 HC PHASE II RECOVERY - ADDTL 15 MIN: Performed by: PAIN MEDICINE

## 2024-05-07 PROCEDURE — 3600000002 HC SURGERY LEVEL 2 BASE: Performed by: PAIN MEDICINE

## 2024-05-07 PROCEDURE — 82962 GLUCOSE BLOOD TEST: CPT

## 2024-05-07 PROCEDURE — 84450 TRANSFERASE (AST) (SGOT): CPT

## 2024-05-07 PROCEDURE — 2580000003 HC RX 258: Performed by: NURSE ANESTHETIST, CERTIFIED REGISTERED

## 2024-05-07 PROCEDURE — 6360000002 HC RX W HCPCS: Performed by: PAIN MEDICINE

## 2024-05-07 PROCEDURE — 2709999900 HC NON-CHARGEABLE SUPPLY: Performed by: PAIN MEDICINE

## 2024-05-07 PROCEDURE — 86140 C-REACTIVE PROTEIN: CPT

## 2024-05-07 PROCEDURE — 85652 RBC SED RATE AUTOMATED: CPT

## 2024-05-07 PROCEDURE — 3700000001 HC ADD 15 MINUTES (ANESTHESIA): Performed by: PAIN MEDICINE

## 2024-05-07 PROCEDURE — 3600000012 HC SURGERY LEVEL 2 ADDTL 15MIN: Performed by: PAIN MEDICINE

## 2024-05-07 PROCEDURE — 2500000003 HC RX 250 WO HCPCS: Performed by: PAIN MEDICINE

## 2024-05-07 PROCEDURE — 82565 ASSAY OF CREATININE: CPT

## 2024-05-07 PROCEDURE — 7100000010 HC PHASE II RECOVERY - FIRST 15 MIN: Performed by: PAIN MEDICINE

## 2024-05-07 RX ORDER — PROPOFOL 10 MG/ML
INJECTION, EMULSION INTRAVENOUS PRN
Status: DISCONTINUED | OUTPATIENT
Start: 2024-05-07 | End: 2024-05-07 | Stop reason: SDUPTHER

## 2024-05-07 RX ORDER — LIDOCAINE HYDROCHLORIDE 5 MG/ML
INJECTION, SOLUTION INFILTRATION; INTRAVENOUS PRN
Status: DISCONTINUED | OUTPATIENT
Start: 2024-05-07 | End: 2024-05-07 | Stop reason: ALTCHOICE

## 2024-05-07 RX ORDER — LIDOCAINE HYDROCHLORIDE 20 MG/ML
INJECTION, SOLUTION EPIDURAL; INFILTRATION; INTRACAUDAL; PERINEURAL PRN
Status: DISCONTINUED | OUTPATIENT
Start: 2024-05-07 | End: 2024-05-07 | Stop reason: SDUPTHER

## 2024-05-07 RX ORDER — IOPAMIDOL 612 MG/ML
INJECTION, SOLUTION INTRATHECAL PRN
Status: DISCONTINUED | OUTPATIENT
Start: 2024-05-07 | End: 2024-05-07 | Stop reason: ALTCHOICE

## 2024-05-07 RX ORDER — METHYLPREDNISOLONE ACETATE 40 MG/ML
INJECTION, SUSPENSION INTRA-ARTICULAR; INTRALESIONAL; INTRAMUSCULAR; SOFT TISSUE PRN
Status: DISCONTINUED | OUTPATIENT
Start: 2024-05-07 | End: 2024-05-07 | Stop reason: ALTCHOICE

## 2024-05-07 RX ORDER — SODIUM CHLORIDE 9 MG/ML
INJECTION, SOLUTION INTRAVENOUS CONTINUOUS PRN
Status: DISCONTINUED | OUTPATIENT
Start: 2024-05-07 | End: 2024-05-07 | Stop reason: SDUPTHER

## 2024-05-07 RX ORDER — MIDAZOLAM HYDROCHLORIDE 1 MG/ML
INJECTION INTRAMUSCULAR; INTRAVENOUS PRN
Status: DISCONTINUED | OUTPATIENT
Start: 2024-05-07 | End: 2024-05-07 | Stop reason: SDUPTHER

## 2024-05-07 RX ORDER — FENTANYL CITRATE 50 UG/ML
INJECTION, SOLUTION INTRAMUSCULAR; INTRAVENOUS PRN
Status: DISCONTINUED | OUTPATIENT
Start: 2024-05-07 | End: 2024-05-07 | Stop reason: SDUPTHER

## 2024-05-07 RX ADMIN — MIDAZOLAM 0.5 MG: 1 INJECTION INTRAMUSCULAR; INTRAVENOUS at 12:46

## 2024-05-07 RX ADMIN — LIDOCAINE HYDROCHLORIDE 40 MG: 20 INJECTION, SOLUTION EPIDURAL; INFILTRATION; INTRACAUDAL; PERINEURAL at 12:44

## 2024-05-07 RX ADMIN — FENTANYL CITRATE 25 MCG: 50 INJECTION, SOLUTION INTRAMUSCULAR; INTRAVENOUS at 12:44

## 2024-05-07 RX ADMIN — FENTANYL CITRATE 25 MCG: 50 INJECTION, SOLUTION INTRAMUSCULAR; INTRAVENOUS at 12:46

## 2024-05-07 RX ADMIN — SODIUM CHLORIDE: 9 INJECTION, SOLUTION INTRAVENOUS at 12:30

## 2024-05-07 RX ADMIN — MIDAZOLAM 0.5 MG: 1 INJECTION INTRAMUSCULAR; INTRAVENOUS at 12:44

## 2024-05-07 RX ADMIN — PROPOFOL 50 MG: 10 INJECTION, EMULSION INTRAVENOUS at 12:48

## 2024-05-07 RX ADMIN — PROPOFOL 30 MG: 10 INJECTION, EMULSION INTRAVENOUS at 12:44

## 2024-05-07 ASSESSMENT — PAIN - FUNCTIONAL ASSESSMENT
PAIN_FUNCTIONAL_ASSESSMENT: 0-10

## 2024-05-07 ASSESSMENT — PAIN DESCRIPTION - DESCRIPTORS: DESCRIPTORS: DISCOMFORT

## 2024-05-07 ASSESSMENT — LIFESTYLE VARIABLES: SMOKING_STATUS: 1

## 2024-05-07 NOTE — DISCHARGE INSTRUCTIONS
[facility] Pain Management Department  [place] Vipodx-[phone]  Dr. Lory Rhodes   Post-Pain Block/Radiofrequency  Home Going Instructions    1-Go home, rest for the remainder of the day  2-Please do not lift over 20 pounds the day of the injection  3-If you received sedation No: alcohol, driving, operating lawn mowers, plows, tractors or other dangerous equipment until next morning. Do not make important decisions or sign legal documents for 24 hours. You may experience light headedness, dizziness, nausea or sleepiness after sedation. Do not stay alone. A responsible adult must be with you for 24 hours. You could be nauseated from the medications you have received. Your IV site may be sore and bruised.    4-No dietary restrictions     5-Resume all medications the same day, blood thinners to be resumed 24 hours after injection if you were instructed to stop any.    6-Keep the surgical site clean and dry, you may shower the next morning and remove the      dressing.     7- No sitz baths, tub baths or hot tubs/swimming for 24 hours.       8- If you have any pain at the injection site(s), application of an ice pack to the area should be       helpful, 20 minutes on/20 minutes off for next 48 hours.  9- Call [facility] immediately at if you develop.  Fever greater than 100.4 F  Have bleeding or drainage from the puncture site  Have progressive Leg/arm numbness and or weakness  Loss of control of bowel and or bladder (wet/soil yourself)  Severe headache with inability to lift head  10-You may return to work the next day

## 2024-05-07 NOTE — ANESTHESIA POSTPROCEDURE EVALUATION
Department of Anesthesiology  Postprocedure Note    Patient: Anjana Epperson  MRN: [id number]  YOB: 1985  Date of evaluation: 5/7/2024    Procedure Summary       Date: 05/07/24 Room / Location: [facility] PROCEDURE ROOM 02 / [facility]    Anesthesia Start: 1237 Anesthesia Stop:     Procedure: CAUDAL EPIDURAL STEROID INJECTION (Coccyx) Diagnosis:       Lumbar radiculopathy      (Lumbar radiculopathy [M54.16])    Surgeons: Neela Rhodes DO Responsible Provider: Sarah Sanchez MD    Anesthesia Type: MAC ASA Status: 3            Anesthesia Type: No value filed.    Jeet Phase I: Jeet Score: 10    Jeet Phase II: Jeet Score: 10    Anesthesia Post Evaluation    Patient location during evaluation: PACU  Patient participation: complete - patient participated  Level of consciousness: awake  Airway patency: patent  Nausea & Vomiting: no nausea and no vomiting  Cardiovascular status: hemodynamically stable  Respiratory status: acceptable, nonlabored ventilation and spontaneous ventilation  Hydration status: euvolemic  Pain management: adequate        No notable events documented.

## 2024-05-07 NOTE — ANESTHESIA PRE PROCEDURE
Department of Anesthesiology  Preprocedure Note       Name:  Anjana Epperson   Age:  38 y.o.  :  1985                                          MRN:  [id number]         Date:  2024      Surgeon: Surgeon(s):  Neela Rhodes DO    Procedure: Procedure(s):  CAUDAL EPIDURAL STEROID INJECTION    Medications prior to admission:   Prior to Admission medications    Medication Sig Start Date End Date Taking? Authorizing Provider   busPIRone (BUSPAR) 10 MG tablet Take 1 tablet by mouth in the morning and at bedtime   Yes Ephraim Escalera MD   cariprazine hcl (VRAYLAR) 1.5 MG capsule Take 1 capsule by mouth at bedtime   Yes Ephraim Escalera MD   pregabalin (LYRICA) 150 MG capsule Take 1 capsule by mouth 2 times daily for 30 days. Max Daily Amount: 300 mg 24  iMriam Curiel PA   cyclobenzaprine (FLEXERIL) 10 MG tablet Take 1 tablet by mouth 3 times daily as needed for Muscle spasms 24  Miriam Curiel PA   adalimumab (HUMIRA, 2 PEN,) 40 MG/0.4ML PNKT Inject 40 mg into the skin every 14 days 24   Cruz Robins DO   venlafaxine (EFFEXOR) 75 MG tablet Take 2 tablets by mouth 2 times daily    Ephraim Escalera MD   methotrexate (RHEUMATREX) 2.5 MG chemo tablet Take 6 tablets by mouth once a week 24   Cruz Robins DO   pantoprazole (PROTONIX) 40 MG tablet Take 1 tablet by mouth nightly 24   Ephraim Escalera MD   traZODone (DESYREL) 50 MG tablet Take 2 tablets by mouth nightly 24   Ephraim Escalera MD   zolpidem (AMBIEN) 5 MG tablet Take 2 tablets by mouth nightly as needed. 24   Ephraim Escalera MD   folic acid (FOLVITE) 1 MG tablet Take 2 tablets by mouth daily 23   Cruz Robins DO   FLOVENT HFA 44 MCG/ACT inhaler Inhale 2 puffs into the lungs 2 times daily 23   Ephraim Escalera MD   Multiple Vitamins-Minerals (THERAPEUTIC MULTIVITAMIN-MINERALS) tablet Take 1 tablet by mouth daily 23   josh,

## 2024-05-07 NOTE — OP NOTE
2024    Patient: Anjana Epperson  :  1985  Age:  38 y.o.  Sex:  female     PRE-OPERATIVE DIAGNOSIS: Lumbar radiculopathy    POST-OPERATIVE DIAGNOSIS: Same.    PROCEDURE PERFORMED: Therapeutic Caudal Epidural done under fluoroscopic guidance.    SURGEON:   SIGRID Christiansen    ANESTHESIA: MAC    ESTIMATED BLOOD LOSS: None.    BRIEF HISTORY: Anjana Epperson comes in today for the therapeutic caudal epidural steroid injection under fluorsoscopic guidance. After discussing the potential risks and benefits of the procedure with the patient  Anjana did request that we proceed. A complete History & Physical was reviewed and it is unchanged.    DESCRIPTION OF PROCEDURE:    After confirming written and informed consent, a time-out was performed and Anjana’s name and date of birth, the procedure to be performed as well as the plan for the location of the needle insertion were confirmed.    Patient was brought into the procedure room and was placed in the prone position on a fluoroscopy table.  Standard monitors were placed and vital signs were observed throughout the procedure.  The lumbosacral and caudal area were prepped with chloraprep and draped in a sterile manner.  The sacral hiatus was identified and marked under AP fluoroscopy. A sterile gauze was placed in the midgluteal cleft for increased sterility. The overlying skin and subcutaneous tissues were anesthetized with 0.5% Lidocaine.  Under lateral fluoroscopic guidance, a # 22 gauge 3.5 inch spinal needle was advanced into the sacral hiatus . After the needle passed through the sacrococcygeal ligament, the needle angle was advanced slightly. There was no evidence of paresthesia throughout the needle placement. After negative aspiration for blood and CSF, epidural spread was confirmed with injection of 2 cc of Isovue-M 300 in both live lateral and live AP fluoroscopy. A solution consisting of 0.5% Lidocaine and 40 mg DepoMedrol, total of 15 cc, was

## 2024-05-07 NOTE — H&P
[facility]        [address] [place] [address]  Dept: [phone]    Procedure History & Physical      Anjana Epperson     HPI:    Patient  is here for LBP BLE pain for caudal BOAZ  Labs/imaging studies reviewed   All question and concerns addressed including R/B/A associated with the procedure    Past Medical History:   Diagnosis Date    Anxiety     Arthritis     Asthma     well controlled, on inhalers    Back pain     Bipolar 1 disorder (HCC)     COPD (chronic obstructive pulmonary disease) (HCC)     Depression     Diabetes mellitus (HCC)     Fibromyalgia     GERD (gastroesophageal reflux disease)     Hypertension     Macular edema     Cm    Migraines     Morbid obesity due to excess calories (HCC)     Neuropathy     PTSD (post-traumatic stress disorder)     Rheumatoid arthritis ([facility])        Past Surgical History:   Procedure Laterality Date    ANTERIOR CRUCIATE LIGAMENT REPAIR Right      SECTION      ,     CHOLECYSTECTOMY      DILATION AND CURETTAGE OF UTERUS N/A 2019    DILATATION AND CURETTAGE HYSTEROSCOPY WITH REMOVAL OF CONDYLOMATA performed by Yuriy Cancino MD at [facility]    DILATION AND CURETTAGE OF UTERUS N/A 2020    DILATATION AND CURETTAGE HYSTEROSCOPY, ATTEMPTED CERVICAL BIOPSY, MARIYA OF  LABIAL ABSCESS performed by Yuriy Cancino MD at [facility]    DILATION AND CURETTAGE OF UTERUS      HYSTERECTOMY (CERVIX STATUS UNKNOWN)  2021    Robotic lysis of adhesions, total hysterectomy, bilateral salpingectomy, cystoscopy    INCONTINENCE SURGERY      OTHER SURGICAL HISTORY  1997    repair anal fissure    PAIN MANAGEMENT PROCEDURE N/A 2022    CAUDAL EPIDURAL STEROID INJECTION  #1 performed by Neela Rhodes DO at [facility] OR    PAIN MANAGEMENT PROCEDURE N/A 2022    CAUDAL EPIDURAL STEROID INJECTION #2 performed by Neela Rhodes DO at [facility] OR    PAIN MANAGEMENT PROCEDURE N/A 10/13/2022    CAUDAL EPIDURAL STEROID INJECTION

## 2024-05-09 ENCOUNTER — OFFICE (OUTPATIENT)
Dept: URBAN - METROPOLITAN AREA CLINIC 15 | Facility: CLINIC | Age: 39
End: 2024-05-09
Payer: COMMERCIAL

## 2024-05-09 VITALS
DIASTOLIC BLOOD PRESSURE: 82 MMHG | SYSTOLIC BLOOD PRESSURE: 124 MMHG | TEMPERATURE: 98.2 F | WEIGHT: 293 LBS | OXYGEN SATURATION: 97 % | HEIGHT: 62 IN | HEART RATE: 103 BPM

## 2024-05-09 DIAGNOSIS — K59.00 CONSTIPATION, UNSPECIFIED: ICD-10-CM

## 2024-05-09 DIAGNOSIS — R10.9 UNSPECIFIED ABDOMINAL PAIN: ICD-10-CM

## 2024-05-09 DIAGNOSIS — K76.0 FATTY (CHANGE OF) LIVER, NOT ELSEWHERE CLASSIFIED: ICD-10-CM

## 2024-05-09 DIAGNOSIS — K21.9 GASTRO-ESOPHAGEAL REFLUX DISEASE WITHOUT ESOPHAGITIS: ICD-10-CM

## 2024-05-09 DIAGNOSIS — R11.2 NAUSEA WITH VOMITING, UNSPECIFIED: ICD-10-CM

## 2024-05-09 DIAGNOSIS — R13.10 DYSPHAGIA, UNSPECIFIED: ICD-10-CM

## 2024-05-09 PROCEDURE — 99204 OFFICE O/P NEW MOD 45 MIN: CPT | Performed by: NURSE PRACTITIONER

## 2024-05-21 ENCOUNTER — TELEPHONE (OUTPATIENT)
Dept: PAIN MANAGEMENT | Age: 39
End: 2024-05-21

## 2024-05-21 RX ORDER — METHOCARBAMOL 500 MG/1
500 TABLET, FILM COATED ORAL 2 TIMES DAILY PRN
Qty: 14 TABLET | Refills: 0 | Status: SHIPPED
Start: 2024-05-21 | End: 2024-05-31 | Stop reason: SDUPTHER

## 2024-05-21 NOTE — TELEPHONE ENCOUNTER
I spoke to Anjana Epperson today to see which cream needed a prior authorization.  I did not see any orders for a cream so I called her pharmacy and they stated that the authorization that is needed is for ZT Lido 1.8% patches.  I will initiate a prior authorization through Cover My Meds.

## 2024-05-24 ENCOUNTER — TELEPHONE (OUTPATIENT)
Dept: RHEUMATOLOGY | Age: 39
End: 2024-05-24

## 2024-05-24 NOTE — TELEPHONE ENCOUNTER
Patient called stating she has been having nausea and vomiting, acid reflux, severe stomach burning, and dry heaving.  She states the vomiting smells like stool.  She was asking if Dr. Robins wants her to hold her Methotrexate.  Advised patient to hold her Methotrexate, and also to go directly to ER due to the severity of the symptoms she described.  Patient acknowledged and thanked.

## 2024-05-31 DIAGNOSIS — G89.4 CHRONIC PAIN SYNDROME: ICD-10-CM

## 2024-05-31 DIAGNOSIS — M54.42 CHRONIC BILATERAL LOW BACK PAIN WITH BILATERAL SCIATICA: ICD-10-CM

## 2024-05-31 DIAGNOSIS — M54.16 LUMBAR RADICULOPATHY: ICD-10-CM

## 2024-05-31 DIAGNOSIS — G89.29 CHRONIC BILATERAL LOW BACK PAIN WITH BILATERAL SCIATICA: ICD-10-CM

## 2024-05-31 DIAGNOSIS — M54.41 CHRONIC BILATERAL LOW BACK PAIN WITH BILATERAL SCIATICA: ICD-10-CM

## 2024-05-31 DIAGNOSIS — M47.817 LUMBOSACRAL SPONDYLOSIS WITHOUT MYELOPATHY: ICD-10-CM

## 2024-05-31 RX ORDER — CYCLOBENZAPRINE HCL 10 MG
10 TABLET ORAL 3 TIMES DAILY PRN
Qty: 90 TABLET | Refills: 0 | Status: SHIPPED
Start: 2024-05-31 | End: 2024-05-31

## 2024-05-31 RX ORDER — METHOCARBAMOL 500 MG/1
500 TABLET, FILM COATED ORAL 2 TIMES DAILY PRN
Qty: 60 TABLET | Refills: 0 | Status: SHIPPED | OUTPATIENT
Start: 2024-05-31 | End: 2024-06-30

## 2024-06-10 ENCOUNTER — PATIENT MESSAGE (OUTPATIENT)
Dept: PAIN MANAGEMENT | Age: 39
End: 2024-06-10

## 2024-06-10 ENCOUNTER — PATIENT MESSAGE (OUTPATIENT)
Dept: RHEUMATOLOGY | Age: 39
End: 2024-06-10

## 2024-06-11 RX ORDER — METHYLPREDNISOLONE 4 MG/1
TABLET ORAL
Qty: 1 KIT | Refills: 0 | Status: SHIPPED | OUTPATIENT
Start: 2024-06-11

## 2024-06-11 NOTE — TELEPHONE ENCOUNTER
From: Anjana Epperson  To: Dr. Cruz Robins  Sent: 6/10/2024 9:11 PM EDT  Subject: Flare     Hey Dr. Guajardo can you please call me in some steroid I'm in a pretty bad flare I can't hardly move and can't really do much with my hands my whole body is stiff and hurts so bad when I try to walk to the point my back is bent over

## 2024-06-12 ENCOUNTER — PREP FOR PROCEDURE (OUTPATIENT)
Dept: PAIN MANAGEMENT | Age: 39
End: 2024-06-12

## 2024-07-01 ENCOUNTER — TELEPHONE (OUTPATIENT)
Dept: PAIN MANAGEMENT | Age: 39
End: 2024-07-01

## 2024-07-01 NOTE — TELEPHONE ENCOUNTER
Anjana Epperson called requesting to increase her lyrica. She currently takes one bid. She would like to increase to two tabs BID. She is has been having increased cramping pain in both legs and feet for the past two weeks. Please advise.

## 2024-07-08 ENCOUNTER — TELEPHONE (OUTPATIENT)
Dept: PAIN MANAGEMENT | Age: 39
End: 2024-07-08

## 2024-07-08 NOTE — PROGRESS NOTES
[facility] PRE-ADMISSION TESTING INSTRUCTIONS    The Preadmission Testing patient is instructed accordingly using the following criteria (check applicable):    ARRIVAL INSTRUCTIONS:  [x] Parking the day of Surgery is located in the Main Entrance lot.  Upon entering the door, make an immediate right to the surgery reception desk    [x] Bring photo ID and insurance card    [] Bring in a copy of Living will or Durable Power of  papers.    [x] Please be sure to arrange for a responsible adult to provide transportation to and from the hospital    [x] Please arrange for a responsible adult to be with you for the 24 hour period post procedure due to having anesthesia    [x] If you awake am of surgery not feeling well or have temperature >100 please call [phone]    GENERAL INSTRUCTIONS:    [x] No solid foods after midnight, may have up to 8oz of water until 4 hours prior to surgery. No gum, no candy, no mints. NPO time: 4 hours       [x] You may brush your teeth, do not swallow any toothpaste    [x] Take medications as instructed     [x] Stop herbal supplements and vitamins 5 days prior to procedure    [x] Follow preop dosing of blood thinners per physician instructions    [] Take 1/2 dose of evening insulin, but no insulin after midnight    [x] No oral diabetic medications after midnight    [x] If diabetic and have low blood sugar or feel symptomatic, take 1-2oz apple juice only    [] Bring inhalers day of surgery    [] Bring urine specimen day of surgery    [x] Shower or bath with soap, lather and rinse well, AM of Surgery, no lotion, powders or creams to surgical site    [] Follow bowel prep as instructed per surgeon    [x] No tobacco products within 24 hours of surgery     [x] No alcohol or illegal drug use, marijuana included, within 24 hours of surgery.    [x] Jewelry, body piercing's, eyeglasses, contact lenses and dentures are not permitted into surgery (bring cases)      [x]

## 2024-07-08 NOTE — TELEPHONE ENCOUNTER
Call to Anjana Epperson that procedure was scheduled for 7/11/2024 and that [facility] should call her a few days before for the pre op call and between 2:00 PM and 4:00 PM  the business day before with the arrival time. Instructed Anjana to hold ibuprofen for 24 hours, Celebrex, Mobic, and naprosyn for 4 days and any aspirin containing products, CoQ 10, or fish oil for 7 days. Instructed to call office back if any questions. Anjana verbalized understanding.    Has not taken trulicity since 7/1/2024.    Electronically signed by Julio Paredes RN on 7/8/2024 at 9:38 AM

## 2024-07-11 ENCOUNTER — ANESTHESIA (OUTPATIENT)
Dept: OPERATING ROOM | Age: 39
End: 2024-07-11
Payer: MEDICARE

## 2024-07-11 ENCOUNTER — HOSPITAL ENCOUNTER (OUTPATIENT)
Age: 39
Discharge: HOME | End: 2024-07-11
Payer: MEDICARE

## 2024-07-11 ENCOUNTER — HOSPITAL ENCOUNTER (OUTPATIENT)
Age: 39
Setting detail: OUTPATIENT SURGERY
Discharge: HOME | End: 2024-07-11
Attending: PAIN MEDICINE | Admitting: PAIN MEDICINE
Payer: MEDICARE

## 2024-07-11 ENCOUNTER — HOSPITAL ENCOUNTER (OUTPATIENT)
Dept: GENERAL RADIOLOGY | Age: 39
Setting detail: OUTPATIENT SURGERY
Discharge: HOME | End: 2024-07-13
Attending: PAIN MEDICINE
Payer: MEDICARE

## 2024-07-11 ENCOUNTER — ANESTHESIA EVENT (OUTPATIENT)
Dept: OPERATING ROOM | Age: 39
End: 2024-07-11
Payer: MEDICARE

## 2024-07-11 VITALS
TEMPERATURE: 97.2 F | HEIGHT: 62 IN | HEART RATE: 98 BPM | BODY MASS INDEX: 53.92 KG/M2 | RESPIRATION RATE: 18 BRPM | SYSTOLIC BLOOD PRESSURE: 135 MMHG | OXYGEN SATURATION: 97 % | WEIGHT: 293 LBS | DIASTOLIC BLOOD PRESSURE: 80 MMHG

## 2024-07-11 DIAGNOSIS — R52 PAIN MANAGEMENT: ICD-10-CM

## 2024-07-11 LAB
ALT SERPL-CCNC: 25 U/L (ref 0–32)
AST SERPL-CCNC: 16 U/L (ref 0–31)
BASOPHILS # BLD: 0.02 K/UL (ref 0–0.2)
BASOPHILS NFR BLD: 0 % (ref 0–2)
CREAT SERPL-MCNC: 0.6 MG/DL (ref 0.5–1)
CRP SERPL HS-MCNC: 20 MG/L (ref 0–5)
EOSINOPHIL # BLD: 0.35 K/UL (ref 0.05–0.5)
EOSINOPHILS RELATIVE PERCENT: 4 % (ref 0–6)
ERYTHROCYTE [DISTWIDTH] IN BLOOD BY AUTOMATED COUNT: 14.1 % (ref 11.5–15)
ERYTHROCYTE [SEDIMENTATION RATE] IN BLOOD BY WESTERGREN METHOD: 26 MM/HR (ref 0–20)
GFR, ESTIMATED: >90 ML/MIN/1.73M2
HCT VFR BLD AUTO: 41.3 % (ref 34–48)
HGB BLD-MCNC: 13.4 G/DL (ref 11.5–15.5)
IMM GRANULOCYTES # BLD AUTO: 0.06 K/UL (ref 0–0.58)
IMM GRANULOCYTES NFR BLD: 1 % (ref 0–5)
LYMPHOCYTES NFR BLD: 2.56 K/UL (ref 1.5–4)
LYMPHOCYTES RELATIVE PERCENT: 28 % (ref 20–42)
MCH RBC QN AUTO: 30.4 PG (ref 26–35)
MCHC RBC AUTO-ENTMCNC: 32.4 G/DL (ref 32–34.5)
MCV RBC AUTO: 93.7 FL (ref 80–99.9)
MONOCYTES NFR BLD: 0.55 K/UL (ref 0.1–0.95)
MONOCYTES NFR BLD: 6 % (ref 2–12)
NEUTROPHILS NFR BLD: 61 % (ref 43–80)
NEUTS SEG NFR BLD: 5.57 K/UL (ref 1.8–7.3)
PLATELET # BLD AUTO: 289 K/UL (ref 130–450)
PMV BLD AUTO: 10.4 FL (ref 7–12)
RBC # BLD AUTO: 4.41 M/UL (ref 3.5–5.5)
WBC OTHER # BLD: 9.1 K/UL (ref 4.5–11.5)

## 2024-07-11 PROCEDURE — 7100000011 HC PHASE II RECOVERY - ADDTL 15 MIN: Performed by: PAIN MEDICINE

## 2024-07-11 PROCEDURE — 7100000010 HC PHASE II RECOVERY - FIRST 15 MIN: Performed by: PAIN MEDICINE

## 2024-07-11 PROCEDURE — 6360000002 HC RX W HCPCS: Performed by: PAIN MEDICINE

## 2024-07-11 PROCEDURE — 2500000003 HC RX 250 WO HCPCS: Performed by: PAIN MEDICINE

## 2024-07-11 PROCEDURE — 85652 RBC SED RATE AUTOMATED: CPT

## 2024-07-11 PROCEDURE — 62323 NJX INTERLAMINAR LMBR/SAC: CPT | Performed by: PAIN MEDICINE

## 2024-07-11 PROCEDURE — 6360000002 HC RX W HCPCS: Performed by: NURSE ANESTHETIST, CERTIFIED REGISTERED

## 2024-07-11 PROCEDURE — 3600000002 HC SURGERY LEVEL 2 BASE: Performed by: PAIN MEDICINE

## 2024-07-11 PROCEDURE — 84460 ALANINE AMINO (ALT) (SGPT): CPT

## 2024-07-11 PROCEDURE — 36415 COLL VENOUS BLD VENIPUNCTURE: CPT

## 2024-07-11 PROCEDURE — 2709999900 HC NON-CHARGEABLE SUPPLY: Performed by: PAIN MEDICINE

## 2024-07-11 PROCEDURE — 84450 TRANSFERASE (AST) (SGOT): CPT

## 2024-07-11 PROCEDURE — 3700000000 HC ANESTHESIA ATTENDED CARE: Performed by: PAIN MEDICINE

## 2024-07-11 PROCEDURE — 86140 C-REACTIVE PROTEIN: CPT

## 2024-07-11 PROCEDURE — 85025 COMPLETE CBC W/AUTO DIFF WBC: CPT

## 2024-07-11 PROCEDURE — 82565 ASSAY OF CREATININE: CPT

## 2024-07-11 PROCEDURE — 6360000004 HC RX CONTRAST MEDICATION: Performed by: PAIN MEDICINE

## 2024-07-11 RX ORDER — PROPOFOL 10 MG/ML
INJECTION, EMULSION INTRAVENOUS PRN
Status: DISCONTINUED | OUTPATIENT
Start: 2024-07-11 | End: 2024-07-11 | Stop reason: SDUPTHER

## 2024-07-11 RX ORDER — IOPAMIDOL 612 MG/ML
INJECTION, SOLUTION INTRATHECAL PRN
Status: DISCONTINUED | OUTPATIENT
Start: 2024-07-11 | End: 2024-07-11 | Stop reason: ALTCHOICE

## 2024-07-11 RX ORDER — MIDAZOLAM HYDROCHLORIDE 1 MG/ML
INJECTION INTRAMUSCULAR; INTRAVENOUS PRN
Status: DISCONTINUED | OUTPATIENT
Start: 2024-07-11 | End: 2024-07-11 | Stop reason: SDUPTHER

## 2024-07-11 RX ORDER — FENTANYL CITRATE 50 UG/ML
INJECTION, SOLUTION INTRAMUSCULAR; INTRAVENOUS PRN
Status: DISCONTINUED | OUTPATIENT
Start: 2024-07-11 | End: 2024-07-11 | Stop reason: SDUPTHER

## 2024-07-11 RX ORDER — METHYLPREDNISOLONE ACETATE 40 MG/ML
INJECTION, SUSPENSION INTRA-ARTICULAR; INTRALESIONAL; INTRAMUSCULAR; SOFT TISSUE PRN
Status: DISCONTINUED | OUTPATIENT
Start: 2024-07-11 | End: 2024-07-11 | Stop reason: ALTCHOICE

## 2024-07-11 RX ORDER — LIDOCAINE HYDROCHLORIDE 5 MG/ML
INJECTION, SOLUTION INFILTRATION; INTRAVENOUS PRN
Status: DISCONTINUED | OUTPATIENT
Start: 2024-07-11 | End: 2024-07-11 | Stop reason: ALTCHOICE

## 2024-07-11 RX ADMIN — FENTANYL CITRATE 100 MCG: 50 INJECTION, SOLUTION INTRAMUSCULAR; INTRAVENOUS at 10:45

## 2024-07-11 RX ADMIN — MIDAZOLAM 2 MG: 1 INJECTION INTRAMUSCULAR; INTRAVENOUS at 10:42

## 2024-07-11 RX ADMIN — PROPOFOL 50 MG: 10 INJECTION, EMULSION INTRAVENOUS at 10:45

## 2024-07-11 ASSESSMENT — PAIN - FUNCTIONAL ASSESSMENT
PAIN_FUNCTIONAL_ASSESSMENT: 0-10
PAIN_FUNCTIONAL_ASSESSMENT: PREVENTS OR INTERFERES SOME ACTIVE ACTIVITIES AND ADLS
PAIN_FUNCTIONAL_ASSESSMENT: 0-10

## 2024-07-11 ASSESSMENT — PAIN DESCRIPTION - DESCRIPTORS
DESCRIPTORS: DISCOMFORT
DESCRIPTORS: DISCOMFORT
DESCRIPTORS: ACHING;CRAMPING;CRUSHING;DISCOMFORT
DESCRIPTORS: ACHING

## 2024-07-11 ASSESSMENT — PAIN DESCRIPTION - LOCATION: LOCATION: BACK

## 2024-07-11 ASSESSMENT — LIFESTYLE VARIABLES: SMOKING_STATUS: 1

## 2024-07-11 NOTE — PROGRESS NOTES
PT RECEIVD IN PACU 2 POST INJECTION UNDER LMAC AWAKE AND ALERT ASKING AND WANTING TO EAT ASSESSMENT COMPLETED PT STATES NO REAL PAIN COMFORTABLE AT THIS TIME

## 2024-07-11 NOTE — H&P
negative for nausea, vomiting, change in bowel habits, diarrhea, constipation and abdominal pain    MUSCULOSKELETAL: negative for muscle weakness    SKIN: negative for itching or rashes.    BEHAVIOR/PSYCH:  negative for poor appetite, increased appetite, decreased sleep and poor concentration    All other systems negative      PHYSICAL EXAM:    VITALS:  BP (!) 141/82   Pulse (!) 105   Temp 97.1 °F (36.2 °C) (Temporal)   Resp 18   Ht 1.575 m (5' 2\")   Wt (!) 147 kg (324 lb)   LMP 02/22/2021 (Approximate)   SpO2 98%   BMI 59.26 kg/m²     CONSTITUTIONAL:  awake, alert, cooperative, no apparent distress, and appears stated age    EYES: PERRLA, EOMI    LUNGS:  No increased work of breathing, no audible wheezing    CARDIOVASCULAR:  regular rate and rhythm    ABDOMEN:  Soft non tender non distended     EXTREMITIES: no signs of clubbing or cyanosis.    MUSCULOSKELETAL: negative for flaccid muscle tone or spastic movements.    SKIN: gross examination reveals no signs of rashes, or diaphoresis.    NEURO: Cranial nerves II-XII grossly intact. No signs of agitated mood.       Assessment/Plan:    LBP BLE pain for caudal BOAZ

## 2024-07-11 NOTE — PROGRESS NOTES
PT TOLERATING PO DISCHARGE INSTRUCTIONS GIVEN TO PT AND FAMILY POST GLUCOSE 99, PT AND FAMILY VERBALIZED UNDERSTANDING OF INSTRUCTIONS PAMPHLETS GIVEN

## 2024-07-11 NOTE — DISCHARGE INSTRUCTIONS
[facility] Department  [place] Ugfcqg-[phone]  Dr. Dao Rhodes   Post-Pain Block/Radiofrequency  Home Going Instructions    1-Go home, rest for the remainder of the day  2-Please do not lift over 20 pounds the day of the injection  3-If you received sedation No: alcohol, driving, operating lawn mowers, plows, tractors or other dangerous equipment until next morning. Do not make important decisions or sign legal documents for 24 hours. You may experience light headedness, dizziness, nausea or sleepiness after sedation. Do not stay alone. A responsible adult must be with you for 24 hours. You could be nauseated from the medications you have received. Your IV site may be sore and bruised.    4-No dietary restrictions     5-Resume all medications the same day, blood thinners to be resumed 24 hours after injection if you were instructed to stop any.    6-Keep the surgical site clean and dry, you may shower the next morning and remove the      dressing.     7- No sitz baths, tub baths or hot tubs/swimming for 24 hours.       8- If you have any pain at the injection site(s), application of an ice pack to the area should be       helpful, 20 minutes on/20 minutes off for next 48 hours.  9- Call [facility] immediately at if you develop.  Fever greater than 100.4 F  Have bleeding or drainage from the puncture site  Have progressive Leg/arm numbness and or weakness  Loss of control of bowel and or bladder (wet/soil yourself)  Severe headache with inability to lift head  10-[facility]  [place] Njfkzr-[phone]  Dr. Lory Rhodes   Post-Pain Block/Radiofrequency  Home Going Instructions    1-Go home, rest for the remainder of the day  2-Please do not lift over 20 pounds the day of the injection  3-If you received sedation No: alcohol, driving, operating lawn mowers, plows, tractors or other dangerous equipment until

## 2024-07-11 NOTE — ANESTHESIA PRE PROCEDURE
last solid food consumption: 07/10/24    BMI:   Wt Readings from Last 3 Encounters:   07/08/24 (!) 147 kg (324 lb)   05/03/24 (!) 149.7 kg (330 lb)   04/08/24 (!) 145.2 kg (320 lb)     Body mass index is 59.26 kg/m².    CBC:   Lab Results   Component Value Date/Time    WBC 9.1 07/11/2024 08:12 AM    RBC 4.41 07/11/2024 08:12 AM    HGB 13.4 07/11/2024 08:12 AM    HCT 41.3 07/11/2024 08:12 AM    MCV 93.7 07/11/2024 08:12 AM    RDW 14.1 07/11/2024 08:12 AM     07/11/2024 08:12 AM       CMP:   Lab Results   Component Value Date/Time     07/14/2023 01:17 PM    K 3.9 07/14/2023 01:17 PM    K 4.5 09/21/2022 02:50 PM     07/14/2023 01:17 PM    CO2 19 07/14/2023 01:17 PM    BUN 12 07/14/2023 01:17 PM    CREATININE 0.6 05/07/2024 09:43 AM    GFRAA >60 09/21/2022 02:50 PM    LABGLOM >90 05/07/2024 09:43 AM    LABGLOM >60 02/22/2024 09:09 AM    GLUCOSE 166 07/14/2023 01:17 PM    CALCIUM 9.3 07/14/2023 01:17 PM    BILITOT 0.3 07/14/2023 01:17 PM    ALKPHOS 116 07/14/2023 01:17 PM    AST 15 05/07/2024 09:43 AM    ALT 19 05/07/2024 09:43 AM       POC Tests: No results for input(s): \"POCGLU\", \"POCNA\", \"POCK\", \"POCCL\", \"POCBUN\", \"POCHEMO\", \"POCHCT\" in the last 72 hours.    Coags:   Lab Results   Component Value Date/Time    PROTIME 10.4 05/12/2020 08:23 PM    INR 0.9 05/12/2020 08:23 PM    APTT 32.2 05/12/2020 08:23 PM       HCG (If Applicable):   Lab Results   Component Value Date    PREGTESTUR Negative 02/22/2021    PREGSERUM NEGATIVE 07/26/2015    HCGQUANT <0.1 01/13/2019        ABGs: No results found for: \"PHART\", \"PO2ART\", \"WET5BAN\", \"QND3GHF\", \"BEART\", \"E4NFESKW\"     Type & Screen (If Applicable):  Lab Results   Component Value Date    LABABO O 02/15/2021       Drug/Infectious Status (If Applicable):  No results found for: \"HIV\", \"HEPCAB\"    COVID-19 Screening (If Applicable):   Lab Results   Component Value Date/Time    COVID19 Not Detected 09/20/2022 10:18 AM    COVID19 Not Detected 11/23/2020 03:45 PM

## 2024-07-11 NOTE — ANESTHESIA POSTPROCEDURE EVALUATION
Department of Anesthesiology  Postprocedure Note    Patient: Anjana Epperson  MRN: [id number]  YOB: 1985  Date of evaluation: 7/11/2024    Procedure Summary       Date: 07/11/24 Room / Location: [facility] PROCEDURE ROOM 02 / [facility]    Anesthesia Start: 1042 Anesthesia Stop: 1052    Procedure: CAUDAL EPIDURAL STEROID INJECTION (Coccyx) Diagnosis:       Lumbar radiculopathy      (Lumbar radiculopathy [M54.16])    Surgeons: Neela Rhodes DO Responsible Provider: Nishant Drummond MD    Anesthesia Type: MAC ASA Status: 3            Anesthesia Type: No value filed.    Jeet Phase I: Jeet Score: 10    Jeet Phase II:      Anesthesia Post Evaluation    Patient location during evaluation: PACU  Patient participation: complete - patient participated  Level of consciousness: awake  Airway patency: patent  Nausea & Vomiting: no nausea and no vomiting  Cardiovascular status: hemodynamically stable  Respiratory status: acceptable  Hydration status: euvolemic  Pain management: adequate        No notable events documented.

## 2024-07-12 ENCOUNTER — TELEMEDICINE (OUTPATIENT)
Dept: PAIN MANAGEMENT | Age: 39
End: 2024-07-12
Payer: MEDICARE

## 2024-07-12 DIAGNOSIS — M54.16 LUMBAR RADICULOPATHY: ICD-10-CM

## 2024-07-12 DIAGNOSIS — G89.4 CHRONIC PAIN SYNDROME: ICD-10-CM

## 2024-07-12 DIAGNOSIS — M47.817 LUMBOSACRAL SPONDYLOSIS WITHOUT MYELOPATHY: ICD-10-CM

## 2024-07-12 DIAGNOSIS — M54.41 CHRONIC BILATERAL LOW BACK PAIN WITH BILATERAL SCIATICA: ICD-10-CM

## 2024-07-12 DIAGNOSIS — G89.29 CHRONIC PAIN OF BOTH KNEES: ICD-10-CM

## 2024-07-12 DIAGNOSIS — G89.29 CHRONIC BILATERAL LOW BACK PAIN WITH BILATERAL SCIATICA: ICD-10-CM

## 2024-07-12 DIAGNOSIS — M25.562 CHRONIC PAIN OF BOTH KNEES: ICD-10-CM

## 2024-07-12 DIAGNOSIS — M54.42 CHRONIC BILATERAL LOW BACK PAIN WITH BILATERAL SCIATICA: ICD-10-CM

## 2024-07-12 DIAGNOSIS — M25.561 CHRONIC PAIN OF BOTH KNEES: ICD-10-CM

## 2024-07-12 DIAGNOSIS — M79.10 MYALGIA: Primary | ICD-10-CM

## 2024-07-12 PROCEDURE — 99213 OFFICE O/P EST LOW 20 MIN: CPT | Performed by: PHYSICIAN ASSISTANT

## 2024-07-12 PROCEDURE — 4004F PT TOBACCO SCREEN RCVD TLK: CPT | Performed by: PHYSICIAN ASSISTANT

## 2024-07-12 PROCEDURE — G8417 CALC BMI ABV UP PARAM F/U: HCPCS | Performed by: PHYSICIAN ASSISTANT

## 2024-07-12 PROCEDURE — G8428 CUR MEDS NOT DOCUMENT: HCPCS | Performed by: PHYSICIAN ASSISTANT

## 2024-07-12 RX ORDER — PREGABALIN 150 MG/1
150 CAPSULE ORAL 2 TIMES DAILY
Qty: 60 CAPSULE | Refills: 2 | Status: SHIPPED | OUTPATIENT
Start: 2024-07-12 | End: 2024-08-11

## 2024-07-12 RX ORDER — CYCLOBENZAPRINE HCL 10 MG
10 TABLET ORAL 3 TIMES DAILY PRN
Qty: 90 TABLET | Refills: 1 | Status: SHIPPED | OUTPATIENT
Start: 2024-07-12 | End: 2024-08-11

## 2024-07-12 NOTE — PROGRESS NOTES
spinal canal stenosis or   neural foraminal narrowing.       L2-L3: There is no significant disc herniation, spinal canal stenosis or   neural foraminal narrowing.       L3-L4: There is no significant disc herniation, spinal canal stenosis or   neural foraminal narrowing.       L4-L5: There is no significant disc herniation.  Mild facet hypertrophy is   noted.  No significant central canal or lateral recess stenosis.  Mild neural   foraminal stenoses.       L5-S1: There is no significant disc herniation.  Mild facet hypertrophy is   noted.  No significant central canal or lateral recess stenosis.  Mild neural   foraminal stenoses.           Impression   1.  No fracture or bony destructive lesion.   2. Mild facet hypertrophic changes at L4-5 and L5-S1, resulting in mild   neural foraminal stenoses.   3.  No significant central canal or lateral recess stenosis.      10/2021 xray cervical and lumbar -  FINDINGS:   C-spine: No fracture or dislocation.  Disc spaces are preserved.  Normal soft   tissues.       L-spine: Minimal degenerative endplate spurring from L3-L5.  No fracture or   dislocation.  Normal soft tissues.           Impression   Unremarkable C-spine.       Minimal degenerative endplate spurring from L3-L5.      2022 rt knee xray -  FINDINGS:  No evidence of acute fracture or dislocation.  No focal osseous lesion.  No  evidence of joint effusion. No focal soft tissue abnormality.     IMPRESSION:  No acute abnormality of the knee.     2018 xray left knee -  Findings: No fracture or dislocation is seen.  The joint spaces appear  preserved.  No joint effusion is evident.  The soft tissues are  grossly unremarkable. There is no evidence for chondrocalcinosis.        IMPRESSION:  No acute fracture is identified. Followup imaging can be  performed for persistent or worsening symptoms.                                         Potential Aberrant Drug-Related Behavior:  None.     Urine Drug Screenin2024 -

## 2024-08-07 ENCOUNTER — PATIENT MESSAGE (OUTPATIENT)
Dept: RHEUMATOLOGY | Age: 39
End: 2024-08-07

## 2024-08-07 RX ORDER — METHYLPREDNISOLONE 4 MG/1
TABLET ORAL
Qty: 1 KIT | Refills: 0 | Status: SHIPPED | OUTPATIENT
Start: 2024-08-07

## 2024-08-07 NOTE — TELEPHONE ENCOUNTER
From: Anjana Epperson  To: Dr. Cruz Robins  Sent: 8/7/2024 9:38 AM EDT  Subject: Flare    Hey Dr. Guajardo can you call me some steroids in something that's isn't prednisone as I'm highly allergic to. I'm in a bad flare my elbows and hands wrist and ankles and also my back is absolutely killing me please thank you so much

## 2024-08-26 ENCOUNTER — OFFICE VISIT (OUTPATIENT)
Dept: RHEUMATOLOGY | Age: 39
End: 2024-08-26
Payer: MEDICARE

## 2024-08-26 VITALS — WEIGHT: 293 LBS | HEIGHT: 62 IN | BODY MASS INDEX: 53.92 KG/M2

## 2024-08-26 DIAGNOSIS — M05.79 RHEUMATOID ARTHRITIS INVOLVING MULTIPLE SITES WITH POSITIVE RHEUMATOID FACTOR (HCC): Primary | ICD-10-CM

## 2024-08-26 DIAGNOSIS — E11.9 TYPE 2 DIABETES MELLITUS WITHOUT COMPLICATION, WITHOUT LONG-TERM CURRENT USE OF INSULIN (HCC): ICD-10-CM

## 2024-08-26 DIAGNOSIS — D84.9 IMMUNOSUPPRESSION (HCC): ICD-10-CM

## 2024-08-26 DIAGNOSIS — I10 ESSENTIAL HYPERTENSION: ICD-10-CM

## 2024-08-26 DIAGNOSIS — Z79.899 HIGH RISK MEDICATION USE: ICD-10-CM

## 2024-08-26 PROCEDURE — 4004F PT TOBACCO SCREEN RCVD TLK: CPT | Performed by: INTERNAL MEDICINE

## 2024-08-26 PROCEDURE — G2211 COMPLEX E/M VISIT ADD ON: HCPCS | Performed by: INTERNAL MEDICINE

## 2024-08-26 PROCEDURE — G8417 CALC BMI ABV UP PARAM F/U: HCPCS | Performed by: INTERNAL MEDICINE

## 2024-08-26 PROCEDURE — 2022F DILAT RTA XM EVC RTNOPTHY: CPT | Performed by: INTERNAL MEDICINE

## 2024-08-26 PROCEDURE — 3046F HEMOGLOBIN A1C LEVEL >9.0%: CPT | Performed by: INTERNAL MEDICINE

## 2024-08-26 PROCEDURE — 99215 OFFICE O/P EST HI 40 MIN: CPT | Performed by: INTERNAL MEDICINE

## 2024-08-26 PROCEDURE — G8427 DOCREV CUR MEDS BY ELIG CLIN: HCPCS | Performed by: INTERNAL MEDICINE

## 2024-08-26 RX ORDER — METHOTREXATE 25 MG/ML
25 INJECTION, SOLUTION INTRA-ARTERIAL; INTRAMUSCULAR; INTRAVENOUS WEEKLY
Qty: 4 ML | Refills: 3 | Status: SHIPPED | OUTPATIENT
Start: 2024-08-26

## 2024-08-26 RX ORDER — IBUPROFEN 200 MG
TABLET ORAL
Qty: 50 EACH | Refills: 1 | Status: SHIPPED | OUTPATIENT
Start: 2024-08-26

## 2024-08-26 RX ORDER — LEUCOVORIN CALCIUM 5 MG/1
5 TABLET ORAL
Qty: 12 TABLET | Refills: 3 | Status: SHIPPED | OUTPATIENT
Start: 2024-08-26 | End: 2025-08-26

## 2024-08-26 ASSESSMENT — ENCOUNTER SYMPTOMS
BACK PAIN: 1
ABDOMINAL PAIN: 1
COUGH: 0
VOMITING: 1
NAUSEA: 1
DIARRHEA: 0
COLOR CHANGE: 0
TROUBLE SWALLOWING: 0

## 2024-08-26 NOTE — PATIENT INSTRUCTIONS
Will swtich to injectable methotrexate 1ml (25mg) every 7 days    Start leucovorin one tab every 7 days the day after methotrexate    Have labs one month after increasing methotrexate then go back to every 3 months for blood work

## 2024-08-26 NOTE — PROGRESS NOTES
Standing  -     AST; Standing  -     Creatinine; Standing  -     Sedimentation Rate; Standing  -     C-Reactive Protein; Standing  3. High risk medication use  -     CBC with Auto Differential; Standing  -     ALT; Standing  -     AST; Standing  -     Creatinine; Standing  -     Sedimentation Rate; Standing  -     C-Reactive Protein; Standing  4. Essential hypertension  5. Type 2 diabetes mellitus without complication, without long-term current use of insulin (HCC)          Return in about 6 months (around 2/26/2025).         Subjective   SUBJECTIVE/OBJECTIVE:    HPI: Anjana Epperson 1985 is a 38 y.o. female seen in follow-up for rheumatoid arthritis.  Doing fair from a joint standpoint on methotrexate and Humira but is really having a lot of severe GI side effects with methotrexate with nausea and vomiting.  Also still a few flareups since last visit and did need a Medrol pack on 2 different occasions.    Initial history: Patient states she has had diffuse pain essentially all over for quite some time now.  She states she feels like it is muscles and joints.  She also feels like it is deep in the bones.  She has a lot of issues with her back and sees pain management.  She is getting injections.  She states that her hands and feet do bother her.  She feels like she may get some diffuse swelling in the hands and feet.  She feels stiff in the morning.  She states that her pain is certainly worse in the winter and worse with activity.  Also worse at the end of the day.  She does take Flexeril at night which helps to some degree.  She also takes ibuprofen and Tylenol which sometimes helps.  She has some baseline shortness of breath from asthma but no other extra-articular manifestations.  She had blood work back in February which did show a low positive rheumatoid factor of 18 and negative AMANDA.    Past Medical History:   Diagnosis Date    Anxiety     Arthritis     Asthma     well controlled, on inhalers    Back  pain     Bipolar 1 disorder (HCC)     COPD (chronic obstructive pulmonary disease) (HCC)     Depression     Diabetes mellitus (HCC)     Fibromyalgia     GERD (gastroesophageal reflux disease)     Hypertension     Macular edema     Cm    Migraines     Morbid obesity due to excess calories (HCC)     Neuropathy     PTSD (post-traumatic stress disorder)     Rheumatoid arthritis (HCC)         Review of Systems   Constitutional:  Negative for fatigue and fever.   HENT:  Negative for mouth sores and trouble swallowing.    Respiratory:  Negative for cough.    Cardiovascular:  Negative for chest pain.   Gastrointestinal:  Positive for abdominal pain, nausea and vomiting. Negative for diarrhea.   Genitourinary:  Negative for dysuria and hematuria.   Musculoskeletal:  Positive for arthralgias, back pain, joint swelling, myalgias, neck pain and neck stiffness.   Skin:  Negative for color change.   Neurological:  Negative for weakness and numbness.   Hematological:  Negative for adenopathy.   All other systems reviewed and are negative.         Objective   There were no vitals filed for this visit.     Physical Exam  Constitutional:       General: She is not in acute distress.     Appearance: Normal appearance.   HENT:      Head: Normocephalic and atraumatic.      Right Ear: External ear normal.      Left Ear: External ear normal.      Nose: Nose normal.   Eyes:      General: No scleral icterus.  Pulmonary:      Effort: Pulmonary effort is normal.   Musculoskeletal:         General: Swelling and tenderness present. No deformity.      Comments: She has synovitis in the third MCP on the left and is tender and a few other MCP joints without obvious swelling   Skin:     General: Skin is warm and dry.      Findings: No rash.   Neurological:      General: No focal deficit present.      Mental Status: She is alert and oriented to person, place, and time. Mental status is at baseline.   Psychiatric:         Mood and Affect: Mood

## 2024-09-12 ENCOUNTER — PREP FOR PROCEDURE (OUTPATIENT)
Dept: PAIN MANAGEMENT | Age: 39
End: 2024-09-12

## 2024-09-12 NOTE — PROGRESS NOTES
Chief Complaint:   Urinary Tract Infection and Sinusitis      History of Present Illness   Source of history provided by:  patient. Ortega Francisco is a 39 y.o. old female with a past medical history of:   Past Medical History:   Diagnosis Date    Anxiety     Arthritis     Asthma     [facility]     COPD [facility]chronic obstructive pulmonary disease) ([facility]     Depression     Fatty liver     Fissure, anal     GERD (gastroesophageal reflux disease)     Glaucoma     Suspected by eye specialist . no eye meds    Hyperlipidemia     Hypertension     Macular edema     Cm    Migraines     Morbid obesity due to excess calories ([facility]     Neuropathy     PTSD (post-traumatic stress disorder)     Splenomegaly 09/24/2020    Type 2 diabetes mellitus without complication, without long-term current use of insulin ([facility])      Sinusitis  Pt  presents to the [facility] care with a cough/congestion/sinus pressure for the past several days. States the cough is non productive. .  No fever noted. Denies any N/V/D, abdominal pain, CP, progressive SOB, dizziness, or lethargy. Urinary Tract Infection  Patient complains of dysuria, frequency She has had symptoms for a few days. Patient also complains of no other s/s. Patient denies fever and vaginal discharge. Patient does have a history of recurrent UTI. ROS    Unless otherwise stated in this report or unable to obtain because of the patient's clinical or mental status as evidenced by the medical record, this patients's positive and negative responses for Review of Systems, constitutional, psych, eyes, ENT, cardiovascular, respiratory, gastrointestinal, neurological, genitourinary, musculoskeletal, integument systems and systems related to the presenting problem are either stated in the preceding or were not pertinent or were negative for the symptoms and/or complaints related to the medical problem.     Past Surgical History:  has a past surgical history that includes Cholecystectomy; Tubal ligation; Anterior cruciate ligament repair (Right); Dilation and curettage of uterus (N/A, 2019); other surgical history (); Dilation and curettage of uterus (N/A, 2020);  section; Dilation and curettage of uterus; Hysterectomy (2021); Incontinence surgery; and Pain management procedure (N/A, 2022). Social History:  reports that she has been smoking cigarettes. She started smoking about 26 years ago. She has a 20.00 pack-year smoking history. She has never used smokeless tobacco. She reports previous alcohol use. She reports previous drug use. Drug: Marijuana Tiffany Jomar). Family History: family history includes Breast Cancer in her maternal aunt; Cancer in her mother and sister; Diabetes in her father, mother, and sister; High Blood Pressure in her brother. Allergies: Keflex [cephalexin], Prednisone, Cymbalta [duloxetine hcl], Lamictal [lamotrigine], and Neurontin [gabapentin]    Physical Exam         VS:  BP (!) 152/100   Pulse 102   Temp 97.1 °F (36.2 °C) (Temporal)   Resp 20   Ht 5' 3\" (1.6 m)   Wt (!) 309 lb (140.2 kg)   LMP 2021 (Approximate)   SpO2 97%   BMI 54.74 kg/m²    Oxygen Saturation Interpretation: Normal.    Constitutional:  Alert, development consistent with age. Ears:  External Ears: Normal bilateral pinna. TM's & External Canals: TM's normal bilaterally without perforation. Canals without erythema or drainage. Nose:   There is no obvious septal defect. Moderate redness/edema. Sinus tenderness present  Mouth:  Moist bucca mucosa and normal tongue. Throat: Mild posterior pharyngeal erythema without exudates or lesions. Neck:  Supple. There is no obvious adenopathy or neck tenderness. Lungs:   Breath sounds: Normal chest expansion and breath sounds noted throughout. No wheezes, rales, or rhonchi noted.     Heart:  Regular rate and rhythm, normal heart sounds, without pathological murmurs, ectopy, gallops, or rubs. Abdomen:  Soft, nontender,  No firm or pulsatile mass. Skin:  Normal turgor. Warm, dry, without visible rash. Neurological:  Oriented. Motor functions intact. Lab / Imaging Results   (All laboratory and radiology results have been personally reviewed by myself)  Labs:  Results for orders placed or performed in visit on 04/22/22   POCT Urinalysis no Micro   Result Value Ref Range    Color, UA jerrod     Clarity, UA cloudy     Glucose, UA POC negative     Bilirubin, UA negative     Ketones, UA negative     Spec Grav, UA >=1.030     Blood, UA POC trace-intact     pH, UA 6.5     Protein, UA POC trace     Urobilinogen, UA 0.2 E.U./dL     Leukocytes, UA trace     Nitrite, UA negative    POCT Influenza A/B Antigen   Result Value Ref Range    Inflenza A Ag negative     Influenza B Ag negative    POCT COVID-19, Antigen   Result Value Ref Range    SARS-COV-2, POC Not-Detected Not Detected    Lot Number [id number]     QC Pass/Fail pass     Performing Instrument BD Veritor        Imaging: All Radiology results interpreted by Radiologist unless otherwise noted. No orders to display         Assessment / Plan     Impression(s):  1. Urinary tract infection in female    2. Acute bacterial sinusitis    3. Cough      Disposition:  Disposition: Advised Covid and Influenza A/B are negative, UA positive for UTI, urine culture to lab, start Doxycycline as ordered, increase water intake    . ER if changes or worse. 102

## 2024-09-19 ENCOUNTER — TELEPHONE (OUTPATIENT)
Dept: PAIN MANAGEMENT | Age: 39
End: 2024-09-19

## 2024-09-20 ENCOUNTER — PATIENT MESSAGE (OUTPATIENT)
Dept: RHEUMATOLOGY | Age: 39
End: 2024-09-20

## 2024-09-23 RX ORDER — METHYLPREDNISOLONE 4 MG
TABLET, DOSE PACK ORAL
Qty: 1 KIT | Refills: 0 | Status: SHIPPED | OUTPATIENT
Start: 2024-09-23

## 2024-12-13 ENCOUNTER — TELEPHONE (OUTPATIENT)
Dept: PAIN MANAGEMENT | Age: 39
End: 2024-12-13

## 2024-12-13 NOTE — TELEPHONE ENCOUNTER
Anjana Epperson  Patient called in asking if she could do a virtual appointment with you since she is coming from the [facility]. Please advise

## 2024-12-17 ENCOUNTER — HOSPITAL ENCOUNTER (OUTPATIENT)
Age: 39
Discharge: HOME | End: 2024-12-19
Payer: MEDICARE

## 2024-12-17 ENCOUNTER — HOSPITAL ENCOUNTER (OUTPATIENT)
Age: 39
Discharge: HOME | End: 2024-12-17
Payer: MEDICARE

## 2024-12-17 ENCOUNTER — HOSPITAL ENCOUNTER (OUTPATIENT)
Dept: GENERAL RADIOLOGY | Age: 39
Discharge: HOME | End: 2024-12-19
Payer: MEDICARE

## 2024-12-17 DIAGNOSIS — M05.79 RHEUMATOID ARTHRITIS INVOLVING MULTIPLE SITES WITH POSITIVE RHEUMATOID FACTOR (HCC): ICD-10-CM

## 2024-12-17 DIAGNOSIS — Z79.899 HIGH RISK MEDICATION USE: ICD-10-CM

## 2024-12-17 DIAGNOSIS — D84.9 IMMUNOSUPPRESSION (HCC): ICD-10-CM

## 2024-12-17 LAB
ALBUMIN SERPL-MCNC: 3.8 G/DL (ref 3.5–5.2)
ALP SERPL-CCNC: 123 U/L (ref 35–104)
ALT SERPL-CCNC: 18 U/L (ref 0–32)
AST SERPL-CCNC: 19 U/L (ref 0–31)
BASOPHILS # BLD: 0.04 K/UL (ref 0–0.2)
BASOPHILS NFR BLD: 0 % (ref 0–2)
BILIRUB DIRECT SERPL-MCNC: <0.2 MG/DL (ref 0–0.3)
BILIRUB INDIRECT SERPL-MCNC: ABNORMAL MG/DL (ref 0–1)
BILIRUB SERPL-MCNC: 0.3 MG/DL (ref 0–1.2)
CREAT SERPL-MCNC: 0.6 MG/DL (ref 0.5–1)
CRP SERPL HS-MCNC: 35 MG/L (ref 0–5)
EOSINOPHIL # BLD: 0.4 K/UL (ref 0.05–0.5)
EOSINOPHILS RELATIVE PERCENT: 4 % (ref 0–6)
ERYTHROCYTE [DISTWIDTH] IN BLOOD BY AUTOMATED COUNT: 13.3 % (ref 11.5–15)
ERYTHROCYTE [SEDIMENTATION RATE] IN BLOOD BY WESTERGREN METHOD: 35 MM/HR (ref 0–20)
GFR, ESTIMATED: >90 ML/MIN/1.73M2
HCT VFR BLD AUTO: 42.2 % (ref 34–48)
HGB BLD-MCNC: 13.9 G/DL (ref 11.5–15.5)
IMM GRANULOCYTES # BLD AUTO: 0.06 K/UL (ref 0–0.58)
IMM GRANULOCYTES NFR BLD: 1 % (ref 0–5)
LYMPHOCYTES NFR BLD: 2.84 K/UL (ref 1.5–4)
LYMPHOCYTES RELATIVE PERCENT: 26 % (ref 20–42)
MCH RBC QN AUTO: 28.6 PG (ref 26–35)
MCHC RBC AUTO-ENTMCNC: 32.9 G/DL (ref 32–34.5)
MCV RBC AUTO: 86.8 FL (ref 80–99.9)
MONOCYTES NFR BLD: 0.61 K/UL (ref 0.1–0.95)
MONOCYTES NFR BLD: 6 % (ref 2–12)
NEUTROPHILS NFR BLD: 63 % (ref 43–80)
NEUTS SEG NFR BLD: 6.82 K/UL (ref 1.8–7.3)
PLATELET # BLD AUTO: 277 K/UL (ref 130–450)
PMV BLD AUTO: 10 FL (ref 7–12)
PROT SERPL-MCNC: 7.6 G/DL (ref 6.4–8.3)
RBC # BLD AUTO: 4.86 M/UL (ref 3.5–5.5)
WBC OTHER # BLD: 10.8 K/UL (ref 4.5–11.5)

## 2024-12-17 PROCEDURE — 36415 COLL VENOUS BLD VENIPUNCTURE: CPT

## 2024-12-17 PROCEDURE — 85025 COMPLETE CBC W/AUTO DIFF WBC: CPT

## 2024-12-17 PROCEDURE — 86140 C-REACTIVE PROTEIN: CPT

## 2024-12-17 PROCEDURE — 85652 RBC SED RATE AUTOMATED: CPT

## 2024-12-17 PROCEDURE — 80076 HEPATIC FUNCTION PANEL: CPT

## 2024-12-17 PROCEDURE — 82565 ASSAY OF CREATININE: CPT

## 2024-12-17 PROCEDURE — 73630 X-RAY EXAM OF FOOT: CPT

## 2024-12-18 ENCOUNTER — TELEPHONE (OUTPATIENT)
Dept: RHEUMATOLOGY | Age: 39
End: 2024-12-18

## 2024-12-18 RX ORDER — METHYLPREDNISOLONE 4 MG/1
TABLET ORAL
Qty: 1 KIT | Refills: 0 | Status: SHIPPED | OUTPATIENT
Start: 2024-12-18

## 2024-12-18 NOTE — TELEPHONE ENCOUNTER
Patient called in stating joints all over hurt, hurts to walk. Would like a medrol pack sent to her pharmacy in chart.     Please advise.    Electronically signed by Dave Escobar MA on 12/18/2024 at 9:59 AM

## 2024-12-19 ENCOUNTER — TELEMEDICINE (OUTPATIENT)
Dept: PAIN MANAGEMENT | Age: 39
End: 2024-12-19
Payer: MEDICARE

## 2024-12-19 DIAGNOSIS — Z79.891 ENCOUNTER FOR LONG-TERM OPIATE ANALGESIC USE: ICD-10-CM

## 2024-12-19 DIAGNOSIS — M25.562 CHRONIC PAIN OF BOTH KNEES: ICD-10-CM

## 2024-12-19 DIAGNOSIS — M54.42 CHRONIC BILATERAL LOW BACK PAIN WITH BILATERAL SCIATICA: ICD-10-CM

## 2024-12-19 DIAGNOSIS — G89.29 CHRONIC PAIN OF BOTH KNEES: ICD-10-CM

## 2024-12-19 DIAGNOSIS — M54.41 CHRONIC BILATERAL LOW BACK PAIN WITH BILATERAL SCIATICA: ICD-10-CM

## 2024-12-19 DIAGNOSIS — G89.29 CHRONIC BILATERAL LOW BACK PAIN WITH BILATERAL SCIATICA: ICD-10-CM

## 2024-12-19 DIAGNOSIS — M79.10 MYALGIA: ICD-10-CM

## 2024-12-19 DIAGNOSIS — M25.561 CHRONIC PAIN OF BOTH KNEES: ICD-10-CM

## 2024-12-19 DIAGNOSIS — G89.4 CHRONIC PAIN SYNDROME: ICD-10-CM

## 2024-12-19 DIAGNOSIS — M47.817 LUMBOSACRAL SPONDYLOSIS WITHOUT MYELOPATHY: ICD-10-CM

## 2024-12-19 DIAGNOSIS — G89.4 CHRONIC PAIN SYNDROME: Primary | ICD-10-CM

## 2024-12-19 DIAGNOSIS — M54.16 LUMBAR RADICULOPATHY: ICD-10-CM

## 2024-12-19 PROCEDURE — 99213 OFFICE O/P EST LOW 20 MIN: CPT | Performed by: PHYSICIAN ASSISTANT

## 2024-12-19 PROCEDURE — 4004F PT TOBACCO SCREEN RCVD TLK: CPT | Performed by: PHYSICIAN ASSISTANT

## 2024-12-19 PROCEDURE — G8427 DOCREV CUR MEDS BY ELIG CLIN: HCPCS | Performed by: PHYSICIAN ASSISTANT

## 2024-12-19 PROCEDURE — G8484 FLU IMMUNIZE NO ADMIN: HCPCS | Performed by: PHYSICIAN ASSISTANT

## 2024-12-19 PROCEDURE — G8417 CALC BMI ABV UP PARAM F/U: HCPCS | Performed by: PHYSICIAN ASSISTANT

## 2024-12-19 RX ORDER — PREGABALIN 150 MG/1
150 CAPSULE ORAL 2 TIMES DAILY
Qty: 60 CAPSULE | Refills: 2 | Status: SHIPPED | OUTPATIENT
Start: 2024-12-19 | End: 2025-01-18

## 2024-12-19 RX ORDER — CYCLOBENZAPRINE HCL 10 MG
10 TABLET ORAL 3 TIMES DAILY PRN
Qty: 90 TABLET | Refills: 2 | Status: SHIPPED | OUTPATIENT
Start: 2024-12-19 | End: 2025-01-18

## 2024-12-19 NOTE — PROGRESS NOTES
Anjana Epperson was read the following message “We want to confirm that, for purposes of billing, this is a virtual visit with your provider for which we will submit a claim for reimbursement with your insurance company. You will be responsible for any copays, coinsurance amounts or other amounts not covered by your insurance company. If you do not accept this, unfortunately we will not be able to schedule or proceed with a virtual visit with the provider. Do you accept? Anjana responded Yes .

## 2024-12-19 NOTE — PROGRESS NOTES
[facility]  [address] [place], [place] [address]    Follow up Note      Anjana Epperson     Date of Visit:  2024    CC:  Patient presents for follow up   Chief Complaint   Patient presents with    Follow-up     Pain level 10       HPI:    Pain is worse to her lower back.  Was scheduled for an injection and was at the hospital but her son was having seizures and had to leave.    Appropriate analgesia with current medications regimen: no.    Change in quality of symptoms:no.    Medication side effects:none.   Recent diagnostic testing:none.   Recent interventional procedures:  none.    She has been on anticoagulation medications to include ASA and NSAIDS and has not been on herbal supplements.  She is diabetic.     Imagin/2022 EDX -  Electrodiagnostic examination of both legs disclosed evidence diagnostic of left S1 motor radiculopathy or intracanalicular lesion, with acute and chronic denervation changes.  This was proven with the abnormal needle testing of the paraspinals.     There were no other motor radiculopathies or intracanalicular lesions.  There were no peripheral neuropathies.  Sensory radiculopathies cannot be evaluated by electrodiagnostic means.     Electrodiagnostic study performed 1 year ago found no abnormalities.     Clinically, the patient presented with back pains radiating into her left leg and foot.  On brief neurological examination, I found no motor or sensory compromise.  Her difficulties are related to her radicular disease.  Imaging studies of lumbosacral spine were recommended, as well as physical therapy and weight loss.     Clinical correlation was highly advised.     3/2022 lumbar MRI -  FINDINGS:   BONES/ALIGNMENT: There is normal alignment of the spine. The vertebral body   heights are maintained. The bone marrow signal appears unremarkable.       SPINAL CORD: The conus terminates normally.       SOFT TISSUES: No paraspinal mass identified.       L1-L2:

## 2024-12-23 ENCOUNTER — TELEPHONE (OUTPATIENT)
Dept: PAIN MANAGEMENT | Age: 39
End: 2024-12-23

## 2024-12-23 NOTE — TELEPHONE ENCOUNTER
Call to Anjana Epperson that procedure was scheduled for 12/31/2024 and that [facility] should call her a few days before for the pre op call and between 2:00 PM and 4:00 PM  the business day before with the arrival time. Instructed Anjana to hold ibuprofen for 24 hours, Celebrex, Mobic, and naprosyn for 4 days and any aspirin containing products, CoQ 10, or fish oil for 7 days. Instructed to call office back if any questions. Anjana verbalized understanding.    **Patients last dose of Trulicity was taken on 12/16/2024. Instructed to hold dose until after the procedure. Anjana verbalized understanding**    Electronically signed by Zamzam Coppola RN on 12/23/2024 at 2:25 PM

## 2024-12-27 RX ORDER — INSULIN ASPART 100 [IU]/ML
INJECTION, SOLUTION INTRAVENOUS; SUBCUTANEOUS
COMMUNITY

## 2024-12-27 NOTE — PROGRESS NOTES
[facility] PRE-ADMISSION TESTING INSTRUCTIONS    The Preadmission Testing patient is instructed accordingly using the following criteria (check applicable):    ARRIVAL INSTRUCTIONS:  [x] Parking the day of Surgery is located in the Main Entrance lot.  Upon entering the door, make an immediate right to the surgery reception desk    [x] Bring photo ID and insurance card    [] Bring in a copy of Living will or Durable Power of  papers.    [x] Please be sure to arrange for responsible adult to provide transportation to and from the hospital    [x] Please arrange for responsible adult to be with you for the 24 hour period post procedure due to having anesthesia    [x] If you awake am of surgery not feeling well or have temperature >100 please call [phone]    GENERAL INSTRUCTIONS:    [x] May have clear liquids until 4 hours prior to surgery. Examples include water, fruit juices (no pulp), jello, popsicles, black coffee or tea, beef or chicken broth.               No gum, candy or mints.    [x] You may brush your teeth, but do not swallow any water    [x] Take medications as instructed with 1-2 oz of water    [x] Stop herbal supplements and vitamins 5 days prior to procedure    [x] Follow preop dosing of blood thinners per physician instructions    [x] Take 1/2 dose of evening insulin, but no insulin after midnight    [] No oral diabetic medications after midnight    [x] If diabetic and have low blood sugar or feel symptomatic, take 1-2oz apple juice only    [x] Bring inhalers day of surgery    [] Bring C-PAP/ Bi-Pap day of surgery    [] Bring urine specimen day of surgery    [x] Shower or bath with soap, lather and rinse well, AM of Surgery, no lotion, powders or creams to surgical site    [] Follow bowel prep as instructed per surgeon    [x] No tobacco products within 24 hours of surgery     [x] No alcohol or illegal drug use within 24 hours of surgery.    [x] Jewelry, body

## 2024-12-31 ENCOUNTER — HOSPITAL ENCOUNTER (OUTPATIENT)
Age: 39
Setting detail: OUTPATIENT SURGERY
Discharge: HOME | End: 2024-12-31
Attending: PAIN MEDICINE | Admitting: PAIN MEDICINE
Payer: MEDICARE

## 2024-12-31 ENCOUNTER — ANESTHESIA EVENT (OUTPATIENT)
Dept: OPERATING ROOM | Age: 39
End: 2024-12-31
Payer: MEDICARE

## 2024-12-31 ENCOUNTER — ANESTHESIA (OUTPATIENT)
Dept: OPERATING ROOM | Age: 39
End: 2024-12-31
Payer: MEDICARE

## 2024-12-31 ENCOUNTER — HOSPITAL ENCOUNTER (OUTPATIENT)
Dept: GENERAL RADIOLOGY | Age: 39
Discharge: HOME | End: 2025-01-02
Attending: PAIN MEDICINE
Payer: MEDICARE

## 2024-12-31 VITALS
HEART RATE: 96 BPM | RESPIRATION RATE: 16 BRPM | TEMPERATURE: 97.6 F | BODY MASS INDEX: 53.92 KG/M2 | HEIGHT: 62 IN | DIASTOLIC BLOOD PRESSURE: 60 MMHG | WEIGHT: 293 LBS | SYSTOLIC BLOOD PRESSURE: 120 MMHG | OXYGEN SATURATION: 96 %

## 2024-12-31 DIAGNOSIS — R52 PAIN MANAGEMENT: ICD-10-CM

## 2024-12-31 LAB — GLUCOSE BLD-MCNC: 142 MG/DL (ref 74–99)

## 2024-12-31 PROCEDURE — 82962 GLUCOSE BLOOD TEST: CPT

## 2024-12-31 PROCEDURE — 6360000002 HC RX W HCPCS: Performed by: NURSE ANESTHETIST, CERTIFIED REGISTERED

## 2024-12-31 PROCEDURE — 6360000004 HC RX CONTRAST MEDICATION: Performed by: PAIN MEDICINE

## 2024-12-31 PROCEDURE — 3700000000 HC ANESTHESIA ATTENDED CARE: Performed by: PAIN MEDICINE

## 2024-12-31 PROCEDURE — 3600000002 HC SURGERY LEVEL 2 BASE: Performed by: PAIN MEDICINE

## 2024-12-31 PROCEDURE — 7100000010 HC PHASE II RECOVERY - FIRST 15 MIN: Performed by: PAIN MEDICINE

## 2024-12-31 PROCEDURE — 6360000002 HC RX W HCPCS: Performed by: PAIN MEDICINE

## 2024-12-31 PROCEDURE — 2580000003 HC RX 258: Performed by: PAIN MEDICINE

## 2024-12-31 PROCEDURE — 2709999900 HC NON-CHARGEABLE SUPPLY: Performed by: PAIN MEDICINE

## 2024-12-31 PROCEDURE — 7100000011 HC PHASE II RECOVERY - ADDTL 15 MIN: Performed by: PAIN MEDICINE

## 2024-12-31 PROCEDURE — 2580000003 HC RX 258: Performed by: NURSE ANESTHETIST, CERTIFIED REGISTERED

## 2024-12-31 PROCEDURE — 62323 NJX INTERLAMINAR LMBR/SAC: CPT | Performed by: PAIN MEDICINE

## 2024-12-31 RX ORDER — PROPOFOL 10 MG/ML
INJECTION, EMULSION INTRAVENOUS
Status: DISCONTINUED | OUTPATIENT
Start: 2024-12-31 | End: 2024-12-31 | Stop reason: SDUPTHER

## 2024-12-31 RX ORDER — SODIUM CHLORIDE 9 MG/ML
INJECTION, SOLUTION INTRAMUSCULAR; INTRAVENOUS; SUBCUTANEOUS PRN
Status: DISCONTINUED | OUTPATIENT
Start: 2024-12-31 | End: 2024-12-31 | Stop reason: ALTCHOICE

## 2024-12-31 RX ORDER — METHYLPREDNISOLONE ACETATE 40 MG/ML
INJECTION, SUSPENSION INTRA-ARTICULAR; INTRALESIONAL; INTRAMUSCULAR; SOFT TISSUE PRN
Status: DISCONTINUED | OUTPATIENT
Start: 2024-12-31 | End: 2024-12-31 | Stop reason: ALTCHOICE

## 2024-12-31 RX ORDER — LIDOCAINE HYDROCHLORIDE 5 MG/ML
INJECTION, SOLUTION INFILTRATION; INTRAVENOUS PRN
Status: DISCONTINUED | OUTPATIENT
Start: 2024-12-31 | End: 2024-12-31 | Stop reason: ALTCHOICE

## 2024-12-31 RX ORDER — IOPAMIDOL 612 MG/ML
INJECTION, SOLUTION INTRATHECAL PRN
Status: DISCONTINUED | OUTPATIENT
Start: 2024-12-31 | End: 2024-12-31 | Stop reason: ALTCHOICE

## 2024-12-31 RX ORDER — SODIUM CHLORIDE 9 MG/ML
INJECTION, SOLUTION INTRAVENOUS
Status: DISCONTINUED | OUTPATIENT
Start: 2024-12-31 | End: 2024-12-31 | Stop reason: SDUPTHER

## 2024-12-31 RX ADMIN — PROPOFOL 50 MG: 10 INJECTION, EMULSION INTRAVENOUS at 11:17

## 2024-12-31 RX ADMIN — SODIUM CHLORIDE: 9 INJECTION, SOLUTION INTRAVENOUS at 11:05

## 2024-12-31 RX ADMIN — PROPOFOL 50 MG: 10 INJECTION, EMULSION INTRAVENOUS at 11:13

## 2024-12-31 ASSESSMENT — PAIN - FUNCTIONAL ASSESSMENT
PAIN_FUNCTIONAL_ASSESSMENT: 0-10
PAIN_FUNCTIONAL_ASSESSMENT: 0-10
PAIN_FUNCTIONAL_ASSESSMENT: ACTIVITIES ARE NOT PREVENTED

## 2024-12-31 ASSESSMENT — LIFESTYLE VARIABLES: SMOKING_STATUS: 1

## 2024-12-31 ASSESSMENT — PAIN SCALES - GENERAL
PAINLEVEL_OUTOF10: 0
PAINLEVEL_OUTOF10: 0

## 2024-12-31 ASSESSMENT — ENCOUNTER SYMPTOMS: DYSPNEA ACTIVITY LEVEL: NO INTERVAL CHANGE

## 2024-12-31 ASSESSMENT — PAIN DESCRIPTION - DESCRIPTORS: DESCRIPTORS: SORE

## 2024-12-31 ASSESSMENT — COPD QUESTIONNAIRES: CAT_SEVERITY: MILD

## 2024-12-31 NOTE — H&P
REVIEW OF SYSTEMS:    CONSTITUTIONAL:  negative for  fevers, chills, sweats and fatigue    RESPIRATORY:  negative for  dry cough, cough with sputum, dyspnea, wheezing and chest pain    CARDIOVASCULAR:  negative for chest pain, dyspnea, palpitations, syncope    GASTROINTESTINAL:  negative for nausea, vomiting, change in bowel habits, diarrhea, constipation and abdominal pain    MUSCULOSKELETAL: negative for muscle weakness    SKIN: negative for itching or rashes.    BEHAVIOR/PSYCH:  negative for poor appetite, increased appetite, decreased sleep and poor concentration    All other systems negative      PHYSICAL EXAM:    VITALS:  /70   Pulse (!) 115   Temp 97.4 °F (36.3 °C) (Temporal)   Resp 15   Ht 1.575 m (5' 2\")   Wt (!) 147.9 kg (326 lb)   LMP 02/22/2021 (Approximate)   SpO2 93%   BMI 59.63 kg/m²     CONSTITUTIONAL:  awake, alert, cooperative, no apparent distress, and appears stated age    EYES: PERRLA, EOMI    LUNGS:  No increased work of breathing, no audible wheezing    CARDIOVASCULAR:  regular rate and rhythm    ABDOMEN:  Soft non tender non distended     EXTREMITIES: no signs of clubbing or cyanosis.    MUSCULOSKELETAL: negative for flaccid muscle tone or spastic movements.    SKIN: gross examination reveals no signs of rashes, or diaphoresis.    NEURO: Cranial nerves II-XII grossly intact. No signs of agitated mood.       Assessment/Plan:    LBP BLE pain for caudal BOAZ

## 2024-12-31 NOTE — DISCHARGE INSTRUCTIONS
[facility] Pain Management Department  [place] [phone]  Dr. Dao Rhodes   Post-Pain Block/Radiofrequency  Home Going Instructions    1-Go home, rest for the remainder of the day  2-Please do not lift over 20 pounds the day of the injection  3-If you received sedation No: alcohol, driving, operating lawn mowers, plows, tractors or other dangerous equipment until next morning. Do not make important decisions or sign legal documents for 24 hours. You may experience light headedness, dizziness, nausea or sleepiness after sedation. Do not stay alone. A responsible adult must be with you for 24 hours. You could be nauseated from the medications you have received. Your IV site may be sore and bruised.    4-No dietary restrictions     5-Resume all medications the same day, blood thinners to be resumed 24 hours after injection if you were instructed to stop any.    6-Keep the surgical site clean and dry, you may shower the next morning and remove the      dressing.     7- No sitz baths, tub baths or hot tubs/swimming for 24 hours.       8- If you have any pain at the injection site(s), application of an ice pack to the area should be       helpful, 20 minutes on/20 minutes off for next 48 hours.  9- Call [facility] immediately at if you develop.  Fever greater than 100.4 F  Have bleeding or drainage from the puncture site  Have progressive Leg/arm numbness and or weakness  Loss of control of bowel and or bladder (wet/soil yourself)  Severe headache with inability to lift head  10-You may return to work the next day

## 2024-12-31 NOTE — OP NOTE
2024    Patient: Anjana Epperson  :  1985  Age:  39 y.o.  Sex:  female     PRE-OPERATIVE DIAGNOSIS: Lumbar radiculopathy    POST-OPERATIVE DIAGNOSIS: Same.    PROCEDURE PERFORMED: Therapeutic Caudal Epidural done under fluoroscopic guidance.    SURGEON:   SIGRID Christiansen    ANESTHESIA: MAC    ESTIMATED BLOOD LOSS: None.    BRIEF HISTORY: Anjana Epperson comes in today for the therapeutic caudal epidural steroid injection under fluorsoscopic guidance. After discussing the potential risks and benefits of the procedure with the patient  Anjana did request that we proceed. A complete History & Physical was reviewed and it is unchanged.    DESCRIPTION OF PROCEDURE:    After confirming written and informed consent, a time-out was performed and Anjana’s name and date of birth, the procedure to be performed as well as the plan for the location of the needle insertion were confirmed.    Patient was brought into the procedure room and was placed in the prone position on a fluoroscopy table.  Standard monitors were placed and vital signs were observed throughout the procedure.  The lumbosacral and caudal area were prepped with chloraprep and draped in a sterile manner.  The sacral hiatus was identified and marked under AP fluoroscopy. A sterile gauze was placed in the midgluteal cleft for increased sterility. The overlying skin and subcutaneous tissues were anesthetized with 0.5% Lidocaine.  Under lateral fluoroscopic guidance, a # 22 gauge 3.5 inch spinal needle was advanced into the sacral hiatus . After the needle passed through the sacrococcygeal ligament, the needle angle was advanced slightly. There was no evidence of paresthesia throughout the needle placement. After negative aspiration for blood and CSF, epidural spread was confirmed with injection of 2 cc of Isovue-M 300 in both live lateral and live AP fluoroscopy. A solution consisting of 0.25% Lidocaine and 40 mg DepoMedrol, total of 15 cc,

## 2024-12-31 NOTE — ANESTHESIA POSTPROCEDURE EVALUATION
Department of Anesthesiology  Postprocedure Note    Patient: Anjana Epperson  MRN: [id number]  YOB: 1985  Date of evaluation: 12/31/2024    Procedure Summary       Date: 12/31/24 Room / Location: [facility] PROCEDURE ROOM 02 / [facility]    Anesthesia Start: 1110 Anesthesia Stop: 1119    Procedure: CAUDAL EPIDURAL STEROID INJECTION (Coccyx) Diagnosis:       Lumbar radiculopathy      (Lumbar radiculopathy [M54.16])    Surgeons: Neela Rhodes DO Responsible Provider: Gato Rivera DO    Anesthesia Type: MAC ASA Status: 4            Anesthesia Type: MAC    Jeet Phase I: Jeet Score: 10    Jeet Phase II:      Anesthesia Post Evaluation    Patient location during evaluation: bedside  Patient participation: complete - patient participated  Level of consciousness: awake  Pain score: 0  Airway patency: patent  Nausea & Vomiting: no nausea and no vomiting  Cardiovascular status: blood pressure returned to baseline and hemodynamically stable  Respiratory status: acceptable  Hydration status: stable  Pain management: adequate and satisfactory to patient        No notable events documented.

## 2025-02-26 DIAGNOSIS — D84.9 IMMUNOSUPPRESSION: ICD-10-CM

## 2025-02-26 DIAGNOSIS — M05.79 RHEUMATOID ARTHRITIS INVOLVING MULTIPLE SITES WITH POSITIVE RHEUMATOID FACTOR (HCC): Primary | ICD-10-CM

## 2025-03-10 ENCOUNTER — OFFICE VISIT (OUTPATIENT)
Dept: RHEUMATOLOGY | Age: 40
End: 2025-03-10
Payer: MEDICARE

## 2025-03-10 VITALS
HEART RATE: 111 BPM | SYSTOLIC BLOOD PRESSURE: 134 MMHG | BODY MASS INDEX: 53.92 KG/M2 | WEIGHT: 293 LBS | DIASTOLIC BLOOD PRESSURE: 86 MMHG | HEIGHT: 62 IN

## 2025-03-10 DIAGNOSIS — M05.79 RHEUMATOID ARTHRITIS INVOLVING MULTIPLE SITES WITH POSITIVE RHEUMATOID FACTOR (HCC): Primary | ICD-10-CM

## 2025-03-10 DIAGNOSIS — M54.16 LUMBAR RADICULOPATHY: ICD-10-CM

## 2025-03-10 DIAGNOSIS — E11.9 TYPE 2 DIABETES MELLITUS WITHOUT COMPLICATION, WITHOUT LONG-TERM CURRENT USE OF INSULIN (HCC): ICD-10-CM

## 2025-03-10 DIAGNOSIS — Z79.899 HIGH RISK MEDICATION USE: ICD-10-CM

## 2025-03-10 DIAGNOSIS — D84.9 IMMUNOSUPPRESSION: ICD-10-CM

## 2025-03-10 DIAGNOSIS — I10 ESSENTIAL HYPERTENSION: ICD-10-CM

## 2025-03-10 PROCEDURE — G2211 COMPLEX E/M VISIT ADD ON: HCPCS | Performed by: INTERNAL MEDICINE

## 2025-03-10 PROCEDURE — G8427 DOCREV CUR MEDS BY ELIG CLIN: HCPCS | Performed by: INTERNAL MEDICINE

## 2025-03-10 PROCEDURE — 2022F DILAT RTA XM EVC RTNOPTHY: CPT | Performed by: INTERNAL MEDICINE

## 2025-03-10 PROCEDURE — 4004F PT TOBACCO SCREEN RCVD TLK: CPT | Performed by: INTERNAL MEDICINE

## 2025-03-10 PROCEDURE — G8417 CALC BMI ABV UP PARAM F/U: HCPCS | Performed by: INTERNAL MEDICINE

## 2025-03-10 PROCEDURE — 3046F HEMOGLOBIN A1C LEVEL >9.0%: CPT | Performed by: INTERNAL MEDICINE

## 2025-03-10 PROCEDURE — 3075F SYST BP GE 130 - 139MM HG: CPT | Performed by: INTERNAL MEDICINE

## 2025-03-10 PROCEDURE — 99215 OFFICE O/P EST HI 40 MIN: CPT | Performed by: INTERNAL MEDICINE

## 2025-03-10 PROCEDURE — 3079F DIAST BP 80-89 MM HG: CPT | Performed by: INTERNAL MEDICINE

## 2025-03-10 ASSESSMENT — ENCOUNTER SYMPTOMS
TROUBLE SWALLOWING: 0
COUGH: 0
BACK PAIN: 1
COLOR CHANGE: 0

## 2025-03-10 NOTE — PROGRESS NOTES
Sample of Humira provided to patient. X1 box  LOT# [id number]  Exp: 05/26    Patient was also advised to call the pharmacy to check status of her Humira script.    Electronically signed by Anjana Jaimes CMA on 3/10/2025 at 10:52 AM

## 2025-03-10 NOTE — PROGRESS NOTES
The patient (or guardian, if applicable) and other individuals in attendance with the patient were advised that Artificial Intelligence will be utilized during this visit to record, process the conversation to generate a clinical note, and support improvement of the AI technology. The patient (or guardian, if applicable) and other individuals in attendance at the appointment consented to the use of AI, including the recording.                  Anjana Epperson 1985 is a 39 y.o. female, here for evaluation of the following chief complaint(s):  Follow-up (Anjana is here as a follow up for RA )      Assessment & Plan   ASSESSMENT/PLAN:    Anjana Epperson 1985 is a 39 y.o. female seen in follow-up for rheumatoid arthritis.    1.  RF positive, CCP negative, 14 3 3 eta positive, nonerosive rheumatoid arthritis-has done pretty well with Humira but has been out of it for several doses now and is having severe exacerbation with synovitis in the right wrist, several MCP joints and tenderness in the right shoulder.  At this point I do not think this is a Humira failure thing is just a matter of getting her back on it.  We will give her a sample today and I did send a new prescription 2 weeks ago but she has not received it yet so she will call the pharmacy right away.  She has failed and been intolerant to methotrexate.  She does not tolerate prednisone.  Will try to avoid steroids is much as possible but will need to use Medrol when we do.    2.  Immunosuppression-I recommend she stay up-to-date on vaccinations.  In the event of any acute infectious illness she should hold methotrexate and Humira.    3.  High risk medication use-up-to-date on TB and hepatitis.  Will update labs as below.  We will monitor for infection.    4.  Chronic low back pain-rheumatoid arthritis spares the back.  She is following with pain management.    5.  Hypertension-patient's with inflammatory arthritis have an increased

## 2025-03-11 NOTE — TELEPHONE ENCOUNTER
Pt cancelled recent dietary follow up via [employer]. Called pt and rescheduled for a phone appt on 8/22/23. Detail Level: Zone Detail Level: Generalized

## 2025-04-23 ENCOUNTER — PREP FOR PROCEDURE (OUTPATIENT)
Dept: PAIN MANAGEMENT | Age: 40
End: 2025-04-23

## 2025-04-23 ENCOUNTER — OFFICE VISIT (OUTPATIENT)
Dept: PAIN MANAGEMENT | Age: 40
End: 2025-04-23
Payer: MEDICARE

## 2025-04-23 VITALS
DIASTOLIC BLOOD PRESSURE: 100 MMHG | RESPIRATION RATE: 18 BRPM | HEIGHT: 62 IN | WEIGHT: 293 LBS | HEART RATE: 104 BPM | SYSTOLIC BLOOD PRESSURE: 146 MMHG | BODY MASS INDEX: 53.92 KG/M2 | OXYGEN SATURATION: 95 % | TEMPERATURE: 98.1 F

## 2025-04-23 DIAGNOSIS — M54.42 CHRONIC BILATERAL LOW BACK PAIN WITH BILATERAL SCIATICA: ICD-10-CM

## 2025-04-23 DIAGNOSIS — G89.29 CHRONIC BILATERAL LOW BACK PAIN WITH BILATERAL SCIATICA: ICD-10-CM

## 2025-04-23 DIAGNOSIS — G89.4 CHRONIC PAIN SYNDROME: ICD-10-CM

## 2025-04-23 DIAGNOSIS — G89.29 CHRONIC PAIN OF BOTH KNEES: ICD-10-CM

## 2025-04-23 DIAGNOSIS — M25.561 CHRONIC PAIN OF BOTH KNEES: ICD-10-CM

## 2025-04-23 DIAGNOSIS — M54.41 CHRONIC BILATERAL LOW BACK PAIN WITH BILATERAL SCIATICA: ICD-10-CM

## 2025-04-23 DIAGNOSIS — Z79.891 ENCOUNTER FOR LONG-TERM OPIATE ANALGESIC USE: ICD-10-CM

## 2025-04-23 DIAGNOSIS — M54.16 LUMBAR RADICULOPATHY: ICD-10-CM

## 2025-04-23 DIAGNOSIS — M25.562 CHRONIC PAIN OF BOTH KNEES: ICD-10-CM

## 2025-04-23 DIAGNOSIS — M79.10 MYALGIA: ICD-10-CM

## 2025-04-23 DIAGNOSIS — G89.4 CHRONIC PAIN SYNDROME: Primary | ICD-10-CM

## 2025-04-23 DIAGNOSIS — M47.817 LUMBOSACRAL SPONDYLOSIS WITHOUT MYELOPATHY: ICD-10-CM

## 2025-04-23 PROCEDURE — 99213 OFFICE O/P EST LOW 20 MIN: CPT

## 2025-04-23 PROCEDURE — 99213 OFFICE O/P EST LOW 20 MIN: CPT | Performed by: PHYSICIAN ASSISTANT

## 2025-04-23 PROCEDURE — 3077F SYST BP >= 140 MM HG: CPT | Performed by: PHYSICIAN ASSISTANT

## 2025-04-23 PROCEDURE — G8417 CALC BMI ABV UP PARAM F/U: HCPCS | Performed by: PHYSICIAN ASSISTANT

## 2025-04-23 PROCEDURE — G8427 DOCREV CUR MEDS BY ELIG CLIN: HCPCS | Performed by: PHYSICIAN ASSISTANT

## 2025-04-23 PROCEDURE — 3080F DIAST BP >= 90 MM HG: CPT | Performed by: PHYSICIAN ASSISTANT

## 2025-04-23 PROCEDURE — 4004F PT TOBACCO SCREEN RCVD TLK: CPT | Performed by: PHYSICIAN ASSISTANT

## 2025-04-23 RX ORDER — LIDOCAINE 50 MG/G
1 PATCH TOPICAL DAILY
Qty: 30 PATCH | Refills: 0 | Status: SHIPPED | OUTPATIENT
Start: 2025-04-23

## 2025-04-23 RX ORDER — PREGABALIN 150 MG/1
150 CAPSULE ORAL 2 TIMES DAILY
Qty: 60 CAPSULE | Refills: 2 | Status: SHIPPED | OUTPATIENT
Start: 2025-04-23 | End: 2025-05-23

## 2025-04-23 RX ORDER — CYCLOBENZAPRINE HCL 10 MG
10 TABLET ORAL 3 TIMES DAILY PRN
Qty: 90 TABLET | Refills: 2 | Status: SHIPPED | OUTPATIENT
Start: 2025-04-23 | End: 2025-05-23

## 2025-04-23 RX ORDER — METHYLPREDNISOLONE ACETATE 40 MG/ML
40 INJECTION, SUSPENSION INTRA-ARTICULAR; INTRALESIONAL; INTRAMUSCULAR; SOFT TISSUE ONCE
Status: COMPLETED | OUTPATIENT
Start: 2025-04-23 | End: 2025-04-23

## 2025-04-23 RX ADMIN — METHYLPREDNISOLONE ACETATE 40 MG: 40 INJECTION, SUSPENSION INTRA-ARTICULAR; INTRALESIONAL; INTRAMUSCULAR; SOFT TISSUE at 12:18

## 2025-04-23 NOTE — PROGRESS NOTES
[facility]  [address] [place], [place] [id number]    Follow up Note      Anjana Epperson     Date of Visit:  2025    CC:  Patient presents for follow up   Chief Complaint   Patient presents with    Follow-up    Back Pain       HPI:    Pain is a little worse.      Appropriate analgesia with current medications regimen: no.    Change in quality of symptoms:no.    Medication side effects:none.   Recent diagnostic testing:none.   Recent interventional procedures:  2024 Therapeutic Caudal Epidural done under fluoroscopic guidance - 80% relief x 2 months.      She has been on anticoagulation medications to include ASA and NSAIDS and has not been on herbal supplements.  She is diabetic.     Imagin/2022 EDX -  Electrodiagnostic examination of both legs disclosed evidence diagnostic of left S1 motor radiculopathy or intracanalicular lesion, with acute and chronic denervation changes.  This was proven with the abnormal needle testing of the paraspinals.     There were no other motor radiculopathies or intracanalicular lesions.  There were no peripheral neuropathies.  Sensory radiculopathies cannot be evaluated by electrodiagnostic means.     Electrodiagnostic study performed 1 year ago found no abnormalities.     Clinically, the patient presented with back pains radiating into her left leg and foot.  On brief neurological examination, I found no motor or sensory compromise.  Her difficulties are related to her radicular disease.  Imaging studies of lumbosacral spine were recommended, as well as physical therapy and weight loss.     Clinical correlation was highly advised.     3/2022 lumbar MRI -  FINDINGS:   BONES/ALIGNMENT: There is normal alignment of the spine. The vertebral body   heights are maintained. The bone marrow signal appears unremarkable.       SPINAL CORD: The conus terminates normally.       SOFT TISSUES: No paraspinal mass identified.       L1-L2: There is no significant

## 2025-04-23 NOTE — PROGRESS NOTES
Anjana Epperson presents to the [facility] on 4/23/2025. Anjana is complaining of pain in her back. The pain is constant. The pain is described as aching and throbbing. Pain is rated on her best day at a 5, on her worst day at a 10, and on average at a 8 on the VAS scale. She took her last dose of Lyrica, Motrin, Tylenol, and Flexeril today.      Any procedures since your last visit: No.    She is not on NSAIDS and  is not on anticoagulation medications to include none.     Pacemaker or defibrillator: No     Do you want someone present when the provider examines you? No    Medication Contract and Consent for Opioid Use Documents Filed        No documents found                       Resp 18   Ht 1.575 m (5' 2.01\")   Wt (!) 160.6 kg (354 lb 0.9 oz)   LMP 02/22/2021 (Approximate)   BMI 64.74 kg/m²      Patient's last menstrual period was 02/22/2021 (approximate).

## 2025-04-23 NOTE — H&P (VIEW-ONLY)
[facility]  [address] [place], [place] [address]    Follow up Note      Anjana Epperson     Date of Visit:  2025    CC:  Patient presents for follow up   Chief Complaint   Patient presents with    Follow-up    Back Pain       HPI:    Pain is a little worse.      Appropriate analgesia with current medications regimen: no.    Change in quality of symptoms:no.    Medication side effects:none.   Recent diagnostic testing:none.   Recent interventional procedures:  2024 Therapeutic Caudal Epidural done under fluoroscopic guidance - 80% relief x 2 months.      She has been on anticoagulation medications to include ASA and NSAIDS and has not been on herbal supplements.  She is diabetic.     Imagin/2022 EDX -  Electrodiagnostic examination of both legs disclosed evidence diagnostic of left S1 motor radiculopathy or intracanalicular lesion, with acute and chronic denervation changes.  This was proven with the abnormal needle testing of the paraspinals.     There were no other motor radiculopathies or intracanalicular lesions.  There were no peripheral neuropathies.  Sensory radiculopathies cannot be evaluated by electrodiagnostic means.     Electrodiagnostic study performed 1 year ago found no abnormalities.     Clinically, the patient presented with back pains radiating into her left leg and foot.  On brief neurological examination, I found no motor or sensory compromise.  Her difficulties are related to her radicular disease.  Imaging studies of lumbosacral spine were recommended, as well as physical therapy and weight loss.     Clinical correlation was highly advised.     3/2022 lumbar MRI -  FINDINGS:   BONES/ALIGNMENT: There is normal alignment of the spine. The vertebral body   heights are maintained. The bone marrow signal appears unremarkable.       SPINAL CORD: The conus terminates normally.       SOFT TISSUES: No paraspinal mass identified.       L1-L2: There is no significant

## 2025-04-24 ENCOUNTER — PATIENT MESSAGE (OUTPATIENT)
Dept: PAIN MANAGEMENT | Age: 40
End: 2025-04-24

## 2025-05-06 ENCOUNTER — TELEPHONE (OUTPATIENT)
Dept: PAIN MANAGEMENT | Age: 40
End: 2025-05-06

## 2025-05-06 DIAGNOSIS — M54.16 LUMBAR RADICULAR PAIN: ICD-10-CM

## 2025-05-06 DIAGNOSIS — M54.16 LUMBAR RADICULOPATHY: Primary | ICD-10-CM

## 2025-05-06 NOTE — TELEPHONE ENCOUNTER
Call to Anjana Epperson that procedure was scheduled for 5/15/2025 and that [facility] should call her a few days before for the pre op call and between 2:00 PM and 4:00 PM  the business day before with the arrival time. Instructed Anjana to hold ibuprofen for 24 hours, Celebrex, Mobic, and naprosyn for 4 days and any aspirin containing products, CoQ -10, or fish oil for 7 days and Trulicity for 7 days. Instructed to call office back if any questions. Anjana verbalized understanding.    Electronically signed by Anton Osman RN on 5/6/2025 at 1:06 PM

## 2025-05-13 NOTE — PROGRESS NOTES
[facility] PRE-ADMISSION TESTING INSTRUCTIONS    The Preadmission Testing patient is instructed accordingly using the following criteria (check applicable):    ARRIVAL INSTRUCTIONS:   Arrival Time: will call the day before the procedure with time of arrival    [x] Parking the day of Surgery is located in the Main Entrance lot.  Upon entering through the main entrance (Entrance A) make an immediate right to the surgery reception desk    [x] Bring photo ID and insurance card    [] Bring in a copy of Living will or Durable Power of  papers.    [x] Please be sure to arrange for a responsible adult to provide transportation to and from the hospital    [x] Please arrange for a responsible adult to be with you for the 24 hour period post procedure, due to having anesthesia    [x] Please notify surgeon if you develop any illness between now and time of surgery (cold, cough, sore throat, fever, nausea, vomiting) or any signs of infections  including skin, wounds, and dental.    [x] If you awake am of surgery not feeling well or have temperature >100 please call [phone].    GENERAL INSTRUCTIONS:    [x] No solid foods after midnight, may have up to 8oz of water until 4 hours prior to surgery. No gum, no candy, no mints.  NPO time: to be determined.       [x] You may brush your teeth    [x] Take medications as instructed (Read back and verified by patient)  Take albuterol and tylenol if needed, norvasc, buspar, lyrica, spiriva, effexor    [x] Bring inhalers day of surgery    [x] Stop herbal supplements and vitamins 5 days prior to procedure    [x] Follow preop dosing of blood thinners per physician instructions    [] Take 1/2 dose of evening insulin, but no insulin after midnight    [x] No oral diabetic medications after midnight    [x] If diabetic and have low blood sugar or feel symptomatic, take 1-2oz apple juice only    [] Bring urine specimen day of surgery    [x] Shower or bath with

## 2025-05-14 NOTE — PROGRESS NOTES
Spoke to Dr Pozo - reviewed patients BMI, planned anesthesia type for pain management procedure 5/15/2025 as well as procedure type - Dr Pozo stated not to move case to OR room.

## 2025-05-15 ENCOUNTER — HOSPITAL ENCOUNTER (OUTPATIENT)
Dept: GENERAL RADIOLOGY | Age: 40
Setting detail: OUTPATIENT SURGERY
Discharge: HOME | End: 2025-05-17
Attending: ANESTHESIOLOGY
Payer: MEDICARE

## 2025-05-15 ENCOUNTER — ANESTHESIA EVENT (OUTPATIENT)
Dept: OPERATING ROOM | Age: 40
End: 2025-05-15
Payer: MEDICARE

## 2025-05-15 ENCOUNTER — HOSPITAL ENCOUNTER (OUTPATIENT)
Age: 40
Discharge: HOME | End: 2025-05-15
Payer: MEDICARE

## 2025-05-15 ENCOUNTER — ANESTHESIA (OUTPATIENT)
Dept: OPERATING ROOM | Age: 40
End: 2025-05-15
Payer: MEDICARE

## 2025-05-15 ENCOUNTER — HOSPITAL ENCOUNTER (OUTPATIENT)
Age: 40
Setting detail: OUTPATIENT SURGERY
Discharge: HOME | End: 2025-05-15
Attending: ANESTHESIOLOGY | Admitting: ANESTHESIOLOGY
Payer: MEDICARE

## 2025-05-15 VITALS
HEART RATE: 120 BPM | TEMPERATURE: 98 F | WEIGHT: 293 LBS | HEIGHT: 62 IN | RESPIRATION RATE: 19 BRPM | OXYGEN SATURATION: 97 % | SYSTOLIC BLOOD PRESSURE: 140 MMHG | DIASTOLIC BLOOD PRESSURE: 70 MMHG | BODY MASS INDEX: 53.92 KG/M2

## 2025-05-15 DIAGNOSIS — Z79.899 HIGH RISK MEDICATION USE: ICD-10-CM

## 2025-05-15 DIAGNOSIS — R52 PAIN MANAGEMENT: ICD-10-CM

## 2025-05-15 DIAGNOSIS — M05.79 RHEUMATOID ARTHRITIS INVOLVING MULTIPLE SITES WITH POSITIVE RHEUMATOID FACTOR (HCC): ICD-10-CM

## 2025-05-15 DIAGNOSIS — D84.9 IMMUNOSUPPRESSION: ICD-10-CM

## 2025-05-15 LAB
ALBUMIN SERPL-MCNC: 3.9 G/DL (ref 3.5–5.2)
ALP SERPL-CCNC: 133 U/L (ref 35–104)
ALT SERPL-CCNC: 48 U/L (ref 0–32)
ANION GAP SERPL CALCULATED.3IONS-SCNC: 11 MMOL/L (ref 7–16)
AST SERPL-CCNC: 36 U/L (ref 0–31)
BASOPHILS # BLD: 0.04 K/UL (ref 0–0.2)
BASOPHILS NFR BLD: 0 % (ref 0–2)
BILIRUB SERPL-MCNC: 0.3 MG/DL (ref 0–1.2)
BUN SERPL-MCNC: 7 MG/DL (ref 6–20)
CALCIUM SERPL-MCNC: 9.2 MG/DL (ref 8.6–10.2)
CHLORIDE SERPL-SCNC: 103 MMOL/L (ref 98–107)
CO2 SERPL-SCNC: 24 MMOL/L (ref 22–29)
CREAT SERPL-MCNC: 0.6 MG/DL (ref 0.5–1)
CRP SERPL HS-MCNC: 36 MG/L (ref 0–5)
EOSINOPHIL # BLD: 0.26 K/UL (ref 0.05–0.5)
EOSINOPHILS RELATIVE PERCENT: 2 % (ref 0–6)
ERYTHROCYTE [DISTWIDTH] IN BLOOD BY AUTOMATED COUNT: 14.5 % (ref 11.5–15)
ERYTHROCYTE [SEDIMENTATION RATE] IN BLOOD BY WESTERGREN METHOD: 25 MM/HR (ref 0–20)
GFR, ESTIMATED: >90 ML/MIN/1.73M2
GLUCOSE BLD-MCNC: 176 MG/DL (ref 74–99)
GLUCOSE SERPL-MCNC: 204 MG/DL (ref 74–99)
HCT VFR BLD AUTO: 44.6 % (ref 34–48)
HGB BLD-MCNC: 14.1 G/DL (ref 11.5–15.5)
IMM GRANULOCYTES # BLD AUTO: 0.05 K/UL (ref 0–0.58)
IMM GRANULOCYTES NFR BLD: 1 % (ref 0–5)
LYMPHOCYTES NFR BLD: 2.67 K/UL (ref 1.5–4)
LYMPHOCYTES RELATIVE PERCENT: 24 % (ref 20–42)
MCH RBC QN AUTO: 29.8 PG (ref 26–35)
MCHC RBC AUTO-ENTMCNC: 31.6 G/DL (ref 32–34.5)
MCV RBC AUTO: 94.3 FL (ref 80–99.9)
MONOCYTES NFR BLD: 0.69 K/UL (ref 0.1–0.95)
MONOCYTES NFR BLD: 6 % (ref 2–12)
NEUTROPHILS NFR BLD: 67 % (ref 43–80)
NEUTS SEG NFR BLD: 7.37 K/UL (ref 1.8–7.3)
PLATELET # BLD AUTO: 256 K/UL (ref 130–450)
PMV BLD AUTO: 10 FL (ref 7–12)
POTASSIUM SERPL-SCNC: 4.4 MMOL/L (ref 3.5–5)
PROT SERPL-MCNC: 7.4 G/DL (ref 6.4–8.3)
RBC # BLD AUTO: 4.73 M/UL (ref 3.5–5.5)
SODIUM SERPL-SCNC: 138 MMOL/L (ref 132–146)
WBC OTHER # BLD: 11.1 K/UL (ref 4.5–11.5)

## 2025-05-15 PROCEDURE — 3700000000 HC ANESTHESIA ATTENDED CARE: Performed by: ANESTHESIOLOGY

## 2025-05-15 PROCEDURE — 6360000004 HC RX CONTRAST MEDICATION: Performed by: ANESTHESIOLOGY

## 2025-05-15 PROCEDURE — 86140 C-REACTIVE PROTEIN: CPT

## 2025-05-15 PROCEDURE — 85652 RBC SED RATE AUTOMATED: CPT

## 2025-05-15 PROCEDURE — 36415 COLL VENOUS BLD VENIPUNCTURE: CPT

## 2025-05-15 PROCEDURE — 6360000002 HC RX W HCPCS

## 2025-05-15 PROCEDURE — 6360000002 HC RX W HCPCS: Performed by: ANESTHESIOLOGY

## 2025-05-15 PROCEDURE — 80053 COMPREHEN METABOLIC PANEL: CPT

## 2025-05-15 PROCEDURE — 3600000002 HC SURGERY LEVEL 2 BASE: Performed by: ANESTHESIOLOGY

## 2025-05-15 PROCEDURE — 2580000003 HC RX 258

## 2025-05-15 PROCEDURE — 82962 GLUCOSE BLOOD TEST: CPT

## 2025-05-15 PROCEDURE — 7100000010 HC PHASE II RECOVERY - FIRST 15 MIN: Performed by: ANESTHESIOLOGY

## 2025-05-15 PROCEDURE — 85025 COMPLETE CBC W/AUTO DIFF WBC: CPT

## 2025-05-15 PROCEDURE — 7100000011 HC PHASE II RECOVERY - ADDTL 15 MIN: Performed by: ANESTHESIOLOGY

## 2025-05-15 PROCEDURE — 2709999900 HC NON-CHARGEABLE SUPPLY: Performed by: ANESTHESIOLOGY

## 2025-05-15 RX ORDER — LIDOCAINE HYDROCHLORIDE 5 MG/ML
INJECTION, SOLUTION INFILTRATION; INTRAVENOUS PRN
Status: DISCONTINUED | OUTPATIENT
Start: 2025-05-15 | End: 2025-05-15 | Stop reason: ALTCHOICE

## 2025-05-15 RX ORDER — IOPAMIDOL 612 MG/ML
INJECTION, SOLUTION INTRATHECAL PRN
Status: DISCONTINUED | OUTPATIENT
Start: 2025-05-15 | End: 2025-05-15 | Stop reason: ALTCHOICE

## 2025-05-15 RX ORDER — SODIUM CHLORIDE 9 MG/ML
INJECTION, SOLUTION INTRAVENOUS
Status: DISCONTINUED | OUTPATIENT
Start: 2025-05-15 | End: 2025-05-15 | Stop reason: SDUPTHER

## 2025-05-15 RX ORDER — METHYLPREDNISOLONE ACETATE 40 MG/ML
INJECTION, SUSPENSION INTRA-ARTICULAR; INTRALESIONAL; INTRAMUSCULAR; SOFT TISSUE PRN
Status: DISCONTINUED | OUTPATIENT
Start: 2025-05-15 | End: 2025-05-15 | Stop reason: ALTCHOICE

## 2025-05-15 RX ORDER — MIDAZOLAM HYDROCHLORIDE 1 MG/ML
INJECTION, SOLUTION INTRAMUSCULAR; INTRAVENOUS
Status: DISCONTINUED | OUTPATIENT
Start: 2025-05-15 | End: 2025-05-15 | Stop reason: SDUPTHER

## 2025-05-15 RX ORDER — FENTANYL CITRATE 50 UG/ML
INJECTION, SOLUTION INTRAMUSCULAR; INTRAVENOUS
Status: DISCONTINUED | OUTPATIENT
Start: 2025-05-15 | End: 2025-05-15 | Stop reason: SDUPTHER

## 2025-05-15 RX ADMIN — FENTANYL CITRATE 100 MCG: 50 INJECTION, SOLUTION INTRAMUSCULAR; INTRAVENOUS at 10:53

## 2025-05-15 RX ADMIN — SODIUM CHLORIDE: 9 INJECTION, SOLUTION INTRAVENOUS at 10:35

## 2025-05-15 RX ADMIN — MIDAZOLAM 2 MG: 1 INJECTION INTRAMUSCULAR; INTRAVENOUS at 10:53

## 2025-05-15 ASSESSMENT — PAIN - FUNCTIONAL ASSESSMENT
PAIN_FUNCTIONAL_ASSESSMENT: 0-10
PAIN_FUNCTIONAL_ASSESSMENT: PREVENTS OR INTERFERES SOME ACTIVE ACTIVITIES AND ADLS

## 2025-05-15 ASSESSMENT — LIFESTYLE VARIABLES: SMOKING_STATUS: 1

## 2025-05-15 ASSESSMENT — ENCOUNTER SYMPTOMS: DYSPNEA ACTIVITY LEVEL: NO INTERVAL CHANGE

## 2025-05-15 ASSESSMENT — PAIN DESCRIPTION - DESCRIPTORS: DESCRIPTORS: ACHING;CRUSHING;DISCOMFORT

## 2025-05-15 ASSESSMENT — COPD QUESTIONNAIRES: CAT_SEVERITY: MILD

## 2025-05-15 NOTE — OP NOTE
Operative Note      Patient: Anjana Epperson  YOB: 1985  MRN: [id number]    Date of Procedure: 5/15/2025    Pre-Op Diagnosis Codes:      * Lumbar radicular pain [M54.16]    Post-Op Diagnosis: Same       Procedure(s):  LUMBAR EPIDURAL STEROID INJECTION LUMBAR 5 -SACRAL 1 UNDER FLUOROSCOPIC GUIDANCE    Surgeon(s):  Pavel Dc MD    Assistant:   * No surgical staff found *    Anesthesia: Monitor Anesthesia Care    Estimated Blood Loss (mL): Minimal    Complications: None    Specimens:   * No specimens in log *    Implants:  * No implants in log *      Drains: * No LDAs found *    Findings:  Infection Present At Time Of Surgery (PATOS) (choose all levels that have infection present):  No infection present  Other Findings: good needle placement    Detailed Description of Procedure:   5/15/2025    Patient: Anjana Epperson  :  1985  Age:  39 y.o.  Sex:  female     PRE-OPERATIVE DIAGNOSIS: Lumbar disc displacement, lumbar radiculopathy.    POST-OPERATIVE DIAGNOSIS: Same.    PROCEDURE: Fluoroscopic guided therapeutic lumbar epidural steroid injection at the L5-S1 level .    SURGEON: Pavel Dc MD    ANESTHESIA: MAC    ESTIMATED BLOOD LOSS: None.  ______________________________________________________________________    BRIEF HISTORY:  Anjana Epperson comes in today for  lumbar epidural injection at L5-S1 level. The potential complications of this procedure were discussed with her again today.  She has elected to undergo the aforementioned procedure.    Anjana’s complete History & Physical examination were reviewed in depth, a copy of which is in the chart.      DESCRIPTION OF PROCEDURE:    After confirming written and informed consent, a time-out was performed and Anjana’s name and date of birth, the procedure to be performed as well as the plan for the location of the needle insertion were confirmed.    The patient was brought into the procedure room and placed in the

## 2025-05-15 NOTE — INTERVAL H&P NOTE
Update History & Physical    The patient's History and Physical of April 23, 2025 was reviewed with the patient and I examined the patient. There was no change. The surgical site was confirmed by the patient and me.     Plan: The risks, benefits, expected outcome, and alternative to the recommended procedure have been discussed with the patient. Patient understands and wants to proceed with the procedure.     Electronically signed by Pavel Dc MD on 5/15/2025

## 2025-05-15 NOTE — ANESTHESIA PRE PROCEDURE
Department of Anesthesiology  Preprocedure Note       Name:  Anjana Epperson   Age:  39 y.o.  :  1985                                          MRN:  [id number]         Date:  5/15/2025      Surgeon: Surgeon(s):  Pavel Dc MD    Procedure: Procedure(s):  CAUDAL EPIDURAL STEROID INJECTION UNDER FLUOROSCOPIC GUIDANCE    Medications prior to admission:   Prior to Admission medications    Medication Sig Start Date End Date Taking? Authorizing Provider   pregabalin (LYRICA) 150 MG capsule Take 1 capsule by mouth 2 times daily for 30 days. Max Daily Amount: 300 mg 25 Yes Miriam Curiel PA   cyclobenzaprine (FLEXERIL) 10 MG tablet Take 1 tablet by mouth 3 times daily as needed for Muscle spasms 25 Yes Miriam Curiel PA   adalimumab (HUMIRA) 40 MG/0.4ML AJKT injection pen kit Inject 0.4 mLs into the skin every 14 days 25  Yes Cruz Robins DO   insulin aspart (NOVOLOG FLEXPEN) 100 UNIT/ML injection pen Inject 5 Units into the skin 3 times daily (before meals) Takes if BS >200.   Yes ProviderEphraim MD   leucovorin calcium (WELLCOVORIN) 5 MG tablet Take 1 tablet by mouth every 7 days 24 Yes Cruz Robins DO   busPIRone (BUSPAR) 10 MG tablet Take 1 tablet by mouth 3 times daily   Yes ProviderEphraim MD   cariprazine hcl (VRAYLAR) 1.5 MG capsule Take 1 capsule by mouth at bedtime   Yes ProviderEphraim MD   venlafaxine (EFFEXOR) 75 MG tablet Take 2 tablets by mouth 2 times daily   Yes ProviderEphraim MD   pantoprazole (PROTONIX) 40 MG tablet Take 1 tablet by mouth nightly 24  Yes Ephraim Escalera MD   traZODone (DESYREL) 50 MG tablet Take 2 tablets by mouth nightly 24  Yes ProviderEphraim MD   folic acid (FOLVITE) 1 MG tablet Take 2 tablets by mouth daily 23  Yes Cruz Robins DO   FLOVENT HFA 44 MCG/ACT inhaler Inhale 2 puffs into the lungs 2 times daily 23  Yes Provider, Historical,

## 2025-05-15 NOTE — ANESTHESIA POSTPROCEDURE EVALUATION
Department of Anesthesiology  Postprocedure Note    Patient: Anjana Epperson  MRN: [id number]  YOB: 1985  Date of evaluation: 5/15/2025    Procedure Summary       Date: 05/15/25 Room / Location: [facility] PROCEDURE ROOM 02 / [facility]    Anesthesia Start: 1048 Anesthesia Stop: 1101    Procedure: LUMBAR EPIDURAL STEROID INJECTION LUMBAR 5 -SACRAL 1 UNDER FLUOROSCOPIC GUIDANCE (Coccyx) Diagnosis:       Lumbar radicular pain      (Lumbar radicular pain [M54.16])    Surgeons: Pavel Dc MD Responsible Provider: Gato Rivera DO    Anesthesia Type: MAC ASA Status: 4            Anesthesia Type: MAC    Jeet Phase I: Jeet Score: 10    Jeet Phase II:      Anesthesia Post Evaluation    Patient location during evaluation: bedside  Patient participation: complete - patient participated  Level of consciousness: awake and alert  Pain score: 0  Airway patency: patent  Nausea & Vomiting: no nausea and no vomiting  Cardiovascular status: hemodynamically stable  Respiratory status: acceptable and room air  Hydration status: stable  Pain management: adequate        No notable events documented.

## 2025-05-15 NOTE — DISCHARGE INSTRUCTIONS
[facility]  [phone]  Dr. Lory Rhodes   Post-Pain Block/Radiofrequency  Home Going Instructions    1-Go home, rest for the remainder of the day  2-Please do not lift over 20 pounds the day of the injection  3-If you received sedation No: alcohol, driving, operating lawn mowers, plows, tractors or other dangerous equipment until next morning. Do not make important decisions or sign legal documents for 24 hours. You may experience light headedness, dizziness, nausea or sleepiness after sedation. Do not stay alone. A responsible adult must be with you for 24 hours. You could be nauseated from the medications you have received. Your IV site may be sore and bruised.    4-No dietary restrictions     5-Resume all medications the same day, blood thinners to be resumed 24 hours after injection if you were instructed to stop any.    6-Keep the surgical site clean and dry, you may shower the next morning and remove the      dressing.     7- No sitz baths, tub baths or hot tubs/swimming for 24 hours.       8- If you have any pain at the injection site(s), application of an ice pack to the area should be       helpful, 20 minutes on/20 minutes off for next 48 hours.  9- Call [facility] immediately at if you develop.  Fever greater than 100.4 F  Have bleeding or drainage from the puncture site  Have progressive Leg/arm numbness and or weakness  Loss of control of bowel and or bladder (wet/soil yourself)  Severe headache with inability to lift head  10-You may return to work the next day

## 2025-05-21 ENCOUNTER — RESULTS FOLLOW-UP (OUTPATIENT)
Dept: RHEUMATOLOGY | Age: 40
End: 2025-05-21

## 2025-05-21 DIAGNOSIS — K76.0 FATTY LIVER: Primary | ICD-10-CM

## 2025-06-20 RX ORDER — LIDOCAINE 50 MG/G
1 PATCH TOPICAL DAILY
Qty: 30 PATCH | Refills: 0 | Status: SHIPPED | OUTPATIENT
Start: 2025-06-20

## 2025-07-23 ENCOUNTER — TELEMEDICINE (OUTPATIENT)
Age: 40
End: 2025-07-23
Payer: MEDICARE

## 2025-07-23 DIAGNOSIS — M54.41 CHRONIC BILATERAL LOW BACK PAIN WITH BILATERAL SCIATICA: ICD-10-CM

## 2025-07-23 DIAGNOSIS — M47.817 LUMBOSACRAL SPONDYLOSIS WITHOUT MYELOPATHY: ICD-10-CM

## 2025-07-23 DIAGNOSIS — M54.16 LUMBAR RADICULAR PAIN: ICD-10-CM

## 2025-07-23 DIAGNOSIS — M25.561 CHRONIC PAIN OF BOTH KNEES: ICD-10-CM

## 2025-07-23 DIAGNOSIS — M54.16 LUMBAR RADICULOPATHY: Primary | ICD-10-CM

## 2025-07-23 DIAGNOSIS — M25.562 CHRONIC PAIN OF BOTH KNEES: ICD-10-CM

## 2025-07-23 DIAGNOSIS — G89.29 CHRONIC PAIN OF BOTH KNEES: ICD-10-CM

## 2025-07-23 DIAGNOSIS — M79.10 MYALGIA: ICD-10-CM

## 2025-07-23 DIAGNOSIS — M76.52 PATELLAR TENDINITIS OF BOTH KNEES: ICD-10-CM

## 2025-07-23 DIAGNOSIS — Z79.891 ENCOUNTER FOR LONG-TERM OPIATE ANALGESIC USE: ICD-10-CM

## 2025-07-23 DIAGNOSIS — G89.4 CHRONIC PAIN SYNDROME: ICD-10-CM

## 2025-07-23 DIAGNOSIS — G89.29 CHRONIC BILATERAL LOW BACK PAIN WITH BILATERAL SCIATICA: ICD-10-CM

## 2025-07-23 DIAGNOSIS — M54.42 CHRONIC BILATERAL LOW BACK PAIN WITH BILATERAL SCIATICA: ICD-10-CM

## 2025-07-23 DIAGNOSIS — M76.51 PATELLAR TENDINITIS OF BOTH KNEES: ICD-10-CM

## 2025-07-23 PROCEDURE — 4004F PT TOBACCO SCREEN RCVD TLK: CPT | Performed by: PHYSICIAN ASSISTANT

## 2025-07-23 PROCEDURE — G8427 DOCREV CUR MEDS BY ELIG CLIN: HCPCS | Performed by: PHYSICIAN ASSISTANT

## 2025-07-23 PROCEDURE — 99213 OFFICE O/P EST LOW 20 MIN: CPT | Performed by: PHYSICIAN ASSISTANT

## 2025-07-23 PROCEDURE — G8417 CALC BMI ABV UP PARAM F/U: HCPCS | Performed by: PHYSICIAN ASSISTANT

## 2025-07-23 RX ORDER — LIDOCAINE 50 MG/G
1 PATCH TOPICAL DAILY
Qty: 30 PATCH | Refills: 2 | Status: SHIPPED | OUTPATIENT
Start: 2025-07-23

## 2025-07-23 RX ORDER — PREGABALIN 150 MG/1
150 CAPSULE ORAL 2 TIMES DAILY
Qty: 60 CAPSULE | Refills: 2 | Status: SHIPPED | OUTPATIENT
Start: 2025-07-23 | End: 2025-08-22

## 2025-07-23 RX ORDER — CYCLOBENZAPRINE HCL 10 MG
10 TABLET ORAL 3 TIMES DAILY PRN
Qty: 90 TABLET | Refills: 2 | Status: SHIPPED | OUTPATIENT
Start: 2025-07-23 | End: 2025-08-22

## 2025-07-23 NOTE — PROGRESS NOTES
Anjana Epperson presents to the [facility] on 7/23/2025. Anjana is complaining of pain lower back. The pain is constant. The pain is described as stabbing. Pain is rated on her best day at a 6, on her worst day at a 10, and on average at a 8 on the VAS scale. She took her last dose of Lyrica, Motrin, Tylenol, and Flexeril 07/23.      Any procedures since your last visit: Yes    She is  on NSAIDS and  is not on anticoagulation medications     Pacemaker or defibrillator: No     Do you want someone present when the provider examines you? No    Medication Contract and Consent for Opioid Use Documents Filed        No documents found                       LMP 02/22/2021 (Approximate)      Patient's last menstrual period was 02/22/2021 (approximate).

## 2025-07-23 NOTE — PROGRESS NOTES
[facility]  [address] [place], [place] [id number]    Follow up Note      Anjana Epperson     Date of Visit:  2025    CC:  Patient presents for follow up   No chief complaint on file.      HPI:    Pain is a little better  Appropriate analgesia with current medications regimen: no.    Change in quality of symptoms:no.    Medication side effects:none.   Recent diagnostic testing:none.   Recent interventional procedures:  05/15/2025  LUMBAR EPIDURAL STEROID INJECTION LUMBAR 5 -SACRAL 1 UNDER FLUOROSCOPIC GUIDANCE - 80% relief with 2 months relief with some diminishing relief.      She has been on anticoagulation medications to include ASA and NSAIDS and has not been on herbal supplements.  She is diabetic.     Imagin/2022 EDX -  Electrodiagnostic examination of both legs disclosed evidence diagnostic of left S1 motor radiculopathy or intracanalicular lesion, with acute and chronic denervation changes.  This was proven with the abnormal needle testing of the paraspinals.     There were no other motor radiculopathies or intracanalicular lesions.  There were no peripheral neuropathies.  Sensory radiculopathies cannot be evaluated by electrodiagnostic means.     Electrodiagnostic study performed 1 year ago found no abnormalities.     Clinically, the patient presented with back pains radiating into her left leg and foot.  On brief neurological examination, I found no motor or sensory compromise.  Her difficulties are related to her radicular disease.  Imaging studies of lumbosacral spine were recommended, as well as physical therapy and weight loss.     Clinical correlation was highly advised.     3/2022 lumbar MRI -  FINDINGS:   BONES/ALIGNMENT: There is normal alignment of the spine. The vertebral body   heights are maintained. The bone marrow signal appears unremarkable.       SPINAL CORD: The conus terminates normally.       SOFT TISSUES: No paraspinal mass identified.       L1-L2: There is

## (undated) DEVICE — GAUZE,SPONGE,4"X4",12PLY,STERILE,LF,2'S: Brand: MEDLINE

## (undated) DEVICE — BANDAGE ADH W1XL3IN NAT FAB WVN FLX DURABLE N ADH PD SEAL

## (undated) DEVICE — SYRINGE, LUER LOCK, 5ML: Brand: MEDLINE

## (undated) DEVICE — PAD SANITARY CRTY MTRN REG ADH STRP DISP NS

## (undated) DEVICE — GAUZE,SPONGE,4"X4",16PLY,XRAY,STRL,LF: Brand: MEDLINE

## (undated) DEVICE — NEEDLE HYPO 18GA L1.5IN PNK POLYPR HUB S STL THN WALL FILL

## (undated) DEVICE — NON-DEHP CATHETER EXTENSION SET, MALE LUER LOCK ADAPTER

## (undated) DEVICE — 3M™ RED DOT™ MONITORING ELECTRODE WITH FOAM TAPE AND STICKY GEL 2560, 50/BAG, 20/CASE, 72/PLT: Brand: RED DOT™

## (undated) DEVICE — COVER,LIGHT HANDLE,FLX,2/PK: Brand: MEDLINE INDUSTRIES, INC.

## (undated) DEVICE — Z DISCONTINUED APPLICATOR SURG PREP 0.35OZ 2% CHG 70% ISO ALC W/ HI LT

## (undated) DEVICE — CONTAINER VACUTAINER ANAER CULTURE SWAB

## (undated) DEVICE — Device: Brand: PORTEX

## (undated) DEVICE — ELECTRODE ARTHSCP W20MM D12MM SHFT L11CM RND MPLR SAFE T G

## (undated) DEVICE — NEEDLE HYPO 25GA L1.5IN BLU POLYPR HUB S STL REG BVL STR

## (undated) DEVICE — GAUZE,SPONGE,4"X4",8PLY,STRL,LF,10/TRAY: Brand: MEDLINE

## (undated) DEVICE — 12 ML SYRINGE,LUER-LOCK TIP: Brand: MONOJECT

## (undated) DEVICE — GLOVE SURG BEAD CUF 6 STD PF WHT STRL TRIUMPH LT LTX

## (undated) DEVICE — CURETTE SUCT DIA3MM ENDOMET O RNG DBL END

## (undated) DEVICE — TRAY PROCED DILATATION CURETTAGE

## (undated) DEVICE — GLOVE ORANGE PI 7 1/2   MSG9075

## (undated) DEVICE — NEEDLE HYPO 18GA L1.5IN PNK POLYPR HUB S STL REG BVL STR

## (undated) DEVICE — 6 ML SYRINGE LUER-LOCK TIP: Brand: MONOJECT

## (undated) DEVICE — BAG PRSS INFUS 1000ML 2 PRSS SFTY VLV RIG HNGR SLIP EASILY

## (undated) DEVICE — SYRINGE MED 5ML STD CLR PLAS LUERLOCK TIP N CTRL DISP

## (undated) DEVICE — SOLUTION IV IRRIG WATER 1000ML POUR BRL 2F7114

## (undated) DEVICE — ELECTRODE ARTHSCP W10MM D10MM SHFT 11CM YEL MPLR LOOP W/

## (undated) DEVICE — CAMERA STRYKER 1488 HD GEN

## (undated) DEVICE — TUBING, SUCTION, 3/16" X 12', STRAIGHT: Brand: MEDLINE

## (undated) DEVICE — PENCIL ES L3M BTTN SWCH HOLSTER W/ BLDE ELECTRD EDGE

## (undated) DEVICE — GLOVE SURG SZ 75 STD WHT LTX SYN POLYMER BEAD REINF ANTI RL

## (undated) DEVICE — PACK,AURORA,LAVH: Brand: MEDLINE

## (undated) DEVICE — PAD,NON-ADHERENT,2X3,STERILE,LF,1/PK: Brand: MEDLINE

## (undated) DEVICE — SET 30 DEGREE HYSTEROSCOPE

## (undated) DEVICE — SWAB SPEC COLL SHFT L5.25IN POLYUR FOAM TIP SFT DBL MEDIA

## (undated) DEVICE — PATIENT RETURN ELECTRODE, SINGLE-USE, CONTACT QUALITY MONITORING, ADULT, WITH 9FT CORD, FOR PATIENTS WEIGING OVER 33LBS. (15KG): Brand: MEGADYNE

## (undated) DEVICE — TOWEL,OR,DSP,ST,BLUE,STD,6/PK,12PK/CS: Brand: MEDLINE

## (undated) DEVICE — Z DISCONTINUED NO SUB IDED KIT ENDOMET ABLAT IMPED CTRL DEV W/ RF CTRL FTSWCH SUCT LN

## (undated) DEVICE — DOUBLE BASIN SET: Brand: MEDLINE INDUSTRIES, INC.

## (undated) DEVICE — Device

## (undated) DEVICE — JELLY,LUBE,STERILE,FLIP TOP,TUBE,4-OZ: Brand: MEDLINE

## (undated) DEVICE — TRAY,VAG PREP,2PR VNYL GLV,4 C: Brand: MEDLINE INDUSTRIES, INC.

## (undated) DEVICE — CYSTO/BLADDER IRRIGATION SET, REGULATING CLAMP

## (undated) DEVICE — CATHETER URETH 16FR RED RUB INTMIT ALL PURP 12 PER CA

## (undated) DEVICE — TRAP,MUCUS SPECIMEN,40CC: Brand: MEDLINE

## (undated) DEVICE — APPLICATOR MEDICATED 50MG L6IN 75% SIL NITRT 25% K NITRT